# Patient Record
Sex: FEMALE | Race: WHITE | NOT HISPANIC OR LATINO | Employment: FULL TIME | URBAN - METROPOLITAN AREA
[De-identification: names, ages, dates, MRNs, and addresses within clinical notes are randomized per-mention and may not be internally consistent; named-entity substitution may affect disease eponyms.]

---

## 2017-02-02 DIAGNOSIS — M25.511 RIGHT SHOULDER PAIN, UNSPECIFIED CHRONICITY: Primary | ICD-10-CM

## 2017-02-03 ENCOUNTER — OFFICE VISIT (OUTPATIENT)
Dept: ORTHOPEDICS | Facility: CLINIC | Age: 62
End: 2017-02-03
Payer: COMMERCIAL

## 2017-02-03 ENCOUNTER — HOSPITAL ENCOUNTER (OUTPATIENT)
Dept: RADIOLOGY | Facility: HOSPITAL | Age: 62
Discharge: HOME | End: 2017-02-03
Attending: ORTHOPAEDIC SURGERY
Payer: COMMERCIAL

## 2017-02-03 VITALS — BODY MASS INDEX: 20.16 KG/M2 | HEIGHT: 68 IN | WEIGHT: 133 LBS

## 2017-02-03 DIAGNOSIS — M25.511 RIGHT SHOULDER PAIN, UNSPECIFIED CHRONICITY: ICD-10-CM

## 2017-02-03 DIAGNOSIS — M85.80 OSTEOPENIA: ICD-10-CM

## 2017-02-03 DIAGNOSIS — M54.12 CERVICAL RADICULAR PAIN: Primary | ICD-10-CM

## 2017-02-03 PROCEDURE — 73030 X-RAY EXAM OF SHOULDER: CPT | Mod: 26,RT,, | Performed by: RADIOLOGY

## 2017-02-03 PROCEDURE — 73030 X-RAY EXAM OF SHOULDER: CPT | Mod: TC,PO,RT

## 2017-02-03 PROCEDURE — 99213 OFFICE O/P EST LOW 20 MIN: CPT | Mod: S$GLB,,, | Performed by: ORTHOPAEDIC SURGERY

## 2017-02-03 PROCEDURE — 99999 PR PBB SHADOW E&M-EST. PATIENT-LVL II: CPT | Mod: PBBFAC,,, | Performed by: ORTHOPAEDIC SURGERY

## 2017-02-14 ENCOUNTER — OFFICE VISIT (OUTPATIENT)
Dept: OPHTHALMOLOGY | Facility: CLINIC | Age: 62
End: 2017-02-14
Payer: COMMERCIAL

## 2017-02-14 DIAGNOSIS — H40.053 OHT (OCULAR HYPERTENSION), BILATERAL: Primary | ICD-10-CM

## 2017-02-14 DIAGNOSIS — H52.13 HIGH MYOPIA, BILATERAL: ICD-10-CM

## 2017-02-14 DIAGNOSIS — Z98.890 HISTORY OF VITRECTOMY: ICD-10-CM

## 2017-02-14 DIAGNOSIS — Z96.1 PSEUDOPHAKIA OF BOTH EYES: ICD-10-CM

## 2017-02-14 PROCEDURE — 92012 INTRM OPH EXAM EST PATIENT: CPT | Mod: S$GLB,,, | Performed by: OPHTHALMOLOGY

## 2017-02-14 PROCEDURE — 99999 PR PBB SHADOW E&M-EST. PATIENT-LVL II: CPT | Mod: PBBFAC,,, | Performed by: OPHTHALMOLOGY

## 2017-02-14 RX ORDER — DORZOLAMIDE HCL 20 MG/ML
1 SOLUTION/ DROPS OPHTHALMIC 2 TIMES DAILY
Qty: 10 ML | Refills: 11 | Status: SHIPPED | OUTPATIENT
Start: 2017-02-14 | End: 2017-07-25

## 2017-02-14 NOTE — MR AVS SNAPSHOT
[facility]  [address] [facility] [address] [place] [address]  Phone: [phone]  Fax: [phone]                  Ashlie Redman   2017 8:30 AM   Office Visit    Description:  Female : 1955   Provider:  Almaz Vasquez MD   Department:  [facility]           Reason for Visit     Glaucoma Suspect           Diagnoses this Visit        Comments    OHT (ocular hypertension), bilateral    -  Primary     Pseudophakia of both eyes         History of vitrectomy         High myopia, bilateral                To Do List           Future Appointments        Provider Department Dept Phone    3/21/2017 3:00 PM Tanna Romeo MD [facility] - [phone]      Goals (5 Years of Data)     None      Follow-Up and Disposition     Return in about 3 months (around 2017) for IOP check, OCT optic nerve.       These Medications        Disp Refills Start End    dorzolamide (TRUSOPT) 2 % ophthalmic solution 10 mL 11 2017    Place 1 drop into both eyes 2 (two) times daily. - Both Eyes    Pharmacy: [id number] - [place], [place] - [address] [facility] Ph #: [phone]         [facility]     [facility] On Call Nurse Care Line -  Assistance  Registered nurses in the [facility] Center provide clinical advisement, health education, appointment booking, and other advisory services.  Call for this free service at [phone].             Medications           Message regarding Medications     Verify the changes and/or additions to your medication regime listed below are the same as discussed with your clinician today.  If any of these changes or additions are incorrect, please notify your healthcare provider.        START taking these NEW medications        Refills    dorzolamide (TRUSOPT) 2 % ophthalmic solution 11    Sig: Place 1 drop into both eyes 2 (two) times daily.    Class: Normal    Route: Both Eyes      STOP taking these  medications     PROZAC 20 mg capsule     timolol maleate 0.5% (TIMOPTIC) 0.5 % Drop Place 1 drop into both eyes every morning.           Verify that the below list of medications is an accurate representation of the medications you are currently taking.  If none reported, the list may be blank. If incorrect, please contact your healthcare provider. Carry this list with you in case of emergency.           Current Medications     estradiol-levonorgestrel (CLIMARA PRO) 0.045-0.015 mg/24 hr Place 1 patch onto the skin once a week.    latanoprost 0.005 % ophthalmic solution     latanoprost 0.005 % ophthalmic solution INSTILL ONE DROP INTO EACH EYE ONCE DAILY IN THE EVENING    nystatin (MYCOSTATIN) cream Apply topically 2 (two) times daily.    VALTREX 500 mg tablet     diazepam (VALIUM) 2 MG tablet 1/2 to 1 po q 6h prn anxiety    dorzolamide (TRUSOPT) 2 % ophthalmic solution Place 1 drop into both eyes 2 (two) times daily.    triamcinolone acetonide 0.1% (KENALOG) 0.1 % cream Apply topically 2 (two) times daily.           Clinical Reference Information           Allergies as of 2/14/2017     No Known Drug Allergies      Immunizations Administered on Date of Encounter - 2/14/2017     None      Orders Placed During Today's Visit     Future Labs/Procedures Expected by Expires    Posterior Segment OCT Optic Nerve- Both eyes  As directed 2/14/2018      Language Assistance Services     ATTENTION: Language assistance services are available, free of charge. Please call [phone].      ATENCIÓN: Si habla roblesañol, tiene a gabriel disposición servicios gratuitos de asistencia lingüística. [place] al [phone].     [facility] Ý: N?u b?n nói Ti?ng Vi?t, có các d?ch v? h? tr? ngôn ng? mi?n phí dành cho b?n. G?i s? [phone].         [facility] complies with applicable Federal civil rights laws and does not discriminate on the basis of race, color, national origin, age, disability, or sex.

## 2017-02-14 NOTE — PROGRESS NOTES
HPI     4 month IOP check, c/o right eye socket was bothering a few days ago went   to bed with it, has not re occurred,  eye pain, using Timolol .5 every AM   and Latanoprost ou HS. States compliance. Would like to discuss gtts   having trouble with low BP.        Last edited by Tiffany Chamberlain on 2/14/2017  8:50 AM.         Assessment /Plan     For exam results, see Encounter Report.    OHT (ocular hypertension), bilateral  -     Posterior Segment OCT Optic Nerve- Both eyes; Future    Pseudophakia of both eyes    History of vitrectomy    High myopia, bilateral    Other orders  -     dorzolamide (TRUSOPT) 2 % ophthalmic solution; Place 1 drop into both eyes 2 (two) times daily.  Dispense: 10 mL; Refill: 11            OHT (ocular hypertension), bilateral - IOP remains great today with addition of Timolol 0.5% QAM OU on 7/19/16, however having symptomatic hypotension/bradycardia since Jan '17. Prior to this, was running mid 20's with occasional pain OS. IOP was significantly elevated off Latanoprost - Tmax 37/44! Maternal grandmother with glaucoma, no known first degree relatives. CCT thick. Baseline HVF/HRT WNL, small cups on clinical exam OU. Last HRT - possible increase in LCD OD, decrease OS, no RNFL thinning identified. Last Gonio - open to CB OU, few tiny iris root vessels visible, no NV. Will try switching T .5 to Dorzolamide BID - if not effective, then can consider 0.25%, as seems to have had a dramatic response in IOP lowering with Timolol. RTC 3 months for IOP check and OCT optic nerve.    Pseudophakia of both eyes - stable    History of vitrectomy - for floaters/AH

## 2017-03-21 ENCOUNTER — OFFICE VISIT (OUTPATIENT)
Dept: OBSTETRICS AND GYNECOLOGY | Facility: CLINIC | Age: 62
End: 2017-03-21
Payer: COMMERCIAL

## 2017-03-21 VITALS
WEIGHT: 136.69 LBS | BODY MASS INDEX: 20.72 KG/M2 | DIASTOLIC BLOOD PRESSURE: 64 MMHG | HEIGHT: 68 IN | SYSTOLIC BLOOD PRESSURE: 118 MMHG

## 2017-03-21 DIAGNOSIS — Z79.890 POSTMENOPAUSAL HRT (HORMONE REPLACEMENT THERAPY): ICD-10-CM

## 2017-03-21 DIAGNOSIS — Z11.51 SCREENING FOR HPV (HUMAN PAPILLOMAVIRUS): ICD-10-CM

## 2017-03-21 DIAGNOSIS — Z01.419 ROUTINE GYNECOLOGICAL EXAMINATION: Primary | ICD-10-CM

## 2017-03-21 DIAGNOSIS — Z82.62 FAMILY HISTORY OF OSTEOPOROSIS: ICD-10-CM

## 2017-03-21 DIAGNOSIS — N81.2 UTEROVAGINAL PROLAPSE, INCOMPLETE: ICD-10-CM

## 2017-03-21 DIAGNOSIS — N63.20 LEFT BREAST MASS: ICD-10-CM

## 2017-03-21 DIAGNOSIS — N81.11 MIDLINE CYSTOCELE: ICD-10-CM

## 2017-03-21 DIAGNOSIS — R23.2 HOT FLASHES: ICD-10-CM

## 2017-03-21 PROCEDURE — 99396 PREV VISIT EST AGE 40-64: CPT | Mod: S$GLB,,, | Performed by: OBSTETRICS & GYNECOLOGY

## 2017-03-21 PROCEDURE — 87624 HPV HI-RISK TYP POOLED RSLT: CPT

## 2017-03-21 PROCEDURE — 88175 CYTOPATH C/V AUTO FLUID REDO: CPT

## 2017-03-21 PROCEDURE — 99999 PR PBB SHADOW E&M-EST. PATIENT-LVL III: CPT | Mod: PBBFAC,,, | Performed by: OBSTETRICS & GYNECOLOGY

## 2017-03-21 RX ORDER — PAROXETINE 10 MG/1
10 TABLET, FILM COATED ORAL NIGHTLY
Qty: 30 TABLET | Refills: 11 | Status: SHIPPED | OUTPATIENT
Start: 2017-03-21 | End: 2017-05-16

## 2017-03-21 NOTE — PROGRESS NOTES
Chief Complaint   Patient presents with    Well Woman    Breast Mass     left breast for about 2 weeks     Axillary Mass     left 4 days        History of Present Illness: Ashlie Redman is a 62 y.o. female that presents today 3/21/2017 for well gyn visit.    Past Medical History:   Diagnosis Date    Allergy     Asteroid hyalosis of both eyes     Diverticulosis     Glaucoma     High myopia     S/P dilation and curettage        Past Surgical History:   Procedure Laterality Date    breast augmentation  03/2013    CATARACT EXTRACTION W/  INTRAOCULAR LENS IMPLANT Bilateral 2009    COLONOSCOPY  4/2009, 2015    repeat in 5 years    DILATION AND CURETTAGE OF UTERUS      ECTOPIC PREGNANCY SURGERY      EYE SURGERY Bilateral 3/09    Vitrectomy     POLYPECTOMY      x3 2001    Yag Capsulotomy Bilateral 12/2014       Current Outpatient Prescriptions   Medication Sig Dispense Refill    diazepam (VALIUM) 2 MG tablet 1/2 to 1 po q 6h prn anxiety 20 tablet 0    dorzolamide (TRUSOPT) 2 % ophthalmic solution Place 1 drop into both eyes 2 (two) times daily. 10 mL 11    estradiol-levonorgestrel (CLIMARA PRO) 0.045-0.015 mg/24 hr Place 1 patch onto the skin once a week. 4 patch 11    latanoprost 0.005 % ophthalmic solution INSTILL ONE DROP INTO EACH EYE ONCE DAILY IN THE EVENING 8 mL 3    nystatin (MYCOSTATIN) cream Apply topically 2 (two) times daily. 30 g 0    VALTREX 500 mg tablet       triamcinolone acetonide 0.1% (KENALOG) 0.1 % cream Apply topically 2 (two) times daily. 45 g 0     No current facility-administered medications for this visit.        Review of patient's allergies indicates:   Allergen Reactions    No known drug allergies        Family History   Problem Relation Age of Onset    Cancer Sister      rectal; passed    Breast cancer Cousin     Cancer Cousin      breast    Heart disease Father      passed    Colon cancer Mother      hospice    Pancreatic cancer Mother      39yo; 91yo     "Cataracts Mother     Hypertension Mother     Thyroid disease Mother     Cancer Mother      colon    Glaucoma Maternal Grandmother     Amblyopia Neg Hx     Blindness Neg Hx     Diabetes Neg Hx     Macular degeneration Neg Hx     Retinal detachment Neg Hx     Strabismus Neg Hx     Stroke Neg Hx        Social History     Social History    Marital status:      Spouse name: N/A    Number of children: N/A    Years of education: N/A     Occupational History     [facility] (Clinics)     Social History Main Topics    Smoking status: Never Smoker    Smokeless tobacco: Never Used    Alcohol use 0.0 oz/week     0 Standard drinks or equivalent per week      Comment: 1/nt    Drug use: No    Sexual activity: Yes     Partners: Male     Birth control/ protection: Post-menopausal     Other Topics Concern    Are You Pregnant Or Think You May Be? No     Social History Narrative       OB History    Para Term  AB SAB TAB Ectopic Multiple Living   3 2  2 1   1  2      # Outcome Date GA Lbr Beni/2nd Weight Sex Delivery Anes PTL Lv   3   36w0d  2.778 kg (6 lb 2 oz) F Vag-Spont EPI  Y      Birth Comments: no complications   2 Ectopic 1988           1   34w0d  2.07 kg (4 lb 9 oz) F Vag-Spont EPI  Y      Birth Comments: no complications          Review of Symptoms:  GENERAL: Denies weight gain or weight loss. Feeling well overall.   SKIN: Denies rash or lesions.   HEAD: Denies head injury or headache.   NODES: Denies enlarged lymph nodes.   CHEST: Denies chest pain or shortness of breath.   CARDIOVASCULAR: Denies palpitations or left sided chest pain.   ABDOMEN: No abdominal pain, constipation, diarrhea, nausea, vomiting or rectal bleeding.   URINARY: No frequency, dysuria, hematuria, or burning on urination.  HEMATOLOGIC: No easy bruisability or excessive bleeding.   MUSCULOSKELETAL: Denies joint pain or swelling.     /64  Ht 5' 8" (1.727 m)  Wt 62 kg (136 lb " 11 oz)  BMI 20.78 kg/m2  Physical Exam:  APPEARANCE: Well nourished, well developed, in no acute distress.  SKIN: Normal skin turgor, no lesions.  NECK: Neck symmetric without masses   RESPIRATORY: Normal respiratory effort with no retractions or use of accessory muscles  CARDIOVASCULAR: Peripheral vascular system with no swelling no varicosities and palpation of pulses normal  LYMPHATIC: No enlargements of the lymph nodes noted in the neck, axillae, or groin  ABDOMEN: Soft. No tenderness or masses. No hepatosplenomegaly. No hernias.  BREASTS: Symmetrical, no skin changes or visible lesions. No palpable masses, nipple discharge or adenopathy bilaterally.  PELVIC: Normal external female genitalia without lesions. Normal hair distribution. Adequate perineal body, normal urethral meatus. Urethra with no masses.  Bladder nontender. Vagina moist and well rugated without lesions or discharge. Cervix pink and without lesions. No significant cystocele or rectocele. Bimanual exam showed uterus normal size, shape, position, mobile and nontender. Adnexa without masses or tenderness. Urethra and bladder normal. PAP DONE  EXTREMITIES: No clubbing cyanosis or edema.    ASSESSMENT/PLAN:  Routine gynecological examination  -     Liquid-based pap smear, screening    Screening for HPV (human papillomavirus)  -     HPV High Risk Genotypes, PCR          Patient was counseled today on Pap guidelines, recommendation for pelvic exams, mammograms every other year after the age of 40 and annually after the age of 50, Colonoscopy after the age of 50, Dexa Bone Scan and calcium and vitamin D supplementation in menopause and to see her PCP for other health maintenance.   FOLLOW-UP:prn

## 2017-03-21 NOTE — PROGRESS NOTES
Chief Complaint   Patient presents with    Well Woman    Breast Mass     left breast for about 2 weeks     Axillary Mass     left 4 days        History of Present Illness: Ashlie Redman is a 62 y.o. female that presents today 3/21/2017 for well gyn visit.    Past Medical History:   Diagnosis Date    Allergy     Asteroid hyalosis of both eyes     Diverticulosis     Glaucoma     High myopia     Osteopenia     S/P dilation and curettage        Past Surgical History:   Procedure Laterality Date    breast augmentation  03/2013    CATARACT EXTRACTION W/  INTRAOCULAR LENS IMPLANT Bilateral 2009    COLONOSCOPY  4/2009, 2015    repeat in 5 years    DILATION AND CURETTAGE OF UTERUS      ECTOPIC PREGNANCY SURGERY      EYE SURGERY Bilateral 3/09    Vitrectomy     POLYPECTOMY      x3 2001    Yag Capsulotomy Bilateral 12/2014       Current Outpatient Prescriptions   Medication Sig Dispense Refill    diazepam (VALIUM) 2 MG tablet 1/2 to 1 po q 6h prn anxiety 20 tablet 0    dorzolamide (TRUSOPT) 2 % ophthalmic solution Place 1 drop into both eyes 2 (two) times daily. 10 mL 11    estradiol-levonorgestrel (CLIMARA PRO) 0.045-0.015 mg/24 hr Place 1 patch onto the skin once a week. 4 patch 11    latanoprost 0.005 % ophthalmic solution INSTILL ONE DROP INTO EACH EYE ONCE DAILY IN THE EVENING 8 mL 3    nystatin (MYCOSTATIN) cream Apply topically 2 (two) times daily. 30 g 0    VALTREX 500 mg tablet       paroxetine (PAXIL) 10 MG tablet Take 1 tablet (10 mg total) by mouth every evening. 30 tablet 11    triamcinolone acetonide 0.1% (KENALOG) 0.1 % cream Apply topically 2 (two) times daily. 45 g 0     No current facility-administered medications for this visit.        Review of patient's allergies indicates:   Allergen Reactions    No known drug allergies        Family History   Problem Relation Age of Onset    Cancer Sister      rectal; passed    Breast cancer Cousin     Cancer Cousin      breast    Heart  disease Father      passed    Colon cancer Mother      hospice    Pancreatic cancer Mother      39yo; 89yo    Cataracts Mother     Hypertension Mother     Thyroid disease Mother     Cancer Mother      colon    Glaucoma Maternal Grandmother     Amblyopia Neg Hx     Blindness Neg Hx     Diabetes Neg Hx     Macular degeneration Neg Hx     Retinal detachment Neg Hx     Strabismus Neg Hx     Stroke Neg Hx        Social History     Social History    Marital status:      Spouse name: N/A    Number of children: N/A    Years of education: N/A     Occupational History     [facility] (Clinics)     Social History Main Topics    Smoking status: Never Smoker    Smokeless tobacco: Never Used    Alcohol use 0.0 oz/week     0 Standard drinks or equivalent per week      Comment: 1/nt    Drug use: No    Sexual activity: Yes     Partners: Male     Birth control/ protection: Post-menopausal     Other Topics Concern    Are You Pregnant Or Think You May Be? No     Social History Narrative       OB History    Para Term  AB SAB TAB Ectopic Multiple Living   3 2  2 1   1  2      # Outcome Date GA Lbr Beni/2nd Weight Sex Delivery Anes PTL Lv   3   36w0d  2.778 kg (6 lb 2 oz) F Vag-Spont EPI  Y      Birth Comments: no complications   2 Ectopic 1988           1   34w0d  2.07 kg (4 lb 9 oz) F Vag-Spont EPI  Y      Birth Comments: no complications          Review of Symptoms:  GENERAL: Denies weight gain or weight loss. Feeling well overall.   SKIN: Denies rash or lesions.   HEAD: Denies head injury or headache.   NODES: Denies enlarged lymph nodes.   CHEST: Denies chest pain or shortness of breath.   CARDIOVASCULAR: Denies palpitations or left sided chest pain.   ABDOMEN: No abdominal pain, constipation, diarrhea, nausea, vomiting or rectal bleeding.   URINARY: No frequency, dysuria, hematuria, or burning on urination.  HEMATOLOGIC: No easy bruisability or excessive  "bleeding.   MUSCULOSKELETAL: Denies joint pain or swelling.     /64  Ht 5' 8" (1.727 m)  Wt 62 kg (136 lb 11 oz)  BMI 20.78 kg/m2  Physical Exam:  APPEARANCE: Well nourished, well developed, in no acute distress.  SKIN: Normal skin turgor, no lesions.  NECK: Neck symmetric without masses   RESPIRATORY: Normal respiratory effort with no retractions or use of accessory muscles  CARDIOVASCULAR: Peripheral vascular system with no swelling no varicosities and palpation of pulses normal  LYMPHATIC: No enlargements of the lymph nodes noted in the neck, axillae, or groin  ABDOMEN: Soft. No tenderness or masses. No hepatosplenomegaly. No hernias.  BREASTS: Symmetrical, ++ left upper outer quadrant 1 o'clock firm mobile large mass 3 cm X 3 cm and 3 o'clock small 1 cm mass of the left breast   PELVIC: Normal external female genitalia without lesions. Normal hair distribution. Adequate perineal body, normal urethral meatus. Urethra with no masses.  Bladder nontender. Vagina moist and well rugated without lesions or discharge. Cervix pink and without lesions.grade 2 cystocele no rectocele. Bimanual exam showed uterus normal size, shape, position with grade 1, mobile and nontender. Adnexa without masses or tenderness. Urethra and bladder normal. PAP DONE  EXTREMITIES: No clubbing cyanosis or edema.    ASSESSMENT/PLAN:  Routine gynecological examination  -     Liquid-based pap smear, screening    Screening for HPV (human papillomavirus)  -     HPV High Risk Genotypes, PCR    Family history of osteoporosis  -     DXA Bone Density Spine And Hip_Axial Skeleton; Future; Expected date: 3/21/17    Postmenopausal HRT (hormone replacement therapy)  -     estradiol-levonorgestrel (CLIMARA PRO) 0.045-0.015 mg/24 hr; Place 1 patch onto the skin once a week.  Dispense: 4 patch; Refill: 11    Midline cystocele    Uterovaginal prolapse, incomplete    Left breast mass  -     Mammo Digital Diagnostic Bilat with Haroldo; Future; Expected " date: 3/21/17    Hot flashes  -     paroxetine (PAXIL) 10 MG tablet; Take 1 tablet (10 mg total) by mouth every evening.  Dispense: 30 tablet; Refill: 11          Patient was counseled today on Pap guidelines, recommendation for pelvic exams, mammograms every other year after the age of 40 and annually after the age of 50, Colonoscopy after the age of 50, Dexa Bone Scan and calcium and vitamin D supplementation in menopause and to see her PCP for other health maintenance.   FOLLOW-UP:prn

## 2017-03-21 NOTE — MR AVS SNAPSHOT
[facility]  [address] [place] [address]  Phone: [phone]                  Ashlie Redman   3/21/2017 3:00 PM   Office Visit    Description:  Female : 1955   Provider:  Tanna Romeo MD   Department:  [facility] - OB/GYN           Reason for Visit     Well Woman     Breast Mass     Axillary Mass           Diagnoses this Visit        Comments    Routine gynecological examination    -  Primary     Screening for HPV (human papillomavirus)                To Do List           Future Appointments        Provider Department Dept Phone    2017 8:00 AM Almaz Vasquez MD [facility] [phone]    2017 8:30 AM VISUAL STUART [facility] [phone]      Goals (5 Years of Data)     None      [facility]     [facility] Nurse Care Line -  Assistance  Registered nurses in the [facility] Center provide clinical advisement, health education, appointment booking, and other advisory services.  Call for this free service at [phone].             Medications           Message regarding Medications     Verify the changes and/or additions to your medication regime listed below are the same as discussed with your clinician today.  If any of these changes or additions are incorrect, please notify your healthcare provider.             Verify that the below list of medications is an accurate representation of the medications you are currently taking.  If none reported, the list may be blank. If incorrect, please contact your healthcare provider. Carry this list with you in case of emergency.           Current Medications     diazepam (VALIUM) 2 MG tablet 1/2 to 1 po q 6h prn anxiety    dorzolamide (TRUSOPT) 2 % ophthalmic solution Place 1 drop into both eyes 2 (two) times daily.    estradiol-levonorgestrel (CLIMARA PRO) 0.045-0.015 mg/24 hr Place 1 patch onto the skin once a week.    latanoprost 0.005 % ophthalmic  "solution INSTILL ONE DROP INTO EACH EYE ONCE DAILY IN THE EVENING    nystatin (MYCOSTATIN) cream Apply topically 2 (two) times daily.    VALTREX 500 mg tablet     triamcinolone acetonide 0.1% (KENALOG) 0.1 % cream Apply topically 2 (two) times daily.           Clinical Reference Information           Your Vitals Were     BP Height Weight BMI       118/64 5' 8" (1.727 m) 62 kg (136 lb 11 oz) 20.78 kg/m2       Blood Pressure          Most Recent Value    BP  118/64      Allergies as of 3/21/2017     No Known Drug Allergies      Immunizations Administered on Date of Encounter - 3/21/2017     None      Orders Placed During Today's Visit      Normal Orders This Visit    HPV High Risk Genotypes, PCR     Liquid-based pap smear, screening       Language Assistance Services     ATTENTION: Language assistance services are available, free of charge. Please call [phone].      ATENCIÓN: Si eloy pittman, tiene a gabriel disposición servicios gratuitos de asistencia lingüística. Llame al [phone].     DERRICK Ý: N?u b?n nói Ti?ng Vi?t, có các d?ch v? h? tr? ngôn ng? mi?n phí dành cho b?n. G?i s? [phone].         [facility] - OB[facility]GY[facility] complies with applicable Federal civil rights laws and does not discriminate on the basis of race, color, national origin, age, disability, or sex.        "

## 2017-03-23 ENCOUNTER — HOSPITAL ENCOUNTER (OUTPATIENT)
Dept: RADIOLOGY | Facility: HOSPITAL | Age: 62
Discharge: HOME | End: 2017-03-23
Attending: OBSTETRICS & GYNECOLOGY
Payer: COMMERCIAL

## 2017-03-23 DIAGNOSIS — Z82.62 FAMILY HISTORY OF OSTEOPOROSIS: ICD-10-CM

## 2017-03-23 DIAGNOSIS — N63.20 LEFT BREAST MASS: ICD-10-CM

## 2017-03-23 PROCEDURE — 76642 ULTRASOUND BREAST LIMITED: CPT | Mod: 26,50,, | Performed by: RADIOLOGY

## 2017-03-23 PROCEDURE — 77062 BREAST TOMOSYNTHESIS BI: CPT | Mod: 26,,, | Performed by: RADIOLOGY

## 2017-03-23 PROCEDURE — 77080 DXA BONE DENSITY AXIAL: CPT | Mod: 26,,, | Performed by: RADIOLOGY

## 2017-03-23 PROCEDURE — 77080 DXA BONE DENSITY AXIAL: CPT | Mod: TC,PO

## 2017-03-23 PROCEDURE — 77066 DX MAMMO INCL CAD BI: CPT | Mod: TC

## 2017-03-23 PROCEDURE — 77066 DX MAMMO INCL CAD BI: CPT | Mod: 26,,, | Performed by: RADIOLOGY

## 2017-03-23 PROCEDURE — 76642 ULTRASOUND BREAST LIMITED: CPT | Mod: TC,50,PO

## 2017-03-27 LAB
HPV16 DNA SPEC QL NAA+PROBE: NEGATIVE
HPV16+18+H RISK 12 DNA CVX-IMP: NEGATIVE
HPV18 DNA SPEC QL NAA+PROBE: NEGATIVE

## 2017-03-28 ENCOUNTER — OFFICE VISIT (OUTPATIENT)
Dept: OBSTETRICS AND GYNECOLOGY | Facility: CLINIC | Age: 62
End: 2017-03-28
Payer: COMMERCIAL

## 2017-03-28 ENCOUNTER — OFFICE VISIT (OUTPATIENT)
Dept: SURGERY | Facility: CLINIC | Age: 62
End: 2017-03-28
Payer: COMMERCIAL

## 2017-03-28 VITALS
WEIGHT: 136.44 LBS | DIASTOLIC BLOOD PRESSURE: 78 MMHG | BODY MASS INDEX: 20.75 KG/M2 | SYSTOLIC BLOOD PRESSURE: 124 MMHG

## 2017-03-28 VITALS — HEIGHT: 68 IN | BODY MASS INDEX: 20.68 KG/M2 | WEIGHT: 136.44 LBS

## 2017-03-28 DIAGNOSIS — N63.20 MASS OF BREAST, LEFT: Primary | ICD-10-CM

## 2017-03-28 DIAGNOSIS — N61.0 MASTITIS, LEFT, ACUTE: Primary | ICD-10-CM

## 2017-03-28 DIAGNOSIS — R22.32 MASS OF AXILLA, LEFT: ICD-10-CM

## 2017-03-28 PROCEDURE — 99999 PR PBB SHADOW E&M-EST. PATIENT-LVL III: CPT | Mod: PBBFAC,,, | Performed by: OBSTETRICS & GYNECOLOGY

## 2017-03-28 PROCEDURE — 99999 PR PBB SHADOW E&M-EST. PATIENT-LVL III: CPT | Mod: PBBFAC,,, | Performed by: SURGERY

## 2017-03-28 PROCEDURE — 87070 CULTURE OTHR SPECIMN AEROBIC: CPT

## 2017-03-28 PROCEDURE — 99244 OFF/OP CNSLTJ NEW/EST MOD 40: CPT | Mod: S$GLB,,, | Performed by: SURGERY

## 2017-03-28 PROCEDURE — 1160F RVW MEDS BY RX/DR IN RCRD: CPT | Mod: S$GLB,,, | Performed by: OBSTETRICS & GYNECOLOGY

## 2017-03-28 PROCEDURE — 99213 OFFICE O/P EST LOW 20 MIN: CPT | Mod: S$GLB,,, | Performed by: OBSTETRICS & GYNECOLOGY

## 2017-03-28 RX ORDER — CLINDAMYCIN HYDROCHLORIDE 300 MG/1
300 CAPSULE ORAL EVERY 8 HOURS
Qty: 30 CAPSULE | Refills: 0 | Status: SHIPPED | OUTPATIENT
Start: 2017-03-28 | End: 2017-04-11

## 2017-03-28 RX ORDER — SULFAMETHOXAZOLE AND TRIMETHOPRIM 800; 160 MG/1; MG/1
TABLET ORAL
COMMUNITY
Start: 2017-03-23 | End: 2017-04-11

## 2017-03-28 NOTE — LETTER
March 28, 2017      Tanna Romeo MD  [address]  [address]  [facility] [address]           [facility] General Surgery  [address]  [facility] [address]  Phone: [phone]          Patient: Ashlie Redman   MR Number: [id number]   YOB: 1955   Date of Visit: 3/28/2017       Dear Dr. Tanna Romeo:    Thank you for referring Ashlie Redman to me for evaluation. Attached you will find relevant portions of my assessment and plan of care.    If you have questions, please do not hesitate to call me. I look forward to following Ashlie Redman along with you.    Sincerely,    Justice Gallagher MD    Enclosure  CC:  No Recipients    If you would like to receive this communication electronically, please contact [email or web address] or [phone] to request more information on [employer] access.    For providers and/or their staff who would like to refer a patient to [facility], please contact us through our one-stop-shop provider referral line, [facility], at [phone].    If you feel you have received this communication in error or would no longer like to receive these types of communications, please e-mail [email or web address]

## 2017-03-28 NOTE — MR AVS SNAPSHOT
[facility]  [address]  [place] [address]  Phone: [phone]                  Ashlie Redman   3/28/2017 10:20 AM   Office Visit    Description:  Female : 1955   Provider:  Tanna Romeo MD   Department:  [facility]           Reason for Visit     Breast Mass                To Do List           Future Appointments        Provider Department Dept Phone    3/28/2017 10:20 AM Tanna Romeo MD [facility] [phone]    2017 1:00 PM [facility] US[facility] [phone]    2017 8:00 AM Almaz Vasquez MD [facility] Ophthalmology [phone]    2017 8:30 AM [facility] [phone]      Goals (5 Years of Data)     None      [facility] On Call     [facility] On [facility] Nurse Care Line -  Assistance  Registered nurses in the [facility] Center provide clinical advisement, health education, appointment booking, and other advisory services.  Call for this free service at [phone].             Medications           Message regarding Medications     Verify the changes and/or additions to your medication regime listed below are the same as discussed with your clinician today.  If any of these changes or additions are incorrect, please notify your healthcare provider.             Verify that the below list of medications is an accurate representation of the medications you are currently taking.  If none reported, the list may be blank. If incorrect, please contact your healthcare provider. Carry this list with you in case of emergency.           Current Medications     diazepam (VALIUM) 2 MG tablet 1/2 to 1 po q 6h prn anxiety    dorzolamide (TRUSOPT) 2 % ophthalmic solution Place 1 drop into both eyes 2 (two) times daily.    estradiol-levonorgestrel (CLIMARA PRO) 0.045-0.015 mg/24 hr Place 1 patch onto the skin once a week.    latanoprost 0.005 % ophthalmic solution INSTILL ONE  DROP INTO EACH EYE ONCE DAILY IN THE EVENING    nystatin (MYCOSTATIN) cream Apply topically 2 (two) times daily.    paroxetine (PAXIL) 10 MG tablet Take 1 tablet (10 mg total) by mouth every evening.    sulfamethoxazole-trimethoprim 800-160mg (BACTRIM DS) 800-160 mg Tab     triamcinolone acetonide 0.1% (KENALOG) 0.1 % cream Apply topically 2 (two) times daily.    VALTREX 500 mg tablet            Clinical Reference Information           Your Vitals Were     BP Weight BMI          124/78 61.9 kg (136 lb 7.4 oz) 20.75 kg/m2        Blood Pressure          Most Recent Value    BP  124/78      Allergies as of 3/28/2017     No Known Drug Allergies      Immunizations Administered on Date of Encounter - 3/28/2017     None      Language Assistance Services     ATTENTION: Language assistance services are available, free of charge. Please call [phone].      ATENCIÓN: Si habla zain, tiene a gabriel disposición servicios gratuitos de asistencia lingüística. Llame al [phone].     DERRICK Ý: N?u b?n nói Ti?ng Vi?t, có các d?ch v? h? tr? ngôn ng? mi?n phí dành cho b?n. G?i s? [phone].         [facility] complies with applicable Federal civil rights laws and does not discriminate on the basis of race, color, national origin, age, disability, or sex.

## 2017-03-28 NOTE — PROGRESS NOTES
Chief Complaint   Patient presents with    Breast Mass     Left breast mass, swelling has increased, has been on the bactrim since thursday    Nausea    Vomiting       History of Present Illness: Ashlie Redman is a 62 y.o. female that presents today 3/28/2017 for   Chief Complaint   Patient presents with    Breast Mass     Left breast mass, swelling has increased, has been on the bactrim since thursday    Nausea    Vomiting     She reports 1 week on the Bactrim. She reports now having severe worsening breast pain. She has no pain last week and now the breast is causing her severe pain. She reports no improvement in the size of the mass and now the lymph node is more tender.     Past Medical History:   Diagnosis Date    Allergy     Asteroid hyalosis of both eyes     Diverticulosis     Glaucoma     High myopia     Osteopenia     S/P dilation and curettage        Past Surgical History:   Procedure Laterality Date    breast augmentation  03/2013    CATARACT EXTRACTION W/  INTRAOCULAR LENS IMPLANT Bilateral 2009    COLONOSCOPY  4/2009, 2015    repeat in 5 years    DILATION AND CURETTAGE OF UTERUS      ECTOPIC PREGNANCY SURGERY      EYE SURGERY Bilateral 3/09    Vitrectomy     POLYPECTOMY      x3 2001    Yag Capsulotomy Bilateral 12/2014       Current Outpatient Prescriptions   Medication Sig Dispense Refill    diazepam (VALIUM) 2 MG tablet 1/2 to 1 po q 6h prn anxiety 20 tablet 0    dorzolamide (TRUSOPT) 2 % ophthalmic solution Place 1 drop into both eyes 2 (two) times daily. 10 mL 11    estradiol-levonorgestrel (CLIMARA PRO) 0.045-0.015 mg/24 hr Place 1 patch onto the skin once a week. 4 patch 11    latanoprost 0.005 % ophthalmic solution INSTILL ONE DROP INTO EACH EYE ONCE DAILY IN THE EVENING 8 mL 3    nystatin (MYCOSTATIN) cream Apply topically 2 (two) times daily. 30 g 0    paroxetine (PAXIL) 10 MG tablet Take 1 tablet (10 mg total) by mouth every evening. 30 tablet 11     sulfamethoxazole-trimethoprim 800-160mg (BACTRIM DS) 800-160 mg Tab       triamcinolone acetonide 0.1% (KENALOG) 0.1 % cream Apply topically 2 (two) times daily. 45 g 0    VALTREX 500 mg tablet        No current facility-administered medications for this visit.        Review of patient's allergies indicates:   Allergen Reactions    No known drug allergies        Family History   Problem Relation Age of Onset    Cancer Sister      rectal; passed    Breast cancer Cousin     Cancer Cousin      breast    Heart disease Father      passed    Colon cancer Mother      hospice    Pancreatic cancer Mother      39yo; 89yo    Cataracts Mother     Hypertension Mother     Thyroid disease Mother     Cancer Mother      colon    Glaucoma Maternal Grandmother     Amblyopia Neg Hx     Blindness Neg Hx     Diabetes Neg Hx     Macular degeneration Neg Hx     Retinal detachment Neg Hx     Strabismus Neg Hx     Stroke Neg Hx        Social History   Substance Use Topics    Smoking status: Never Smoker    Smokeless tobacco: Never Used    Alcohol use 0.0 oz/week     0 Standard drinks or equivalent per week      Comment: /nt       OB History    Para Term  AB SAB TAB Ectopic Multiple Living   3 2  2 1   1  2      # Outcome Date GA Lbr Beni/2nd Weight Sex Delivery Anes PTL Lv   3   36w0d  2.778 kg (6 lb 2 oz) F Vag-Spont EPI  Y      Birth Comments: no complications   2 Ectopic 1988           1   34w0d  2.07 kg (4 lb 9 oz) F Vag-Spont EPI  Y      Birth Comments: no complications          Review of Symptoms:  GENERAL: Denies weight gain or weight loss. Feeling well overall.   SKIN: Denies rash or lesions.   HEAD: Denies head injury or headache.   NODES: Denies enlarged lymph nodes.   CHEST: Denies chest pain or shortness of breath.   CARDIOVASCULAR: Denies palpitations or left sided chest pain.   ABDOMEN: No abdominal pain, constipation, diarrhea, nausea, vomiting or rectal bleeding.    URINARY: No frequency, dysuria, hematuria, or burning on urination.  HEMATOLOGIC: No easy bruisability or excessive bleeding.   MUSCULOSKELETAL: Denies joint pain or swelling.     /78  Wt 61.9 kg (136 lb 7.4 oz)  BMI 20.75 kg/m2  Physical Exam:  APPEARANCE: Well nourished, well developed, in no acute distress.  SKIN: Normal skin turgor, no lesions.  NECK: Neck symmetric without masses   RESPIRATORY: Normal respiratory effort with no retractions or use of accessory muscles  CARDIOVASCULAR: Peripheral vascular system with no swelling no varicosities and palpation of pulses normal  LYMPHATIC: No enlargements of the lymph nodes noted in the neck, axillae, or groin  ABDOMEN: Soft. No tenderness or masses. No hepatosplenomegaly. No hernias.  BREASTS: no change except now Tender small breast mass at 3 o'clock and tender large lymph node in the left axillary region.       ASSESSMENT/PLAN:  Mastitis, left, acute  -     Ambulatory consult to General Surgery      15 minutes spent today with this patient. Greater than half spent in counseling today.

## 2017-03-29 ENCOUNTER — TELEPHONE (OUTPATIENT)
Dept: RADIOLOGY | Facility: HOSPITAL | Age: 62
End: 2017-03-29

## 2017-03-30 LAB — BACTERIA SPEC AEROBE CULT: NORMAL

## 2017-04-02 ENCOUNTER — PATIENT MESSAGE (OUTPATIENT)
Dept: SURGERY | Facility: CLINIC | Age: 62
End: 2017-04-02

## 2017-04-02 ENCOUNTER — PATIENT MESSAGE (OUTPATIENT)
Dept: OBSTETRICS AND GYNECOLOGY | Facility: CLINIC | Age: 62
End: 2017-04-02

## 2017-04-03 ENCOUNTER — PATIENT MESSAGE (OUTPATIENT)
Dept: OBSTETRICS AND GYNECOLOGY | Facility: CLINIC | Age: 62
End: 2017-04-03

## 2017-04-03 ENCOUNTER — LAB VISIT (OUTPATIENT)
Dept: LAB | Facility: HOSPITAL | Age: 62
End: 2017-04-03
Attending: OBSTETRICS & GYNECOLOGY
Payer: COMMERCIAL

## 2017-04-03 DIAGNOSIS — N63.0 BREAST MASS: Primary | ICD-10-CM

## 2017-04-03 DIAGNOSIS — N63.0 BREAST MASS: ICD-10-CM

## 2017-04-03 LAB
BASOPHILS # BLD AUTO: 0.03 K/UL
BASOPHILS NFR BLD: 0.6 %
DIFFERENTIAL METHOD: ABNORMAL
EOSINOPHIL # BLD AUTO: 0.1 K/UL
EOSINOPHIL NFR BLD: 2.2 %
ERYTHROCYTE [DISTWIDTH] IN BLOOD BY AUTOMATED COUNT: 11.9 %
HCT VFR BLD AUTO: 37.5 %
HGB BLD-MCNC: 13.1 G/DL
LYMPHOCYTES # BLD AUTO: 2.6 K/UL
LYMPHOCYTES NFR BLD: 47.9 %
MCH RBC QN AUTO: 33.6 PG
MCHC RBC AUTO-ENTMCNC: 34.9 %
MCV RBC AUTO: 96 FL
MONOCYTES # BLD AUTO: 0.5 K/UL
MONOCYTES NFR BLD: 8.9 %
NEUTROPHILS # BLD AUTO: 2.2 K/UL
NEUTROPHILS NFR BLD: 40 %
PLATELET # BLD AUTO: 239 K/UL
PMV BLD AUTO: 10.9 FL
RBC # BLD AUTO: 3.9 M/UL
WBC # BLD AUTO: 5.37 K/UL

## 2017-04-03 PROCEDURE — 85025 COMPLETE CBC W/AUTO DIFF WBC: CPT

## 2017-04-03 PROCEDURE — 36415 COLL VENOUS BLD VENIPUNCTURE: CPT | Mod: PO

## 2017-04-05 ENCOUNTER — HOSPITAL ENCOUNTER (OUTPATIENT)
Dept: RADIOLOGY | Facility: HOSPITAL | Age: 62
Discharge: HOME | End: 2017-04-05
Attending: SURGERY
Payer: COMMERCIAL

## 2017-04-05 DIAGNOSIS — R22.32 MASS OF AXILLA, LEFT: ICD-10-CM

## 2017-04-05 DIAGNOSIS — N63.20 MASS OF BREAST, LEFT: ICD-10-CM

## 2017-04-05 DIAGNOSIS — R92.8 ABNORMAL MAMMOGRAM OF LEFT BREAST: ICD-10-CM

## 2017-04-05 PROCEDURE — 19083 BX BREAST 1ST LESION US IMAG: CPT | Mod: PO

## 2017-04-05 PROCEDURE — 19083 BX BREAST 1ST LESION US IMAG: CPT | Mod: ,,, | Performed by: RADIOLOGY

## 2017-04-05 PROCEDURE — 88305 TISSUE EXAM BY PATHOLOGIST: CPT | Performed by: PATHOLOGY

## 2017-04-05 PROCEDURE — 88305 TISSUE EXAM BY PATHOLOGIST: CPT | Mod: 26,,, | Performed by: PATHOLOGY

## 2017-04-05 PROCEDURE — 88360 TUMOR IMMUNOHISTOCHEM/MANUAL: CPT | Mod: 26,,, | Performed by: PATHOLOGY

## 2017-04-10 ENCOUNTER — TELEPHONE (OUTPATIENT)
Dept: RADIOLOGY | Facility: HOSPITAL | Age: 62
End: 2017-04-10

## 2017-04-10 ENCOUNTER — PATIENT MESSAGE (OUTPATIENT)
Dept: OBSTETRICS AND GYNECOLOGY | Facility: CLINIC | Age: 62
End: 2017-04-10

## 2017-04-10 RX ORDER — LATANOPROST 50 UG/ML
SOLUTION/ DROPS OPHTHALMIC
Qty: 8 ML | Refills: 2 | Status: SHIPPED | OUTPATIENT
Start: 2017-04-10 | End: 2018-05-19 | Stop reason: SDUPTHER

## 2017-04-10 RX ORDER — LATANOPROST 50 UG/ML
SOLUTION/ DROPS OPHTHALMIC
Qty: 8 ML | Refills: 3 | Status: CANCELLED | OUTPATIENT
Start: 2017-04-10

## 2017-04-10 NOTE — TELEPHONE ENCOUNTER
..Patient notified of breast biopsy results    FINAL PATHOLOGIC DIAGNOSIS  Left breast, 4:00; 8 cm from nipple, core biopsies:  Invasive ductal carcinoma, grade 2 (3,2,1).  Invasive tumor measures 0.2 cm in greatest linear dimension within the core biopsy specimen.  High-grade ductal carcinoma in situ with comedo necrosis is also present.  Immunohistochemical stains for hormonal receptors will be completed and results will follow in a supplemental  Report.    Appointment scheduled on 4/11/2017 with Dr Gallagher.

## 2017-04-11 ENCOUNTER — OFFICE VISIT (OUTPATIENT)
Dept: SURGERY | Facility: CLINIC | Age: 62
End: 2017-04-11
Payer: COMMERCIAL

## 2017-04-11 ENCOUNTER — TELEPHONE (OUTPATIENT)
Dept: OPHTHALMOLOGY | Facility: CLINIC | Age: 62
End: 2017-04-11

## 2017-04-11 VITALS — SYSTOLIC BLOOD PRESSURE: 134 MMHG | HEART RATE: 58 BPM | DIASTOLIC BLOOD PRESSURE: 66 MMHG

## 2017-04-11 DIAGNOSIS — C50.912 DUCTAL CARCINOMA OF LEFT BREAST: Primary | ICD-10-CM

## 2017-04-11 PROCEDURE — 99999 PR PBB SHADOW E&M-EST. PATIENT-LVL II: CPT | Mod: PBBFAC,,, | Performed by: SURGERY

## 2017-04-11 PROCEDURE — 99214 OFFICE O/P EST MOD 30 MIN: CPT | Mod: S$GLB,,, | Performed by: SURGERY

## 2017-04-11 PROCEDURE — 1160F RVW MEDS BY RX/DR IN RCRD: CPT | Mod: S$GLB,,, | Performed by: SURGERY

## 2017-04-11 NOTE — TELEPHONE ENCOUNTER
----- Message from Aleah Roberts sent at 4/10/2017  4:34 PM CDT -----  [facility] [phone] is calling concerning the prescription Latanoproft. Pharmacy says the quantity does not match up with what they have. Office put 8 ml and it comes in 2.5 ml. Please verify at:      [id number] - [place] - [address] [address] [facility]  [address] [facility] [address]  Phone: [phone] Fax: [phone]

## 2017-04-11 NOTE — TELEPHONE ENCOUNTER
----- Message from Aleah Roberts sent at 4/10/2017  4:34 PM CDT -----  [facility] [phone] is calling concerning the prescription Latanoproft. Pharmacy says the quantity does not match up with what they have. Office put 8 ml and it comes in 2.5 ml. Please verify at:      [id number] - [place] - [address] [facility]  [address] [facility] [address]  Phone: [phone] Fax: [phone]

## 2017-04-12 ENCOUNTER — PATIENT MESSAGE (OUTPATIENT)
Dept: ADMINISTRATIVE | Facility: OTHER | Age: 62
End: 2017-04-12

## 2017-04-12 NOTE — PROGRESS NOTES
Subjective:       Patient ID: Ashlie Redman is a 62 y.o. female.    Chief Complaint: Follow-up (s/p breast biopsy)    HPI Comments: Patient presents to discuss surgical treatment of breast cancer.  Leftt LOQ. she is 6 days following core biopsy, left demonstrating an intermediate grade invasive ductal cancer. Estrogen receptor status is pending. Progesterone receptor status is pending. HER-2/clay receptors by IHC is pending.  She was supposed to have the left axillary node biopsied at the same time but this was not done by radiology.      Review of Systems   Constitutional: Negative for appetite change, chills, diaphoresis, fatigue, fever and unexpected weight change.   HENT: Negative for hearing loss, sore throat, trouble swallowing and voice change.    Eyes: Negative for visual disturbance.   Respiratory: Negative for cough, shortness of breath and wheezing.    Cardiovascular: Negative for chest pain, palpitations and leg swelling.   Gastrointestinal: Negative for abdominal distention, abdominal pain, anal bleeding, blood in stool, constipation, diarrhea, nausea, rectal pain and vomiting.   Genitourinary: Negative for difficulty urinating, dysuria, flank pain, frequency, hematuria, menstrual problem and urgency.   Musculoskeletal: Negative for arthralgias, back pain, joint swelling, myalgias and neck pain.   Skin: Negative for pallor and rash.   Neurological: Negative for dizziness, syncope, weakness and headaches.   Hematological: Negative for adenopathy. Does not bruise/bleed easily.   Psychiatric/Behavioral: Negative for suicidal ideas. The patient is not nervous/anxious.        Objective:      Physical Exam   Constitutional: She is oriented to person, place, and time. She appears well-developed and well-nourished. No distress.   HENT:   Head: Normocephalic and atraumatic.   Right Ear: External ear normal.   Left Ear: External ear normal.   Eyes: Conjunctivae are normal. Pupils are equal, round, and reactive to  light. Right eye exhibits no discharge. Left eye exhibits no discharge.   Neck: No tracheal deviation present. No thyromegaly present.   Cardiovascular: Normal rate and regular rhythm.    Pulmonary/Chest: Effort normal. No respiratory distress. Right breast exhibits no inverted nipple, no mass, no nipple discharge, no skin change and no tenderness. Left breast exhibits mass (3 o'clock, non tender) and tenderness. Left breast exhibits no inverted nipple, no nipple discharge and no skin change. Breasts are asymmetrical.       Musculoskeletal: She exhibits no edema or tenderness.   Lymphadenopathy:     She has no cervical adenopathy.     She has axillary adenopathy.        Left axillary: Pectoral adenopathy present.   Neurological: She is alert and oriented to person, place, and time. No cranial nerve deficit.   Skin: Skin is warm and dry. No rash noted. She is not diaphoretic. No pallor.   Psychiatric: She has a normal mood and affect. Her behavior is normal. Judgment and thought content normal.       Assessment:       1. Ductal carcinoma of left breast        Plan:       Long discussion had with patient regarding treatment options.  She needs to have a biopsy of the axillary node as this appears positive clinically and on ultrasound.  Will probably be candidate for induction chemotherapy.  Breast surgery will probably involve removing or changing implant and require plastic surgery coordination.  She is very anxious at this time, and is considering MD Galvez.  When suggestion made to refer to [facility] she was eager to have it done.  [facility] contacted and will see patient.  Advised if she needs a port we can do it locally or there.  All questions answered.

## 2017-04-17 ENCOUNTER — PATIENT MESSAGE (OUTPATIENT)
Dept: FAMILY MEDICINE | Facility: CLINIC | Age: 62
End: 2017-04-17

## 2017-04-17 DIAGNOSIS — C50.919 MALIGNANT NEOPLASM OF FEMALE BREAST, UNSPECIFIED LATERALITY, UNSPECIFIED SITE OF BREAST: Primary | ICD-10-CM

## 2017-04-18 ENCOUNTER — HOSPITAL ENCOUNTER (OUTPATIENT)
Dept: RADIOLOGY | Facility: HOSPITAL | Age: 62
Discharge: HOME | End: 2017-04-18
Attending: SURGERY
Payer: COMMERCIAL

## 2017-04-18 ENCOUNTER — OFFICE VISIT (OUTPATIENT)
Dept: SURGERY | Facility: CLINIC | Age: 62
End: 2017-04-18
Payer: COMMERCIAL

## 2017-04-18 ENCOUNTER — DOCUMENTATION ONLY (OUTPATIENT)
Dept: SURGERY | Facility: CLINIC | Age: 62
End: 2017-04-18

## 2017-04-18 VITALS
BODY MASS INDEX: 20.61 KG/M2 | HEIGHT: 68 IN | TEMPERATURE: 98 F | SYSTOLIC BLOOD PRESSURE: 129 MMHG | HEART RATE: 65 BPM | DIASTOLIC BLOOD PRESSURE: 75 MMHG | WEIGHT: 136 LBS

## 2017-04-18 DIAGNOSIS — C50.919 BREAST CANCER: ICD-10-CM

## 2017-04-18 DIAGNOSIS — N63.0 BREAST MASS: ICD-10-CM

## 2017-04-18 DIAGNOSIS — C50.919 MALIGNANT NEOPLASM OF FEMALE BREAST, UNSPECIFIED LATERALITY, UNSPECIFIED SITE OF BREAST: Primary | ICD-10-CM

## 2017-04-18 DIAGNOSIS — N63.0 LUMP OR MASS IN BREAST: Primary | ICD-10-CM

## 2017-04-18 DIAGNOSIS — C50.512 PRIMARY CANCER OF LOWER OUTER QUADRANT OF LEFT FEMALE BREAST: ICD-10-CM

## 2017-04-18 PROCEDURE — 38505 NEEDLE BIOPSY LYMPH NODES: CPT | Mod: ,,, | Performed by: RADIOLOGY

## 2017-04-18 PROCEDURE — 88305 TISSUE EXAM BY PATHOLOGIST: CPT | Performed by: PATHOLOGY

## 2017-04-18 PROCEDURE — 77065 DX MAMMO INCL CAD UNI: CPT | Mod: TC,LT

## 2017-04-18 PROCEDURE — 99999 PR PBB SHADOW E&M-EST. PATIENT-LVL IV: CPT | Mod: PBBFAC,,, | Performed by: SURGERY

## 2017-04-18 PROCEDURE — A4648 IMPLANTABLE TISSUE MARKER: HCPCS

## 2017-04-18 PROCEDURE — 77065 DX MAMMO INCL CAD UNI: CPT | Mod: 26,,, | Performed by: RADIOLOGY

## 2017-04-18 PROCEDURE — 88305 TISSUE EXAM BY PATHOLOGIST: CPT | Mod: 26,,, | Performed by: PATHOLOGY

## 2017-04-18 PROCEDURE — 99245 OFF/OP CONSLTJ NEW/EST HI 55: CPT | Mod: S$GLB,,, | Performed by: SURGERY

## 2017-04-18 RX ORDER — LIDOCAINE HYDROCHLORIDE 10 MG/ML
1 INJECTION, SOLUTION EPIDURAL; INFILTRATION; INTRACAUDAL; PERINEURAL ONCE
Status: CANCELLED | OUTPATIENT
Start: 2017-04-18 | End: 2017-04-18

## 2017-04-18 RX ORDER — SODIUM CHLORIDE 9 MG/ML
INJECTION, SOLUTION INTRAVENOUS CONTINUOUS
Status: CANCELLED | OUTPATIENT
Start: 2017-04-18

## 2017-04-18 NOTE — Clinical Note
April 23, 2017      Justice Gallagher MD  [address]  [address]  [place] [address]           [facility] Breast Surgery  [address]  [facility] [address]  Phone: [phone]          Patient: Ashlie Redman   MR Number: [id number]   YOB: 1955   Date of Visit: 4/18/2017       Dear Dr. Justice Gallagher:    Thank you for referring Ashlie Redman to me for evaluation. Attached you will find relevant portions of my assessment and plan of care.    If you have questions, please do not hesitate to call me. I look forward to following Ashlie Redman along with you.    Sincerely,    Alexander Arce MD    Enclosure  CC:  No Recipients    If you would like to receive this communication electronically, please contact [email or web address] or [phone] to request more information on [employer] Link access.    For providers and/or their staff who would like to refer a patient to [facility], please contact us through our one-stop-shop provider referral line, [facility] , at [phone].    If you feel you have received this communication in error or would no longer like to receive these types of communications, please e-mail [email or web address]

## 2017-04-18 NOTE — LETTER
[facility] Breast Surgery  [address]  [facility] [address]  Phone: [phone] April 25, 2017      Justice Gallagher MD  [facility] [address]    Patient: Ashlie Redman   MR Number: [id number]   YOB: 1955   Date of Visit: 4/18/2017     Dear Dr. Gallagher:    Thank you for referring Ashlie Redman to me for evaluation. Below are the relevant portions of my assessment and plan of care.    The patient is a very pleasant 62-year-old  female who presents with her . The patient felt a mass in her left breast in March 2017. This was also described by her primary care physician who ordered a diagnostic mammogram and ultrasound. She subsequently had imaging, which revealed a suspicious asymmetric density and mass in the lateral lower outer 8 o'clock region of the left breast, 8 cm from the nipple. This measured 34 mm by ultrasound and 6 mm by mammogram. It was described as an asymmetric density with an area of focal asymmetry measuring 6 mm seen in the posterior aspect of the left breast at the 3 and 4 o'clock position located 8 cm from the nipple. It was in the lower outer 3 to 4 o'clock position.     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 This was estrogen and progesterone receptor positive and it was also HER-2/clay positive with 3+ staining. The patient also was found to have a benign 12 mm right-sided contralateral simple cyst.     PAST MEDICAL HISTORY: Significant for eye surgery, ectopic pregnancy, bilateral breast augmentation four years ago with some glandular silicone implants. She took oral contraceptive products for approximately 30 years.      GYN HISTORY: Reveals that menarche was at age 12, menopause at age 50. She is on Climara and Paxil in addition to other medications in [facility] record. She took hormone replacement therapy for approximately 12 years and has subsequently  discontinued them with her diagnosis. She is a  3, para 2 with first live birth and pregnancy at age 25 and the second child at age 36.      FAMILY HISTORY: Significant for colon cancer in her mother at age 90 and rectal cancer in her sister at age 48. She has a maternal first cousin diagnosed with breast cancer after the age of 50 and her mother also had had a benign pancreatic mass by the history, which is the region where the patient states was the same vicinity of her eventual development of colon cancer.     Her clinical breast exam is otherwise noncontributory other than the breast exam. The right breast is within normal limits with no suspicious masses, nodules, densities, skin changes or lymphadenopathy. There is a palpable subglandular silicone implant on the right anterior to the pectoralis muscle. There are no lymphadenopathy or breast masses. The left side reveals a 2.5 cm mass-like density in the lateral lower outer 3 to 4 o'clock region of the left breast. Wecan palpate the implant. There are no suspicious skin changes. There is no dimpling of the skin or nipple areolar complex. There is no edema, erythema or thickening of the skin; however, I do feel a palpable, mobile, well circumscribed single 2 cm lymph node, which is highly suspicious.    I am going to clinically stage this patient as having a T2 N1 clinical stage IIB breast cancer. Since she is HER-2 positive, she would be a strong candidate for preoperative neoadjuvant chemotherapy approach with Herceptin and Perjeta based chemotherapy. We will order a PET CT whole body fusion scan. We will schedule her for a right-sided contralateral Port-A-Cath, which will be scheduled for Monday, 2017. We will have the left axilla evaluated by ultrasound and core needle biopsy today. I will also obtain an MRI of her breast as well as obtain genetic counseling for possible genetic testing. We will set her up for Medical Oncology, but she prefers  Medical Oncology consultation on [place] and we will try to have her see Dr. Trell Lau who had taken care of her mother when her mother was diagnosed with the colon cancer.     Consent was obtained for the Port-A-Cath and we will proceed with the anticipated neoadjuvant chemotherapy as outlined above pending the lymph node biopsy.    ADDENDUM: Subsequently, the patient had an ultrasound-guided core needle biopsy of the axilla, which revealed a positive lymph node with abundant carcinoma noted within it. The PET scan was negative for any evidence of distant metastatic disease. The primary tumor was seen in the 4 o'clock region of the left breast as well as the PET avid lymph node that we can palpate. There was also an additional smaller lymph node adjacent to this, which was seen on the PET scan and I would still stage her as clinically stage IIB with a T2 N1 status at this point. Plan will be for neoadjuvant Herceptin-based chemotherapy with Herceptin and Perjeta and we will proceed with a Port-A-Cath insertion on Monday 04/24/2017 as outlined above.    Approximate time spent with the patient and her  today was 60 minutes discussing options of surgical therapy, typically if she receives breast conservation surgery, she would require radiotherapy. She may require adjuvant radiotherapy given the presentation with a large palpable lymph node at this time and HER-2/clay status. Certainly, she would have time to consider her local therapies of breast conservation and radiotherapy versus mastectomy and possible postmastectomy radiotherapy while she undergoes neoadjuvant chemotherapy. We would get her involved with Plastic Surgery should she decide to pursue mastectomies, but at this point, she would like to move forward with the neoadjuvant approach. We discussed all the indications for neoadjuvant chemotherapy including being able to assess response of the primary tumor initiating systemic therapy sooner  allowing her to receive FDA approved double antiHerceptin regimen. The ability to line up for potential results of genetic counseling and possible testing with Plastic Surgery and give her more time to ultimately decide her local therapeutic options. Again, time spent with the patient today was 60 minutes with greater than 50% of that time in counseling.    If you have questions, please do not hesitate to call me. I look forward to following Ashlie along with you.    Sincerely,      Alexander Arce MD  Medical Director, [facility]  Section of General and Oncologic Surgery  [facility]    RLC/hcr    CC  MD Celestina Oliver MD

## 2017-04-18 NOTE — LETTER
[facility] Breast Surgery  [address]  [facility] [address]  Phone: [phone] April 26, 2017      Justice Gallagher MD  [facility] [address] [address]    Patient: Ashlie Redman   MR Number: [id number]   YOB: 1955   Date of Visit: 4/18/2017     Dear Dr. Gallagher:    Thank you for referring Ashlie eRdman to me for evaluation. Below are the relevant portions of my assessment and plan of care.    The patient is a very pleasant 62-year-old  female who presents with her . The patient felt a mass in her left breast in March 2017. This was also described by her primary care physician who ordered a diagnostic mammogram and ultrasound. She subsequently had imaging, which revealed a suspicious asymmetric density and mass in the lateral lower outer 8 o'clock region of the left breast, 8 cm from the nipple. This measured 34 mm by ultrasound and 6 mm by mammogram. It was described as an asymmetric density with an area of focal asymmetry measuring 6 mm seen in the posterior aspect of the left breast at the 3 and 4 o'clock position located 8 cm from the nipple. It was in the lower outer 3 to 4 o'clock position. 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 This was estrogen and progesterone receptor positive and it was also HER-2/clay positive with 3+ staining. The patient also was found to have a benign 12 mm right-sided contralateral simple cyst.    PAST MEDICAL HISTORY: Significant for eye surgery, ectopic pregnancy, bilateral breast augmentation four years ago with some glandular silicone implants. She took oral contraceptive products for approximately 30 years.    GYN HISTORY: Reveals that menarche was at age 12, menopause at age 50. She is on Climara and Paxil in addition to other medications in [facility] record. She took hormone replacement therapy for approximately 12 years and has subsequently  discontinued them with her diagnosis. She is a  3, para 2 with first live birth and pregnancy at age 25 and the second child at age 36.    FAMILY HISTORY: Significant for colon cancer in her mother at age 90 and rectal cancer in her sister at age 48. She has a maternal first cousin diagnosed with breast cancer after the age of 50 and her mother also had had a benign pancreatic mass by the history, which is the region where the patient states was the same vicinity of her eventual development of colon cancer.     Her clinical breast exam is otherwise noncontributory other than the breast exam. The right breast is within normal limits with no suspicious masses, nodules, densities, skin changes or lymphadenopathy. There is a palpable subglandular silicone implant on the right anterior to the pectoralis muscle. There are no lymphadenopathy or breast masses. The left side reveals a 2.5 cm mass-like density in the lateral lower outer 3 to 4 o'clock region of the left breast. We can palpate the implant. There are no suspicious skin changes. There is no dimpling of the skin or nipple areolar complex. There is no edema, erythema or thickening of the skin; however, I do feel a palpable, mobile, well circumscribed single 2 cm lymph node, which is highly suspicious.    I am going to clinically stage this patient as having a T2 N1 clinical stage IIB breast cancer. Since she is HER-2 positive, she would be a strong candidate for preoperative neoadjuvant chemotherapy approach with Herceptin and Perjeta based chemotherapy. We will order a PET CT whole body fusion scan. We will schedule her for a right-sided contralateral Port-A-Cath, which will be scheduled for Monday, 2017. We will have the left axilla evaluated by ultrasound and core needle biopsy today. I will also obtain an MRI of her breast as well as obtain genetic counseling for possible genetic testing. We will set her up for Medical Oncology, but she prefers  Medical Oncology consultation on [place] and we will try to have her see Dr. Trell Lau who had taken care of her mother when her mother was diagnosed with the colon cancer. Consent was obtained for the Port-A-Cath and we will proceed with the anticipated neoadjuvant chemotherapy as outlined above pending the lymph node biopsy.    ADDENDUM: Subsequently, the patient had an ultrasound-guided core needle biopsy of the axilla, which revealed a positive lymph node with abundant carcinoma noted within it. The PET scan was negative for any evidence of distant metastatic disease. The primary tumor was seen in the 4 o'clock region of the left breast as well as the PET avid lymph node that we can palpate. There was also an additional smaller lymph node adjacent to this, which was seen on the PET scan and I would still stage her as clinically stage IIB with a T2 N1 status at this point. Plan will be for neoadjuvant Herceptin-based chemotherapy with Herceptin and Perjeta and we will proceed with a Port-A-Cath insertion on Monday, 04/24/2017 as outlined above.    Approximate time spent with the patient and her  today was 60 minutes discussing options of surgical therapy, typically if she receives breast conservation surgery, she would require radiotherapy. She may require adjuvant radiotherapy given the presentation with a large palpable lymph node at this time and HER-2/clay status. Certainly, she would have time to consider her local therapies of breast conservation and radiotherapy versus mastectomy and possible postmastectomy radiotherapy while she undergoes neoadjuvant chemotherapy. We would get her involved with Plastic Surgery should she decide to pursue mastectomies, but at this point, she would like to move forward with the neoadjuvant approach. We discussed all the indications for neoadjuvant chemotherapy including being able to assess response of the primary tumor initiating systemic therapy sooner  allowing her to receive FDA approved double antiHerceptin regimen. The ability to line up for potential results of genetic counseling and possible testing with Plastic Surgery and give her more time to ultimately decide her local therapeutic options. Again, time spent with the patient today was 60 minutes with greater than 50% of that time in counseling.    If you have questions, please do not hesitate to call me. I look forward to following Ashlie along with you.    Sincerely,      Alexander Arce MD   Medical Director, [facility]  Section of General and Oncologic Surgery  [facility]    RLC/hcr

## 2017-04-18 NOTE — MEDICAL/APP STUDENT
Subjective:       Patient ID: Ashlie Redman is a 62 y.o. female.    Chief Complaint: Consult (New Patient New Cancer)    HPI   This is a 62 y.o female who is referred for evaluation of L breast mass. She first noticed this mass in 2017, since then she saw her PCP and mammogram, ultrasound and biopsy were completed. Biopsy showed invasive ductal carcinoma of L breast, axillary lymph node biopsy was not completed at that time. Patient reports of breast pain 4-5/10 on severity and constantly there, the pain is worse when she walks or is active. She takes tylenol for pain relief and says it reduces her pain. She is unsure whether the mass has grown in size. Patient denies any fevers, chills, nausea, vomiting, weight loss or night sweats at this time.     Gyn History:  Age of menarche is 12. . OCP x 18 years. Menopause age 50. HRT (climara and paxil) x 12 years.     Fam History:   Colon cancer mother (90) and sister (48)  Breast cancer in 1st cousin (patient unsure age)    Review of Systems   Constitutional: Negative for chills, fatigue and fever.   Respiratory: Negative for cough, chest tightness and shortness of breath.    Cardiovascular: Negative for chest pain.   Gastrointestinal: Negative for abdominal distention, abdominal pain, nausea and vomiting.        Loose stools s/p clindamycin   Genitourinary: Negative for difficulty urinating, dysuria and flank pain.   Musculoskeletal: Negative for back pain.   Skin: Negative for color change and rash.   Hematological: Positive for adenopathy.       Objective:      Physical Exam   Constitutional: She is oriented to person, place, and time. She appears well-developed and well-nourished. No distress.   HENT:   Head: Normocephalic and atraumatic.   Eyes: EOM are normal. Pupils are equal, round, and reactive to light.   Neck: Normal range of motion. Neck supple.   Cardiovascular: Normal rate and regular rhythm.    Pulmonary/Chest: Effort normal and breath sounds  normal. No respiratory distress. She has no wheezes.       There is no apparent skin changes or nipple discharge.     R breast: appears normal.    Bilateral breast w/ implants that are palpable.    There is palpable and enlarge lymph node at apical axilla. It is non-tender and mobile.   Abdominal: Soft. Bowel sounds are normal. She exhibits no distension. There is no tenderness.   Musculoskeletal: Normal range of motion. She exhibits no edema.   Neurological: She is alert and oriented to person, place, and time.   Skin: Skin is warm and dry. She is not diaphoretic.   Psychiatric: She has a normal mood and affect. Her behavior is normal. Judgment and thought content normal.   Nursing note and vitals reviewed.      Assessment:       1. Malignant neoplasm of female breast, unspecified laterality, unspecified site of breast        Plan:         63 y/o female with invasive ductal carcinoma    1. US guided core needle biopsy of L apical axillary node  2. MRI scan  3. PET scan   4. Medical oncology referral for chemotherapy for Er-/Pr-/Her2+   5. Port-a-cath placement on R chest

## 2017-04-19 ENCOUNTER — TELEPHONE (OUTPATIENT)
Dept: HEMATOLOGY/ONCOLOGY | Facility: CLINIC | Age: 62
End: 2017-04-19

## 2017-04-19 DIAGNOSIS — C50.919 MALIGNANT NEOPLASM OF FEMALE BREAST, UNSPECIFIED LATERALITY, UNSPECIFIED SITE OF BREAST: Primary | ICD-10-CM

## 2017-04-19 DIAGNOSIS — C50.912 MALIGNANT NEOPLASM OF LEFT FEMALE BREAST, UNSPECIFIED SITE OF BREAST: Primary | ICD-10-CM

## 2017-04-19 NOTE — TELEPHONE ENCOUNTER
Received call from dr. kline's office, patient needs appt record printed and brought to rn navigator for review

## 2017-04-19 NOTE — PROGRESS NOTES
Nurse Navigator Note:     Met with patient during her consult with Dr. Arce. Patient and I reviewed the information she discussed with Dr. Arce, including treatment options, diagnosis, and future plans for workup. Patient and I went over her current future appointments, including PET scan, genetics consult, and MRI. Patient and I went through the new patient binder, explained some of the information and why it is provided.     Patient was given a copy of her appointments, new patient binder, and Dr. Arce's card with my number written on the back. Encouraged her to call me if she has any questions or concerns. Verbalized understanding of all information.

## 2017-04-21 ENCOUNTER — HOSPITAL ENCOUNTER (OUTPATIENT)
Dept: RADIOLOGY | Facility: HOSPITAL | Age: 62
Discharge: HOME | End: 2017-04-21
Attending: SURGERY
Payer: COMMERCIAL

## 2017-04-21 ENCOUNTER — TELEPHONE (OUTPATIENT)
Dept: HEMATOLOGY/ONCOLOGY | Facility: CLINIC | Age: 62
End: 2017-04-21

## 2017-04-21 ENCOUNTER — TELEPHONE (OUTPATIENT)
Dept: SURGERY | Facility: CLINIC | Age: 62
End: 2017-04-21

## 2017-04-21 ENCOUNTER — OFFICE VISIT (OUTPATIENT)
Dept: SURGERY | Facility: CLINIC | Age: 62
End: 2017-04-21
Payer: COMMERCIAL

## 2017-04-21 VITALS — BODY MASS INDEX: 20.67 KG/M2 | HEIGHT: 68 IN | WEIGHT: 136.38 LBS

## 2017-04-21 DIAGNOSIS — C50.919 MALIGNANT NEOPLASM OF FEMALE BREAST, UNSPECIFIED LATERALITY, UNSPECIFIED SITE OF BREAST: ICD-10-CM

## 2017-04-21 DIAGNOSIS — Z80.8 FAMILY HISTORY OF MELANOMA: ICD-10-CM

## 2017-04-21 DIAGNOSIS — C50.919 INVASIVE DUCTAL CARCINOMA OF BREAST, UNSPECIFIED LATERALITY: ICD-10-CM

## 2017-04-21 DIAGNOSIS — Z80.0 FAMILY HISTORY OF RECTAL CANCER: ICD-10-CM

## 2017-04-21 DIAGNOSIS — Z80.0 FAMILY HISTORY OF COLON CANCER IN MOTHER: ICD-10-CM

## 2017-04-21 PROCEDURE — 99999 PR PBB SHADOW E&M-EST. PATIENT-LVL I: CPT | Mod: PBBFAC,,,

## 2017-04-21 PROCEDURE — A9552 F18 FDG: HCPCS

## 2017-04-21 PROCEDURE — 99213 OFFICE O/P EST LOW 20 MIN: CPT | Mod: S$GLB,,, | Performed by: NURSE PRACTITIONER

## 2017-04-21 PROCEDURE — 78815 PET IMAGE W/CT SKULL-THIGH: CPT | Mod: 26,PI,, | Performed by: NUCLEAR MEDICINE

## 2017-04-21 NOTE — TELEPHONE ENCOUNTER
----- Message from Gema Zee sent at 4/21/2017 12:26 PM CDT -----  Contact: self  Patient returning Veena's call regarding scheduling an appointment    Please call      Thanks

## 2017-04-21 NOTE — PROGRESS NOTES
Patient presents to discuss family history concerns. See pedigree for full family history. This most likely represents sporadic cancers in this family, verses a family clustering, however there could be a genetic susceptibility with the cancers reported. Discussed options for genetic testing in detail including genes to test, types of results, implications for self and family and cost of testing/insurance. She desires to proceed with testing. Informed consent obtained. Buccal sample collected and sent to [facility] for [facility] with [facility]. Results expected in 2-3 weeks    Time in counseling 40 min, total time 40 min

## 2017-04-21 NOTE — TELEPHONE ENCOUNTER
Left message on pt VM that she needs to arrive to the 2nd floor DOC[facility] for surgery with Dr Arce on 4/24/2017 @9 am, left contact number for further questions

## 2017-04-21 NOTE — TELEPHONE ENCOUNTER
----- Message from Shanelle Foster sent at 4/21/2017  3:49 PM CDT -----  Contact: self  Ashlie States that she needs a call returned by a nurse in reference to some general questions.                    Please call Ashlie @ [phone]. Thanks :)

## 2017-04-21 NOTE — TELEPHONE ENCOUNTER
Patient scheduled with dr. Ragsdale for consult, to have chemotherapy done on [facility].

## 2017-04-23 PROBLEM — C50.512: Status: ACTIVE | Noted: 2017-04-23

## 2017-04-23 NOTE — PROGRESS NOTES
Patient ID: Ashlie Redman is a 62 y.o. female.     Chief Complaint: Consult (New Patient New Cancer)     HPI   This is a 62 y.o female who is referred for evaluation of L breast mass. She first noticed this mass in 2017, since then she saw her PCP and mammogram, ultrasound and biopsy were completed. Biopsy showed invasive ductal carcinoma of L breast, axillary lymph node biopsy was not completed at that time. Patient reports of breast pain 4-5/10 on severity and constantly there, the pain is worse when she walks or is active. She takes tylenol for pain relief and says it reduces her pain. She is unsure whether the mass has grown in size. Patient denies any fevers, chills, nausea, vomiting, weight loss or night sweats at this time.      Gyn History:  Age of menarche is 12. . OCP x 18 years. Menopause age 50. HRT (climara and paxil) x 12 years.      Fam History:   Colon cancer mother (90) and sister (48)  Breast cancer in 1st cousin (patient unsure age)     Review of Systems   Constitutional: Negative for chills, fatigue and fever.   Respiratory: Negative for cough, chest tightness and shortness of breath.   Cardiovascular: Negative for chest pain.   Gastrointestinal: Negative for abdominal distention, abdominal pain, nausea and vomiting.   Loose stools s/p clindamycin   Genitourinary: Negative for difficulty urinating, dysuria and flank pain.   Musculoskeletal: Negative for back pain.   Skin: Negative for color change and rash.   Hematological: Positive for adenopathy.       Objective:      Physical Exam   Constitutional: She is oriented to person, place, and time. She appears well-developed and well-nourished. No distress.   HENT:   Head: Normocephalic and atraumatic.   Eyes: EOM are normal. Pupils are equal, round, and reactive to light.   Neck: Normal range of motion. Neck supple.   Cardiovascular: Normal rate and regular rhythm.   Pulmonary/Chest: Effort normal and breath sounds normal. No  respiratory distress. She has no wheezes.       There is no apparent skin changes or nipple discharge.     R breast: appears normal.    Bilateral breast w/ implants that are palpable.    There is palpable and enlarge lymph node at apical axilla. It is non-tender and mobile.   Abdominal: Soft. Bowel sounds are normal. She exhibits no distension. There is no tenderness.   Musculoskeletal: Normal range of motion. She exhibits no edema.   Neurological: She is alert and oriented to person, place, and time.   Skin: Skin is warm and dry. She is not diaphoretic.   Psychiatric: She has a normal mood and affect. Her behavior is normal. Judgment and thought content normal.   Nursing note and vitals reviewed.      Assessment:       1. Malignant neoplasm of female breast, unspecified laterality, unspecified site of breast        Plan:         61 y/o female with invasive ductal carcinoma     1. US guided core needle biopsy of L apical axillary node  2. MRI scan  3. PET scan   4. Medical oncology referral for chemotherapy for Er-/Pr-/Her2+   5. Port-a-cath placement on R chest                       DATE OF CONSULTATION:  04/18/2017    CHIEF COMPLAINT:  Newly diagnosed left breast cancer.    HISTORY OF PRESENT ILLNESS:  The patient is a very pleasant 62-year-old    female who presents with her .  The patient felt a mass in her   left breast in March 2017.  This was also described by her primary care   physician who ordered a diagnostic mammogram and ultrasound.  She subsequently   had imaging, which revealed a suspicious asymmetric density and mass in the   lateral lower outer 8 o'clock region of the left breast, 8 cm from the nipple.    This measured 34 mm by ultrasound and 6 mm by mammogram.  It was described as an   asymmetric density with an area of focal asymmetry measuring 6 mm seen in the   posterior aspect of the left breast at the 3 and 4 o'clock position located 8 cm   from the nipple.  It was in the lower  outer 3 to 4 o'clock position.  The   patient underwent core needle biopsy, which revealed a grade II invasive ductal   carcinoma along with high-grade DCIS.  This was estrogen and progesterone   receptor positive and it was also HER-2/clay positive with 3+ staining.  The   patient also was found to have a benign 12 mm right-sided contralateral simple   cyst.    PAST MEDICAL HISTORY:  Significant for eye surgery, ectopic pregnancy, bilateral   breast augmentation four years ago with some glandular silicone implants.  She   took oral contraceptive products for approximately 30 years.    GYN HISTORY:  Reveals that menarche was at age 12, menopause at age 50.  She is   on Climara and Paxil in addition to other medications in [facility] record.  She took   hormone replacement therapy for approximately 12 years and has subsequently   discontinued them with her diagnosis.  She is a  3, para 2 with first   live birth and pregnancy at age 25 and the second child at age 36.    FAMILY HISTORY:  Significant for colon cancer in her mother at age 90 and rectal   cancer in her sister at age 48.  She has a maternal first cousin diagnosed with   breast cancer after the age of 50 and her mother also had had a benign   pancreatic mass by the history, which is the region where the patient states was   the same vicinity of her eventual development of colon cancer.  Her clinical   breast exam is otherwise noncontributory other than the breast exam.  The right   breast is within normal limits with no suspicious masses, nodules, densities,   skin changes or lymphadenopathy.  There is a palpable subglandular silicone   implant on the right anterior to the pectoralis muscle.  There are no   lymphadenopathy or breast masses.  The left side reveals a 2.5 cm mass-like   density in the lateral lower outer 3 to 4 o'clock region of the left breast.  We   can palpate the implant.  There are no suspicious skin changes.  There is no   dimpling of the  skin or nipple areolar complex.  There is no edema, erythema or   thickening of the skin; however, I do feel a palpable, mobile, well   circumscribed single 2 cm lymph node, which is highly suspicious.    ASSESSMENT AND PLAN:  I am going to clinically stage this patient as having a T2   N1 clinical stage IIB breast cancer.  Since she is HER-2 positive, she would be   a strong candidate for preoperative neoadjuvant chemotherapy approach with   Herceptin and Perjeta based chemotherapy.  We will order a PET CT whole body   fusion scan.  We will schedule her for a right-sided contralateral Port-A-Cath,   which will be scheduled for Monday, 04/24/2017.  We will have the left axilla   evaluated by ultrasound and core needle biopsy today.  I will also obtain an MRI   of her breast as well as obtain genetic counseling for possible genetic   testing.  We will set her up for Medical Oncology, but she prefers Medical   Oncology consultation on [place] and we will try to have her see Dr. Trell Lau who had taken care of her mother when her mother was diagnosed with the   colon cancer.  Consent was obtained for the Port-A-Cath and we will proceed with   the anticipated neoadjuvant chemotherapy as outlined above pending the lymph   node biopsy.    ADDENDUM:  Subsequently, the patient had an ultrasound-guided core needle biopsy   of the axilla, which revealed a positive lymph node with abundant carcinoma   noted within it.  The PET scan was negative for any evidence of distant   metastatic disease.  The primary tumor was seen in the 4 o'clock region of the   left breast as well as the PET avid lymph node that we can palpate.  There was   also an additional smaller lymph node adjacent to this, which was seen on the   PET scan and I would still stage her as clinically stage IIB with a T2 N1 status   at this point.  Plan will be for neoadjuvant Herceptin-based chemotherapy with   Herceptin and Perjeta and we will proceed with  a Port-A-Cath insertion on Monday 04/24/2017 as outlined above.  Approximate time spent with the patient and her    today was 60 minutes discussing options of surgical therapy, typically   if she receives breast conservation surgery, she would require radiotherapy.    She may require adjuvant radiotherapy given the presentation with a large   palpable lymph node at this time and HER-2/clay status.  Certainly, she would   have time to consider her local therapies of breast conservation and   radiotherapy versus mastectomy and possible postmastectomy radiotherapy while   she undergoes neoadjuvant chemotherapy.  We would get her involved with Plastic   Surgery should she decide to pursue mastectomies, but at this point, she would   like to move forward with the neoadjuvant approach.  We discussed all the   indications for neoadjuvant chemotherapy including being able to assess response   of the primary tumor initiating systemic therapy sooner allowing her to receive   FDA approved double antiHerceptin regimen.  The ability to line up for   potential results of genetic counseling and possible testing with Plastic   Surgery and give her more time to ultimately decide her local therapeutic   options.  Again, time spent with the patient today was 60 minutes with greater   than 50% of that time in counseling.      RLC/HN  dd: 04/23/2017 17:07:32 (CDT)  td: 04/24/2017 00:49:35 (CDT)  Doc ID   #[id number]  Job ID #[id number]    CC:     Job # [id number].

## 2017-04-24 ENCOUNTER — HOSPITAL ENCOUNTER (OUTPATIENT)
Facility: HOSPITAL | Age: 62
Discharge: HOME | End: 2017-04-24
Attending: SURGERY | Admitting: SURGERY
Payer: COMMERCIAL

## 2017-04-24 ENCOUNTER — ANESTHESIA EVENT (OUTPATIENT)
Dept: SURGERY | Facility: HOSPITAL | Age: 62
End: 2017-04-24
Payer: COMMERCIAL

## 2017-04-24 ENCOUNTER — ANESTHESIA (OUTPATIENT)
Dept: SURGERY | Facility: HOSPITAL | Age: 62
End: 2017-04-24
Payer: COMMERCIAL

## 2017-04-24 VITALS
TEMPERATURE: 98 F | RESPIRATION RATE: 14 BRPM | SYSTOLIC BLOOD PRESSURE: 137 MMHG | DIASTOLIC BLOOD PRESSURE: 64 MMHG | HEART RATE: 61 BPM | OXYGEN SATURATION: 100 % | BODY MASS INDEX: 20.61 KG/M2 | HEIGHT: 68 IN | WEIGHT: 136 LBS

## 2017-04-24 DIAGNOSIS — C50.912 BREAST CANCER, LEFT: ICD-10-CM

## 2017-04-24 PROCEDURE — 25000003 PHARM REV CODE 250: Performed by: SURGERY

## 2017-04-24 PROCEDURE — 71000015 HC POSTOP RECOV 1ST HR: Performed by: SURGERY

## 2017-04-24 PROCEDURE — 37000009 HC ANESTHESIA EA ADD 15 MINS: Performed by: SURGERY

## 2017-04-24 PROCEDURE — D9220A PRA ANESTHESIA: Mod: CRNA,,, | Performed by: NURSE ANESTHETIST, CERTIFIED REGISTERED

## 2017-04-24 PROCEDURE — 63600175 PHARM REV CODE 636 W HCPCS: Performed by: NURSE ANESTHETIST, CERTIFIED REGISTERED

## 2017-04-24 PROCEDURE — 36000706: Performed by: SURGERY

## 2017-04-24 PROCEDURE — 36000707: Performed by: SURGERY

## 2017-04-24 PROCEDURE — 77001 FLUOROGUIDE FOR VEIN DEVICE: CPT | Mod: 26,,, | Performed by: SURGERY

## 2017-04-24 PROCEDURE — C1788 PORT, INDWELLING, IMP: HCPCS | Performed by: SURGERY

## 2017-04-24 PROCEDURE — D9220A PRA ANESTHESIA: Mod: ANES,,, | Performed by: ANESTHESIOLOGY

## 2017-04-24 PROCEDURE — 36561 INSERT TUNNELED CV CATH: CPT | Mod: ,,, | Performed by: SURGERY

## 2017-04-24 PROCEDURE — 71000044 HC DOSC ROUTINE RECOVERY FIRST HOUR: Performed by: SURGERY

## 2017-04-24 PROCEDURE — 63600175 PHARM REV CODE 636 W HCPCS: Performed by: SURGERY

## 2017-04-24 PROCEDURE — 25000003 PHARM REV CODE 250: Performed by: NURSE ANESTHETIST, CERTIFIED REGISTERED

## 2017-04-24 PROCEDURE — 37000008 HC ANESTHESIA 1ST 15 MINUTES: Performed by: SURGERY

## 2017-04-24 DEVICE — KIT POWERPORT SINGLE 8FR: Type: IMPLANTABLE DEVICE | Site: NECK | Status: FUNCTIONAL

## 2017-04-24 RX ORDER — PROPOFOL 10 MG/ML
VIAL (ML) INTRAVENOUS
Status: DISCONTINUED | OUTPATIENT
Start: 2017-04-24 | End: 2017-04-24

## 2017-04-24 RX ORDER — CEFAZOLIN SODIUM 2 G/50ML
2 SOLUTION INTRAVENOUS
Status: COMPLETED | OUTPATIENT
Start: 2017-04-24 | End: 2017-04-24

## 2017-04-24 RX ORDER — SODIUM CHLORIDE 0.9 % (FLUSH) 0.9 %
3 SYRINGE (ML) INJECTION
Status: DISCONTINUED | OUTPATIENT
Start: 2017-04-24 | End: 2017-04-24 | Stop reason: HOSPADM

## 2017-04-24 RX ORDER — SODIUM CHLORIDE 9 MG/ML
INJECTION, SOLUTION INTRAVENOUS CONTINUOUS
Status: DISCONTINUED | OUTPATIENT
Start: 2017-04-24 | End: 2017-04-24 | Stop reason: HOSPADM

## 2017-04-24 RX ORDER — PROPOFOL 10 MG/ML
VIAL (ML) INTRAVENOUS CONTINUOUS PRN
Status: DISCONTINUED | OUTPATIENT
Start: 2017-04-24 | End: 2017-04-24

## 2017-04-24 RX ORDER — ONDANSETRON 2 MG/ML
4 INJECTION INTRAMUSCULAR; INTRAVENOUS EVERY 12 HOURS PRN
Status: DISCONTINUED | OUTPATIENT
Start: 2017-04-24 | End: 2017-04-24 | Stop reason: HOSPADM

## 2017-04-24 RX ORDER — HYDROMORPHONE HYDROCHLORIDE 1 MG/ML
0.2 INJECTION, SOLUTION INTRAMUSCULAR; INTRAVENOUS; SUBCUTANEOUS EVERY 5 MIN PRN
Status: DISCONTINUED | OUTPATIENT
Start: 2017-04-24 | End: 2017-04-24 | Stop reason: HOSPADM

## 2017-04-24 RX ORDER — BUPIVACAINE HYDROCHLORIDE 5 MG/ML
INJECTION, SOLUTION EPIDURAL; INTRACAUDAL
Status: DISCONTINUED | OUTPATIENT
Start: 2017-04-24 | End: 2017-04-24 | Stop reason: HOSPADM

## 2017-04-24 RX ORDER — MIDAZOLAM HYDROCHLORIDE 1 MG/ML
INJECTION, SOLUTION INTRAMUSCULAR; INTRAVENOUS
Status: DISCONTINUED | OUTPATIENT
Start: 2017-04-24 | End: 2017-04-24

## 2017-04-24 RX ORDER — HYDROCODONE BITARTRATE AND ACETAMINOPHEN 5; 325 MG/1; MG/1
1 TABLET ORAL EVERY 4 HOURS PRN
Status: DISCONTINUED | OUTPATIENT
Start: 2017-04-24 | End: 2017-04-24 | Stop reason: HOSPADM

## 2017-04-24 RX ORDER — FENTANYL CITRATE 50 UG/ML
INJECTION, SOLUTION INTRAMUSCULAR; INTRAVENOUS
Status: DISCONTINUED | OUTPATIENT
Start: 2017-04-24 | End: 2017-04-24

## 2017-04-24 RX ORDER — LIDOCAINE HCL/PF 100 MG/5ML
SYRINGE (ML) INTRAVENOUS
Status: DISCONTINUED | OUTPATIENT
Start: 2017-04-24 | End: 2017-04-24

## 2017-04-24 RX ORDER — LIDOCAINE HYDROCHLORIDE 10 MG/ML
1 INJECTION, SOLUTION EPIDURAL; INFILTRATION; INTRACAUDAL; PERINEURAL ONCE
Status: COMPLETED | OUTPATIENT
Start: 2017-04-24 | End: 2017-04-24

## 2017-04-24 RX ORDER — HYDROCODONE BITARTRATE AND ACETAMINOPHEN 5; 325 MG/1; MG/1
1 TABLET ORAL EVERY 6 HOURS PRN
Qty: 21 TABLET | Refills: 0 | Status: ON HOLD | OUTPATIENT
Start: 2017-04-24 | End: 2017-11-02

## 2017-04-24 RX ORDER — HEPARIN SODIUM (PORCINE) LOCK FLUSH IV SOLN 100 UNIT/ML 100 UNIT/ML
SOLUTION INTRAVENOUS
Status: DISCONTINUED | OUTPATIENT
Start: 2017-04-24 | End: 2017-04-24 | Stop reason: HOSPADM

## 2017-04-24 RX ADMIN — LIDOCAINE HYDROCHLORIDE 100 MG: 20 INJECTION, SOLUTION INTRAVENOUS at 11:04

## 2017-04-24 RX ADMIN — PROPOFOL 200 MCG/KG/MIN: 10 INJECTION, EMULSION INTRAVENOUS at 11:04

## 2017-04-24 RX ADMIN — CEFAZOLIN SODIUM 2 G: 2 SOLUTION INTRAVENOUS at 11:04

## 2017-04-24 RX ADMIN — PROPOFOL 20 MG: 10 INJECTION, EMULSION INTRAVENOUS at 11:04

## 2017-04-24 RX ADMIN — FENTANYL CITRATE 25 MCG: 50 INJECTION, SOLUTION INTRAMUSCULAR; INTRAVENOUS at 11:04

## 2017-04-24 RX ADMIN — LIDOCAINE HYDROCHLORIDE 0.1 MG: 10 INJECTION, SOLUTION EPIDURAL; INFILTRATION; INTRACAUDAL; PERINEURAL at 10:04

## 2017-04-24 RX ADMIN — SODIUM CHLORIDE, SODIUM GLUCONATE, SODIUM ACETATE, POTASSIUM CHLORIDE, MAGNESIUM CHLORIDE, SODIUM PHOSPHATE, DIBASIC, AND POTASSIUM PHOSPHATE: .53; .5; .37; .037; .03; .012; .00082 INJECTION, SOLUTION INTRAVENOUS at 11:04

## 2017-04-24 RX ADMIN — SODIUM CHLORIDE: 0.9 INJECTION, SOLUTION INTRAVENOUS at 10:04

## 2017-04-24 RX ADMIN — MIDAZOLAM HYDROCHLORIDE 2 MG: 1 INJECTION, SOLUTION INTRAMUSCULAR; INTRAVENOUS at 11:04

## 2017-04-24 NOTE — ANESTHESIA RELEASE NOTE
"Anesthesia Release from PACU Note    Patient: Ashlie Redman    Procedure(s) Performed: Procedure(s) (LRB):  [id number] neck poss chest (Right)    Anesthesia type: general    Post pain: Adequate analgesia    Post assessment: no apparent anesthetic complications    Last Vitals:   Visit Vitals    /62 (BP Location: Right leg, Patient Position: Sitting, BP Method: Automatic)    Pulse (!) 53    Temp 36.4 °C (97.6 °F) (Skin)    Resp 14    Ht 5' 8" (1.727 m)    Wt 61.7 kg (136 lb)    SpO2 100%    Breastfeeding No    BMI 20.68 kg/m2       Post vital signs: stable    Level of consciousness: awake    Nausea/Vomiting: no nausea/no vomiting    Complications: none    Airway Patency: patent    Respiratory: unassisted    Cardiovascular: stable and blood pressure at baseline    Hydration: euvolemic  "

## 2017-04-24 NOTE — BRIEF OP NOTE
[facility]  Brief Operative Note     SUMMARY     Surgery Date: 4/24/2017     Surgeon(s) and Role:     * Alexander Arce MD - Primary     * Kenn Amezcua MD - Resident - Assisting        Pre-op Diagnosis:  Malignant neoplasm of left female breast, unspecified site of breast [C50.912]    Post-op Diagnosis:  Post-Op Diagnosis Codes:     * Malignant neoplasm of left female breast, unspecified site of breast [C50.912]    Procedure(s) (LRB):  UIRSHHASV-CEKH-A-CATH neck poss chest (Right)    Anesthesia: Local MAC    Description of the findings of the procedure: port a cath placed in right chest, right sub clavian vein       Estimated Blood Loss: * No values recorded between 4/24/2017 12:09 PM and 4/24/2017 12:51 PM *         Specimens:   Specimen     None          Discharge Note    SUMMARY     Admit Date: 4/24/2017    Discharge Date and Time:  04/24/2017 12:51 PM    Hospital Course (synopsis of major diagnoses, care, treatment, and services provided during the course of the hospital stay): Hospital course: this patient was admitted for an outpatient procedure which they tolerated well without any apparent untoward event       Final Diagnosis: Post-Op Diagnosis Codes:     * Malignant neoplasm of left female breast, unspecified site of breast [C50.912]    Disposition: Home or Self Care    Follow Up/Patient Instructions:     Medications:  Reconciled Home Medications:   Current Discharge Medication List      START taking these medications    Details   hydrocodone-acetaminophen 5-325mg (NORCO) 5-325 mg per tablet Take 1 tablet by mouth every 6 (six) hours as needed for Pain.  Qty: 21 tablet, Refills: 0         CONTINUE these medications which have NOT CHANGED    Details   dorzolamide (TRUSOPT) 2 % ophthalmic solution Place 1 drop into both eyes 2 (two) times daily.  Qty: 10 mL, Refills: 11      !! latanoprost 0.005 % ophthalmic solution INSTILL ONE DROP INTO EACH EYE ONCE DAILY IN THE EVENING  Qty: 8 mL,  Refills: 3      !! latanoprost 0.005 % ophthalmic solution INSTILL ONE DROP INTO EACH EYE ONCE DAILY IN THE EVENING  Qty: 8 mL, Refills: 2    Comments: What was wrong with the refill I sent in December??      diazepam (VALIUM) 2 MG tablet 1/2 to 1 po q 6h prn anxiety  Qty: 20 tablet, Refills: 0    Associated Diagnoses: Depressed      nystatin (MYCOSTATIN) cream Apply topically 2 (two) times daily.  Qty: 30 g, Refills: 0      paroxetine (PAXIL) 10 MG tablet Take 1 tablet (10 mg total) by mouth every evening.  Qty: 30 tablet, Refills: 11    Associated Diagnoses: Hot flashes      triamcinolone acetonide 0.1% (KENALOG) 0.1 % cream Apply topically 2 (two) times daily.  Qty: 45 g, Refills: 0      VALTREX 500 mg tablet        !! - Potential duplicate medications found. Please discuss with provider.          Discharge Procedure Orders  Diet general     Activity as tolerated     Call MD for:  temperature >100.4     Call MD for:  persistent nausea and vomiting     Call MD for:  redness, tenderness, or signs of infection (pain, swelling, redness, odor or green/yellow discharge around incision site)     Call MD for:  difficulty breathing, headache or visual disturbances     Call MD for:  severe uncontrolled pain     Remove dressing in 48 hours   Order Comments: Ok to shower, wash wound with soap and water, keep incision clean and dry at all times, no swimming, no tub bathing.   Steristrips will fall off on their own       Follow-up Information     Follow up with Alexander Arce MD.    Specialty:  General Surgery    Why:  As needed    Contact information:    [address]  [facility] [id number] [phone]

## 2017-04-24 NOTE — H&P
Patient ID: Ashlie Redman is a 62 y.o. female.      Chief Complaint: Consult (New Patient New Cancer)      HPI   This is a 62 y.o female who is referred for evaluation of L breast mass. She first noticed this mass in 2017, since then she saw her PCP and mammogram, ultrasound and biopsy were completed. Biopsy showed invasive ductal carcinoma of L breast, axillary lymph node biopsy was not completed at that time. Patient reports of breast pain 4-5/10 on severity and constantly there, the pain is worse when she walks or is active. She takes tylenol for pain relief and says it reduces her pain. She is unsure whether the mass has grown in size. Patient denies any fevers, chills, nausea, vomiting, weight loss or night sweats at this time.       Gyn History:  Age of menarche is 12. . OCP x 18 years. Menopause age 50. HRT (climara and paxil) x 12 years.       Fam History:   Colon cancer mother (90) and sister (48)  Breast cancer in 1st cousin (patient unsure age)      Review of Systems   Constitutional: Negative for chills, fatigue and fever.   Respiratory: Negative for cough, chest tightness and shortness of breath.   Cardiovascular: Negative for chest pain.   Gastrointestinal: Negative for abdominal distention, abdominal pain, nausea and vomiting.   Loose stools s/p clindamycin   Genitourinary: Negative for difficulty urinating, dysuria and flank pain.   Musculoskeletal: Negative for back pain.   Skin: Negative for color change and rash.   Hematological: Positive for adenopathy.       Objective:       Physical Exam   Constitutional: She is oriented to person, place, and time. She appears well-developed and well-nourished. No distress.   HENT:   Head: Normocephalic and atraumatic.   Eyes: EOM are normal. Pupils are equal, round, and reactive to light.   Neck: Normal range of motion. Neck supple.   Cardiovascular: Normal rate and regular rhythm.   Pulmonary/Chest: Effort normal and breath sounds  normal. No respiratory distress. She has no wheezes.       There is no apparent skin changes or nipple discharge.     R breast: appears normal.    Bilateral breast w/ implants that are palpable.    There is palpable and enlarge lymph node at apical axilla. It is non-tender and mobile.   Abdominal: Soft. Bowel sounds are normal. She exhibits no distension. There is no tenderness.   Musculoskeletal: Normal range of motion. She exhibits no edema.   Neurological: She is alert and oriented to person, place, and time.   Skin: Skin is warm and dry. She is not diaphoretic.   Psychiatric: She has a normal mood and affect. Her behavior is normal. Judgment and thought content normal.   Nursing note and vitals reviewed.      Assessment:       1. Malignant neoplasm of female breast, unspecified laterality, unspecified site of breast        Plan:       63 y/o female with invasive ductal carcinoma      1. US guided core needle biopsy of L apical axillary node  2. MRI scan  3. PET scan   4. Medical oncology referral for chemotherapy for Er-/Pr-/Her2+   5. Port-a-cath placement on R chest

## 2017-04-24 NOTE — ANESTHESIA PREPROCEDURE EVALUATION
04/24/2017  Ashlie Redman is a 62 y.o., female.    Anesthesia Evaluation    I have reviewed the Patient Summary Reports.    I have reviewed the Nursing Notes.   I have reviewed the Medications.     Review of Systems  Anesthesia Hx:  No problems with previous Anesthesia    Hematology/Oncology:        Current/Recent Cancer. Breast left   Cardiovascular:  Cardiovascular Normal  Denies Hypertension.  Denies MI.  Denies CAD.       Pulmonary:  Pulmonary Normal  Denies COPD.  Denies Asthma.  Denies Shortness of breath.  Denies Sleep Apnea.    Hepatic/GI:  Hepatic/GI Normal  Denies GERD.    Neurological:  Neurology Normal Denies TIA.  Denies CVA. Denies Seizures.    Endocrine:  Endocrine Normal Denies Diabetes. Denies Hypothyroidism.  Denies Hyperthyroidism.        Physical Exam  General:  Well nourished    Airway/Jaw/Neck:  Airway Findings: Mouth Opening: Normal Tongue: Normal  General Airway Assessment: Adult  Mallampati: II  TM Distance: Normal, at least 6 cm  Jaw/Neck Findings:  Neck ROM: Normal ROM      Dental:  Dental Findings: In tact        Mental Status:  Mental Status Findings:  Cooperative, Alert and Oriented         Anesthesia Plan  Type of Anesthesia, risks & benefits discussed:  Anesthesia Type:  general  Patient's Preference: GA  Intra-op Monitoring Plan:   Intra-op Monitoring Plan Comments:   Post Op Pain Control Plan:   Post Op Pain Control Plan Comments:   Induction:   IV  Beta Blocker:  Patient is not currently on a Beta-Blocker (No further documentation required).       Informed Consent: Patient understands risks and agrees with Anesthesia plan.  Questions answered. Anesthesia consent signed with patient.  ASA Score: 2     Day of Surgery Review of History & Physical:    H&P update referred to the surgeon.         Ready For Surgery From Anesthesia Perspective.

## 2017-04-24 NOTE — IP AVS SNAPSHOT
[facility]  [address]  [facility] [address]  Phone: [phone]           Patient Discharge Instructions   Our goal is to set you up for success. This packet includes information on your condition, medications, and your home care.  It will help you care for yourself to prevent having to return to the hospital.     Please ask your nurse if you have any questions.      There are many details to remember when preparing to leave the hospital. Here is what you will need to do:    1. Take your medicine. If you are prescribed medications, review your Medication List on the following pages. You may have new medications to  at the pharmacy and others that you'll need to stop taking. Review the instructions for how and when to take your medications. Talk with your doctor or nurses if you are unsure of what to do.     2. Go to your follow-up appointments. Specific follow-up information is listed in the following pages. Your may be contacted by a nurse or clinical provider about future appointments. Be sure we have all of the phone numbers to reach you. Please contact your provider's office if you are unable to make an appointment.     3. Watch for warning signs. Your doctor or nurse will give you detailed warning signs to watch for and when to call for assistance. These instructions may also include educational information about your condition. If you experience any of warning signs to your health, call your doctor.           [facility]  Unless otherwise directed by your provider, please   contact [facility], our nurse care line   that is available for 24/7 assistance.     [phone] (toll-free)     Registered nurses in the [facility]   provide: appointment scheduling, clinical advisement, health education, and other advisory services.                  ** Verify the list of medication(s) below is accurate and up to date. Carry this with you in case of  emergency. If your medications have changed, please notify your healthcare provider.             Medication List      START taking these medications        Additional Info                      hydrocodone-acetaminophen 5-325mg 5-325 mg per tablet   Commonly known as:  NORCO   Quantity:  21 tablet   Refills:  0   Dose:  1 tablet    Instructions:  Take 1 tablet by mouth every 6 (six) hours as needed for Pain.     Begin Date    AM    Noon    PM    Bedtime         CONTINUE taking these medications        Additional Info                      diazePAM 2 MG tablet   Commonly known as:  VALIUM   Quantity:  20 tablet   Refills:  0    Instructions:  1/2 to 1 po q 6h prn anxiety     Begin Date    AM    Noon    PM    Bedtime       dorzolamide 2 % ophthalmic solution   Commonly known as:  TRUSOPT   Quantity:  10 mL   Refills:  11   Dose:  1 drop    Instructions:  Place 1 drop into both eyes 2 (two) times daily.     Begin Date    AM    Noon    PM    Bedtime       * latanoprost 0.005 % ophthalmic solution   Quantity:  8 mL   Refills:  3    Instructions:  INSTILL ONE DROP INTO EACH EYE ONCE DAILY IN THE EVENING     Begin Date    AM    Noon    PM    Bedtime       * latanoprost 0.005 % ophthalmic solution   Quantity:  8 mL   Refills:  2   Comments:  What was wrong with the refill I sent in December??    Instructions:  INSTILL ONE DROP INTO EACH EYE ONCE DAILY IN THE EVENING     Begin Date    AM    Noon    PM    Bedtime       nystatin cream   Commonly known as:  MYCOSTATIN   Quantity:  30 g   Refills:  0    Instructions:  Apply topically 2 (two) times daily.     Begin Date    AM    Noon    PM    Bedtime       paroxetine 10 MG tablet   Commonly known as:  PAXIL   Quantity:  30 tablet   Refills:  11   Dose:  10 mg    Instructions:  Take 1 tablet (10 mg total) by mouth every evening.     Begin Date    AM    Noon    PM    Bedtime       triamcinolone acetonide 0.1% 0.1 % cream   Commonly known as:  KENALOG   Quantity:  45 g   Refills:  0     Instructions:  Apply topically 2 (two) times daily.     Begin Date    AM    Noon    PM    Bedtime       VALTREX 500 MG tablet   Refills:  0   Generic drug:  valacyclovir      Begin Date    AM    Noon    PM    Bedtime       * Notice:  This list has 2 medication(s) that are the same as other medications prescribed for you. Read the directions carefully, and ask your doctor or other care provider to review them with you.         Where to Get Your Medications      You can get these medications from any pharmacy     Bring a paper prescription for each of these medications     hydrocodone-acetaminophen 5-325mg 5-325 mg per tablet                  Please bring to all follow up appointments:    1. A copy of your discharge instructions.  2. All medicines you are currently taking in their original bottles.  3. Identification and insurance card.    Please arrive 15 minutes ahead of scheduled appointment time.    Please call 24 hours in advance if you must reschedule your appointment and/or time.        Your Scheduled Appointments     Apr 25, 2017  1:00 PM CDT   Consult with Celestina Ragsdale MD   [facility] ([facility])    [address]  [facility] [address]   [phone]            Apr 27, 2017  4:30 PM CDT   Mri Breast Cont with [facility] MRI1   [facility] ([facility] )    [address] [address]   [phone]            May 16, 2017  8:00 AM CDT   Established Patient Visit with Almaz Vasquez MD   [facility] ([facility])    [address] [facility] [facility] [address]   [phone]            May 16, 2017  8:30 AM CDT   OCT with VISUAL FIELDS   [facility] ([facility])    [address] [facility] [facility] [address]   [phone]              Follow-up Information     Follow up with Alexander Arce MD.    Specialty:  General Surgery    Why:  As needed    Contact information:     [address]  [facility] [address]  [phone]          Discharge Instructions     Future Orders    Activity as tolerated     Call MD for:  difficulty breathing, headache or visual disturbances     Call MD for:  persistent nausea and vomiting     Call MD for:  redness, tenderness, or signs of infection (pain, swelling, redness, odor or green/yellow discharge around incision site)     Call MD for:  severe uncontrolled pain     Call MD for:  temperature >100.4     Diet general     Questions:    Total calories:      Fat restriction, if any:      Protein restriction, if any:      Na restriction, if any:      Fluid restriction:      Additional restrictions:      Remove dressing in 48 hours     Comments:    Ok to shower, wash wound with soap and water, keep incision clean and dry at all times, no swimming, no tub bathing.   Steristrips will fall off on their own        Discharge Instructions         Discharge Instructions: Caring for Your Central Line  You are going home with a central line. Its also called a central venous access device (CVAD) or central venous catheter (CVC). A small, soft tube (catheter) has been put in a vein that leads to your heart. This provides medicine during your treatment. It is taken out when you no longer need it. At home, you need to take care of your central line to keep it working. A central line has a high infection risk. So you must take extra care washing your hands and preventing the spread of germs. This sheet will help you remember what to do at home.    Understanding your role  · A nurse or other healthcare provider will teach you and your caregivers how to care for the central line. Before leaving the hospital, make sure you understand what to do at home, how long you may need the central line, and when to have a follow-up visit.  · You will likely be told to flush the central line with saline or heparin solution. You may also be told to change the catheters injection  caps and change the bandage (dressing). Or, a nurse may do this for you during a follow-up visit. Only do these things if youre told to do so. Follow the instructions you were given.  Protecting the central line  If the central line gets damaged, it wont work right and could raise your chance of infection. Call your healthcare team right away if any damage occurs. To protect the central line at home:  · Prevent infection. Use good hand hygiene by following the guidelines on this sheet. Dont touch the catheter or dressing unless you need to. And always clean your hands before and after you come in contact with any part of the central line. Your caregivers, family members, and any visitors should use good hand hygiene, too.  · Keep the central line dry. The catheter and dressing must stay dry. Dont take baths, go swimming, use a hot tub, or do other activities that could get the central line wet. Take a sponge bath to avoid getting the central line wet, unless your healthcare provider tells you otherwise. Ask your provider about the best way to keep the line dry when bathing or showering. If the dressing does get wet, change it only if you have been shown how. Otherwise, call your healthcare team right away for help.  · Avoid damage. Dont use any sharp or pointy objects around the catheter. This includes scissors, pins, knives, razors, or anything else that could cut it or put a hole in it (puncture it). Also, dont let anything pull or rub on the catheter, such as clothing.  · Watch for signs of problems. Pay attention to how much of the catheter sticks out from your skin. If this changes at all, let your healthcare provider know. Also watch for cracks, leaks, or other damage. If the dressing becomes dirty, loose, or wet, change it (if you have been instructed to). Or call your healthcare team right away.  · Avoid lowering your chest below your waist. This includes bending at the waist to do things like tying  your shoes. When your chest is below your waist, especially for a long time, the catheters internal tip could slip out of place in the vein.  · Tell your healthcare team if you vomit or have severe coughing. This can also make the catheter slip out of place.  Risk of blood clot  If a blood clot forms it can block blood flow through the vein where the catheter is placed. Signs of a blood clot include pain or swelling in your neck, face, chest or arm. If you have any of these symptoms, call your healthcare provider right away. You may need an ultrasound exam to find the blood clot. You may also be treated with a blood thinner.    Prevent infection with good hand hygiene  A central line can let germs into your body. This can lead to serious and sometimes deadly infections. To prevent infection, its very important that you, your caregivers, and others around you use good hand hygiene. This means washing your hands well with soap and water, and cleaning them with alcohol-based hand gel as directed. Never touch the central line or dressing without first using one of these methods.  To wash your hands with soap and water:  1. Wet your hands with warm water. (Avoid hot water. It can cause skin irritation when you wash your hands often.)  2. Apply enough soap to cover the entire surface of your hands, including your fingers.  3. Rub your hands together briskly for at least 15 seconds. Make sure to rub the front and back of each hand up to the wrist, your fingers and fingernails, between the fingers, and each thumb.  4. Rinse your hands with warm water.  5. Dry your hands completely with a new, unused paper towel. Dont use a cloth towel or other reusable towel. These can harbor germs.  6. Use the paper towel to turn off the faucet, then throw it away. If youre in a bathroom, also use a paper towel to open the door instead of touching the handle.  When you dont have access to soap and water: Use alcohol-based hand gel to  "clean your hands. The gel should have at least 60% alcohol. Follow the instructions on the package. Your healthcare team can answer any questions you have about when to use hand gel, or when its better to wash with soap and water.   When to seek medical care  Call your healthcare provider right away if you have any of the following:  · Pain or burning in your shoulder, chest, back, arm, or leg  · Fever of 100.4°F (38.0°C) or higher  · Chills  · Signs of infection at the catheter site (pain, redness, drainage, burning, or stinging)  · Coughing, wheezing, or shortness of breath  · A racing or irregular heartbeat  · Muscle stiffness or trouble moving  · Gurgling noises coming from the catheter  · The catheter falls out, breaks, cracks, leaks, or has other damage   Date Last Reviewed: 7/1/2016  © 6215-3813 [employer]. [address] [place] [address]. All rights reserved. This information is not intended as a substitute for professional medical care. Always follow your healthcare professional's instructions.            Primary Diagnosis     Your primary diagnosis was:  Breast Cancer      Admission Information     Date & Time Provider Department CSN    4/24/2017  9:12 AM Alexander Arce MD [facility] [id number]      Care Providers     Provider Role Specialty Primary office phone    Alexander Arce MD Attending Provider General Surgery [phone]    Alexander Arce MD Surgeon  General Surgery [phone]      Your Vitals Were     BP Pulse Temp Resp Height Weight    140/73 (BP Location: Right leg, Patient Position: Lying, BP Method: Automatic) 65 97.6 °F (36.4 °C) (Skin) 16 5' 8" (1.727 m) 61.7 kg (136 lb)    SpO2 BMI             100% 20.68 kg/m2         Recent Lab Values        5/5/2010                           9:01 AM           A1C 5.1                       Allergies as of 4/24/2017        Reactions    Bactrim [Sulfamethoxazole-trimethoprim] Rash    Fever, vomiting "      Advance Directives     An advance directive is a document which, in the event you are no longer able to make decisions for yourself, tells your healthcare team what kind of treatment you do or do not want to receive, or who you would like to make those decisions for you.  If you do not currently have an advance directive, [facility] encourages you to create one.  For more information call:  [phone]), [phone] ([phone]),  or log on to [email or web address].        Language Assistance Services     ATTENTION: Language assistance services are available, free of charge. Please call [phone].      ATENCIÓN: Si eloy pittman, tiene a gabriel disposición servicios gratuitos de asistencia lingüística. Llame al [phone].     CHÚ Ý: N?u b?n nói Ti?ng Vi?t, có các d?ch v? h? tr? ngôn ng? mi?n phí dành cho b?n. G?i s? [phone].         [facility] complies with applicable Federal civil rights laws and does not discriminate on the basis of race, color, national origin, age, disability, or sex.

## 2017-04-24 NOTE — ANESTHESIA POSTPROCEDURE EVALUATION
"Anesthesia Post Evaluation    Patient: Ashlie Redman    Procedure(s) Performed: Procedure(s) (LRB):  [id number] neck poss chest (Right)    Final Anesthesia Type: general  Patient location during evaluation: PACU  Patient participation: Yes- Able to Participate  Level of consciousness: awake and alert and oriented  Post-procedure vital signs: reviewed and stable  Pain management: adequate  Airway patency: patent  PONV status at discharge: No PONV  Anesthetic complications: no      Cardiovascular status: hemodynamically stable  Respiratory status: unassisted and spontaneous ventilation  Hydration status: euvolemic  Follow-up not needed.        Visit Vitals    /62 (BP Location: Right leg, Patient Position: Sitting, BP Method: Automatic)    Pulse (!) 53    Temp 36.4 °C (97.6 °F) (Skin)    Resp 14    Ht 5' 8" (1.727 m)    Wt 61.7 kg (136 lb)    SpO2 100%    Breastfeeding No    BMI 20.68 kg/m2       Pain/Lillian Score: Pain Assessment Performed: Yes (4/24/2017  1:21 PM)  Presence of Pain: denies (4/24/2017  1:21 PM)  Lillian Score: 10 (4/24/2017  1:21 PM)      "

## 2017-04-24 NOTE — PROGRESS NOTES
Discharge instructions given, patient verbalized understanding. Consents in chart, Vitals stable, no complaints. Waiting for Dr Amezcua to review CXR so she can be discharged.

## 2017-04-24 NOTE — DISCHARGE INSTRUCTIONS
Discharge Instructions: Caring for Your Central Line  You are going home with a central line. Its also called a central venous access device (CVAD) or central venous catheter (CVC). A small, soft tube (catheter) has been put in a vein that leads to your heart. This provides medicine during your treatment. It is taken out when you no longer need it. At home, you need to take care of your central line to keep it working. A central line has a high infection risk. So you must take extra care washing your hands and preventing the spread of germs. This sheet will help you remember what to do at home.    Understanding your role  · A nurse or other healthcare provider will teach you and your caregivers how to care for the central line. Before leaving the hospital, make sure you understand what to do at home, how long you may need the central line, and when to have a follow-up visit.  · You will likely be told to flush the central line with saline or heparin solution. You may also be told to change the catheters injection caps and change the bandage (dressing). Or, a nurse may do this for you during a follow-up visit. Only do these things if youre told to do so. Follow the instructions you were given.  Protecting the central line  If the central line gets damaged, it wont work right and could raise your chance of infection. Call your healthcare team right away if any damage occurs. To protect the central line at home:  · Prevent infection. Use good hand hygiene by following the guidelines on this sheet. Dont touch the catheter or dressing unless you need to. And always clean your hands before and after you come in contact with any part of the central line. Your caregivers, family members, and any visitors should use good hand hygiene, too.  · Keep the central line dry. The catheter and dressing must stay dry. Dont take baths, go swimming, use a hot tub, or do other activities that could get the central line wet. Take  a sponge bath to avoid getting the central line wet, unless your healthcare provider tells you otherwise. Ask your provider about the best way to keep the line dry when bathing or showering. If the dressing does get wet, change it only if you have been shown how. Otherwise, call your healthcare team right away for help.  · Avoid damage. Dont use any sharp or pointy objects around the catheter. This includes scissors, pins, knives, razors, or anything else that could cut it or put a hole in it (puncture it). Also, dont let anything pull or rub on the catheter, such as clothing.  · Watch for signs of problems. Pay attention to how much of the catheter sticks out from your skin. If this changes at all, let your healthcare provider know. Also watch for cracks, leaks, or other damage. If the dressing becomes dirty, loose, or wet, change it (if you have been instructed to). Or call your healthcare team right away.  · Avoid lowering your chest below your waist. This includes bending at the waist to do things like tying your shoes. When your chest is below your waist, especially for a long time, the catheters internal tip could slip out of place in the vein.  · Tell your healthcare team if you vomit or have severe coughing. This can also make the catheter slip out of place.  Risk of blood clot  If a blood clot forms it can block blood flow through the vein where the catheter is placed. Signs of a blood clot include pain or swelling in your neck, face, chest or arm. If you have any of these symptoms, call your healthcare provider right away. You may need an ultrasound exam to find the blood clot. You may also be treated with a blood thinner.    Prevent infection with good hand hygiene  A central line can let germs into your body. This can lead to serious and sometimes deadly infections. To prevent infection, its very important that you, your caregivers, and others around you use good hand hygiene. This means washing your  hands well with soap and water, and cleaning them with alcohol-based hand gel as directed. Never touch the central line or dressing without first using one of these methods.  To wash your hands with soap and water:  1. Wet your hands with warm water. (Avoid hot water. It can cause skin irritation when you wash your hands often.)  2. Apply enough soap to cover the entire surface of your hands, including your fingers.  3. Rub your hands together briskly for at least 15 seconds. Make sure to rub the front and back of each hand up to the wrist, your fingers and fingernails, between the fingers, and each thumb.  4. Rinse your hands with warm water.  5. Dry your hands completely with a new, unused paper towel. Dont use a cloth towel or other reusable towel. These can harbor germs.  6. Use the paper towel to turn off the faucet, then throw it away. If youre in a bathroom, also use a paper towel to open the door instead of touching the handle.  When you dont have access to soap and water: Use alcohol-based hand gel to clean your hands. The gel should have at least 60% alcohol. Follow the instructions on the package. Your healthcare team can answer any questions you have about when to use hand gel, or when its better to wash with soap and water.   When to seek medical care  Call your healthcare provider right away if you have any of the following:  · Pain or burning in your shoulder, chest, back, arm, or leg  · Fever of 100.4°F (38.0°C) or higher  · Chills  · Signs of infection at the catheter site (pain, redness, drainage, burning, or stinging)  · Coughing, wheezing, or shortness of breath  · A racing or irregular heartbeat  · Muscle stiffness or trouble moving  · Gurgling noises coming from the catheter  · The catheter falls out, breaks, cracks, leaks, or has other damage   Date Last Reviewed: 7/1/2016  © 5175-2318 [employer]. [address] [place] [address], [place] [address]. All rights reserved. This  information is not intended as a substitute for professional medical care. Always follow your healthcare professional's instructions.

## 2017-04-24 NOTE — TRANSFER OF CARE
"Anesthesia Transfer of Care Note    Patient: Ashlie Redman    Procedure(s) Performed: Procedure(s) (LRB):  [id number] neck poss chest (Right)    Patient location: PACU    Anesthesia Type: general    Transport from OR: Transported from OR on room air with adequate spontaneous ventilation    Post pain: adequate analgesia    Post assessment: no apparent anesthetic complications    Post vital signs: stable    Level of consciousness: awake, alert and oriented    Nausea/Vomiting: no nausea/vomiting    Complications: none          Last vitals:   Visit Vitals    BP (!) 137/94 (BP Location: Left arm, Patient Position: Lying, BP Method: Automatic)    Pulse 64    Temp 36.6 °C (97.8 °F) (Oral)    Resp 15    Ht 5' 8" (1.727 m)    Wt 61.7 kg (136 lb)    Breastfeeding No    BMI 20.68 kg/m2     "

## 2017-04-25 ENCOUNTER — INITIAL CONSULT (OUTPATIENT)
Dept: HEMATOLOGY/ONCOLOGY | Facility: CLINIC | Age: 62
End: 2017-04-25
Payer: COMMERCIAL

## 2017-04-25 ENCOUNTER — TELEPHONE (OUTPATIENT)
Dept: HEMATOLOGY/ONCOLOGY | Facility: CLINIC | Age: 62
End: 2017-04-25

## 2017-04-25 ENCOUNTER — LAB VISIT (OUTPATIENT)
Dept: LAB | Facility: HOSPITAL | Age: 62
End: 2017-04-25
Attending: INTERNAL MEDICINE
Payer: COMMERCIAL

## 2017-04-25 VITALS
BODY MASS INDEX: 21.18 KG/M2 | RESPIRATION RATE: 18 BRPM | DIASTOLIC BLOOD PRESSURE: 72 MMHG | WEIGHT: 139.75 LBS | HEART RATE: 57 BPM | HEIGHT: 68 IN | TEMPERATURE: 98 F | SYSTOLIC BLOOD PRESSURE: 134 MMHG

## 2017-04-25 DIAGNOSIS — C50.012 MALIGNANT NEOPLASM OF NIPPLE OF LEFT BREAST IN FEMALE: Primary | ICD-10-CM

## 2017-04-25 DIAGNOSIS — C50.012 MALIGNANT NEOPLASM OF NIPPLE OF LEFT BREAST IN FEMALE: ICD-10-CM

## 2017-04-25 LAB
ALBUMIN SERPL BCP-MCNC: 4.7 G/DL
ALP SERPL-CCNC: 80 U/L
ALT SERPL W/O P-5'-P-CCNC: 33 U/L
ANION GAP SERPL CALC-SCNC: 7 MMOL/L
AST SERPL-CCNC: 30 U/L
BASOPHILS # BLD AUTO: 0.03 K/UL
BASOPHILS NFR BLD: 0.3 %
BILIRUB SERPL-MCNC: 0.6 MG/DL
BUN SERPL-MCNC: 8 MG/DL
CALCIUM SERPL-MCNC: 10 MG/DL
CHLORIDE SERPL-SCNC: 102 MMOL/L
CO2 SERPL-SCNC: 30 MMOL/L
CREAT SERPL-MCNC: 0.75 MG/DL
DIFFERENTIAL METHOD: ABNORMAL
EOSINOPHIL # BLD AUTO: 0.2 K/UL
EOSINOPHIL NFR BLD: 2.3 %
ERYTHROCYTE [DISTWIDTH] IN BLOOD BY AUTOMATED COUNT: 11.9 %
EST. GFR  (AFRICAN AMERICAN): >60 ML/MIN/1.73 M^2
EST. GFR  (NON AFRICAN AMERICAN): >60 ML/MIN/1.73 M^2
GLUCOSE SERPL-MCNC: 97 MG/DL
HCT VFR BLD AUTO: 39.6 %
HGB BLD-MCNC: 13.5 G/DL
LYMPHOCYTES # BLD AUTO: 1.9 K/UL
LYMPHOCYTES NFR BLD: 17.6 %
MCH RBC QN AUTO: 34 PG
MCHC RBC AUTO-ENTMCNC: 34.1 %
MCV RBC AUTO: 100 FL
MONOCYTES # BLD AUTO: 1 K/UL
MONOCYTES NFR BLD: 9.3 %
NEUTROPHILS # BLD AUTO: 7.4 K/UL
NEUTROPHILS NFR BLD: 70.5 %
NRBC BLD-RTO: 0 /100 WBC
PLATELET # BLD AUTO: 199 K/UL
PMV BLD AUTO: 10.4 FL
POTASSIUM SERPL-SCNC: 4.4 MMOL/L
PROT SERPL-MCNC: 7.5 G/DL
RBC # BLD AUTO: 3.97 M/UL
SODIUM SERPL-SCNC: 139 MMOL/L
WBC # BLD AUTO: 10.5 K/UL

## 2017-04-25 PROCEDURE — 1160F RVW MEDS BY RX/DR IN RCRD: CPT | Mod: S$GLB,,, | Performed by: INTERNAL MEDICINE

## 2017-04-25 PROCEDURE — 99999 PR PBB SHADOW E&M-EST. PATIENT-LVL III: CPT | Mod: PBBFAC,,, | Performed by: INTERNAL MEDICINE

## 2017-04-25 PROCEDURE — 99205 OFFICE O/P NEW HI 60 MIN: CPT | Mod: S$GLB,,, | Performed by: INTERNAL MEDICINE

## 2017-04-25 PROCEDURE — 80053 COMPREHEN METABOLIC PANEL: CPT | Mod: PN

## 2017-04-25 PROCEDURE — 85025 COMPLETE CBC W/AUTO DIFF WBC: CPT

## 2017-04-25 PROCEDURE — 36415 COLL VENOUS BLD VENIPUNCTURE: CPT | Mod: PN

## 2017-04-25 PROCEDURE — 85025 COMPLETE CBC W/AUTO DIFF WBC: CPT | Mod: PN

## 2017-04-25 PROCEDURE — 86300 IMMUNOASSAY TUMOR CA 15-3: CPT

## 2017-04-25 PROCEDURE — 80053 COMPREHEN METABOLIC PANEL: CPT

## 2017-04-25 RX ORDER — ONDANSETRON 8 MG/1
8 TABLET, ORALLY DISINTEGRATING ORAL EVERY 12 HOURS PRN
Qty: 30 TABLET | Refills: 1 | Status: SHIPPED | OUTPATIENT
Start: 2017-04-25 | End: 2018-04-25

## 2017-04-25 RX ORDER — PROCHLORPERAZINE MALEATE 10 MG
10 TABLET ORAL EVERY 6 HOURS PRN
Qty: 30 TABLET | Refills: 1 | Status: SHIPPED | OUTPATIENT
Start: 2017-04-25 | End: 2018-04-25

## 2017-04-25 RX ORDER — LIDOCAINE AND PRILOCAINE 25; 25 MG/G; MG/G
CREAM TOPICAL
Qty: 5 G | Refills: 0 | Status: SHIPPED | OUTPATIENT
Start: 2017-04-25 | End: 2017-05-01

## 2017-04-25 NOTE — PROGRESS NOTES
Ashlie Redman, 62-year-old  woman sent in, referral by Dr. Arce.    REASON FOR CONSULTATION:  New diagnosis of breast cancer for neoadjuvant   chemotherapy.    SIGNIFICANT HISTORY:  The patient saw Dr. Arce yesterday.  The patient felt   a left breast mass.  She has had breast augmentation in the past.  Mammogram was   done.  Ultrasound and biopsy were completed when abnormality was noted.  The   patient also had a port placed since then.  Palpable enlarged lymph node in the   left axilla.  The patient's ERP are negative, HER-2 positive, stage IIB   clinically due to the size of breast mass on the PET scan being about 3 cm and   the axillary lymph node about 1.3 cm.  The patient is here for discussion of   neoadjuvant therapy.  She is obviously anxious, concerned about the findings,   wants to get started as soon as possible with therapy.  She is here with her   .  She is 0% on ER and 0% on NV and HER-2 positive.  The patient does not   really have any other medical situations that need to be addressed.  She takes   Valtrex for herpes prophylaxis and postbiopsy has been taking hydrocodone.  She   takes Paxil for depression and Valium for sleep aid.    PHYSICAL EXAM:  GENERAL:  This is a well-built, well-groomed, comfortable patient without any   deformities ambulating to clinic.  Patient is in no distress.    NEURO:  Awake, alert and oriented to time, person and place.  The patient   demonstrates no evidence of depression, anxiety or agitation.  Patient is   cooperative with exam and discussion.    EYES:  Normal conjunctivae and eyelids.  PERRLA 3 to 4 mm without icterus.    ENT AND MOUTH:  External appearance of ears and nose normal without scars,   lesions or masses.  Nasal mucosa, turbinates and septum are normal.  No ENT   drainage and dried blood noted.  No tenderness over frontal or maxillary sinus.    Lips and gums are normal.  Dentition is normal.    NECK:  Supple.  Trachea is central.   No crepitus.  No JVD.  No thyromegaly or   masses.     RESPIRATORY:  No intercostal retractions and no use of accessory muscles of   respirations noted.  No areas of dullness to percussion.  Chest is clear to   auscultation.  No rales, rhonchi or wheezes.  No crepitus.  Good air entry   bilaterally.    CARDIOVASCULAR:  No cardiomegaly.  Normal location.  No thrills or heaviness.    Normal carotid pulse.  No bruits.  S1 and S2 are normally heard without murmurs   or gallops.  All peripheral pulses, including pedal pulses, are present.    ABDOMEN:  Normal abdomen.  No hepatosplenomegaly.  No free fluid.  Bowel sounds   are present.  No hernia noted.  No masses.  No rebound or tenderness.  No   guarding or rigidity.  Umbilicus is midline.    LYMPHATICS:  No axillary, cervical, supraclavicular, submental, or inguinal   lymphadenopathy.    SKIN AND MUSCULOSKELETAL:  There is no evidence of excoriation marks or   ecchymosis.  No rashes.  No pigmented lesions, induration or subcutaneous   nodules.  No sores or abnormal moles.  No cyanosis.  No clubbing.  Nailbed   abnormalities are noted. No joint or skeletal deformities noted.  Normal range   of motion.    BREASTS:  Bilateral implants.  Mass in the breast in the left side is palpable,   two at the lateral side, 3 o'clock position, discrete, about 2 cm in size.    There is also an axillary lymph node about 1.5 cm palpable.  No supraclavicular   adenopathy felt.  No other lymph nodes felt.  Right breast is intact.  The   patient has a port placed in the right chest wall.  No abnormal masses or   discharge.    NEUROLOGIC:  Higher functions are appropriate.  No cranial nerve deficits.    Normal gait.  Normal strength.  Motor and sensory functions are normal.  Deep   tendon reflexes are normal without Babinski's sign or ankle clonus.    IMPRESSION:  ERP are negative, HER-2 positive, stage IIB, clinical stage breast   cancer.  The patient is here for neoadjuvant  therapy.    PLAN:  1.  Will be to embark on 4 cycles of Adriamycin and Cytoxan, followed by Taxol   weekly for 12 cycles with Herceptin.  Looking at the degree of response after   Adriamycin and Cytoxan, I may or may not add Perjeta to the regimen.  The   patient voices understanding of plan.  Lengthy clinic visit today.  2.  Sent to Chemo class. The patient already has port.  Chemo orders placed.  3.  Prescription for Compazine, Zofran, mouth wash, and EMLA cream sent to   pharmacy.  4.  The patient to come back to me with CBC and CMP in time for start date ASAP.  5.  The patient may continue her Valtrex and hydrocodone p.r.n.      SSS/PN  dd: 04/25/2017 15:23:59 (CDT)  td: 04/26/2017 02:16:53 (CDT)  Doc ID   #[id number]  Job ID #[id number]    CC:

## 2017-04-25 NOTE — TELEPHONE ENCOUNTER
Returned call to [facility], spoke with Norberto, confirmed 30 grams for Emla cream may be dispensed

## 2017-04-25 NOTE — TELEPHONE ENCOUNTER
----- Message from Vangie Varner LPN sent at 4/25/2017  3:28 PM CDT -----  Contact: [facility] - [phone]      ----- Message -----     From: Morgan Mendes     Sent: 4/25/2017   3:04 PM       To: Jn BARRETT Staff    States that they just received an e-scribe for the cream, can the lidocaine-prilocaine (EMLA) cream 30 grams can be dispensed.  Please call [phone]

## 2017-04-25 NOTE — MR AVS SNAPSHOT
[facility] Hematology  [facility]  [facility] [address]  Phone: [phone]  Fax: [phone]                  Ashlie Redman   2017 1:00 PM   Initial consult    Description:  Female : 1955   Provider:  Celestina Ragsdale MD   Department:  [facility] - Hematology           Diagnoses this Visit        Comments    Malignant neoplasm of nipple of left breast in female    -  Primary            To Do List           Future Appointments        Provider Department Dept Phone    2017 2:15 PM LAB, [facility] - Laboratory [phone]    2017 4:30 PM [facility] MRI[facility] [phone]    2017 8:00 AM Almaz Vasquez MD [facility] Ophthalmology [phone]    2017 8:30 AM [facility] Ophthalmology [phone]      Goals (5 Years of Data)     None       These Medications        Disp Refills Start End    prochlorperazine (COMPAZINE) 10 MG tablet 30 tablet 1 2017    Take 1 tablet (10 mg total) by mouth every 6 (six) hours as needed. - Oral    Pharmacy: [facility] [address] [place] [address] [facility] Ph #: [phone]       ondansetron (ZOFRAN-ODT) 8 MG TbDL 30 tablet 1 2017    Take 1 tablet (8 mg total) by mouth every 12 (twelve) hours as needed. - Oral    Pharmacy: [facility] [address] [place] [address] [place] - [address] [facility] Ph #: [phone]       mouthwashes Soln 200 mL 1 2017     PHARMACIST:  MIX 50mL Benadryl Elixir; 50mL Viscous Lidocaine; 50mL Nystatin Suspension; 50mL Milk of Magnesia  PATIENT:  Take 5mL by mouth - Swish and swallow - every 4 hours as needed for mouth/throat pain.    Pharmacy: [facility] [address] [place] [address] [place] - [address] [facility] Ph #: [phone]       lidocaine-prilocaine (EMLA) cream 5 g 0 2017     Apply to affected area once    Pharmacy: [facility] [address] [place] - [address] [address] Ph #:  [phone]         [facility]     [facility] Nurse Care Line - 24/7 Assistance  Unless otherwise directed by your provider, please contact [facility], our nurse care line that is available for 24/7 assistance.     Registered nurses in the [facility] provide: appointment scheduling, clinical advisement, health education, and other advisory services.  Call: [phone] (toll free)               Medications           Message regarding Medications     Verify the changes and/or additions to your medication regime listed below are the same as discussed with your clinician today.  If any of these changes or additions are incorrect, please notify your healthcare provider.        START taking these NEW medications        Refills    prochlorperazine (COMPAZINE) 10 MG tablet 1    Sig: Take 1 tablet (10 mg total) by mouth every 6 (six) hours as needed.    Class: Normal    Route: Oral    ondansetron (ZOFRAN-ODT) 8 MG TbDL 1    Sig: Take 1 tablet (8 mg total) by mouth every 12 (twelve) hours as needed.    Class: Normal    Route: Oral    mouthwashes Soln 1    Sig: PHARMACIST:  MIX 50mL Benadryl Elixir; 50mL Viscous Lidocaine; 50mL Nystatin Suspension; 50mL Milk of Magnesia  PATIENT:  Take 5mL by mouth - Swish and swallow - every 4 hours as needed for mouth/throat pain.    Class: Print    lidocaine-prilocaine (EMLA) cream 0    Sig: Apply to affected area once    Class: Normal           Verify that the below list of medications is an accurate representation of the medications you are currently taking.  If none reported, the list may be blank. If incorrect, please contact your healthcare provider. Carry this list with you in case of emergency.           Current Medications     dorzolamide (TRUSOPT) 2 % ophthalmic solution Place 1 drop into both eyes 2 (two) times daily.    hydrocodone-acetaminophen 5-325mg (NORCO) 5-325 mg per tablet Take 1 tablet by mouth every 6 (six) hours as needed for Pain.     "latanoprost 0.005 % ophthalmic solution INSTILL ONE DROP INTO EACH EYE ONCE DAILY IN THE EVENING    latanoprost 0.005 % ophthalmic solution INSTILL ONE DROP INTO EACH EYE ONCE DAILY IN THE EVENING    nystatin (MYCOSTATIN) cream Apply topically 2 (two) times daily.    paroxetine (PAXIL) 10 MG tablet Take 1 tablet (10 mg total) by mouth every evening.    VALTREX 500 mg tablet     diazepam (VALIUM) 2 MG tablet 1/2 to 1 po q 6h prn anxiety    lidocaine-prilocaine (EMLA) cream Apply to affected area once    mouthwashes Soln PHARMACIST:  MIX 50mL Benadryl Elixir; 50mL Viscous Lidocaine; 50mL Nystatin Suspension; 50mL Milk of Magnesia  PATIENT:  Take 5mL by mouth - Swish and swallow - every 4 hours as needed for mouth/throat pain.    ondansetron (ZOFRAN-ODT) 8 MG TbDL Take 1 tablet (8 mg total) by mouth every 12 (twelve) hours as needed.    prochlorperazine (COMPAZINE) 10 MG tablet Take 1 tablet (10 mg total) by mouth every 6 (six) hours as needed.    triamcinolone acetonide 0.1% (KENALOG) 0.1 % cream Apply topically 2 (two) times daily.           Clinical Reference Information           Your Vitals Were     BP Pulse Temp Resp Height Weight    134/72 57 98.2 °F (36.8 °C) 18 5' 8" (1.727 m) 63.4 kg (139 lb 12.4 oz)    BMI                21.25 kg/m2          Blood Pressure          Most Recent Value    BP  134/72      Allergies as of 4/25/2017     Bactrim [Sulfamethoxazole-trimethoprim]      Immunizations Administered on Date of Encounter - 4/25/2017     None      Orders Placed During Today's Visit     Future Labs/Procedures Expected by Expires    CA 27.29  4/25/2017 6/24/2018    CBC w/ DIFF  4/25/2017 6/24/2018    CMP  4/25/2017 6/24/2018      Language Assistance Services     ATTENTION: Language assistance services are available, free of charge. Please call [phone].      ATENCIÓN: Si habla zain, tiene a gabriel disposición servicios gratuitos de asistencia lingüística. Llame al [phone].     CHÚ Ý: N?u b?n nói " Ti?ng Vi?t, có các d?ch v? h? tr? ngôn ng? mi?n phí dành cho b?n. G?i s? [phone].         [facility] complies with applicable Federal civil rights laws and does not discriminate on the basis of race, color, national origin, age, disability, or sex.

## 2017-04-25 NOTE — LETTER
April 25, 2017      Alexander Arce MD  [address] [facility] [address]           [place] [address] [facility] [address]  [facility] [address]  Phone: [phone]  Fax: [phone]          Patient: Ashlie Redman   MR Number: [id number]   YOB: 1955   Date of Visit: 4/25/2017       Dear Dr. Alexander Arce:    Thank you for referring Ashlie Redman to me for evaluation. Attached you will find relevant portions of my assessment and plan of care.    If you have questions, please do not hesitate to call me. I look forward to following Ashlie Redman along with you.    Sincerely,    Celestina Ragsdale MD    Enclosure  CC:  No Recipients    If you would like to receive this communication electronically, please contact [email or web address] or [phone] to request more information on [employer] access.    For providers and/or their staff who would like to refer a patient to [facility], please contact us through our one-stop-shop provider referral line, [facility], at [phone].    If you feel you have received this communication in error or would no longer like to receive these types of communications, please e-mail [email or web address]

## 2017-04-26 NOTE — OP NOTE
DATE OF PROCEDURE:  04/24/2017    PRIMARY SURGEON:  Alexander Arce M.D.    ASSISTANT:  Kenn Amezcua M.D. (RES)    PREOPERATIVE DIAGNOSES:  Locally advanced left invasive HER-2/clay positive lymph   node, positive left invasive infiltrating breast carcinoma at least clinically   stage II; with need for central venous access for preoperative neoadjuvant   systemic Herceptin-based chemotherapy.    POSTOPERATIVE DIAGNOSES:  Locally advanced left invasive HER-2/clay positive   lymph node, positive left invasive infiltrating breast carcinoma at least   clinically stage II; with need for central venous access for preoperative   neoadjuvant systemic Herceptin-based chemotherapy.    PROCEDURES PERFORMED:  Placement of right subclavian vein 8-Congolese MRI   compatible PowerPort Port-A-Cath with intraoperative fluoroscopy placed by   Seldinger technique.    PROCEDURE IN DETAIL:  The patient underwent informed consent.  The history and   physical examination was reviewed and updated.  She was brought to the Operating   Room.  The right chest was marked.  She was in a supine position.  A vertical   roll was placed parallel to her spine between her shoulder blades.  Both arms   were tucked.  The right anterior neck and chest were prepped and draped in a   sterile fashion.  Ioban drape was placed.  Local anesthesia was infiltrated at   the midclavicular line in the infraclavicular region.  The right subclavian vein   was cannulated percutaneously with good venous blood return.  The guidewire was   advanced into the central venous system with good venous blood return.  The   access site was enlarged for approximately 3 cm or so under local anesthesia to   create the subcutaneous pocket for the port.  The port was anchored to the   catheter with the locking hub and flushed with injectable saline.  The port was   anchored into the pocket with interrupted 2-0 Vicryl sutures x2.  The patient   had a history of breast implants.  The  catheter tip was cut to the appropriate   length so its tip would be in the superior vena cava near the right atrial   junction.  The catheter tip was cut to approximately 16 cm in length.  The   dilator and peel-away sheath were advanced over the guidewire under fluoroscopic   guidance.  The dilator and guidewire were removed and the catheter was then   advanced through the peel-away sheath without difficulty.  The peel-away sheath   was withdrawn.  Fluoroscopy confirmed the catheter tip in the appropriate   position.  The port was accessed with the Edgar needle.  It flushed and   aspirated easily.  The port was then flushed with a final solution of 100 units   of heparin per mL of saline.  The deep dermal and subcutaneous layer was closed   with 3-0 Vicryl suture and the skin closed with a running 4-0 Monocryl   subcuticular skin closure.  Mastisol, Steri-Strips, sterile Telfa gauze and   Tegaderm dressing were applied.  Estimated blood loss was minimal.  All needle,   instrument and sponge counts were correct.  The patient tolerated the procedure   well without complication and was turned over to Anesthesia for transport to the   recovery area in a satisfactory condition.      ROSITA/FERNY  dd: 04/25/2017 20:45:35 (CDT)  td: 04/25/2017 21:18:52 (CDT)  Doc ID   #[id number]  Job ID #[id number]    CC:

## 2017-04-27 ENCOUNTER — HOSPITAL ENCOUNTER (OUTPATIENT)
Dept: RADIOLOGY | Facility: HOSPITAL | Age: 62
Discharge: HOME | End: 2017-04-27
Attending: SURGERY
Payer: COMMERCIAL

## 2017-04-27 DIAGNOSIS — C50.919 MALIGNANT NEOPLASM OF FEMALE BREAST, UNSPECIFIED LATERALITY, UNSPECIFIED SITE OF BREAST: ICD-10-CM

## 2017-04-27 PROCEDURE — 0159T MRI BREAST BILATERAL: CPT | Mod: TC

## 2017-04-27 PROCEDURE — 25500020 PHARM REV CODE 255: Performed by: SURGERY

## 2017-04-27 PROCEDURE — A9577 INJ MULTIHANCE: HCPCS | Performed by: SURGERY

## 2017-04-27 RX ADMIN — GADOBENATE DIMEGLUMINE 14 ML: 529 INJECTION, SOLUTION INTRAVENOUS at 05:04

## 2017-04-28 ENCOUNTER — TELEPHONE (OUTPATIENT)
Dept: HEMATOLOGY/ONCOLOGY | Facility: CLINIC | Age: 62
End: 2017-04-28

## 2017-04-28 ENCOUNTER — PATIENT MESSAGE (OUTPATIENT)
Dept: HEMATOLOGY/ONCOLOGY | Facility: CLINIC | Age: 62
End: 2017-04-28

## 2017-04-28 LAB — CANCER AG27-29 SERPL-ACNC: 44.1 U/ML

## 2017-04-28 NOTE — TELEPHONE ENCOUNTER
Patient called very anxious and ready to start chemo. Patient scheduled for chemo school on 5/1/17 and Olivia Pendleton notified. Patient will see Dietician an  at that time per Olivia Pendleton Nurse Navigator.

## 2017-05-01 ENCOUNTER — APPOINTMENT (OUTPATIENT)
Dept: INFUSION THERAPY | Facility: HOSPITAL | Age: 62
End: 2017-05-01
Attending: NURSE PRACTITIONER
Payer: COMMERCIAL

## 2017-05-01 ENCOUNTER — CLINICAL SUPPORT (OUTPATIENT)
Dept: HEMATOLOGY/ONCOLOGY | Facility: CLINIC | Age: 62
End: 2017-05-01
Payer: COMMERCIAL

## 2017-05-01 ENCOUNTER — DOCUMENTATION ONLY (OUTPATIENT)
Dept: INFUSION THERAPY | Facility: HOSPITAL | Age: 62
End: 2017-05-01

## 2017-05-01 VITALS
HEIGHT: 68 IN | TEMPERATURE: 98 F | WEIGHT: 136 LBS | DIASTOLIC BLOOD PRESSURE: 79 MMHG | SYSTOLIC BLOOD PRESSURE: 134 MMHG | BODY MASS INDEX: 20.61 KG/M2 | RESPIRATION RATE: 16 BRPM | HEART RATE: 58 BPM

## 2017-05-01 DIAGNOSIS — C50.512 PRIMARY CANCER OF LOWER OUTER QUADRANT OF LEFT FEMALE BREAST: Primary | ICD-10-CM

## 2017-05-01 PROCEDURE — 99999 PR PBB SHADOW E&M-EST. PATIENT-LVL III: CPT | Mod: PBBFAC,,, | Performed by: NURSE PRACTITIONER

## 2017-05-01 PROCEDURE — 99214 OFFICE O/P EST MOD 30 MIN: CPT | Mod: S$GLB,,, | Performed by: NURSE PRACTITIONER

## 2017-05-01 RX ORDER — LIDOCAINE AND PRILOCAINE 25; 25 MG/G; MG/G
CREAM TOPICAL ONCE
COMMUNITY
End: 2018-06-21

## 2017-05-01 RX ORDER — LATANOPROST 50 UG/ML
SOLUTION/ DROPS OPHTHALMIC
Refills: 0 | OUTPATIENT
Start: 2017-05-01

## 2017-05-01 NOTE — PROGRESS NOTES
TEACHING NOTE    INITIAL DIAGNOSIS:  Stage IIB left breast carcinoma, ER/[place] negative, HER-2/clya positive.    SECONDARY DIAGNOSES:  1.  Cataracts.  2.  Diverticulosis.  3.  Glaucoma.  4.  Osteopenia.  5.  Breast augmentation.  6.  D and C of uterus.  7.  Bilateral vitrectomy.  8.  Cataract extraction with intraocular lens implantation, bilateral.    HISTORY OF PRESENT ILLNESS:  This is a 62-year-old white female known to Dr. Ragsdale.  The patient discovered a left breast mass and was seen by Dr. Arce.    Ultrasound and biopsy were done as well as port implantation.  She presents to the  Clinic today for chemotherapy instructions in good spirits.  No new complaints or  Pertinent findings on a review of systems.    ALLERGIES:  Bactrim.    MEDICATIONS  Valium 1/2 to 1 tablet (2 mg q. 6 hours p.r.n. anxiety),   dorzolamide 2% ophthalmic solution one drop to both eyes b.i.d., Norco 5/325 one   q. 6 hours p.r.n. pain, latanoprost 0.005% ophthalmic solution one drop into   each eye daily, Paxil 10 mg q. evening, Valtrex 500 mg daily.    SOCIAL HISTORY:  The patient lives in [place].  She works full-time with [employer].  The patient denies ever smoking, uses alcohol socially and   denies the use of illicit drugs.    FAMILY HISTORY:  Includes mother positive for colon cancer, hypertension,   pancreatic cancer and thyroid disease.  Father positive for coronary artery   disease.    PHYSICAL EXAMINATION:  GENERAL:  Well-developed, well-nourished white female in no acute distress.    Alert and oriented x4.  VITAL SIGNS:  Weight 136 pounds, height 68 inches, /79, pulse 58,   respirations 16, temperature 98.0.  HEENT:  Normocephalic, atraumatic.  Oral mucosa pink and moist.  Lips without   lesions.  Tongue midline.  Oropharynx clear.  Nonicteric sclerae.  NECK:  Supple, no adenopathy.  No carotid bruits, thyromegaly or thyroid nodule.  HEART:  Regular rate and rhythm without murmur, gallop.  LUNGS:  Clear  to auscultation bilaterally.  Normal respiratory effort.  ABDOMEN:  Soft, nontender, nondistended with positive normoactive bowel sounds,   no hepatosplenomegaly.  EXTREMITIES:  No cyanosis, clubbing or edema.  Distal pulses are intact.  AXILLAE AND GROIN:  No palpable pathologic lymphadenopathy is appreciated.  SKIN:  Intact/turgor normal.  NEUROLOGIC:  Cranial nerves II-XII grossly intact.  Motor:  Good muscle bulk and   tone.  Strength/sensory 5/5 throughout.  Gait stable.    IMPRESSION:  Stage IIB left breast carcinoma.    PLAN:  1.  I spent 60 minutes with the patient discussing Dr. Ragsdale's treatment plan of   Adriamycin/Cytoxan to be delivered every two weeks for four cycles followed by   Taxol dosed at 80 mg/m2 weekly for 12 weeks.  2.  Lumpectomy/surgery after completion of Taxol.  3.  Herceptin every three weeks to complete 52 weeks.  4.  Possible Perjeta to be added.  5.  I provided the patient with preventative prescriptions for Dr. RAMOS's   Mouthwash.  Compazine, Zofran and EMLA cream were previously faxed to the   pharmacy.  I discussed the purposes and indications for each drug.  6.  I provided the patient with chemotherapy information with specific   information from [employer] on antineoplastics to be used.  The patient   verbalized understanding and signed her consent for treatment.  7.  Orders for chemotherapy were reviewed and checked in [facility].  8.  The patient to follow with nutritionist and  post this   teaching.  MUGA scan to be arranged prior to chemotherapy.    Assessment/plan reviewed and approved by Dr. Lau.      LUDIVINA/TN  dd: 05/01/2017 16:14:22 (CDT)  td: 05/02/2017 03:41:57 (CDT)  Doc ID   #[id number]  Job ID #[id number]    CC:

## 2017-05-01 NOTE — PROGRESS NOTES
: LCSW met with pt for new pt education. Discussed supportive services offered by the Cancer [facility]. Pt is well supported by family and friends and does not endorse psychosocial concerns at this time. Pt uses exercise for physical as well as emotional benefits. Pt is former professional dancer and dancercise instructor. Currently works at [employer]. Employer is very supportive and willing to flex pt's work hours as needed.  LCSW provided tour of infusion suite. Rowena Brown LCSW

## 2017-05-01 NOTE — MR AVS SNAPSHOT
[address] [facility] [address]  [facility] [address]  Phone: [phone]  Fax: [phone]                  Ashlie Redman   2017 11:00 AM   Clinical Support    Description:  Female : 1955   Provider:  Jarret Ramsay NP   Department:  [facility]           Diagnoses this Visit        Comments    Primary cancer of lower outer quadrant of left female breast    -  Primary            To Do List           Future Appointments        Provider Department Dept Phone    2017 8:00 AM Almaz Vasquez MD [facility] [phone]    2017 8:30 AM VISUAL STUART [facility] [phone]      Goals (5 Years of Data)     None      [facility]     [facility] Nurse Care Line -  Assistance  Unless otherwise directed by your provider, please contact [facility], our nurse care line that is available for  assistance.     Registered nurses in the [facility] provide: appointment scheduling, clinical advisement, health education, and other advisory services.  Call: [phone] (toll free)               Medications           Message regarding Medications     Verify the changes and/or additions to your medication regime listed below are the same as discussed with your clinician today.  If any of these changes or additions are incorrect, please notify your healthcare provider.        STOP taking these medications     lidocaine-prilocaine (EMLA) cream Apply to affected area once    nystatin (MYCOSTATIN) cream Apply topically 2 (two) times daily.    triamcinolone acetonide 0.1% (KENALOG) 0.1 % cream Apply topically 2 (two) times daily.           Verify that the below list of medications is an accurate representation of the medications you are currently taking.  If none reported, the list may be blank. If incorrect, please contact your healthcare provider. Carry this list with you in case of emergency.          "  Current Medications     diazepam (VALIUM) 2 MG tablet 1/2 to 1 po q 6h prn anxiety    dorzolamide (TRUSOPT) 2 % ophthalmic solution Place 1 drop into both eyes 2 (two) times daily.    hydrocodone-acetaminophen 5-325mg (NORCO) 5-325 mg per tablet Take 1 tablet by mouth every 6 (six) hours as needed for Pain.    latanoprost 0.005 % ophthalmic solution INSTILL ONE DROP INTO EACH EYE ONCE DAILY IN THE EVENING    lidocaine-prilocaine (EMLA) cream Apply topically once.    mouthwashes Soln PHARMACIST:  MIX 50mL Benadryl Elixir; 50mL Viscous Lidocaine; 50mL Nystatin Suspension; 50mL Milk of Magnesia  PATIENT:  Take 5mL by mouth - Swish and swallow - every 4 hours as needed for mouth/throat pain.    ondansetron (ZOFRAN-ODT) 8 MG TbDL Take 1 tablet (8 mg total) by mouth every 12 (twelve) hours as needed.    paroxetine (PAXIL) 10 MG tablet Take 1 tablet (10 mg total) by mouth every evening.    prochlorperazine (COMPAZINE) 10 MG tablet Take 1 tablet (10 mg total) by mouth every 6 (six) hours as needed.    VALTREX 500 mg tablet            Clinical Reference Information           Your Vitals Were     BP Pulse Temp Resp Height Weight    134/79 58 98 °F (36.7 °C) 16 5' 8" (1.727 m) 61.7 kg (136 lb 0.4 oz)    BMI                20.68 kg/m2          Blood Pressure          Most Recent Value    BP  134/79      Allergies as of 5/1/2017     Bactrim [Sulfamethoxazole-trimethoprim]      Immunizations Administered on Date of Encounter - 5/1/2017     None      Language Assistance Services     ATTENTION: Language assistance services are available, free of charge. Please call [phone].      ATENCIÓN: Si royalla zain, tiene a gabriel disposición servicios gratuitos de asistencia lingüística. Llame al [phone].     [facility] Ý: N?u b?n nói Ti?ng Vi?t, có các d?ch v? h? tr? ngôn ng? mi?n phí dành cho b?n. G?i s? [phone].         [facility] complies with applicable Federal civil rights laws and does not discriminate on the " basis of race, color, national origin, age, disability, or sex.

## 2017-05-02 ENCOUNTER — TELEPHONE (OUTPATIENT)
Dept: HEMATOLOGY/ONCOLOGY | Facility: CLINIC | Age: 62
End: 2017-05-02

## 2017-05-02 ENCOUNTER — TELEPHONE (OUTPATIENT)
Dept: PHARMACY | Facility: AMBULARY SURGERY CENTER | Age: 62
End: 2017-05-02

## 2017-05-02 ENCOUNTER — DOCUMENTATION ONLY (OUTPATIENT)
Dept: INFUSION THERAPY | Facility: HOSPITAL | Age: 62
End: 2017-05-02

## 2017-05-02 ENCOUNTER — PATIENT MESSAGE (OUTPATIENT)
Dept: HEMATOLOGY/ONCOLOGY | Facility: CLINIC | Age: 62
End: 2017-05-02

## 2017-05-02 DIAGNOSIS — C50.919 MALIGNANT NEOPLASM OF FEMALE BREAST, UNSPECIFIED LATERALITY, UNSPECIFIED SITE OF BREAST: Primary | ICD-10-CM

## 2017-05-02 NOTE — PROGRESS NOTES
Nutrition: Late entry. Met with pt on 5/1/2017 for chemo new patient education. Pt did not complete the nutrition screen however she is not currently a nutritional risk. Pt informed me that she is a vegetarian however she does eat protein at each meal. Wt: 136# Discussed the importance of weight maintenance and nutrition during treatment. Discussed the importance of eating protein at each meal. Discussed nausea MGT. Discussed bowel MGT. Discussed food safety. Will assist if and when needed. Connie Salvador,MS, RD, LDN

## 2017-05-03 ENCOUNTER — PATIENT MESSAGE (OUTPATIENT)
Dept: HEMATOLOGY/ONCOLOGY | Facility: CLINIC | Age: 62
End: 2017-05-03

## 2017-05-04 ENCOUNTER — CLINICAL SUPPORT (OUTPATIENT)
Dept: CARDIOLOGY | Facility: CLINIC | Age: 62
End: 2017-05-04
Payer: COMMERCIAL

## 2017-05-04 DIAGNOSIS — I34.0 NONRHEUMATIC MITRAL VALVE INSUFFICIENCY: ICD-10-CM

## 2017-05-04 DIAGNOSIS — C50.919 MALIGNANT NEOPLASM OF FEMALE BREAST, UNSPECIFIED LATERALITY, UNSPECIFIED SITE OF BREAST: ICD-10-CM

## 2017-05-04 LAB
DIASTOLIC DYSFUNCTION: NO
ESTIMATED PA SYSTOLIC PRESSURE: 26.43
MITRAL VALVE REGURGITATION: NORMAL
RETIRED EF AND QEF - SEE NOTES: 60 (ref 55–65)
TRICUSPID VALVE REGURGITATION: NORMAL

## 2017-05-04 PROCEDURE — 93306 TTE W/DOPPLER COMPLETE: CPT | Mod: S$GLB,,, | Performed by: INTERNAL MEDICINE

## 2017-05-08 ENCOUNTER — HOSPITAL ENCOUNTER (OUTPATIENT)
Dept: RADIOLOGY | Facility: HOSPITAL | Age: 62
Discharge: HOME | End: 2017-05-08
Attending: SURGERY
Payer: COMMERCIAL

## 2017-05-08 DIAGNOSIS — C50.919 MALIGNANT NEOPLASM OF FEMALE BREAST, UNSPECIFIED LATERALITY, UNSPECIFIED SITE OF BREAST: ICD-10-CM

## 2017-05-08 PROCEDURE — 0159T MRI BREAST BILATERAL: CPT | Mod: TC

## 2017-05-08 PROCEDURE — 0159T MRI BREAST BILATERAL: CPT | Mod: 26,,, | Performed by: RADIOLOGY

## 2017-05-08 PROCEDURE — 77059 MRI BREAST BILATERAL: CPT | Mod: 26,,, | Performed by: RADIOLOGY

## 2017-05-08 PROCEDURE — A9577 INJ MULTIHANCE: HCPCS | Performed by: SURGERY

## 2017-05-08 PROCEDURE — 25500020 PHARM REV CODE 255: Performed by: SURGERY

## 2017-05-08 RX ADMIN — GADOBENATE DIMEGLUMINE 14 ML: 529 INJECTION, SOLUTION INTRAVENOUS at 06:05

## 2017-05-09 ENCOUNTER — DOCUMENTATION ONLY (OUTPATIENT)
Dept: SURGERY | Facility: CLINIC | Age: 62
End: 2017-05-09

## 2017-05-12 ENCOUNTER — OFFICE VISIT (OUTPATIENT)
Dept: HEMATOLOGY/ONCOLOGY | Facility: CLINIC | Age: 62
End: 2017-05-12
Payer: COMMERCIAL

## 2017-05-12 ENCOUNTER — INFUSION (OUTPATIENT)
Dept: INFUSION THERAPY | Facility: HOSPITAL | Age: 62
End: 2017-05-12
Attending: NURSE PRACTITIONER
Payer: COMMERCIAL

## 2017-05-12 VITALS — SYSTOLIC BLOOD PRESSURE: 136 MMHG | HEART RATE: 74 BPM | RESPIRATION RATE: 18 BRPM | DIASTOLIC BLOOD PRESSURE: 80 MMHG

## 2017-05-12 VITALS
HEIGHT: 68 IN | BODY MASS INDEX: 21.11 KG/M2 | DIASTOLIC BLOOD PRESSURE: 82 MMHG | SYSTOLIC BLOOD PRESSURE: 142 MMHG | RESPIRATION RATE: 16 BRPM | WEIGHT: 139.31 LBS | HEART RATE: 77 BPM | TEMPERATURE: 99 F

## 2017-05-12 DIAGNOSIS — C50.012 MALIGNANT NEOPLASM OF NIPPLE OF LEFT BREAST IN FEMALE: Primary | ICD-10-CM

## 2017-05-12 DIAGNOSIS — F32.89 OTHER DEPRESSION: Primary | ICD-10-CM

## 2017-05-12 DIAGNOSIS — C50.012 MALIGNANT NEOPLASM OF NIPPLE OF LEFT BREAST IN FEMALE: ICD-10-CM

## 2017-05-12 PROCEDURE — 96411 CHEMO IV PUSH ADDL DRUG: CPT | Mod: PN

## 2017-05-12 PROCEDURE — 99999 PR PBB SHADOW E&M-EST. PATIENT-LVL III: CPT | Mod: PBBFAC,,, | Performed by: INTERNAL MEDICINE

## 2017-05-12 PROCEDURE — 99215 OFFICE O/P EST HI 40 MIN: CPT | Mod: S$GLB,,, | Performed by: INTERNAL MEDICINE

## 2017-05-12 PROCEDURE — 96367 TX/PROPH/DG ADDL SEQ IV INF: CPT | Mod: PN

## 2017-05-12 PROCEDURE — 63600175 PHARM REV CODE 636 W HCPCS: Mod: PN | Performed by: INTERNAL MEDICINE

## 2017-05-12 PROCEDURE — 1160F RVW MEDS BY RX/DR IN RCRD: CPT | Mod: S$GLB,,, | Performed by: INTERNAL MEDICINE

## 2017-05-12 PROCEDURE — 96413 CHEMO IV INFUSION 1 HR: CPT | Mod: PN

## 2017-05-12 PROCEDURE — 25000003 PHARM REV CODE 250: Mod: PN | Performed by: INTERNAL MEDICINE

## 2017-05-12 PROCEDURE — 96377 APPLICATON ON-BODY INJECTOR: CPT | Mod: PN

## 2017-05-12 RX ORDER — VENLAFAXINE HYDROCHLORIDE 75 MG/1
75 CAPSULE, EXTENDED RELEASE ORAL DAILY
Qty: 30 CAPSULE | Refills: 2 | Status: SHIPPED | OUTPATIENT
Start: 2017-05-12 | End: 2017-08-07 | Stop reason: SDUPTHER

## 2017-05-12 RX ORDER — SODIUM CHLORIDE 0.9 % (FLUSH) 0.9 %
10 SYRINGE (ML) INJECTION
Status: CANCELLED | OUTPATIENT
Start: 2017-05-12

## 2017-05-12 RX ORDER — HEPARIN 100 UNIT/ML
500 SYRINGE INTRAVENOUS
Status: CANCELLED | OUTPATIENT
Start: 2017-05-12

## 2017-05-12 RX ORDER — DOXORUBICIN HYDROCHLORIDE 2 MG/ML
60 INJECTION, SOLUTION INTRAVENOUS
Status: COMPLETED | OUTPATIENT
Start: 2017-05-12 | End: 2017-05-12

## 2017-05-12 RX ORDER — DOXORUBICIN HYDROCHLORIDE 2 MG/ML
60 INJECTION, SOLUTION INTRAVENOUS
Status: CANCELLED | OUTPATIENT
Start: 2017-05-12

## 2017-05-12 RX ORDER — SODIUM CHLORIDE 0.9 % (FLUSH) 0.9 %
10 SYRINGE (ML) INJECTION
Status: DISCONTINUED | OUTPATIENT
Start: 2017-05-12 | End: 2017-05-12 | Stop reason: HOSPADM

## 2017-05-12 RX ADMIN — CYCLOPHOSPHAMIDE 1045 MG: 1 INJECTION, POWDER, FOR SOLUTION INTRAVENOUS; ORAL at 12:05

## 2017-05-12 RX ADMIN — DOXORUBICIN HYDROCHLORIDE 104 MG: 2 INJECTION, SOLUTION INTRAVENOUS at 11:05

## 2017-05-12 RX ADMIN — PALONOSETRON HYDROCHLORIDE 0.25 MG: 0.25 INJECTION INTRAVENOUS at 10:05

## 2017-05-12 RX ADMIN — SODIUM CHLORIDE, PRESERVATIVE FREE 10 ML: 5 INJECTION INTRAVENOUS at 10:05

## 2017-05-12 RX ADMIN — SODIUM CHLORIDE 150 MG: 9 INJECTION, SOLUTION INTRAVENOUS at 11:05

## 2017-05-12 RX ADMIN — PEGFILGRASTIM 6 MG: KIT SUBCUTANEOUS at 01:05

## 2017-05-12 RX ADMIN — SODIUM CHLORIDE: 0.9 INJECTION, SOLUTION INTRAVENOUS at 10:05

## 2017-05-12 NOTE — PLAN OF CARE
Problem: Patient Care Overview  Goal: Plan of Care Review  Outcome: Ongoing (interventions implemented as appropriate)  C1 D1 AC  Pt voiced understanding ARLETTE

## 2017-05-12 NOTE — MR AVS SNAPSHOT
Patient Information     Patient Name Sex Ashlie Padilla Female 1955      Visit Information        Provider Department Dept Phone Center    2017 9:30 AM CHAIR 32, [facility] [phone] [facility]      Patient Instructions     None      Your Current Medications Are     diazepam (VALIUM) 2 MG tablet    dorzolamide (TRUSOPT) 2 % ophthalmic solution    hydrocodone-acetaminophen 5-325mg (NORCO) 5-325 mg per tablet    latanoprost 0.005 % ophthalmic solution    lidocaine-prilocaine (EMLA) cream    mouthwashes Soln    ondansetron (ZOFRAN-ODT) 8 MG TbDL    paroxetine (PAXIL) 10 MG tablet    prochlorperazine (COMPAZINE) 10 MG tablet    VALTREX 500 mg tablet    venlafaxine (EFFEXOR XR) 75 MG 24 hr capsule      Facility-Administered Medications     cyclophosphamide (CYTOXAN) 600 mg/m2 = 1,045 mg in sodium chloride 0.9% 250 mL chemo infusion    DOXOrubicin chemo injection 104 mg    fosaprepitant 150 mg in sodium chloride 0.9% 150 mL IVPB    palonosetron 0.25mg/dexamethasone 10mg IVPB    pegfilgrastim (NEULASTA (ON BODY INJECTOR)) injection 6 mg    sodium chloride 0.9% 250 mL flush bag    sodium chloride 0.9% flush 10 mL      Appointments for Next Year     2017  8:00 AM ESTABLISHED PATIENT (15 min.) [facility] Almaz Vasquez MD    Arrive at check-in approximately 15 minutes before your scheduled appointment time. Bring all outside medical records and imaging, along with a list of your current medications and insurance card.    2nd Floor    2017  8:30 AM OCT (30 min.) [facility]    Arrive at check-in approximately 15 minutes before your scheduled appointment time. Bring all outside medical records and imaging, along with a list of your current medications and insurance card.    2nd Floor    2017 12:30 PM INFUSION 240 MIN (240 min.) [facility] CHAIR 24, [facility]    Arrive at  "check-in approximately 15 minutes before your scheduled appointment time. Bring all outside medical records and imaging, along with a list of your current medications and insurance card.    1st Floor         Default Flowsheet Data (last 24 hours)      Amb Complex Vitals Kang        05/12/17 0937                Measurements    Weight 63.2 kg (139 lb 5.3 oz)        Height 5' 8" (1.727 m)        BSA (Calculated - sq m) 1.74 sq meters        BMI (Calculated) 21.2        BP (!)  142/82        Temp 98.6 °F (37 °C)        Pulse 77        Resp 16        Pain Assessment    Pain Score Five   breast                Allergies     Bactrim [Sulfamethoxazole-trimethoprim] Rash    Fever, vomiting      Current Discharge Medication List     Cannot display discharge medications since this is not an admission.      "

## 2017-05-12 NOTE — PLAN OF CARE
Problem: Chemotherapy Effects (Adult)  Goal: Signs and Symptoms of Listed Potential Problems Will be Absent, Minimized or Managed (Chemotherapy Effects)  Signs and symptoms of listed potential problems will be absent, minimized or managed by discharge/transition of care (reference Chemotherapy Effects (Adult) CPG).  Outcome: Ongoing (interventions implemented as appropriate)  C1 D1 AC

## 2017-05-12 NOTE — PROGRESS NOTES
Ashlie Redman, 62-year-old  woman sent in, referral by Dr. Arce.    REASON FOR CONSULTATION:  New diagnosis of breast cancer for neoadjuvant   chemotherapy.    SIGNIFICANT HISTORY:  The patient saw Dr. Arce yesterday.  The patient felt   a left breast mass.  She has had breast augmentation in the past.  Mammogram was   done.  Ultrasound and biopsy were completed when abnormality was noted.  The   patient also had a port placed since then.  Palpable enlarged lymph node in the   left axilla.  The patient's ERP are negative, HER-2 positive, stage IIB   clinically due to the size of breast mass on the PET scan being about 3 cm and   the axillary lymph node about 1.3 cm.  The patient is here for discussion of   neoadjuvant therapy.  She is obviously anxious, concerned about the findings,   wants to get started as soon as possible with therapy.  She is here with her   .  She is 0% on ER and 0% on NE and HER-2 positive.  The patient does not   really have any other medical situations that need to be addressed.  She takes   Valtrex for herpes prophylaxis and postbiopsy has been taking hydrocodone.  She   takes Paxil for depression and Valium for sleep aid.    PHYSICAL EXAM:  GENERAL:  This is a well-built, well-groomed, comfortable patient without any   deformities ambulating to clinic.  Patient is in no distress.    NEURO:  Awake, alert and oriented to time, person and place.  The patient   demonstrates no evidence of depression, anxiety or agitation.  Patient is   cooperative with exam and discussion.    EYES:  Normal conjunctivae and eyelids.  PERRLA 3 to 4 mm without icterus.    ENT AND MOUTH:  External appearance of ears and nose normal without scars,   lesions or masses.  Nasal mucosa, turbinates and septum are normal.  No ENT   drainage and dried blood noted.  No tenderness over frontal or maxillary sinus.    Lips and gums are normal.  Dentition is normal.    NECK:  Supple.  Trachea is central.   No crepitus.  No JVD.  No thyromegaly or   masses.     RESPIRATORY:  No intercostal retractions and no use of accessory muscles of   respirations noted.  No areas of dullness to percussion.  Chest is clear to   auscultation.  No rales, rhonchi or wheezes.  No crepitus.  Good air entry   bilaterally.    CARDIOVASCULAR:  No cardiomegaly.  Normal location.  No thrills or heaviness.    Normal carotid pulse.  No bruits.  S1 and S2 are normally heard without murmurs   or gallops.  All peripheral pulses, including pedal pulses, are present.    ABDOMEN:  Normal abdomen.  No hepatosplenomegaly.  No free fluid.  Bowel sounds   are present.  No hernia noted.  No masses.  No rebound or tenderness.  No   guarding or rigidity.  Umbilicus is midline.    LYMPHATICS:  No axillary, cervical, supraclavicular, submental, or inguinal   lymphadenopathy.    SKIN AND MUSCULOSKELETAL:  There is no evidence of excoriation marks or   ecchymosis.  No rashes.  No pigmented lesions, induration or subcutaneous   nodules.  No sores or abnormal moles.  No cyanosis.  No clubbing.  Nailbed   abnormalities are noted. No joint or skeletal deformities noted.  Normal range   of motion.    BREASTS:  Bilateral implants.  Mass in the breast in the left side is palpable,   two at the lateral side, 3 o'clock position, discrete, about 2 cm in size.    There is also an axillary lymph node about 1.5 cm palpable.  No supraclavicular   adenopathy felt.  No other lymph nodes felt.  Right breast is intact.  The   patient has a port placed in the right chest wall.  No abnormal masses or   discharge.    NEUROLOGIC:  Higher functions are appropriate.  No cranial nerve deficits.    Normal gait.  Normal strength.  Motor and sensory functions are normal.  Deep   tendon reflexes are normal without Babinski's sign or ankle clonus.    IMPRESSION:  ERP are negative, HER-2 positive, stage IIB, clinical stage breast   cancer.  The patient is here for neoadjuvant  therapy.    PLAN:  1.  Start 1/4  cycles of Adriamycin and Cytoxan, followed by Taxol   weekly for 12 cycles with Herceptin.  Looking at the degree of response after   Adriamycin and Cytoxan, I may or may not add Perjeta to the regimen.  The   patient voices understanding of plan.  Lengthy clinic visit today.  2.  Sent to Chemo class. The patient  has port.    3.  Prescription for Compazine, Zofran, mouth wash, and EMLA cream sent to   Pharmacy.pt went over these with chemo class  4.  The patient to come back to me with CBC and CMP 2 weeks   5.  The patient may continue her Valtrex and hydrocodone p.r.n.

## 2017-05-12 NOTE — PLAN OF CARE
Problem: Patient Care Overview  Goal: Discharge Needs Assessment  Outcome: Ongoing (interventions implemented as appropriate)  Pt tolerated C1 well Neulasta OBI placed on patient Right arm   Instructions reviewed with patient   Pt and daughter voiced understanding . Pt to return in 2 weeks Pt instructed to call if any symptoms/ problems arise ARLETTE

## 2017-05-16 ENCOUNTER — OFFICE VISIT (OUTPATIENT)
Dept: OPHTHALMOLOGY | Facility: CLINIC | Age: 62
End: 2017-05-16
Payer: COMMERCIAL

## 2017-05-16 ENCOUNTER — CLINICAL SUPPORT (OUTPATIENT)
Dept: OPHTHALMOLOGY | Facility: CLINIC | Age: 62
End: 2017-05-16
Payer: COMMERCIAL

## 2017-05-16 DIAGNOSIS — H40.003 GLAUCOMA SUSPECT OF BOTH EYES: ICD-10-CM

## 2017-05-16 DIAGNOSIS — H40.053 OHT (OCULAR HYPERTENSION), BILATERAL: ICD-10-CM

## 2017-05-16 DIAGNOSIS — Z98.890 HISTORY OF VITRECTOMY: ICD-10-CM

## 2017-05-16 DIAGNOSIS — Z96.1 PSEUDOPHAKIA OF BOTH EYES: ICD-10-CM

## 2017-05-16 DIAGNOSIS — H52.13 HIGH MYOPIA, BILATERAL: ICD-10-CM

## 2017-05-16 DIAGNOSIS — H40.053 OHT (OCULAR HYPERTENSION), BILATERAL: Primary | ICD-10-CM

## 2017-05-16 PROCEDURE — 92133 CPTRZD OPH DX IMG PST SGM ON: CPT | Mod: S$GLB,,, | Performed by: OPHTHALMOLOGY

## 2017-05-16 PROCEDURE — 99999 PR PBB SHADOW E&M-EST. PATIENT-LVL III: CPT | Mod: PBBFAC,,, | Performed by: OPHTHALMOLOGY

## 2017-05-16 PROCEDURE — 92012 INTRM OPH EXAM EST PATIENT: CPT | Mod: S$GLB,,, | Performed by: OPHTHALMOLOGY

## 2017-05-16 NOTE — PROGRESS NOTES
HPI     Glaucoma    Additional comments: 3 month iop ck with oct           Comments   DLS: 2/14/17    Pt states no changes in va since last visit. Pt just diagnosed with breast   cancer.     Gtts: Dorzolamide BID, Latanoprost QHS OU       Last edited by Cheryle Quintana on 5/16/2017  8:42 AM. (History)            Assessment /Plan     For exam results, see Encounter Report.    OHT (ocular hypertension), bilateral    Pseudophakia of both eyes    History of vitrectomy    High myopia, bilateral    Glaucoma suspect of both eyes            OHT (ocular hypertension), bilateral - Switched Timolol to Dorzolamide 2/14/17, but may have forgotten to take this am - has headache, recently started chemo for newly diagnosed breast cancer.   IOP baseline mid 20's with occasional pain OS. IOP was significantly elevated off Latanoprost - Tmax 37/44! Maternal grandmother with glaucoma, no known first degree relatives. CCT thick. Baseline HVF/HRT WNL, small cups on clinical exam OU. Last HRT - possible increase in LCD OD, decrease OS, no RNFL thinning identified.   Last Gonio - open to CB OU, few tiny iris root vessels visible, no NV.   OCT nerve 5/16/17 - thin TS and TI OD, borderline TS OS.  Will try switching T .5 to Dorzolamide BID - if not effective, then can consider 0.25%, as seems to have had a dramatic response in IOP lowering with Timolol. RTC 3 months for IOP check and OCT optic nerve.    On Latanoprost since presentation (former pt of Dr. Ordaz).  Dorzolamide added 2/14/17 - no significant improvement in IOP, may have forgotten to take this am?  Timolol 0.5% QAM (7/9/16-2/14/17) - worked great but symptomatic hypotension/bradycardia started in Jan '17    Pseudophakia of both eyes - stable    History of vitrectomy - for floaters/AH

## 2017-05-16 NOTE — MR AVS SNAPSHOT
[facility]  [address] [facility]  [facility] [address]  Phone: [phone]  Fax: [phone]                  Ashlie Redman   2017 8:00 AM   Office Visit    Description:  Female : 1955   Provider:  Almaz Vasquez MD   Department:  [facility]           Reason for Visit     Glaucoma           Diagnoses this Visit        Comments    OHT (ocular hypertension), bilateral    -  Primary     Pseudophakia of both eyes         History of vitrectomy         High myopia, bilateral         Glaucoma suspect of both eyes                To Do List           Future Appointments        Provider Department Dept Phone    2017 10:30 AM LAB, [facility] DRAW STATION [facility] - Laboratory [phone]    2017 11:20 AM Celestina Ragsdale MD [facility] Hematology [phone]    2017 12:30 PM CHAIR 24, [facility] CHEMO [facility] [phone]    2017 4:15 PM Alexander Arce MD [facility] Surgery [phone]      Goals (5 Years of Data)     None      Follow-Up and Disposition     Return in about 2 months (around 2017) for IOP check.      [facility]     [facility] Nurse Care Line -  Assistance  Unless otherwise directed by your provider, please contact [facility], our nurse care line that is available for  assistance.     Registered nurses in the [facility] provide: appointment scheduling, clinical advisement, health education, and other advisory services.  Call: [phone] (toll free)               Medications           Message regarding Medications     Verify the changes and/or additions to your medication regime listed below are the same as discussed with your clinician today.  If any of these changes or additions are incorrect, please notify your healthcare provider.        STOP taking these medications     paroxetine (PAXIL) 10 MG tablet Take 1 tablet (10 mg total) by mouth every  evening.           Verify that the below list of medications is an accurate representation of the medications you are currently taking.  If none reported, the list may be blank. If incorrect, please contact your healthcare provider. Carry this list with you in case of emergency.           Current Medications     diazepam (VALIUM) 2 MG tablet 1/2 to 1 po q 6h prn anxiety    dorzolamide (TRUSOPT) 2 % ophthalmic solution Place 1 drop into both eyes 2 (two) times daily.    DOXORUBICIN HCL (ADRIAMYCIN IV) Inject into the vein every 14 (fourteen) days.    hydrocodone-acetaminophen 5-325mg (NORCO) 5-325 mg per tablet Take 1 tablet by mouth every 6 (six) hours as needed for Pain.    latanoprost 0.005 % ophthalmic solution INSTILL ONE DROP INTO EACH EYE ONCE DAILY IN THE EVENING    lidocaine-prilocaine (EMLA) cream Apply topically once.    mouthwashes Soln PHARMACIST:  MIX 50mL Benadryl Elixir; 50mL Viscous Lidocaine; 50mL Nystatin Suspension; 50mL Milk of Magnesia  PATIENT:  Take 5mL by mouth - Swish and swallow - every 4 hours as needed for mouth/throat pain.    ondansetron (ZOFRAN-ODT) 8 MG TbDL Take 1 tablet (8 mg total) by mouth every 12 (twelve) hours as needed.    prochlorperazine (COMPAZINE) 10 MG tablet Take 1 tablet (10 mg total) by mouth every 6 (six) hours as needed.    sodium chloride 0.9% 0.9 % SolP with cyclophosphamide 1 gram SolR Inject into the vein every 14 (fourteen) days.    UNABLE TO FIND SWISH AND SWALLOW 5 ML PO Q 4 H PRF MOUTH/THROAT PAIN    VALTREX 500 mg tablet     venlafaxine (EFFEXOR XR) 75 MG 24 hr capsule Take 1 capsule (75 mg total) by mouth once daily.           Clinical Reference Information           Allergies as of 5/16/2017     Bactrim [Sulfamethoxazole-trimethoprim]      Immunizations Administered on Date of Encounter - 5/16/2017     None      Language Assistance Services     ATTENTION: Language assistance services are available, free of charge. Please call [phone].      ATENCIÓN:  Si habla español, tiene a gabriel disposición servicios gratuitos de asistencia lingüística. Llame al [phone].     CHÚ Ý: N?u b?n nói Ti?ng Vi?t, có các d?ch v? h? tr? ngôn ng? mi?n phí dành cho b?n. G?i s? [phone].         [facility] complies with applicable Federal civil rights laws and does not discriminate on the basis of race, color, national origin, age, disability, or sex.

## 2017-05-23 ENCOUNTER — TELEPHONE (OUTPATIENT)
Dept: HEMATOLOGY/ONCOLOGY | Facility: CLINIC | Age: 62
End: 2017-05-23

## 2017-05-23 NOTE — TELEPHONE ENCOUNTER
As provider will be out on 5/26/17, rescheduled lab and follow up with patient to Thursday 5/25/17 at 10 am and 10:40 am, respectively. Patient desires to keep infusion on Friday 5/26/17 as scheduled. Patient voiced understanding and appreciation.

## 2017-05-25 ENCOUNTER — LAB VISIT (OUTPATIENT)
Dept: LAB | Facility: HOSPITAL | Age: 62
End: 2017-05-25
Attending: INTERNAL MEDICINE
Payer: COMMERCIAL

## 2017-05-25 ENCOUNTER — PATIENT MESSAGE (OUTPATIENT)
Dept: OBSTETRICS AND GYNECOLOGY | Facility: CLINIC | Age: 62
End: 2017-05-25

## 2017-05-25 ENCOUNTER — OFFICE VISIT (OUTPATIENT)
Dept: HEMATOLOGY/ONCOLOGY | Facility: CLINIC | Age: 62
End: 2017-05-25
Payer: COMMERCIAL

## 2017-05-25 VITALS
RESPIRATION RATE: 16 BRPM | TEMPERATURE: 97 F | HEART RATE: 55 BPM | HEIGHT: 68 IN | BODY MASS INDEX: 21.02 KG/M2 | DIASTOLIC BLOOD PRESSURE: 69 MMHG | WEIGHT: 138.69 LBS | SYSTOLIC BLOOD PRESSURE: 141 MMHG

## 2017-05-25 DIAGNOSIS — F32.89 OTHER DEPRESSION: ICD-10-CM

## 2017-05-25 DIAGNOSIS — C50.011 MALIGNANT NEOPLASM INVOLVING BOTH NIPPLE AND AREOLA OF RIGHT BREAST IN FEMALE: Primary | ICD-10-CM

## 2017-05-25 DIAGNOSIS — C50.012 MALIGNANT NEOPLASM OF NIPPLE OF LEFT BREAST IN FEMALE: ICD-10-CM

## 2017-05-25 LAB
ALBUMIN SERPL BCP-MCNC: 4.5 G/DL
ALP SERPL-CCNC: 105 U/L
ALT SERPL W/O P-5'-P-CCNC: 40 U/L
ANION GAP SERPL CALC-SCNC: 8 MMOL/L
ANISOCYTOSIS BLD QL SMEAR: SLIGHT
AST SERPL-CCNC: 34 U/L
BASOPHILS NFR BLD: 0 %
BILIRUB SERPL-MCNC: 0.3 MG/DL
BUN SERPL-MCNC: 12 MG/DL
CALCIUM SERPL-MCNC: 10 MG/DL
CHLORIDE SERPL-SCNC: 100 MMOL/L
CO2 SERPL-SCNC: 30 MMOL/L
CREAT SERPL-MCNC: 0.65 MG/DL
DIFFERENTIAL METHOD: ABNORMAL
EOSINOPHIL NFR BLD: 0 %
ERYTHROCYTE [DISTWIDTH] IN BLOOD BY AUTOMATED COUNT: 11.9 %
EST. GFR  (AFRICAN AMERICAN): >60 ML/MIN/1.73 M^2
EST. GFR  (NON AFRICAN AMERICAN): >60 ML/MIN/1.73 M^2
GLUCOSE SERPL-MCNC: 86 MG/DL
HCT VFR BLD AUTO: 37 %
HGB BLD-MCNC: 12.3 G/DL
LYMPHOCYTES NFR BLD: 9 %
MCH RBC QN AUTO: 33.8 PG
MCHC RBC AUTO-ENTMCNC: 33.2 %
MCV RBC AUTO: 102 FL
METAMYELOCYTES NFR BLD MANUAL: 5 %
MONOCYTES NFR BLD: 7 %
MYELOCYTES NFR BLD MANUAL: 4 %
NEUTROPHILS # BLD AUTO: 13.1 K/UL
NEUTROPHILS NFR BLD: 69 %
NEUTS BAND NFR BLD MANUAL: 6 %
NRBC BLD-RTO: 0 /100 WBC
PLATELET # BLD AUTO: 210 K/UL
PMV BLD AUTO: 10.2 FL
POTASSIUM SERPL-SCNC: 4.3 MMOL/L
PROT SERPL-MCNC: 7.3 G/DL
RBC # BLD AUTO: 3.64 M/UL
SODIUM SERPL-SCNC: 138 MMOL/L
TOXIC GRANULES BLD QL SMEAR: PRESENT
WBC # BLD AUTO: 17.47 K/UL

## 2017-05-25 PROCEDURE — 99999 PR PBB SHADOW E&M-EST. PATIENT-LVL III: CPT | Mod: PBBFAC,,, | Performed by: INTERNAL MEDICINE

## 2017-05-25 PROCEDURE — 85007 BL SMEAR W/DIFF WBC COUNT: CPT

## 2017-05-25 PROCEDURE — 85027 COMPLETE CBC AUTOMATED: CPT

## 2017-05-25 PROCEDURE — 99215 OFFICE O/P EST HI 40 MIN: CPT | Mod: S$GLB,,, | Performed by: INTERNAL MEDICINE

## 2017-05-25 PROCEDURE — 80053 COMPREHEN METABOLIC PANEL: CPT | Mod: PN

## 2017-05-25 PROCEDURE — 85007 BL SMEAR W/DIFF WBC COUNT: CPT | Mod: PN

## 2017-05-25 PROCEDURE — 80053 COMPREHEN METABOLIC PANEL: CPT

## 2017-05-25 PROCEDURE — 85027 COMPLETE CBC AUTOMATED: CPT | Mod: PN

## 2017-05-25 PROCEDURE — 36415 COLL VENOUS BLD VENIPUNCTURE: CPT | Mod: PN

## 2017-05-25 RX ORDER — HEPARIN 100 UNIT/ML
500 SYRINGE INTRAVENOUS
Status: CANCELLED | OUTPATIENT
Start: 2017-05-26

## 2017-05-25 RX ORDER — DOXORUBICIN HYDROCHLORIDE 2 MG/ML
60 INJECTION, SOLUTION INTRAVENOUS
Status: CANCELLED | OUTPATIENT
Start: 2017-05-26

## 2017-05-25 RX ORDER — SODIUM CHLORIDE 0.9 % (FLUSH) 0.9 %
10 SYRINGE (ML) INJECTION
Status: CANCELLED | OUTPATIENT
Start: 2017-05-26

## 2017-05-25 NOTE — PROGRESS NOTES
REASON FOR F/U:  For AC chemotherapy.cycle #2, completed cycle #1 without much issue    SIGNIFICANT HISTORY:  The patient saw Dr. Arce..  The patient felt   a left breast mass.  She has had breast augmentation in the past.  Mammogram was   done.  Ultrasound and biopsy were completed when abnormality was noted.  The   patient also had a port placed since then.  Palpable enlarged lymph node in the   left axilla.  The patient's ERP are negative, HER-2 positive, stage IIB   clinically due to the size of breast mass on the PET scan being about 3 cm and   the axillary lymph node about 1.3 cm.  The patient is here for discussion of   neoadjuvant therapy.  She is obviously anxious, concerned about the findings,   wants to get started as soon as possible with therapy.  She is here with her   .  She is 0% on ER and 0% on NH and HER-2 positive.  The patient does not   really have any other medical situations that need to be addressed.  She takes   Valtrex for herpes prophylaxis and postbiopsy has been taking hydrocodone.  She   takes Paxil for depression and Valium for sleep aid.    PHYSICAL EXAM:  Wt Readings from Last 3 Encounters:   05/25/17 62.9 kg (138 lb 10.7 oz)   05/12/17 63.2 kg (139 lb 5.3 oz)   05/01/17 61.7 kg (136 lb 0.4 oz)     Temp Readings from Last 3 Encounters:   05/25/17 97.2 °F (36.2 °C)   05/12/17 98.6 °F (37 °C)   05/01/17 98 °F (36.7 °C)     BP Readings from Last 3 Encounters:   05/25/17 (!) 141/69   05/12/17 136/80   05/12/17 (!) 142/82     Pulse Readings from Last 3 Encounters:   05/25/17 (!) 55   05/12/17 74   05/12/17 77     GENERAL:  This is a well-built, well-groomed, comfortable patient without any   deformities ambulating to clinic.  Patient is in no distress.    NEURO:  Awake, alert and oriented to time, person and place.  The patient   demonstrates no evidence of depression, anxiety or agitation.  Patient is   cooperative with exam and discussion.    EYES:  Normal conjunctivae  and eyelids.  PERRLA 3 to 4 mm without icterus.    ENT AND MOUTH:  External appearance of ears and nose normal without scars,   lesions or masses.  Nasal mucosa, turbinates and septum are normal.  No ENT   drainage and dried blood noted.  No tenderness over frontal or maxillary sinus.    Lips and gums are normal.  Dentition is normal.    NECK:  Supple.  Trachea is central.  No crepitus.  No JVD.  No thyromegaly or   masses.     RESPIRATORY:  No intercostal retractions and no use of accessory muscles of   respirations noted.  No areas of dullness to percussion.  Chest is clear to   auscultation.  No rales, rhonchi or wheezes.  No crepitus.  Good air entry   bilaterally.    CARDIOVASCULAR:  No cardiomegaly.  Normal location.  No thrills or heaviness.    Normal carotid pulse.  No bruits.  S1 and S2 are normally heard without murmurs   or gallops.  All peripheral pulses, including pedal pulses, are present.    ABDOMEN:  Normal abdomen.  No hepatosplenomegaly.  No free fluid.  Bowel sounds   are present.  No hernia noted.  No masses.  No rebound or tenderness.  No   guarding or rigidity.  Umbilicus is midline.    LYMPHATICS:  No axillary, cervical, supraclavicular, submental, or inguinal   lymphadenopathy.    SKIN AND MUSCULOSKELETAL:  There is no evidence of excoriation marks or   ecchymosis.  No rashes.  No pigmented lesions, induration or subcutaneous   nodules.  No sores or abnormal moles.  No cyanosis.  No clubbing.  Nailbed   abnormalities are noted. No joint or skeletal deformities noted.  Normal range   of motion.    BREASTS:  Bilateral implants.  Mass in the breast in the left side is palpable,   two at the lateral side, 3 o'clock position, discrete, about 2 cm in size.    There is also an axillary lymph node about 1.5 cm palpable.  No supraclavicular   adenopathy felt.  No other lymph nodes felt.  Right breast is intact.  The   patient has a port placed in the right chest wall.  No abnormal masses or    discharge.    NEUROLOGIC:  Higher functions are appropriate.  No cranial nerve deficits.    Normal gait.  Normal strength.  Motor and sensory functions are normal.  Deep   tendon reflexes are normal without Babinski's sign or ankle clonus.    IMPRESSION:  ERP are negative, HER-2 positive, stage IIB, clinical stage breast   cancer.  The patient is here for neoadjuvant therapy.    PLAN:  1.   2/4  cycles of Adriamycin and Cytoxan, followed by Taxol   weekly for 12 cycles with Herceptin.  Looking at the degree of response after   Adriamycin and Cytoxan, I may or may not add Perjeta to the regimen.  The   patient voices understanding of plan.  Lengthy clinic visit today.    The patient may continue her Valtrex and hydrocodone p.r.n.   rtc 2 weeks with cbc, cmp,   neulast for neutropenia support

## 2017-05-26 ENCOUNTER — INFUSION (OUTPATIENT)
Dept: INFUSION THERAPY | Facility: HOSPITAL | Age: 62
End: 2017-05-26
Attending: NURSE PRACTITIONER
Payer: COMMERCIAL

## 2017-05-26 ENCOUNTER — DOCUMENTATION ONLY (OUTPATIENT)
Dept: INFUSION THERAPY | Facility: HOSPITAL | Age: 62
End: 2017-05-26

## 2017-05-26 VITALS
HEART RATE: 62 BPM | HEIGHT: 68 IN | BODY MASS INDEX: 21.24 KG/M2 | WEIGHT: 140.13 LBS | RESPIRATION RATE: 98 BRPM | SYSTOLIC BLOOD PRESSURE: 136 MMHG | DIASTOLIC BLOOD PRESSURE: 66 MMHG

## 2017-05-26 DIAGNOSIS — C50.012 MALIGNANT NEOPLASM OF NIPPLE OF LEFT BREAST IN FEMALE: Primary | ICD-10-CM

## 2017-05-26 PROCEDURE — 25000003 PHARM REV CODE 250: Mod: PN | Performed by: INTERNAL MEDICINE

## 2017-05-26 PROCEDURE — 96413 CHEMO IV INFUSION 1 HR: CPT | Mod: PN

## 2017-05-26 PROCEDURE — 96367 TX/PROPH/DG ADDL SEQ IV INF: CPT | Mod: PN

## 2017-05-26 PROCEDURE — 63600175 PHARM REV CODE 636 W HCPCS: Mod: PN | Performed by: INTERNAL MEDICINE

## 2017-05-26 PROCEDURE — 96411 CHEMO IV PUSH ADDL DRUG: CPT | Mod: PN

## 2017-05-26 PROCEDURE — 96377 APPLICATON ON-BODY INJECTOR: CPT | Mod: PN

## 2017-05-26 RX ORDER — DOXORUBICIN HYDROCHLORIDE 2 MG/ML
60 INJECTION, SOLUTION INTRAVENOUS
Status: COMPLETED | OUTPATIENT
Start: 2017-05-26 | End: 2017-05-26

## 2017-05-26 RX ORDER — SODIUM CHLORIDE 0.9 % (FLUSH) 0.9 %
10 SYRINGE (ML) INJECTION
Status: DISCONTINUED | OUTPATIENT
Start: 2017-05-26 | End: 2017-05-26 | Stop reason: HOSPADM

## 2017-05-26 RX ADMIN — SODIUM CHLORIDE: 0.9 INJECTION, SOLUTION INTRAVENOUS at 01:05

## 2017-05-26 RX ADMIN — PEGFILGRASTIM 6 MG: KIT SUBCUTANEOUS at 03:05

## 2017-05-26 RX ADMIN — DOXORUBICIN HYDROCHLORIDE 104 MG: 2 INJECTION, SOLUTION INTRAVENOUS at 02:05

## 2017-05-26 RX ADMIN — CYCLOPHOSPHAMIDE 1045 MG: 1 INJECTION, POWDER, FOR SOLUTION INTRAVENOUS; ORAL at 02:05

## 2017-05-26 RX ADMIN — PALONOSETRON HYDROCHLORIDE: 0.25 INJECTION INTRAVENOUS at 01:05

## 2017-05-26 RX ADMIN — SODIUM CHLORIDE 150 MG: 9 INJECTION, SOLUTION INTRAVENOUS at 01:05

## 2017-05-26 NOTE — PLAN OF CARE
Problem: Patient Care Overview  Goal: Plan of Care Review  Outcome: Ongoing (interventions implemented as appropriate)  Tolerated chemo infusion without any difficulty; RTC in 2 weeks for next tx; Amb off unit independently with spouse

## 2017-05-26 NOTE — PROGRESS NOTES
Nutrition: Met with pt today while she was having chemo infusion #2. Pt is not currently a nutritional risk. Reports slight nausea after first cycle that resolved with rest and medicine. Reports good appetite. Denies vomiting or issues with bowels. Wt: 140# Discussed the importance of continued weight maintenance and nutrition during treatment. Provided encouragement and support. Will assist if and when needed. Connie Salvador,MS, RD, LDN

## 2017-06-09 ENCOUNTER — OFFICE VISIT (OUTPATIENT)
Dept: HEMATOLOGY/ONCOLOGY | Facility: CLINIC | Age: 62
End: 2017-06-09
Payer: COMMERCIAL

## 2017-06-09 ENCOUNTER — LAB VISIT (OUTPATIENT)
Dept: LAB | Facility: HOSPITAL | Age: 62
End: 2017-06-09
Attending: INTERNAL MEDICINE
Payer: COMMERCIAL

## 2017-06-09 ENCOUNTER — INFUSION (OUTPATIENT)
Dept: INFUSION THERAPY | Facility: HOSPITAL | Age: 62
End: 2017-06-09
Attending: NURSE PRACTITIONER
Payer: COMMERCIAL

## 2017-06-09 VITALS
TEMPERATURE: 98 F | RESPIRATION RATE: 16 BRPM | WEIGHT: 141.13 LBS | DIASTOLIC BLOOD PRESSURE: 64 MMHG | HEIGHT: 68 IN | HEART RATE: 71 BPM | SYSTOLIC BLOOD PRESSURE: 111 MMHG | BODY MASS INDEX: 21.39 KG/M2

## 2017-06-09 VITALS
HEIGHT: 68 IN | SYSTOLIC BLOOD PRESSURE: 123 MMHG | WEIGHT: 141.13 LBS | TEMPERATURE: 98 F | DIASTOLIC BLOOD PRESSURE: 59 MMHG | RESPIRATION RATE: 16 BRPM | BODY MASS INDEX: 21.39 KG/M2 | HEART RATE: 56 BPM

## 2017-06-09 DIAGNOSIS — C50.012 MALIGNANT NEOPLASM OF NIPPLE OF LEFT BREAST IN FEMALE: ICD-10-CM

## 2017-06-09 DIAGNOSIS — C50.011 MALIGNANT NEOPLASM INVOLVING BOTH NIPPLE AND AREOLA OF RIGHT BREAST IN FEMALE: ICD-10-CM

## 2017-06-09 DIAGNOSIS — C50.012 MALIGNANT NEOPLASM OF NIPPLE OF LEFT BREAST IN FEMALE: Primary | ICD-10-CM

## 2017-06-09 DIAGNOSIS — C50.011 MALIGNANT NEOPLASM INVOLVING BOTH NIPPLE AND AREOLA OF RIGHT BREAST IN FEMALE: Primary | ICD-10-CM

## 2017-06-09 LAB
ALBUMIN SERPL BCP-MCNC: 4.3 G/DL
ALP SERPL-CCNC: 103 U/L
ALT SERPL W/O P-5'-P-CCNC: 33 U/L
ANION GAP SERPL CALC-SCNC: 7 MMOL/L
AST SERPL-CCNC: 33 U/L
BASOPHILS NFR BLD: 2 %
BILIRUB SERPL-MCNC: 0.4 MG/DL
BUN SERPL-MCNC: 9 MG/DL
CALCIUM SERPL-MCNC: 9.6 MG/DL
CHLORIDE SERPL-SCNC: 102 MMOL/L
CO2 SERPL-SCNC: 30 MMOL/L
CREAT SERPL-MCNC: 0.65 MG/DL
DIFFERENTIAL METHOD: ABNORMAL
EOSINOPHIL NFR BLD: 0 %
ERYTHROCYTE [DISTWIDTH] IN BLOOD BY AUTOMATED COUNT: 12.5 %
EST. GFR  (AFRICAN AMERICAN): >60 ML/MIN/1.73 M^2
EST. GFR  (NON AFRICAN AMERICAN): >60 ML/MIN/1.73 M^2
GLUCOSE SERPL-MCNC: 87 MG/DL
HCT VFR BLD AUTO: 33.9 %
HGB BLD-MCNC: 11.1 G/DL
LYMPHOCYTES NFR BLD: 7 %
MCH RBC QN AUTO: 33.4 PG
MCHC RBC AUTO-ENTMCNC: 32.7 %
MCV RBC AUTO: 102 FL
METAMYELOCYTES NFR BLD MANUAL: 5 %
MONOCYTES NFR BLD: 10 %
MYELOCYTES NFR BLD MANUAL: 1 %
NEUTROPHILS # BLD AUTO: 9.4 K/UL
NEUTROPHILS NFR BLD: 59 %
NEUTS BAND NFR BLD MANUAL: 16 %
NRBC BLD-RTO: 0 /100 WBC
PLATELET # BLD AUTO: 144 K/UL
PLATELET BLD QL SMEAR: ABNORMAL
PMV BLD AUTO: 10.4 FL
POTASSIUM SERPL-SCNC: 4.3 MMOL/L
PROT SERPL-MCNC: 6.8 G/DL
RBC # BLD AUTO: 3.32 M/UL
SODIUM SERPL-SCNC: 139 MMOL/L
WBC # BLD AUTO: 12.58 K/UL

## 2017-06-09 PROCEDURE — 96367 TX/PROPH/DG ADDL SEQ IV INF: CPT | Mod: PN

## 2017-06-09 PROCEDURE — 99999 PR PBB SHADOW E&M-EST. PATIENT-LVL III: CPT | Mod: PBBFAC,,, | Performed by: INTERNAL MEDICINE

## 2017-06-09 PROCEDURE — 96411 CHEMO IV PUSH ADDL DRUG: CPT | Mod: PN

## 2017-06-09 PROCEDURE — 85027 COMPLETE CBC AUTOMATED: CPT

## 2017-06-09 PROCEDURE — 96413 CHEMO IV INFUSION 1 HR: CPT | Mod: PN

## 2017-06-09 PROCEDURE — 25000003 PHARM REV CODE 250: Mod: PN | Performed by: INTERNAL MEDICINE

## 2017-06-09 PROCEDURE — 85027 COMPLETE CBC AUTOMATED: CPT | Mod: PN

## 2017-06-09 PROCEDURE — 80053 COMPREHEN METABOLIC PANEL: CPT

## 2017-06-09 PROCEDURE — 80053 COMPREHEN METABOLIC PANEL: CPT | Mod: PN

## 2017-06-09 PROCEDURE — 85007 BL SMEAR W/DIFF WBC COUNT: CPT | Mod: PN

## 2017-06-09 PROCEDURE — 96377 APPLICATON ON-BODY INJECTOR: CPT | Mod: PN

## 2017-06-09 PROCEDURE — 63600175 PHARM REV CODE 636 W HCPCS: Mod: PN | Performed by: INTERNAL MEDICINE

## 2017-06-09 PROCEDURE — 85007 BL SMEAR W/DIFF WBC COUNT: CPT

## 2017-06-09 PROCEDURE — 36415 COLL VENOUS BLD VENIPUNCTURE: CPT | Mod: PN

## 2017-06-09 PROCEDURE — 99215 OFFICE O/P EST HI 40 MIN: CPT | Mod: S$GLB,,, | Performed by: INTERNAL MEDICINE

## 2017-06-09 RX ORDER — SODIUM CHLORIDE 0.9 % (FLUSH) 0.9 %
10 SYRINGE (ML) INJECTION
Status: DISCONTINUED | OUTPATIENT
Start: 2017-06-09 | End: 2017-06-09 | Stop reason: HOSPADM

## 2017-06-09 RX ORDER — DOXORUBICIN HYDROCHLORIDE 2 MG/ML
60 INJECTION, SOLUTION INTRAVENOUS
Status: CANCELLED | OUTPATIENT
Start: 2017-06-09

## 2017-06-09 RX ORDER — HEPARIN 100 UNIT/ML
500 SYRINGE INTRAVENOUS
Status: CANCELLED | OUTPATIENT
Start: 2017-06-09

## 2017-06-09 RX ORDER — SODIUM CHLORIDE 0.9 % (FLUSH) 0.9 %
10 SYRINGE (ML) INJECTION
Status: CANCELLED | OUTPATIENT
Start: 2017-06-09

## 2017-06-09 RX ORDER — DOXORUBICIN HYDROCHLORIDE 2 MG/ML
60 INJECTION, SOLUTION INTRAVENOUS
Status: COMPLETED | OUTPATIENT
Start: 2017-06-09 | End: 2017-06-09

## 2017-06-09 RX ADMIN — SODIUM CHLORIDE 150 MG: 9 INJECTION, SOLUTION INTRAVENOUS at 12:06

## 2017-06-09 RX ADMIN — SODIUM CHLORIDE, PRESERVATIVE FREE 10 ML: 5 INJECTION INTRAVENOUS at 11:06

## 2017-06-09 RX ADMIN — CYCLOPHOSPHAMIDE 1045 MG: 1 INJECTION, POWDER, FOR SOLUTION INTRAVENOUS; ORAL at 12:06

## 2017-06-09 RX ADMIN — DOXORUBICIN HYDROCHLORIDE 104 MG: 2 INJECTION, SOLUTION INTRAVENOUS at 12:06

## 2017-06-09 RX ADMIN — PALONOSETRON HYDROCHLORIDE: 0.25 INJECTION INTRAVENOUS at 11:06

## 2017-06-09 RX ADMIN — SODIUM CHLORIDE, PRESERVATIVE FREE 10 ML: 5 INJECTION INTRAVENOUS at 02:06

## 2017-06-09 RX ADMIN — PEGFILGRASTIM 6 MG: KIT SUBCUTANEOUS at 12:06

## 2017-06-09 RX ADMIN — SODIUM CHLORIDE: 0.9 INJECTION, SOLUTION INTRAVENOUS at 11:06

## 2017-06-09 NOTE — PROGRESS NOTES
REASON FOR F/U:  For AC chemotherapy.cycle #3, completed cycle #2 without much issue    SIGNIFICANT HISTORY:  The patient saw Dr. Arce..  The patient felt   a left breast mass.  She has had breast augmentation in the past.  Mammogram was   done.  Ultrasound and biopsy were completed when abnormality was noted.  The   patient also had a port placed since then.  Palpable enlarged lymph node in the   left axilla.  The patient's ERP are negative, HER-2 positive, stage IIB   clinically due to the size of breast mass on the PET scan being about 3 cm and   the axillary lymph node about 1.3 cm.  The patient is here for discussion of   neoadjuvant therapy.  She is obviously anxious, concerned about the findings,   wants to get started as soon as possible with therapy.  She is here with her   .  She is 0% on ER and 0% on TN and HER-2 positive.  The patient does not   really have any other medical situations that need to be addressed.  She takes   Valtrex for herpes prophylaxis and postbiopsy has been taking hydrocodone.  She   takes Paxil for depression and Valium for sleep aid.    PHYSICAL EXAM:  Wt Readings from Last 3 Encounters:   06/09/17 64 kg (141 lb 1.5 oz)   05/26/17 63.5 kg (140 lb 1.6 oz)   05/25/17 62.9 kg (138 lb 10.7 oz)     Temp Readings from Last 3 Encounters:   06/09/17 97.7 °F (36.5 °C)   05/25/17 97.2 °F (36.2 °C)   05/12/17 98.6 °F (37 °C)     BP Readings from Last 3 Encounters:   06/09/17 (!) 123/59   05/26/17 136/66   05/25/17 (!) 141/69     Pulse Readings from Last 3 Encounters:   06/09/17 (!) 56   05/26/17 62   05/25/17 (!) 55     GENERAL:  This is a well-built, well-groomed, comfortable patient without any   deformities ambulating to clinic.  Patient is in no distress.    NEURO:  Awake, alert and oriented to time, person and place.  The patient   demonstrates no evidence of depression, anxiety or agitation.  Patient is   cooperative with exam and discussion.    EYES:  Normal  conjunctivae and eyelids.  PERRLA 3 to 4 mm without icterus.    ENT AND MOUTH:  External appearance of ears and nose normal without scars,   lesions or masses.  Nasal mucosa, turbinates and septum are normal.  No ENT   drainage and dried blood noted.  No tenderness over frontal or maxillary sinus.    Lips and gums are normal.  Dentition is normal.    NECK:  Supple.  Trachea is central.  No crepitus.  No JVD.  No thyromegaly or   masses.     RESPIRATORY:  No intercostal retractions and no use of accessory muscles of   respirations noted.  No areas of dullness to percussion.  Chest is clear to   auscultation.  No rales, rhonchi or wheezes.  No crepitus.  Good air entry   bilaterally.    CARDIOVASCULAR:  No cardiomegaly.  Normal location.  No thrills or heaviness.    Normal carotid pulse.  No bruits.  S1 and S2 are normally heard without murmurs   or gallops.  All peripheral pulses, including pedal pulses, are present.    ABDOMEN:  Normal abdomen.  No hepatosplenomegaly.  No free fluid.  Bowel sounds   are present.  No hernia noted.  No masses.  No rebound or tenderness.  No   guarding or rigidity.  Umbilicus is midline.    LYMPHATICS:  No axillary, cervical, supraclavicular, submental, or inguinal   lymphadenopathy.    SKIN AND MUSCULOSKELETAL:  There is no evidence of excoriation marks or   ecchymosis.  No rashes.  No pigmented lesions, induration or subcutaneous   nodules.  No sores or abnormal moles.  No cyanosis.  No clubbing.  Nailbed   abnormalities are noted. No joint or skeletal deformities noted.  Normal range   of motion.    BREASTS:  Bilateral implants.  Mass in the breast in the left side is palpable,   two at the lateral side, 3 o'clock position, discrete, about 2 cm in size.    There is also an axillary lymph node about 1.5 cm palpable.  No supraclavicular   adenopathy felt.  No other lymph nodes felt.  Right breast is intact.  The   patient has a port placed in the right chest wall.  No abnormal masses  or   discharge.    NEUROLOGIC:  Higher functions are appropriate.  No cranial nerve deficits.    Normal gait.  Normal strength.  Motor and sensory functions are normal.  Deep   tendon reflexes are normal without Babinski's sign or ankle clonus.  Lab Results   Component Value Date    WBC 12.58 06/09/2017    HGB 11.1 (L) 06/09/2017    HCT 33.9 (L) 06/09/2017     (H) 06/09/2017     (L) 06/09/2017     CMP  Sodium   Date Value Ref Range Status   06/09/2017 139 136 - 145 mmol/L Final     Potassium   Date Value Ref Range Status   06/09/2017 4.3 3.5 - 5.1 mmol/L Final     Chloride   Date Value Ref Range Status   06/09/2017 102 95 - 110 mmol/L Final     CO2   Date Value Ref Range Status   06/09/2017 30 22 - 31 mmol/L Final     Glucose   Date Value Ref Range Status   06/09/2017 87 70 - 110 mg/dL Final     Comment:     The ADA recommends the following guidelines for fasting glucose:  Normal:       less than 100 mg/dL  Prediabetes:  100 mg/dL to 125 mg/dL  Diabetes:     126 mg/dL or higher       BUN, Bld   Date Value Ref Range Status   06/09/2017 9 7 - 18 mg/dL Final     Creatinine   Date Value Ref Range Status   06/09/2017 0.65 0.50 - 1.40 mg/dL Final     Calcium   Date Value Ref Range Status   06/09/2017 9.6 8.4 - 10.2 mg/dL Final     Total Protein   Date Value Ref Range Status   06/09/2017 6.8 6.0 - 8.4 g/dL Final     Albumin   Date Value Ref Range Status   06/09/2017 4.3 3.5 - 5.2 g/dL Final     Total Bilirubin   Date Value Ref Range Status   06/09/2017 0.4 0.2 - 1.3 mg/dL Final     Comment:     For infants and newborns, interpretation of results should be based  on gestational age, weight and in agreement with clinical  observations.  Premature Infant recommended reference ranges:  Up to 24 hours.............<8.0 mg/dL  Up to 48 hours............<12.0 mg/dL  3-5 days..................<15.0 mg/dL  6-29 days.................<15.0 mg/dL       Alkaline Phosphatase   Date Value Ref Range Status   06/09/2017 103 38  - 145 U/L Final     AST   Date Value Ref Range Status   06/09/2017 33 14 - 36 U/L Final     ALT   Date Value Ref Range Status   06/09/2017 33 10 - 44 U/L Final     Anion Gap   Date Value Ref Range Status   06/09/2017 7 (L) 8 - 16 mmol/L Final     eGFR if    Date Value Ref Range Status   06/09/2017 >60 >60 mL/min/1.73 m^2 Final     eGFR if non    Date Value Ref Range Status   06/09/2017 >60 >60 mL/min/1.73 m^2 Final     Comment:     Calculation used to obtain the estimated glomerular filtration  rate (eGFR) is the CKD-EPI equation. Since race is unknown   in our information system, the eGFR values for   -American and Non--American patients are given   for each creatinine result.       IMPRESSION:  ERP are negative, HER-2 positive, stage IIB, clinical stage breast   cancer.  The patient is here for neoadjuvant therapy.    PLAN:  1.   3/4  cycles of Adriamycin and Cytoxan, followed by Taxol   weekly for 12 cycles with Herceptin.  Looking at the degree of response after   Adriamycin and Cytoxan, I may or may not add Perjeta to the regimen.  The   patient voices understanding of plan.  Lengthy clinic visit today.    The patient may continue her Valtrex and hydrocodone p.r.n.   rtc 2 weeks with cbc, cmp,   neulast for neutropenia support

## 2017-06-09 NOTE — PLAN OF CARE
Problem: Patient Care Overview  Goal: Plan of Care Review  Outcome: Ongoing (interventions implemented as appropriate)  Pt. Completed treatment, tolerated without noted distress.Vtial signs stable. Patient discharged from infusion center ambulatory with friend. Patient had all present questions answered.

## 2017-06-23 ENCOUNTER — OFFICE VISIT (OUTPATIENT)
Dept: HEMATOLOGY/ONCOLOGY | Facility: CLINIC | Age: 62
End: 2017-06-23
Payer: COMMERCIAL

## 2017-06-23 ENCOUNTER — INFUSION (OUTPATIENT)
Dept: INFUSION THERAPY | Facility: HOSPITAL | Age: 62
End: 2017-06-23
Attending: NURSE PRACTITIONER
Payer: COMMERCIAL

## 2017-06-23 VITALS
DIASTOLIC BLOOD PRESSURE: 80 MMHG | HEIGHT: 68 IN | TEMPERATURE: 98 F | BODY MASS INDEX: 21.42 KG/M2 | WEIGHT: 141.31 LBS | SYSTOLIC BLOOD PRESSURE: 128 MMHG | HEART RATE: 65 BPM | RESPIRATION RATE: 16 BRPM

## 2017-06-23 VITALS
HEIGHT: 68 IN | WEIGHT: 141.31 LBS | RESPIRATION RATE: 16 BRPM | DIASTOLIC BLOOD PRESSURE: 66 MMHG | BODY MASS INDEX: 21.42 KG/M2 | HEART RATE: 54 BPM | TEMPERATURE: 98 F | SYSTOLIC BLOOD PRESSURE: 143 MMHG

## 2017-06-23 DIAGNOSIS — C50.012 MALIGNANT NEOPLASM OF NIPPLE OF LEFT BREAST IN FEMALE: Primary | ICD-10-CM

## 2017-06-23 DIAGNOSIS — C50.011 MALIGNANT NEOPLASM INVOLVING BOTH NIPPLE AND AREOLA OF RIGHT BREAST IN FEMALE: Primary | ICD-10-CM

## 2017-06-23 DIAGNOSIS — C50.012 MALIGNANT NEOPLASM OF NIPPLE OF LEFT BREAST IN FEMALE: ICD-10-CM

## 2017-06-23 PROCEDURE — 96377 APPLICATON ON-BODY INJECTOR: CPT | Mod: PN

## 2017-06-23 PROCEDURE — 96413 CHEMO IV INFUSION 1 HR: CPT | Mod: PN

## 2017-06-23 PROCEDURE — 63600175 PHARM REV CODE 636 W HCPCS: Mod: PN | Performed by: INTERNAL MEDICINE

## 2017-06-23 PROCEDURE — 96411 CHEMO IV PUSH ADDL DRUG: CPT | Mod: PN

## 2017-06-23 PROCEDURE — 25000003 PHARM REV CODE 250: Mod: PN | Performed by: INTERNAL MEDICINE

## 2017-06-23 PROCEDURE — 99215 OFFICE O/P EST HI 40 MIN: CPT | Mod: S$GLB,,, | Performed by: INTERNAL MEDICINE

## 2017-06-23 PROCEDURE — 96367 TX/PROPH/DG ADDL SEQ IV INF: CPT | Mod: PN

## 2017-06-23 PROCEDURE — 99999 PR PBB SHADOW E&M-EST. PATIENT-LVL III: CPT | Mod: PBBFAC,,, | Performed by: INTERNAL MEDICINE

## 2017-06-23 RX ORDER — SODIUM CHLORIDE 0.9 % (FLUSH) 0.9 %
10 SYRINGE (ML) INJECTION
Status: DISCONTINUED | OUTPATIENT
Start: 2017-06-23 | End: 2017-06-23

## 2017-06-23 RX ORDER — DOXORUBICIN HYDROCHLORIDE 2 MG/ML
60 INJECTION, SOLUTION INTRAVENOUS
Status: CANCELLED | OUTPATIENT
Start: 2017-06-23

## 2017-06-23 RX ORDER — SODIUM CHLORIDE 0.9 % (FLUSH) 0.9 %
10 SYRINGE (ML) INJECTION
Status: CANCELLED | OUTPATIENT
Start: 2017-06-23

## 2017-06-23 RX ORDER — HEPARIN 100 UNIT/ML
500 SYRINGE INTRAVENOUS
Status: CANCELLED | OUTPATIENT
Start: 2017-06-23

## 2017-06-23 RX ORDER — DOXORUBICIN HYDROCHLORIDE 2 MG/ML
60 INJECTION, SOLUTION INTRAVENOUS
Status: COMPLETED | OUTPATIENT
Start: 2017-06-23 | End: 2017-06-23

## 2017-06-23 RX ADMIN — DOXORUBICIN HYDROCHLORIDE 104 MG: 2 INJECTION, SOLUTION INTRAVENOUS at 12:06

## 2017-06-23 RX ADMIN — PALONOSETRON HYDROCHLORIDE: 0.25 INJECTION INTRAVENOUS at 11:06

## 2017-06-23 RX ADMIN — CYCLOPHOSPHAMIDE 1045 MG: 1 INJECTION, POWDER, FOR SOLUTION INTRAVENOUS; ORAL at 12:06

## 2017-06-23 RX ADMIN — PEGFILGRASTIM 6 MG: KIT SUBCUTANEOUS at 01:06

## 2017-06-23 RX ADMIN — SODIUM CHLORIDE 150 MG: 9 INJECTION, SOLUTION INTRAVENOUS at 12:06

## 2017-06-23 RX ADMIN — SODIUM CHLORIDE: 0.9 INJECTION, SOLUTION INTRAVENOUS at 11:06

## 2017-06-23 RX ADMIN — SODIUM CHLORIDE, PRESERVATIVE FREE 10 ML: 5 INJECTION INTRAVENOUS at 11:06

## 2017-06-23 NOTE — PROGRESS NOTES
A 62-year-old  woman coming in for followup.  She is due for cycle 4 of   Adriamycin and Cytoxan.  The patient is receiving neoadjuvant chemotherapy in   lieu of ER/CO negative, HER-2 positive invasive breast carcinoma of the left   breast.  The patient has seen Dr. Alexander Arce.  She is here for the fourth   cycle of treatment.  She has tolerated this regimen really well and has no   issues whatsoever.  The patient had a palpable left axillary lymph node.  She is   a clinical stage IIB at least.  The patient had a PET scan done that was April 2017 that shows no evidence of metastatic lesions elsewhere, but showed the left   primary breast malignancy and ipsilateral axillary metastatic lymphadenopathy.    She had pulmonary nodules in the right lower side.  She had bilateral breast   MRIs ordered by Dr. Arce, which was a challenging study because the patient   had prior implants in both her breasts.  The lesion roughly measured about 2.7   cm.  The left axillary lymph node measured about 2.6 cm on the MRI.    REVIEW OF SYSTEMS:  Feels great.  No side effects whatsoever, has lost all her   hair and is wearing it proudly.  Denies nausea, vomiting, diarrhea, altered   bowel or bladder habits, cough, sputum production, fever, chills, rigors.    Appetite has been appropriately appropriate for her chemotherapy time and the   patient make a conscious effort to be healthy during her cycles of chemotherapy.    PHYSICAL EXAMINATION:  GENERAL:  Alopecic.  VITAL SIGNS:  Stable.  Ambulatory to clinic.  Weight is 141 pounds, 5 feet 8   inches.  BSA of 1.75.  HEENT:  Showed no congestion.  NECK:  Supple, without JVD.  Trachea is central.  LUNGS:  Clear to auscultation, right-sided MediPort.  ABDOMEN:  Soft, no rebound, guarding or rigidity.  Axilla on the left reveals   lymph node slightly smaller in size.  This is palpable still.  NEUROLOGIC:  The patient is appropriate, very pleasant.  EXTREMITIES:  Showing no  edema.    Labs today reveal a white count of 11.0, H and H 11 and 32, , platelets   of 184, segs of 66%.  Chemistry panel reveals entirely normal electrolytes and   liver functions and renal function.  The patient's tumor markers CA 27-29 on   April 25th was 44.1, slightly elevated.    IMPRESSION:  1.  Neoadjuvant chemotherapy for a clinical stage IIB breast cancer with large   palpable lymph nodes to the left axilla and an MRI measurement of breast tumor   at 2.7 cm.  The patient is here for cycle #4 of Adriamycin and Cytoxan okay to   proceed with AC.  2.  Because of her ER/WA negativity and HER-2 positivity, in two weeks, she will   start Taxol weekly regimen for 12 cycles, along with Herceptin 8 mg/kg loading   dose and then 6 mg/kg q. 3 weeks.  She will also start Perjeta along with the   Herceptin.  3.  I have attempted to make a phone call to Dr. Arce, but he is out of   clinic until July 4th.  I will make sure I notify Dr. Arce about the plans   to proceed with the Taxol, Herceptin, Perjeta combination as clinically I have   seen mild improvement, not a very remarkable one.  An MRI may be necessary to   follow up.  4.  We will also get an echocardiogram before embarking on more cardiotoxic   drugs.  The patient returns to clinic in two weeks to proceed with Taxol weekly   regimen, Herceptin 8 mg/k loading dose followed by 6 mg q. 3 weeks and Perjeta   as ordered.  Chemotherapy chart prepared, consent orders to be done.  She will   need chemo teaching again for the new drugs.  CBC and CMP for the next following   visit along with echocardiogram.  The patient will be sent to radiation   evaluation after completing of surgery, which can be accomplished if Taxol is   completed in 12 weeks during infusion Perjeta and Herceptin.      SSS/PN  dd: 06/23/2017 16:52:32 (CDT)  td: 06/24/2017 13:29:26 (CDT)  Doc ID   #[id number]  Job ID #[id number]    CC:

## 2017-06-29 ENCOUNTER — PATIENT MESSAGE (OUTPATIENT)
Dept: HEMATOLOGY/ONCOLOGY | Facility: CLINIC | Age: 62
End: 2017-06-29

## 2017-07-07 ENCOUNTER — OFFICE VISIT (OUTPATIENT)
Dept: HEMATOLOGY/ONCOLOGY | Facility: CLINIC | Age: 62
End: 2017-07-07
Payer: COMMERCIAL

## 2017-07-07 ENCOUNTER — PATIENT MESSAGE (OUTPATIENT)
Dept: HEMATOLOGY/ONCOLOGY | Facility: CLINIC | Age: 62
End: 2017-07-07

## 2017-07-07 ENCOUNTER — INFUSION (OUTPATIENT)
Dept: INFUSION THERAPY | Facility: HOSPITAL | Age: 62
End: 2017-07-07
Attending: INTERNAL MEDICINE
Payer: COMMERCIAL

## 2017-07-07 VITALS — HEART RATE: 69 BPM | DIASTOLIC BLOOD PRESSURE: 69 MMHG | SYSTOLIC BLOOD PRESSURE: 119 MMHG | RESPIRATION RATE: 20 BRPM

## 2017-07-07 VITALS
DIASTOLIC BLOOD PRESSURE: 71 MMHG | TEMPERATURE: 98 F | HEIGHT: 68 IN | HEART RATE: 62 BPM | SYSTOLIC BLOOD PRESSURE: 118 MMHG | BODY MASS INDEX: 21.39 KG/M2 | WEIGHT: 141.13 LBS | RESPIRATION RATE: 16 BRPM

## 2017-07-07 DIAGNOSIS — C50.912 MALIGNANT NEOPLASM OF LEFT FEMALE BREAST, UNSPECIFIED SITE OF BREAST: ICD-10-CM

## 2017-07-07 DIAGNOSIS — C50.912 MALIGNANT NEOPLASM OF LEFT FEMALE BREAST, UNSPECIFIED SITE OF BREAST: Primary | ICD-10-CM

## 2017-07-07 DIAGNOSIS — C50.512 PRIMARY CANCER OF LOWER OUTER QUADRANT OF LEFT FEMALE BREAST: Primary | ICD-10-CM

## 2017-07-07 PROCEDURE — S0028 INJECTION, FAMOTIDINE, 20 MG: HCPCS | Mod: PN | Performed by: NURSE PRACTITIONER

## 2017-07-07 PROCEDURE — 63600175 PHARM REV CODE 636 W HCPCS: Mod: JW,PN | Performed by: NURSE PRACTITIONER

## 2017-07-07 PROCEDURE — A4216 STERILE WATER/SALINE, 10 ML: HCPCS | Mod: PN | Performed by: NURSE PRACTITIONER

## 2017-07-07 PROCEDURE — 99214 OFFICE O/P EST MOD 30 MIN: CPT | Mod: S$GLB,,, | Performed by: NURSE PRACTITIONER

## 2017-07-07 PROCEDURE — 25000003 PHARM REV CODE 250: Mod: PN | Performed by: NURSE PRACTITIONER

## 2017-07-07 PROCEDURE — 96415 CHEMO IV INFUSION ADDL HR: CPT | Mod: PN

## 2017-07-07 PROCEDURE — 96367 TX/PROPH/DG ADDL SEQ IV INF: CPT | Mod: PN

## 2017-07-07 PROCEDURE — 96417 CHEMO IV INFUS EACH ADDL SEQ: CPT | Mod: PN

## 2017-07-07 PROCEDURE — 96413 CHEMO IV INFUSION 1 HR: CPT | Mod: PN

## 2017-07-07 PROCEDURE — 99999 PR PBB SHADOW E&M-EST. PATIENT-LVL IV: CPT | Mod: PBBFAC,,, | Performed by: NURSE PRACTITIONER

## 2017-07-07 RX ORDER — SODIUM CHLORIDE 0.9 % (FLUSH) 0.9 %
10 SYRINGE (ML) INJECTION
Status: CANCELLED | OUTPATIENT
Start: 2017-07-07

## 2017-07-07 RX ORDER — HEPARIN 100 UNIT/ML
500 SYRINGE INTRAVENOUS
Status: CANCELLED | OUTPATIENT
Start: 2017-07-07

## 2017-07-07 RX ORDER — EPINEPHRINE 0.3 MG/.3ML
0.3 INJECTION SUBCUTANEOUS ONCE AS NEEDED
Status: CANCELLED | OUTPATIENT
Start: 2017-07-07 | End: 2017-07-07

## 2017-07-07 RX ORDER — FAMOTIDINE 20 MG/50ML
20 INJECTION, SOLUTION INTRAVENOUS
Status: CANCELLED
Start: 2017-07-07 | End: 2017-07-07

## 2017-07-07 RX ORDER — FAMOTIDINE 20 MG/50ML
20 INJECTION, SOLUTION INTRAVENOUS
Status: COMPLETED | OUTPATIENT
Start: 2017-07-07 | End: 2017-07-07

## 2017-07-07 RX ORDER — DIPHENHYDRAMINE HYDROCHLORIDE 50 MG/ML
50 INJECTION INTRAMUSCULAR; INTRAVENOUS ONCE AS NEEDED
Status: CANCELLED | OUTPATIENT
Start: 2017-07-07 | End: 2017-07-07

## 2017-07-07 RX ORDER — SODIUM CHLORIDE 0.9 % (FLUSH) 0.9 %
10 SYRINGE (ML) INJECTION
Status: DISCONTINUED | OUTPATIENT
Start: 2017-07-07 | End: 2017-07-07 | Stop reason: HOSPADM

## 2017-07-07 RX ADMIN — PACLITAXEL 138 MG: 6 INJECTION, SOLUTION, CONCENTRATE INTRAVENOUS at 04:07

## 2017-07-07 RX ADMIN — PERTUZUMAB 840 MG: 30 INJECTION, SOLUTION, CONCENTRATE INTRAVENOUS at 01:07

## 2017-07-07 RX ADMIN — SODIUM CHLORIDE, PRESERVATIVE FREE 10 ML: 5 INJECTION INTRAVENOUS at 05:07

## 2017-07-07 RX ADMIN — FAMOTIDINE 20 MG: 20 INJECTION, SOLUTION INTRAVENOUS at 12:07

## 2017-07-07 RX ADMIN — SODIUM CHLORIDE: 0.9 INJECTION, SOLUTION INTRAVENOUS at 11:07

## 2017-07-07 RX ADMIN — Medication 20 MG: at 12:07

## 2017-07-07 RX ADMIN — DIPHENHYDRAMINE HYDROCHLORIDE 50 MG: 50 INJECTION, SOLUTION INTRAMUSCULAR; INTRAVENOUS at 11:07

## 2017-07-07 RX ADMIN — TRASTUZUMAB 513 MG: 150 INJECTION, POWDER, LYOPHILIZED, FOR SOLUTION INTRAVENOUS at 02:07

## 2017-07-07 NOTE — PROGRESS NOTES
HISTORY OF PRESENT ILLNESS:  This is a 62-year-old white female known to Dr. Ragsdale for a recent diagnosis of Stage IIB left breast carcinoma.  The patient discovered   a mass in her left breast and was seen by Dr. Arce.  She has received 4 cycles of   A/C and presents to the clinic today for evaluation prior to Week 1 of Taxol/Herceptin/Perjeta.    She states she is slightly fatigued, but continues to work out with weights.  She denies any  fevers, chills, unexplained weight loss, new breast complaints, vaginal bleeding, abdominal   discomfort, nausea, vomiting, diarrhea, etc.  No new complaints or pertinent findings on a   14 point review of systems.      PHYSICAL EXAMINATION:  GENERAL:  Well-developed, well-nourished white female in no acute distress.    Alert and oriented x4.  VITAL SIGNS:  Weight 141.1 pounds, stable. /71, pulse 62,   respirations 16, temperature 97.9.  HEENT:  Normocephalic, atraumatic.  Oral mucosa pink and moist.  Lips without   lesions.  Tongue midline.  Oropharynx clear.  Nonicteric sclerae.  NECK:  Supple, no adenopathy.  No carotid bruits, thyromegaly or thyroid nodule.  HEART:  Regular rate and rhythm without murmur, gallop.  LUNGS:  Clear to auscultation bilaterally.  Normal respiratory effort.  ABDOMEN:  Soft, nontender, nondistended with positive normoactive bowel sounds,   no hepatosplenomegaly.  EXTREMITIES:  No cyanosis, clubbing or edema.  Distal pulses are intact.  AXILLAE AND GROIN:  No palpable pathologic lymphadenopathy is appreciated.  SKIN:  Intact/turgor normal.  NEUROLOGIC:  Cranial nerves II-XII grossly intact.  Motor:  Good muscle bulk and   tone.  Strength/sensory 5/5 throughout.  Gait stable.    LABORATORY:    Lab Results   Component Value Date    WBC 11.22 07/07/2017    HGB 10.9 (L) 07/07/2017    HCT 32.8 (L) 07/07/2017     (H) 07/07/2017     07/07/2017     Differential remarkable for 88% grans, 5% lymph, 3% monos    CMP  Sodium   Date Value  Ref Range Status   07/07/2017 136 136 - 145 mmol/L Final     Potassium   Date Value Ref Range Status   07/07/2017 4.6 3.5 - 5.1 mmol/L Final     Chloride   Date Value Ref Range Status   07/07/2017 102 95 - 110 mmol/L Final     CO2   Date Value Ref Range Status   07/07/2017 29 22 - 31 mmol/L Final     Glucose   Date Value Ref Range Status   07/07/2017 85 70 - 110 mg/dL Final     Comment:     The ADA recommends the following guidelines for fasting glucose:  Normal:       less than 100 mg/dL  Prediabetes:  100 mg/dL to 125 mg/dL  Diabetes:     126 mg/dL or higher       BUN, Bld   Date Value Ref Range Status   07/07/2017 13 7 - 18 mg/dL Final     Creatinine   Date Value Ref Range Status   07/07/2017 0.63 0.50 - 1.40 mg/dL Final     Calcium   Date Value Ref Range Status   07/07/2017 9.6 8.4 - 10.2 mg/dL Final     Total Protein   Date Value Ref Range Status   07/07/2017 7.0 6.0 - 8.4 g/dL Final     Albumin   Date Value Ref Range Status   07/07/2017 4.3 3.5 - 5.2 g/dL Final     Total Bilirubin   Date Value Ref Range Status   07/07/2017 0.4 0.2 - 1.3 mg/dL Final     Comment:     For infants and newborns, interpretation of results should be based  on gestational age, weight and in agreement with clinical  observations.  Premature Infant recommended reference ranges:  Up to 24 hours.............<8.0 mg/dL  Up to 48 hours............<12.0 mg/dL  3-5 days..................<15.0 mg/dL  6-29 days.................<15.0 mg/dL       Alkaline Phosphatase   Date Value Ref Range Status   07/07/2017 127 38 - 145 U/L Final     AST   Date Value Ref Range Status   07/07/2017 30 14 - 36 U/L Final     ALT   Date Value Ref Range Status   07/07/2017 28 10 - 44 U/L Final     Anion Gap   Date Value Ref Range Status   07/07/2017 5 (L) 8 - 16 mmol/L Final     eGFR if    Date Value Ref Range Status   07/07/2017 >60 >60 mL/min/1.73 m^2 Final     eGFR if non    Date Value Ref Range Status   07/07/2017 >60 >60  mL/min/1.73 m^2 Final     Comment:     Calculation used to obtain the estimated glomerular filtration  rate (eGFR) is the CKD-EPI equation. Since race is unknown   in our information system, the eGFR values for   -American and Non--American patients are given   for each creatinine result.         IMPRESSION:  Stage IIB left breast carcinoma (ER/OK negative, Her-2/clay positive)    PLAN:  1.  Proceed with Week 1 of Taxol/herceptin/perjeta with premedications.  2.  Follow up in clinic with interval CBC, CMP for evaluation prior to C1D8 Taxol.    Assessment/plan reviewed and approved by Dr. Lau.

## 2017-07-07 NOTE — PLAN OF CARE
Problem: Patient Care Overview  Goal: Plan of Care Review  Outcome: Ongoing (interventions implemented as appropriate)  Pt tolerated first PHT regimen well.  No s/s of infusion reaction noted.  Instructed to call MD with any problems.

## 2017-07-13 ENCOUNTER — LAB VISIT (OUTPATIENT)
Dept: LAB | Facility: HOSPITAL | Age: 62
End: 2017-07-13
Attending: INTERNAL MEDICINE
Payer: COMMERCIAL

## 2017-07-13 DIAGNOSIS — C50.512 PRIMARY CANCER OF LOWER OUTER QUADRANT OF LEFT FEMALE BREAST: ICD-10-CM

## 2017-07-13 LAB
ALBUMIN SERPL BCP-MCNC: 4.3 G/DL
ALP SERPL-CCNC: 94 U/L
ALT SERPL W/O P-5'-P-CCNC: 54 U/L
ANION GAP SERPL CALC-SCNC: 7 MMOL/L
AST SERPL-CCNC: 48 U/L
BASOPHILS # BLD AUTO: 0.03 K/UL
BASOPHILS NFR BLD: 0.7 %
BILIRUB SERPL-MCNC: 0.6 MG/DL
BUN SERPL-MCNC: 13 MG/DL
CALCIUM SERPL-MCNC: 9.6 MG/DL
CHLORIDE SERPL-SCNC: 100 MMOL/L
CO2 SERPL-SCNC: 30 MMOL/L
CREAT SERPL-MCNC: 0.63 MG/DL
DIFFERENTIAL METHOD: ABNORMAL
EOSINOPHIL # BLD AUTO: 0 K/UL
EOSINOPHIL NFR BLD: 0.7 %
ERYTHROCYTE [DISTWIDTH] IN BLOOD BY AUTOMATED COUNT: 15.2 %
EST. GFR  (AFRICAN AMERICAN): >60 ML/MIN/1.73 M^2
EST. GFR  (NON AFRICAN AMERICAN): >60 ML/MIN/1.73 M^2
GLUCOSE SERPL-MCNC: 89 MG/DL
HCT VFR BLD AUTO: 29.9 %
HGB BLD-MCNC: 10.4 G/DL
LYMPHOCYTES # BLD AUTO: 0.8 K/UL
LYMPHOCYTES NFR BLD: 17.2 %
MCH RBC QN AUTO: 35.9 PG
MCHC RBC AUTO-ENTMCNC: 34.8 %
MCV RBC AUTO: 103 FL
MONOCYTES # BLD AUTO: 0.6 K/UL
MONOCYTES NFR BLD: 12.9 %
NEUTROPHILS # BLD AUTO: 3.1 K/UL
NEUTROPHILS NFR BLD: 68.5 %
NRBC BLD-RTO: 0 /100 WBC
PLATELET # BLD AUTO: 294 K/UL
PMV BLD AUTO: 10.4 FL
POTASSIUM SERPL-SCNC: 4.3 MMOL/L
PROT SERPL-MCNC: 7 G/DL
RBC # BLD AUTO: 2.9 M/UL
SODIUM SERPL-SCNC: 137 MMOL/L
WBC # BLD AUTO: 4.58 K/UL

## 2017-07-13 PROCEDURE — 80053 COMPREHEN METABOLIC PANEL: CPT | Mod: PN

## 2017-07-13 PROCEDURE — 85025 COMPLETE CBC W/AUTO DIFF WBC: CPT

## 2017-07-13 PROCEDURE — 85025 COMPLETE CBC W/AUTO DIFF WBC: CPT | Mod: PN

## 2017-07-13 PROCEDURE — 36415 COLL VENOUS BLD VENIPUNCTURE: CPT | Mod: PN

## 2017-07-13 PROCEDURE — 80053 COMPREHEN METABOLIC PANEL: CPT

## 2017-07-14 ENCOUNTER — OFFICE VISIT (OUTPATIENT)
Dept: HEMATOLOGY/ONCOLOGY | Facility: CLINIC | Age: 62
End: 2017-07-14
Payer: COMMERCIAL

## 2017-07-14 ENCOUNTER — INFUSION (OUTPATIENT)
Dept: INFUSION THERAPY | Facility: HOSPITAL | Age: 62
End: 2017-07-14
Attending: INTERNAL MEDICINE
Payer: COMMERCIAL

## 2017-07-14 VITALS
DIASTOLIC BLOOD PRESSURE: 55 MMHG | TEMPERATURE: 98 F | WEIGHT: 142.44 LBS | SYSTOLIC BLOOD PRESSURE: 123 MMHG | BODY MASS INDEX: 21.59 KG/M2 | RESPIRATION RATE: 20 BRPM | HEIGHT: 68 IN | HEART RATE: 71 BPM

## 2017-07-14 VITALS
WEIGHT: 142.44 LBS | HEART RATE: 73 BPM | HEIGHT: 68 IN | DIASTOLIC BLOOD PRESSURE: 72 MMHG | RESPIRATION RATE: 16 BRPM | BODY MASS INDEX: 21.59 KG/M2 | SYSTOLIC BLOOD PRESSURE: 129 MMHG | TEMPERATURE: 98 F

## 2017-07-14 DIAGNOSIS — C50.012 MALIGNANT NEOPLASM OF NIPPLE OF LEFT BREAST IN FEMALE, UNSPECIFIED ESTROGEN RECEPTOR STATUS: Primary | ICD-10-CM

## 2017-07-14 DIAGNOSIS — C50.011 MALIGNANT NEOPLASM OF NIPPLE OF RIGHT BREAST IN FEMALE, UNSPECIFIED ESTROGEN RECEPTOR STATUS: Primary | ICD-10-CM

## 2017-07-14 DIAGNOSIS — C50.012 MALIGNANT NEOPLASM OF NIPPLE OF LEFT BREAST IN FEMALE, UNSPECIFIED ESTROGEN RECEPTOR STATUS: ICD-10-CM

## 2017-07-14 PROCEDURE — 96367 TX/PROPH/DG ADDL SEQ IV INF: CPT | Mod: PN

## 2017-07-14 PROCEDURE — S0028 INJECTION, FAMOTIDINE, 20 MG: HCPCS | Mod: PN | Performed by: INTERNAL MEDICINE

## 2017-07-14 PROCEDURE — 63600175 PHARM REV CODE 636 W HCPCS: Mod: PN | Performed by: INTERNAL MEDICINE

## 2017-07-14 PROCEDURE — A4216 STERILE WATER/SALINE, 10 ML: HCPCS | Mod: PN | Performed by: INTERNAL MEDICINE

## 2017-07-14 PROCEDURE — 99215 OFFICE O/P EST HI 40 MIN: CPT | Mod: S$GLB,,, | Performed by: INTERNAL MEDICINE

## 2017-07-14 PROCEDURE — 96375 TX/PRO/DX INJ NEW DRUG ADDON: CPT | Mod: PN

## 2017-07-14 PROCEDURE — 25000003 PHARM REV CODE 250: Mod: PN | Performed by: INTERNAL MEDICINE

## 2017-07-14 PROCEDURE — 99999 PR PBB SHADOW E&M-EST. PATIENT-LVL III: CPT | Mod: PBBFAC,,, | Performed by: INTERNAL MEDICINE

## 2017-07-14 PROCEDURE — 96413 CHEMO IV INFUSION 1 HR: CPT | Mod: PN

## 2017-07-14 RX ORDER — SODIUM CHLORIDE 0.9 % (FLUSH) 0.9 %
10 SYRINGE (ML) INJECTION
Status: CANCELLED | OUTPATIENT
Start: 2017-07-14

## 2017-07-14 RX ORDER — EPINEPHRINE 0.3 MG/.3ML
0.3 INJECTION SUBCUTANEOUS ONCE AS NEEDED
Status: CANCELLED | OUTPATIENT
Start: 2017-07-14 | End: 2017-07-14

## 2017-07-14 RX ORDER — ONDANSETRON HCL IN 0.9 % NACL 8 MG/50 ML
8 INTRAVENOUS SOLUTION, PIGGYBACK (ML) INTRAVENOUS
Status: CANCELLED
Start: 2017-07-14

## 2017-07-14 RX ORDER — SODIUM CHLORIDE 0.9 % (FLUSH) 0.9 %
10 SYRINGE (ML) INJECTION
Status: DISCONTINUED | OUTPATIENT
Start: 2017-07-14 | End: 2017-07-14 | Stop reason: HOSPADM

## 2017-07-14 RX ORDER — DIPHENHYDRAMINE HYDROCHLORIDE 50 MG/ML
50 INJECTION INTRAMUSCULAR; INTRAVENOUS ONCE AS NEEDED
Status: CANCELLED | OUTPATIENT
Start: 2017-07-21 | End: 2017-07-21

## 2017-07-14 RX ORDER — EPINEPHRINE 0.3 MG/.3ML
0.3 INJECTION SUBCUTANEOUS ONCE AS NEEDED
Status: CANCELLED | OUTPATIENT
Start: 2017-07-21 | End: 2017-07-21

## 2017-07-14 RX ORDER — ONDANSETRON 2 MG/ML
8 INJECTION INTRAMUSCULAR; INTRAVENOUS
Status: COMPLETED | OUTPATIENT
Start: 2017-07-14 | End: 2017-07-14

## 2017-07-14 RX ORDER — HEPARIN 100 UNIT/ML
500 SYRINGE INTRAVENOUS
Status: CANCELLED | OUTPATIENT
Start: 2017-07-14

## 2017-07-14 RX ORDER — SODIUM CHLORIDE 0.9 % (FLUSH) 0.9 %
10 SYRINGE (ML) INJECTION
Status: CANCELLED | OUTPATIENT
Start: 2017-07-21

## 2017-07-14 RX ORDER — HEPARIN 100 UNIT/ML
500 SYRINGE INTRAVENOUS
Status: CANCELLED | OUTPATIENT
Start: 2017-07-21

## 2017-07-14 RX ORDER — FAMOTIDINE 20 MG/50ML
20 INJECTION, SOLUTION INTRAVENOUS
Status: CANCELLED
Start: 2017-07-21 | End: 2017-07-21

## 2017-07-14 RX ORDER — FAMOTIDINE 20 MG/50ML
20 INJECTION, SOLUTION INTRAVENOUS
Status: COMPLETED | OUTPATIENT
Start: 2017-07-14 | End: 2017-07-14

## 2017-07-14 RX ORDER — DIPHENHYDRAMINE HYDROCHLORIDE 50 MG/ML
50 INJECTION INTRAMUSCULAR; INTRAVENOUS ONCE AS NEEDED
Status: CANCELLED | OUTPATIENT
Start: 2017-07-14 | End: 2017-07-14

## 2017-07-14 RX ORDER — FAMOTIDINE 20 MG/50ML
20 INJECTION, SOLUTION INTRAVENOUS
Status: CANCELLED
Start: 2017-07-14 | End: 2017-07-14

## 2017-07-14 RX ADMIN — DIPHENHYDRAMINE HYDROCHLORIDE 50 MG: 50 INJECTION, SOLUTION INTRAMUSCULAR; INTRAVENOUS at 12:07

## 2017-07-14 RX ADMIN — ONDANSETRON 8 MG: 2 INJECTION INTRAMUSCULAR; INTRAVENOUS at 12:07

## 2017-07-14 RX ADMIN — Medication 20 MG: at 12:07

## 2017-07-14 RX ADMIN — FAMOTIDINE 20 MG: 20 INJECTION, SOLUTION INTRAVENOUS at 01:07

## 2017-07-14 RX ADMIN — SODIUM CHLORIDE, PRESERVATIVE FREE 10 ML: 5 INJECTION INTRAVENOUS at 02:07

## 2017-07-14 RX ADMIN — PACLITAXEL 138 MG: 6 INJECTION, SOLUTION, CONCENTRATE INTRAVENOUS at 01:07

## 2017-07-14 RX ADMIN — SODIUM CHLORIDE: 0.9 INJECTION, SOLUTION INTRAVENOUS at 12:07

## 2017-07-14 NOTE — PROGRESS NOTES
A 62-year-old  woman coming in for followup.  She is due for cycle 4 of   Adriamycin and Cytoxan.  The patient is receiving neoadjuvant chemotherapy in   lieu of ER/HI negative, HER-2 positive invasive breast carcinoma of the left   breast.  The patient has seen Dr. Alexander Arce.  She is here for the fourth   cycle of treatment.  She has tolerated this regimen really well and has no   issues whatsoever.  The patient had a palpable left axillary lymph node.  She is   a clinical stage IIB at least.  The patient had a PET scan done that was April 2017 that shows no evidence of metastatic lesions elsewhere, but showed the left   primary breast malignancy and ipsilateral axillary metastatic lymphadenopathy.    She had pulmonary nodules in the right lower side.  She had bilateral breast   MRIs ordered by Dr. Arce, which was a challenging study because the patient   had prior implants in both her breasts.  The lesion roughly measured about 2.7   cm.  The left axillary lymph node measured about 2.6 cm on the MRI.    REVIEW OF SYSTEMS:  Feels great.  No side effects whatsoever, has lost all her   hair and is wearing it proudly.  Denies nausea, vomiting, diarrhea, altered   bowel or bladder habits, cough, sputum production, fever, chills, rigors.    Appetite has been appropriately appropriate for her chemotherapy time and the   patient make a conscious effort to be healthy during her cycles of chemotherapy.    PHYSICAL EXAMINATION:  GENERAL:  Alopecic.  VITAL SIGNS:  Stable.  Ambulatory to clinic.  Weight is 141 pounds, 5 feet 8   inches.  BSA of 1.75.  HEENT:  Showed no congestion.  NECK:  Supple, without JVD.  Trachea is central.  LUNGS:  Clear to auscultation, right-sided MediPort.  ABDOMEN:  Soft, no rebound, guarding or rigidity.  Axilla on the left reveals   lymph node slightly smaller in size.  This is palpable still.  NEUROLOGIC:  The patient is appropriate, very pleasant.  EXTREMITIES:  Showing no  edema.    Labs today reveal a white count of 11.0, H and H 11 and 32, , platelets   of 184, segs of 66%.  Chemistry panel reveals entirely normal electrolytes and   liver functions and renal function.  The patient's tumor markers CA 27-29 on   April 25th was 44.1, slightly elevated.    IMPRESSION:  1.  Neoadjuvant chemotherapy for a clinical stage IIB breast cancer with large   palpable lymph nodes to the left axilla and an MRI measurement of breast tumor   at 2.7 cm.  The patient is here for cycle #4 of Adriamycin and Cytoxan okay to   proceed with taxol week #2, week 1 recd perjeta and herceptin    3.   Dr. Arce,seeing pt next week. Pt has multiple questions about sx  ..  Taxol weekly regimen, Herceptin 8 mg/k loading dose followed by 6 mg q. 3 weeks and Perjeta   as ordered.        CBC and CMP for the next week  The patient will be sent to radiation   evaluation after completing of surgery, which can be accomplished if Taxol is   completed in 12 weeks during infusion Perjeta and Herceptin.

## 2017-07-14 NOTE — PLAN OF CARE
Problem: Patient Care Overview  Goal: Plan of Care Review  Outcome: Ongoing (interventions implemented as appropriate)  Pt tolerated treatment well.  Instructed to call MD with any problems.

## 2017-07-17 ENCOUNTER — PATIENT MESSAGE (OUTPATIENT)
Dept: HEMATOLOGY/ONCOLOGY | Facility: CLINIC | Age: 62
End: 2017-07-17

## 2017-07-21 ENCOUNTER — OFFICE VISIT (OUTPATIENT)
Dept: HEMATOLOGY/ONCOLOGY | Facility: CLINIC | Age: 62
End: 2017-07-21
Payer: COMMERCIAL

## 2017-07-21 ENCOUNTER — INFUSION (OUTPATIENT)
Dept: INFUSION THERAPY | Facility: HOSPITAL | Age: 62
End: 2017-07-21
Attending: INTERNAL MEDICINE
Payer: COMMERCIAL

## 2017-07-21 VITALS
DIASTOLIC BLOOD PRESSURE: 75 MMHG | RESPIRATION RATE: 16 BRPM | SYSTOLIC BLOOD PRESSURE: 144 MMHG | TEMPERATURE: 98 F | WEIGHT: 144.81 LBS | BODY MASS INDEX: 21.95 KG/M2 | HEART RATE: 81 BPM | HEIGHT: 68 IN

## 2017-07-21 VITALS
HEART RATE: 78 BPM | HEIGHT: 68 IN | WEIGHT: 144.81 LBS | RESPIRATION RATE: 17 BRPM | BODY MASS INDEX: 21.95 KG/M2 | SYSTOLIC BLOOD PRESSURE: 155 MMHG | DIASTOLIC BLOOD PRESSURE: 80 MMHG

## 2017-07-21 DIAGNOSIS — C50.011 MALIGNANT NEOPLASM OF NIPPLE OF RIGHT BREAST IN FEMALE, UNSPECIFIED ESTROGEN RECEPTOR STATUS: Primary | ICD-10-CM

## 2017-07-21 DIAGNOSIS — C50.012 MALIGNANT NEOPLASM OF NIPPLE OF LEFT BREAST IN FEMALE, UNSPECIFIED ESTROGEN RECEPTOR STATUS: Primary | ICD-10-CM

## 2017-07-21 PROCEDURE — A4216 STERILE WATER/SALINE, 10 ML: HCPCS | Mod: PN | Performed by: INTERNAL MEDICINE

## 2017-07-21 PROCEDURE — 96413 CHEMO IV INFUSION 1 HR: CPT | Mod: PN

## 2017-07-21 PROCEDURE — 96367 TX/PROPH/DG ADDL SEQ IV INF: CPT | Mod: PN

## 2017-07-21 PROCEDURE — 99215 OFFICE O/P EST HI 40 MIN: CPT | Mod: S$GLB,,, | Performed by: INTERNAL MEDICINE

## 2017-07-21 PROCEDURE — 99999 PR PBB SHADOW E&M-EST. PATIENT-LVL III: CPT | Mod: PBBFAC,,, | Performed by: INTERNAL MEDICINE

## 2017-07-21 PROCEDURE — 96375 TX/PRO/DX INJ NEW DRUG ADDON: CPT | Mod: PN

## 2017-07-21 PROCEDURE — 25000003 PHARM REV CODE 250: Mod: PN | Performed by: INTERNAL MEDICINE

## 2017-07-21 PROCEDURE — 63600175 PHARM REV CODE 636 W HCPCS: Mod: PN | Performed by: INTERNAL MEDICINE

## 2017-07-21 PROCEDURE — S0028 INJECTION, FAMOTIDINE, 20 MG: HCPCS | Mod: PN | Performed by: INTERNAL MEDICINE

## 2017-07-21 RX ORDER — ONDANSETRON 2 MG/ML
8 INJECTION INTRAMUSCULAR; INTRAVENOUS
Status: CANCELLED
Start: 2017-07-21 | End: 2017-07-21

## 2017-07-21 RX ORDER — ONDANSETRON 2 MG/ML
8 INJECTION INTRAMUSCULAR; INTRAVENOUS
Status: COMPLETED | OUTPATIENT
Start: 2017-07-21 | End: 2017-07-21

## 2017-07-21 RX ORDER — SODIUM CHLORIDE 0.9 % (FLUSH) 0.9 %
10 SYRINGE (ML) INJECTION
Status: DISCONTINUED | OUTPATIENT
Start: 2017-07-21 | End: 2017-07-21 | Stop reason: HOSPADM

## 2017-07-21 RX ORDER — HEPARIN 100 UNIT/ML
500 SYRINGE INTRAVENOUS
Status: DISCONTINUED | OUTPATIENT
Start: 2017-07-21 | End: 2017-07-21 | Stop reason: HOSPADM

## 2017-07-21 RX ORDER — FAMOTIDINE 20 MG/50ML
20 INJECTION, SOLUTION INTRAVENOUS
Status: COMPLETED | OUTPATIENT
Start: 2017-07-21 | End: 2017-07-21

## 2017-07-21 RX ADMIN — SODIUM CHLORIDE, PRESERVATIVE FREE 10 ML: 5 INJECTION INTRAVENOUS at 12:07

## 2017-07-21 RX ADMIN — Medication 20 MG: at 12:07

## 2017-07-21 RX ADMIN — ONDANSETRON 8 MG: 2 INJECTION INTRAMUSCULAR; INTRAVENOUS at 12:07

## 2017-07-21 RX ADMIN — FAMOTIDINE 20 MG: 20 INJECTION, SOLUTION INTRAVENOUS at 12:07

## 2017-07-21 RX ADMIN — PACLITAXEL 138 MG: 6 INJECTION, SOLUTION, CONCENTRATE INTRAVENOUS at 01:07

## 2017-07-21 RX ADMIN — SODIUM CHLORIDE: 0.9 INJECTION, SOLUTION INTRAVENOUS at 12:07

## 2017-07-21 RX ADMIN — DIPHENHYDRAMINE HYDROCHLORIDE 50 MG: 50 INJECTION, SOLUTION INTRAMUSCULAR; INTRAVENOUS at 12:07

## 2017-07-21 NOTE — PROGRESS NOTES
A 62-year-old  woman coming in for followup.  She is due for cycle 4 of   Adriamycin and Cytoxan.  The patient is receiving neoadjuvant chemotherapy in   lieu of ER/[place] negative, HER-2 positive invasive breast carcinoma of the left   breast.  The patient has seen Dr. Alexander Arce.  She is here for the fourth   cycle of treatment.  She has tolerated this regimen really well and has no   issues whatsoever.  The patient had a palpable left axillary lymph node.  She is   a clinical stage IIB at least.  The patient had a PET scan done that was April 2017 that shows no evidence of metastatic lesions elsewhere, but showed the left   primary breast malignancy and ipsilateral axillary metastatic lymphadenopathy.    She had pulmonary nodules in the right lower side.  She had bilateral breast   MRIs ordered by Dr. Arce, which was a challenging study because the patient   had prior implants in both her breasts.  The lesion roughly measured about 2.7   cm.  The left axillary lymph node measured about 2.6 cm on the MRI.    REVIEW OF SYSTEMS:  Feels great.  No side effects whatsoever, has lost all her   hair and is wearing it proudly.  Denies nausea, vomiting, diarrhea, altered   bowel or bladder habits, cough, sputum production, fever, chills, rigors.    Appetite has been appropriately appropriate for her chemotherapy time and the   patient make a conscious effort to be healthy during her cycles of chemotherapy.    PHYSICAL EXAMINATION:  GENERAL:  Alopecic.  VITAL SIGNS:  Stable.  Ambulatory to clinic.  Weight is 141 pounds, 5 feet 8   inches.  BSA of 1.75.  HEENT:  Showed no congestion.  NECK:  Supple, without JVD.  Trachea is central.  LUNGS:  Clear to auscultation, right-sided MediPort.  ABDOMEN:  Soft, no rebound, guarding or rigidity.  Axilla on the left reveals   lymph node slightly smaller in size.  This is palpable still.  NEUROLOGIC:  The patient is appropriate, very pleasant.  EXTREMITIES:  Showing no  edema.  Lab Results   Component Value Date    WBC 3.29 (L) 07/20/2017    HGB 10.3 (L) 07/20/2017    HCT 30.4 (L) 07/20/2017     (H) 07/20/2017     07/20/2017     CMP  Sodium   Date Value Ref Range Status   07/20/2017 138 136 - 145 mmol/L Final     Potassium   Date Value Ref Range Status   07/20/2017 3.9 3.5 - 5.1 mmol/L Final     Chloride   Date Value Ref Range Status   07/20/2017 102 95 - 110 mmol/L Final     CO2   Date Value Ref Range Status   07/20/2017 30 22 - 31 mmol/L Final     Glucose   Date Value Ref Range Status   07/20/2017 85 70 - 110 mg/dL Final     Comment:     The ADA recommends the following guidelines for fasting glucose:  Normal:       less than 100 mg/dL  Prediabetes:  100 mg/dL to 125 mg/dL  Diabetes:     126 mg/dL or higher       BUN, Bld   Date Value Ref Range Status   07/20/2017 13 7 - 18 mg/dL Final     Creatinine   Date Value Ref Range Status   07/20/2017 0.63 0.50 - 1.40 mg/dL Final     Calcium   Date Value Ref Range Status   07/20/2017 9.8 8.4 - 10.2 mg/dL Final     Total Protein   Date Value Ref Range Status   07/20/2017 6.9 6.0 - 8.4 g/dL Final     Albumin   Date Value Ref Range Status   07/20/2017 4.3 3.5 - 5.2 g/dL Final     Total Bilirubin   Date Value Ref Range Status   07/20/2017 0.5 0.2 - 1.3 mg/dL Final     Comment:     For infants and newborns, interpretation of results should be based  on gestational age, weight and in agreement with clinical  observations.  Premature Infant recommended reference ranges:  Up to 24 hours.............<8.0 mg/dL  Up to 48 hours............<12.0 mg/dL  3-5 days..................<15.0 mg/dL  6-29 days.................<15.0 mg/dL       Alkaline Phosphatase   Date Value Ref Range Status   07/20/2017 92 38 - 145 U/L Final     AST   Date Value Ref Range Status   07/20/2017 53 (H) 14 - 36 U/L Final     ALT   Date Value Ref Range Status   07/20/2017 59 (H) 10 - 44 U/L Final     Anion Gap   Date Value Ref Range Status   07/20/2017 6 (L) 8 - 16  mmol/L Final     eGFR if    Date Value Ref Range Status   07/20/2017 >60 >60 mL/min/1.73 m^2 Final     eGFR if non    Date Value Ref Range Status   07/20/2017 >60 >60 mL/min/1.73 m^2 Final     Comment:     Calculation used to obtain the estimated glomerular filtration  rate (eGFR) is the CKD-EPI equation. Since race is unknown   in our information system, the eGFR values for   -American and Non--American patients are given   for each creatinine result.     IMPRESSION:  1.  Neoadjuvant chemotherapy for a clinical stage IIB breast cancer with large   palpable lymph nodes to the left axilla and an MRI measurement of breast tumor   at 2.7 cm.  The patient is here for cycle #4 of Adriamycin and Cytoxan okay to   proceed with taxol week #2, week 1 recd perjeta and herceptin    3.   Dr. Arce,seeing pt august 17th. Pt has multiple questions about sx  ..  Taxol weekly regimen, Herceptin 8 mg/k loading dose followed by 6 mg q. 3 weeks and Perjeta   as ordered.        CBC and CMP for the next week  The patient will be sent to radiation   evaluation after completing of surgery, which can be accomplished if Taxol is   completed in 12 weeks during infusion Perjeta and Herceptin.

## 2017-07-24 ENCOUNTER — INFUSION (OUTPATIENT)
Dept: INFUSION THERAPY | Facility: HOSPITAL | Age: 62
End: 2017-07-24
Attending: INTERNAL MEDICINE
Payer: COMMERCIAL

## 2017-07-24 VITALS
HEART RATE: 62 BPM | BODY MASS INDEX: 21.9 KG/M2 | HEIGHT: 68 IN | DIASTOLIC BLOOD PRESSURE: 70 MMHG | TEMPERATURE: 98 F | SYSTOLIC BLOOD PRESSURE: 143 MMHG | RESPIRATION RATE: 14 BRPM | WEIGHT: 144.5 LBS

## 2017-07-24 DIAGNOSIS — C50.012 MALIGNANT NEOPLASM OF NIPPLE OF LEFT BREAST IN FEMALE, UNSPECIFIED ESTROGEN RECEPTOR STATUS: Primary | ICD-10-CM

## 2017-07-24 PROCEDURE — 96372 THER/PROPH/DIAG INJ SC/IM: CPT | Mod: PN

## 2017-07-24 PROCEDURE — 63600175 PHARM REV CODE 636 W HCPCS: Mod: PN | Performed by: INTERNAL MEDICINE

## 2017-07-24 RX ADMIN — FILGRASTIM 300 MCG: 300 INJECTION, SOLUTION INTRAVENOUS; SUBCUTANEOUS at 10:07

## 2017-07-25 ENCOUNTER — INFUSION (OUTPATIENT)
Dept: INFUSION THERAPY | Facility: HOSPITAL | Age: 62
End: 2017-07-25
Attending: INTERNAL MEDICINE
Payer: COMMERCIAL

## 2017-07-25 ENCOUNTER — OFFICE VISIT (OUTPATIENT)
Dept: OPHTHALMOLOGY | Facility: CLINIC | Age: 62
End: 2017-07-25
Payer: COMMERCIAL

## 2017-07-25 VITALS
SYSTOLIC BLOOD PRESSURE: 135 MMHG | DIASTOLIC BLOOD PRESSURE: 85 MMHG | TEMPERATURE: 98 F | HEART RATE: 72 BPM | RESPIRATION RATE: 16 BRPM

## 2017-07-25 DIAGNOSIS — Z98.890 HISTORY OF VITRECTOMY: ICD-10-CM

## 2017-07-25 DIAGNOSIS — Z96.1 PSEUDOPHAKIA OF BOTH EYES: ICD-10-CM

## 2017-07-25 DIAGNOSIS — C50.012 MALIGNANT NEOPLASM OF NIPPLE OF LEFT BREAST IN FEMALE, UNSPECIFIED ESTROGEN RECEPTOR STATUS: Primary | ICD-10-CM

## 2017-07-25 DIAGNOSIS — H40.053 OHT (OCULAR HYPERTENSION), BILATERAL: Primary | ICD-10-CM

## 2017-07-25 DIAGNOSIS — H52.13 HIGH MYOPIA, BILATERAL: ICD-10-CM

## 2017-07-25 PROCEDURE — 92012 INTRM OPH EXAM EST PATIENT: CPT | Mod: S$GLB,,, | Performed by: OPHTHALMOLOGY

## 2017-07-25 PROCEDURE — 96372 THER/PROPH/DIAG INJ SC/IM: CPT | Mod: PN

## 2017-07-25 PROCEDURE — 63600175 PHARM REV CODE 636 W HCPCS: Mod: PN | Performed by: INTERNAL MEDICINE

## 2017-07-25 PROCEDURE — 99999 PR PBB SHADOW E&M-EST. PATIENT-LVL III: CPT | Mod: PBBFAC,,, | Performed by: OPHTHALMOLOGY

## 2017-07-25 RX ORDER — BRIMONIDINE TARTRATE 2 MG/ML
1 SOLUTION/ DROPS OPHTHALMIC 3 TIMES DAILY
Qty: 10 ML | Refills: 11 | Status: SHIPPED | OUTPATIENT
Start: 2017-07-25 | End: 2017-08-11 | Stop reason: ALTCHOICE

## 2017-07-25 RX ADMIN — FILGRASTIM 300 MCG: 300 INJECTION, SOLUTION INTRAVENOUS; SUBCUTANEOUS at 10:07

## 2017-07-25 NOTE — PROGRESS NOTES
Assessment /Plan     For exam results, see Encounter Report.    OHT (ocular hypertension), bilateral    Pseudophakia of both eyes    History of vitrectomy    High myopia, bilateral            OHT (ocular hypertension), bilateral - Switched Timolol to Dorzolamide 2/14/17, but IOP still upper 20's. Recently started chemo for newly diagnosed breast cancer.   IOP baseline mid 20's with occasional pain OS. IOP was significantly elevated off Latanoprost - Tmax 37/44! Maternal grandmother with glaucoma, no known first degree relatives. CCT thick. Baseline HVF/HRT WNL, small cups on clinical exam OU. Last HRT - possible increase in LCD OD, decrease OS, no RNFL thinning identified.   Last Gonio - open to CB OU, few tiny iris root vessels visible, no NV.   OCT nerve 5/16/17 - thin TS and TI OD, borderline TS OS.  Switched T .5 to Dorzolamide BID, but not as effective. Will try switching to Brimonidine TID, if still too high then can consider 0.25%, as seems to have had a dramatic response in IOP lowering with Timolol.   RTC 3 months for IOP check with HRT and DFE.    On Latanoprost since presentation (former pt of Dr. Ordaz).  Dorzolamide added 2/14-7/25/17 - no significant improvement in IOP, may have forgotten to take? Resumed 8/9/17 TID OU  Timolol 0.5% QAM (7/9/16-2/14/17) - worked great but symptomatic hypotension/bradycardia started in Jan '17  Brimonidine - 7/25-8/9 17 - d/c'd due to symptomatic hypotension.    Pseudophakia of both eyes - stable    History of vitrectomy - for floaters/AH      Addendum: symptomatic hypotension with Brimonidine - switched back to Dorzolamide on 8/9/17 - asked that she try to use it TID.

## 2017-07-26 ENCOUNTER — INFUSION (OUTPATIENT)
Dept: INFUSION THERAPY | Facility: HOSPITAL | Age: 62
End: 2017-07-26
Attending: INTERNAL MEDICINE
Payer: COMMERCIAL

## 2017-07-26 VITALS
RESPIRATION RATE: 16 BRPM | TEMPERATURE: 99 F | SYSTOLIC BLOOD PRESSURE: 106 MMHG | DIASTOLIC BLOOD PRESSURE: 62 MMHG | HEART RATE: 67 BPM

## 2017-07-26 DIAGNOSIS — C50.012 MALIGNANT NEOPLASM OF NIPPLE OF LEFT BREAST IN FEMALE, UNSPECIFIED ESTROGEN RECEPTOR STATUS: Primary | ICD-10-CM

## 2017-07-26 PROCEDURE — 63600175 PHARM REV CODE 636 W HCPCS: Mod: PN | Performed by: INTERNAL MEDICINE

## 2017-07-26 PROCEDURE — 96372 THER/PROPH/DIAG INJ SC/IM: CPT | Mod: PN

## 2017-07-26 RX ADMIN — FILGRASTIM 300 MCG: 300 INJECTION, SOLUTION INTRAVENOUS; SUBCUTANEOUS at 01:07

## 2017-07-26 NOTE — PLAN OF CARE
Problem: Patient Care Overview  Goal: Plan of Care Review  Outcome: Ongoing (interventions implemented as appropriate)  Pt has no new complaints on shift. Pt receiving neupogen day 3 pt refused AVS. Educated pt to call Dr with any issues. Pt verbalized understanding.

## 2017-07-28 ENCOUNTER — OFFICE VISIT (OUTPATIENT)
Dept: HEMATOLOGY/ONCOLOGY | Facility: CLINIC | Age: 62
End: 2017-07-28
Payer: COMMERCIAL

## 2017-07-28 ENCOUNTER — DOCUMENTATION ONLY (OUTPATIENT)
Dept: INFUSION THERAPY | Facility: HOSPITAL | Age: 62
End: 2017-07-28

## 2017-07-28 ENCOUNTER — INFUSION (OUTPATIENT)
Dept: INFUSION THERAPY | Facility: HOSPITAL | Age: 62
End: 2017-07-28
Attending: INTERNAL MEDICINE
Payer: COMMERCIAL

## 2017-07-28 VITALS
BODY MASS INDEX: 22.05 KG/M2 | RESPIRATION RATE: 18 BRPM | SYSTOLIC BLOOD PRESSURE: 108 MMHG | WEIGHT: 145.5 LBS | HEART RATE: 54 BPM | DIASTOLIC BLOOD PRESSURE: 59 MMHG | HEIGHT: 68 IN

## 2017-07-28 VITALS
BODY MASS INDEX: 22.05 KG/M2 | WEIGHT: 145.5 LBS | DIASTOLIC BLOOD PRESSURE: 59 MMHG | HEIGHT: 68 IN | TEMPERATURE: 98 F | RESPIRATION RATE: 18 BRPM | HEART RATE: 54 BPM | SYSTOLIC BLOOD PRESSURE: 108 MMHG

## 2017-07-28 DIAGNOSIS — C50.012 BILATERAL MALIGNANT NEOPLASM INVOLVING BOTH NIPPLE AND AREOLA IN FEMALE, UNSPECIFIED ESTROGEN RECEPTOR STATUS: Primary | ICD-10-CM

## 2017-07-28 DIAGNOSIS — C50.011 BILATERAL MALIGNANT NEOPLASM INVOLVING BOTH NIPPLE AND AREOLA IN FEMALE, UNSPECIFIED ESTROGEN RECEPTOR STATUS: Primary | ICD-10-CM

## 2017-07-28 DIAGNOSIS — C50.012 MALIGNANT NEOPLASM OF NIPPLE OF LEFT BREAST IN FEMALE, UNSPECIFIED ESTROGEN RECEPTOR STATUS: Primary | ICD-10-CM

## 2017-07-28 DIAGNOSIS — C50.012 MALIGNANT NEOPLASM OF NIPPLE OF LEFT BREAST IN FEMALE, UNSPECIFIED ESTROGEN RECEPTOR STATUS: ICD-10-CM

## 2017-07-28 PROCEDURE — A4216 STERILE WATER/SALINE, 10 ML: HCPCS | Mod: PN | Performed by: INTERNAL MEDICINE

## 2017-07-28 PROCEDURE — 99999 PR PBB SHADOW E&M-EST. PATIENT-LVL III: CPT | Mod: PBBFAC,,, | Performed by: INTERNAL MEDICINE

## 2017-07-28 PROCEDURE — 96375 TX/PRO/DX INJ NEW DRUG ADDON: CPT | Mod: PN

## 2017-07-28 PROCEDURE — 96413 CHEMO IV INFUSION 1 HR: CPT | Mod: PN

## 2017-07-28 PROCEDURE — 96417 CHEMO IV INFUS EACH ADDL SEQ: CPT | Mod: PN

## 2017-07-28 PROCEDURE — 96367 TX/PROPH/DG ADDL SEQ IV INF: CPT | Mod: PN

## 2017-07-28 PROCEDURE — 99215 OFFICE O/P EST HI 40 MIN: CPT | Mod: S$GLB,,, | Performed by: INTERNAL MEDICINE

## 2017-07-28 PROCEDURE — S0028 INJECTION, FAMOTIDINE, 20 MG: HCPCS | Mod: PN | Performed by: INTERNAL MEDICINE

## 2017-07-28 PROCEDURE — 63600175 PHARM REV CODE 636 W HCPCS: Mod: PN | Performed by: INTERNAL MEDICINE

## 2017-07-28 PROCEDURE — 25000003 PHARM REV CODE 250: Mod: PN | Performed by: INTERNAL MEDICINE

## 2017-07-28 RX ORDER — FAMOTIDINE 20 MG/50ML
20 INJECTION, SOLUTION INTRAVENOUS
Status: CANCELLED
Start: 2017-07-30 | End: 2017-07-30

## 2017-07-28 RX ORDER — ONDANSETRON 2 MG/ML
8 INJECTION INTRAMUSCULAR; INTRAVENOUS
Status: COMPLETED | OUTPATIENT
Start: 2017-07-28 | End: 2017-07-28

## 2017-07-28 RX ORDER — FAMOTIDINE 20 MG/50ML
20 INJECTION, SOLUTION INTRAVENOUS
Status: COMPLETED | OUTPATIENT
Start: 2017-07-28 | End: 2017-07-28

## 2017-07-28 RX ORDER — SODIUM CHLORIDE 0.9 % (FLUSH) 0.9 %
10 SYRINGE (ML) INJECTION
Status: DISCONTINUED | OUTPATIENT
Start: 2017-07-28 | End: 2017-07-28 | Stop reason: HOSPADM

## 2017-07-28 RX ORDER — EPINEPHRINE 0.3 MG/.3ML
0.3 INJECTION SUBCUTANEOUS ONCE AS NEEDED
Status: CANCELLED | OUTPATIENT
Start: 2017-07-30 | End: 2017-07-30

## 2017-07-28 RX ORDER — HEPARIN 100 UNIT/ML
500 SYRINGE INTRAVENOUS
Status: CANCELLED | OUTPATIENT
Start: 2017-07-30

## 2017-07-28 RX ORDER — ONDANSETRON 2 MG/ML
8 INJECTION INTRAMUSCULAR; INTRAVENOUS
Status: CANCELLED
Start: 2017-07-30 | End: 2017-07-30

## 2017-07-28 RX ORDER — SODIUM CHLORIDE 0.9 % (FLUSH) 0.9 %
10 SYRINGE (ML) INJECTION
Status: CANCELLED | OUTPATIENT
Start: 2017-07-30

## 2017-07-28 RX ORDER — DIPHENHYDRAMINE HYDROCHLORIDE 50 MG/ML
50 INJECTION INTRAMUSCULAR; INTRAVENOUS ONCE AS NEEDED
Status: CANCELLED | OUTPATIENT
Start: 2017-07-30 | End: 2017-07-30

## 2017-07-28 RX ADMIN — SODIUM CHLORIDE, PRESERVATIVE FREE 10 ML: 5 INJECTION INTRAVENOUS at 03:07

## 2017-07-28 RX ADMIN — DIPHENHYDRAMINE HYDROCHLORIDE 50 MG: 50 INJECTION, SOLUTION INTRAMUSCULAR; INTRAVENOUS at 12:07

## 2017-07-28 RX ADMIN — TRASTUZUMAB 396 MG: 150 INJECTION, POWDER, LYOPHILIZED, FOR SOLUTION INTRAVENOUS at 01:07

## 2017-07-28 RX ADMIN — PERTUZUMAB 420 MG: 30 INJECTION, SOLUTION, CONCENTRATE INTRAVENOUS at 12:07

## 2017-07-28 RX ADMIN — SODIUM CHLORIDE: 0.9 INJECTION, SOLUTION INTRAVENOUS at 11:07

## 2017-07-28 RX ADMIN — Medication 20 MG: at 11:07

## 2017-07-28 RX ADMIN — ONDANSETRON 8 MG: 2 INJECTION INTRAMUSCULAR; INTRAVENOUS at 11:07

## 2017-07-28 RX ADMIN — PACLITAXEL 144 MG: 6 INJECTION, SOLUTION, CONCENTRATE INTRAVENOUS at 02:07

## 2017-07-28 RX ADMIN — FAMOTIDINE 20 MG: 20 INJECTION, SOLUTION INTRAVENOUS at 12:07

## 2017-07-28 NOTE — PROGRESS NOTES
Nutrition: Late entry. Weight check; 145 lbs , 5 lbs wt gain in greater than 1 month. Will f/u and assist as needed. Tamia Kohli RD ,LDN.

## 2017-07-28 NOTE — PROGRESS NOTES
A 62-year-old  woman coming in for followup.  She is due for cycle 4 of   Adriamycin and Cytoxan.  The patient is receiving neoadjuvant chemotherapy in   lieu of ER/OR negative, HER-2 positive invasive breast carcinoma of the left   breast.  The patient has seen Dr. Alexander Arce.  She is here for the fourth   cycle of treatment.  She has tolerated this regimen really well and has no   issues whatsoever.  The patient had a palpable left axillary lymph node.  She is   a clinical stage IIB at least.  The patient had a PET scan done that was April 2017 that shows no evidence of metastatic lesions elsewhere, but showed the left   primary breast malignancy and ipsilateral axillary metastatic lymphadenopathy.    She had pulmonary nodules in the right lower side.  She had bilateral breast   MRIs ordered by Dr. Arce, which was a challenging study because the patient   had prior implants in both her breasts.  The lesion roughly measured about 2.7   cm.  The left axillary lymph node measured about 2.6 cm on the MRI.    REVIEW OF SYSTEMS:  Feels great.  No side effects whatsoever, has lost all her   hair and is wearing it proudly.  Denies nausea, vomiting, diarrhea, altered   bowel or bladder habits, cough, sputum production, fever, chills, rigors.    Appetite has been appropriately appropriate for her chemotherapy time and the   patient make a conscious effort to be healthy during her cycles of chemotherapy.    PHYSICAL EXAMINATION:  GENERAL:  Alopecic.  VITAL SIGNS:  Stable.  Ambulatory to clinic.  Weight is 141 pounds, 5 feet 8   inches.  BSA of 1.75.  HEENT:  Showed no congestion.  NECK:  Supple, without JVD.  Trachea is central.  LUNGS:  Clear to auscultation, right-sided MediPort.  ABDOMEN:  Soft, no rebound, guarding or rigidity.  Axilla on the left reveals   lymph node slightly smaller in size.  This is palpable still.  NEUROLOGIC:  The patient is appropriate, very pleasant.  EXTREMITIES:  Showing no  edema.  Lab Results   Component Value Date    WBC 24.47 (H) 07/28/2017    HGB 10.3 (L) 07/28/2017    HCT 30.4 (L) 07/28/2017     (H) 07/28/2017     07/28/2017     CMP  Sodium   Date Value Ref Range Status   07/28/2017 138 136 - 145 mmol/L Final     Potassium   Date Value Ref Range Status   07/28/2017 4.1 3.5 - 5.1 mmol/L Final     Chloride   Date Value Ref Range Status   07/28/2017 103 95 - 110 mmol/L Final     CO2   Date Value Ref Range Status   07/28/2017 30 22 - 31 mmol/L Final     Glucose   Date Value Ref Range Status   07/28/2017 92 70 - 110 mg/dL Final     Comment:     The ADA recommends the following guidelines for fasting glucose:  Normal:       less than 100 mg/dL  Prediabetes:  100 mg/dL to 125 mg/dL  Diabetes:     126 mg/dL or higher       BUN, Bld   Date Value Ref Range Status   07/28/2017 11 7 - 18 mg/dL Final     Creatinine   Date Value Ref Range Status   07/28/2017 0.75 0.50 - 1.40 mg/dL Final     Calcium   Date Value Ref Range Status   07/28/2017 9.6 8.4 - 10.2 mg/dL Final     Total Protein   Date Value Ref Range Status   07/28/2017 6.4 6.0 - 8.4 g/dL Final     Albumin   Date Value Ref Range Status   07/28/2017 3.9 3.5 - 5.2 g/dL Final     Total Bilirubin   Date Value Ref Range Status   07/28/2017 0.3 0.2 - 1.3 mg/dL Final     Comment:     For infants and newborns, interpretation of results should be based  on gestational age, weight and in agreement with clinical  observations.  Premature Infant recommended reference ranges:  Up to 24 hours.............<8.0 mg/dL  Up to 48 hours............<12.0 mg/dL  3-5 days..................<15.0 mg/dL  6-29 days.................<15.0 mg/dL       Alkaline Phosphatase   Date Value Ref Range Status   07/28/2017 112 38 - 145 U/L Final     AST   Date Value Ref Range Status   07/28/2017 43 (H) 14 - 36 U/L Final     ALT   Date Value Ref Range Status   07/28/2017 56 (H) 10 - 44 U/L Final     Anion Gap   Date Value Ref Range Status   07/28/2017 5 (L) 8 -  16 mmol/L Final     eGFR if    Date Value Ref Range Status   07/28/2017 >60 >60 mL/min/1.73 m^2 Final     eGFR if non    Date Value Ref Range Status   07/28/2017 >60 >60 mL/min/1.73 m^2 Final     Comment:     Calculation used to obtain the estimated glomerular filtration  rate (eGFR) is the CKD-EPI equation. Since race is unknown   in our information system, the eGFR values for   -American and Non--American patients are given   for each creatinine result.     IMPRESSION:  1.  Neoadjuvant chemotherapy for a clinical stage IIB breast cancer with large   palpable lymph nodes to the left axilla and an MRI measurement of breast tumor   at 2.7 cm.  The patient is here for cycle #4 of Adriamycin and Cytoxan okay to   proceed with taxol week #3  week and cont this time with  perjeta and herceptin   recd neupogen last week and counts picked up well for rx this week no neupogen this time    3.   Dr. Arce,seeing pt august 17th. Pt has multiple questions about sx  ..  Taxol weekly regimen, Herceptin 8 mg/k loading dose followed by 6 mg q. 3 weeks and Perjeta   as ordered.        CBC and CMP for the next week  The patient will be sent to radiation   evaluation after completing of surgery, which can be accomplished if Taxol is   completed in 12 weeks during infusion Perjeta and Herceptin.

## 2017-08-04 ENCOUNTER — INFUSION (OUTPATIENT)
Dept: INFUSION THERAPY | Facility: HOSPITAL | Age: 62
End: 2017-08-04
Attending: INTERNAL MEDICINE
Payer: COMMERCIAL

## 2017-08-04 ENCOUNTER — OFFICE VISIT (OUTPATIENT)
Dept: HEMATOLOGY/ONCOLOGY | Facility: CLINIC | Age: 62
End: 2017-08-04
Payer: COMMERCIAL

## 2017-08-04 VITALS
SYSTOLIC BLOOD PRESSURE: 112 MMHG | HEIGHT: 68 IN | WEIGHT: 146.63 LBS | DIASTOLIC BLOOD PRESSURE: 66 MMHG | HEART RATE: 56 BPM | TEMPERATURE: 98 F | RESPIRATION RATE: 16 BRPM | BODY MASS INDEX: 22.22 KG/M2

## 2017-08-04 VITALS
BODY MASS INDEX: 22.22 KG/M2 | HEIGHT: 68 IN | TEMPERATURE: 98 F | SYSTOLIC BLOOD PRESSURE: 135 MMHG | WEIGHT: 146.63 LBS | HEART RATE: 65 BPM | DIASTOLIC BLOOD PRESSURE: 70 MMHG | RESPIRATION RATE: 16 BRPM

## 2017-08-04 DIAGNOSIS — Z17.1 MALIGNANT NEOPLASM OF LEFT BREAST IN FEMALE, ESTROGEN RECEPTOR NEGATIVE, UNSPECIFIED SITE OF BREAST: Primary | ICD-10-CM

## 2017-08-04 DIAGNOSIS — C50.012 MALIGNANT NEOPLASM OF NIPPLE OF LEFT BREAST IN FEMALE, UNSPECIFIED ESTROGEN RECEPTOR STATUS: Primary | ICD-10-CM

## 2017-08-04 DIAGNOSIS — C50.912 MALIGNANT NEOPLASM OF LEFT BREAST IN FEMALE, ESTROGEN RECEPTOR NEGATIVE, UNSPECIFIED SITE OF BREAST: Primary | ICD-10-CM

## 2017-08-04 DIAGNOSIS — C50.912 HER2-POSITIVE CARCINOMA OF LEFT BREAST: ICD-10-CM

## 2017-08-04 PROBLEM — Z17.31 HER2-POSITIVE CARCINOMA OF LEFT BREAST: Status: ACTIVE | Noted: 2017-08-04

## 2017-08-04 PROCEDURE — S0028 INJECTION, FAMOTIDINE, 20 MG: HCPCS | Mod: PN | Performed by: NURSE PRACTITIONER

## 2017-08-04 PROCEDURE — 99999 PR PBB SHADOW E&M-EST. PATIENT-LVL III: CPT | Mod: PBBFAC,,, | Performed by: NURSE PRACTITIONER

## 2017-08-04 PROCEDURE — 3008F BODY MASS INDEX DOCD: CPT | Mod: S$GLB,,, | Performed by: NURSE PRACTITIONER

## 2017-08-04 PROCEDURE — 96375 TX/PRO/DX INJ NEW DRUG ADDON: CPT | Mod: PN

## 2017-08-04 PROCEDURE — 63600175 PHARM REV CODE 636 W HCPCS: Mod: PN | Performed by: NURSE PRACTITIONER

## 2017-08-04 PROCEDURE — 99214 OFFICE O/P EST MOD 30 MIN: CPT | Mod: S$GLB,,, | Performed by: NURSE PRACTITIONER

## 2017-08-04 PROCEDURE — 96413 CHEMO IV INFUSION 1 HR: CPT | Mod: PN

## 2017-08-04 PROCEDURE — 96367 TX/PROPH/DG ADDL SEQ IV INF: CPT | Mod: PN

## 2017-08-04 PROCEDURE — 25000003 PHARM REV CODE 250: Mod: PN | Performed by: NURSE PRACTITIONER

## 2017-08-04 PROCEDURE — A4216 STERILE WATER/SALINE, 10 ML: HCPCS | Mod: PN | Performed by: NURSE PRACTITIONER

## 2017-08-04 RX ORDER — DIPHENHYDRAMINE HYDROCHLORIDE 50 MG/ML
50 INJECTION INTRAMUSCULAR; INTRAVENOUS ONCE AS NEEDED
Status: DISCONTINUED | OUTPATIENT
Start: 2017-08-04 | End: 2017-08-04 | Stop reason: HOSPADM

## 2017-08-04 RX ORDER — DIPHENHYDRAMINE HYDROCHLORIDE 50 MG/ML
50 INJECTION INTRAMUSCULAR; INTRAVENOUS ONCE AS NEEDED
Status: CANCELLED | OUTPATIENT
Start: 2017-08-04 | End: 2017-08-04

## 2017-08-04 RX ORDER — HEPARIN 100 UNIT/ML
500 SYRINGE INTRAVENOUS
Status: CANCELLED | OUTPATIENT
Start: 2017-08-04

## 2017-08-04 RX ORDER — FAMOTIDINE 20 MG/50ML
20 INJECTION, SOLUTION INTRAVENOUS
Status: COMPLETED | OUTPATIENT
Start: 2017-08-04 | End: 2017-08-04

## 2017-08-04 RX ORDER — ONDANSETRON 2 MG/ML
8 INJECTION INTRAMUSCULAR; INTRAVENOUS
Status: COMPLETED | OUTPATIENT
Start: 2017-08-04 | End: 2017-08-04

## 2017-08-04 RX ORDER — EPINEPHRINE 0.3 MG/.3ML
0.3 INJECTION SUBCUTANEOUS ONCE AS NEEDED
Status: DISCONTINUED | OUTPATIENT
Start: 2017-08-04 | End: 2017-08-04 | Stop reason: HOSPADM

## 2017-08-04 RX ORDER — SODIUM CHLORIDE 0.9 % (FLUSH) 0.9 %
10 SYRINGE (ML) INJECTION
Status: DISCONTINUED | OUTPATIENT
Start: 2017-08-04 | End: 2017-08-04 | Stop reason: HOSPADM

## 2017-08-04 RX ORDER — FAMOTIDINE 20 MG/50ML
20 INJECTION, SOLUTION INTRAVENOUS
Status: CANCELLED
Start: 2017-08-04 | End: 2017-08-04

## 2017-08-04 RX ORDER — ONDANSETRON 2 MG/ML
8 INJECTION INTRAMUSCULAR; INTRAVENOUS
Status: CANCELLED
Start: 2017-08-04 | End: 2017-08-04

## 2017-08-04 RX ORDER — EPINEPHRINE 0.3 MG/.3ML
0.3 INJECTION SUBCUTANEOUS ONCE AS NEEDED
Status: CANCELLED | OUTPATIENT
Start: 2017-08-04 | End: 2017-08-04

## 2017-08-04 RX ORDER — SODIUM CHLORIDE 0.9 % (FLUSH) 0.9 %
10 SYRINGE (ML) INJECTION
Status: CANCELLED | OUTPATIENT
Start: 2017-08-04

## 2017-08-04 RX ADMIN — FAMOTIDINE 20 MG: 20 INJECTION, SOLUTION INTRAVENOUS at 12:08

## 2017-08-04 RX ADMIN — PACLITAXEL 144 MG: 6 INJECTION, SOLUTION, CONCENTRATE INTRAVENOUS at 01:08

## 2017-08-04 RX ADMIN — SODIUM CHLORIDE, PRESERVATIVE FREE 10 ML: 5 INJECTION INTRAVENOUS at 03:08

## 2017-08-04 RX ADMIN — SODIUM CHLORIDE: 0.9 INJECTION, SOLUTION INTRAVENOUS at 12:08

## 2017-08-04 RX ADMIN — ONDANSETRON 8 MG: 2 INJECTION INTRAMUSCULAR; INTRAVENOUS at 12:08

## 2017-08-04 RX ADMIN — Medication 20 MG: at 01:08

## 2017-08-04 RX ADMIN — DIPHENHYDRAMINE HYDROCHLORIDE 50 MG: 50 INJECTION, SOLUTION INTRAMUSCULAR; INTRAVENOUS at 01:08

## 2017-08-04 NOTE — PLAN OF CARE
Problem: Chemotherapy Effects (Adult)  Goal: Signs and Symptoms of Listed Potential Problems Will be Absent, Minimized or Managed (Chemotherapy Effects)  Signs and symptoms of listed potential problems will be absent, minimized or managed by discharge/transition of care (reference Chemotherapy Effects (Adult) CPG).   Outcome: Ongoing (interventions implemented as appropriate)  Pt receiving taxol on shift. No new complaints. Pt reports occasional nose bleeds Dr aware. And generalized fatigue. Will continue to monitor.

## 2017-08-04 NOTE — PROGRESS NOTES
HISTORY OF PRESENT ILLNESS:  This is a 62-year-old white female known to Dr. Ragsdale for a recent diagnosis of Stage IIB left breast carcinoma.  The patient discovered   a mass in her left breast and was seen by Dr. Arce.  She has received 4 cycles of   A/C and presents to the clinic today for evaluation prior to Cycle 2, Day 8 of Taxol.    She remains slightly fatigued, but continues to work out with weights.  She denies any  fevers, chills, unexplained weight loss, new breast complaints, vaginal bleeding, abdominal   discomfort, nausea, vomiting, diarrhea, etc.  No new complaints or pertinent findings on a   14 point review of systems.    PHYSICAL EXAMINATION:  GENERAL:  Well-developed, well-nourished white female in no acute distress.    Alert and oriented x4.  VITAL SIGNS:  Weight 146.6 pounds (gain 1/2 pound/1 week), /66, pulse 56,   respirations 16, temperature 98.0.  HEENT:  Normocephalic, atraumatic.  Oral mucosa pink and moist.  Lips without   lesions.  Tongue midline.  Oropharynx clear.  Nonicteric sclerae.  NECK:  Supple, no adenopathy.  No carotid bruits, thyromegaly or thyroid nodule.  HEART:  Regular rate and rhythm without murmur, gallop.  LUNGS:  Clear to auscultation bilaterally.  Normal respiratory effort.  ABDOMEN:  Soft, nontender, nondistended with positive normoactive bowel sounds,   no hepatosplenomegaly.  EXTREMITIES:  No cyanosis, clubbing or edema.  Distal pulses are intact.  AXILLAE AND GROIN:  No palpable pathologic lymphadenopathy is appreciated.  SKIN:  Intact/turgor normal.  NEUROLOGIC:  Cranial nerves II-XII grossly intact.  Motor:  Good muscle bulk and   tone.  Strength/sensory 5/5 throughout.  Gait stable.    LABORATORY:    Lab Results   Component Value Date    WBC 5.13 08/04/2017    HGB 10.1 (L) 08/04/2017    HCT 29.7 (L) 08/04/2017     (H) 08/04/2017     08/04/2017     Differential remarkable for 71.3% grans, 5% lymph, 17% monos    CMP  Sodium   Date Value  Ref Range Status   08/04/2017 137 136 - 145 mmol/L Final     Potassium   Date Value Ref Range Status   08/04/2017 4.4 3.5 - 5.1 mmol/L Final     Chloride   Date Value Ref Range Status   08/04/2017 102 95 - 110 mmol/L Final     CO2   Date Value Ref Range Status   08/04/2017 28 22 - 31 mmol/L Final     Glucose   Date Value Ref Range Status   08/04/2017 90 70 - 110 mg/dL Final     Comment:     The ADA recommends the following guidelines for fasting glucose:  Normal:       less than 100 mg/dL  Prediabetes:  100 mg/dL to 125 mg/dL  Diabetes:     126 mg/dL or higher       BUN, Bld   Date Value Ref Range Status   08/04/2017 13 7 - 18 mg/dL Final     Creatinine   Date Value Ref Range Status   08/04/2017 0.74 0.50 - 1.40 mg/dL Final     Calcium   Date Value Ref Range Status   08/04/2017 9.8 8.4 - 10.2 mg/dL Final     Total Protein   Date Value Ref Range Status   08/04/2017 6.8 6.0 - 8.4 g/dL Final     Albumin   Date Value Ref Range Status   08/04/2017 4.2 3.5 - 5.2 g/dL Final     Total Bilirubin   Date Value Ref Range Status   08/04/2017 0.6 0.2 - 1.3 mg/dL Final     Alkaline Phosphatase   Date Value Ref Range Status   08/04/2017 80 38 - 145 U/L Final     AST   Date Value Ref Range Status   08/04/2017 42 (H) 14 - 36 U/L Final     ALT   Date Value Ref Range Status   08/04/2017 48 (H) 10 - 44 U/L Final     Anion Gap   Date Value Ref Range Status   08/04/2017 7 (L) 8 - 16 mmol/L Final     eGFR if    Date Value Ref Range Status   08/04/2017 >60 >60 mL/min/1.73 m^2 Final     eGFR if non    Date Value Ref Range Status   08/04/2017 >60 >60 mL/min/1.73 m^2 Final     Comment:     Calculation used to obtain the estimated glomerular filtration  rate (eGFR) is the CKD-EPI equation. Since race is unknown   in our information system, the eGFR values for   -American and Non--American patients are given   for each creatinine result.         IMPRESSION:  Stage IIB left breast carcinoma (ER/IN  negative, Her-2/clay positive)    PLAN:  1.  Proceed with Cycle 2, day 8 Taxol with premedications.  2.  Follow up in clinic with interval CBC, CMP for evaluation prior to C2D15 Taxol.    Assessment/plan reviewed and approved by Dr. Lau.

## 2017-08-04 NOTE — PLAN OF CARE
Problem: Patient Care Overview  Goal: Plan of Care Review  Outcome: Ongoing (interventions implemented as appropriate)  Pt tolerated well, AVS given to pt. Pt educated to call Dr with any issues. Pt verbalized understanding.

## 2017-08-07 DIAGNOSIS — F32.89 OTHER DEPRESSION: ICD-10-CM

## 2017-08-08 ENCOUNTER — PATIENT MESSAGE (OUTPATIENT)
Dept: HEMATOLOGY/ONCOLOGY | Facility: CLINIC | Age: 62
End: 2017-08-08

## 2017-08-09 ENCOUNTER — PATIENT MESSAGE (OUTPATIENT)
Dept: OPHTHALMOLOGY | Facility: CLINIC | Age: 62
End: 2017-08-09

## 2017-08-09 RX ORDER — VENLAFAXINE HYDROCHLORIDE 75 MG/1
75 CAPSULE, EXTENDED RELEASE ORAL DAILY
Qty: 30 CAPSULE | Refills: 2 | Status: SHIPPED | OUTPATIENT
Start: 2017-08-09 | End: 2017-11-08 | Stop reason: SDUPTHER

## 2017-08-11 ENCOUNTER — LAB VISIT (OUTPATIENT)
Dept: LAB | Facility: HOSPITAL | Age: 62
End: 2017-08-11
Attending: INTERNAL MEDICINE
Payer: COMMERCIAL

## 2017-08-11 ENCOUNTER — OFFICE VISIT (OUTPATIENT)
Dept: HEMATOLOGY/ONCOLOGY | Facility: CLINIC | Age: 62
End: 2017-08-11
Payer: COMMERCIAL

## 2017-08-11 ENCOUNTER — INFUSION (OUTPATIENT)
Dept: INFUSION THERAPY | Facility: HOSPITAL | Age: 62
End: 2017-08-11
Attending: INTERNAL MEDICINE
Payer: COMMERCIAL

## 2017-08-11 VITALS
HEART RATE: 68 BPM | SYSTOLIC BLOOD PRESSURE: 138 MMHG | BODY MASS INDEX: 22.09 KG/M2 | TEMPERATURE: 98 F | RESPIRATION RATE: 18 BRPM | DIASTOLIC BLOOD PRESSURE: 72 MMHG | WEIGHT: 145.75 LBS | HEIGHT: 68 IN

## 2017-08-11 VITALS
WEIGHT: 145.75 LBS | RESPIRATION RATE: 18 BRPM | HEART RATE: 67 BPM | BODY MASS INDEX: 22.09 KG/M2 | TEMPERATURE: 98 F | HEIGHT: 68 IN | SYSTOLIC BLOOD PRESSURE: 142 MMHG | DIASTOLIC BLOOD PRESSURE: 74 MMHG

## 2017-08-11 DIAGNOSIS — Z17.1 MALIGNANT NEOPLASM OF LEFT BREAST IN FEMALE, ESTROGEN RECEPTOR NEGATIVE, UNSPECIFIED SITE OF BREAST: ICD-10-CM

## 2017-08-11 DIAGNOSIS — C50.912 MALIGNANT NEOPLASM OF LEFT BREAST IN FEMALE, ESTROGEN RECEPTOR NEGATIVE, UNSPECIFIED SITE OF BREAST: ICD-10-CM

## 2017-08-11 DIAGNOSIS — C50.012 MALIGNANT NEOPLASM OF NIPPLE OF LEFT BREAST IN FEMALE, UNSPECIFIED ESTROGEN RECEPTOR STATUS: Primary | ICD-10-CM

## 2017-08-11 LAB
ALBUMIN SERPL BCP-MCNC: 4.1 G/DL
ALP SERPL-CCNC: 79 U/L
ALT SERPL W/O P-5'-P-CCNC: 49 U/L
ANION GAP SERPL CALC-SCNC: 7 MMOL/L
AST SERPL-CCNC: 41 U/L
BASOPHILS # BLD AUTO: 0.03 K/UL
BASOPHILS NFR BLD: 1.2 %
BILIRUB SERPL-MCNC: 0.6 MG/DL
BUN SERPL-MCNC: 9 MG/DL
CALCIUM SERPL-MCNC: 9.9 MG/DL
CHLORIDE SERPL-SCNC: 104 MMOL/L
CO2 SERPL-SCNC: 28 MMOL/L
CREAT SERPL-MCNC: 0.7 MG/DL
DIFFERENTIAL METHOD: ABNORMAL
EOSINOPHIL # BLD AUTO: 0.1 K/UL
EOSINOPHIL NFR BLD: 3.8 %
ERYTHROCYTE [DISTWIDTH] IN BLOOD BY AUTOMATED COUNT: 14.4 %
EST. GFR  (AFRICAN AMERICAN): >60 ML/MIN/1.73 M^2
EST. GFR  (NON AFRICAN AMERICAN): >60 ML/MIN/1.73 M^2
GLUCOSE SERPL-MCNC: 79 MG/DL
HCT VFR BLD AUTO: 30 %
HGB BLD-MCNC: 10.1 G/DL
LYMPHOCYTES # BLD AUTO: 0.8 K/UL
LYMPHOCYTES NFR BLD: 29.2 %
MCH RBC QN AUTO: 36.5 PG
MCHC RBC AUTO-ENTMCNC: 33.7 G/DL
MCV RBC AUTO: 108 FL
MONOCYTES # BLD AUTO: 0.4 K/UL
MONOCYTES NFR BLD: 16.9 %
NEUTROPHILS # BLD AUTO: 1.3 K/UL
NEUTROPHILS NFR BLD: 48.9 %
NRBC BLD-RTO: 0 /100 WBC
PLATELET # BLD AUTO: 231 K/UL
PMV BLD AUTO: 10.1 FL
POTASSIUM SERPL-SCNC: 4.1 MMOL/L
PROT SERPL-MCNC: 6.7 G/DL
RBC # BLD AUTO: 2.77 M/UL
SODIUM SERPL-SCNC: 139 MMOL/L
WBC # BLD AUTO: 2.6 K/UL

## 2017-08-11 PROCEDURE — 85025 COMPLETE CBC W/AUTO DIFF WBC: CPT

## 2017-08-11 PROCEDURE — 96413 CHEMO IV INFUSION 1 HR: CPT | Mod: PN

## 2017-08-11 PROCEDURE — 85025 COMPLETE CBC W/AUTO DIFF WBC: CPT | Mod: PN

## 2017-08-11 PROCEDURE — 36415 COLL VENOUS BLD VENIPUNCTURE: CPT | Mod: PN

## 2017-08-11 PROCEDURE — A4216 STERILE WATER/SALINE, 10 ML: HCPCS | Mod: PN | Performed by: INTERNAL MEDICINE

## 2017-08-11 PROCEDURE — 3008F BODY MASS INDEX DOCD: CPT | Mod: S$GLB,,, | Performed by: INTERNAL MEDICINE

## 2017-08-11 PROCEDURE — 99215 OFFICE O/P EST HI 40 MIN: CPT | Mod: S$GLB,,, | Performed by: INTERNAL MEDICINE

## 2017-08-11 PROCEDURE — 25000003 PHARM REV CODE 250: Mod: PN | Performed by: INTERNAL MEDICINE

## 2017-08-11 PROCEDURE — 96375 TX/PRO/DX INJ NEW DRUG ADDON: CPT | Mod: PN

## 2017-08-11 PROCEDURE — S0028 INJECTION, FAMOTIDINE, 20 MG: HCPCS | Mod: PN | Performed by: INTERNAL MEDICINE

## 2017-08-11 PROCEDURE — 99999 PR PBB SHADOW E&M-EST. PATIENT-LVL III: CPT | Mod: PBBFAC,,, | Performed by: INTERNAL MEDICINE

## 2017-08-11 PROCEDURE — 63600175 PHARM REV CODE 636 W HCPCS: Mod: PN | Performed by: INTERNAL MEDICINE

## 2017-08-11 PROCEDURE — 96367 TX/PROPH/DG ADDL SEQ IV INF: CPT | Mod: PN

## 2017-08-11 PROCEDURE — 80053 COMPREHEN METABOLIC PANEL: CPT

## 2017-08-11 PROCEDURE — 80053 COMPREHEN METABOLIC PANEL: CPT | Mod: PN

## 2017-08-11 RX ORDER — HEPARIN 100 UNIT/ML
500 SYRINGE INTRAVENOUS
Status: CANCELLED | OUTPATIENT
Start: 2017-08-11

## 2017-08-11 RX ORDER — HEPARIN 100 UNIT/ML
500 SYRINGE INTRAVENOUS
Status: DISCONTINUED | OUTPATIENT
Start: 2017-08-11 | End: 2017-08-11 | Stop reason: HOSPADM

## 2017-08-11 RX ORDER — ONDANSETRON 2 MG/ML
8 INJECTION INTRAMUSCULAR; INTRAVENOUS
Status: COMPLETED | OUTPATIENT
Start: 2017-08-11 | End: 2017-08-11

## 2017-08-11 RX ORDER — FAMOTIDINE 20 MG/50ML
20 INJECTION, SOLUTION INTRAVENOUS
Status: COMPLETED | OUTPATIENT
Start: 2017-08-11 | End: 2017-08-11

## 2017-08-11 RX ORDER — SODIUM CHLORIDE 0.9 % (FLUSH) 0.9 %
10 SYRINGE (ML) INJECTION
Status: CANCELLED | OUTPATIENT
Start: 2017-08-11

## 2017-08-11 RX ORDER — SODIUM CHLORIDE 0.9 % (FLUSH) 0.9 %
10 SYRINGE (ML) INJECTION
Status: DISCONTINUED | OUTPATIENT
Start: 2017-08-11 | End: 2017-08-11 | Stop reason: HOSPADM

## 2017-08-11 RX ORDER — EPINEPHRINE 0.3 MG/.3ML
0.3 INJECTION SUBCUTANEOUS ONCE AS NEEDED
Status: CANCELLED | OUTPATIENT
Start: 2017-08-11 | End: 2017-08-11

## 2017-08-11 RX ORDER — DORZOLAMIDE HCL 20 MG/ML
1 SOLUTION/ DROPS OPHTHALMIC 3 TIMES DAILY
COMMUNITY
End: 2017-10-24 | Stop reason: SDUPTHER

## 2017-08-11 RX ORDER — FAMOTIDINE 20 MG/50ML
20 INJECTION, SOLUTION INTRAVENOUS
Status: CANCELLED
Start: 2017-08-11 | End: 2017-08-11

## 2017-08-11 RX ORDER — ONDANSETRON 2 MG/ML
8 INJECTION INTRAMUSCULAR; INTRAVENOUS
Status: CANCELLED
Start: 2017-08-11 | End: 2017-08-11

## 2017-08-11 RX ORDER — DIPHENHYDRAMINE HYDROCHLORIDE 50 MG/ML
50 INJECTION INTRAMUSCULAR; INTRAVENOUS ONCE AS NEEDED
Status: CANCELLED | OUTPATIENT
Start: 2017-08-11 | End: 2017-08-11

## 2017-08-11 RX ADMIN — SODIUM CHLORIDE 20 MG: 9 INJECTION, SOLUTION INTRAVENOUS at 11:08

## 2017-08-11 RX ADMIN — DIPHENHYDRAMINE HYDROCHLORIDE 50 MG: 50 INJECTION, SOLUTION INTRAMUSCULAR; INTRAVENOUS at 11:08

## 2017-08-11 RX ADMIN — SODIUM CHLORIDE: 0.9 INJECTION, SOLUTION INTRAVENOUS at 11:08

## 2017-08-11 RX ADMIN — SODIUM CHLORIDE, PRESERVATIVE FREE 10 ML: 5 INJECTION INTRAVENOUS at 01:08

## 2017-08-11 RX ADMIN — ONDANSETRON 8 MG: 2 INJECTION INTRAMUSCULAR; INTRAVENOUS at 12:08

## 2017-08-11 RX ADMIN — PACLITAXEL 144 MG: 6 INJECTION, SOLUTION, CONCENTRATE INTRAVENOUS at 12:08

## 2017-08-11 RX ADMIN — FAMOTIDINE 20 MG: 20 INJECTION, SOLUTION INTRAVENOUS at 11:08

## 2017-08-11 NOTE — PLAN OF CARE
Problem: Patient Care Overview  Goal: Plan of Care Review  Outcome: Ongoing (interventions implemented as appropriate)  Pt tolerated well, no signs of infusion reaction AVS given to pt. Pt educated to call Dr with any issues. Pt verbalized understanding.

## 2017-08-11 NOTE — PLAN OF CARE
Problem: Chemotherapy Effects (Adult)  Goal: Signs and Symptoms of Listed Potential Problems Will be Absent, Minimized or Managed (Chemotherapy Effects)  Signs and symptoms of listed potential problems will be absent, minimized or managed by discharge/transition of care (reference Chemotherapy Effects (Adult) CPG).   Outcome: Ongoing (interventions implemented as appropriate)  Pt receiving taxol on shift. Pt presents with new mouth sore educated t use magic mouthwash. Pt verbalized understanding. . Pt reports occasional nose bleeds Dr aware. Pt alternates between constipation and diarrhea medication used as necessary. Pt also c/o  generalized fatigue. Will continue to monitor.

## 2017-08-11 NOTE — PROGRESS NOTES
A 62-year-old  woman coming in for followup.  She is due for cycle 4 of   Adriamycin and Cytoxan.  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  The patient has seen Dr. Alexander Arce.  She is here for the fourth   cycle of treatment.  She has tolerated this regimen really well and has no   issues whatsoever.  The patient had a palpable left axillary lymph node.  She is   a clinical stage IIB at least.  The patient had a PET scan done that was April 2017 that shows no evidence of metastatic lesions elsewhere, but showed the left   primary breast malignancy and ipsilateral axillary metastatic lymphadenopathy.    She had pulmonary nodules in the right lower side.  She had bilateral breast   MRIs ordered by Dr. Arce, which was a challenging study because the patient   had prior implants in both her breasts.  The lesion roughly measured about 2.7   cm.  The left axillary lymph node measured about 2.6 cm on the MRI.    REVIEW OF SYSTEMS:  Alt diarrhea and constipation, passed out from side effects of glaucoma meds, bp better now  that she has been taken off  bowel or bladder habits, cough, sputum production, fever, chills, rigors.    Appetite has been appropriately appropriate for her chemotherapy time and the   patient make a conscious effort to be healthy during her cycles of chemotherapy.    PHYSICAL EXAMINATION:  GENERAL:  Alopecic.  VITAL SIGNS:  Stable.  Ambulatory to clinic.  145lbs inches.  BSA of 1.75.  HEENT:  Showed no congestion.  NECK:  Supple, without JVD.  Trachea is central.  LUNGS:  Clear to auscultation, right-sided MediPort.  ABDOMEN:  Soft, no rebound, guarding or rigidity.  Axilla on the left reveals   lymph node slightly smaller in size.  This is palpable still.  NEUROLOGIC:  The patient is appropriate, very pleasant.  EXTREMITIES:  Showing no edema.  Lab Results   Component Value Date     WBC 2.60 (L) 08/11/2017    HGB 10.1 (L) 08/11/2017    HCT 30.0 (L) 08/11/2017     (H) 08/11/2017     08/11/2017     CMP  Sodium   Date Value Ref Range Status   08/11/2017 139 136 - 145 mmol/L Final     Potassium   Date Value Ref Range Status   08/11/2017 4.1 3.5 - 5.1 mmol/L Final     Chloride   Date Value Ref Range Status   08/11/2017 104 95 - 110 mmol/L Final     CO2   Date Value Ref Range Status   08/11/2017 28 22 - 31 mmol/L Final     Glucose   Date Value Ref Range Status   08/11/2017 79 70 - 110 mg/dL Final     Comment:     The ADA recommends the following guidelines for fasting glucose:  Normal:       less than 100 mg/dL  Prediabetes:  100 mg/dL to 125 mg/dL  Diabetes:     126 mg/dL or higher       BUN, Bld   Date Value Ref Range Status   08/11/2017 9 7 - 18 mg/dL Final     Creatinine   Date Value Ref Range Status   08/11/2017 0.70 0.50 - 1.40 mg/dL Final     Calcium   Date Value Ref Range Status   08/11/2017 9.9 8.4 - 10.2 mg/dL Final     Total Protein   Date Value Ref Range Status   08/11/2017 6.7 6.0 - 8.4 g/dL Final     Albumin   Date Value Ref Range Status   08/11/2017 4.1 3.5 - 5.2 g/dL Final     Total Bilirubin   Date Value Ref Range Status   08/11/2017 0.6 0.2 - 1.3 mg/dL Final     Alkaline Phosphatase   Date Value Ref Range Status   08/11/2017 79 38 - 145 U/L Final     AST   Date Value Ref Range Status   08/11/2017 41 (H) 14 - 36 U/L Final     ALT   Date Value Ref Range Status   08/11/2017 49 (H) 10 - 44 U/L Final     Anion Gap   Date Value Ref Range Status   08/11/2017 7 (L) 8 - 16 mmol/L Final     eGFR if    Date Value Ref Range Status   08/11/2017 >60 >60 mL/min/1.73 m^2 Final     eGFR if non    Date Value Ref Range Status   08/11/2017 >60 >60 mL/min/1.73 m^2 Final     Comment:     Calculation used to obtain the estimated glomerular filtration  rate (eGFR) is the CKD-EPI equation. Since race is unknown   in our information system, the eGFR values for    -American and Non--American patients are given   for each creatinine result.     IMPRESSION:  1.  Neoadjuvant chemotherapy for a clinical stage IIB breast cancer with large   palpable lymph nodes to the left axilla and an MRI measurement of breast tumor   at 2.7 cm.  The patient is here for cycle #4 of Adriamycin and Cytoxan okay to   proceed with taxol week #3  week and cont this time with  perjeta and herceptin   recd neupogen last week and counts picked up well for rx this week no neupogen this time    3.   Dr. Arce,seeing pt august 17th. Pt has multiple questions about sx  ..  Taxol weekly regimen, Herceptin 8 mg/k loading dose followed by 6 mg q. 3 weeks and Perjeta   as ordered.        CBC and CMP for the next week  The patient will be sent to radiation   evaluation after completing of surgery, which can be accomplished if Taxol is   completed in 12 weeks during infusion Perjeta and Herceptin.

## 2017-08-12 ENCOUNTER — PATIENT MESSAGE (OUTPATIENT)
Dept: HEMATOLOGY/ONCOLOGY | Facility: CLINIC | Age: 62
End: 2017-08-12

## 2017-08-14 ENCOUNTER — INFUSION (OUTPATIENT)
Dept: INFUSION THERAPY | Facility: HOSPITAL | Age: 62
End: 2017-08-14
Attending: INTERNAL MEDICINE
Payer: COMMERCIAL

## 2017-08-14 VITALS
HEIGHT: 68 IN | WEIGHT: 145 LBS | RESPIRATION RATE: 16 BRPM | TEMPERATURE: 98 F | HEART RATE: 67 BPM | BODY MASS INDEX: 21.98 KG/M2 | DIASTOLIC BLOOD PRESSURE: 83 MMHG | SYSTOLIC BLOOD PRESSURE: 144 MMHG

## 2017-08-14 DIAGNOSIS — C50.012 MALIGNANT NEOPLASM OF NIPPLE OF LEFT BREAST IN FEMALE, UNSPECIFIED ESTROGEN RECEPTOR STATUS: Primary | ICD-10-CM

## 2017-08-14 PROCEDURE — 96372 THER/PROPH/DIAG INJ SC/IM: CPT | Mod: PN

## 2017-08-14 PROCEDURE — 63600175 PHARM REV CODE 636 W HCPCS: Mod: PN | Performed by: INTERNAL MEDICINE

## 2017-08-14 RX ADMIN — FILGRASTIM 300 MCG: 300 INJECTION, SOLUTION INTRAVENOUS; SUBCUTANEOUS at 11:08

## 2017-08-14 NOTE — PLAN OF CARE
Problem: Chemotherapy Effects (Adult)  Goal: Signs and Symptoms of Listed Potential Problems Will be Absent, Minimized or Managed (Chemotherapy Effects)  Signs and symptoms of listed potential problems will be absent, minimized or managed by discharge/transition of care (reference Chemotherapy Effects (Adult) CPG).   Outcome: Ongoing (interventions implemented as appropriate)  Pt receiving neupogen during visit today No new complaints. Pt c/o generalized fatigue and diarrhea treated well with immodium. Will continue to monitor.

## 2017-08-14 NOTE — PLAN OF CARE
Problem: Patient Care Overview  Goal: Plan of Care Review  Outcome: Ongoing (interventions implemented as appropriate)  Pt tolerated tx well Educated her to take claratin or zyrtec for bone pain and malaise. Educated pt to call Dr with any issues. Will continue to monitor.

## 2017-08-15 ENCOUNTER — INFUSION (OUTPATIENT)
Dept: INFUSION THERAPY | Facility: HOSPITAL | Age: 62
End: 2017-08-15
Attending: INTERNAL MEDICINE
Payer: COMMERCIAL

## 2017-08-15 VITALS
DIASTOLIC BLOOD PRESSURE: 75 MMHG | RESPIRATION RATE: 16 BRPM | HEART RATE: 77 BPM | TEMPERATURE: 99 F | SYSTOLIC BLOOD PRESSURE: 123 MMHG

## 2017-08-15 DIAGNOSIS — C50.012 MALIGNANT NEOPLASM OF NIPPLE OF LEFT BREAST IN FEMALE, UNSPECIFIED ESTROGEN RECEPTOR STATUS: Primary | ICD-10-CM

## 2017-08-15 PROCEDURE — 96372 THER/PROPH/DIAG INJ SC/IM: CPT | Mod: PN

## 2017-08-15 PROCEDURE — 63600175 PHARM REV CODE 636 W HCPCS: Mod: PN | Performed by: INTERNAL MEDICINE

## 2017-08-15 RX ADMIN — FILGRASTIM 300 MCG: 300 INJECTION, SOLUTION INTRAVENOUS; SUBCUTANEOUS at 12:08

## 2017-08-16 ENCOUNTER — INFUSION (OUTPATIENT)
Dept: INFUSION THERAPY | Facility: HOSPITAL | Age: 62
End: 2017-08-16
Attending: INTERNAL MEDICINE
Payer: COMMERCIAL

## 2017-08-16 VITALS
HEART RATE: 82 BPM | DIASTOLIC BLOOD PRESSURE: 76 MMHG | SYSTOLIC BLOOD PRESSURE: 127 MMHG | TEMPERATURE: 98 F | RESPIRATION RATE: 16 BRPM

## 2017-08-16 DIAGNOSIS — C50.012 MALIGNANT NEOPLASM OF NIPPLE OF LEFT BREAST IN FEMALE, UNSPECIFIED ESTROGEN RECEPTOR STATUS: Primary | ICD-10-CM

## 2017-08-16 PROCEDURE — 63600175 PHARM REV CODE 636 W HCPCS: Mod: PN | Performed by: INTERNAL MEDICINE

## 2017-08-16 PROCEDURE — 96372 THER/PROPH/DIAG INJ SC/IM: CPT | Mod: PN

## 2017-08-16 RX ADMIN — FILGRASTIM 300 MCG: 300 INJECTION, SOLUTION INTRAVENOUS; SUBCUTANEOUS at 11:08

## 2017-08-17 ENCOUNTER — OFFICE VISIT (OUTPATIENT)
Dept: SURGERY | Facility: CLINIC | Age: 62
End: 2017-08-17
Payer: COMMERCIAL

## 2017-08-17 VITALS
HEIGHT: 68 IN | SYSTOLIC BLOOD PRESSURE: 160 MMHG | HEART RATE: 67 BPM | TEMPERATURE: 98 F | BODY MASS INDEX: 21.77 KG/M2 | WEIGHT: 143.63 LBS | DIASTOLIC BLOOD PRESSURE: 79 MMHG

## 2017-08-17 DIAGNOSIS — C50.912 MALIGNANT NEOPLASM OF LEFT FEMALE BREAST, UNSPECIFIED ESTROGEN RECEPTOR STATUS, UNSPECIFIED SITE OF BREAST: Primary | ICD-10-CM

## 2017-08-17 PROCEDURE — 99215 OFFICE O/P EST HI 40 MIN: CPT | Mod: S$GLB,,, | Performed by: SURGERY

## 2017-08-17 PROCEDURE — 99999 PR PBB SHADOW E&M-EST. PATIENT-LVL III: CPT | Mod: PBBFAC,,, | Performed by: SURGERY

## 2017-08-17 PROCEDURE — 3008F BODY MASS INDEX DOCD: CPT | Mod: S$GLB,,, | Performed by: SURGERY

## 2017-08-17 NOTE — Clinical Note
MRI early October.  F/U with me mid October.  Plan for magseed left lumpectomy with magseed assisted SLNB hopefully on B[facility] And [place] trial on wed 11-1-17. Will need magseed at both site of primary tumor and the site of biopsy proven positive LN

## 2017-08-18 ENCOUNTER — OFFICE VISIT (OUTPATIENT)
Dept: HEMATOLOGY/ONCOLOGY | Facility: CLINIC | Age: 62
End: 2017-08-18
Payer: COMMERCIAL

## 2017-08-18 ENCOUNTER — INFUSION (OUTPATIENT)
Dept: INFUSION THERAPY | Facility: HOSPITAL | Age: 62
End: 2017-08-18
Attending: INTERNAL MEDICINE
Payer: COMMERCIAL

## 2017-08-18 VITALS
WEIGHT: 149.25 LBS | HEART RATE: 73 BPM | DIASTOLIC BLOOD PRESSURE: 77 MMHG | RESPIRATION RATE: 17 BRPM | SYSTOLIC BLOOD PRESSURE: 148 MMHG | TEMPERATURE: 98 F | HEIGHT: 68 IN | BODY MASS INDEX: 22.62 KG/M2

## 2017-08-18 VITALS
SYSTOLIC BLOOD PRESSURE: 148 MMHG | BODY MASS INDEX: 22.42 KG/M2 | RESPIRATION RATE: 17 BRPM | DIASTOLIC BLOOD PRESSURE: 77 MMHG | HEART RATE: 73 BPM | HEIGHT: 68 IN | WEIGHT: 147.94 LBS

## 2017-08-18 DIAGNOSIS — C50.912 HER2-POSITIVE CARCINOMA OF LEFT BREAST: ICD-10-CM

## 2017-08-18 DIAGNOSIS — C50.012 MALIGNANT NEOPLASM OF NIPPLE OF LEFT BREAST IN FEMALE, UNSPECIFIED ESTROGEN RECEPTOR STATUS: Primary | ICD-10-CM

## 2017-08-18 DIAGNOSIS — C50.512 PRIMARY CANCER OF LOWER OUTER QUADRANT OF LEFT FEMALE BREAST: ICD-10-CM

## 2017-08-18 DIAGNOSIS — C50.012 MALIGNANT NEOPLASM OF NIPPLE OF LEFT BREAST IN FEMALE, UNSPECIFIED ESTROGEN RECEPTOR STATUS: ICD-10-CM

## 2017-08-18 DIAGNOSIS — C50.011 MALIGNANT NEOPLASM OF NIPPLE OF RIGHT BREAST IN FEMALE, UNSPECIFIED ESTROGEN RECEPTOR STATUS: Primary | ICD-10-CM

## 2017-08-18 PROCEDURE — 3008F BODY MASS INDEX DOCD: CPT | Mod: S$GLB,,, | Performed by: INTERNAL MEDICINE

## 2017-08-18 PROCEDURE — S0028 INJECTION, FAMOTIDINE, 20 MG: HCPCS | Mod: PN | Performed by: INTERNAL MEDICINE

## 2017-08-18 PROCEDURE — 96417 CHEMO IV INFUS EACH ADDL SEQ: CPT | Mod: PN

## 2017-08-18 PROCEDURE — 99215 OFFICE O/P EST HI 40 MIN: CPT | Mod: S$GLB,,, | Performed by: INTERNAL MEDICINE

## 2017-08-18 PROCEDURE — A4216 STERILE WATER/SALINE, 10 ML: HCPCS | Mod: PN | Performed by: INTERNAL MEDICINE

## 2017-08-18 PROCEDURE — 96413 CHEMO IV INFUSION 1 HR: CPT | Mod: PN

## 2017-08-18 PROCEDURE — 63600175 PHARM REV CODE 636 W HCPCS: Mod: PN | Performed by: INTERNAL MEDICINE

## 2017-08-18 PROCEDURE — 99999 PR PBB SHADOW E&M-EST. PATIENT-LVL III: CPT | Mod: PBBFAC,,, | Performed by: INTERNAL MEDICINE

## 2017-08-18 PROCEDURE — 25000003 PHARM REV CODE 250: Mod: PN | Performed by: INTERNAL MEDICINE

## 2017-08-18 PROCEDURE — 96367 TX/PROPH/DG ADDL SEQ IV INF: CPT | Mod: PN

## 2017-08-18 PROCEDURE — 96375 TX/PRO/DX INJ NEW DRUG ADDON: CPT | Mod: PN

## 2017-08-18 RX ORDER — FAMOTIDINE 20 MG/50ML
20 INJECTION, SOLUTION INTRAVENOUS
Status: COMPLETED | OUTPATIENT
Start: 2017-08-18 | End: 2017-08-18

## 2017-08-18 RX ORDER — ONDANSETRON 2 MG/ML
8 INJECTION INTRAMUSCULAR; INTRAVENOUS
Status: CANCELLED
Start: 2017-08-25 | End: 2017-08-25

## 2017-08-18 RX ORDER — EPINEPHRINE 0.3 MG/.3ML
0.3 INJECTION SUBCUTANEOUS ONCE AS NEEDED
Status: CANCELLED | OUTPATIENT
Start: 2017-08-18 | End: 2017-08-18

## 2017-08-18 RX ORDER — FAMOTIDINE 20 MG/50ML
20 INJECTION, SOLUTION INTRAVENOUS
Status: CANCELLED
Start: 2017-08-25 | End: 2017-08-25

## 2017-08-18 RX ORDER — EPINEPHRINE 0.3 MG/.3ML
0.3 INJECTION SUBCUTANEOUS ONCE AS NEEDED
Status: CANCELLED | OUTPATIENT
Start: 2017-08-25 | End: 2017-08-25

## 2017-08-18 RX ORDER — FAMOTIDINE 20 MG/50ML
20 INJECTION, SOLUTION INTRAVENOUS
Status: CANCELLED
Start: 2017-08-18 | End: 2017-08-18

## 2017-08-18 RX ORDER — SODIUM CHLORIDE 0.9 % (FLUSH) 0.9 %
10 SYRINGE (ML) INJECTION
Status: DISCONTINUED | OUTPATIENT
Start: 2017-08-18 | End: 2017-08-18 | Stop reason: HOSPADM

## 2017-08-18 RX ORDER — DIPHENHYDRAMINE HYDROCHLORIDE 50 MG/ML
50 INJECTION INTRAMUSCULAR; INTRAVENOUS ONCE AS NEEDED
Status: CANCELLED | OUTPATIENT
Start: 2017-08-18 | End: 2017-08-18

## 2017-08-18 RX ORDER — FAMOTIDINE 20 MG/50ML
20 INJECTION, SOLUTION INTRAVENOUS
Status: CANCELLED
Start: 2017-09-01 | End: 2017-09-01

## 2017-08-18 RX ORDER — DIPHENHYDRAMINE HYDROCHLORIDE 50 MG/ML
50 INJECTION INTRAMUSCULAR; INTRAVENOUS ONCE AS NEEDED
Status: CANCELLED | OUTPATIENT
Start: 2017-09-01 | End: 2017-09-01

## 2017-08-18 RX ORDER — HEPARIN 100 UNIT/ML
500 SYRINGE INTRAVENOUS
Status: CANCELLED | OUTPATIENT
Start: 2017-08-18

## 2017-08-18 RX ORDER — ONDANSETRON 2 MG/ML
8 INJECTION INTRAMUSCULAR; INTRAVENOUS
Status: CANCELLED
Start: 2017-09-01 | End: 2017-09-01

## 2017-08-18 RX ORDER — ONDANSETRON 2 MG/ML
8 INJECTION INTRAMUSCULAR; INTRAVENOUS
Status: COMPLETED | OUTPATIENT
Start: 2017-08-18 | End: 2017-08-18

## 2017-08-18 RX ORDER — SODIUM CHLORIDE 0.9 % (FLUSH) 0.9 %
10 SYRINGE (ML) INJECTION
Status: CANCELLED | OUTPATIENT
Start: 2017-08-18

## 2017-08-18 RX ORDER — HEPARIN 100 UNIT/ML
500 SYRINGE INTRAVENOUS
Status: CANCELLED | OUTPATIENT
Start: 2017-08-25

## 2017-08-18 RX ORDER — DIPHENHYDRAMINE HYDROCHLORIDE 50 MG/ML
50 INJECTION INTRAMUSCULAR; INTRAVENOUS ONCE AS NEEDED
Status: CANCELLED | OUTPATIENT
Start: 2017-08-25 | End: 2017-08-25

## 2017-08-18 RX ORDER — SODIUM CHLORIDE 0.9 % (FLUSH) 0.9 %
10 SYRINGE (ML) INJECTION
Status: CANCELLED | OUTPATIENT
Start: 2017-08-25

## 2017-08-18 RX ORDER — HEPARIN 100 UNIT/ML
500 SYRINGE INTRAVENOUS
Status: CANCELLED | OUTPATIENT
Start: 2017-09-01

## 2017-08-18 RX ORDER — EPINEPHRINE 0.3 MG/.3ML
0.3 INJECTION SUBCUTANEOUS ONCE AS NEEDED
Status: CANCELLED | OUTPATIENT
Start: 2017-09-01 | End: 2017-09-01

## 2017-08-18 RX ORDER — ONDANSETRON 2 MG/ML
8 INJECTION INTRAMUSCULAR; INTRAVENOUS
Status: CANCELLED
Start: 2017-08-18 | End: 2017-08-18

## 2017-08-18 RX ORDER — SODIUM CHLORIDE 0.9 % (FLUSH) 0.9 %
10 SYRINGE (ML) INJECTION
Status: CANCELLED | OUTPATIENT
Start: 2017-09-01

## 2017-08-18 RX ADMIN — SODIUM CHLORIDE 20 MG: 9 INJECTION, SOLUTION INTRAVENOUS at 02:08

## 2017-08-18 RX ADMIN — FAMOTIDINE 20 MG: 20 INJECTION, SOLUTION INTRAVENOUS at 02:08

## 2017-08-18 RX ADMIN — SODIUM CHLORIDE, PRESERVATIVE FREE 10 ML: 5 INJECTION INTRAVENOUS at 05:08

## 2017-08-18 RX ADMIN — PERTUZUMAB 420 MG: 30 INJECTION, SOLUTION, CONCENTRATE INTRAVENOUS at 03:08

## 2017-08-18 RX ADMIN — SODIUM CHLORIDE: 0.9 INJECTION, SOLUTION INTRAVENOUS at 02:08

## 2017-08-18 RX ADMIN — TRASTUZUMAB 396 MG: 150 INJECTION, POWDER, LYOPHILIZED, FOR SOLUTION INTRAVENOUS at 04:08

## 2017-08-18 RX ADMIN — PACLITAXEL 144 MG: 6 INJECTION, SOLUTION, CONCENTRATE INTRAVENOUS at 04:08

## 2017-08-18 RX ADMIN — DIPHENHYDRAMINE HYDROCHLORIDE 50 MG: 50 INJECTION, SOLUTION INTRAMUSCULAR; INTRAVENOUS at 02:08

## 2017-08-18 RX ADMIN — ONDANSETRON 8 MG: 2 INJECTION INTRAMUSCULAR; INTRAVENOUS at 02:08

## 2017-08-18 NOTE — PLAN OF CARE
Problem: Patient Care Overview  Goal: Plan of Care Review  Outcome: Ongoing (interventions implemented as appropriate)  Pt tolerated treatment well.  No s/s of infusion reaction noted.  Instructed to call MD with any problems.

## 2017-08-18 NOTE — PROGRESS NOTES
A 62-year-old  woman coming in for followup.  She is due for cycle 4 of   Adriamycin and Cytoxan.  The patient is receiving neoadjuvant chemotherapy in   lieu of ER/CO negative, HER-2 positive invasive breast carcinoma of the left   breast.  The patient has seen Dr. Alexander Arce.  She is here for the fourth   cycle of treatment.  She has tolerated this regimen really well and has no   issues whatsoever.  The patient had a palpable left axillary lymph node.  She is   a clinical stage IIB at least.  The patient had a PET scan done that was April 2017 that shows no evidence of metastatic lesions elsewhere, but showed the left   primary breast malignancy and ipsilateral axillary metastatic lymphadenopathy.    She had pulmonary nodules in the right lower side.  She had bilateral breast   MRIs ordered by Dr. Arce, which was a challenging study because the patient   had prior implants in both her breasts.  The lesion roughly measured about 2.7   cm.  The left axillary lymph node measured about 2.6 cm on the MRI.    REVIEW OF SYSTEMS:  Alt diarrhea and constipation, passed out from side effects of glaucoma meds, bp better now  that she has been taken off  bowel or bladder habits, cough, sputum production, fever, chills, rigors.    Appetite has been appropriately appropriate for her chemotherapy time and the   patient make a conscious effort to be healthy during her cycles of chemotherapy.    PHYSICAL EXAMINATION:  GENERAL:  Alopecic.  VITAL SIGNS:  Stable.  Ambulatory to clinic.  145lbs inches.  BSA of 1.75.  HEENT:  Showed no congestion.  NECK:  Supple, without JVD.  Trachea is central.  LUNGS:  Clear to auscultation, right-sided MediPort.  ABDOMEN:  Soft, no rebound, guarding or rigidity.  Axilla on the left reveals   lymph node slightly smaller in size.  This is palpable still.  NEUROLOGIC:  The patient is appropriate, very pleasant.  EXTREMITIES:  Showing no edema.  Lab Results   Component Value Date     WBC 24.06 (H) 08/18/2017    HGB 10.5 (L) 08/18/2017    HCT 31.5 (L) 08/18/2017     (H) 08/18/2017     08/18/2017     CMP  Sodium   Date Value Ref Range Status   08/18/2017 138 136 - 145 mmol/L Final     Potassium   Date Value Ref Range Status   08/18/2017 4.2 3.5 - 5.1 mmol/L Final     Chloride   Date Value Ref Range Status   08/18/2017 103 95 - 110 mmol/L Final     CO2   Date Value Ref Range Status   08/18/2017 26 22 - 31 mmol/L Final     Glucose   Date Value Ref Range Status   08/18/2017 87 70 - 110 mg/dL Final     Comment:     The ADA recommends the following guidelines for fasting glucose:  Normal:       less than 100 mg/dL  Prediabetes:  100 mg/dL to 125 mg/dL  Diabetes:     126 mg/dL or higher       BUN, Bld   Date Value Ref Range Status   08/18/2017 10 7 - 18 mg/dL Final     Creatinine   Date Value Ref Range Status   08/18/2017 0.70 0.50 - 1.40 mg/dL Final     Calcium   Date Value Ref Range Status   08/18/2017 9.6 8.4 - 10.2 mg/dL Final     Total Protein   Date Value Ref Range Status   08/18/2017 6.6 6.0 - 8.4 g/dL Final     Albumin   Date Value Ref Range Status   08/18/2017 4.0 3.5 - 5.2 g/dL Final     Total Bilirubin   Date Value Ref Range Status   08/18/2017 0.4 0.2 - 1.3 mg/dL Final     Alkaline Phosphatase   Date Value Ref Range Status   08/18/2017 114 38 - 145 U/L Final     AST   Date Value Ref Range Status   08/18/2017 39 (H) 14 - 36 U/L Final     ALT   Date Value Ref Range Status   08/18/2017 42 10 - 44 U/L Final     Anion Gap   Date Value Ref Range Status   08/18/2017 9 8 - 16 mmol/L Final     eGFR if    Date Value Ref Range Status   08/18/2017 >60 >60 mL/min/1.73 m^2 Final     eGFR if non    Date Value Ref Range Status   08/18/2017 >60 >60 mL/min/1.73 m^2 Final     Comment:     Calculation used to obtain the estimated glomerular filtration  rate (eGFR) is the CKD-EPI equation. Since race is unknown   in our information system, the eGFR values for    -American and Non--American patients are given   for each creatinine result.     IMPRESSION:  1.  Neoadjuvant chemotherapy for a clinical stage IIB breast cancer with large   palpable lymph nodes to the left axilla and an MRI measurement of breast tumor   at 2.7 cm.  proceed with taxol  and cont   perjeta and herceptin q 3 weeks   recd neupogen last week and counts picked up well for rx this week no neupogen this time    3.   Dr. Arce,is happy with reasults of PE , not identifying any mass, MRI pending next week  Continue with..Taxol weekly regimen, Herceptin 8 mg/k loading dose followed by 6 mg q. 3 weeks and Perjeta   as ordered.        CBC and CMP for the next week  The patient will be sent to radiation ( may enroll if study in [facility] that may leave out axillary disection/ xrt)  evaluation after completing of surgery, which can be accomplished if Taxol is   completed in 12 weeks during infusion Perjeta and Herceptin.

## 2017-08-19 NOTE — PROGRESS NOTES
Patient ID: Ashlie Redman is a 62 y.o. female.     Chief Complaint: Consult (New Patient New Cancer)     HPI   This is a 62 y.o female who is referred for evaluation of L breast mass. She first noticed this mass in 2017, since then she saw her PCP and mammogram, ultrasound and biopsy were completed. Biopsy showed invasive ductal carcinoma of L breast, axillary lymph node biopsy was not completed at that time. Patient reports of breast pain 4-5/10 on severity and constantly there, the pain is worse when she walks or is active. She takes tylenol for pain relief and says it reduces her pain. She is unsure whether the mass has grown in size. Patient denies any fevers, chills, nausea, vomiting, weight loss or night sweats at this time.      Gyn History:  Age of menarche is 12. . OCP x 18 years. Menopause age 50. HRT (climara and paxil) x 12 years.      Fam History:   Colon cancer mother (90) and sister (48)  Breast cancer in 1st cousin (patient unsure age)     Review of Systems   Constitutional: Negative for chills, fatigue and fever.   Respiratory: Negative for cough, chest tightness and shortness of breath.   Cardiovascular: Negative for chest pain.   Gastrointestinal: Negative for abdominal distention, abdominal pain, nausea and vomiting.   Loose stools s/p clindamycin   Genitourinary: Negative for difficulty urinating, dysuria and flank pain.   Musculoskeletal: Negative for back pain.   Skin: Negative for color change and rash.   Hematological: Positive for adenopathy.       Objective:      Physical Exam   Constitutional: She is oriented to person, place, and time. She appears well-developed and well-nourished. No distress.   HENT:   Head: Normocephalic and atraumatic.   Eyes: EOM are normal. Pupils are equal, round, and reactive to light.   Neck: Normal range of motion. Neck supple.   Cardiovascular: Normal rate and regular rhythm.   Pulmonary/Chest: Effort normal and breath sounds normal. No  respiratory distress. She has no wheezes.       There is no apparent skin changes or nipple discharge.     R breast: appears normal.    Bilateral breast w/ implants that are palpable.    There is palpable and enlarge lymph node at apical axilla. It is non-tender and mobile.   Abdominal: Soft. Bowel sounds are normal. She exhibits no distension. There is no tenderness.   Musculoskeletal: Normal range of motion. She exhibits no edema.   Neurological: She is alert and oriented to person, place, and time.   Skin: Skin is warm and dry. She is not diaphoretic.   Psychiatric: She has a normal mood and affect. Her behavior is normal. Judgment and thought content normal.   Nursing note and vitals reviewed.      Assessment:       1. Malignant neoplasm of female breast, unspecified laterality, unspecified site of breast        Plan:         63 y/o female with invasive ductal carcinoma     1. US guided core needle biopsy of L apical axillary node  2. MRI scan  3. PET scan   4. Medical oncology referral for chemotherapy for Er-/Pr-/Her2+   5. Port-a-cath placement on R chest                           DATE OF CONSULTATION:  04/18/2017     CHIEF COMPLAINT:  Newly diagnosed left breast cancer.     HISTORY OF PRESENT ILLNESS:  The patient is a very pleasant 62-year-old    female who presents with her .  The patient felt a mass in her   left breast in March 2017.  This was also described by her primary care   physician who ordered a diagnostic mammogram and ultrasound.  She subsequently   had imaging, which revealed a suspicious asymmetric density and mass in the   lateral lower outer 8 o'clock region of the left breast, 8 cm from the nipple.    This measured 34 mm by ultrasound and 6 mm by mammogram.  It was described as an   asymmetric density with an area of focal asymmetry measuring 6 mm seen in the   posterior aspect of the left breast at the 3 and 4 o'clock position located 8 cm   from the nipple.  It was in the  lower outer 3 to 4 o'clock position.  The   patient underwent core needle biopsy, which revealed a grade II invasive ductal   carcinoma along with high-grade DCIS.  This was estrogen and progesterone   receptor positive and it was also HER-2/clay positive with 3+ staining.  The   patient also was found to have a benign 12 mm right-sided contralateral simple   cyst.     PAST MEDICAL HISTORY:  Significant for eye surgery, ectopic pregnancy, bilateral   breast augmentation four years ago with some glandular silicone implants.  She   took oral contraceptive products for approximately 30 years.     GYN HISTORY:  Reveals that menarche was at age 12, menopause at age 50.  She is   on Climara and Paxil in addition to other medications in [facility] record.  She took   hormone replacement therapy for approximately 12 years and has subsequently   discontinued them with her diagnosis.  She is a  3, para 2 with first   live birth and pregnancy at age 25 and the second child at age 36.     FAMILY HISTORY:  Significant for colon cancer in her mother at age 90 and rectal   cancer in her sister at age 48.  She has a maternal first cousin diagnosed with   breast cancer after the age of 50 and her mother also had had a benign   pancreatic mass by the history, which is the region where the patient states was   the same vicinity of her eventual development of colon cancer.  Her clinical   breast exam is otherwise noncontributory other than the breast exam.  The right   breast is within normal limits with no suspicious masses, nodules, densities,   skin changes or lymphadenopathy.  There is a palpable subglandular silicone   implant on the right anterior to the pectoralis muscle.  There are no   lymphadenopathy or breast masses.  The left side reveals a 2.5 cm mass-like   density in the lateral lower outer 3 to 4 o'clock region of the left breast.  We   can palpate the implant.  There are no suspicious skin changes.  There is no    dimpling of the skin or nipple areolar complex.  There is no edema, erythema or   thickening of the skin; however, I do feel a palpable, mobile, well   circumscribed single 2 cm lymph node, which is highly suspicious.     ASSESSMENT AND PLAN:  I am going to clinically stage this patient as having a T2   N1 clinical stage IIB breast cancer.  Since she is HER-2 positive, she would be   a strong candidate for preoperative neoadjuvant chemotherapy approach with   Herceptin and Perjeta based chemotherapy.  We will order a PET CT whole body   fusion scan.  We will schedule her for a right-sided contralateral Port-A-Cath,   which will be scheduled for Monday, 04/24/2017.  We will have the left axilla   evaluated by ultrasound and core needle biopsy today.  I will also obtain an MRI   of her breast as well as obtain genetic counseling for possible genetic   testing.  We will set her up for Medical Oncology, but she prefers Medical   Oncology consultation on [place] and we will try to have her see Dr. Trell Lau who had taken care of her mother when her mother was diagnosed with the   colon cancer.  Consent was obtained for the Port-A-Cath and we will proceed with   the anticipated neoadjuvant chemotherapy as outlined above pending the lymph   node biopsy.     ADDENDUM:  Subsequently, the patient had an ultrasound-guided core needle biopsy   of the axilla, which revealed a positive lymph node with abundant carcinoma   noted within it.  The PET scan was negative for any evidence of distant   metastatic disease.  The primary tumor was seen in the 4 o'clock region of the   left breast as well as the PET avid lymph node that we can palpate.  There was   also an additional smaller lymph node adjacent to this, which was seen on the   PET scan and I would still stage her as clinically stage IIB with a T2 N1 status   at this point.  Plan will be for neoadjuvant Herceptin-based chemotherapy with   Herceptin and Perjeta and  we will proceed with a Port-A-Cath insertion on Monday 04/24/2017 as outlined above.  Approximate time spent with the patient and her    today was 60 minutes discussing options of surgical therapy, typically   if she receives breast conservation surgery, she would require radiotherapy.    She may require adjuvant radiotherapy given the presentation with a large   palpable lymph node at this time and HER-2/clay status.  Certainly, she would   have time to consider her local therapies of breast conservation and   radiotherapy versus mastectomy and possible postmastectomy radiotherapy while   she undergoes neoadjuvant chemotherapy.  We would get her involved with Plastic   Surgery should she decide to pursue mastectomies, but at this point, she would   like to move forward with the neoadjuvant approach.  We discussed all the   indications for neoadjuvant chemotherapy including being able to assess response   of the primary tumor initiating systemic therapy sooner allowing her to receive   FDA approved double antiHerceptin regimen.  The ability to line up for   potential results of genetic counseling and possible testing with Plastic   Surgery and give her more time to ultimately decide her local therapeutic   options.       Notes from initial clinic encounter of 4-18-17 are as detailed and noted above.  I reviewed these notes above from that encounter again today August 17, 2018 with the patient and her .  The patient has been receiving her neoadjuvant preoperative Herceptin and per GEN a-based chemotherapy per Dr. Celestina Ragsdale on the [place].  The patient is a 62-year-old  female with a HER-2/clay positive invasive ductal carcinoma that originally measured 2.5 cm in the 4:00 region of the left lower outer quadrant of the breast with a biopsy-proven positive palpable 2 cm left axillary lymph node.  She has received her chemotherapy and has 6 cycles of weekly Taxol remaining and has been  administered to cycles of Herceptin and progesterone of thus far every 3 weeks.  We anticipate her cytotoxic chemotherapy to be completed by the end of September and I would like to give her approximately 1 month to recover until the end of October and therefore we would plan to schedule her surgery in early November 2017.    Her clinical breast exam today is completely within normal limits.  I do not palpate any suspicious masses nodules densities skin changes or lymphadenopathy.  I feel she has achieved a complete clinical response already.  Nonetheless, she will complete her proper recommended preoperative course of chemotherapy.  The patient  are quite happy about the good prognostic implications of a complete clinical response and this was discussed with them.    I will plan to order a repeat breast MRI in early October 2017 and she will follow-up with me in mid October and we would anticipate her surgery to be tentatively scheduled for Wednesday, November 1, 2017.  Since she has had a complete clinical response by my clinical exam of her axilla today and I do not palpate the axillary node, she would be a candidate for the NSABP B 51 and its sister [place] trial where at the time of surgery I would perform a sentinel lymph node biopsy on the previously biopsied proven lymph node and if negative then not only which she did not undergo a completion axillary dissection, but she would also be randomized to either radiation or no radiation to the axilla.  If the sentinel node or node that was producing biopsied remains pathologically positive at the time of surgery then the [place] trial would randomize her to either axillary radiation versus axillary dissection.  We discussed this trial at length and the aim of the trial is to minimize lymphedema and comorbidities that come from repetitive treatment if the patient has a pathologic response in her axilla.    In terms of local regional therapy we discussed  options of breast conservation surgery and radiotherapy versus mastectomy again at length.  Since she has had a complete clinical response thus far I feel she is an excellent candidate for breast conservation surgery.  We would perform a lumpectomy of the area of the original tumor as well as perform a left axillary sentinel lymph node biopsy and make sure that the originally biopsied node that was positive with a clip and it is excised for complete pathologic analysis and trial eligibility  Given the complete clinical response she is motivated for the attempted breast conservation surgery and radiotherapy and is very interested in the clinical trial as well to minimize axillary surgery to reduce potential postsurgical left upper extremity lymphedema risks.  She will follow-up with me in mid October to obtain consents and review her postchemotherapy MRI scheduled for early October.  Surgery date will be Wednesday, November 1, 2017 with anticipated mag seed seed localization partial mastectomy (lumpectomy for margins) with left axillary sentinel lymph node biopsy with anticipated excision of the previously biopsy proven malignant node with mag seed localization of that note as well to assess his response to therapy and randomization as dictated by the clinical trial.  We will plan to keep her port as we will anticipate her receiving adjuvant Herceptin postoperatively as well for a year total of monoclonal antibody therapy.  Time spent with the patient today was 60 minutes with greater than 50% of that time in counseling.

## 2017-08-23 DIAGNOSIS — C50.912 MALIGNANT NEOPLASM OF LEFT FEMALE BREAST, UNSPECIFIED ESTROGEN RECEPTOR STATUS, UNSPECIFIED SITE OF BREAST: Primary | ICD-10-CM

## 2017-08-25 ENCOUNTER — INFUSION (OUTPATIENT)
Dept: INFUSION THERAPY | Facility: HOSPITAL | Age: 62
End: 2017-08-25
Attending: INTERNAL MEDICINE
Payer: COMMERCIAL

## 2017-08-25 ENCOUNTER — DOCUMENTATION ONLY (OUTPATIENT)
Dept: INFUSION THERAPY | Facility: HOSPITAL | Age: 62
End: 2017-08-25

## 2017-08-25 ENCOUNTER — TELEPHONE (OUTPATIENT)
Dept: HEMATOLOGY/ONCOLOGY | Facility: CLINIC | Age: 62
End: 2017-08-25

## 2017-08-25 ENCOUNTER — OFFICE VISIT (OUTPATIENT)
Dept: HEMATOLOGY/ONCOLOGY | Facility: CLINIC | Age: 62
End: 2017-08-25
Payer: COMMERCIAL

## 2017-08-25 VITALS
DIASTOLIC BLOOD PRESSURE: 70 MMHG | BODY MASS INDEX: 22.12 KG/M2 | HEIGHT: 68 IN | HEART RATE: 82 BPM | TEMPERATURE: 98 F | RESPIRATION RATE: 18 BRPM | WEIGHT: 145.94 LBS | SYSTOLIC BLOOD PRESSURE: 144 MMHG

## 2017-08-25 VITALS
BODY MASS INDEX: 22.12 KG/M2 | WEIGHT: 145.94 LBS | RESPIRATION RATE: 18 BRPM | SYSTOLIC BLOOD PRESSURE: 144 MMHG | HEIGHT: 68 IN | DIASTOLIC BLOOD PRESSURE: 70 MMHG | TEMPERATURE: 98 F | HEART RATE: 82 BPM

## 2017-08-25 DIAGNOSIS — C50.012 MALIGNANT NEOPLASM OF NIPPLE OF LEFT BREAST IN FEMALE, UNSPECIFIED ESTROGEN RECEPTOR STATUS: ICD-10-CM

## 2017-08-25 DIAGNOSIS — C50.011 MALIGNANT NEOPLASM OF NIPPLE OF RIGHT BREAST IN FEMALE, UNSPECIFIED ESTROGEN RECEPTOR STATUS: Primary | ICD-10-CM

## 2017-08-25 DIAGNOSIS — C50.912 HER2-POSITIVE CARCINOMA OF LEFT BREAST: ICD-10-CM

## 2017-08-25 DIAGNOSIS — C50.012 MALIGNANT NEOPLASM OF NIPPLE OF LEFT BREAST IN FEMALE, UNSPECIFIED ESTROGEN RECEPTOR STATUS: Primary | ICD-10-CM

## 2017-08-25 PROCEDURE — 96367 TX/PROPH/DG ADDL SEQ IV INF: CPT | Mod: PN

## 2017-08-25 PROCEDURE — 96375 TX/PRO/DX INJ NEW DRUG ADDON: CPT | Mod: PN

## 2017-08-25 PROCEDURE — 99215 OFFICE O/P EST HI 40 MIN: CPT | Mod: S$GLB,,, | Performed by: INTERNAL MEDICINE

## 2017-08-25 PROCEDURE — 63600175 PHARM REV CODE 636 W HCPCS: Mod: PN | Performed by: INTERNAL MEDICINE

## 2017-08-25 PROCEDURE — 99999 PR PBB SHADOW E&M-EST. PATIENT-LVL III: CPT | Mod: PBBFAC,,, | Performed by: INTERNAL MEDICINE

## 2017-08-25 PROCEDURE — 25000003 PHARM REV CODE 250: Mod: PN | Performed by: INTERNAL MEDICINE

## 2017-08-25 PROCEDURE — 3008F BODY MASS INDEX DOCD: CPT | Mod: S$GLB,,, | Performed by: INTERNAL MEDICINE

## 2017-08-25 PROCEDURE — S0028 INJECTION, FAMOTIDINE, 20 MG: HCPCS | Mod: PN | Performed by: INTERNAL MEDICINE

## 2017-08-25 PROCEDURE — A4216 STERILE WATER/SALINE, 10 ML: HCPCS | Mod: PN | Performed by: INTERNAL MEDICINE

## 2017-08-25 PROCEDURE — 96413 CHEMO IV INFUSION 1 HR: CPT | Mod: PN

## 2017-08-25 RX ORDER — ONDANSETRON 2 MG/ML
8 INJECTION INTRAMUSCULAR; INTRAVENOUS
Status: COMPLETED | OUTPATIENT
Start: 2017-08-25 | End: 2017-08-25

## 2017-08-25 RX ORDER — FAMOTIDINE 20 MG/50ML
20 INJECTION, SOLUTION INTRAVENOUS
Status: COMPLETED | OUTPATIENT
Start: 2017-08-25 | End: 2017-08-25

## 2017-08-25 RX ORDER — SODIUM CHLORIDE 0.9 % (FLUSH) 0.9 %
10 SYRINGE (ML) INJECTION
Status: DISCONTINUED | OUTPATIENT
Start: 2017-08-25 | End: 2017-08-25 | Stop reason: HOSPADM

## 2017-08-25 RX ADMIN — FAMOTIDINE 20 MG: 20 INJECTION, SOLUTION INTRAVENOUS at 02:08

## 2017-08-25 RX ADMIN — ONDANSETRON 8 MG: 2 INJECTION INTRAMUSCULAR; INTRAVENOUS at 02:08

## 2017-08-25 RX ADMIN — PACLITAXEL 144 MG: 6 INJECTION, SOLUTION, CONCENTRATE INTRAVENOUS at 03:08

## 2017-08-25 RX ADMIN — SODIUM CHLORIDE, PRESERVATIVE FREE 10 ML: 5 INJECTION INTRAVENOUS at 04:08

## 2017-08-25 RX ADMIN — SODIUM CHLORIDE 20 MG: 9 INJECTION, SOLUTION INTRAVENOUS at 02:08

## 2017-08-25 RX ADMIN — DIPHENHYDRAMINE HYDROCHLORIDE 50 MG: 50 INJECTION, SOLUTION INTRAMUSCULAR; INTRAVENOUS at 02:08

## 2017-08-25 NOTE — TELEPHONE ENCOUNTER
----- Message from Tanika Dow RN sent at 8/25/2017 12:01 PM CDT -----  Due to the office being closed on Labor Day, the patient will not receive her Monday neupogen. So we need to add a neupogen thurs?--- or just skip that dose?  Thanks

## 2017-08-25 NOTE — PLAN OF CARE
Problem: Patient Care Overview  Goal: Plan of Care Review  Outcome: Ongoing (interventions implemented as appropriate)  Pt tolerated Taxol treatment well.  No s/s of infusion reaction noted.  Instructed to call MD with any problems.  To RTC on Monday for Neupogen injection.

## 2017-08-25 NOTE — PROGRESS NOTES
[8/25/2017 3:43 PM] Monica Ann:   ----- Message from Tanika Dow RN sent at 8/25/2017 12:01 PM CDT -----  Due to the office being closed on Labor Day, the patient will not receive her Monday neupogen. So we need to add a neupogen thurs?--- or just skip that dose?  Thanks  Per Dr Ragsdale she will not know for the week of labor day till she follows up with her next friday on 09/01/17 Thanks Monica

## 2017-08-25 NOTE — PROGRESS NOTES
A 62-year-old  woman coming in for followup.  She is due for cycle 4 of   Adriamycin and Cytoxan.  The patient is receiving neoadjuvant chemotherapy in   lieu of ER/CA negative, HER-2 positive invasive breast carcinoma of the left   breast.  The patient is also seeing Dr. Alexander Arce.  On weekly taxol now. With herceptin and perjeta q 3 weeks with neupogen support as needed.     The patient had a palpable left axillary lymph node.  She is   a clinical stage IIB at least.  The patient had a PET scan done that was April 2017 that shows no evidence of metastatic lesions elsewhere, but showed the left   primary breast malignancy and ipsilateral axillary metastatic lymphadenopathy.    She had pulmonary nodules in the right lower side.  She had bilateral breast   MRIs  which was a challenging study because the patient   had prior implants in both her breasts.  The lesion roughly measured about 2.7   cm.  The left axillary lymph node measured about 2.6 cm on the MRI.    REVIEW OF SYSTEMS: denies changes today to  bowel or bladder habits, cough, sputum production, fever, chills, rigors.    Appetite has been appropriately appropriate for her chemotherapy time and the   patient make a conscious effort to be healthy during her cycles of chemotherapy.  Feeling more fatigued    PHYSICAL EXAMINATION:  GENERAL:  Alopecic.  VITAL SIGNS:  Stable.  Ambulatory to clinic.  145lbs inches.  BSA of 1.75.  HEENT:  Showed no congestion.  NECK:  Supple, without JVD.  Trachea is central.  LUNGS:  Clear to auscultation, right-sided MediPort.  ABDOMEN:  Soft, no rebound, guarding or rigidity.  Axilla on the left reveals   lymph node slightly smaller in size.  This is palpable still.  NEUROLOGIC:  The patient is appropriate, very pleasant.  EXTREMITIES:  Showing no edema.  Lab Results   Component Value Date    WBC 4.34 08/24/2017    HGB 11.1 (L) 08/24/2017    HCT 32.4 (L) 08/24/2017     (H) 08/24/2017     08/24/2017      CMP  Sodium   Date Value Ref Range Status   08/24/2017 136 136 - 145 mmol/L Final     Potassium   Date Value Ref Range Status   08/24/2017 4.3 3.5 - 5.1 mmol/L Final     Chloride   Date Value Ref Range Status   08/24/2017 101 95 - 110 mmol/L Final     CO2   Date Value Ref Range Status   08/24/2017 25 22 - 31 mmol/L Final     Glucose   Date Value Ref Range Status   08/24/2017 86 70 - 110 mg/dL Final     Comment:     The ADA recommends the following guidelines for fasting glucose:  Normal:       less than 100 mg/dL  Prediabetes:  100 mg/dL to 125 mg/dL  Diabetes:     126 mg/dL or higher       BUN, Bld   Date Value Ref Range Status   08/24/2017 21 (H) 7 - 18 mg/dL Final     Creatinine   Date Value Ref Range Status   08/24/2017 0.69 0.50 - 1.40 mg/dL Final     Calcium   Date Value Ref Range Status   08/24/2017 10.1 8.4 - 10.2 mg/dL Final     Total Protein   Date Value Ref Range Status   08/24/2017 7.3 6.0 - 8.4 g/dL Final     Albumin   Date Value Ref Range Status   08/24/2017 4.4 3.5 - 5.2 g/dL Final     Total Bilirubin   Date Value Ref Range Status   08/24/2017 0.7 0.2 - 1.3 mg/dL Final     Alkaline Phosphatase   Date Value Ref Range Status   08/24/2017 100 38 - 145 U/L Final     AST   Date Value Ref Range Status   08/24/2017 58 (H) 14 - 36 U/L Final     ALT   Date Value Ref Range Status   08/24/2017 69 (H) 10 - 44 U/L Final     Anion Gap   Date Value Ref Range Status   08/24/2017 10 8 - 16 mmol/L Final     eGFR if    Date Value Ref Range Status   08/24/2017 >60 >60 mL/min/1.73 m^2 Final     eGFR if non    Date Value Ref Range Status   08/24/2017 >60 >60 mL/min/1.73 m^2 Final     Comment:     Calculation used to obtain the estimated glomerular filtration  rate (eGFR) is the CKD-EPI equation. Since race is unknown   in our information system, the eGFR values for   -American and Non--American patients are given   for each creatinine result.       DR. Arce's note from  aug 19th    will plan to order a repeat breast MRI in early October 2017 and she will follow-up with me in mid October and we would anticipate her surgery to be tentatively scheduled for Wednesday, November 1, 2017.  Since she has had a complete clinical response by my clinical exam of her axilla today and I do not palpate the axillary node, she would be a candidate for the NSABP B 51 and its sister [place] trial where at the time of surgery I would perform a sentinel lymph node biopsy on the previously biopsied proven lymph node and if negative then not only which she did not undergo a completion axillary dissection, but she would also be randomized to either radiation or no radiation to the axilla.  If the sentinel node or node that was producing biopsied remains pathologically positive at the time of surgery then the [place] trial would randomize her to either axillary radiation versus axillary dissection.  We discussed this trial at length and the aim of the trial is to minimize lymphedema and comorbidities that come from repetitive treatment if the patient has a pathologic response in her axilla.     In terms of local regional therapy we discussed options of breast conservation surgery and radiotherapy versus mastectomy again at length.  Since she has had a complete clinical response thus far I feel she is an excellent candidate for breast conservation surgery.  We would perform a lumpectomy of the area of the original tumor as well as perform a left axillary sentinel lymph node biopsy and make sure that the originally biopsied node that was positive with a clip and it is excised for complete pathologic analysis and trial eligibility  Given the complete clinical response she is motivated for the attempted breast conservation surgery and radiotherapy and is very interested in the clinical trial as well to minimize axillary surgery to reduce potential postsurgical left upper extremity lymphedema risks.  She  will follow-up with me in mid October to obtain consents and review her postchemotherapy MRI scheduled for early October.  Surgery date will be Wednesday, November 1, 2017 with anticipated mag seed seed localization partial mastectomy (lumpectomy for margins) with left axillary sentinel lymph node biopsy with anticipated excision of the previously biopsy proven malignant node with mag seed localization of that note as well to assess his response to therapy and randomization as dictated by the clinical trial    IMPRESSION:  1.  Neoadjuvant chemotherapy for a clinical stage IIB breast cancer with large   palpable lymph nodes to the left axilla and an MRI measurement of breast tumor   at 2.7 cm.  proceed with taxol  and cont   perjeta and herceptin q 3 weeks   recd neupogen last week and counts picked up well for rx this week no neupogen this time  Continue with..Taxol weekly regimen, Herceptin 8 mg/k loading dose followed by 6 mg q. 3 weeks and Perjeta   as ordered.    Will add neupogen for Monday and Tuesday after today to get wbc up I time for next weeks rx    CBC and CMP for the next week  The patient will be sent to radiation ( may enroll if study in [facility] that may leave out axillary disection/ xrt)  evaluation after completing of surgery, which can be accomplished if Taxol is   completed in 12 weeks during infusion Perjeta and Herceptin.

## 2017-08-28 ENCOUNTER — INFUSION (OUTPATIENT)
Dept: INFUSION THERAPY | Facility: HOSPITAL | Age: 62
End: 2017-08-28
Attending: INTERNAL MEDICINE
Payer: COMMERCIAL

## 2017-08-28 VITALS
SYSTOLIC BLOOD PRESSURE: 143 MMHG | WEIGHT: 147.19 LBS | DIASTOLIC BLOOD PRESSURE: 84 MMHG | HEART RATE: 73 BPM | HEIGHT: 68 IN | OXYGEN SATURATION: 100 % | BODY MASS INDEX: 22.31 KG/M2 | TEMPERATURE: 98 F | RESPIRATION RATE: 16 BRPM

## 2017-08-28 DIAGNOSIS — C50.012 MALIGNANT NEOPLASM OF NIPPLE OF LEFT BREAST IN FEMALE, UNSPECIFIED ESTROGEN RECEPTOR STATUS: Primary | ICD-10-CM

## 2017-08-28 PROCEDURE — 63600175 PHARM REV CODE 636 W HCPCS: Mod: PN | Performed by: INTERNAL MEDICINE

## 2017-08-28 PROCEDURE — 96372 THER/PROPH/DIAG INJ SC/IM: CPT | Mod: PN

## 2017-08-28 RX ADMIN — FILGRASTIM 480 MCG: 480 INJECTION, SOLUTION INTRAVENOUS; SUBCUTANEOUS at 03:08

## 2017-08-28 NOTE — PLAN OF CARE
Problem: Patient Care Overview  Goal: Plan of Care Review  Pt tolerated neupogen injection well

## 2017-08-29 ENCOUNTER — INFUSION (OUTPATIENT)
Dept: INFUSION THERAPY | Facility: HOSPITAL | Age: 62
End: 2017-08-29
Attending: INTERNAL MEDICINE
Payer: COMMERCIAL

## 2017-08-29 VITALS
SYSTOLIC BLOOD PRESSURE: 134 MMHG | RESPIRATION RATE: 14 BRPM | TEMPERATURE: 98 F | HEART RATE: 80 BPM | DIASTOLIC BLOOD PRESSURE: 73 MMHG

## 2017-08-29 DIAGNOSIS — C50.012 MALIGNANT NEOPLASM OF NIPPLE OF LEFT BREAST IN FEMALE, UNSPECIFIED ESTROGEN RECEPTOR STATUS: Primary | ICD-10-CM

## 2017-08-29 PROCEDURE — 63600175 PHARM REV CODE 636 W HCPCS: Mod: PN | Performed by: INTERNAL MEDICINE

## 2017-08-29 PROCEDURE — 96372 THER/PROPH/DIAG INJ SC/IM: CPT | Mod: PN

## 2017-08-29 RX ADMIN — FILGRASTIM 480 MCG: 480 INJECTION, SOLUTION INTRAVENOUS; SUBCUTANEOUS at 03:08

## 2017-09-01 ENCOUNTER — TELEPHONE (OUTPATIENT)
Dept: INFUSION THERAPY | Facility: HOSPITAL | Age: 62
End: 2017-09-01

## 2017-09-01 ENCOUNTER — INFUSION (OUTPATIENT)
Dept: INFUSION THERAPY | Facility: HOSPITAL | Age: 62
End: 2017-09-01
Attending: INTERNAL MEDICINE
Payer: COMMERCIAL

## 2017-09-01 ENCOUNTER — OFFICE VISIT (OUTPATIENT)
Dept: HEMATOLOGY/ONCOLOGY | Facility: CLINIC | Age: 62
End: 2017-09-01
Payer: COMMERCIAL

## 2017-09-01 VITALS
DIASTOLIC BLOOD PRESSURE: 93 MMHG | TEMPERATURE: 98 F | DIASTOLIC BLOOD PRESSURE: 93 MMHG | SYSTOLIC BLOOD PRESSURE: 135 MMHG | HEIGHT: 68 IN | TEMPERATURE: 98 F | RESPIRATION RATE: 16 BRPM | BODY MASS INDEX: 22.35 KG/M2 | WEIGHT: 147.5 LBS | HEART RATE: 74 BPM | SYSTOLIC BLOOD PRESSURE: 135 MMHG | RESPIRATION RATE: 16 BRPM | HEIGHT: 68 IN | BODY MASS INDEX: 22.35 KG/M2 | HEART RATE: 74 BPM | WEIGHT: 147.5 LBS

## 2017-09-01 DIAGNOSIS — C50.912 HER2-POSITIVE CARCINOMA OF LEFT BREAST: ICD-10-CM

## 2017-09-01 DIAGNOSIS — C50.012 MALIGNANT NEOPLASM OF NIPPLE OF LEFT BREAST IN FEMALE, UNSPECIFIED ESTROGEN RECEPTOR STATUS: Primary | ICD-10-CM

## 2017-09-01 PROCEDURE — 3008F BODY MASS INDEX DOCD: CPT | Mod: S$GLB,,, | Performed by: INTERNAL MEDICINE

## 2017-09-01 PROCEDURE — 99215 OFFICE O/P EST HI 40 MIN: CPT | Mod: S$GLB,,, | Performed by: INTERNAL MEDICINE

## 2017-09-01 PROCEDURE — 96375 TX/PRO/DX INJ NEW DRUG ADDON: CPT | Mod: PN

## 2017-09-01 PROCEDURE — S0028 INJECTION, FAMOTIDINE, 20 MG: HCPCS | Mod: PN | Performed by: INTERNAL MEDICINE

## 2017-09-01 PROCEDURE — 25000003 PHARM REV CODE 250: Mod: PN | Performed by: INTERNAL MEDICINE

## 2017-09-01 PROCEDURE — 63600175 PHARM REV CODE 636 W HCPCS: Mod: PN | Performed by: INTERNAL MEDICINE

## 2017-09-01 PROCEDURE — 96367 TX/PROPH/DG ADDL SEQ IV INF: CPT | Mod: PN

## 2017-09-01 PROCEDURE — 99999 PR PBB SHADOW E&M-EST. PATIENT-LVL III: CPT | Mod: PBBFAC,,, | Performed by: INTERNAL MEDICINE

## 2017-09-01 PROCEDURE — 96413 CHEMO IV INFUSION 1 HR: CPT | Mod: PN

## 2017-09-01 PROCEDURE — A4216 STERILE WATER/SALINE, 10 ML: HCPCS | Mod: PN | Performed by: INTERNAL MEDICINE

## 2017-09-01 RX ORDER — ONDANSETRON 2 MG/ML
8 INJECTION INTRAMUSCULAR; INTRAVENOUS
Status: CANCELLED
Start: 2017-09-22 | End: 2017-09-22

## 2017-09-01 RX ORDER — FAMOTIDINE 20 MG/50ML
20 INJECTION, SOLUTION INTRAVENOUS
Status: CANCELLED
Start: 2017-09-15 | End: 2017-09-15

## 2017-09-01 RX ORDER — FAMOTIDINE 20 MG/50ML
20 INJECTION, SOLUTION INTRAVENOUS
Status: CANCELLED
Start: 2017-09-11 | End: 2017-09-08

## 2017-09-01 RX ORDER — FAMOTIDINE 20 MG/50ML
20 INJECTION, SOLUTION INTRAVENOUS
Status: CANCELLED
Start: 2017-09-22 | End: 2017-09-22

## 2017-09-01 RX ORDER — EPINEPHRINE 0.3 MG/.3ML
0.3 INJECTION SUBCUTANEOUS ONCE AS NEEDED
Status: CANCELLED | OUTPATIENT
Start: 2017-09-15 | End: 2017-09-15

## 2017-09-01 RX ORDER — SODIUM CHLORIDE 0.9 % (FLUSH) 0.9 %
10 SYRINGE (ML) INJECTION
Status: DISCONTINUED | OUTPATIENT
Start: 2017-09-01 | End: 2017-09-01 | Stop reason: HOSPADM

## 2017-09-01 RX ORDER — ONDANSETRON 2 MG/ML
8 INJECTION INTRAMUSCULAR; INTRAVENOUS
Status: CANCELLED
Start: 2017-09-11 | End: 2017-09-08

## 2017-09-01 RX ORDER — SODIUM CHLORIDE 0.9 % (FLUSH) 0.9 %
10 SYRINGE (ML) INJECTION
Status: CANCELLED | OUTPATIENT
Start: 2017-09-22

## 2017-09-01 RX ORDER — SODIUM CHLORIDE 0.9 % (FLUSH) 0.9 %
10 SYRINGE (ML) INJECTION
Status: CANCELLED | OUTPATIENT
Start: 2017-09-15

## 2017-09-01 RX ORDER — ONDANSETRON 2 MG/ML
8 INJECTION INTRAMUSCULAR; INTRAVENOUS
Status: CANCELLED
Start: 2017-09-15 | End: 2017-09-15

## 2017-09-01 RX ORDER — DIPHENHYDRAMINE HYDROCHLORIDE 50 MG/ML
50 INJECTION INTRAMUSCULAR; INTRAVENOUS ONCE AS NEEDED
Status: CANCELLED | OUTPATIENT
Start: 2017-09-11 | End: 2017-09-08

## 2017-09-01 RX ORDER — HEPARIN 100 UNIT/ML
500 SYRINGE INTRAVENOUS
Status: CANCELLED | OUTPATIENT
Start: 2017-09-22

## 2017-09-01 RX ORDER — DIPHENHYDRAMINE HYDROCHLORIDE 50 MG/ML
50 INJECTION INTRAMUSCULAR; INTRAVENOUS ONCE AS NEEDED
Status: CANCELLED | OUTPATIENT
Start: 2017-09-15 | End: 2017-09-15

## 2017-09-01 RX ORDER — ONDANSETRON 2 MG/ML
8 INJECTION INTRAMUSCULAR; INTRAVENOUS
Status: COMPLETED | OUTPATIENT
Start: 2017-09-01 | End: 2017-09-01

## 2017-09-01 RX ORDER — HEPARIN 100 UNIT/ML
500 SYRINGE INTRAVENOUS
Status: CANCELLED | OUTPATIENT
Start: 2017-09-11

## 2017-09-01 RX ORDER — HEPARIN 100 UNIT/ML
500 SYRINGE INTRAVENOUS
Status: CANCELLED | OUTPATIENT
Start: 2017-09-15

## 2017-09-01 RX ORDER — EPINEPHRINE 0.3 MG/.3ML
0.3 INJECTION SUBCUTANEOUS ONCE AS NEEDED
Status: CANCELLED | OUTPATIENT
Start: 2017-09-22 | End: 2017-09-22

## 2017-09-01 RX ORDER — FAMOTIDINE 20 MG/50ML
20 INJECTION, SOLUTION INTRAVENOUS
Status: COMPLETED | OUTPATIENT
Start: 2017-09-01 | End: 2017-09-01

## 2017-09-01 RX ORDER — DIPHENHYDRAMINE HYDROCHLORIDE 50 MG/ML
50 INJECTION INTRAMUSCULAR; INTRAVENOUS ONCE AS NEEDED
Status: CANCELLED | OUTPATIENT
Start: 2017-09-22 | End: 2017-09-22

## 2017-09-01 RX ORDER — SODIUM CHLORIDE 0.9 % (FLUSH) 0.9 %
10 SYRINGE (ML) INJECTION
Status: CANCELLED | OUTPATIENT
Start: 2017-09-11

## 2017-09-01 RX ORDER — EPINEPHRINE 0.3 MG/.3ML
0.3 INJECTION SUBCUTANEOUS ONCE AS NEEDED
Status: CANCELLED | OUTPATIENT
Start: 2017-09-11 | End: 2017-09-08

## 2017-09-01 RX ADMIN — SODIUM CHLORIDE 20 MG: 9 INJECTION, SOLUTION INTRAVENOUS at 02:09

## 2017-09-01 RX ADMIN — SODIUM CHLORIDE, PRESERVATIVE FREE 10 ML: 5 INJECTION INTRAVENOUS at 04:09

## 2017-09-01 RX ADMIN — ONDANSETRON 8 MG: 2 INJECTION INTRAMUSCULAR; INTRAVENOUS at 02:09

## 2017-09-01 RX ADMIN — DIPHENHYDRAMINE HYDROCHLORIDE 50 MG: 50 INJECTION, SOLUTION INTRAMUSCULAR; INTRAVENOUS at 02:09

## 2017-09-01 RX ADMIN — PACLITAXEL 144 MG: 6 INJECTION, SOLUTION, CONCENTRATE INTRAVENOUS at 03:09

## 2017-09-01 RX ADMIN — FAMOTIDINE 20 MG: 20 INJECTION, SOLUTION INTRAVENOUS at 02:09

## 2017-09-01 RX ADMIN — SODIUM CHLORIDE: 0.9 INJECTION, SOLUTION INTRAVENOUS at 02:09

## 2017-09-01 NOTE — PROGRESS NOTES
A 62-year-old  woman coming in for followup.  She is due for cycle 4 of   Adriamycin and Cytoxan.  The patient is receiving neoadjuvant chemotherapy in   lieu of ER/[place] negative, HER-2 positive invasive breast carcinoma of the left   breast.  The patient is also seeing Dr. Alexander Arce.  On weekly taxol now. With herceptin and perjeta q 3 weeks with neupogen support as needed.     The patient had a palpable left axillary lymph node.  She is   a clinical stage IIB at least.  The patient had a PET scan done that was April 2017 that shows no evidence of metastatic lesions elsewhere, but showed the left   primary breast malignancy and ipsilateral axillary metastatic lymphadenopathy.    She had pulmonary nodules in the right lower side.  She had bilateral breast   MRIs  which was a challenging study because the patient   had prior implants in both her breasts.  The lesion roughly measured about 2.7   cm.  The left axillary lymph node measured about 2.6 cm on the MRI.current clinical improvement without the mass being palpable ,     REVIEW OF SYSTEMS: denies changes today to  bowel or bladder habits, cough, sputum production, fever, chills, rigors.    Appetite has been appropriately appropriate for her chemotherapy time and the   patient make a conscious effort to be healthy during her cycles of chemotherapy.  Feeling more fatigued    PHYSICAL EXAMINATION:  Wt Readings from Last 3 Encounters:   09/01/17 66.9 kg (147 lb 7.8 oz)   09/01/17 66.9 kg (147 lb 7.8 oz)   08/28/17 66.8 kg (147 lb 3.2 oz)     Temp Readings from Last 3 Encounters:   09/01/17 97.6 °F (36.4 °C)   09/01/17 97.6 °F (36.4 °C)   08/29/17 98 °F (36.7 °C)     BP Readings from Last 3 Encounters:   09/01/17 (!) 135/93   09/01/17 (!) 135/93   08/29/17 134/73     Pulse Readings from Last 3 Encounters:   09/01/17 74   09/01/17 74   08/29/17 80     GENERAL:  Alopecic.  VITAL SIGNS:  Stable.  Ambulatory to clinic.  147lbs inches.  BSA of 1.75.  HEENT:   Showed no congestion.  NECK:  Supple, without JVD.  Trachea is central.  LUNGS:  Clear to auscultation, right-sided MediPort.  ABDOMEN:  Soft, no rebound, guarding or rigidity.  Axilla on the left reveals   lymph node slightly smaller in size.  This is palpable still.  NEUROLOGIC:  The patient is appropriate, very pleasant.  EXTREMITIES:  Showing no edema.  Lab Results   Component Value Date    WBC 14.20 (H) 09/01/2017    HGB 10.8 (L) 09/01/2017    HCT 32.1 (L) 09/01/2017     (H) 09/01/2017     09/01/2017     CMP  Sodium   Date Value Ref Range Status   09/01/2017 140 136 - 145 mmol/L Final     Potassium   Date Value Ref Range Status   09/01/2017 4.0 3.5 - 5.1 mmol/L Final     Chloride   Date Value Ref Range Status   09/01/2017 105 95 - 110 mmol/L Final     CO2   Date Value Ref Range Status   09/01/2017 27 22 - 31 mmol/L Final     Glucose   Date Value Ref Range Status   09/01/2017 89 70 - 110 mg/dL Final     Comment:     The ADA recommends the following guidelines for fasting glucose:  Normal:       less than 100 mg/dL  Prediabetes:  100 mg/dL to 125 mg/dL  Diabetes:     126 mg/dL or higher       BUN, Bld   Date Value Ref Range Status   09/01/2017 10 7 - 18 mg/dL Final     Creatinine   Date Value Ref Range Status   09/01/2017 0.70 0.50 - 1.40 mg/dL Final     Calcium   Date Value Ref Range Status   09/01/2017 10.0 8.4 - 10.2 mg/dL Final     Total Protein   Date Value Ref Range Status   09/01/2017 7.0 6.0 - 8.4 g/dL Final     Albumin   Date Value Ref Range Status   09/01/2017 4.2 3.5 - 5.2 g/dL Final     Total Bilirubin   Date Value Ref Range Status   09/01/2017 0.4 0.2 - 1.3 mg/dL Final     Alkaline Phosphatase   Date Value Ref Range Status   09/01/2017 128 38 - 145 U/L Final     AST   Date Value Ref Range Status   09/01/2017 64 (H) 14 - 36 U/L Final     ALT   Date Value Ref Range Status   09/01/2017 70 (H) 10 - 44 U/L Final     Anion Gap   Date Value Ref Range Status   09/01/2017 8 8 - 16 mmol/L Final      eGFR if    Date Value Ref Range Status   09/01/2017 >60 >60 mL/min/1.73 m^2 Final     eGFR if non    Date Value Ref Range Status   09/01/2017 >60 >60 mL/min/1.73 m^2 Final     Comment:     Calculation used to obtain the estimated glomerular filtration  rate (eGFR) is the CKD-EPI equation. Since race is unknown   in our information system, the eGFR values for   -American and Non--American patients are given   for each creatinine result.       IMPRESSION:  1.  Neoadjuvant chemotherapy for a clinical stage IIB breast cancer with large   palpable lymph nodes to the left axilla and an MRI measurement of breast tumor   at 2.7 cm.  proceed with taxol  and cont   perjeta and herceptin q 3 weeks   recd neupogen last week and counts picked up well for rx this week no neupogen this time  Continue with..Taxol weekly regimen, Herceptin 8 mg/k loading dose followed by 6 mg q. 3 weeks and Perjeta   as ordered.    Will add neupogen for Monday and Tuesday after today to get wbc up I time for next weeks rx    CBC and CMP for the next week  The patient will be sent to radiation ( may enroll if study in [facility] that may leave out axillary disection/ xrt)  evaluation after completing of surgery, which can be accomplished if Taxol is   completed in 12 weeks during infusion Perjeta and Herceptin.

## 2017-09-01 NOTE — PLAN OF CARE
Problem: Patient Care Overview  Goal: Plan of Care Review  Outcome: Ongoing (interventions implemented as appropriate)  Pt tolerated Taxol infusion well.  No s/s of infusion reaction noted.  To RTC on Tuesday for Neupogen injection.  Instructed to call MD with any problems.

## 2017-09-05 ENCOUNTER — INFUSION (OUTPATIENT)
Dept: INFUSION THERAPY | Facility: HOSPITAL | Age: 62
End: 2017-09-05
Attending: INTERNAL MEDICINE
Payer: COMMERCIAL

## 2017-09-05 VITALS
BODY MASS INDEX: 22.35 KG/M2 | HEIGHT: 68 IN | RESPIRATION RATE: 16 BRPM | TEMPERATURE: 98 F | HEART RATE: 77 BPM | DIASTOLIC BLOOD PRESSURE: 86 MMHG | SYSTOLIC BLOOD PRESSURE: 140 MMHG | OXYGEN SATURATION: 99 % | WEIGHT: 147.5 LBS

## 2017-09-05 DIAGNOSIS — C50.012 MALIGNANT NEOPLASM OF NIPPLE OF LEFT BREAST IN FEMALE, UNSPECIFIED ESTROGEN RECEPTOR STATUS: Primary | ICD-10-CM

## 2017-09-05 PROCEDURE — 63600175 PHARM REV CODE 636 W HCPCS: Mod: PN | Performed by: INTERNAL MEDICINE

## 2017-09-05 PROCEDURE — 96372 THER/PROPH/DIAG INJ SC/IM: CPT | Mod: PN

## 2017-09-05 RX ADMIN — FILGRASTIM 300 MCG: 300 INJECTION, SOLUTION INTRAVENOUS; SUBCUTANEOUS at 03:09

## 2017-09-05 NOTE — NURSING
Reported assessment to Dr Ragsdale via Sharmaine Cortes, RN charge nurse. Pt to go to ER if symptoms get worse or will discuss with pt at appointment on Friday, 9/8/17. Dr Ragsdale states possible mucusitis issues in gutt, that pt may need a break from chemo. Pt notified and verbalized understanding. Pt states will speak with Dr Ragsdale on Friday.

## 2017-09-05 NOTE — PATIENT INSTRUCTIONS
Understanding Neupogen     You may be able to get Neupogen injections at home.      Your doctor has prescribed the medication Neupogen for you. Neupogen increases the number of infection-fighting cells in your blood following a chemotherapy treatment. It is given by injection (a shot). Your doctor or pharmacist can give you information about getting the injections. This sheet will help you learn more about Neupogen and how it is given at home, or in a clinic or hospital.  What Neupogen can do for you  · Improve your quality of life.  · Make you less prone to infection.  · Make it safe for you to be in close contact with people. And as a result, you can do more.  · Prevent illness that could cause a delay in your treatment.  How it works  · When you have a chemotherapy treatment, the number of infection-fighting cells in your blood is reduced.  · As the number of cells decreases, you are less able to fight infection.  · Neupogen works by helping these infection-fighting blood cells rebuild faster inside of your bones.  Coping with side effects  · Common side effects are aching bones, joints, and muscles, and redness, swelling, or itching at the site of injection.  · These symptoms can often be relieved with a heating pad and/or pain medications that dont contain aspirin. Check with your doctor before you take anything for pain.  · Rare side effects include headache, pain in the lower back or pelvis, skin rash or itching, and nausea. Report all side effects to your doctor.  When should I call my healthcare provider?  Call your healthcare provider right away if any of these occur:  · Possible signs of infection, such as fever, chills, rash, sore throat, diarrhea, or redness at the site of a wound or sore  · Pain in the left upper stomach area or left shoulder area  (this could be a sign of spleen enlargement, a rare side effect of neupogen treatment)  · Shortness of breath, wheezing, dizziness, swelling around the  mouth or eyes, quick pulse, or sweating  · Redness, swelling, or itching at the site of injection   Date Last Reviewed: 5/1/2016 © 2000-2016 [employer]. [address] [place] [address], [place] [address]. All rights reserved. This information is not intended as a substitute for professional medical care. Always follow your healthcare professional's instructions.        Oncology: Preventing Infections  Chemotherapy can make your body less able to fight off infection. This happens because treatment reduces the number of white blood cells (infection fighters) in your body. To help prevent infections, follow the tips below.     Scrub your hands with soap and warm water for at least 15 seconds.   Know your harry  The harry is the time during your chemotherapy cycle when you have the fewest white blood cells. The length of your harry and when it occurs depend on the drugs you are taking. Each drug has its own harry. Talk with your doctor or nurse about your harry period. Then take extra precautions to prevent infection at that time.  Protect yourself  · Keep your hands clean. To reduce your risk of infection, bathe daily and wash your hands often throughout the day. For best results, lather them with soap for at least 15 seconds. Wash your hands before eating, after spending time in public places, and after using the bathroom.  · Avoid some foods. Limit your risk. Dont eat uncooked or undercooked meat or fish. You may also be told not to eat raw vegetables or thin-skinned fruits during your harry.  · Reduce your risk of illness. During this time your body is less able to fight off colds, measles, and other illnesses. Stay away from anyone who has a fever or an infection and avoid large crowds during your harry.  · Wear gloves. Make it harder for infection to enter your body. Wear gloves when you work around germs and dirt. Have someone else clean a pets tank, cage, or litter box.  · Avoid cutting yourself.  Protect your feet from injury and germs by not walking barefoot.  Medications can help you to  · Prevent and treat infection. Antibiotics work in this way by attacking and killing the germs that cause infection.  · Trigger new cell growth. These medications cause your body to make new white blood cells. Neupogen is an example of such a drug.  Talk with your doctor about optimal medications. In some circumstances, your doctor may advise you to avoid Tylenol and other NSAIDs for pain relief because these drugs can mask a fever if you have neutropenia. Talk with your doctor about medications for pain control if needed during your harry period.  When to seek medical advice  Contact your doctor right away if you have any of the following:  · Temperature of 100.4ºF (38ºC) or higher.  · Burning when you urinate.  · Severe coughing or sore throat.  · Shortness of breath, sweating, or chills.  · Pain, especially near an open wound or catheter site.   Date Last Reviewed: 1/3/2016  © 7508-7689 [employer]. [address] [place] [address], [place] [address]. All rights reserved. This information is not intended as a substitute for professional medical care. Always follow your healthcare professional's instructions.

## 2017-09-05 NOTE — PLAN OF CARE
Problem: Patient Care Overview  Goal: Plan of Care Review  Outcome: Ongoing (interventions implemented as appropriate)  See nurse note

## 2017-09-06 ENCOUNTER — INFUSION (OUTPATIENT)
Dept: INFUSION THERAPY | Facility: HOSPITAL | Age: 62
End: 2017-09-06
Attending: INTERNAL MEDICINE
Payer: COMMERCIAL

## 2017-09-06 VITALS
HEART RATE: 74 BPM | DIASTOLIC BLOOD PRESSURE: 83 MMHG | RESPIRATION RATE: 16 BRPM | TEMPERATURE: 98 F | OXYGEN SATURATION: 99 % | SYSTOLIC BLOOD PRESSURE: 142 MMHG

## 2017-09-06 DIAGNOSIS — C50.012 MALIGNANT NEOPLASM OF NIPPLE OF LEFT BREAST IN FEMALE, UNSPECIFIED ESTROGEN RECEPTOR STATUS: Primary | ICD-10-CM

## 2017-09-06 PROCEDURE — 63600175 PHARM REV CODE 636 W HCPCS: Mod: PN | Performed by: INTERNAL MEDICINE

## 2017-09-06 PROCEDURE — 96372 THER/PROPH/DIAG INJ SC/IM: CPT | Mod: PN

## 2017-09-06 RX ADMIN — FILGRASTIM 300 MCG: 300 INJECTION, SOLUTION INTRAVENOUS; SUBCUTANEOUS at 03:09

## 2017-09-06 NOTE — PLAN OF CARE
Problem: Patient Care Overview  Goal: Plan of Care Review  Outcome: Ongoing (interventions implemented as appropriate)  Pt tolerated Neupogen injection well.  Pt states she is have soft BM's several times a day.  Instructed to take Imodium AD PRN.  Instructed to call MD with any problems.

## 2017-09-08 ENCOUNTER — OFFICE VISIT (OUTPATIENT)
Dept: HEMATOLOGY/ONCOLOGY | Facility: CLINIC | Age: 62
End: 2017-09-08
Payer: COMMERCIAL

## 2017-09-08 ENCOUNTER — INFUSION (OUTPATIENT)
Dept: INFUSION THERAPY | Facility: HOSPITAL | Age: 62
End: 2017-09-08
Attending: INTERNAL MEDICINE
Payer: COMMERCIAL

## 2017-09-08 VITALS
TEMPERATURE: 97 F | BODY MASS INDEX: 22.52 KG/M2 | DIASTOLIC BLOOD PRESSURE: 82 MMHG | HEIGHT: 68 IN | SYSTOLIC BLOOD PRESSURE: 147 MMHG | WEIGHT: 148.56 LBS | RESPIRATION RATE: 16 BRPM | HEART RATE: 72 BPM

## 2017-09-08 VITALS
RESPIRATION RATE: 16 BRPM | HEART RATE: 72 BPM | SYSTOLIC BLOOD PRESSURE: 147 MMHG | BODY MASS INDEX: 22.52 KG/M2 | TEMPERATURE: 97 F | WEIGHT: 148.56 LBS | DIASTOLIC BLOOD PRESSURE: 82 MMHG | HEIGHT: 68 IN

## 2017-09-08 DIAGNOSIS — C50.012 MALIGNANT NEOPLASM OF NIPPLE OF LEFT BREAST IN FEMALE, UNSPECIFIED ESTROGEN RECEPTOR STATUS: Primary | ICD-10-CM

## 2017-09-08 DIAGNOSIS — C50.912 HER2-POSITIVE CARCINOMA OF LEFT BREAST: ICD-10-CM

## 2017-09-08 DIAGNOSIS — C50.512 PRIMARY CANCER OF LOWER OUTER QUADRANT OF LEFT FEMALE BREAST: Primary | ICD-10-CM

## 2017-09-08 PROCEDURE — A4216 STERILE WATER/SALINE, 10 ML: HCPCS | Mod: PN | Performed by: INTERNAL MEDICINE

## 2017-09-08 PROCEDURE — 63600175 PHARM REV CODE 636 W HCPCS: Mod: PN | Performed by: INTERNAL MEDICINE

## 2017-09-08 PROCEDURE — 3008F BODY MASS INDEX DOCD: CPT | Mod: S$GLB,,, | Performed by: INTERNAL MEDICINE

## 2017-09-08 PROCEDURE — 99215 OFFICE O/P EST HI 40 MIN: CPT | Mod: S$GLB,,, | Performed by: INTERNAL MEDICINE

## 2017-09-08 PROCEDURE — S0028 INJECTION, FAMOTIDINE, 20 MG: HCPCS | Mod: PN | Performed by: INTERNAL MEDICINE

## 2017-09-08 PROCEDURE — 25000003 PHARM REV CODE 250: Mod: PN | Performed by: INTERNAL MEDICINE

## 2017-09-08 PROCEDURE — 99999 PR PBB SHADOW E&M-EST. PATIENT-LVL III: CPT | Mod: PBBFAC,,, | Performed by: INTERNAL MEDICINE

## 2017-09-08 PROCEDURE — 96367 TX/PROPH/DG ADDL SEQ IV INF: CPT | Mod: PN

## 2017-09-08 PROCEDURE — 96375 TX/PRO/DX INJ NEW DRUG ADDON: CPT | Mod: PN

## 2017-09-08 PROCEDURE — 96417 CHEMO IV INFUS EACH ADDL SEQ: CPT | Mod: PN

## 2017-09-08 PROCEDURE — 96413 CHEMO IV INFUSION 1 HR: CPT | Mod: PN

## 2017-09-08 RX ORDER — HEPARIN 100 UNIT/ML
500 SYRINGE INTRAVENOUS
Status: CANCELLED | OUTPATIENT
Start: 2017-10-06

## 2017-09-08 RX ORDER — FAMOTIDINE 20 MG/50ML
20 INJECTION, SOLUTION INTRAVENOUS
Status: COMPLETED | OUTPATIENT
Start: 2017-09-08 | End: 2017-09-08

## 2017-09-08 RX ORDER — SODIUM CHLORIDE 0.9 % (FLUSH) 0.9 %
10 SYRINGE (ML) INJECTION
Status: CANCELLED | OUTPATIENT
Start: 2017-10-06

## 2017-09-08 RX ORDER — HEPARIN 100 UNIT/ML
500 SYRINGE INTRAVENOUS
Status: CANCELLED | OUTPATIENT
Start: 2017-10-13

## 2017-09-08 RX ORDER — EPINEPHRINE 0.3 MG/.3ML
0.3 INJECTION SUBCUTANEOUS ONCE AS NEEDED
Status: CANCELLED | OUTPATIENT
Start: 2017-09-29 | End: 2017-10-02

## 2017-09-08 RX ORDER — FAMOTIDINE 20 MG/50ML
20 INJECTION, SOLUTION INTRAVENOUS
Status: CANCELLED
Start: 2017-10-06 | End: 2017-10-09

## 2017-09-08 RX ORDER — ONDANSETRON 2 MG/ML
8 INJECTION INTRAMUSCULAR; INTRAVENOUS
Status: CANCELLED
Start: 2017-10-06 | End: 2017-10-09

## 2017-09-08 RX ORDER — SODIUM CHLORIDE 0.9 % (FLUSH) 0.9 %
10 SYRINGE (ML) INJECTION
Status: DISCONTINUED | OUTPATIENT
Start: 2017-09-08 | End: 2017-09-08 | Stop reason: HOSPADM

## 2017-09-08 RX ORDER — SODIUM CHLORIDE 0.9 % (FLUSH) 0.9 %
10 SYRINGE (ML) INJECTION
Status: CANCELLED | OUTPATIENT
Start: 2017-09-29

## 2017-09-08 RX ORDER — DIPHENHYDRAMINE HYDROCHLORIDE 50 MG/ML
50 INJECTION INTRAMUSCULAR; INTRAVENOUS ONCE AS NEEDED
Status: CANCELLED | OUTPATIENT
Start: 2017-09-29 | End: 2017-10-02

## 2017-09-08 RX ORDER — SODIUM CHLORIDE 0.9 % (FLUSH) 0.9 %
10 SYRINGE (ML) INJECTION
Status: CANCELLED | OUTPATIENT
Start: 2017-10-13

## 2017-09-08 RX ORDER — ONDANSETRON 2 MG/ML
8 INJECTION INTRAMUSCULAR; INTRAVENOUS
Status: CANCELLED
Start: 2017-09-29 | End: 2017-10-02

## 2017-09-08 RX ORDER — FAMOTIDINE 20 MG/50ML
20 INJECTION, SOLUTION INTRAVENOUS
Status: CANCELLED
Start: 2017-09-29 | End: 2017-10-02

## 2017-09-08 RX ORDER — ONDANSETRON 2 MG/ML
8 INJECTION INTRAMUSCULAR; INTRAVENOUS
Status: COMPLETED | OUTPATIENT
Start: 2017-09-08 | End: 2017-09-08

## 2017-09-08 RX ORDER — ONDANSETRON 2 MG/ML
8 INJECTION INTRAMUSCULAR; INTRAVENOUS
Status: CANCELLED
Start: 2017-10-13 | End: 2017-10-16

## 2017-09-08 RX ORDER — EPINEPHRINE 0.3 MG/.3ML
0.3 INJECTION SUBCUTANEOUS ONCE AS NEEDED
Status: CANCELLED | OUTPATIENT
Start: 2017-10-13 | End: 2017-10-16

## 2017-09-08 RX ORDER — DIPHENHYDRAMINE HYDROCHLORIDE 50 MG/ML
50 INJECTION INTRAMUSCULAR; INTRAVENOUS ONCE AS NEEDED
Status: CANCELLED | OUTPATIENT
Start: 2017-10-13 | End: 2017-10-16

## 2017-09-08 RX ORDER — DIPHENHYDRAMINE HYDROCHLORIDE 50 MG/ML
50 INJECTION INTRAMUSCULAR; INTRAVENOUS ONCE AS NEEDED
Status: CANCELLED | OUTPATIENT
Start: 2017-10-06 | End: 2017-10-09

## 2017-09-08 RX ORDER — EPINEPHRINE 0.3 MG/.3ML
0.3 INJECTION SUBCUTANEOUS ONCE AS NEEDED
Status: CANCELLED | OUTPATIENT
Start: 2017-10-06 | End: 2017-10-09

## 2017-09-08 RX ORDER — FAMOTIDINE 20 MG/50ML
20 INJECTION, SOLUTION INTRAVENOUS
Status: CANCELLED
Start: 2017-10-13 | End: 2017-10-16

## 2017-09-08 RX ORDER — HEPARIN 100 UNIT/ML
500 SYRINGE INTRAVENOUS
Status: CANCELLED | OUTPATIENT
Start: 2017-09-29

## 2017-09-08 RX ADMIN — ONDANSETRON 8 MG: 2 INJECTION INTRAMUSCULAR; INTRAVENOUS at 12:09

## 2017-09-08 RX ADMIN — DIPHENHYDRAMINE HYDROCHLORIDE 50 MG: 50 INJECTION, SOLUTION INTRAMUSCULAR; INTRAVENOUS at 12:09

## 2017-09-08 RX ADMIN — FAMOTIDINE 20 MG: 20 INJECTION, SOLUTION INTRAVENOUS at 12:09

## 2017-09-08 RX ADMIN — SODIUM CHLORIDE 20 MG: 9 INJECTION, SOLUTION INTRAVENOUS at 12:09

## 2017-09-08 RX ADMIN — PERTUZUMAB 420 MG: 30 INJECTION, SOLUTION, CONCENTRATE INTRAVENOUS at 01:09

## 2017-09-08 RX ADMIN — SODIUM CHLORIDE, PRESERVATIVE FREE 10 ML: 5 INJECTION INTRAVENOUS at 04:09

## 2017-09-08 RX ADMIN — TRASTUZUMAB 396 MG: 150 INJECTION, POWDER, LYOPHILIZED, FOR SOLUTION INTRAVENOUS at 02:09

## 2017-09-08 RX ADMIN — PACLITAXEL 144 MG: 6 INJECTION, SOLUTION, CONCENTRATE INTRAVENOUS at 03:09

## 2017-09-08 RX ADMIN — SODIUM CHLORIDE: 0.9 INJECTION, SOLUTION INTRAVENOUS at 12:09

## 2017-09-08 NOTE — PROGRESS NOTES
A 62-year-old  woman coming in for followup.  She is due for cycle 4 of   Adriamycin and Cytoxan.  The patient is receiving neoadjuvant chemotherapy in   lieu of ER/[place] negative, HER-2 positive invasive breast carcinoma of the left   breast.  The patient is also seeing Dr. Alexander Arce.  On weekly taxol now. With herceptin and perjeta q 3 weeks with neupogen support as needed.     The patient had a palpable left axillary lymph node.  She is   a clinical stage IIB at least.  The patient had a PET scan done that was April 2017 that shows no evidence of metastatic lesions elsewhere, but showed the left   primary breast malignancy and ipsilateral axillary metastatic lymphadenopathy.    She had pulmonary nodules in the right lower side.  She had bilateral breast   MRIs  which was a challenging study because the patient   had prior implants in both her breasts.  The lesion roughly measured about 2.7   cm.  The left axillary lymph node measured about 2.6 cm on the MRI.current clinical improvement without the mass being palpable ,     REVIEW OF SYSTEMS: denies changes today, occ cramping to abd and alt diarrhea and constipation, weak and tired  PHYSICAL EXAMINATION:  Wt Readings from Last 3 Encounters:   09/05/17 66.9 kg (147 lb 7.8 oz)   09/01/17 66.9 kg (147 lb 7.8 oz)   09/01/17 66.9 kg (147 lb 7.8 oz)     Temp Readings from Last 3 Encounters:   09/06/17 98.3 °F (36.8 °C)   09/05/17 98.2 °F (36.8 °C)   09/01/17 97.6 °F (36.4 °C)     BP Readings from Last 3 Encounters:   09/06/17 (!) 142/83   09/05/17 (!) 140/86   09/01/17 (!) 135/93     Pulse Readings from Last 3 Encounters:   09/06/17 74   09/05/17 77   09/01/17 74     GENERAL:  Alopecic.  VITAL SIGNS:  Stable.  Ambulatory to clinic.  147lbs inches.  BSA of 1.75.  HEENT:  Showed no congestion.  NECK:  Supple, without JVD.  Trachea is central.  LUNGS:  Clear to auscultation, right-sided MediPort.  ABDOMEN:  Soft, no rebound, guarding or rigidity.  Axilla on  the left reveals   lymph node slightly smaller in size.  This is palpable still.  NEUROLOGIC:  The patient is appropriate, very pleasant.  EXTREMITIES:  Showing no edema.  Lab Results   Component Value Date    WBC 14.20 (H) 09/01/2017    HGB 10.8 (L) 09/01/2017    HCT 32.1 (L) 09/01/2017     (H) 09/01/2017     09/01/2017     CMP  Sodium   Date Value Ref Range Status   09/01/2017 140 136 - 145 mmol/L Final     Potassium   Date Value Ref Range Status   09/01/2017 4.0 3.5 - 5.1 mmol/L Final     Chloride   Date Value Ref Range Status   09/01/2017 105 95 - 110 mmol/L Final     CO2   Date Value Ref Range Status   09/01/2017 27 22 - 31 mmol/L Final     Glucose   Date Value Ref Range Status   09/01/2017 89 70 - 110 mg/dL Final     Comment:     The ADA recommends the following guidelines for fasting glucose:  Normal:       less than 100 mg/dL  Prediabetes:  100 mg/dL to 125 mg/dL  Diabetes:     126 mg/dL or higher       BUN, Bld   Date Value Ref Range Status   09/01/2017 10 7 - 18 mg/dL Final     Creatinine   Date Value Ref Range Status   09/01/2017 0.70 0.50 - 1.40 mg/dL Final     Calcium   Date Value Ref Range Status   09/01/2017 10.0 8.4 - 10.2 mg/dL Final     Total Protein   Date Value Ref Range Status   09/01/2017 7.0 6.0 - 8.4 g/dL Final     Albumin   Date Value Ref Range Status   09/01/2017 4.2 3.5 - 5.2 g/dL Final     Total Bilirubin   Date Value Ref Range Status   09/01/2017 0.4 0.2 - 1.3 mg/dL Final     Alkaline Phosphatase   Date Value Ref Range Status   09/01/2017 128 38 - 145 U/L Final     AST   Date Value Ref Range Status   09/01/2017 64 (H) 14 - 36 U/L Final     ALT   Date Value Ref Range Status   09/01/2017 70 (H) 10 - 44 U/L Final     Anion Gap   Date Value Ref Range Status   09/01/2017 8 8 - 16 mmol/L Final     eGFR if    Date Value Ref Range Status   09/01/2017 >60 >60 mL/min/1.73 m^2 Final     eGFR if non    Date Value Ref Range Status   09/01/2017 >60 >60  mL/min/1.73 m^2 Final     Comment:     Calculation used to obtain the estimated glomerular filtration  rate (eGFR) is the CKD-EPI equation. Since race is unknown   in our information system, the eGFR values for   -American and Non--American patients are given   for each creatinine result.       IMPRESSION:  1.  Neoadjuvant chemotherapy for a clinical stage IIB breast cancer with large   palpable lymph nodes to the left axilla and an MRI measurement of breast tumor   at 2.7 cm.  proceed with taxol  and cont   perjeta and herceptin q 3 weeks   recd neupogen last week and counts picked up well for rx this week no neupogen this time  Continue with..Taxol weekly regimen, Herceptin 8 mg/k loading dose followed by 6 mg q. 3 weeks and Perjeta   as ordered.    Will add neupogen for Monday and Tuesday after today to get wbc up I time for next weeks rx    CBC and CMP for the next week  The patient will be sent to radiation ( may enroll if study in [facility] that may leave out axillary disection/ xrt)  evaluation after completing of surgery, which can be accomplished if Taxol is   completed in 12 weeks during infusion Perjeta and Herceptin.

## 2017-09-15 ENCOUNTER — OFFICE VISIT (OUTPATIENT)
Dept: HEMATOLOGY/ONCOLOGY | Facility: CLINIC | Age: 62
End: 2017-09-15
Payer: COMMERCIAL

## 2017-09-15 ENCOUNTER — INFUSION (OUTPATIENT)
Dept: INFUSION THERAPY | Facility: HOSPITAL | Age: 62
End: 2017-09-15
Attending: INTERNAL MEDICINE
Payer: COMMERCIAL

## 2017-09-15 VITALS
RESPIRATION RATE: 20 BRPM | WEIGHT: 147.5 LBS | BODY MASS INDEX: 22.35 KG/M2 | TEMPERATURE: 98 F | SYSTOLIC BLOOD PRESSURE: 119 MMHG | HEIGHT: 68 IN | DIASTOLIC BLOOD PRESSURE: 69 MMHG | HEART RATE: 76 BPM

## 2017-09-15 VITALS
RESPIRATION RATE: 16 BRPM | WEIGHT: 147.5 LBS | BODY MASS INDEX: 22.35 KG/M2 | SYSTOLIC BLOOD PRESSURE: 140 MMHG | DIASTOLIC BLOOD PRESSURE: 76 MMHG | HEART RATE: 76 BPM | TEMPERATURE: 98 F | HEIGHT: 68 IN

## 2017-09-15 DIAGNOSIS — C50.912 HER2-POSITIVE CARCINOMA OF LEFT BREAST: Primary | ICD-10-CM

## 2017-09-15 DIAGNOSIS — C50.012 MALIGNANT NEOPLASM OF NIPPLE OF LEFT BREAST IN FEMALE, UNSPECIFIED ESTROGEN RECEPTOR STATUS: Primary | ICD-10-CM

## 2017-09-15 PROCEDURE — 96367 TX/PROPH/DG ADDL SEQ IV INF: CPT | Mod: PN

## 2017-09-15 PROCEDURE — 99999 PR PBB SHADOW E&M-EST. PATIENT-LVL IV: CPT | Mod: PBBFAC,,, | Performed by: NURSE PRACTITIONER

## 2017-09-15 PROCEDURE — 99214 OFFICE O/P EST MOD 30 MIN: CPT | Mod: S$GLB,,, | Performed by: NURSE PRACTITIONER

## 2017-09-15 PROCEDURE — S0028 INJECTION, FAMOTIDINE, 20 MG: HCPCS | Mod: PN | Performed by: INTERNAL MEDICINE

## 2017-09-15 PROCEDURE — 25000003 PHARM REV CODE 250: Mod: PN | Performed by: INTERNAL MEDICINE

## 2017-09-15 PROCEDURE — A4216 STERILE WATER/SALINE, 10 ML: HCPCS | Mod: PN | Performed by: INTERNAL MEDICINE

## 2017-09-15 PROCEDURE — 3008F BODY MASS INDEX DOCD: CPT | Mod: S$GLB,,, | Performed by: NURSE PRACTITIONER

## 2017-09-15 PROCEDURE — 96413 CHEMO IV INFUSION 1 HR: CPT | Mod: PN

## 2017-09-15 PROCEDURE — 63600175 PHARM REV CODE 636 W HCPCS: Mod: PN | Performed by: INTERNAL MEDICINE

## 2017-09-15 PROCEDURE — 96375 TX/PRO/DX INJ NEW DRUG ADDON: CPT | Mod: PN

## 2017-09-15 RX ORDER — FAMOTIDINE 20 MG/50ML
20 INJECTION, SOLUTION INTRAVENOUS
Status: COMPLETED | OUTPATIENT
Start: 2017-09-15 | End: 2017-09-15

## 2017-09-15 RX ORDER — SODIUM CHLORIDE 0.9 % (FLUSH) 0.9 %
10 SYRINGE (ML) INJECTION
Status: DISCONTINUED | OUTPATIENT
Start: 2017-09-15 | End: 2017-09-15 | Stop reason: HOSPADM

## 2017-09-15 RX ORDER — ONDANSETRON 2 MG/ML
8 INJECTION INTRAMUSCULAR; INTRAVENOUS
Status: COMPLETED | OUTPATIENT
Start: 2017-09-15 | End: 2017-09-15

## 2017-09-15 RX ADMIN — ONDANSETRON 8 MG: 2 INJECTION INTRAMUSCULAR; INTRAVENOUS at 01:09

## 2017-09-15 RX ADMIN — SODIUM CHLORIDE 20 MG: 9 INJECTION, SOLUTION INTRAVENOUS at 01:09

## 2017-09-15 RX ADMIN — PACLITAXEL 144 MG: 6 INJECTION, SOLUTION, CONCENTRATE INTRAVENOUS at 02:09

## 2017-09-15 RX ADMIN — SODIUM CHLORIDE: 0.9 INJECTION, SOLUTION INTRAVENOUS at 01:09

## 2017-09-15 RX ADMIN — SODIUM CHLORIDE, PRESERVATIVE FREE 10 ML: 5 INJECTION INTRAVENOUS at 04:09

## 2017-09-15 RX ADMIN — FAMOTIDINE 20 MG: 20 INJECTION, SOLUTION INTRAVENOUS at 02:09

## 2017-09-15 RX ADMIN — DIPHENHYDRAMINE HYDROCHLORIDE 50 MG: 50 INJECTION, SOLUTION INTRAMUSCULAR; INTRAVENOUS at 02:09

## 2017-09-15 NOTE — PLAN OF CARE
Problem: Patient Care Overview  Goal: Plan of Care Review  Outcome: Ongoing (interventions implemented as appropriate)  Pt tolerated Taxol infusion well.  No s/s of infusion reaction noted.  Instructed to call MD with any problems.

## 2017-09-18 ENCOUNTER — DOCUMENTATION ONLY (OUTPATIENT)
Dept: INFUSION THERAPY | Facility: HOSPITAL | Age: 62
End: 2017-09-18

## 2017-09-22 ENCOUNTER — OFFICE VISIT (OUTPATIENT)
Dept: HEMATOLOGY/ONCOLOGY | Facility: CLINIC | Age: 62
End: 2017-09-22
Payer: COMMERCIAL

## 2017-09-22 ENCOUNTER — LAB VISIT (OUTPATIENT)
Dept: LAB | Facility: HOSPITAL | Age: 62
End: 2017-09-22
Attending: INTERNAL MEDICINE
Payer: COMMERCIAL

## 2017-09-22 ENCOUNTER — INFUSION (OUTPATIENT)
Dept: INFUSION THERAPY | Facility: HOSPITAL | Age: 62
End: 2017-09-22
Attending: INTERNAL MEDICINE
Payer: COMMERCIAL

## 2017-09-22 VITALS
WEIGHT: 148.56 LBS | DIASTOLIC BLOOD PRESSURE: 72 MMHG | SYSTOLIC BLOOD PRESSURE: 136 MMHG | RESPIRATION RATE: 16 BRPM | BODY MASS INDEX: 22.52 KG/M2 | TEMPERATURE: 98 F | HEART RATE: 80 BPM | HEIGHT: 68 IN

## 2017-09-22 DIAGNOSIS — C50.012 MALIGNANT NEOPLASM OF NIPPLE OF LEFT BREAST IN FEMALE, UNSPECIFIED ESTROGEN RECEPTOR STATUS: Primary | ICD-10-CM

## 2017-09-22 DIAGNOSIS — C50.012 MALIGNANT NEOPLASM OF NIPPLE OF LEFT BREAST IN FEMALE, UNSPECIFIED ESTROGEN RECEPTOR STATUS: ICD-10-CM

## 2017-09-22 DIAGNOSIS — C50.912 HER2-POSITIVE CARCINOMA OF LEFT BREAST: ICD-10-CM

## 2017-09-22 DIAGNOSIS — C50.512 PRIMARY CANCER OF LOWER OUTER QUADRANT OF LEFT FEMALE BREAST: ICD-10-CM

## 2017-09-22 DIAGNOSIS — C50.912 HER2-POSITIVE CARCINOMA OF LEFT BREAST: Primary | ICD-10-CM

## 2017-09-22 LAB
ALBUMIN SERPL BCP-MCNC: 4.5 G/DL
ALP SERPL-CCNC: 95 U/L
ALT SERPL W/O P-5'-P-CCNC: 50 U/L
ANION GAP SERPL CALC-SCNC: 8 MMOL/L
AST SERPL-CCNC: 36 U/L
BASOPHILS # BLD AUTO: 0.05 K/UL
BASOPHILS NFR BLD: 1.2 %
BILIRUB SERPL-MCNC: 0.4 MG/DL
BUN SERPL-MCNC: 13 MG/DL
CALCIUM SERPL-MCNC: 10.3 MG/DL
CHLORIDE SERPL-SCNC: 104 MMOL/L
CO2 SERPL-SCNC: 26 MMOL/L
CREAT SERPL-MCNC: 0.68 MG/DL
DIFFERENTIAL METHOD: ABNORMAL
EOSINOPHIL # BLD AUTO: 0.1 K/UL
EOSINOPHIL NFR BLD: 2.8 %
ERYTHROCYTE [DISTWIDTH] IN BLOOD BY AUTOMATED COUNT: 12.3 %
EST. GFR  (AFRICAN AMERICAN): >60 ML/MIN/1.73 M^2
EST. GFR  (NON AFRICAN AMERICAN): >60 ML/MIN/1.73 M^2
GLUCOSE SERPL-MCNC: 98 MG/DL
HCT VFR BLD AUTO: 34.4 %
HGB BLD-MCNC: 11.6 G/DL
LYMPHOCYTES # BLD AUTO: 1.2 K/UL
LYMPHOCYTES NFR BLD: 27.6 %
MCH RBC QN AUTO: 35.8 PG
MCHC RBC AUTO-ENTMCNC: 33.7 G/DL
MCV RBC AUTO: 106 FL
MONOCYTES # BLD AUTO: 0.4 K/UL
MONOCYTES NFR BLD: 9.5 %
NEUTROPHILS # BLD AUTO: 2.5 K/UL
NEUTROPHILS NFR BLD: 58.9 %
NRBC BLD-RTO: 0 /100 WBC
PLATELET # BLD AUTO: 231 K/UL
PMV BLD AUTO: 10 FL
POTASSIUM SERPL-SCNC: 3.8 MMOL/L
PROT SERPL-MCNC: 7.1 G/DL
RBC # BLD AUTO: 3.24 M/UL
SODIUM SERPL-SCNC: 138 MMOL/L
WBC # BLD AUTO: 4.31 K/UL

## 2017-09-22 PROCEDURE — 80053 COMPREHEN METABOLIC PANEL: CPT | Mod: PN

## 2017-09-22 PROCEDURE — 3008F BODY MASS INDEX DOCD: CPT | Mod: S$GLB,,, | Performed by: INTERNAL MEDICINE

## 2017-09-22 PROCEDURE — 36415 COLL VENOUS BLD VENIPUNCTURE: CPT | Mod: PN

## 2017-09-22 PROCEDURE — 96367 TX/PROPH/DG ADDL SEQ IV INF: CPT | Mod: PN

## 2017-09-22 PROCEDURE — 80053 COMPREHEN METABOLIC PANEL: CPT

## 2017-09-22 PROCEDURE — 85025 COMPLETE CBC W/AUTO DIFF WBC: CPT | Mod: PN

## 2017-09-22 PROCEDURE — 25000003 PHARM REV CODE 250: Mod: PN | Performed by: INTERNAL MEDICINE

## 2017-09-22 PROCEDURE — 99999 PR PBB SHADOW E&M-EST. PATIENT-LVL III: CPT | Mod: PBBFAC,,, | Performed by: INTERNAL MEDICINE

## 2017-09-22 PROCEDURE — 99215 OFFICE O/P EST HI 40 MIN: CPT | Mod: S$GLB,,, | Performed by: INTERNAL MEDICINE

## 2017-09-22 PROCEDURE — 96413 CHEMO IV INFUSION 1 HR: CPT | Mod: PN

## 2017-09-22 PROCEDURE — A4216 STERILE WATER/SALINE, 10 ML: HCPCS | Mod: PN | Performed by: INTERNAL MEDICINE

## 2017-09-22 PROCEDURE — 63600175 PHARM REV CODE 636 W HCPCS: Mod: PN | Performed by: INTERNAL MEDICINE

## 2017-09-22 PROCEDURE — 85025 COMPLETE CBC W/AUTO DIFF WBC: CPT

## 2017-09-22 PROCEDURE — S0028 INJECTION, FAMOTIDINE, 20 MG: HCPCS | Mod: PN | Performed by: INTERNAL MEDICINE

## 2017-09-22 PROCEDURE — 96375 TX/PRO/DX INJ NEW DRUG ADDON: CPT | Mod: PN

## 2017-09-22 RX ORDER — SODIUM CHLORIDE 0.9 % (FLUSH) 0.9 %
10 SYRINGE (ML) INJECTION
Status: DISCONTINUED | OUTPATIENT
Start: 2017-09-22 | End: 2017-09-22 | Stop reason: HOSPADM

## 2017-09-22 RX ORDER — FAMOTIDINE 20 MG/50ML
20 INJECTION, SOLUTION INTRAVENOUS
Status: COMPLETED | OUTPATIENT
Start: 2017-09-22 | End: 2017-09-22

## 2017-09-22 RX ORDER — ONDANSETRON 2 MG/ML
8 INJECTION INTRAMUSCULAR; INTRAVENOUS
Status: COMPLETED | OUTPATIENT
Start: 2017-09-22 | End: 2017-09-22

## 2017-09-22 RX ADMIN — PACLITAXEL 144 MG: 6 INJECTION, SOLUTION, CONCENTRATE INTRAVENOUS at 03:09

## 2017-09-22 RX ADMIN — FAMOTIDINE 20 MG: 20 INJECTION, SOLUTION INTRAVENOUS at 03:09

## 2017-09-22 RX ADMIN — ONDANSETRON 8 MG: 2 INJECTION INTRAMUSCULAR; INTRAVENOUS at 02:09

## 2017-09-22 RX ADMIN — DIPHENHYDRAMINE HYDROCHLORIDE 50 MG: 50 INJECTION, SOLUTION INTRAMUSCULAR; INTRAVENOUS at 03:09

## 2017-09-22 RX ADMIN — SODIUM CHLORIDE, PRESERVATIVE FREE 10 ML: 5 INJECTION INTRAVENOUS at 05:09

## 2017-09-22 RX ADMIN — SODIUM CHLORIDE: 0.9 INJECTION, SOLUTION INTRAVENOUS at 02:09

## 2017-09-22 RX ADMIN — SODIUM CHLORIDE 20 MG: 9 INJECTION, SOLUTION INTRAVENOUS at 03:09

## 2017-09-22 NOTE — PROGRESS NOTES
Per Dr. Ragsdale pt has completed 12 cycles of taxol and last dose of taxol is today. Continue with perjeta/Herceptin every 3 weeks

## 2017-09-22 NOTE — PLAN OF CARE
Problem: Patient Care Overview  Goal: Plan of Care Review  Outcome: Ongoing (interventions implemented as appropriate)  Pt tolerated Taxol infusion well.  No s/s of infusion reaction noted.  Pt instructed to call MD with any problems.

## 2017-09-22 NOTE — PROGRESS NOTES
HISTORY OF PRESENT ILLNESS:  This is a 62-year-old white female known to Dr. Ragsdale for a recent diagnosis of Stage IIB left breast carcinoma.  The patient discovered   a mass in her left breast and was seen by Dr. Arce.  She has received 4 cycles of   A/C and presents to the clinic today for evaluation prior to Cycle 4, Day 8 of Taxol.    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  She has bouts of   diarrhea that resolve easily with Imodium. She denies any fevers, chills, unexplained   weight loss, new breast complaints, vaginal bleeding, abdominal discomfort, nausea,   vomiting, diarrhea, mouth sores, etc.  No new complaints or pertinent findings on a   14 point review of systems.    PHYSICAL EXAMINATION:  GENERAL:  Well-developed, well-nourished white female in no acute distress.    Alert and oriented x4.  VITAL SIGNS:  Weight 147.5 pounds (decrease of 1 pound/1 week), /76, pulse 76,   respirations 16, temperature 97.9.  HEENT:  Normocephalic, atraumatic.  Oral mucosa pink and moist.  Lips without   lesions.  Tongue midline.  Oropharynx clear.  Nonicteric sclerae.  NECK:  Supple, no adenopathy.  No carotid bruits, thyromegaly or thyroid nodule.  HEART:  Regular rate and rhythm without murmur, gallop.  LUNGS:  Clear to auscultation bilaterally.  Normal respiratory effort.  ABDOMEN:  Soft, nontender, nondistended with positive normoactive bowel sounds,   no hepatosplenomegaly.  EXTREMITIES:  No cyanosis, clubbing or edema.  Distal pulses are intact.  AXILLAE AND GROIN:  No palpable pathologic lymphadenopathy is appreciated.  SKIN:  Intact/turgor normal.  NEUROLOGIC:  Cranial nerves II-XII grossly intact.  Motor:  Good muscle bulk and   tone.  Strength/sensory 5/5 throughout.  Gait stable.    LABORATORY:    Lab Results   Component Value Date    WBC 4.31 09/22/2017    HGB 11.6 (L) 09/22/2017    HCT 34.4 (L) 09/22/2017     (H) 09/22/2017     09/22/2017     Differential  remarkable for 71.3% grans, 5% lymph, 17% monos    CMP  Sodium   Date Value Ref Range Status   09/22/2017 138 136 - 145 mmol/L Final     Potassium   Date Value Ref Range Status   09/22/2017 3.8 3.5 - 5.1 mmol/L Final     Chloride   Date Value Ref Range Status   09/22/2017 104 95 - 110 mmol/L Final     CO2   Date Value Ref Range Status   09/22/2017 26 22 - 31 mmol/L Final     Glucose   Date Value Ref Range Status   09/22/2017 98 70 - 110 mg/dL Final     Comment:     The ADA recommends the following guidelines for fasting glucose:  Normal:       less than 100 mg/dL  Prediabetes:  100 mg/dL to 125 mg/dL  Diabetes:     126 mg/dL or higher       BUN, Bld   Date Value Ref Range Status   09/22/2017 13 7 - 18 mg/dL Final     Creatinine   Date Value Ref Range Status   09/22/2017 0.68 0.50 - 1.40 mg/dL Final     Calcium   Date Value Ref Range Status   09/22/2017 10.3 (H) 8.4 - 10.2 mg/dL Final     Total Protein   Date Value Ref Range Status   09/22/2017 7.1 6.0 - 8.4 g/dL Final     Albumin   Date Value Ref Range Status   09/22/2017 4.5 3.5 - 5.2 g/dL Final     Total Bilirubin   Date Value Ref Range Status   09/22/2017 0.4 0.2 - 1.3 mg/dL Final     Alkaline Phosphatase   Date Value Ref Range Status   09/22/2017 95 38 - 145 U/L Final     AST   Date Value Ref Range Status   09/22/2017 36 14 - 36 U/L Final     ALT   Date Value Ref Range Status   09/22/2017 50 (H) 10 - 44 U/L Final     Anion Gap   Date Value Ref Range Status   09/22/2017 8 8 - 16 mmol/L Final     eGFR if    Date Value Ref Range Status   09/22/2017 >60 >60 mL/min/1.73 m^2 Final     eGFR if non    Date Value Ref Range Status   09/22/2017 >60 >60 mL/min/1.73 m^2 Final     Comment:     Calculation used to obtain the estimated glomerular filtration  rate (eGFR) is the CKD-EPI equation. Since race is unknown   in our information system, the eGFR values for   -American and Non--American patients are given   for each  creatinine result.       IMPRESSION:  Stage IIB left breast carcinoma (ER/[place] negative, Her-2/clay positive)    PLAN:  1.  Proceed with 12th dose of taxol and last , rtc for herceptin and perjeta due 3 weeks from last    Off dexamethasone  2.  Follow up in clinic with interval CBC, CMP

## 2017-09-25 ENCOUNTER — INFUSION (OUTPATIENT)
Dept: INFUSION THERAPY | Facility: HOSPITAL | Age: 62
End: 2017-09-25
Attending: INTERNAL MEDICINE
Payer: COMMERCIAL

## 2017-09-25 VITALS
RESPIRATION RATE: 14 BRPM | WEIGHT: 149 LBS | DIASTOLIC BLOOD PRESSURE: 75 MMHG | TEMPERATURE: 98 F | HEART RATE: 62 BPM | HEIGHT: 68 IN | SYSTOLIC BLOOD PRESSURE: 150 MMHG | BODY MASS INDEX: 22.58 KG/M2

## 2017-09-25 DIAGNOSIS — C50.012 MALIGNANT NEOPLASM OF NIPPLE OF LEFT BREAST IN FEMALE, UNSPECIFIED ESTROGEN RECEPTOR STATUS: Primary | ICD-10-CM

## 2017-09-25 PROCEDURE — 96372 THER/PROPH/DIAG INJ SC/IM: CPT | Mod: PN

## 2017-09-25 PROCEDURE — 63600175 PHARM REV CODE 636 W HCPCS: Mod: PN | Performed by: INTERNAL MEDICINE

## 2017-09-25 RX ADMIN — FILGRASTIM 300 MCG: 300 INJECTION, SOLUTION INTRAVENOUS; SUBCUTANEOUS at 11:09

## 2017-09-26 ENCOUNTER — INFUSION (OUTPATIENT)
Dept: INFUSION THERAPY | Facility: HOSPITAL | Age: 62
End: 2017-09-26
Attending: INTERNAL MEDICINE
Payer: COMMERCIAL

## 2017-09-26 ENCOUNTER — PATIENT MESSAGE (OUTPATIENT)
Dept: HEMATOLOGY/ONCOLOGY | Facility: CLINIC | Age: 62
End: 2017-09-26

## 2017-09-26 VITALS
RESPIRATION RATE: 14 BRPM | DIASTOLIC BLOOD PRESSURE: 70 MMHG | HEART RATE: 75 BPM | SYSTOLIC BLOOD PRESSURE: 133 MMHG | TEMPERATURE: 98 F

## 2017-09-26 DIAGNOSIS — C50.919 MALIGNANT NEOPLASM OF FEMALE BREAST, UNSPECIFIED ESTROGEN RECEPTOR STATUS, UNSPECIFIED LATERALITY, UNSPECIFIED SITE OF BREAST: Primary | ICD-10-CM

## 2017-09-26 DIAGNOSIS — C50.012 MALIGNANT NEOPLASM OF NIPPLE OF LEFT BREAST IN FEMALE, UNSPECIFIED ESTROGEN RECEPTOR STATUS: Primary | ICD-10-CM

## 2017-09-26 PROCEDURE — 96372 THER/PROPH/DIAG INJ SC/IM: CPT | Mod: PN

## 2017-09-26 PROCEDURE — 63600175 PHARM REV CODE 636 W HCPCS: Mod: PN | Performed by: INTERNAL MEDICINE

## 2017-09-26 RX ADMIN — FILGRASTIM 300 MCG: 300 INJECTION, SOLUTION INTRAVENOUS; SUBCUTANEOUS at 11:09

## 2017-09-26 NOTE — PLAN OF CARE
Problem: Patient Care Overview  Goal: Plan of Care Review  Outcome: Ongoing (interventions implemented as appropriate)  Pt tolerated injection well without complaints. D/C to home with steady gait. VSS.

## 2017-09-26 NOTE — PATIENT INSTRUCTIONS
Oncology: Preventing Infections  Chemotherapy can make your body less able to fight off infection. This happens because treatment reduces the number of white blood cells. White blood cells fight infection in your body. To help prevent infections, follow the tips below.     Scrub your hands with soap and warm water for at least 15 seconds.   Know your harry  The harry is the time during your chemotherapy cycle when you have the fewest white blood cells. The length of your harry and when it occurs depend on the medicines you are taking. Each medicine has its own harry. Talk with your doctor or nurse about your harry period. Then take extra precautions to prevent infection at that time.  Protect yourself  · Keep your hands clean. To reduce your risk of infection, bathe every day and wash your hands often throughout the day. For best results, lather them with soap for at least 15 seconds. Wash your hands before eating, after spending time in public places, and after using the bathroom.  · Stay away from some foods. Limit your risk. Dont eat uncooked or undercooked meat or fish. You may also be told not to eat raw vegetables or thin-skinned fruits during your harry.  · Reduce your risk for illness. During this time your body is less able to fight off colds, measles, and other illnesses. Stay away from anyone who has a fever or an infection. Also stay away from large crowds during your harry.  · Wear gloves. Make it harder for infection to enter your body. Wear gloves when you work around germs and dirt. Have someone else clean a pets tank, cage, or litter box.  · Try not to accidentally cut yourself. Protect your feet from injury and germs by not walking barefoot.  How medicines can help  · Prevent and treat infection. Antibiotics work in this way by attacking and killing the germs that cause infection.  · Trigger new cell growth. These medicines cause your body to make new white blood cells. Neupogen is an example  of such a medicine.  Talk with your doctor about the best medicines for you. In some cases, your doctor may tell you to not take acetaminophen or NSAIDs for pain relief because these medicines can mask a fever if you have neutropenia. Talk with your doctor about medicines for pain control if you need it during your harry period.  When to seek medical advice  Contact your doctor right away if you have any of the following:  · Fever of 100.4ºF (38ºC) or higher, or as directed by your healthcare provider  · Burning when you urinate  · Severe coughing or sore throat  · Shortness of breath, sweating, or chills  · Pain, especially near an open wound or catheter site   Date Last Reviewed: 1/3/2016  © 4996-1148 [employer]. [address] [place] [address], [place] [address]. All rights reserved. This information is not intended as a substitute for professional medical care. Always follow your healthcare professional's instructions.        Understanding Neupogen     You may be able to get Neupogen injections at home.      Your doctor has prescribed the medication Neupogen for you. Neupogen increases the number of infection-fighting cells in your blood following a chemotherapy treatment. It is given by injection (a shot). Your doctor or pharmacist can give you information about getting the injections. This sheet will help you learn more about Neupogen and how it is given at home, or in a clinic or hospital.  What Neupogen can do for you  · Improve your quality of life.  · Make you less prone to infection.  · Make it safe for you to be in close contact with people. And as a result, you can do more.  · Prevent illness that could cause a delay in your treatment.  How it works  · When you have a chemotherapy treatment, the number of infection-fighting cells in your blood is reduced.  · As the number of cells decreases, you are less able to fight infection.  · Neupogen works by helping these infection-fighting blood cells  rebuild faster inside of your bones.  Coping with side effects  · Common side effects are aching bones, joints, and muscles, and redness, swelling, or itching at the site of injection.  · These symptoms can often be relieved with a heating pad and/or pain medications that dont contain aspirin. Check with your doctor before you take anything for pain.  · Rare side effects include headache, pain in the lower back or pelvis, skin rash or itching, and nausea. Report all side effects to your doctor.  When should I call my healthcare provider?  Call your healthcare provider right away if any of these occur:  · Possible signs of infection, such as fever, chills, rash, sore throat, diarrhea, or redness at the site of a wound or sore  · Pain in the left upper stomach area or left shoulder area  (this could be a sign of spleen enlargement, a rare side effect of neupogen treatment)  · Shortness of breath, wheezing, dizziness, swelling around the mouth or eyes, quick pulse, or sweating  · Redness, swelling, or itching at the site of injection   Date Last Reviewed: 5/1/2016  © 0396-9282 [employer]. [address] [place] [address], [place] [address]. All rights reserved. This information is not intended as a substitute for professional medical care. Always follow your healthcare professional's instructions.

## 2017-09-29 ENCOUNTER — OFFICE VISIT (OUTPATIENT)
Dept: HEMATOLOGY/ONCOLOGY | Facility: CLINIC | Age: 62
End: 2017-09-29
Payer: COMMERCIAL

## 2017-09-29 ENCOUNTER — INFUSION (OUTPATIENT)
Dept: INFUSION THERAPY | Facility: HOSPITAL | Age: 62
End: 2017-09-29
Attending: INTERNAL MEDICINE
Payer: COMMERCIAL

## 2017-09-29 VITALS
HEART RATE: 68 BPM | SYSTOLIC BLOOD PRESSURE: 149 MMHG | RESPIRATION RATE: 18 BRPM | TEMPERATURE: 98 F | HEIGHT: 68 IN | BODY MASS INDEX: 22.29 KG/M2 | DIASTOLIC BLOOD PRESSURE: 70 MMHG | WEIGHT: 147.06 LBS

## 2017-09-29 DIAGNOSIS — C50.012 MALIGNANT NEOPLASM OF NIPPLE OF LEFT BREAST IN FEMALE, UNSPECIFIED ESTROGEN RECEPTOR STATUS: Primary | ICD-10-CM

## 2017-09-29 DIAGNOSIS — C50.912 HER2-POSITIVE CARCINOMA OF LEFT BREAST: ICD-10-CM

## 2017-09-29 DIAGNOSIS — C50.011 MALIGNANT NEOPLASM OF NIPPLE OF RIGHT BREAST IN FEMALE, UNSPECIFIED ESTROGEN RECEPTOR STATUS: Primary | ICD-10-CM

## 2017-09-29 PROCEDURE — 63600175 PHARM REV CODE 636 W HCPCS: Mod: PN | Performed by: INTERNAL MEDICINE

## 2017-09-29 PROCEDURE — 99999 PR PBB SHADOW E&M-EST. PATIENT-LVL III: CPT | Mod: PBBFAC,,, | Performed by: INTERNAL MEDICINE

## 2017-09-29 PROCEDURE — 96417 CHEMO IV INFUS EACH ADDL SEQ: CPT | Mod: PN

## 2017-09-29 PROCEDURE — S0028 INJECTION, FAMOTIDINE, 20 MG: HCPCS | Mod: PN | Performed by: INTERNAL MEDICINE

## 2017-09-29 PROCEDURE — 99215 OFFICE O/P EST HI 40 MIN: CPT | Mod: S$GLB,,, | Performed by: INTERNAL MEDICINE

## 2017-09-29 PROCEDURE — 96413 CHEMO IV INFUSION 1 HR: CPT | Mod: PN

## 2017-09-29 PROCEDURE — A4216 STERILE WATER/SALINE, 10 ML: HCPCS | Mod: PN | Performed by: INTERNAL MEDICINE

## 2017-09-29 PROCEDURE — 3008F BODY MASS INDEX DOCD: CPT | Mod: S$GLB,,, | Performed by: INTERNAL MEDICINE

## 2017-09-29 PROCEDURE — 25000003 PHARM REV CODE 250: Mod: PN | Performed by: INTERNAL MEDICINE

## 2017-09-29 PROCEDURE — 96375 TX/PRO/DX INJ NEW DRUG ADDON: CPT | Mod: PN

## 2017-09-29 PROCEDURE — 96367 TX/PROPH/DG ADDL SEQ IV INF: CPT | Mod: PN

## 2017-09-29 RX ORDER — ONDANSETRON 2 MG/ML
8 INJECTION INTRAMUSCULAR; INTRAVENOUS
Status: CANCELLED
Start: 2017-10-20 | End: 2017-10-20

## 2017-09-29 RX ORDER — SODIUM CHLORIDE 0.9 % (FLUSH) 0.9 %
10 SYRINGE (ML) INJECTION
Status: CANCELLED | OUTPATIENT
Start: 2017-10-20

## 2017-09-29 RX ORDER — HEPARIN 100 UNIT/ML
500 SYRINGE INTRAVENOUS
Status: CANCELLED | OUTPATIENT
Start: 2017-10-20

## 2017-09-29 RX ORDER — SODIUM CHLORIDE 0.9 % (FLUSH) 0.9 %
10 SYRINGE (ML) INJECTION
Status: DISCONTINUED | OUTPATIENT
Start: 2017-09-29 | End: 2017-09-29 | Stop reason: HOSPADM

## 2017-09-29 RX ORDER — FAMOTIDINE 20 MG/50ML
20 INJECTION, SOLUTION INTRAVENOUS
Status: COMPLETED | OUTPATIENT
Start: 2017-09-29 | End: 2017-09-29

## 2017-09-29 RX ORDER — FAMOTIDINE 20 MG/50ML
20 INJECTION, SOLUTION INTRAVENOUS
Status: CANCELLED
Start: 2017-10-20 | End: 2017-10-20

## 2017-09-29 RX ORDER — DIPHENHYDRAMINE HYDROCHLORIDE 50 MG/ML
50 INJECTION INTRAMUSCULAR; INTRAVENOUS ONCE AS NEEDED
Status: CANCELLED | OUTPATIENT
Start: 2017-10-20 | End: 2017-10-20

## 2017-09-29 RX ORDER — EPINEPHRINE 0.3 MG/.3ML
0.3 INJECTION SUBCUTANEOUS ONCE AS NEEDED
Status: CANCELLED | OUTPATIENT
Start: 2017-10-20 | End: 2017-10-20

## 2017-09-29 RX ORDER — ONDANSETRON 2 MG/ML
8 INJECTION INTRAMUSCULAR; INTRAVENOUS
Status: COMPLETED | OUTPATIENT
Start: 2017-09-29 | End: 2017-09-29

## 2017-09-29 RX ADMIN — SODIUM CHLORIDE: 0.9 INJECTION, SOLUTION INTRAVENOUS at 12:09

## 2017-09-29 RX ADMIN — ONDANSETRON 8 MG: 2 INJECTION INTRAMUSCULAR; INTRAVENOUS at 12:09

## 2017-09-29 RX ADMIN — PERTUZUMAB 420 MG: 30 INJECTION, SOLUTION, CONCENTRATE INTRAVENOUS at 01:09

## 2017-09-29 RX ADMIN — TRASTUZUMAB 396 MG: 150 INJECTION, POWDER, LYOPHILIZED, FOR SOLUTION INTRAVENOUS at 02:09

## 2017-09-29 RX ADMIN — FAMOTIDINE 20 MG: 20 INJECTION, SOLUTION INTRAVENOUS at 01:09

## 2017-09-29 RX ADMIN — DIPHENHYDRAMINE HYDROCHLORIDE 50 MG: 50 INJECTION, SOLUTION INTRAMUSCULAR; INTRAVENOUS at 12:09

## 2017-09-29 RX ADMIN — SODIUM CHLORIDE, PRESERVATIVE FREE 10 ML: 5 INJECTION INTRAVENOUS at 03:09

## 2017-09-29 NOTE — PROGRESS NOTES
HISTORY OF PRESENT ILLNESS:  This is a 62-year-old white female known to Dr. Ragsdale for a recent diagnosis of Stage IIB left breast carcinoma.  The patient discovered   a mass in her left breast and was seen by Dr. Arce.  She has received 4 cycles of   A/C and presents to the clinic today for evaluation prior to completed 12 cycles weekly taxol started q 3 week herceptin/ perjeta  She remains chronically fatigued with minor peripheral neuropathy.  She has bouts of   diarrhea that resolve easily with Imodium. She denies any fevers, chills, unexplained   weight loss, new breast complaints, vaginal bleeding, abdominal discomfort, nausea,   vomiting, diarrhea, mouth sores, etc.  No new complaints or pertinent findings on a   14 point review of systems.    PHYSICAL EXAMINATION:  Wt Readings from Last 3 Encounters:   09/29/17 66.7 kg (147 lb 0.8 oz)   09/25/17 67.6 kg (149 lb)   09/22/17 67.4 kg (148 lb 9.4 oz)     Temp Readings from Last 3 Encounters:   09/29/17 98.1 °F (36.7 °C)   09/26/17 98.1 °F (36.7 °C)   09/25/17 97.9 °F (36.6 °C)     BP Readings from Last 3 Encounters:   09/29/17 (!) 149/70   09/26/17 133/70   09/25/17 (!) 150/75     Pulse Readings from Last 3 Encounters:   09/29/17 68   09/26/17 75   09/25/17 62     GENERAL:  Well-developed, well-nourished white female in no acute distress.    Alert and oriented x4.  :  HEENT:  Normocephalic, atraumatic.  Oral mucosa pink and moist.  Lips without   lesions.  Tongue midline.  Oropharynx clear.  Nonicteric sclerae.  NECK:  Supple, no adenopathy.  No carotid bruits, thyromegaly or thyroid nodule.  HEART:  Regular rate and rhythm without murmur, gallop.  LUNGS:  Clear to auscultation bilaterally.  Normal respiratory effort.  ABDOMEN:  Soft, nontender, nondistended with positive normoactive bowel sounds,   no hepatosplenomegaly.  EXTREMITIES:  No cyanosis, clubbing or edema.  Distal pulses are intact.  AXILLAE AND GROIN:  No palpable pathologic lymphadenopathy is  appreciated.  SKIN:  Intact/turgor normal.  NEUROLOGIC:  Cranial nerves II-XII grossly intact.  Motor:  Good muscle bulk and   tone.  Strength/sensory 5/5 throughout.  Gait stable.    LABORATORY:    Lab Results   Component Value Date    WBC 13.17 (H) 09/29/2017    HGB 12.1 09/29/2017    HCT 35.9 (L) 09/29/2017     (H) 09/29/2017     09/29/2017     Differential remarkable for 71.3% grans, 5% lymph, 17% monos    CMP  Sodium   Date Value Ref Range Status   09/29/2017 138 136 - 145 mmol/L Final     Potassium   Date Value Ref Range Status   09/29/2017 4.1 3.5 - 5.1 mmol/L Final     Chloride   Date Value Ref Range Status   09/29/2017 102 95 - 110 mmol/L Final     CO2   Date Value Ref Range Status   09/29/2017 27 22 - 31 mmol/L Final     Glucose   Date Value Ref Range Status   09/29/2017 84 70 - 110 mg/dL Final     Comment:     The ADA recommends the following guidelines for fasting glucose:  Normal:       less than 100 mg/dL  Prediabetes:  100 mg/dL to 125 mg/dL  Diabetes:     126 mg/dL or higher       BUN, Bld   Date Value Ref Range Status   09/29/2017 13 7 - 18 mg/dL Final     Creatinine   Date Value Ref Range Status   09/29/2017 0.70 0.50 - 1.40 mg/dL Final     Calcium   Date Value Ref Range Status   09/29/2017 10.1 8.4 - 10.2 mg/dL Final     Total Protein   Date Value Ref Range Status   09/29/2017 7.1 6.0 - 8.4 g/dL Final     Albumin   Date Value Ref Range Status   09/29/2017 4.4 3.5 - 5.2 g/dL Final     Total Bilirubin   Date Value Ref Range Status   09/29/2017 0.3 0.2 - 1.3 mg/dL Final     Alkaline Phosphatase   Date Value Ref Range Status   09/29/2017 115 38 - 145 U/L Final     AST   Date Value Ref Range Status   09/29/2017 42 (H) 14 - 36 U/L Final     ALT   Date Value Ref Range Status   09/29/2017 55 (H) 10 - 44 U/L Final     Anion Gap   Date Value Ref Range Status   09/29/2017 9 8 - 16 mmol/L Final     eGFR if    Date Value Ref Range Status   09/29/2017 >60 >60 mL/min/1.73 m^2 Final      eGFR if non    Date Value Ref Range Status   09/29/2017 >60 >60 mL/min/1.73 m^2 Final     Comment:     Calculation used to obtain the estimated glomerular filtration  rate (eGFR) is the CKD-EPI equation. Since race is unknown   in our information system, the eGFR values for   -American and Non--American patients are given   for each creatinine result.       IMPRESSION:  Stage IIB left breast carcinoma (ER/MA negative, Her-2/clay positive)  Completed neoadjuvant AC and weekly taxol now on herceptin and perjeta  PLAN:  1.  Proceed with   herceptin and perjeta Off dexamethasone  2.  Follow up in clinic with interval CBC, CMP 3 weeks  MRI on 3rd oct, DR Arce on the 12th sx scheduled for 11/1

## 2017-09-29 NOTE — PLAN OF CARE
Problem: Patient Care Overview  Goal: Plan of Care Review  Outcome: Ongoing (interventions implemented as appropriate)  Pt tolerated Perjeta/Herceptin infusion well.  Instructed to call MD with any problems.

## 2017-10-03 ENCOUNTER — HOSPITAL ENCOUNTER (OUTPATIENT)
Dept: RADIOLOGY | Facility: HOSPITAL | Age: 62
Discharge: HOME | End: 2017-10-03
Attending: SURGERY
Payer: COMMERCIAL

## 2017-10-03 DIAGNOSIS — C50.912 MALIGNANT NEOPLASM OF LEFT FEMALE BREAST, UNSPECIFIED ESTROGEN RECEPTOR STATUS, UNSPECIFIED SITE OF BREAST: ICD-10-CM

## 2017-10-03 PROCEDURE — 0159T MRI BREAST BILATERAL: CPT | Mod: 26,,, | Performed by: STUDENT IN AN ORGANIZED HEALTH CARE EDUCATION/TRAINING PROGRAM

## 2017-10-03 PROCEDURE — 0159T MRI BREAST BILATERAL: CPT | Mod: TC

## 2017-10-03 PROCEDURE — A9577 INJ MULTIHANCE: HCPCS | Performed by: SURGERY

## 2017-10-03 PROCEDURE — 77059 MRI BREAST BILATERAL: CPT | Mod: 26,,, | Performed by: STUDENT IN AN ORGANIZED HEALTH CARE EDUCATION/TRAINING PROGRAM

## 2017-10-03 PROCEDURE — 25500020 PHARM REV CODE 255: Performed by: SURGERY

## 2017-10-03 RX ADMIN — GADOBENATE DIMEGLUMINE 15 ML: 529 INJECTION, SOLUTION INTRAVENOUS at 09:10

## 2017-10-12 ENCOUNTER — RESEARCH ENCOUNTER (OUTPATIENT)
Dept: RESEARCH | Facility: HOSPITAL | Age: 62
End: 2017-10-12

## 2017-10-12 ENCOUNTER — OFFICE VISIT (OUTPATIENT)
Dept: SURGERY | Facility: CLINIC | Age: 62
End: 2017-10-12
Payer: COMMERCIAL

## 2017-10-12 VITALS
DIASTOLIC BLOOD PRESSURE: 74 MMHG | WEIGHT: 147 LBS | HEART RATE: 80 BPM | TEMPERATURE: 98 F | BODY MASS INDEX: 22.28 KG/M2 | HEIGHT: 68 IN | SYSTOLIC BLOOD PRESSURE: 135 MMHG

## 2017-10-12 DIAGNOSIS — C50.512 PRIMARY CANCER OF LOWER OUTER QUADRANT OF LEFT FEMALE BREAST: Primary | ICD-10-CM

## 2017-10-12 PROCEDURE — 99999 PR PBB SHADOW E&M-EST. PATIENT-LVL III: CPT | Mod: PBBFAC,,, | Performed by: SURGERY

## 2017-10-12 PROCEDURE — 99214 OFFICE O/P EST MOD 30 MIN: CPT | Mod: S$GLB,,, | Performed by: SURGERY

## 2017-10-12 NOTE — PROGRESS NOTES
Thursday, October 12, 2017    Protocol: [id number]  Investigator: GRETCHEN Arce MD  Pt Initials: KATRINA HERNDON JOSHUA  IRB #: [id number]    [id number]: A Randomized Phase III Trial Evaluating the Role of Axillary Lymph Node Dissection in Breast Cancer Patients (CT1-3 N1) Who Have Positive [place] Lymph Node Disease After Neoadjuvant Chemotherapy     Informed Consent Note:     Met with patient today to discuss the above mentioned clinical trial. Patient alert and oriented; mood and affect appropriate to the situation. Patient confirms that she not participating in any other research trials.  Purpose of the trial reviewed with patient who noted understanding that she has breast cancer that has spread to her lymph nodes which need to be examined and possibly removed with surgery and which will be followed by radiation therapy.  Length of Study and Number of Participants reviewed with patient who noted understanding that radiation will occur after surgery and may last for 6 weeks, that she will be followed for 3-5 years, and that there should be about [id number] participants in this study.  Procedure reviewed with patient to include screening tests, surgery, randomization to either Arm A or B, and study visits during and after treatment; understanding noted by patient.  Risks and Benefits reviewed with patient to include side effects of surgery, radiation, and reproductive and radiation risks. Benefits include helping other future cancer patients. Understanding noted by the patient.  Costs reviewed with patient who noted understanding that her insurance will be billed in the usual way.  Payment for Participation and/or Reimbursement of Expenses reviewed with patient who understands that she will not be paid to participate in this clinical trial.  Alternative Methods/Treatments reviewed with patient who noted understanding of other options and that if she decides not to participate in the trial her care at [facility] will not be affected.  Study  Related Questions and Compensation For Injury reviewed with patient who noted understanding of who to contact for any trial participation questions and/or any research-related injuries.  Questions About Your Rights reviewed with patient who noted understanding of the role of the Institutional Review Board and their contact information.  Voluntary Participation and Withdrawal From the Research reviewed with patient who noted understanding that she may withdraw her consent at any time without penalty or loss of benefits.  Confidentiality and HIPAA Authorization reviewed with patient who noted understanding of the laws and also to go to www.clinicaltrials.gov for more information regarding this clinical trial.  Additional Studies reviewed with patient who initialed, yes, to confirm her wish to participate in the Lymphedema sub-study.     Patient willingly and independently agreed to participate in this clinical trial and signed consent on 12OCT2017. Patient was given several opportunities to ask questions which were all answered to the patient's satisfaction. A copy of the signed consent was given to the patient along with my contact information. Patient is aware to contact myself and/or Dr. Arce for any further questions.

## 2017-10-12 NOTE — Clinical Note
Surgery 11-1-17. Needs magseed in both the L breast and axilla.
 Shunt Revision
27 year old h/o HCP shunted since 1month old with 2 revision, found to have papilledema by eye doctor

## 2017-10-12 NOTE — PROGRESS NOTES
Thursday, October 12, 2017    Protocol: Use of a Clinical Trial Screening Tool to Address Cancer Health Disparities in the [employer] [facility] Research Program ([facility])  Protocol# DCP-001  IRB# [id number]  Pi: Rod Morocho MD    Pt Eligibility Note:  Patient deemed eligible for above named clinical trial due to patient being currently in screening process for study [id number].    Informed Consent Process:    I met with the patient to discuss the above mentioned protocol. She is alert and oriented. Mood and affect appropriate to the situation. Patient states that she has just consented to research study [id number] and is in the screening process.  Purpose of the study reviewed with the patient. Patient states understanding of this information.  Length of study and number of participants reviewed with the patient; patient verbalized that she understands that her participation in the trial will take place each time she is considered for a clinical trial.  Study procedure discussed in detail with the patient to include. What will happen on this project; Who is included in the project; What extra tests she would have to take part in and how long she would be in the project. Patient verbalized understanding of this information.  Risks reviewed with the patient. Patient verbalized understanding.  Benefits, costs, and/or payment reimbursement and source of funding all reviewed with the patient. Patient states she understands that her insurance will cover cost of all standard of care medications and procedures and there is no cost to take part in the study nor will she get paid to participate in this study.  Alternative treatment methods discussed with patient; she states understanding of this information.  Study related questions/compensation for injury, and whom to contact regarding rights as a research subject all reviewed with the patient; she verbalizes understanding of this information.  Voluntary participation and withdrawal  from research stressed to patient; she states she understands that she may withdraw consent at any time without compromise to her care.  Confidentiality and HIPAA discussed with patient; process of protection and security of database explained to patient; she verbalizes understanding of this information. Informed the patient that detailed information on this trial can be found at www.clinicaltrials.gov.   And cancer.gov.  Patient also informed that he can call the [employer] Cancer Information Service at: 5-888-7-CANCER    Throughout the consenting process, the patient was given several opportunities to ask questions, all questions addressed and answered to patient satisfaction per myself. Patient states her willingness to participate in the study; patient signed and dated the consent form and copy of consent form given to patient. Patient verbalized understanding.

## 2017-10-14 NOTE — PROGRESS NOTES
Patient ID: Ashlie Redman is a 62 y.o. female.     Chief Complaint: F/U of left breast CA treated with neoadjuvant CTX     HPI   This is a 62 y.o female who was referred for evaluation of L breast mass. She first noticed this mass in 2017, since then she saw her PCP and mammogram, ultrasound and biopsy were completed. Biopsy showed invasive ductal carcinoma of L breast, axillary lymph node biopsy was not completed at that time. Patient reports of breast pain 4-5/10 on severity and constantly there at time of initial presentation, the pain was worse when she walked or was active.  She is statesr the mass has decreased in size and is no longer palpable. Patient denied any fevers, chills, nausea, vomiting, weight loss or night sweats at that time.      Gyn History:  Age of menarche is 12. . OCP x 18 years. Menopause age 50. HRT (climara and paxil) x 12 years.      Fam History:   Colon cancer mother (90) and sister (48)  Breast cancer in 1st cousin (patient unsure age)     Review of Systems   Constitutional: Negative for chills, fatigue and fever.   Respiratory: Negative for cough, chest tightness and shortness of breath.   Cardiovascular: Negative for chest pain.   Gastrointestinal: Negative for abdominal distention, abdominal pain, nausea and vomiting.   Loose stools s/p clindamycin   Genitourinary: Negative for difficulty urinating, dysuria and flank pain.   Musculoskeletal: Negative for back pain.   Skin: Negative for color change and rash.   Hematological: Positive for adenopathy.      Objective:      Physical Exam   Constitutional: She is oriented to person, place, and time. She appears well-developed and well-nourished. No distress.   HENT:   Head: Normocephalic and atraumatic.   Eyes: EOM are normal. Pupils are equal, round, and reactive to light.   Neck: Normal range of motion. Neck supple.   Cardiovascular: Normal rate and regular rhythm.   Pulmonary/Chest: Effort normal and breath sounds normal.  No respiratory distress. She has no wheezes.       There is no apparent skin changes or nipple discharge.     R breast: appears normal.    Bilateral breast w/ implants that are palpable.    There is palpable and enlarge lymph node at apical axilla. It is non-tender and mobile.   Abdominal: Soft. Bowel sounds are normal. She exhibits no distension. There is no tenderness.   Musculoskeletal: Normal range of motion. She exhibits no edema.   Neurological: She is alert and oriented to person, place, and time.   Skin: Skin is warm and dry. She is not diaphoretic.   Psychiatric: She has a normal mood and affect. Her behavior is normal. Judgment and thought content normal.   Nursing note and vitals reviewed.     Assessment:      1. Malignant neoplasm of female breast, unspecified laterality, unspecified site of breast       Plan:        63 y/o female with invasive ductal carcinoma     1. US guided core needle biopsy of L apical axillary node  2. MRI scan  3. PET scan   4. Medical oncology referral for chemotherapy for Er-/Pr-/Her2+   5. Port-a-cath placement on R chest                              DATE OF CONSULTATION:  04/18/2017     CHIEF COMPLAINT:  Newly diagnosed left breast cancer.     HISTORY OF PRESENT ILLNESS:  The patient is a very pleasant 62-year-old    female who presents with her .  The patient felt a mass in her   left breast in March 2017.  This was also described by her primary care   physician who ordered a diagnostic mammogram and ultrasound.  She subsequently   had imaging, which revealed a suspicious asymmetric density and mass in the   lateral lower outer 8 o'clock region of the left breast, 8 cm from the nipple.    This measured 34 mm by ultrasound and 6 mm by mammogram.  It was described as an   asymmetric density with an area of focal asymmetry measuring 6 mm seen in the   posterior aspect of the left breast at the 3 and 4 o'clock position located 8 cm   from the nipple.  It was in  the lower outer 3 to 4 o'clock position.  The   patient underwent core needle biopsy, which revealed a grade II invasive ductal   carcinoma along with high-grade DCIS.  This was estrogen and progesterone   receptor positive and it was also HER-2/clay positive with 3+ staining.  The   patient also was found to have a benign 12 mm right-sided contralateral simple   cyst.     PAST MEDICAL HISTORY:  Significant for eye surgery, ectopic pregnancy, bilateral   breast augmentation four years ago with some glandular silicone implants.  She   took oral contraceptive products for approximately 30 years.     GYN HISTORY:  Reveals that menarche was at age 12, menopause at age 50.  She is   on Climara and Paxil in addition to other medications in [facility] record.  She took   hormone replacement therapy for approximately 12 years and has subsequently   discontinued them with her diagnosis.  She is a  3, para 2 with first   live birth and pregnancy at age 25 and the second child at age 36.     FAMILY HISTORY:  Significant for colon cancer in her mother at age 90 and rectal   cancer in her sister at age 48.  She has a maternal first cousin diagnosed with   breast cancer after the age of 50 and her mother also had had a benign   pancreatic mass by the history, which is the region where the patient states was   the same vicinity of her eventual development of colon cancer.  Her clinical   breast exam is otherwise noncontributory other than the breast exam.  The right   breast is within normal limits with no suspicious masses, nodules, densities,   skin changes or lymphadenopathy.  There is a palpable subglandular silicone   implant on the right anterior to the pectoralis muscle.  There are no   lymphadenopathy or breast masses.  The left side reveals a 2.5 cm mass-like   density in the lateral lower outer 3 to 4 o'clock region of the left breast.  We   can palpate the implant.  There are no suspicious skin changes.  There is no    dimpling of the skin or nipple areolar complex.  There is no edema, erythema or   thickening of the skin; however, I do feel a palpable, mobile, well   circumscribed single 2 cm lymph node, which is highly suspicious.     ASSESSMENT AND PLAN:  I am going to clinically stage this patient as having a T2   N1 clinical stage IIB breast cancer.  Since she is HER-2 positive, she would be   a strong candidate for preoperative neoadjuvant chemotherapy approach with   Herceptin and Perjeta based chemotherapy.  We will order a PET CT whole body   fusion scan.  We will schedule her for a right-sided contralateral Port-A-Cath,   which will be scheduled for Monday, 04/24/2017.  We will have the left axilla   evaluated by ultrasound and core needle biopsy today.  I will also obtain an MRI   of her breast as well as obtain genetic counseling for possible genetic   testing.  We will set her up for Medical Oncology, but she prefers Medical   Oncology consultation on [place] and we will try to have her see Dr. Trell Lau who had taken care of her mother when her mother was diagnosed with the   colon cancer.  Consent was obtained for the Port-A-Cath and we will proceed with   the anticipated neoadjuvant chemotherapy as outlined above pending the lymph   node biopsy.     ADDENDUM:  Subsequently, the patient had an ultrasound-guided core needle biopsy   of the axilla, which revealed a positive lymph node with abundant carcinoma   noted within it.  The PET scan was negative for any evidence of distant   metastatic disease.  The primary tumor was seen in the 4 o'clock region of the   left breast as well as the PET avid lymph node that we can palpate.  There was   also an additional smaller lymph node adjacent to this, which was seen on the   PET scan and I would still stage her as clinically stage IIB with a T2 N1 status   at this point.  Plan will be for neoadjuvant Herceptin-based chemotherapy with   Herceptin and Perjeta and  we will proceed with a Port-A-Cath insertion on Monday 04/24/2017 as outlined above.  Approximate time spent with the patient and her    today was 60 minutes discussing options of surgical therapy, typically   if she receives breast conservation surgery, she would require radiotherapy.    She may require adjuvant radiotherapy given the presentation with a large   palpable lymph node at this time and HER-2/clay status.  Certainly, she would   have time to consider her local therapies of breast conservation and   radiotherapy versus mastectomy and possible postmastectomy radiotherapy while   she undergoes neoadjuvant chemotherapy.  We would get her involved with Plastic   Surgery should she decide to pursue mastectomies, but at this point, she would   like to move forward with the neoadjuvant approach.  We discussed all the   indications for neoadjuvant chemotherapy including being able to assess response   of the primary tumor initiating systemic therapy sooner allowing her to receive   FDA approved double antiHerceptin regimen.  The ability to line up for   potential results of genetic counseling and possible testing with Plastic   Surgery and give her more time to ultimately decide her local therapeutic   options.         Notes from initial clinic encounter of 4-18-17 are as detailed and noted above.  I reviewed these notes above from that encounter again today August 17, 2018 with the patient and her .  The patient has been receiving her neoadjuvant preoperative Herceptin and per GEN a-based chemotherapy per Dr. Celestina Ragsdale on the [place].  The patient is a 62-year-old  female with a HER-2/clay positive invasive ductal carcinoma that originally measured 2.5 cm in the 4:00 region of the left lower outer quadrant of the breast with a biopsy-proven positive palpable 2 cm left axillary lymph node.  She has received her chemotherapy and has 6 cycles of weekly Taxol remaining and has been  administered to cycles of Herceptin and progesterone of thus far every 3 weeks.  We anticipate her cytotoxic chemotherapy to be completed by the end of September and I would like to give her approximately 1 month to recover until the end of October and therefore we would plan to schedule her surgery in early November 2017.     Her clinical breast exam today is completely within normal limits.  I do not palpate any suspicious masses nodules densities skin changes or lymphadenopathy.  I feel she has achieved a complete clinical response already.  Nonetheless, she will complete her proper recommended preoperative course of chemotherapy.  The patient  are quite happy about the good prognostic implications of a complete clinical response and this was discussed with them.     I will plan to order a repeat breast MRI in early October 2017 and she will follow-up with me in mid October and we would anticipate her surgery to be tentatively scheduled for Wednesday, November 1, 2017.  Since she has had a complete clinical response by my clinical exam of her axilla today and I do not palpate the axillary node, she would be a candidate for the NSABP B 51 and its sister [place] trial where at the time of surgery I would perform a sentinel lymph node biopsy on the previously biopsied proven lymph node and if negative then not only which she did not undergo a completion axillary dissection, but she would also be randomized to either radiation or no radiation to the axilla.  If the sentinel node or node that was producing biopsied remains pathologically positive at the time of surgery then the [place] trial would randomize her to either axillary radiation versus axillary dissection.  We discussed this trial at length and the aim of the trial is to minimize lymphedema and comorbidities that come from repetitive treatment if the patient has a pathologic response in her axilla.     In terms of local regional therapy we  discussed options of breast conservation surgery and radiotherapy versus mastectomy again at length.  Since she has had a complete clinical response thus far I feel she is an excellent candidate for breast conservation surgery.  We would perform a lumpectomy of the area of the original tumor as well as perform a left axillary sentinel lymph node biopsy and make sure that the originally biopsied node that was positive with a clip and it is excised for complete pathologic analysis and trial eligibility  Given the complete clinical response she is motivated for the attempted breast conservation surgery and radiotherapy and is very interested in the clinical trial as well to minimize axillary surgery to reduce potential postsurgical left upper extremity lymphedema risks.  She will follow-up with me in mid October to obtain consents and review her postchemotherapy MRI scheduled for early October.  Surgery date will be Wednesday, November 1, 2017 with anticipated mag seed seed localization partial mastectomy (lumpectomy for margins) with left axillary sentinel lymph node biopsy with anticipated excision of the previously biopsy proven malignant node with mag seed localization of that note as well to assess his response to therapy and randomization as dictated by the clinical trial.  We will plan to keep her port as we will anticipate her receiving adjuvant Herceptin postoperatively as well for a year total of monoclonal antibody therapy.      The patient presented again today 10-12-13 and all of the above from previous encounters was reviewed.  The MRI of breast on 10-3-17 revealed no residual enhancement or tumor seen in left breast with left axillary node decreased in size.  The left axillary LN is not palpable today on clinical exam.  It was not palpable on the last clinical exam either.  This makes her eligible for the NSABP B51 and [place] trial.  She has been consented and scheduled for left BCS partial mastectomy  (lumpectomy for margins) with left axillary SLNB on 11-1-17.  She will be recommended for and have a magseed placed in both her left breat at the initial biopsy proven primary site as well as in the left previously biopsy proven axillary LN  Magseeds to be placed on 10-25-17 in both the left breast AND axilla.  Patient will consent to NSABP B51 and [place] trial.  Time spent today in clinic was 45 minutes with >50% of time in counseling, predominantly about the clincial trial.  She had previously agreed to want to pursue and schedule BCS and SLNB on 11-1-17 of left breast after neoadjuvant CTX for Her2 positive CA.  We will plan to keep her R port in place for adjuvvant Herceptin as well.

## 2017-10-18 ENCOUNTER — TELEPHONE (OUTPATIENT)
Dept: HEMATOLOGY/ONCOLOGY | Facility: CLINIC | Age: 62
End: 2017-10-18

## 2017-10-18 NOTE — TELEPHONE ENCOUNTER
----- Message from Georgette King sent at 10/18/2017 11:32 AM CDT -----  Can you please put in order for just the perjeta and herceptin. So I can get it to the auth team to start working on that. Thanks

## 2017-10-20 ENCOUNTER — OFFICE VISIT (OUTPATIENT)
Dept: HEMATOLOGY/ONCOLOGY | Facility: CLINIC | Age: 62
End: 2017-10-20
Payer: COMMERCIAL

## 2017-10-20 ENCOUNTER — LAB VISIT (OUTPATIENT)
Dept: LAB | Facility: HOSPITAL | Age: 62
End: 2017-10-20
Attending: INTERNAL MEDICINE
Payer: COMMERCIAL

## 2017-10-20 ENCOUNTER — INFUSION (OUTPATIENT)
Dept: INFUSION THERAPY | Facility: HOSPITAL | Age: 62
End: 2017-10-20
Attending: INTERNAL MEDICINE
Payer: COMMERCIAL

## 2017-10-20 ENCOUNTER — DOCUMENTATION ONLY (OUTPATIENT)
Dept: INFUSION THERAPY | Facility: HOSPITAL | Age: 62
End: 2017-10-20

## 2017-10-20 VITALS
HEART RATE: 73 BPM | HEIGHT: 68 IN | RESPIRATION RATE: 20 BRPM | SYSTOLIC BLOOD PRESSURE: 126 MMHG | TEMPERATURE: 98 F | DIASTOLIC BLOOD PRESSURE: 74 MMHG | BODY MASS INDEX: 23.02 KG/M2 | WEIGHT: 151.88 LBS

## 2017-10-20 DIAGNOSIS — C50.011 MALIGNANT NEOPLASM OF NIPPLE OF RIGHT BREAST IN FEMALE, UNSPECIFIED ESTROGEN RECEPTOR STATUS: ICD-10-CM

## 2017-10-20 DIAGNOSIS — C50.011 MALIGNANT NEOPLASM OF NIPPLE OF RIGHT BREAST IN FEMALE, UNSPECIFIED ESTROGEN RECEPTOR STATUS: Primary | ICD-10-CM

## 2017-10-20 DIAGNOSIS — Z11.3 SCREEN FOR STD (SEXUALLY TRANSMITTED DISEASE): ICD-10-CM

## 2017-10-20 DIAGNOSIS — C50.012 MALIGNANT NEOPLASM OF NIPPLE OF LEFT BREAST IN FEMALE, UNSPECIFIED ESTROGEN RECEPTOR STATUS: ICD-10-CM

## 2017-10-20 DIAGNOSIS — C50.012 MALIGNANT NEOPLASM OF NIPPLE OF LEFT BREAST IN FEMALE, UNSPECIFIED ESTROGEN RECEPTOR STATUS: Primary | ICD-10-CM

## 2017-10-20 DIAGNOSIS — C50.912 HER2-POSITIVE CARCINOMA OF LEFT BREAST: ICD-10-CM

## 2017-10-20 LAB
ALBUMIN SERPL BCP-MCNC: 4.2 G/DL
ALP SERPL-CCNC: 95 U/L
ALT SERPL W/O P-5'-P-CCNC: 45 U/L
ANION GAP SERPL CALC-SCNC: 8 MMOL/L
AST SERPL-CCNC: 42 U/L
BASOPHILS # BLD AUTO: 0.04 K/UL
BASOPHILS NFR BLD: 0.7 %
BILIRUB SERPL-MCNC: 0.3 MG/DL
BUN SERPL-MCNC: 12 MG/DL
CALCIUM SERPL-MCNC: 10.2 MG/DL
CHLORIDE SERPL-SCNC: 106 MMOL/L
CO2 SERPL-SCNC: 28 MMOL/L
CREAT SERPL-MCNC: 0.7 MG/DL
DIFFERENTIAL METHOD: ABNORMAL
EOSINOPHIL # BLD AUTO: 0.2 K/UL
EOSINOPHIL NFR BLD: 2.8 %
ERYTHROCYTE [DISTWIDTH] IN BLOOD BY AUTOMATED COUNT: 11.6 %
EST. GFR  (AFRICAN AMERICAN): >60 ML/MIN/1.73 M^2
EST. GFR  (NON AFRICAN AMERICAN): >60 ML/MIN/1.73 M^2
GLUCOSE SERPL-MCNC: 95 MG/DL
HCT VFR BLD AUTO: 37.2 %
HGB BLD-MCNC: 12.3 G/DL
LYMPHOCYTES # BLD AUTO: 1.8 K/UL
LYMPHOCYTES NFR BLD: 31.4 %
MCH RBC QN AUTO: 34.7 PG
MCHC RBC AUTO-ENTMCNC: 33.1 G/DL
MCV RBC AUTO: 105 FL
MONOCYTES # BLD AUTO: 0.6 K/UL
MONOCYTES NFR BLD: 11.1 %
NEUTROPHILS # BLD AUTO: 3.1 K/UL
NEUTROPHILS NFR BLD: 54 %
NRBC BLD-RTO: 0 /100 WBC
PLATELET # BLD AUTO: 261 K/UL
PMV BLD AUTO: 9.6 FL
POTASSIUM SERPL-SCNC: 4 MMOL/L
PROT SERPL-MCNC: 7.1 G/DL
RBC # BLD AUTO: 3.54 M/UL
SODIUM SERPL-SCNC: 142 MMOL/L
WBC # BLD AUTO: 5.67 K/UL

## 2017-10-20 PROCEDURE — 63600175 PHARM REV CODE 636 W HCPCS: Mod: PN | Performed by: INTERNAL MEDICINE

## 2017-10-20 PROCEDURE — 85025 COMPLETE CBC W/AUTO DIFF WBC: CPT

## 2017-10-20 PROCEDURE — 96367 TX/PROPH/DG ADDL SEQ IV INF: CPT | Mod: PN

## 2017-10-20 PROCEDURE — A4216 STERILE WATER/SALINE, 10 ML: HCPCS | Mod: PN | Performed by: INTERNAL MEDICINE

## 2017-10-20 PROCEDURE — 85025 COMPLETE CBC W/AUTO DIFF WBC: CPT | Mod: PN

## 2017-10-20 PROCEDURE — 99999 PR PBB SHADOW E&M-EST. PATIENT-LVL III: CPT | Mod: PBBFAC,,, | Performed by: INTERNAL MEDICINE

## 2017-10-20 PROCEDURE — S0028 INJECTION, FAMOTIDINE, 20 MG: HCPCS | Mod: PN | Performed by: INTERNAL MEDICINE

## 2017-10-20 PROCEDURE — 80053 COMPREHEN METABOLIC PANEL: CPT | Mod: PN

## 2017-10-20 PROCEDURE — 36415 COLL VENOUS BLD VENIPUNCTURE: CPT | Mod: PN

## 2017-10-20 PROCEDURE — 96375 TX/PRO/DX INJ NEW DRUG ADDON: CPT | Mod: PN

## 2017-10-20 PROCEDURE — 96417 CHEMO IV INFUS EACH ADDL SEQ: CPT | Mod: PN

## 2017-10-20 PROCEDURE — 80053 COMPREHEN METABOLIC PANEL: CPT

## 2017-10-20 PROCEDURE — 96413 CHEMO IV INFUSION 1 HR: CPT | Mod: PN

## 2017-10-20 PROCEDURE — 25000003 PHARM REV CODE 250: Mod: PN | Performed by: INTERNAL MEDICINE

## 2017-10-20 PROCEDURE — 99215 OFFICE O/P EST HI 40 MIN: CPT | Mod: S$GLB,,, | Performed by: INTERNAL MEDICINE

## 2017-10-20 RX ORDER — SODIUM CHLORIDE 0.9 % (FLUSH) 0.9 %
10 SYRINGE (ML) INJECTION
Status: DISCONTINUED | OUTPATIENT
Start: 2017-10-20 | End: 2017-10-20 | Stop reason: HOSPADM

## 2017-10-20 RX ORDER — SODIUM CHLORIDE 0.9 % (FLUSH) 0.9 %
10 SYRINGE (ML) INJECTION
Status: CANCELLED | OUTPATIENT
Start: 2017-11-10

## 2017-10-20 RX ORDER — EPINEPHRINE 0.3 MG/.3ML
0.3 INJECTION SUBCUTANEOUS ONCE AS NEEDED
Status: CANCELLED | OUTPATIENT
Start: 2017-11-10 | End: 2017-11-10

## 2017-10-20 RX ORDER — HEPARIN 100 UNIT/ML
500 SYRINGE INTRAVENOUS
Status: CANCELLED | OUTPATIENT
Start: 2017-11-10

## 2017-10-20 RX ORDER — FAMOTIDINE 20 MG/50ML
20 INJECTION, SOLUTION INTRAVENOUS
Status: COMPLETED | OUTPATIENT
Start: 2017-10-20 | End: 2017-10-20

## 2017-10-20 RX ORDER — FAMOTIDINE 20 MG/50ML
20 INJECTION, SOLUTION INTRAVENOUS
Status: CANCELLED
Start: 2017-11-10 | End: 2017-11-10

## 2017-10-20 RX ORDER — ONDANSETRON 2 MG/ML
8 INJECTION INTRAMUSCULAR; INTRAVENOUS
Status: COMPLETED | OUTPATIENT
Start: 2017-10-20 | End: 2017-10-20

## 2017-10-20 RX ORDER — ONDANSETRON 2 MG/ML
8 INJECTION INTRAMUSCULAR; INTRAVENOUS
Status: CANCELLED
Start: 2017-11-10 | End: 2017-11-10

## 2017-10-20 RX ORDER — DIPHENHYDRAMINE HYDROCHLORIDE 50 MG/ML
50 INJECTION INTRAMUSCULAR; INTRAVENOUS ONCE AS NEEDED
Status: CANCELLED | OUTPATIENT
Start: 2017-11-10 | End: 2017-11-10

## 2017-10-20 RX ADMIN — FAMOTIDINE 20 MG: 20 INJECTION, SOLUTION INTRAVENOUS at 12:10

## 2017-10-20 RX ADMIN — PERTUZUMAB 420 MG: 30 INJECTION, SOLUTION, CONCENTRATE INTRAVENOUS at 12:10

## 2017-10-20 RX ADMIN — ONDANSETRON 8 MG: 2 INJECTION INTRAMUSCULAR; INTRAVENOUS at 12:10

## 2017-10-20 RX ADMIN — SODIUM CHLORIDE, PRESERVATIVE FREE 10 ML: 5 INJECTION INTRAVENOUS at 02:10

## 2017-10-20 RX ADMIN — TRASTUZUMAB 396 MG: 150 INJECTION, POWDER, LYOPHILIZED, FOR SOLUTION INTRAVENOUS at 01:10

## 2017-10-20 RX ADMIN — SODIUM CHLORIDE: 900 INJECTION, SOLUTION INTRAVENOUS at 12:10

## 2017-10-20 NOTE — PROGRESS NOTES
HISTORY OF PRESENT ILLNESS:  This is a 62-year-old white female known to Dr. Ragsdale for a recent diagnosis of Stage IIB left breast carcinoma.  The patient discovered   a mass in her left breast and was seen by Dr. Arce.  She has received 4 cycles of   A/C and presents to the clinic today for evaluation prior to completed 12 cycles weekly taxol started q 3 week herceptin/ perjeta  She remains chronically fatigued with minor peripheral neuropathy.  She has bouts of   diarrhea that resolve easily with Imodium. She denies any fevers, chills, unexplained   weight loss, new breast complaints, vaginal bleeding, abdominal discomfort, nausea,   vomiting, diarrhea, mouth sores, etc.  No new complaints or pertinent findings on a   14 point review of systems.    PHYSICAL EXAMINATION:  Wt Readings from Last 3 Encounters:   10/20/17 68.9 kg (151 lb 14.4 oz)   10/12/17 66.7 kg (147 lb)   09/29/17 66.7 kg (147 lb 0.8 oz)     Temp Readings from Last 3 Encounters:   10/20/17 97.7 °F (36.5 °C)   10/12/17 98.1 °F (36.7 °C) (Oral)   09/29/17 98.1 °F (36.7 °C)     BP Readings from Last 3 Encounters:   10/20/17 126/74   10/12/17 135/74   09/29/17 (!) 149/70     Pulse Readings from Last 3 Encounters:   10/20/17 73   10/12/17 80   09/29/17 68     GENERAL:  Well-developed, well-nourished white female in no acute distress.    Alert and oriented x4.  :  HEENT:  Normocephalic, atraumatic.  Oral mucosa pink and moist.  Lips without   lesions.  Tongue midline.  Oropharynx clear.  Nonicteric sclerae.  NECK:  Supple, no adenopathy.  No carotid bruits, thyromegaly or thyroid nodule.  HEART:  Regular rate and rhythm without murmur, gallop.  LUNGS:  Clear to auscultation bilaterally.  Normal respiratory effort.  ABDOMEN:  Soft, nontender, nondistended with positive normoactive bowel sounds,   no hepatosplenomegaly.  EXTREMITIES:  No cyanosis, clubbing or edema.  Distal pulses are intact.  AXILLAE AND GROIN:  No palpable pathologic  lymphadenopathy is appreciated.  SKIN:  Intact/turgor normal.  NEUROLOGIC:  Cranial nerves II-XII grossly intact.  Motor:  Good muscle bulk and   tone.  Strength/sensory 5/5 throughout.  Gait stable.    LABORATORY:    Lab Results   Component Value Date    WBC 5.67 10/20/2017    HGB 12.3 10/20/2017    HCT 37.2 10/20/2017     (H) 10/20/2017     10/20/2017     Differential remarkable for 71.3% grans, 5% lymph, 17% monos    CMP  Sodium   Date Value Ref Range Status   10/20/2017 142 136 - 145 mmol/L Final     Potassium   Date Value Ref Range Status   10/20/2017 4.0 3.5 - 5.1 mmol/L Final     Chloride   Date Value Ref Range Status   10/20/2017 106 95 - 110 mmol/L Final     CO2   Date Value Ref Range Status   10/20/2017 28 22 - 31 mmol/L Final     Glucose   Date Value Ref Range Status   10/20/2017 95 70 - 110 mg/dL Final     Comment:     The ADA recommends the following guidelines for fasting glucose:  Normal:       less than 100 mg/dL  Prediabetes:  100 mg/dL to 125 mg/dL  Diabetes:     126 mg/dL or higher       BUN, Bld   Date Value Ref Range Status   10/20/2017 12 7 - 18 mg/dL Final     Creatinine   Date Value Ref Range Status   10/20/2017 0.70 0.50 - 1.40 mg/dL Final     Calcium   Date Value Ref Range Status   10/20/2017 10.2 8.4 - 10.2 mg/dL Final     Total Protein   Date Value Ref Range Status   10/20/2017 7.1 6.0 - 8.4 g/dL Final     Albumin   Date Value Ref Range Status   10/20/2017 4.2 3.5 - 5.2 g/dL Final     Total Bilirubin   Date Value Ref Range Status   10/20/2017 0.3 0.2 - 1.3 mg/dL Final     Alkaline Phosphatase   Date Value Ref Range Status   10/20/2017 95 38 - 145 U/L Final     AST   Date Value Ref Range Status   10/20/2017 42 (H) 14 - 36 U/L Final     ALT   Date Value Ref Range Status   10/20/2017 45 (H) 10 - 44 U/L Final     Anion Gap   Date Value Ref Range Status   10/20/2017 8 8 - 16 mmol/L Final     eGFR if    Date Value Ref Range Status   10/20/2017 >60 >60 mL/min/1.73  m^2 Final     eGFR if non    Date Value Ref Range Status   10/20/2017 >60 >60 mL/min/1.73 m^2 Final     Comment:     Calculation used to obtain the estimated glomerular filtration  rate (eGFR) is the CKD-EPI equation. Since race is unknown   in our information system, the eGFR values for   -American and Non--American patients are given   for each creatinine result.       IMPRESSION:  Stage IIB left breast carcinoma (ER/MS negative, Her-2/clay positive)  Completed neoadjuvant AC and weekly taxol now on herceptin and perjeta  PLAN:  1.  Proceed with   herceptin and perjeta Off dexamethasone and benadryl ( makes her too sleepy)  2.  Follow up in clinic with interval CBC, CMP 3 weeks  MRI on 3rd oct, DR Arce on the 12th sx scheduled for 11/1   schedule echo

## 2017-10-20 NOTE — PROGRESS NOTES
Nutrition: Met with pt per patients request regarding intake of fresh fruits and veggies. Answered patients questions. Encouraged to call with questions. Offered encouragement and support. Will assist if and when needed. Connie Salvador, MS, RD, LDN

## 2017-10-24 ENCOUNTER — OFFICE VISIT (OUTPATIENT)
Dept: OPHTHALMOLOGY | Facility: CLINIC | Age: 62
End: 2017-10-24
Payer: COMMERCIAL

## 2017-10-24 ENCOUNTER — CLINICAL SUPPORT (OUTPATIENT)
Dept: OPHTHALMOLOGY | Facility: CLINIC | Age: 62
End: 2017-10-24
Attending: OPHTHALMOLOGY
Payer: COMMERCIAL

## 2017-10-24 DIAGNOSIS — H52.13 HIGH MYOPIA, BILATERAL: ICD-10-CM

## 2017-10-24 DIAGNOSIS — H00.15 CHALAZION LEFT LOWER EYELID: ICD-10-CM

## 2017-10-24 DIAGNOSIS — H40.053 OHT (OCULAR HYPERTENSION), BILATERAL: ICD-10-CM

## 2017-10-24 DIAGNOSIS — Z98.890 HISTORY OF VITRECTOMY: ICD-10-CM

## 2017-10-24 DIAGNOSIS — H40.053 OHT (OCULAR HYPERTENSION), BILATERAL: Primary | ICD-10-CM

## 2017-10-24 DIAGNOSIS — Z96.1 PSEUDOPHAKIA OF BOTH EYES: ICD-10-CM

## 2017-10-24 PROCEDURE — 92014 COMPRE OPH EXAM EST PT 1/>: CPT | Mod: S$GLB,,, | Performed by: OPHTHALMOLOGY

## 2017-10-24 PROCEDURE — 99999 PR PBB SHADOW E&M-EST. PATIENT-LVL III: CPT | Mod: PBBFAC,,, | Performed by: OPHTHALMOLOGY

## 2017-10-24 PROCEDURE — 92134 CPTRZ OPH DX IMG PST SGM RTA: CPT | Mod: S$GLB,,, | Performed by: OPHTHALMOLOGY

## 2017-10-24 RX ORDER — DORZOLAMIDE HCL 20 MG/ML
1 SOLUTION/ DROPS OPHTHALMIC 3 TIMES DAILY
Qty: 30 ML | Refills: 3 | Status: SHIPPED | OUTPATIENT
Start: 2017-10-24 | End: 2017-10-26 | Stop reason: SDUPTHER

## 2017-10-24 NOTE — PROGRESS NOTES
HPI     3 month , IOP check , HRT , DFE , denies eye pain today does have a stye   developing left lower lid using OTC stye cream. States was on Taxol which   has visual side effects blurry vision and optic nerve damage concerned   about.     Using Dorzolamide OU TID has trouble with TID  Latanoprost OU HS.    Last edited by Tiffany Chamberlain on 10/24/2017  9:50 AM. (History)        ROS     Positive for: Neurological (headaches - do occur in am - currently under   stress, just moved to new house, attributes to sinuses; history of   migraines), Eyes (CE OU, vitrectomy OU for floaters 2009 (AH?)),   Heme/Lymph (breast CA - under chemo)    Negative for: Endocrine (denies DM), Cardiovascular (denies HTN - some   changes while on chemo), Respiratory (denies asthma)    Last edited by Almaz Vasquez MD on 10/24/2017 11:09 AM.   (History)        Assessment /Plan     For exam results, see Encounter Report.    OHT (ocular hypertension), bilateral    Pseudophakia of both eyes    History of vitrectomy    High myopia, bilateral    Chalazion left lower eyelid    Other orders  -     dorzolamide (TRUSOPT) 2 % ophthalmic solution; Place 1 drop into both eyes 3 (three) times daily.  Dispense: 30 mL; Refill: 3            OHT (ocular hypertension), bilateral - Switched Timolol to Dorzolamide 2/14/17, but IOP still upper 20's. Just finished tamoxifen for newly diagnosed breast cancer.   IOP baseline mid 20's with occasional pain OS. IOP was significantly elevated off Latanoprost - Tmax 37/44! Maternal grandmother with glaucoma, no known first degree relatives. CCT thick. Baseline HVF/HRT WNL, small cups on clinical exam OU. today's HRT - within range of priors, no RNFL thinning identified.   Last Gonio - open to CB OU, few tiny iris root vessels visible, no NV.   OCT nerve 5/16/17 - thin TS and TI OD, borderline TS OS.  Switched T .5 to Dorzolamide BID, but not as effective. Tried Brimonidine TID - but had hypotension again, so  went back to Dorzolamide TID. If goes too high then can consider 0.25% Timolol, as seems to have had a dramatic response in IOP lowering with Timolol.   RTC 4 months for IOP check with HVF. If HVF is WNL, then will continue to follow just with HRT/OCT.    On Latanoprost since presentation (former pt of Dr. Ordaz).  Dorzolamide added 2/14-7/25/17 - no significant improvement in IOP, may have forgotten to take? Resumed 8/9/17 TID OU  Timolol 0.5% QAM (7/9/16-2/14/17) - worked great but symptomatic hypotension/bradycardia started in Jan '17  Brimonidine - 7/25-8/9 17 - d/c'd due to symptomatic hypotension.    Pseudophakia of both eyes - stable    History of vitrectomy - for floaters/AH      Addendum: symptomatic hypotension with Brimonidine - switched back to Dorzolamide on 8/9/17 - asked that she try to use it TID.

## 2017-10-25 ENCOUNTER — HOSPITAL ENCOUNTER (OUTPATIENT)
Dept: RADIOLOGY | Facility: HOSPITAL | Age: 62
Discharge: HOME | End: 2017-10-25
Attending: SURGERY
Payer: COMMERCIAL

## 2017-10-25 ENCOUNTER — PATIENT MESSAGE (OUTPATIENT)
Dept: OPHTHALMOLOGY | Facility: CLINIC | Age: 62
End: 2017-10-25

## 2017-10-25 ENCOUNTER — TELEPHONE (OUTPATIENT)
Dept: OPHTHALMOLOGY | Facility: CLINIC | Age: 62
End: 2017-10-25

## 2017-10-25 DIAGNOSIS — C50.919 BREAST CANCER: ICD-10-CM

## 2017-10-25 PROCEDURE — A4648 IMPLANTABLE TISSUE MARKER: HCPCS

## 2017-10-25 PROCEDURE — 19281 PERQ DEVICE BREAST 1ST IMAG: CPT | Mod: LT,,, | Performed by: STUDENT IN AN ORGANIZED HEALTH CARE EDUCATION/TRAINING PROGRAM

## 2017-10-25 PROCEDURE — 19285 PERQ DEV BREAST 1ST US IMAG: CPT | Mod: TC,LT

## 2017-10-25 NOTE — TELEPHONE ENCOUNTER
----- Message from Almaz Vasquez MD sent at 10/24/2017  4:36 PM CDT -----  Contact: Shanelle   Can she get it somewhere else?    ----- Message -----  From: Tiana Locke  Sent: 10/24/2017   4:28 PM  To: Almaz Vasquez MD        ----- Message -----  From: Barbra Salamanca  Sent: 10/24/2017   3:31 PM  To: Pedro Mccann Staff    Pharmacy called regarding the patient's Rx, dorzolamide (TRUSOPT) 2 % ophthalmic solution will not be available until mid next month. Please contact Shanelle      [id number] - [place], [place] - [address] [facility]  [address] [facility] [address]  Phone: [phone] Fax: [phone]

## 2017-10-26 RX ORDER — DORZOLAMIDE HCL 20 MG/ML
1 SOLUTION/ DROPS OPHTHALMIC 3 TIMES DAILY
Qty: 30 ML | Refills: 3 | Status: SHIPPED | OUTPATIENT
Start: 2017-10-26 | End: 2018-09-25

## 2017-10-27 ENCOUNTER — RESEARCH ENCOUNTER (OUTPATIENT)
Dept: RESEARCH | Facility: HOSPITAL | Age: 62
End: 2017-10-27

## 2017-10-27 NOTE — PROGRESS NOTES
2017    Protocol: [id number]  Investigator: GRETCHEN Arce MD  Pt Initials: KATRINA HERNDON JOSHUA  Study ID: [id number]  IRB #: [id number]    [id number]: A Randomized Phase III Trial Evaluating the Role of Axillary Lymph Node Dissection in Breast Cancer Patients (CT1-3 N1) Who Have Positive [place] Lymph Node Disease After Neoadjuvant Chemotherapy    Pre-registration Eligibility Note:    Work-up has been completed for the above mentioned trail and patient is deemed eligible:    Per Demographics, patient is a 62 year old female; : 1955.  Per Dr. Arce's note dated 17, patient's clinical stage at diagnosis was T2 N1 M0, Stage IIb.  Per Pathology reports dated 17 and 17, patient does not have inflammatory breast cancer.  Per patient and medical history, patient has no history of any other malignancy within 5 years of registration.  Per Pathology report dated 17, patient had a core needle biopsy of axillary lymph nodes documenting axillary metastasis at the time of diagnosis.  Per Pathology report dated 17, patient is estrogen receptor and progesterone receptor negative; and HER2 positive.  Per Chemo Flowsheet, patient received 12 weekly treatments of Taxol and 4 cycles of AC which was completed on 17.  Per Pathology report dated 17, patient is HER2 positive and has received 5 cycles of approved anti-HER2 therapy thus far.  Per Dr. Arce's note dated 10/12/17, patient has no axillary adenopathy on physical exam.  Per Pathology report dated 17, patient is estrogen and progesterone receptor negative thus no endocrine therapy needed.  Per patient and medical history, no neoadjuvant radiation therapy was received.  Per patient and medical history, patient has not received a SLN surgery/excisional biopsy.  Per patient and medical history, patient has no prior history of ipsilateral breast cancer.  Per patient and medical history, patient has had no prior ipsilateral axillary  surgery or treatment of hidradenitis.  Per patient and medical history, patient has no history of prior or concurrent contralateral invasive breast cancer.  Per patient, she is not pregnant or nursing. Patient went through menopause at age 50.  Per Dr. Arce's note dated 10/12/17, patient's ECOG PS = 0.

## 2017-10-29 ENCOUNTER — PATIENT MESSAGE (OUTPATIENT)
Dept: SURGERY | Facility: HOSPITAL | Age: 62
End: 2017-10-29

## 2017-10-31 ENCOUNTER — TELEPHONE (OUTPATIENT)
Dept: SURGERY | Facility: CLINIC | Age: 62
End: 2017-10-31

## 2017-10-31 ENCOUNTER — ANESTHESIA EVENT (OUTPATIENT)
Dept: SURGERY | Facility: HOSPITAL | Age: 62
End: 2017-10-31
Payer: COMMERCIAL

## 2017-11-01 ENCOUNTER — HOSPITAL ENCOUNTER (OUTPATIENT)
Dept: RADIOLOGY | Facility: HOSPITAL | Age: 62
Discharge: HOME | End: 2017-11-01
Attending: SURGERY | Admitting: SURGERY
Payer: COMMERCIAL

## 2017-11-01 ENCOUNTER — SURGERY (OUTPATIENT)
Age: 62
End: 2017-11-01

## 2017-11-01 ENCOUNTER — RESEARCH ENCOUNTER (OUTPATIENT)
Dept: RESEARCH | Facility: HOSPITAL | Age: 62
End: 2017-11-01

## 2017-11-01 ENCOUNTER — ANESTHESIA (OUTPATIENT)
Dept: SURGERY | Facility: HOSPITAL | Age: 62
End: 2017-11-01
Payer: COMMERCIAL

## 2017-11-01 ENCOUNTER — HOSPITAL ENCOUNTER (OUTPATIENT)
Facility: HOSPITAL | Age: 62
Discharge: HOME | End: 2017-11-02
Attending: SURGERY | Admitting: SURGERY
Payer: COMMERCIAL

## 2017-11-01 DIAGNOSIS — C50.919 BREAST CANCER: ICD-10-CM

## 2017-11-01 DIAGNOSIS — C50.912 BREAST CANCER, LEFT BREAST: Primary | ICD-10-CM

## 2017-11-01 DIAGNOSIS — C50.912 MALIGNANT NEOPLASM OF LEFT FEMALE BREAST, UNSPECIFIED ESTROGEN RECEPTOR STATUS, UNSPECIFIED SITE OF BREAST: ICD-10-CM

## 2017-11-01 PROCEDURE — 64520 N BLOCK LUMBAR/THORACIC: CPT | Performed by: ANESTHESIOLOGY

## 2017-11-01 PROCEDURE — 27000221 HC OXYGEN, UP TO 24 HOURS

## 2017-11-01 PROCEDURE — 25000003 PHARM REV CODE 250: Performed by: STUDENT IN AN ORGANIZED HEALTH CARE EDUCATION/TRAINING PROGRAM

## 2017-11-01 PROCEDURE — 38792 RA TRACER ID OF SENTINL NODE: CPT | Mod: 26,,, | Performed by: NUCLEAR MEDICINE

## 2017-11-01 PROCEDURE — 19302 P-MASTECTOMY W/LN REMOVAL: CPT | Mod: LT,,, | Performed by: SURGERY

## 2017-11-01 PROCEDURE — 88304 TISSUE EXAM BY PATHOLOGIST: CPT | Mod: 26,,, | Performed by: PATHOLOGY

## 2017-11-01 PROCEDURE — 63600175 PHARM REV CODE 636 W HCPCS: Performed by: STUDENT IN AN ORGANIZED HEALTH CARE EDUCATION/TRAINING PROGRAM

## 2017-11-01 PROCEDURE — 27200651 HC AIRWAY, LMA: Performed by: NURSE ANESTHETIST, CERTIFIED REGISTERED

## 2017-11-01 PROCEDURE — 71000033 HC RECOVERY, INTIAL HOUR: Performed by: SURGERY

## 2017-11-01 PROCEDURE — 76098 X-RAY EXAM SURGICAL SPECIMEN: CPT | Mod: 26,,, | Performed by: RADIOLOGY

## 2017-11-01 PROCEDURE — 36000706: Performed by: SURGERY

## 2017-11-01 PROCEDURE — D9220A PRA ANESTHESIA: Mod: CRNA,,, | Performed by: NURSE ANESTHETIST, CERTIFIED REGISTERED

## 2017-11-01 PROCEDURE — 38792 RA TRACER ID OF SENTINL NODE: CPT | Mod: TC

## 2017-11-01 PROCEDURE — G0378 HOSPITAL OBSERVATION PER HR: HCPCS

## 2017-11-01 PROCEDURE — 88305 TISSUE EXAM BY PATHOLOGIST: CPT | Performed by: PATHOLOGY

## 2017-11-01 PROCEDURE — 63600175 PHARM REV CODE 636 W HCPCS: Performed by: ANESTHESIOLOGY

## 2017-11-01 PROCEDURE — 94760 N-INVAS EAR/PLS OXIMETRY 1: CPT

## 2017-11-01 PROCEDURE — A4216 STERILE WATER/SALINE, 10 ML: HCPCS | Performed by: STUDENT IN AN ORGANIZED HEALTH CARE EDUCATION/TRAINING PROGRAM

## 2017-11-01 PROCEDURE — 88341 IMHCHEM/IMCYTCHM EA ADD ANTB: CPT | Mod: 26,,, | Performed by: PATHOLOGY

## 2017-11-01 PROCEDURE — 25000003 PHARM REV CODE 250: Performed by: NURSE ANESTHETIST, CERTIFIED REGISTERED

## 2017-11-01 PROCEDURE — 36000707: Performed by: SURGERY

## 2017-11-01 PROCEDURE — D9220A PRA ANESTHESIA: Mod: ANES,,, | Performed by: ANESTHESIOLOGY

## 2017-11-01 PROCEDURE — 88305 TISSUE EXAM BY PATHOLOGIST: CPT | Mod: 26,,, | Performed by: PATHOLOGY

## 2017-11-01 PROCEDURE — 76098 X-RAY EXAM SURGICAL SPECIMEN: CPT | Mod: TC

## 2017-11-01 PROCEDURE — 71000039 HC RECOVERY, EACH ADD'L HOUR: Performed by: SURGERY

## 2017-11-01 PROCEDURE — 88331 PATH CONSLTJ SURG 1 BLK 1SPC: CPT | Mod: 26,,, | Performed by: PATHOLOGY

## 2017-11-01 PROCEDURE — 63600175 PHARM REV CODE 636 W HCPCS: Performed by: NURSE ANESTHETIST, CERTIFIED REGISTERED

## 2017-11-01 PROCEDURE — 88307 TISSUE EXAM BY PATHOLOGIST: CPT | Mod: 26,,, | Performed by: PATHOLOGY

## 2017-11-01 PROCEDURE — 63600175 PHARM REV CODE 636 W HCPCS: Performed by: SURGERY

## 2017-11-01 PROCEDURE — 88342 IMHCHEM/IMCYTCHM 1ST ANTB: CPT | Mod: 26,,, | Performed by: PATHOLOGY

## 2017-11-01 PROCEDURE — 37000009 HC ANESTHESIA EA ADD 15 MINS: Performed by: SURGERY

## 2017-11-01 PROCEDURE — 25000003 PHARM REV CODE 250

## 2017-11-01 PROCEDURE — 37000008 HC ANESTHESIA 1ST 15 MINUTES: Performed by: SURGERY

## 2017-11-01 PROCEDURE — 38900 IO MAP OF SENT LYMPH NODE: CPT | Mod: LT,,, | Performed by: SURGERY

## 2017-11-01 RX ORDER — PHENYLEPHRINE HYDROCHLORIDE 10 MG/ML
INJECTION INTRAVENOUS
Status: DISCONTINUED | OUTPATIENT
Start: 2017-11-01 | End: 2017-11-01

## 2017-11-01 RX ORDER — PROPOFOL 10 MG/ML
VIAL (ML) INTRAVENOUS
Status: DISCONTINUED | OUTPATIENT
Start: 2017-11-01 | End: 2017-11-01

## 2017-11-01 RX ORDER — HYDROMORPHONE HYDROCHLORIDE 1 MG/ML
0.2 INJECTION, SOLUTION INTRAMUSCULAR; INTRAVENOUS; SUBCUTANEOUS EVERY 5 MIN PRN
Status: DISCONTINUED | OUTPATIENT
Start: 2017-11-01 | End: 2017-11-01 | Stop reason: HOSPADM

## 2017-11-01 RX ORDER — LIDOCAINE HYDROCHLORIDE 10 MG/ML
1 INJECTION, SOLUTION EPIDURAL; INFILTRATION; INTRACAUDAL; PERINEURAL ONCE
Status: DISCONTINUED | OUTPATIENT
Start: 2017-11-01 | End: 2017-11-01

## 2017-11-01 RX ORDER — LIDOCAINE HCL/PF 100 MG/5ML
SYRINGE (ML) INTRAVENOUS
Status: DISCONTINUED | OUTPATIENT
Start: 2017-11-01 | End: 2017-11-01

## 2017-11-01 RX ORDER — DORZOLAMIDE HCL 20 MG/ML
1 SOLUTION/ DROPS OPHTHALMIC 3 TIMES DAILY
Status: DISCONTINUED | OUTPATIENT
Start: 2017-11-01 | End: 2017-11-02 | Stop reason: HOSPADM

## 2017-11-01 RX ORDER — DEXAMETHASONE SODIUM PHOSPHATE 4 MG/ML
INJECTION, SOLUTION INTRA-ARTICULAR; INTRALESIONAL; INTRAMUSCULAR; INTRAVENOUS; SOFT TISSUE
Status: DISCONTINUED | OUTPATIENT
Start: 2017-11-01 | End: 2017-11-01

## 2017-11-01 RX ORDER — DIPHENHYDRAMINE HYDROCHLORIDE 50 MG/ML
25 INJECTION INTRAMUSCULAR; INTRAVENOUS EVERY 4 HOURS PRN
Status: DISCONTINUED | OUTPATIENT
Start: 2017-11-01 | End: 2017-11-02 | Stop reason: HOSPADM

## 2017-11-01 RX ORDER — OXYCODONE AND ACETAMINOPHEN 5; 325 MG/1; MG/1
TABLET ORAL
Status: COMPLETED
Start: 2017-11-01 | End: 2017-11-01

## 2017-11-01 RX ORDER — GLYCOPYRROLATE 0.2 MG/ML
INJECTION INTRAMUSCULAR; INTRAVENOUS
Status: DISCONTINUED | OUTPATIENT
Start: 2017-11-01 | End: 2017-11-01

## 2017-11-01 RX ORDER — OXYCODONE AND ACETAMINOPHEN 5; 325 MG/1; MG/1
1 TABLET ORAL ONCE
Status: COMPLETED | OUTPATIENT
Start: 2017-11-01 | End: 2017-11-01

## 2017-11-01 RX ORDER — SODIUM CHLORIDE 9 MG/ML
INJECTION, SOLUTION INTRAVENOUS CONTINUOUS
Status: DISCONTINUED | OUTPATIENT
Start: 2017-11-01 | End: 2017-11-01

## 2017-11-01 RX ORDER — ONDANSETRON 2 MG/ML
4 INJECTION INTRAMUSCULAR; INTRAVENOUS EVERY 6 HOURS PRN
Status: DISCONTINUED | OUTPATIENT
Start: 2017-11-01 | End: 2017-11-02 | Stop reason: HOSPADM

## 2017-11-01 RX ORDER — MIDAZOLAM HYDROCHLORIDE 1 MG/ML
INJECTION, SOLUTION INTRAMUSCULAR; INTRAVENOUS
Status: DISCONTINUED | OUTPATIENT
Start: 2017-11-01 | End: 2017-11-01

## 2017-11-01 RX ORDER — LATANOPROST 50 UG/ML
1 SOLUTION/ DROPS OPHTHALMIC NIGHTLY
Status: DISCONTINUED | OUTPATIENT
Start: 2017-11-01 | End: 2017-11-02 | Stop reason: HOSPADM

## 2017-11-01 RX ORDER — FENTANYL CITRATE 50 UG/ML
25 INJECTION, SOLUTION INTRAMUSCULAR; INTRAVENOUS EVERY 5 MIN PRN
Status: DISCONTINUED | OUTPATIENT
Start: 2017-11-01 | End: 2017-11-01

## 2017-11-01 RX ORDER — ISOSULFAN BLUE 50 MG/5ML
INJECTION, SOLUTION SUBCUTANEOUS
Status: DISCONTINUED | OUTPATIENT
Start: 2017-11-01 | End: 2017-11-01 | Stop reason: HOSPADM

## 2017-11-01 RX ORDER — MIDAZOLAM HYDROCHLORIDE 1 MG/ML
0.5 INJECTION INTRAMUSCULAR; INTRAVENOUS EVERY 5 MIN PRN
Status: DISCONTINUED | OUTPATIENT
Start: 2017-11-01 | End: 2017-11-01

## 2017-11-01 RX ORDER — ACETAMINOPHEN 325 MG/1
650 TABLET ORAL EVERY 6 HOURS PRN
Status: DISCONTINUED | OUTPATIENT
Start: 2017-11-01 | End: 2017-11-02 | Stop reason: HOSPADM

## 2017-11-01 RX ORDER — VENLAFAXINE HYDROCHLORIDE 75 MG/1
75 CAPSULE, EXTENDED RELEASE ORAL DAILY
Status: DISCONTINUED | OUTPATIENT
Start: 2017-11-01 | End: 2017-11-02 | Stop reason: HOSPADM

## 2017-11-01 RX ORDER — FENTANYL CITRATE 50 UG/ML
INJECTION, SOLUTION INTRAMUSCULAR; INTRAVENOUS
Status: DISCONTINUED | OUTPATIENT
Start: 2017-11-01 | End: 2017-11-01

## 2017-11-01 RX ORDER — HYDROCODONE BITARTRATE AND ACETAMINOPHEN 5; 325 MG/1; MG/1
1 TABLET ORAL EVERY 4 HOURS PRN
Status: DISCONTINUED | OUTPATIENT
Start: 2017-11-01 | End: 2017-11-02 | Stop reason: HOSPADM

## 2017-11-01 RX ORDER — ONDANSETRON 2 MG/ML
INJECTION INTRAMUSCULAR; INTRAVENOUS
Status: DISCONTINUED | OUTPATIENT
Start: 2017-11-01 | End: 2017-11-01

## 2017-11-01 RX ORDER — SODIUM CHLORIDE 0.9 % (FLUSH) 0.9 %
3 SYRINGE (ML) INJECTION EVERY 8 HOURS
Status: DISCONTINUED | OUTPATIENT
Start: 2017-11-01 | End: 2017-11-02 | Stop reason: HOSPADM

## 2017-11-01 RX ORDER — HYDROCODONE BITARTRATE AND ACETAMINOPHEN 10; 325 MG/1; MG/1
1 TABLET ORAL EVERY 4 HOURS PRN
Status: DISCONTINUED | OUTPATIENT
Start: 2017-11-01 | End: 2017-11-02 | Stop reason: HOSPADM

## 2017-11-01 RX ORDER — SODIUM CHLORIDE 0.9 % (FLUSH) 0.9 %
3 SYRINGE (ML) INJECTION
Status: DISCONTINUED | OUTPATIENT
Start: 2017-11-01 | End: 2017-11-01

## 2017-11-01 RX ADMIN — FENTANYL CITRATE 50 MCG: 50 INJECTION INTRAMUSCULAR; INTRAVENOUS at 07:11

## 2017-11-01 RX ADMIN — MIDAZOLAM HYDROCHLORIDE 2 MG: 1 INJECTION, SOLUTION INTRAMUSCULAR; INTRAVENOUS at 07:11

## 2017-11-01 RX ADMIN — ONDANSETRON 4 MG: 2 INJECTION INTRAMUSCULAR; INTRAVENOUS at 10:11

## 2017-11-01 RX ADMIN — PHENYLEPHRINE HYDROCHLORIDE 100 MCG: 10 INJECTION INTRAVENOUS at 09:11

## 2017-11-01 RX ADMIN — PHENYLEPHRINE HYDROCHLORIDE 200 MCG: 10 INJECTION INTRAVENOUS at 09:11

## 2017-11-01 RX ADMIN — PHENYLEPHRINE HYDROCHLORIDE 0.25 MCG/KG/MIN: 10 INJECTION INTRAVENOUS at 09:11

## 2017-11-01 RX ADMIN — PROPOFOL 50 MG: 10 INJECTION, EMULSION INTRAVENOUS at 08:11

## 2017-11-01 RX ADMIN — DORZOLAMIDE HYDROCHLORIDE 1 DROP: 20 SOLUTION/ DROPS OPHTHALMIC at 10:11

## 2017-11-01 RX ADMIN — HYDROMORPHONE HYDROCHLORIDE 0.2 MG: 1 INJECTION, SOLUTION INTRAMUSCULAR; INTRAVENOUS; SUBCUTANEOUS at 11:11

## 2017-11-01 RX ADMIN — FENTANYL CITRATE 25 MCG: 50 INJECTION, SOLUTION INTRAMUSCULAR; INTRAVENOUS at 09:11

## 2017-11-01 RX ADMIN — FENTANYL CITRATE 50 MCG: 50 INJECTION, SOLUTION INTRAMUSCULAR; INTRAVENOUS at 09:11

## 2017-11-01 RX ADMIN — PHENYLEPHRINE HYDROCHLORIDE 100 MCG: 10 INJECTION INTRAVENOUS at 08:11

## 2017-11-01 RX ADMIN — GLYCOPYRROLATE 0.1 MG: 0.2 INJECTION, SOLUTION INTRAMUSCULAR; INTRAVENOUS at 09:11

## 2017-11-01 RX ADMIN — SODIUM CHLORIDE, PRESERVATIVE FREE 3 ML: 5 INJECTION INTRAVENOUS at 09:11

## 2017-11-01 RX ADMIN — VENLAFAXINE HYDROCHLORIDE 75 MG: 75 CAPSULE, EXTENDED RELEASE ORAL at 05:11

## 2017-11-01 RX ADMIN — HYDROMORPHONE HYDROCHLORIDE 0.2 MG: 1 INJECTION, SOLUTION INTRAMUSCULAR; INTRAVENOUS; SUBCUTANEOUS at 12:11

## 2017-11-01 RX ADMIN — PROPOFOL 150 MG: 10 INJECTION, EMULSION INTRAVENOUS at 08:11

## 2017-11-01 RX ADMIN — Medication 2 G: at 08:11

## 2017-11-01 RX ADMIN — OXYCODONE HYDROCHLORIDE AND ACETAMINOPHEN 1 TABLET: 5; 325 TABLET ORAL at 01:11

## 2017-11-01 RX ADMIN — DEXAMETHASONE SODIUM PHOSPHATE 4 MG: 4 INJECTION, SOLUTION INTRAMUSCULAR; INTRAVENOUS at 09:11

## 2017-11-01 RX ADMIN — PROPOFOL 30 MG: 10 INJECTION, EMULSION INTRAVENOUS at 09:11

## 2017-11-01 RX ADMIN — ISOSULFAN BLUE 3 ML: 10 INJECTION, SOLUTION SUBCUTANEOUS at 09:11

## 2017-11-01 RX ADMIN — LATANOPROST 1 DROP: 50 SOLUTION OPHTHALMIC at 10:11

## 2017-11-01 RX ADMIN — MIDAZOLAM HYDROCHLORIDE 2 MG: 1 INJECTION, SOLUTION INTRAMUSCULAR; INTRAVENOUS at 08:11

## 2017-11-01 RX ADMIN — SODIUM CHLORIDE, SODIUM GLUCONATE, SODIUM ACETATE, POTASSIUM CHLORIDE, MAGNESIUM CHLORIDE, SODIUM PHOSPHATE, DIBASIC, AND POTASSIUM PHOSPHATE: .53; .5; .37; .037; .03; .012; .00082 INJECTION, SOLUTION INTRAVENOUS at 08:11

## 2017-11-01 RX ADMIN — PHENYLEPHRINE HYDROCHLORIDE 200 MCG: 10 INJECTION INTRAVENOUS at 08:11

## 2017-11-01 RX ADMIN — HYDROCODONE BITARTRATE AND ACETAMINOPHEN 1 TABLET: 10; 325 TABLET ORAL at 07:11

## 2017-11-01 RX ADMIN — LIDOCAINE HYDROCHLORIDE 60 MG: 20 INJECTION, SOLUTION INTRAVENOUS at 08:11

## 2017-11-01 RX ADMIN — SODIUM CHLORIDE: 0.9 INJECTION, SOLUTION INTRAVENOUS at 08:11

## 2017-11-01 RX ADMIN — PROPOFOL 50 MG: 10 INJECTION, EMULSION INTRAVENOUS at 09:11

## 2017-11-01 RX ADMIN — SODIUM CHLORIDE, SODIUM GLUCONATE, SODIUM ACETATE, POTASSIUM CHLORIDE, MAGNESIUM CHLORIDE, SODIUM PHOSPHATE, DIBASIC, AND POTASSIUM PHOSPHATE: .53; .5; .37; .037; .03; .012; .00082 INJECTION, SOLUTION INTRAVENOUS at 09:11

## 2017-11-01 RX ADMIN — OXYCODONE AND ACETAMINOPHEN 1 TABLET: 5; 325 TABLET ORAL at 01:11

## 2017-11-01 NOTE — PROGRESS NOTES
Wednesday, November 1, 2017    Protocol: [id number]  Investigator: GRTECHEN Arce MD  Pt Initials: KATRINA HERNDON  Study ID: [id number]  IRB #: [id number]    [id number]: A Randomized Phase III Trial Evaluating the Role of Axillary Lymph Node Dissection in Breast Cancer Patients (CT1-3 N1) Who Have Positive [place] Lymph Node Disease After Neoadjuvant Chemotherapy    Intraoperative Randomization Note:    Phone call received from the circulating nurse in the patient's operating room who stated that the first sentinel lymph node tested was positive. Intraoperative randomization then occurred which resulted in randomization to Arm 1: Axillary Lymph Node Dissection + Anne Radiation Therapy (without XRT to dissected axilla). Will await final pathology report and have patient set up for a radiation consult.

## 2017-11-01 NOTE — ANESTHESIA PREPROCEDURE EVALUATION
11/01/2017  Ashlie Redman is a 62 y.o., female.    Pre-op Assessment    I have reviewed the Patient Summary Reports.     I have reviewed the Nursing Notes.   I have reviewed the Medications.     Review of Systems  Anesthesia Hx:  No problems with previous Anesthesia  History of prior surgery of interest to airway management or planning: Denies Family Hx of Anesthesia complications.   Denies Personal Hx of Anesthesia complications.   Hematology/Oncology:  Hematology Normal      Current/Recent Cancer. Breast left   EENT/Dental:  EENT/Dental Normal Elevated IOP   Cardiovascular:  Cardiovascular Normal Exercise tolerance: good  Denies Hypertension.  Denies MI.  Denies CAD.       Pulmonary:  Pulmonary Normal  Denies COPD.  Denies Asthma.  Denies Shortness of breath.  Denies Sleep Apnea.    Renal/:  Renal/ Normal     Hepatic/GI:  Hepatic/GI Normal  Denies GERD.    Musculoskeletal:  Musculoskeletal Normal    Neurological:  Neurology Normal Denies TIA.  Denies CVA. Denies Seizures.    Endocrine:  Endocrine Normal Denies Diabetes. Denies Hypothyroidism.  Denies Hyperthyroidism.    Dermatological:  Skin Normal    Psych:  Psychiatric Normal           Physical Exam  General:  Well nourished    Airway/Jaw/Neck:  Airway Findings: Mouth Opening: Normal Tongue: Normal  General Airway Assessment: Adult  Mallampati: II  TM Distance: Normal, at least 6 cm  Jaw/Neck Findings:  Neck ROM: Normal ROM  Neck Findings: Normal     Dental:  Dental Findings: In tact   Chest/Lungs:  Chest/Lungs Clear    Heart/Vascular:  Heart Findings: Normal Heart murmur: negative Vascular Findings: Normal    Abdomen:  Abdomen Findings: Normal    Musculoskeletal:  Musculoskeletal Findings: Normal   Skin:  Skin Findings: Normal    Mental Status:  Mental Status Findings:  Cooperative, Alert and Oriented         Anesthesia Plan  Type of Anesthesia,  risks & benefits discussed:  Anesthesia Type:  general, regional  Patient's Preference:   Intra-op Monitoring Plan: standard ASA monitors  Intra-op Monitoring Plan Comments:   Post Op Pain Control Plan: per primary service following discharge from PACU, IV/PO Opioids PRN and peripheral nerve block  Post Op Pain Control Plan Comments:   Induction:   IV  Beta Blocker:  Patient is not currently on a Beta-Blocker (No further documentation required).       Informed Consent: Patient understands risks and agrees with Anesthesia plan.  Questions answered. Anesthesia consent signed with patient.  ASA Score: 2     Day of Surgery Review of History & Physical:    H&P update referred to the surgeon.         Ready For Surgery From Anesthesia Perspective.

## 2017-11-01 NOTE — ANESTHESIA PROCEDURE NOTES
Paravertebral Single Injection Block(s)    Patient location during procedure: pre-op   Block not for primary anesthetic.  Reason for block: at surgeon's request and post-op pain management   Post-op Pain Location: left chest wall pain  Start time: 11/1/2017 7:26 AM  Timeout: 11/1/2017 7:24 AM   End time: 11/1/2017 7:33 AM  Staffing  Anesthesiologist: RADHA GOLDSTEIN  Other anesthesia staff: PAUL COLLINS  Performed: other anesthesia staff   Preanesthetic Checklist  Completed: patient identified, site marked, surgical consent, pre-op evaluation, timeout performed, IV checked, risks and benefits discussed and monitors and equipment checked  Peripheral Block  Patient position: sitting  Prep: ChloraPrep  Patient monitoring: heart rate, cardiac monitor, continuous pulse ox, continuous capnometry and frequent blood pressure checks  Block type: paravertebral - thoracic  Laterality: left  Injection technique: single shot  Needle  Needle type: Tuohy   Needle gauge: 22 G  Needle length: 3.5 in  Needle localization: anatomical landmarks     Assessment  Injection assessment: negative aspiration and negative parasthesia  Paresthesia pain: none  Heart rate change: no  Slow fractionated injection: yes  Medications:  Bolus administered: 30 mL of 0.5 ropivacaine  Epinephrine added: 3.75 mcg/mL (1/300,000)  Additional Notes  T2 os at 4 cm  T4 os at 3.25 cm  VSS.  [facility] RN monitoring vitals throughout procedure.  Patient tolerated procedure well.

## 2017-11-01 NOTE — H&P (VIEW-ONLY)
Patient ID: Ashlie Redman is a 62 y.o. female.     Chief Complaint: F/U of left breast CA treated with neoadjuvant CTX     HPI   This is a 62 y.o female who was referred for evaluation of L breast mass. She first noticed this mass in 2017, since then she saw her PCP and mammogram, ultrasound and biopsy were completed. Biopsy showed invasive ductal carcinoma of L breast, axillary lymph node biopsy was not completed at that time. Patient reports of breast pain 4-5/10 on severity and constantly there at time of initial presentation, the pain was worse when she walked or was active.  She is statesr the mass has decreased in size and is no longer palpable. Patient denied any fevers, chills, nausea, vomiting, weight loss or night sweats at that time.      Gyn History:  Age of menarche is 12. . OCP x 18 years. Menopause age 50. HRT (climara and paxil) x 12 years.      Fam History:   Colon cancer mother (90) and sister (48)  Breast cancer in 1st cousin (patient unsure age)     Review of Systems   Constitutional: Negative for chills, fatigue and fever.   Respiratory: Negative for cough, chest tightness and shortness of breath.   Cardiovascular: Negative for chest pain.   Gastrointestinal: Negative for abdominal distention, abdominal pain, nausea and vomiting.   Loose stools s/p clindamycin   Genitourinary: Negative for difficulty urinating, dysuria and flank pain.   Musculoskeletal: Negative for back pain.   Skin: Negative for color change and rash.   Hematological: Positive for adenopathy.      Objective:      Physical Exam   Constitutional: She is oriented to person, place, and time. She appears well-developed and well-nourished. No distress.   HENT:   Head: Normocephalic and atraumatic.   Eyes: EOM are normal. Pupils are equal, round, and reactive to light.   Neck: Normal range of motion. Neck supple.   Cardiovascular: Normal rate and regular rhythm.   Pulmonary/Chest: Effort normal and breath sounds normal.  No respiratory distress. She has no wheezes.       There is no apparent skin changes or nipple discharge.     R breast: appears normal.    Bilateral breast w/ implants that are palpable.    There is palpable and enlarge lymph node at apical axilla. It is non-tender and mobile.   Abdominal: Soft. Bowel sounds are normal. She exhibits no distension. There is no tenderness.   Musculoskeletal: Normal range of motion. She exhibits no edema.   Neurological: She is alert and oriented to person, place, and time.   Skin: Skin is warm and dry. She is not diaphoretic.   Psychiatric: She has a normal mood and affect. Her behavior is normal. Judgment and thought content normal.   Nursing note and vitals reviewed.     Assessment:      1. Malignant neoplasm of female breast, unspecified laterality, unspecified site of breast       Plan:        61 y/o female with invasive ductal carcinoma     1. US guided core needle biopsy of L apical axillary node  2. MRI scan  3. PET scan   4. Medical oncology referral for chemotherapy for Er-/Pr-/Her2+   5. Port-a-cath placement on R chest                              DATE OF CONSULTATION:  04/18/2017     CHIEF COMPLAINT:  Newly diagnosed left breast cancer.     HISTORY OF PRESENT ILLNESS:  The patient is a very pleasant 62-year-old    female who presents with her .  The patient felt a mass in her   left breast in March 2017.  This was also described by her primary care   physician who ordered a diagnostic mammogram and ultrasound.  She subsequently   had imaging, which revealed a suspicious asymmetric density and mass in the   lateral lower outer 8 o'clock region of the left breast, 8 cm from the nipple.    This measured 34 mm by ultrasound and 6 mm by mammogram.  It was described as an   asymmetric density with an area of focal asymmetry measuring 6 mm seen in the   posterior aspect of the left breast at the 3 and 4 o'clock position located 8 cm   from the nipple.  It was in  the lower outer 3 to 4 o'clock position.  The   patient underwent core needle biopsy, which revealed a grade II invasive ductal   carcinoma along with high-grade DCIS.  This was estrogen and progesterone   receptor positive and it was also HER-2/clay positive with 3+ staining.  The   patient also was found to have a benign 12 mm right-sided contralateral simple   cyst.     PAST MEDICAL HISTORY:  Significant for eye surgery, ectopic pregnancy, bilateral   breast augmentation four years ago with some glandular silicone implants.  She   took oral contraceptive products for approximately 30 years.     GYN HISTORY:  Reveals that menarche was at age 12, menopause at age 50.  She is   on Climara and Paxil in addition to other medications in [facility] record.  She took   hormone replacement therapy for approximately 12 years and has subsequently   discontinued them with her diagnosis.  She is a  3, para 2 with first   live birth and pregnancy at age 25 and the second child at age 36.     FAMILY HISTORY:  Significant for colon cancer in her mother at age 90 and rectal   cancer in her sister at age 48.  She has a maternal first cousin diagnosed with   breast cancer after the age of 50 and her mother also had had a benign   pancreatic mass by the history, which is the region where the patient states was   the same vicinity of her eventual development of colon cancer.  Her clinical   breast exam is otherwise noncontributory other than the breast exam.  The right   breast is within normal limits with no suspicious masses, nodules, densities,   skin changes or lymphadenopathy.  There is a palpable subglandular silicone   implant on the right anterior to the pectoralis muscle.  There are no   lymphadenopathy or breast masses.  The left side reveals a 2.5 cm mass-like   density in the lateral lower outer 3 to 4 o'clock region of the left breast.  We   can palpate the implant.  There are no suspicious skin changes.  There is no    dimpling of the skin or nipple areolar complex.  There is no edema, erythema or   thickening of the skin; however, I do feel a palpable, mobile, well   circumscribed single 2 cm lymph node, which is highly suspicious.     ASSESSMENT AND PLAN:  I am going to clinically stage this patient as having a T2   N1 clinical stage IIB breast cancer.  Since she is HER-2 positive, she would be   a strong candidate for preoperative neoadjuvant chemotherapy approach with   Herceptin and Perjeta based chemotherapy.  We will order a PET CT whole body   fusion scan.  We will schedule her for a right-sided contralateral Port-A-Cath,   which will be scheduled for Monday, 04/24/2017.  We will have the left axilla   evaluated by ultrasound and core needle biopsy today.  I will also obtain an MRI   of her breast as well as obtain genetic counseling for possible genetic   testing.  We will set her up for Medical Oncology, but she prefers Medical   Oncology consultation on [place] and we will try to have her see Dr. Trell Lau who had taken care of her mother when her mother was diagnosed with the   colon cancer.  Consent was obtained for the Port-A-Cath and we will proceed with   the anticipated neoadjuvant chemotherapy as outlined above pending the lymph   node biopsy.     ADDENDUM:  Subsequently, the patient had an ultrasound-guided core needle biopsy   of the axilla, which revealed a positive lymph node with abundant carcinoma   noted within it.  The PET scan was negative for any evidence of distant   metastatic disease.  The primary tumor was seen in the 4 o'clock region of the   left breast as well as the PET avid lymph node that we can palpate.  There was   also an additional smaller lymph node adjacent to this, which was seen on the   PET scan and I would still stage her as clinically stage IIB with a T2 N1 status   at this point.  Plan will be for neoadjuvant Herceptin-based chemotherapy with   Herceptin and Perjeta and  we will proceed with a Port-A-Cath insertion on Monday 04/24/2017 as outlined above.  Approximate time spent with the patient and her    today was 60 minutes discussing options of surgical therapy, typically   if she receives breast conservation surgery, she would require radiotherapy.    She may require adjuvant radiotherapy given the presentation with a large   palpable lymph node at this time and HER-2/clay status.  Certainly, she would   have time to consider her local therapies of breast conservation and   radiotherapy versus mastectomy and possible postmastectomy radiotherapy while   she undergoes neoadjuvant chemotherapy.  We would get her involved with Plastic   Surgery should she decide to pursue mastectomies, but at this point, she would   like to move forward with the neoadjuvant approach.  We discussed all the   indications for neoadjuvant chemotherapy including being able to assess response   of the primary tumor initiating systemic therapy sooner allowing her to receive   FDA approved double antiHerceptin regimen.  The ability to line up for   potential results of genetic counseling and possible testing with Plastic   Surgery and give her more time to ultimately decide her local therapeutic   options.         Notes from initial clinic encounter of 4-18-17 are as detailed and noted above.  I reviewed these notes above from that encounter again today August 17, 2018 with the patient and her .  The patient has been receiving her neoadjuvant preoperative Herceptin and per GEN a-based chemotherapy per Dr. Celestina Ragsdale on the [place].  The patient is a 62-year-old  female with a HER-2/clay positive invasive ductal carcinoma that originally measured 2.5 cm in the 4:00 region of the left lower outer quadrant of the breast with a biopsy-proven positive palpable 2 cm left axillary lymph node.  She has received her chemotherapy and has 6 cycles of weekly Taxol remaining and has been  administered to cycles of Herceptin and progesterone of thus far every 3 weeks.  We anticipate her cytotoxic chemotherapy to be completed by the end of September and I would like to give her approximately 1 month to recover until the end of October and therefore we would plan to schedule her surgery in early November 2017.     Her clinical breast exam today is completely within normal limits.  I do not palpate any suspicious masses nodules densities skin changes or lymphadenopathy.  I feel she has achieved a complete clinical response already.  Nonetheless, she will complete her proper recommended preoperative course of chemotherapy.  The patient  are quite happy about the good prognostic implications of a complete clinical response and this was discussed with them.     I will plan to order a repeat breast MRI in early October 2017 and she will follow-up with me in mid October and we would anticipate her surgery to be tentatively scheduled for Wednesday, November 1, 2017.  Since she has had a complete clinical response by my clinical exam of her axilla today and I do not palpate the axillary node, she would be a candidate for the NSABP B 51 and its sister [place] trial where at the time of surgery I would perform a sentinel lymph node biopsy on the previously biopsied proven lymph node and if negative then not only which she did not undergo a completion axillary dissection, but she would also be randomized to either radiation or no radiation to the axilla.  If the sentinel node or node that was producing biopsied remains pathologically positive at the time of surgery then the [place] trial would randomize her to either axillary radiation versus axillary dissection.  We discussed this trial at length and the aim of the trial is to minimize lymphedema and comorbidities that come from repetitive treatment if the patient has a pathologic response in her axilla.     In terms of local regional therapy we  discussed options of breast conservation surgery and radiotherapy versus mastectomy again at length.  Since she has had a complete clinical response thus far I feel she is an excellent candidate for breast conservation surgery.  We would perform a lumpectomy of the area of the original tumor as well as perform a left axillary sentinel lymph node biopsy and make sure that the originally biopsied node that was positive with a clip and it is excised for complete pathologic analysis and trial eligibility  Given the complete clinical response she is motivated for the attempted breast conservation surgery and radiotherapy and is very interested in the clinical trial as well to minimize axillary surgery to reduce potential postsurgical left upper extremity lymphedema risks.  She will follow-up with me in mid October to obtain consents and review her postchemotherapy MRI scheduled for early October.  Surgery date will be Wednesday, November 1, 2017 with anticipated mag seed seed localization partial mastectomy (lumpectomy for margins) with left axillary sentinel lymph node biopsy with anticipated excision of the previously biopsy proven malignant node with mag seed localization of that note as well to assess his response to therapy and randomization as dictated by the clinical trial.  We will plan to keep her port as we will anticipate her receiving adjuvant Herceptin postoperatively as well for a year total of monoclonal antibody therapy.      The patient presented again today 10-12-13 and all of the above from previous encounters was reviewed.  The MRI of breast on 10-3-17 revealed no residual enhancement or tumor seen in left breast with left axillary node decreased in size.  The left axillary LN is not palpable today on clinical exam.  It was not palpable on the last clinical exam either.  This makes her eligible for the NSABP B51 and [place] trial.  She has been consented and scheduled for left BCS partial mastectomy  (lumpectomy for margins) with left axillary SLNB on 11-1-17.  She will be recommended for and have a magseed placed in both her left breat at the initial biopsy proven primary site as well as in the left previously biopsy proven axillary LN  Magseeds to be placed on 10-25-17 in both the left breast AND axilla.  Patient will consent to NSABP B51 and [place] trial.  Time spent today in clinic was 45 minutes with >50% of time in counseling, predominantly about the clincial trial.  She had previously agreed to want to pursue and schedule BCS and SLNB on 11-1-17 of left breast after neoadjuvant CTX for Her2 positive CA.  We will plan to keep her R port in place for adjuvvant Herceptin as well.

## 2017-11-01 NOTE — ANESTHESIA RELEASE NOTE
"Anesthesia Release from PACU Note    Patient: Ashlie Redman    Procedure(s) Performed: Procedure(s) (LRB):  LUMPECTOMY with MAGSEED (2) (Left)  BIOPSY-LYMPH NODE-SENTINEL (Left)  INJECTION-NODE-SENTINEL (Left)  DISSECTION-LYMPH NODE-AXILLARY (Left)    Anesthesia type: general    Post pain: Adequate analgesia    Post assessment: no apparent anesthetic complications    Last Vitals:   Visit Vitals  /63   Pulse 80   Temp 36.9 °C (98.4 °F) (Oral)   Resp 12   Ht 5' 7" (1.702 m)   Wt 65.8 kg (145 lb)   SpO2 97%   Breastfeeding? No   BMI 22.71 kg/m²       Post vital signs: stable    Level of consciousness: awake, alert  and oriented    Nausea/Vomiting: no nausea/no vomiting    Complications: none    Airway Patency: patent    Respiratory: unassisted    Cardiovascular: stable and blood pressure at baseline    Hydration: euvolemic  "

## 2017-11-01 NOTE — OR NURSING
1.) Left axillary sentinel lymph node blue and warm <100. (sent to mammo then to frozen)  2.) Left axillary sentinel lymph node blue and hot 1000 (frozen)  3.) Left axillary additional tissue (permanent)  4.) Left axillary sentinel lymph node warm and blue 500 (frozen)  5.) Left breast lumpectomy (sent to mammo)  6.) Fibrous capsule deep to known tumor (permanent)  7.) Left axillary contents (permanent)

## 2017-11-01 NOTE — ANESTHESIA POSTPROCEDURE EVALUATION
"Anesthesia Post Evaluation    Patient: Ashlie Redman    Procedure(s) Performed: Procedure(s) (LRB):  LUMPECTOMY with MAGSEED (2) (Left)  BIOPSY-LYMPH NODE-SENTINEL (Left)  INJECTION-NODE-SENTINEL (Left)  DISSECTION-LYMPH NODE-AXILLARY (Left)    Final Anesthesia Type: general  Patient location during evaluation: PACU  Patient participation: Yes- Able to Participate  Level of consciousness: awake and alert and oriented  Post-procedure vital signs: reviewed and stable  Pain management: adequate  Airway patency: patent  PONV status at discharge: No PONV  Anesthetic complications: no      Cardiovascular status: blood pressure returned to baseline  Respiratory status: unassisted and room air  Hydration status: euvolemic  Follow-up not needed.        Visit Vitals  /63   Pulse 85   Temp 36.9 °C (98.4 °F) (Oral)   Resp 11   Ht 5' 7" (1.702 m)   Wt 65.8 kg (145 lb)   SpO2 97%   Breastfeeding? No   BMI 22.71 kg/m²       Pain/Lillian Score: Pain Assessment Performed: Yes (11/1/2017 12:30 PM)  Presence of Pain: other (see comments) (states pain is tolerable) (11/1/2017 12:30 PM)  Pain Rating Prior to Med Admin: 4 (11/1/2017  1:25 PM)  Lillian Score: 8 (11/1/2017 12:30 PM)      "

## 2017-11-01 NOTE — ANESTHESIA POSTPROCEDURE EVALUATION
"Anesthesia Post Evaluation    Patient: Ashlie Redman    Procedure(s) Performed: Procedure(s) (LRB):  LUMPECTOMY with MAGSEED (2) (Left)  BIOPSY-LYMPH NODE-SENTINEL (Left)  INJECTION-NODE-SENTINEL (Left)  DISSECTION-LYMPH NODE-AXILLARY (Left)    Final Anesthesia Type: general  Patient location during evaluation: PACU  Patient participation: Yes- Able to Participate  Level of consciousness: awake and alert and oriented  Post-procedure vital signs: reviewed and stable  Pain management: adequate  Airway patency: patent  PONV status at discharge: No PONV  Anesthetic complications: no      Cardiovascular status: blood pressure returned to baseline  Respiratory status: unassisted  Hydration status: euvolemic  Follow-up not needed.        Visit Vitals  /63   Pulse 80   Temp 36.9 °C (98.4 °F) (Oral)   Resp 12   Ht 5' 7" (1.702 m)   Wt 65.8 kg (145 lb)   SpO2 97%   Breastfeeding? No   BMI 22.71 kg/m²       Pain/Lillian Score: Pain Assessment Performed: Yes (11/1/2017 12:30 PM)  Presence of Pain: other (see comments) (states pain is tolerable) (11/1/2017 12:30 PM)  Pain Rating Prior to Med Admin: 4 (11/1/2017  1:25 PM)  Lillian Score: 8 (11/1/2017 12:30 PM)      "

## 2017-11-01 NOTE — INTERVAL H&P NOTE
The patient has been examined and the H&P has been reviewed:    No acute interval changes.    Anesthesia/Surgery risks, benefits and alternative options discussed and understood by patient/family.          Active Hospital Problems    Diagnosis  POA    Breast cancer, left breast [C50.912]  Yes      Resolved Hospital Problems    Diagnosis Date Resolved POA   No resolved problems to display.

## 2017-11-01 NOTE — BRIEF OP NOTE
[facility]  Brief Operative Note    SUMMARY     Surgery Date: 11/1/2017     Surgeon(s) and Role:     * Alexander Arce MD - Primary    Assisting Surgeon: Yonny Hernandez MD    Pre-op Diagnosis:  Malignant neoplasm of left female breast, unspecified estrogen receptor status, unspecified site of breast [C50.912]    Post-op Diagnosis:  Post-Op Diagnosis Codes:     * Malignant neoplasm of left female breast, unspecified estrogen receptor status, unspecified site of breast [C50.912]    Procedure(s) (LRB):  LUMPECTOMY with MAGSEED (2) (Left)  BIOPSY-LYMPH NODE-SENTINEL (Left)  INJECTION-NODE-SENTINEL (Left)  DISSECTION-LYMPH NODE-AXILLARY (Left)    Anesthesia: General    Description of Procedure: Patient was injected with radio tracer and blue dye. Axillary sentinel lymph nodes removed and sent for frozen section. Lateral incision made on left breast and breast tissue removed down to breast implant capsule. Small area of implant capsule excised and sent for permanent. Trion node returned positive for metastatic disease and an axillary dissection was performed.     Description of the findings of the procedure: 1 sentinel lymph node positive for metastatic disease on frozen; axillary dissection done    Estimated Blood Loss: 10cc       Specimens:   Specimen (12h ago through future)    Start     Ordered    11/01/17 1023  Specimen to Pathology - Surgery  Once     Comments:  1.) Left axillary sentinel lymph node blue and warm <100. (sent to mammo then to frozen)2.) Left axillary sentinel lymph node blue and hot 1000 (frozen)3.) Left axillary additional tissue (permanent)4.) Left axillary sentinel lymph node warm and blue 500 (frozen)5.) Left breast lumpectomy- green= inferior, blue= superior, orange= lateral, yellow= anterior, black= posterior, red= medial (sent to mammo)6.) Fibrous capsule deep to known tumor (permanent)7.) Left axillary contents (permanent)      11/01/17 1026

## 2017-11-01 NOTE — TRANSFER OF CARE
"Anesthesia Transfer of Care Note    Patient: Ashlie Redman    Procedure(s) Performed: Procedure(s) (LRB):  LUMPECTOMY with MAGSEED (2) (Left)  BIOPSY-LYMPH NODE-SENTINEL (Left)  INJECTION-NODE-SENTINEL (Left)  DISSECTION-LYMPH NODE-AXILLARY (Left)    Patient location: PACU    Anesthesia Type: general    Transport from OR: Transported from OR on 6-10 L/min O2 by face mask with adequate spontaneous ventilation    Post pain: adequate analgesia    Post assessment: no apparent anesthetic complications and tolerated procedure well    Post vital signs: stable    Level of consciousness: sedated and responds to stimulation    Nausea/Vomiting: no nausea/vomiting    Complications: none    Transfer of care protocol was followed      Last vitals:   Visit Vitals  /69 (BP Location: Right arm)   Pulse 82   Temp 36.2 °C (97.2 °F) (Axillary)   Resp 15   Ht 5' 7" (1.702 m)   Wt 65.8 kg (145 lb)   SpO2 95%   Breastfeeding? No   BMI 22.71 kg/m²     "

## 2017-11-01 NOTE — OP NOTE
DATE OF PROCEDURE:  11/01/2017    PRIMARY SURGEON:  Alexander Arce M.D.    ASSISTANT:  Juan Miguel Hernandez M.D. (RES)    PREOPERATIVE DIAGNOSIS:  Known left lateral breast invasive HER-2/clay positive   invasive breast carcinoma, status post Herceptin and Perjeta based preoperative   neoadjuvant chemotherapy with preoperatively known positive left lymph node in   the left axilla.    POSTOPERATIVE DIAGNOSIS:  Known left lateral breast invasive HER-2/clay positive   invasive breast carcinoma, status post Herceptin and Perjeta based preoperative   neoadjuvant chemotherapy with preoperatively known positive left lymph node in   the left axilla.    PROCEDURES:  1.  Left breast partial mastectomy with Magseed seed localization.  2.  Injection of left breast with isosulfan Lymphazurin blue dye and   technetium-labeled radiocolloid for sentinel lymph node identification.  3.  Left deep axillary sentinel lymph node biopsy with intraoperative frozen   sections; a complete axillary level 1 and level 2 lymph node dissection   (axillary lymph node dissection).    PROCEDURE IN DETAIL:  The patient underwent informed consent.  The history and   physical examination was reviewed and updated.  The left breast was marked.  She   underwent a regional paravertebral block on the left side.  She was brought to   the Operating Room.  The patient was in the supine position under general   anesthesia with laryngeal mask airway.  The left breast, anterior subareolar   region was injected with the isosulfan Lymphazurin blue dye and   technetium-labeled radiocolloid in the subareolar region under standard   protocol.  The patient was known to have an underlying breast implant.  We   allowed the area to migrate to the axilla while we prepped and draped the left   breast, left anterior chest, left arm and axilla in a sterile fashion.  We noted   a hot spot in the axilla and we performed the sentinel lymph node biopsy first.    We used the Magseed  detector to detect the area of interest in the axilla and   this corresponded to the area of hot spot.  A small transverse inferior axillary   incision was made.  We circumferentially dissected around the hot spot with   both the gamma probe as well as the Magseed detector.  The blue dye could be   seen entering this area.  This area was circumferentially dissected and removed.    On the back table, we  the hot spot with the Magseed detector which   was in the area of the clip of the prior biopsy.  There was no palpable lymph   node in this area, but there was some fibrosis thought to be reaction from the   neoadjuvant chemotherapy since the node has been known to be positive prior to   chemotherapy.  We trimmed the Magseed active lymph node, which had some blue dye   and some faint radioactivity and this was labeled as specimen #1, warm and   blue.  It was sent for specimen radiograph, which confirmed the Magseed and clip   and then sent to frozen section.  Adjacent to this, we had trimmed off some   additional left axillary sentinel lymph node tissue, which was hot and blue with   an ex vivo count of 100.  This had been immediately adjacent to the area with   the Magseed.  We then trimmed some fibrofatty tissue off of this, which was not   blue or radioactive and this was sent as left axillary additional tissue.  All   this had come out as the original initial specimen and the first 3 specimens   were  individually on the back table.  We then put the probe back in   the axilla.  There were no further palpable nodes, but we still had some   radioactivity in the more posterior medial area perhaps level 2 region.  This   was dissected out and labeled as left axillary sentinel lymph node, warm and   blue with an ex vivo count of 500 and this was submitted for frozen section as   well.  The patient had been enrolled in NSABP B-51 and its sister alliance trial   and so we sent these for frozen  sections.  The specimen radiograph of #1   confirmed the Magseed in it and then it was sent to frozen section.  We then   found out that the sentinel node was being analyzed and we are awaiting the   results while we performed the lumpectomy.  We made a lateral radial incision in   the 3 o'clock position of the left breast over the Magseed area of greatest   activity.  No skin was taken.  The dissection was carried down to and including   the capsule of the implant.  We submitted the capsule of the implant separately   that was deep to the area of interest.  The lumpectomy specimen was removed and   circumferentially had been dissected with cautery and on the back table was   oriented with the multicolored inks in the standard fashion and fixed with   acetic acid.  Specimen radiograph confirmed the clip and area of interest and   Magseed within this specimen.  We submitted the posterior fibrous capsule, which   was taken from the area just deep to and posterior to the known area of cancer   for permanent sectioning.  The lumpectomy was specimen #5 and the fibrous   capsule was specimen #6.  In the meantime, we heard the results on the frozen   section.  The first specimen, which had been originally positive node was   positive on frozen section.  She underwent intraoperative randomization by phone   call to the alliance trial and she was randomized to complete axillary lymph   node dissection, which the patient had given consent to preoperatively.  The   patient then underwent a complete level 1 and level 2 axillary lymph node   dissection.  We identified the axillary vein and preserved its adventitia.  We   dissected posterior to the pectoralis minor muscle, preserving the medial   pectoral nerve.  We swept out the level 2 nodes, swept them inferiorly.  We   preserved the intercostal brachial nerve running inferior and parallel to the   axillary vein across the axilla.  The dissection was carried posteriorly to    identify the long thoracic nerve of Leal on the posterior medial chest wall   along the serratus anterior muscle.  Laterally, we identified the thoracodorsal   neurovascular bundle.  The anterior axillary veins running through the axilla   had been clipped and divided.  We also used the Harmonic scalpel LigaSure   throughout some of this dissection.  We swept all the contents out from the   level 2 region and off of the subscapularis muscle inferiorly off of the   serratus anterior muscle and the latissimus dorsi muscle, thus performing a   level 1 and level 2 lymph node dissection.  We had preserved the intercostal   brachial nerve as well as all the motor nerves.  At this point, there were no   palpable nodes remaining in the axilla.  There were no palpable nodes in the   level 3 region, thus completing the level 1 and level 2 axillary lymph node   dissection.  The wound was irrigated.  It was made hemostatic.  We placed a #19   round Jeff drain into the axilla.  The axillary incision was closed with a deep   dermal and subcutaneous 3-0 Vicryl suture followed by running 4-0 Monocryl   subcuticular skin closure.  The lumpectomy site was closed after it was made   hemostatic and irrigated by reapproximating the deep dermal and subcutaneous   layer with 3-0 Vicryl suture and running the skin layer closed with a 4-0   Monocryl subcuticular skin closure.  Mastisol, Steri-Strips, sterile fluff gauze   dressing and post-procedure bra were placed.  Estimated blood loss was minimal.    All needle, instrument and sponge counts were correct.      LOLI  dd: 11/01/2017 11:15:05 (CDT)  td: 11/01/2017 12:08:36 (CD)  Doc ID   #[id number]  Job ID #[id number]    CC:

## 2017-11-02 VITALS
HEART RATE: 67 BPM | HEIGHT: 67 IN | TEMPERATURE: 99 F | OXYGEN SATURATION: 97 % | RESPIRATION RATE: 18 BRPM | DIASTOLIC BLOOD PRESSURE: 58 MMHG | WEIGHT: 143.31 LBS | SYSTOLIC BLOOD PRESSURE: 120 MMHG | BODY MASS INDEX: 22.49 KG/M2

## 2017-11-02 PROCEDURE — A4216 STERILE WATER/SALINE, 10 ML: HCPCS | Performed by: STUDENT IN AN ORGANIZED HEALTH CARE EDUCATION/TRAINING PROGRAM

## 2017-11-02 PROCEDURE — 25000003 PHARM REV CODE 250: Performed by: STUDENT IN AN ORGANIZED HEALTH CARE EDUCATION/TRAINING PROGRAM

## 2017-11-02 RX ORDER — HYDROCODONE BITARTRATE AND ACETAMINOPHEN 5; 325 MG/1; MG/1
1 TABLET ORAL EVERY 6 HOURS PRN
Qty: 30 TABLET | Refills: 0 | Status: SHIPPED | OUTPATIENT
Start: 2017-11-02 | End: 2018-06-21

## 2017-11-02 RX ADMIN — VENLAFAXINE HYDROCHLORIDE 75 MG: 75 CAPSULE, EXTENDED RELEASE ORAL at 10:11

## 2017-11-02 RX ADMIN — SODIUM CHLORIDE, PRESERVATIVE FREE 3 ML: 5 INJECTION INTRAVENOUS at 05:11

## 2017-11-02 RX ADMIN — DORZOLAMIDE HYDROCHLORIDE 1 DROP: 20 SOLUTION/ DROPS OPHTHALMIC at 05:11

## 2017-11-02 RX ADMIN — HYDROCODONE BITARTRATE AND ACETAMINOPHEN 1 TABLET: 10; 325 TABLET ORAL at 02:11

## 2017-11-02 NOTE — PLAN OF CARE
Problem: Patient Care Overview  Goal: Plan of Care Review  Outcome: Ongoing (interventions implemented as appropriate)  Plan of care reviewed with patient. No distress noted at this time. MELY Drain to bulb suction. Pt denies pain at this time. Pt resting quietly in bed and  at bedside supportive of patient. Awaiting discharge. Will continue to monitor closely.

## 2017-11-02 NOTE — SUBJECTIVE & OBJECTIVE
Interval History: No issues overnight. Some breast soreness this morning. Full range of motion left shoulder    Medications:  Continuous Infusions:   Scheduled Meds:   dorzolamide  1 drop Both Eyes TID    latanoprost  1 drop Both Eyes QHS    sodium chloride 0.9%  3 mL Intravenous Q8H    venlafaxine  75 mg Oral Daily     PRN Meds:acetaminophen, diphenhydrAMINE, hydrocodone-acetaminophen 10-325mg, hydrocodone-acetaminophen 5-325mg, ondansetron, promethazine (PHENERGAN) IVPB     Review of patient's allergies indicates:   Allergen Reactions    Bactrim [sulfamethoxazole-trimethoprim] Rash     Fever, vomiting     Objective:     Vital Signs (Most Recent):  Temp: 97.6 °F (36.4 °C) (11/02/17 0455)  Pulse: 75 (11/02/17 0455)  Resp: 18 (11/02/17 0455)  BP: 125/64 (11/02/17 0455)  SpO2: 100 % (11/02/17 0455) Vital Signs (24h Range):  Temp:  [97.2 °F (36.2 °C)-98.4 °F (36.9 °C)] 97.6 °F (36.4 °C)  Pulse:  [] 75  Resp:  [10-20] 18  SpO2:  [90 %-100 %] 100 %  BP: (113-150)/(56-79) 125/64     Weight: 65 kg (143 lb 4.8 oz)  Body mass index is 22.44 kg/m².    Intake/Output - Last 3 Shifts       10/31 0700 - 11/01 0659 11/01 0700 - 11/02 0659 11/02 0700 - 11/03 0659    P.O.  360     I.V. (mL/kg)  2200 (33.8)     Total Intake(mL/kg)  2560 (39.4)     Urine (mL/kg/hr)  675 (0.4)     Drains  110 (0.1)     Total Output   785      Net   +1775             Urine Occurrence  1 x           Physical Exam   Constitutional: She is oriented to person, place, and time. She appears well-developed and well-nourished.   HENT:   Head: Atraumatic.   Eyes: EOM are normal.   Neck: Neck supple.   Cardiovascular: Normal rate and regular rhythm.    Pulmonary/Chest: Effort normal and breath sounds normal.   Left breast and axillary incision c/d/i with steris in place  MELY drain in place with serous output   Abdominal: Soft. She exhibits no distension. There is no tenderness. There is no guarding.   Musculoskeletal: Normal range of motion.    Neurological: She is alert and oriented to person, place, and time.   Skin: Skin is warm and dry.   Psychiatric: She has a normal mood and affect. Her behavior is normal.       Significant Labs:  CBC: No results for input(s): WBC, RBC, HGB, HCT, PLT, MCV, MCH, MCHC in the last 168 hours.  BMP: No results for input(s): GLU, NA, K, CL, CO2, BUN, CREATININE, CALCIUM, MG in the last 168 hours.  LFTs: No results for input(s): ALT, AST, ALKPHOS, BILITOT, PROT, ALBUMIN in the last 168 hours.    Significant Diagnostics:  I have reviewed all pertinent imaging results/findings within the past 24 hours.

## 2017-11-02 NOTE — NURSING TRANSFER
Nursing Transfer Note      11/1/2017     Transfer obs 10    Transfer via stretcher    Transfer with n/a    Transported by pct    Medicines sent: effexor    Chart send with patient: yes    Notified: patient will notify     Patient reassessed at: 11/1/17    Upon arrival to floor:

## 2017-11-02 NOTE — ASSESSMENT & PLAN NOTE
POD#1 s/p left breast lumpectomy and axillary dissection    - Doing well  - Can cont regular diet  - Home meds  - D/C home today with MELY drain.  - RTC in 2 weeks. Appointment made

## 2017-11-02 NOTE — PROGRESS NOTES
Discharge instructions given to patient. No distress noted at this time. Peripheral IV removed catheter intact. Pt given measuring cup to empty MELY drain. Pt instructed in care of MELY drain; stripping drain, emptying, measuring, and recording output. Pt given paper prescription for pain meds.  at bedside. Wheelchair requested. Pt left unit without incident.

## 2017-11-02 NOTE — PLAN OF CARE
Problem: Patient Care Overview  Goal: Plan of Care Review  Outcome: Ongoing (interventions implemented as appropriate)  Proper hand hygiene and wearing of gloves to reduce risk of infection. Rounding every 2 hours, call light within reach, pt in room near nurses station and assistance with OOB activities.

## 2017-11-02 NOTE — PROGRESS NOTES
[facility]  General Surgery  Progress Note    Subjective:     History of Present Illness:  No notes on file    Post-Op Info:  Procedure(s) (LRB):  LUMPECTOMY with MAGSEED (2) (Left)  BIOPSY-LYMPH NODE-SENTINEL (Left)  INJECTION-NODE-SENTINEL (Left)  DISSECTION-LYMPH NODE-AXILLARY (Left)   1 Day Post-Op     Interval History: No issues overnight. Some breast soreness this morning. Full range of motion left shoulder    Medications:  Continuous Infusions:   Scheduled Meds:   dorzolamide  1 drop Both Eyes TID    latanoprost  1 drop Both Eyes QHS    sodium chloride 0.9%  3 mL Intravenous Q8H    venlafaxine  75 mg Oral Daily     PRN Meds:acetaminophen, diphenhydrAMINE, hydrocodone-acetaminophen 10-325mg, hydrocodone-acetaminophen 5-325mg, ondansetron, promethazine (PHENERGAN) IVPB     Review of patient's allergies indicates:   Allergen Reactions    Bactrim [sulfamethoxazole-trimethoprim] Rash     Fever, vomiting     Objective:     Vital Signs (Most Recent):  Temp: 97.6 °F (36.4 °C) (11/02/17 0455)  Pulse: 75 (11/02/17 0455)  Resp: 18 (11/02/17 0455)  BP: 125/64 (11/02/17 0455)  SpO2: 100 % (11/02/17 0455) Vital Signs (24h Range):  Temp:  [97.2 °F (36.2 °C)-98.4 °F (36.9 °C)] 97.6 °F (36.4 °C)  Pulse:  [] 75  Resp:  [10-20] 18  SpO2:  [90 %-100 %] 100 %  BP: (113-150)/(56-79) 125/64     Weight: 65 kg (143 lb 4.8 oz)  Body mass index is 22.44 kg/m².    Intake/Output - Last 3 Shifts       10/31 0700 - 11/01 0659 11/01 0700 - 11/02 0659 11/02 0700 - 11/03 0659    P.O.  360     I.V. (mL/kg)  2200 (33.8)     Total Intake(mL/kg)  2560 (39.4)     Urine (mL/kg/hr)  675 (0.4)     Drains  110 (0.1)     Total Output   785      Net   +1775             Urine Occurrence  1 x           Physical Exam   Constitutional: She is oriented to person, place, and time. She appears well-developed and well-nourished.   HENT:   Head: Atraumatic.   Eyes: EOM are normal.   Neck: Neck supple.   Cardiovascular: Normal rate  and regular rhythm.    Pulmonary/Chest: Effort normal and breath sounds normal.   Left breast and axillary incision c/d/i with steris in place  MELY drain in place with serous output   Abdominal: Soft. She exhibits no distension. There is no tenderness. There is no guarding.   Musculoskeletal: Normal range of motion.   Neurological: She is alert and oriented to person, place, and time.   Skin: Skin is warm and dry.   Psychiatric: She has a normal mood and affect. Her behavior is normal.       Significant Labs:  CBC: No results for input(s): WBC, RBC, HGB, HCT, PLT, MCV, MCH, MCHC in the last 168 hours.  BMP: No results for input(s): GLU, NA, K, CL, CO2, BUN, CREATININE, CALCIUM, MG in the last 168 hours.  LFTs: No results for input(s): ALT, AST, ALKPHOS, BILITOT, PROT, ALBUMIN in the last 168 hours.    Significant Diagnostics:  I have reviewed all pertinent imaging results/findings within the past 24 hours.    Assessment/Plan:     Breast cancer, left breast    POD#1 s/p left breast lumpectomy and axillary dissection    - Doing well  - Can cont regular diet  - Home meds  - D/C home today with MELY drain.  - RTC in 2 weeks. Appointment made            Yonny Hernandez MD  General Surgery  [facility] [address]

## 2017-11-03 NOTE — DISCHARGE SUMMARY
[facility]  General Surgery  Discharge Summary      Patient Name: Ashlie Redman  MRN: [id number]  Admission Date: 2017  Hospital Length of Stay: 0 days  Discharge Date and Time:  2017 8:40 AM  Attending Physician: No att. providers found   Discharging Provider: Yonny Hernandez MD  Primary Care Provider: Kevin Gong MD     HPI: This is a 62 y.o female who was referred for evaluation of L breast mass. She first noticed this mass in 2017, since then she saw her PCP and mammogram, ultrasound and biopsy were completed. Biopsy showed invasive ductal carcinoma of L breast, axillary lymph node biopsy was not completed at that time. Patient reports of breast pain 4-5/10 on severity and constantly there at time of initial presentation, the pain was worse when she walked or was active.  She is statesr the mass has decreased in size and is no longer palpable. Patient denied any fevers, chills, nausea, vomiting, weight loss or night sweats at that time.      Gyn History:  Age of menarche is 12. . OCP x 18 years. Menopause age 50. HRT (climara and paxil) x 12 years.        Procedure(s) (LRB):  LUMPECTOMY with MAGSEED (2) (Left)  BIOPSY-LYMPH NODE-SENTINEL (Left)  INJECTION-NODE-SENTINEL (Left)  DISSECTION-LYMPH NODE-AXILLARY (Left)     Hospital Course: The patient was admitted and underwent the procedures listed above. Her post-op course was uneventful. Tolerated regular diet without issue and her pain was well controlled with PO meds. She was discharged home with family and has an appt to f/u in clinic in 2 weeks.    Consults:     Significant Diagnostic Studies: Labs: BMP: No results for input(s): GLU, NA, K, CL, CO2, BUN, CREATININE, CALCIUM, MG in the last 48 hours.    Pending Diagnostic Studies:     None        Final Active Diagnoses:    Diagnosis Date Noted POA    Breast cancer, left breast [C50.912] 2017 Yes      Problems Resolved During this Admission:    Diagnosis Date Noted Date  Resolved POA      Discharged Condition: good    Disposition: Home or Self Care    Follow Up:  Follow-up Information     Alexander Arce MD In 2 weeks.    Specialty:  General Surgery  Contact information:  Karie Dhillon  [facility] [id number] [phone]                 Patient Instructions:     Diet general     Activity as tolerated     Shower on day dressing removed (No bath)   Order Comments: Can shower in 24 hours with soap and water. No soaking in bath.     Call MD for:  redness, tenderness, or signs of infection (pain, swelling, redness, odor or green/yellow discharge around incision site)     Call MD for:  severe uncontrolled pain     Call MD for:  persistent nausea and vomiting or diarrhea     Call MD for:  temperature >100.4     Other restrictions (specify):   Order Comments: POSTOPERATIVE INSTRUCTIONS FOLLOWING SENTINEL   LYMPH NODE BIOPSY AND LUMPECTOMY      The following are post-operative instructions that will help you to recover from your surgery.  Please read over these instructions carefully and contact us if we can answer any of your questions or concerns.    Post-op care/dressing/breast binder (surgi-bra)  A surgical bra may be placed around your chest after your surgery.  If you are given the bra, please wear it for the first 48 hours after surgery. After 48 hours you can remove your surgical bra and dressing to shower/cleanse the breast with antibacterial soap and warm water. Do not take a tub bath and do not soak the surgical site for at least 2 weeks. Please do not remove the white strips of tape (steri-strips) that cover your incision- they will be removed at your clinic visit.    The final pathology report will be available approximately 7-10 days after your surgery.  Our office will call you with your pathology report when it becomes available.    If blue dye was used to locate your sentinel lymph nodes, your urine and stool may be blue-green in color for 1 or 2 days.    Dr. Rodriguez  patients: please wear the surgical bra as close to 24 hours a day as possible until your post-operative clinic appointment.  If the elastic around the bra irritates your skin, you may wear a soft t-shirt underneath the bra. You may shower AFTER the drains are removed.  Please sponge bathe until then. You may go without wearing the bra long enough to bath, to launder and dry the bra. If you have fluffy filler placed inside the bra, the filler should be removed whenever the bra is taken off. Please reinsert the fluffy filler, or insert the new soft filler, under the bra when you put the bra back on.  If the bra is extremely uncomfortable, you may wear a supportive sports bra instead after 2 days.    Activity   You will be able to do much of your own personal care, such as bathing, dressing, preparing simple meals, etc.  A short walk each day will help with your recovery  You may find that you need to take rest breaks between activities, but you should not need to stay in bed for prolonged periods of time during the day. A good rule during this time is to listen to your body, do what is comfortable, and stop and rest when your feel tired.  If it hurts, don't do it.  Return to taking your daily medications as prescribed  Please avoid activities that require moderate to heavy lifting (grocery shopping) or pushing/pulling (vacuuming) and repetitive motions (such as washing windows). Do not lift anything heavier than a gallon of milk.  Following a lymph node dissection, don't avoid using your arm, but don't exercise your arm until after your first post-operative visit.  At your first post-op visit, you will be given arm exercises to regain movement and flexibility.  You may be referred to physical therapy if needed.  You may restart driving when you are no longer on narcotics and you feel safe turning the wheel and stopping quickly.  You will need to be out of work approximately 1-2 weeks depending on your particular  surgery and how well you are recovering.  We will evaluate how you are doing at the first post-op appointment.  This is a good time to ask when you may return to work and what activities you may do.    Medication for pain  You may find that over the counter pain medications may be sufficient for your pain.  You will be given a prescription for pain medication for more severe pain.  You should not drive or operate machinery while taking these.  Please take prescription pain medication (narcotics) with food.  Narcotics can cause, or worsen, constipation.  You will need to increase your fluid intake, eat high fiber foods (such as fruits and bran) and make sure that you are up and walking. You may need to take an over the counter stool softener for constipation. Short term use of an icepack may be helpful to decrease discomfort and swelling, particularly to the armpit after lymph node surgery. A small pillow positioned in the armpit may also decrease discomfort after lymph node surgery.      Please report the following:    Temperature greater than 101 degrees  Discharge or bad odor from the wound  Excessive bleeding, such as bloody dressing or extreme bruising  Redness at incision and/or drain sites  Swelling or buildup of fluid around incision  Persistent fevers, chills, nausea, vomiting, or diarrhea    Additional information  Your surgeon will see you approximately 2 weeks following your surgery.  If this follow-up appointment has not been made, please call the office.    If you have any questions or problems, please call my office or my nurse.    NOREEN Turk Dr., Dr., Dr., RN Jamie Gambino, NOREEN Garcia RN  [phone]    Erika Bhat PA-C [phone]  Tamika Nam, NOREEN [phone]    After hours and on weekends, you may call the main [facility] line at [phone] and ask to  "have the general surgery resident paged or have me paged.  How to care for surgical Drain(s)  Wash hands-STRIP or "milk" the drainage tube as it comes out of your body toward the bulb.   Beginning where the drain comes out of your body, hold drainage tubing with one hand and with the other, stretch and release tubing an inch at time while moving downward with both hands toward the bulb.  Do this 2-3 times before emptying the bulb.  Remove the stopper from the bulb's port  (drainage port)  Pour the drainage in the measuring cup provided by the nurse  Flatten/squeeze the bulb to create a vacuum and replace the stopper before letting go of the bulb.  Record the date, time and amount of drainage in cc's (not ounces) each time bulb is emptied. If you have more than one drain, record each separately.  Discard the drainage into the toilet after measuring and then wash hands.  Empty bulbs 2-3 times/day or as needed if it fills up before 8 hours.  Remember to bring the output record with you to your doctor's appointment.      Lymphedema Risk Reduction    Lymphedema is a swelling of a part of the body, caused by an insufficient lymphatic system and an accumulation of fluid in the body's tissues.  Lymphedema may occur when normal drainage of fluid is disrupted, such as an infection, injury, cancer, scar tissue, or removal of lymph nodes.    If you had a full axillary lymph node dissection procedure, you may be at greater risk for lymphedema.     For those patients having a sentinel lymph node biopsy, these risks may be smaller and the recommendations are provided for your review and consideration.    The following list contains recommendations for reducing your risk of developing lymphedema.    Skin Care-avoid trauma/injury to reduce infection risk  Keep the hand and arm on the side of surgery clean and dry  Pay attention to nail care and do not cut cuticles  Avoid punctures, such as injections and blood draws from you on the " side of your surgery  Wear gloves while doing activities that may cause skin injury (washing dishes, gardening, etc.)  If scratches or punctures occur, wash area with soap and water, and observe for signs of infections (redness, drainage, swelling)  If a rash, itching, redness, pain, increased skin temperatures, fever, or flu-like symptoms occur, contact your physician immediately for early treatment of a possible infection  Activity/Lifestyle  Gradually build up the duration and intensity of any activity or exercise  Take frequent rest periods during activity to allow for arm recovery  Monitor your arm and upper body during and after activity for any change in size, shape, tissue, texture, soreness, heaviness, or firmness  Avoid constriction of your arm on the side of your surgery  Avoid having blood pressure taken on the arm on the side of your surgery  Wear loose fitting jewelry and clothing  Be careful not to rest a heavy purse, luggage, or grocery bags on that arm  When you return to wearing a bra, make sure that it is well fitted and not too tight       Medications:  Reconciled Home Medications:   Discharge Medication List as of 11/2/2017 10:06 AM      CONTINUE these medications which have CHANGED    Details   hydrocodone-acetaminophen 5-325mg (NORCO) 5-325 mg per tablet Take 1 tablet by mouth every 6 (six) hours as needed for Pain., Starting Thu 11/2/2017, Print         CONTINUE these medications which have NOT CHANGED    Details   dorzolamide (TRUSOPT) 2 % ophthalmic solution Place 1 drop into both eyes 3 (three) times daily., Starting u 10/26/2017, Normal      latanoprost 0.005 % ophthalmic solution INSTILL ONE DROP INTO EACH EYE ONCE DAILY IN THE EVENING, Normal      lidocaine-prilocaine (EMLA) cream Apply topically once., Historical Med      mouthwashes Soln PHARMACIST:  MIX 50mL Benadryl Elixir; 50mL Viscous Lidocaine; 50mL Nystatin Suspension; 50mL Milk of Magnesia  PATIENT:  Take 5mL by mouth - Swish  and swallow - every 4 hours as needed for mouth/throat pain., Print      ondansetron (ZOFRAN-ODT) 8 MG TbDL Take 1 tablet (8 mg total) by mouth every 12 (twelve) hours as needed., Starting 4/25/2017, Until Wed 4/25/18, Normal      prochlorperazine (COMPAZINE) 10 MG tablet Take 1 tablet (10 mg total) by mouth every 6 (six) hours as needed., Starting 4/25/2017, Until Wed 4/25/18, Normal      VALTREX 500 mg tablet Starting 6/16/2016, Until Discontinued, Historical Med      venlafaxine (EFFEXOR XR) 75 MG 24 hr capsule Take 1 capsule (75 mg total) by mouth once daily., Starting Wed 8/9/2017, Until Thu 8/9/2018, Normal             Yonny Hernandez MD  General Surgery  [facility]

## 2017-11-07 ENCOUNTER — CLINICAL SUPPORT (OUTPATIENT)
Dept: CARDIOLOGY | Facility: CLINIC | Age: 62
End: 2017-11-07
Payer: COMMERCIAL

## 2017-11-07 DIAGNOSIS — C50.011 MALIGNANT NEOPLASM OF NIPPLE OF RIGHT BREAST IN FEMALE, UNSPECIFIED ESTROGEN RECEPTOR STATUS: ICD-10-CM

## 2017-11-07 LAB
DIASTOLIC DYSFUNCTION: NO
ESTIMATED PA SYSTOLIC PRESSURE: 25.85
MITRAL VALVE REGURGITATION: NORMAL
RETIRED EF AND QEF - SEE NOTES: 55 (ref 55–65)
TRICUSPID VALVE REGURGITATION: NORMAL

## 2017-11-07 PROCEDURE — 93306 TTE W/DOPPLER COMPLETE: CPT | Mod: S$GLB,,, | Performed by: INTERNAL MEDICINE

## 2017-11-08 DIAGNOSIS — F32.89 OTHER DEPRESSION: ICD-10-CM

## 2017-11-08 RX ORDER — VENLAFAXINE HYDROCHLORIDE 75 MG/1
CAPSULE, EXTENDED RELEASE ORAL
Qty: 30 CAPSULE | Refills: 2 | Status: SHIPPED | OUTPATIENT
Start: 2017-11-08 | End: 2017-12-08 | Stop reason: ALTCHOICE

## 2017-11-10 ENCOUNTER — OFFICE VISIT (OUTPATIENT)
Dept: HEMATOLOGY/ONCOLOGY | Facility: CLINIC | Age: 62
End: 2017-11-10
Payer: COMMERCIAL

## 2017-11-10 ENCOUNTER — TELEPHONE (OUTPATIENT)
Dept: SURGERY | Facility: CLINIC | Age: 62
End: 2017-11-10

## 2017-11-10 ENCOUNTER — INFUSION (OUTPATIENT)
Dept: INFUSION THERAPY | Facility: HOSPITAL | Age: 62
End: 2017-11-10
Attending: INTERNAL MEDICINE
Payer: COMMERCIAL

## 2017-11-10 ENCOUNTER — LAB VISIT (OUTPATIENT)
Dept: LAB | Facility: HOSPITAL | Age: 62
End: 2017-11-10
Attending: INTERNAL MEDICINE
Payer: COMMERCIAL

## 2017-11-10 VITALS
HEIGHT: 68 IN | TEMPERATURE: 98 F | BODY MASS INDEX: 23.37 KG/M2 | WEIGHT: 154.19 LBS | SYSTOLIC BLOOD PRESSURE: 123 MMHG | HEART RATE: 64 BPM | DIASTOLIC BLOOD PRESSURE: 69 MMHG | RESPIRATION RATE: 16 BRPM

## 2017-11-10 VITALS
WEIGHT: 154.19 LBS | RESPIRATION RATE: 16 BRPM | BODY MASS INDEX: 23.37 KG/M2 | DIASTOLIC BLOOD PRESSURE: 69 MMHG | HEART RATE: 64 BPM | SYSTOLIC BLOOD PRESSURE: 123 MMHG | TEMPERATURE: 98 F | HEIGHT: 68 IN

## 2017-11-10 DIAGNOSIS — C50.912 HER2-POSITIVE CARCINOMA OF LEFT BREAST: ICD-10-CM

## 2017-11-10 DIAGNOSIS — C50.012 MALIGNANT NEOPLASM OF NIPPLE OF LEFT BREAST IN FEMALE, UNSPECIFIED ESTROGEN RECEPTOR STATUS: Primary | ICD-10-CM

## 2017-11-10 DIAGNOSIS — C50.012 MALIGNANT NEOPLASM OF NIPPLE OF LEFT BREAST IN FEMALE, UNSPECIFIED ESTROGEN RECEPTOR STATUS: ICD-10-CM

## 2017-11-10 DIAGNOSIS — C50.011 MALIGNANT NEOPLASM OF NIPPLE OF RIGHT BREAST IN FEMALE, UNSPECIFIED ESTROGEN RECEPTOR STATUS: Primary | ICD-10-CM

## 2017-11-10 DIAGNOSIS — C50.011 MALIGNANT NEOPLASM OF NIPPLE OF RIGHT BREAST IN FEMALE, UNSPECIFIED ESTROGEN RECEPTOR STATUS: ICD-10-CM

## 2017-11-10 LAB
ALBUMIN SERPL BCP-MCNC: 4.2 G/DL
ALP SERPL-CCNC: 88 U/L
ALT SERPL W/O P-5'-P-CCNC: 45 U/L
ANION GAP SERPL CALC-SCNC: 9 MMOL/L
AST SERPL-CCNC: 39 U/L
BASOPHILS # BLD AUTO: 0.05 K/UL
BASOPHILS NFR BLD: 0.8 %
BILIRUB SERPL-MCNC: 0.4 MG/DL
BUN SERPL-MCNC: 13 MG/DL
CALCIUM SERPL-MCNC: 10.4 MG/DL
CHLORIDE SERPL-SCNC: 104 MMOL/L
CO2 SERPL-SCNC: 27 MMOL/L
CREAT SERPL-MCNC: 0.67 MG/DL
DIFFERENTIAL METHOD: ABNORMAL
EOSINOPHIL # BLD AUTO: 0.4 K/UL
EOSINOPHIL NFR BLD: 6.6 %
ERYTHROCYTE [DISTWIDTH] IN BLOOD BY AUTOMATED COUNT: 11.3 %
EST. GFR  (AFRICAN AMERICAN): >60 ML/MIN/1.73 M^2
EST. GFR  (NON AFRICAN AMERICAN): >60 ML/MIN/1.73 M^2
GLUCOSE SERPL-MCNC: 91 MG/DL
HCT VFR BLD AUTO: 37.2 %
HGB BLD-MCNC: 12.4 G/DL
LYMPHOCYTES # BLD AUTO: 1.5 K/UL
LYMPHOCYTES NFR BLD: 25 %
MCH RBC QN AUTO: 34.3 PG
MCHC RBC AUTO-ENTMCNC: 33.3 G/DL
MCV RBC AUTO: 103 FL
MONOCYTES # BLD AUTO: 0.6 K/UL
MONOCYTES NFR BLD: 10.2 %
NEUTROPHILS # BLD AUTO: 3.5 K/UL
NEUTROPHILS NFR BLD: 57.4 %
NRBC BLD-RTO: 0 /100 WBC
PLATELET # BLD AUTO: 241 K/UL
PMV BLD AUTO: 9.9 FL
POTASSIUM SERPL-SCNC: 4.5 MMOL/L
PROT SERPL-MCNC: 7.2 G/DL
RBC # BLD AUTO: 3.61 M/UL
SODIUM SERPL-SCNC: 140 MMOL/L
WBC # BLD AUTO: 6.05 K/UL

## 2017-11-10 PROCEDURE — 85025 COMPLETE CBC W/AUTO DIFF WBC: CPT | Mod: PN

## 2017-11-10 PROCEDURE — 25000003 PHARM REV CODE 250: Mod: PN | Performed by: INTERNAL MEDICINE

## 2017-11-10 PROCEDURE — 36415 COLL VENOUS BLD VENIPUNCTURE: CPT | Mod: PN

## 2017-11-10 PROCEDURE — 63600175 PHARM REV CODE 636 W HCPCS: Mod: PN | Performed by: INTERNAL MEDICINE

## 2017-11-10 PROCEDURE — 80053 COMPREHEN METABOLIC PANEL: CPT

## 2017-11-10 PROCEDURE — A4216 STERILE WATER/SALINE, 10 ML: HCPCS | Mod: PN | Performed by: INTERNAL MEDICINE

## 2017-11-10 PROCEDURE — 96375 TX/PRO/DX INJ NEW DRUG ADDON: CPT | Mod: PN

## 2017-11-10 PROCEDURE — 99215 OFFICE O/P EST HI 40 MIN: CPT | Mod: S$GLB,,, | Performed by: INTERNAL MEDICINE

## 2017-11-10 PROCEDURE — S0028 INJECTION, FAMOTIDINE, 20 MG: HCPCS | Mod: PN | Performed by: INTERNAL MEDICINE

## 2017-11-10 PROCEDURE — 96413 CHEMO IV INFUSION 1 HR: CPT | Mod: PN

## 2017-11-10 PROCEDURE — 99999 PR PBB SHADOW E&M-EST. PATIENT-LVL III: CPT | Mod: PBBFAC,,, | Performed by: INTERNAL MEDICINE

## 2017-11-10 PROCEDURE — 96417 CHEMO IV INFUS EACH ADDL SEQ: CPT | Mod: PN

## 2017-11-10 PROCEDURE — 85025 COMPLETE CBC W/AUTO DIFF WBC: CPT

## 2017-11-10 PROCEDURE — 96367 TX/PROPH/DG ADDL SEQ IV INF: CPT | Mod: PN

## 2017-11-10 PROCEDURE — 80053 COMPREHEN METABOLIC PANEL: CPT | Mod: PN

## 2017-11-10 RX ORDER — DIPHENHYDRAMINE HYDROCHLORIDE 50 MG/ML
50 INJECTION INTRAMUSCULAR; INTRAVENOUS ONCE AS NEEDED
Status: CANCELLED | OUTPATIENT
Start: 2017-12-01 | End: 2017-12-01

## 2017-11-10 RX ORDER — FAMOTIDINE 20 MG/50ML
20 INJECTION, SOLUTION INTRAVENOUS
Status: COMPLETED | OUTPATIENT
Start: 2017-11-10 | End: 2017-11-10

## 2017-11-10 RX ORDER — SODIUM CHLORIDE 0.9 % (FLUSH) 0.9 %
10 SYRINGE (ML) INJECTION
Status: CANCELLED | OUTPATIENT
Start: 2017-12-01

## 2017-11-10 RX ORDER — ONDANSETRON 2 MG/ML
8 INJECTION INTRAMUSCULAR; INTRAVENOUS
Status: COMPLETED | OUTPATIENT
Start: 2017-11-10 | End: 2017-11-10

## 2017-11-10 RX ORDER — FAMOTIDINE 20 MG/50ML
20 INJECTION, SOLUTION INTRAVENOUS
Status: CANCELLED
Start: 2017-12-01 | End: 2017-12-01

## 2017-11-10 RX ORDER — SODIUM CHLORIDE 0.9 % (FLUSH) 0.9 %
10 SYRINGE (ML) INJECTION
Status: DISCONTINUED | OUTPATIENT
Start: 2017-11-10 | End: 2017-11-10 | Stop reason: HOSPADM

## 2017-11-10 RX ORDER — ONDANSETRON 2 MG/ML
8 INJECTION INTRAMUSCULAR; INTRAVENOUS
Status: CANCELLED
Start: 2017-12-01 | End: 2017-12-01

## 2017-11-10 RX ORDER — HEPARIN 100 UNIT/ML
500 SYRINGE INTRAVENOUS
Status: CANCELLED | OUTPATIENT
Start: 2017-12-01

## 2017-11-10 RX ORDER — EPINEPHRINE 0.3 MG/.3ML
0.3 INJECTION SUBCUTANEOUS ONCE AS NEEDED
Status: CANCELLED | OUTPATIENT
Start: 2017-12-01 | End: 2017-12-01

## 2017-11-10 RX ORDER — CLINDAMYCIN HYDROCHLORIDE 300 MG/1
300 CAPSULE ORAL 3 TIMES DAILY
COMMUNITY
Start: 2017-11-10 | End: 2017-12-08 | Stop reason: ALTCHOICE

## 2017-11-10 RX ADMIN — FAMOTIDINE 20 MG: 20 INJECTION, SOLUTION INTRAVENOUS at 11:11

## 2017-11-10 RX ADMIN — PERTUZUMAB 420 MG: 30 INJECTION, SOLUTION, CONCENTRATE INTRAVENOUS at 11:11

## 2017-11-10 RX ADMIN — TRASTUZUMAB 396 MG: 150 INJECTION, POWDER, LYOPHILIZED, FOR SOLUTION INTRAVENOUS at 12:11

## 2017-11-10 RX ADMIN — SODIUM CHLORIDE, PRESERVATIVE FREE 10 ML: 5 INJECTION INTRAVENOUS at 01:11

## 2017-11-10 RX ADMIN — ONDANSETRON 8 MG: 2 INJECTION INTRAMUSCULAR; INTRAVENOUS at 11:11

## 2017-11-10 RX ADMIN — SODIUM CHLORIDE: 0.9 INJECTION, SOLUTION INTRAVENOUS at 11:11

## 2017-11-10 NOTE — PLAN OF CARE
Problem: Patient Care Overview  Goal: Plan of Care Review  Outcome: Ongoing (interventions implemented as appropriate)  Pt tolerated Perjeta/Herceptin treatment well.  No s/s of infusion reaction noted.  Instructed to call MD with any problems.

## 2017-11-10 NOTE — PROGRESS NOTES
HISTORY OF PRESENT ILLNESS:  This is a 62-year-old white female known to Dr. Ragsdale for a recent diagnosis of Stage IIB left breast carcinoma.  The patient discovered   a mass in her left breast and was seen by Dr. Arce.  She has received 4 cycles of   A/C and presents to the clinic today for evaluation prior to completed 12 cycles weekly taxol started q 3 week herceptin/ perjeta  She remains chronically fatigued with minor peripheral neuropathy.  She has bouts of   diarrhea that resolve easily with Imodium. She denies any fevers, chills, unexplained   weight loss, new breast complaints, vaginal bleeding, abdominal discomfort, nausea,   vomiting, diarrhea, mouth sores, etc.  No new complaints or pertinent findings on a   14 point review of systems.some fatigue ,w ent for lumpectomy and axillary eval , revealed 2 LN + hence had a disection  xrt is to be done in [place] per trial, pt here to continue herceptin / perjeta, she had hoped her axilla would be clear    PHYSICAL EXAMINATION:  Wt Readings from Last 3 Encounters:   11/10/17 70 kg (154 lb 3.4 oz)   11/01/17 65 kg (143 lb 4.8 oz)   10/20/17 68.9 kg (151 lb 14.4 oz)     Temp Readings from Last 3 Encounters:   11/10/17 97.8 °F (36.6 °C)   11/02/17 98.5 °F (36.9 °C) (Oral)   10/20/17 97.7 °F (36.5 °C)     BP Readings from Last 3 Encounters:   11/10/17 123/69   11/02/17 (!) 120/58   10/20/17 126/74     Pulse Readings from Last 3 Encounters:   11/10/17 64   11/02/17 67   10/20/17 73     GENERAL:  Well-developed, well-nourished white female in no acute distress.    Alert and oriented x4.  :  HEENT:  Normocephalic, atraumatic.  Oral mucosa pink and moist.  Lips without   lesions.  Tongue midline.  Oropharynx clear.  Nonicteric sclerae.  NECK:  Supple, no adenopathy.  No carotid bruits, thyromegaly or thyroid nodule.  HEART:  Regular rate and rhythm without murmur, gallop.  LUNGS:  Clear to auscultation bilaterally.  Normal respiratory effort.  ABDOMEN:  Soft,  nontender, nondistended with positive normoactive bowel sounds,   no hepatosplenomegaly.  EXTREMITIES:  No cyanosis, clubbing or edema.  Distal pulses are intact.  AXILLAE AND GROIN:  No palpable pathologic lymphadenopathy is appreciated.  SKIN:  Intact/turgor normal.  NEUROLOGIC:  Cranial nerves II-XII grossly intact.  Motor:  Good muscle bulk and   tone.  Strength/sensory 5/5 throughout.  Gait stable.    LABORATORY:    Lab Results   Component Value Date    WBC 6.05 11/10/2017    HGB 12.4 11/10/2017    HCT 37.2 11/10/2017     (H) 11/10/2017     11/10/2017     Differential remarkable for 71.3% grans, 5% lymph, 17% monos    CMP  Sodium   Date Value Ref Range Status   11/10/2017 140 136 - 145 mmol/L Final     Potassium   Date Value Ref Range Status   11/10/2017 4.5 3.5 - 5.1 mmol/L Final     Chloride   Date Value Ref Range Status   11/10/2017 104 95 - 110 mmol/L Final     CO2   Date Value Ref Range Status   11/10/2017 27 22 - 31 mmol/L Final     Glucose   Date Value Ref Range Status   11/10/2017 91 70 - 110 mg/dL Final     Comment:     The ADA recommends the following guidelines for fasting glucose:  Normal:       less than 100 mg/dL  Prediabetes:  100 mg/dL to 125 mg/dL  Diabetes:     126 mg/dL or higher       BUN, Bld   Date Value Ref Range Status   11/10/2017 13 7 - 18 mg/dL Final     Creatinine   Date Value Ref Range Status   11/10/2017 0.67 0.50 - 1.40 mg/dL Final     Calcium   Date Value Ref Range Status   11/10/2017 10.4 (H) 8.4 - 10.2 mg/dL Final     Total Protein   Date Value Ref Range Status   11/10/2017 7.2 6.0 - 8.4 g/dL Final     Albumin   Date Value Ref Range Status   11/10/2017 4.2 3.5 - 5.2 g/dL Final     Total Bilirubin   Date Value Ref Range Status   11/10/2017 0.4 0.2 - 1.3 mg/dL Final     Alkaline Phosphatase   Date Value Ref Range Status   11/10/2017 88 38 - 145 U/L Final     AST   Date Value Ref Range Status   11/10/2017 39 (H) 14 - 36 U/L Final     ALT   Date Value Ref Range Status    11/10/2017 45 (H) 10 - 44 U/L Final     Anion Gap   Date Value Ref Range Status   11/10/2017 9 8 - 16 mmol/L Final     eGFR if    Date Value Ref Range Status   11/10/2017 >60 >60 mL/min/1.73 m^2 Final     eGFR if non    Date Value Ref Range Status   11/10/2017 >60 >60 mL/min/1.73 m^2 Final     Comment:     Calculation used to obtain the estimated glomerular filtration  rate (eGFR) is the CKD-EPI equation.      ECHO wnl  IMPRESSION:  Stage IIB left breast carcinoma (ER/AR negative, Her-2/clay positive)  Completed neoadjuvant AC and weekly taxol now on herceptin and perjeta, s/p recent lumpectomy and axillary disection , with 2 residual Ln positive  PLAN:  1.  Proceed with   herceptin and perjeta Off dexamethasone and benadryl ( makes her too sleepy)  2.  Follow up in clinic with interval CBC, CMP 3 weeks for ongoing rx.   keep xrt schedule

## 2017-11-16 ENCOUNTER — TELEPHONE (OUTPATIENT)
Dept: HEMATOLOGY/ONCOLOGY | Facility: CLINIC | Age: 62
End: 2017-11-16

## 2017-11-16 ENCOUNTER — OFFICE VISIT (OUTPATIENT)
Dept: SURGERY | Facility: CLINIC | Age: 62
End: 2017-11-16
Payer: COMMERCIAL

## 2017-11-16 VITALS
TEMPERATURE: 98 F | BODY MASS INDEX: 23.13 KG/M2 | WEIGHT: 152.63 LBS | DIASTOLIC BLOOD PRESSURE: 76 MMHG | SYSTOLIC BLOOD PRESSURE: 154 MMHG | HEART RATE: 64 BPM | HEIGHT: 68 IN

## 2017-11-16 DIAGNOSIS — Z90.10 STATUS POST PARTIAL MASTECTOMY, UNSPECIFIED LATERALITY: Primary | ICD-10-CM

## 2017-11-16 DIAGNOSIS — C50.512 PRIMARY CANCER OF LOWER OUTER QUADRANT OF LEFT FEMALE BREAST: ICD-10-CM

## 2017-11-16 PROCEDURE — 99999 PR PBB SHADOW E&M-EST. PATIENT-LVL III: CPT | Mod: PBBFAC,,, | Performed by: SURGERY

## 2017-11-16 PROCEDURE — 99024 POSTOP FOLLOW-UP VISIT: CPT | Mod: S$GLB,,, | Performed by: SURGERY

## 2017-11-16 NOTE — TELEPHONE ENCOUNTER
I called to discuss setting up xrt on the [facility] and she has not decided if she will do [facility] or [facility]. She has an appt with Dr Arce this morning and I will call her back later today to see what she has decided. arabella

## 2017-11-16 NOTE — PROGRESS NOTES
History & Physical  Gynecology      SUBJECTIVE:         History of Present Illness:  62 y.o with known left lateral breast invasive HER-2/clay positive invasive breast carcinoma, status post Herceptin and Perjeta based preoperative neoadjuvant chemotherapy with preoperatively known positive left lymph node inthe left axilla. She is s/p partial mastectomy and sentinel node dissection on , positive node noted with negative margins on mastectomy specimen. She is doing well, however complains of irritation at drain site, with purulent drainage around MELY drain insertion. She started PO clindamycin on . Denies signs of symptoms of systemic illness, denies fevers at home.    Pathology  [id number], Stage IIB, ER/NY negative, HER-2 positive        Review of patient's allergies indicates:   Allergen Reactions    Bactrim [sulfamethoxazole-trimethoprim] Rash     Fever, vomiting       Past Medical History:   Diagnosis Date    Allergy     Anemia     Asteroid hyalosis of both eyes     Breast cancer     Cataract     Diverticulosis     Encounter for blood transfusion     Glaucoma     High myopia     Osteopenia     S/P dilation and curettage      Past Surgical History:   Procedure Laterality Date    breast augmentation  2013    CATARACT EXTRACTION W/  INTRAOCULAR LENS IMPLANT Bilateral     COLONOSCOPY  2009,     repeat in 5 years    DILATION AND CURETTAGE OF UTERUS      ECTOPIC PREGNANCY SURGERY      EYE SURGERY Bilateral 3/09    Vitrectomy     POLYPECTOMY      x3     Yag Capsulotomy Bilateral 2014     OB History      Para Term  AB Living    3 2   2 1 2    SAB TAB Ectopic Multiple Live Births        1   2        Family History   Problem Relation Age of Onset    Cancer Sister      rectal; passed    Breast cancer Cousin     Cancer Cousin      breast    Heart disease Father      passed    Colon cancer Mother      hospice    Pancreatic cancer Mother      39yo;  89yo    Cataracts Mother     Hypertension Mother     Thyroid disease Mother     Cancer Mother      colon    Glaucoma Maternal Grandmother     Amblyopia Neg Hx     Blindness Neg Hx     Diabetes Neg Hx     Macular degeneration Neg Hx     Retinal detachment Neg Hx     Strabismus Neg Hx     Stroke Neg Hx      Social History   Substance Use Topics    Smoking status: Never Smoker    Smokeless tobacco: Never Used    Alcohol use No      Comment: Quit       Current Outpatient Prescriptions   Medication Sig    clindamycin (CLEOCIN) 300 MG capsule Take 300 mg by mouth 3 (three) times daily.    dorzolamide (TRUSOPT) 2 % ophthalmic solution Place 1 drop into both eyes 3 (three) times daily.    hydrocodone-acetaminophen 5-325mg (NORCO) 5-325 mg per tablet Take 1 tablet by mouth every 6 (six) hours as needed for Pain.    latanoprost 0.005 % ophthalmic solution INSTILL ONE DROP INTO EACH EYE ONCE DAILY IN THE EVENING    lidocaine-prilocaine (EMLA) cream Apply topically once.    venlafaxine (EFFEXOR-XR) 75 MG 24 hr capsule TAKE ONE CAPSULE BY MOUTH ONCE DAILY    mouthwashes Soln PHARMACIST:  MIX 50mL Benadryl Elixir; 50mL Viscous Lidocaine; 50mL Nystatin Suspension; 50mL Milk of Magnesia  PATIENT:  Take 5mL by mouth - Swish and swallow - every 4 hours as needed for mouth/throat pain.    ondansetron (ZOFRAN-ODT) 8 MG TbDL Take 1 tablet (8 mg total) by mouth every 12 (twelve) hours as needed.    prochlorperazine (COMPAZINE) 10 MG tablet Take 1 tablet (10 mg total) by mouth every 6 (six) hours as needed.     No current facility-administered medications for this visit.          Review of Systems:  Review of Systems   Breast: Positive for skin changes.Negative for breast mass, breast pain and nipple discharge  All other systems reviewed and are negative.       OBJECTIVE:     Physical Exam:  Physical Exam   Constitutional: She is oriented to person, place, and time. She appears well-developed and well-nourished.    Neck: No tracheal deviation present. No thyromegaly present.   Cardiovascular: Normal rate, regular rhythm, normal heart sounds and intact distal pulses.    Pulmonary/Chest: Effort normal and breath sounds normal. Right breast exhibits no skin change.   Mastectomy site healing well without evidence of erythema. Node site well healing. There is erythema around MELY drain site with scant purulence noted   Abdominal: Soft. Bowel sounds are normal. She exhibits no distension and no mass. There is no tenderness. There is no guarding.   Neurological: She is oriented to person, place, and time.   Psychiatric: She has a normal mood and affect. Her behavior is normal. Thought content normal.       Procedure: Lumpectomy.  Lymph node sampling: [place] lymph nodes and axillary contents.  Specimen laterality: Left.  Tumor site: N/A.  Histologic type of invasive carcinoma: Invasive ductal carcinoma.  Tumor size: 0.2 cm.  Histiologic grade: Grade 2 (glandular/tubular differentiation- 3, nuclear pleomorphism- 2, mitotic rate- 1)  Tumor focality: Single focus.  Ductal carcinoma in situ: Present, negative for EIC.  Macroscopic and microscopic extent of tumor:  Skin- Not applicable.  Nipple- Not applicable.  Margins- Negative; Invasive carcinoma is present 0.1 cm from the closest margin (posterior); DCIS is present 0.1  cm from the closest margin (posterior).  Number of sentinel lymph nodes examined: 7.  Total number of lymph nodes examined (sentinel and non-sentinel): 18.  Number of lymph nodes involved: 2.  Pathologic staging: [id number].  Ancillary studies- (Performed on biopsy specimen [id number])  ER: Negative.  MT: Negative.  HER2: Positive (3+).    ASSESSMENT:       ICD-10-CM ICD-9-CM    1. Status post partial mastectomy, left Z90.10 V45.71           Plan:       1. S/p partial mastectomy, positive node  - [id number], ER/MT negative, HER-2 positive  - Continue to follow with med-onc for chemotherapy recommendations  - Long  discussion today with patient and  regarding XRT versus total mastectomy, discussed risks of lymphedema and capsular contraction. Discussed recurrence risk with radiation therapy versus mastectomy. Patient is considering electing to proceed with total mastectomy and forgoing radiation treatment, however will further discuss this when returns in clinic in 2 weeks  - Drainage output >50cc qdaily, instructed patient to contact clinic when drain output is <45cc/day for removal  - Continue clindamycin for superficial infection at MELY drain site, monitor for signs of system infection, mastectomy site and baldo dissection site are well healed.     2. Shoulder pain  - Likely positional from surgery  - Will arrange for PT after drain is removed    Counseling time: 45 minutes    Fam Mayorga MD   PGY-3 Ob-gyn  I have personally taken the history and examined this patient and agree with the resident's note as stated above.  Ashlie Redman is a very  pleasant 62-year-old  female well known to   me, who presents with her  for their first postoperative visit status   post surgery on 11/01/2017.  We performed a left lateral radial lumpectomy,   breast conservation surgery, partial mastectomy for left invasive cancer   following neoadjuvant Herceptin-based chemotherapy.  She initially presented   with a positive node, which was clinically palpable at the initial presentation   and subsequently resolved clinically with neoadjuvant Herceptin and Perjeta   based chemotherapy.  We enrolled her in the NSABP B-51 and [place] trial.    Intraoperatively her sentinel lymph node at the time of lumpectomy was positive   on frozen section and she was intraoperatively randomized to a completion   axillary lymph node dissection, which we performed.  Final pathology revealed 2   out of 18 positive lymph nodes with the complete axillary dissection including   the sentinel node and one other additional nodes, one node had  an 8-mm   macrometastasis and other metastatic deposit was borderline micro versus   macrometastasis at 2 mm.  Today, she presents.  She offers no subjective   complaints.  She does have some limited range of motion at the left shoulder.    Her drain is still draining approximately 70 mL of benign transudative serous   fluid.  We will continue the drain and defer and hold off on any physical   therapy until the drain has been removed.  Once the drain has been removed, we   will refer her for her physical therapy for the limited range of motion at the   left shoulder.  At this point, I have asked her to follow up with me in two   weeks for drain removal or sooner if it becomes less than 45 mL daily in that   two-week interval.    At this point, she can resume her adjuvant Herceptin.  She does want to skip and   defer the axillary radiation per the protocol.  She is somewhat hesitant about   the potential radiation of the breast given the potential 40-50% risk of   contraction capsular fibrosis in patients with implants that undergo breast   conservation surgery and radiotherapy.  We did discuss the option of a   completion mastectomy since she has been randomized to deferral of radiotherapy   in the axilla.  We could certainly perform a completion mastectomy with implant   placement.  We would remove the existing implant and replace it with a tissue   expander or different size implant to try to achieve symmetry with the   contralateral breast and then she could potentially defer radiotherapy   altogether.  At her lumpectomy the closest margin was the posterior deep margin   at 1 mm.  We did then subsequently excise the underlying deep aspect, which was   the capsule on the implant itself, which had no evidence of tumor.  I will   present her at conference to make sure that she would not be recommended for   postmastectomy radiotherapy since she has already been randomized to not undergo   axillary radiation.   Certainly, I feel that if she underwent a completion   mastectomy, she would not need adjuvant radiotherapy to the chest wall and   native skin mastectomy flaps given the initial presentation, which did not   warrant criteria for post-mastectomy radiotherapy.  Nonetheless, we will present   her at clinic and we rediscuss the topic at her next followup visit in 2 weeks.    We will present her at conference to discuss all these options as well.      RLC/HN  dd: 11/18/2017 15:07:49 (CST)  td: 11/18/2017 23:14:09 (CST)  Doc ID   #[id number]  Job ID #[id number]    CC:     Job # [id number].

## 2017-11-16 NOTE — LETTER
[facility]  [address]  [facility] [address]  Phone: [phone]  Fax: [phone] November 29, 2017      Kevin Gong MD  [address] [facility] [address] [place] [address]    Patient: Ashlie Redman   MR Number: [id number]   YOB: 1955   Date of Visit: 11/16/2017     Dear Dr. Gong:    Thank you for referring Ashlie eRdman to me for evaluation. Below are the relevant portions of my assessment and plan of care.    Ashlie Redman is a very  pleasant 62-year-old  female well known to me, who presents with her  for their first postoperative visit status post surgery on 11/01/2017.  We performed a left lateral radial lumpectomy, breast conservation surgery, partial mastectomy for left invasive cancer following neoadjuvant Herceptin-based chemotherapy.  She initially presented with a positive node, which was clinically palpable at the initial presentation and subsequently resolved clinically with neoadjuvant Herceptin and Perjeta based chemotherapy.  We enrolled her in the NSABP B-51 and [place] trial. Intraoperatively her sentinel lymph node at the time of lumpectomy was positive on frozen section and she was intraoperatively randomized to a completion axillary lymph node dissection, which we performed. Final pathology revealed 2 out of 18 positive lymph nodes with the complete axillary dissection including the sentinel node and one other additional nodes, one node had an 8-mm macrometastasis and other metastatic deposit was borderline micro versus macrometastasis at 2 mm.  Today, she presents.  She offers no subjective complaints.  She does have some limited range of motion at the left shoulder.  Her drain is still draining approximately 70 mL of benign transudative serous fluid.  We will continue the drain and defer and hold off on any physical therapy until the drain has been removed.  Once the drain has been removed, we will refer her for her physical therapy for  the limited range of motion at the left shoulder.  At this point, I have asked her to follow up with me in two weeks for drain removal or sooner if it becomes less than 45 mL daily in that two-week interval.     At this point, she can resume her adjuvant Herceptin.  She does want to skip and defer the axillary radiation per the protocol.  She is somewhat hesitant about the potential radiation of the breast given the potential 40-50% risk of contraction capsular fibrosis in patients with implants that undergo breast conservation surgery and radiotherapy.  We did discuss the option of a completion mastectomy since she has been randomized to deferral of radiotherapy in the axilla.  We could certainly perform a completion mastectomy with implant placement.  We would remove the existing implant and replace it with a tissue expander or different size implant to try to achieve symmetry with the contralateral breast and then she could potentially defer radiotherapy altogether.  At her lumpectomy the closest margin was the posterior deep margin at 1 mm.  We did then subsequently excise the underlying deep aspect, which was the capsule on the implant itself, which had no evidence of tumor.  I will present her at conference to make sure that she would not be recommended for postmastectomy radiotherapy since she has already been randomized to not undergo axillary radiation.  Certainly, I feel that if she underwent a completion mastectomy, she would not need adjuvant radiotherapy to the chest wall and native skin mastectomy flaps given the initial presentation, which did not warrant criteria for post-mastectomy radiotherapy.  Nonetheless, we will present her at clinic and we rediscuss the topic at her next followup visit in 2 weeks. We will present her at conference to discuss all these options as well.     If you have questions, please do not hesitate to call me. I look forward to following Ashlie along with  you.    Sincerely,      Alexander Arce MD  Medical Director, [facility]  Section of General and Oncologic Surgery  [facility]    RLC/hcr    CC  MD Tanna Fierro MD

## 2017-11-16 NOTE — Clinical Note
Mariann, please present at conference on 11-28 if she is not already on the schedule for 11-21 at conference. Thanks, RLC

## 2017-11-18 PROBLEM — Z90.10: Status: ACTIVE | Noted: 2017-11-18

## 2017-11-21 ENCOUNTER — TUMOR BOARD CONFERENCE (OUTPATIENT)
Dept: SURGERY | Facility: CLINIC | Age: 62
End: 2017-11-21

## 2017-11-21 ENCOUNTER — PATIENT MESSAGE (OUTPATIENT)
Dept: HEMATOLOGY/ONCOLOGY | Facility: CLINIC | Age: 62
End: 2017-11-21

## 2017-11-21 ENCOUNTER — PATIENT MESSAGE (OUTPATIENT)
Dept: SURGERY | Facility: CLINIC | Age: 62
End: 2017-11-21

## 2017-11-21 NOTE — PROGRESS NOTES
Interdisciplinary Breast Cancer Conference    Ashlie Redman    Female    Date Presented to Tumor Board: 11/21/17    HOSPTIAL/CLINIC PRESENTING: [facility]    TUMOR SITE: LEFT    TUMOR SITE: LOQ (3:00 8cm FN, enlarged lymph node)    Presenter: Madalyn Gautam, Yonny Hernandez (on behalf of Alexander Arce MD)    Reason For Consultation: Initial Presentation    Specialties Present: Surgical Oncology;Radiation Oncology;Medical Oncology;Research;Pathology;Navigation;Genetics;Radiology    Patient Status: a current patient    Treatment to Date: Neoadjuvant Chemotherapy (genetic consultation done, testing denied by insurance, recceived AC/THP)    Clinical Trial Eligibility: Enrolled ([id number] surgical trial, randomized to axillary dissection)    Estrogen Receptor Status: Negative    Progesterone Status: Negative    Her2/MARIAMA Status: Positive    Clinically T2N1 at baseline pre-chemo  [id number], 2 positive lymph nodes 16 negative lymph nodes    RECOMMENDED PLAN: Radiation;Surgery;Chemotherapy  Patient does not need axillary radiation, but currently needs breast radiation. Patient is debating between completion mastectomy and no radiation vs. Breast radiation    Per radiation, if she has completion mastectomy no chest wall radiation would be recommended. If no further surgery will radiate breast per trial criteria    Completion of 1 year Herceptin will be mandatory either way    UNSTAGEABLE: No         PRESENTATION AT CANCER CONFERENCE: [facility]

## 2017-11-27 ENCOUNTER — TELEPHONE (OUTPATIENT)
Dept: SURGERY | Facility: CLINIC | Age: 62
End: 2017-11-27

## 2017-11-27 NOTE — TELEPHONE ENCOUNTER
Returned pt call, she was complain that the drain site is red and inflamed and is draining less than 30 ml a day and would like her appointment moved up, I reschedule her for tomorrow at 0930

## 2017-11-27 NOTE — TELEPHONE ENCOUNTER
----- Message from Tirso Bradley sent at 11/27/2017 10:25 AM CST -----  Orlando Wall wants to speak with you regarding her f/u appt. Please call pt at [phone]

## 2017-11-28 ENCOUNTER — TELEPHONE (OUTPATIENT)
Dept: SURGERY | Facility: CLINIC | Age: 62
End: 2017-11-28

## 2017-11-28 ENCOUNTER — OFFICE VISIT (OUTPATIENT)
Dept: SURGERY | Facility: CLINIC | Age: 62
End: 2017-11-28
Payer: COMMERCIAL

## 2017-11-28 VITALS
SYSTOLIC BLOOD PRESSURE: 131 MMHG | HEART RATE: 73 BPM | HEIGHT: 68 IN | BODY MASS INDEX: 22.7 KG/M2 | DIASTOLIC BLOOD PRESSURE: 86 MMHG | WEIGHT: 149.81 LBS | TEMPERATURE: 98 F

## 2017-11-28 DIAGNOSIS — I89.0 LYMPHEDEMA: Primary | ICD-10-CM

## 2017-11-28 DIAGNOSIS — M25.612 DECREASED RANGE OF MOTION OF LEFT SHOULDER: ICD-10-CM

## 2017-11-28 DIAGNOSIS — C50.912 MALIGNANT NEOPLASM OF LEFT FEMALE BREAST, UNSPECIFIED ESTROGEN RECEPTOR STATUS, UNSPECIFIED SITE OF BREAST: ICD-10-CM

## 2017-11-28 PROCEDURE — 99024 POSTOP FOLLOW-UP VISIT: CPT | Mod: S$GLB,,, | Performed by: SURGERY

## 2017-11-28 PROCEDURE — 99999 PR PBB SHADOW E&M-EST. PATIENT-LVL III: CPT | Mod: PBBFAC,,, | Performed by: SURGERY

## 2017-11-28 NOTE — PROGRESS NOTES
The patient is a 62-year-old  female well-known to me.  The left axillary drain is now draining 30 cc or less for the last 4 consecutive days.  The drain was removed in clinic today without difficulty.  She is doing quite well with no signs of infection.  She has an excellent symmetrical bilateral cosmetic result with the left lateral radial incision performed for the lumpectomy following Herceptin-based neoadjuvant chemotherapy.    The patient is scheduled to meet with radiation oncology on December 15, 2017 to discuss her adjuvant radiotherapy to the breast only.  She will not be receiving axillary radiation since she is enrolled on the [place] trial and was randomized to a complete axillary lymph node dissection which was performed intraoperatively and revealed 2 of 18 positive lymph nodes.  We presented her at conference and accident discussed whether or not she would require postmastectomy radiation if she chose completion mastectomy rather than proceed with adjuvant radiotherapy to the remaining breast.  I told her the discussion at conference was that she would not require any radiotherapy should she proceed with completion mastectomy.    She has considered this since our last surgery but has opted to proceed with the adjuvant radiation to the breast and desires to maintain her breasts since she had clear surgical margins with only 2 mm of residual disease in the lumpectomy specimen.  She will be set up for the adjuvant radiotherapy here on the [place] as part of the protocol where she will not receive adjuvant axillary radiotherapy.  She does not desire any further surgery at this point    She will continue with her adjuvant Herceptin on the [place] and we'll have the radiation oncology consult with Dr. Duarte on December 15, 2017.  We will refer her to outpatient occupational therapy and physical therapy for range of motion exercises as it relates to the left upper extremity and  shoulder.  She will follow-up with me in 4 months for routine breast cancer screening surveillance follow-up per [facility] protocol.

## 2017-11-28 NOTE — TELEPHONE ENCOUNTER
Called patient to set up PT consultation. Discussed PT schedule, and possibly getting patient set up with someone permanently on the [facility] after this initial consultation. Patient scheduled for 12/5 at 8am. Reviewed location of [facility], patient verbalized understanding of all information.

## 2017-12-01 ENCOUNTER — LAB VISIT (OUTPATIENT)
Dept: LAB | Facility: HOSPITAL | Age: 62
End: 2017-12-01
Attending: INTERNAL MEDICINE
Payer: COMMERCIAL

## 2017-12-01 ENCOUNTER — INFUSION (OUTPATIENT)
Dept: INFUSION THERAPY | Facility: HOSPITAL | Age: 62
End: 2017-12-01
Attending: INTERNAL MEDICINE
Payer: COMMERCIAL

## 2017-12-01 ENCOUNTER — OFFICE VISIT (OUTPATIENT)
Dept: HEMATOLOGY/ONCOLOGY | Facility: CLINIC | Age: 62
End: 2017-12-01
Payer: COMMERCIAL

## 2017-12-01 VITALS
SYSTOLIC BLOOD PRESSURE: 137 MMHG | HEIGHT: 68 IN | BODY MASS INDEX: 22.79 KG/M2 | TEMPERATURE: 98 F | RESPIRATION RATE: 20 BRPM | HEART RATE: 70 BPM | WEIGHT: 150.38 LBS | DIASTOLIC BLOOD PRESSURE: 84 MMHG

## 2017-12-01 VITALS
TEMPERATURE: 98 F | DIASTOLIC BLOOD PRESSURE: 65 MMHG | HEART RATE: 75 BPM | BODY MASS INDEX: 22.79 KG/M2 | SYSTOLIC BLOOD PRESSURE: 131 MMHG | RESPIRATION RATE: 20 BRPM | WEIGHT: 150.38 LBS | HEIGHT: 68 IN

## 2017-12-01 DIAGNOSIS — C50.012 MALIGNANT NEOPLASM OF NIPPLE OF LEFT BREAST IN FEMALE, UNSPECIFIED ESTROGEN RECEPTOR STATUS: ICD-10-CM

## 2017-12-01 DIAGNOSIS — C50.011 MALIGNANT NEOPLASM OF NIPPLE OF RIGHT BREAST IN FEMALE, UNSPECIFIED ESTROGEN RECEPTOR STATUS: ICD-10-CM

## 2017-12-01 DIAGNOSIS — C50.011 MALIGNANT NEOPLASM OF NIPPLE OF RIGHT BREAST IN FEMALE, UNSPECIFIED ESTROGEN RECEPTOR STATUS: Primary | ICD-10-CM

## 2017-12-01 DIAGNOSIS — C50.912 HER2-POSITIVE CARCINOMA OF LEFT BREAST: ICD-10-CM

## 2017-12-01 DIAGNOSIS — C50.012 MALIGNANT NEOPLASM OF NIPPLE OF LEFT BREAST IN FEMALE, UNSPECIFIED ESTROGEN RECEPTOR STATUS: Primary | ICD-10-CM

## 2017-12-01 LAB
ALBUMIN SERPL BCP-MCNC: 4.3 G/DL
ALP SERPL-CCNC: 84 U/L
ALT SERPL W/O P-5'-P-CCNC: 30 U/L
ANION GAP SERPL CALC-SCNC: 9 MMOL/L
AST SERPL-CCNC: 31 U/L
BASOPHILS # BLD AUTO: 0.05 K/UL
BASOPHILS NFR BLD: 0.9 %
BILIRUB SERPL-MCNC: 0.5 MG/DL
BUN SERPL-MCNC: 14 MG/DL
CALCIUM SERPL-MCNC: 10.4 MG/DL
CHLORIDE SERPL-SCNC: 103 MMOL/L
CO2 SERPL-SCNC: 29 MMOL/L
CREAT SERPL-MCNC: 0.66 MG/DL
DIFFERENTIAL METHOD: ABNORMAL
EOSINOPHIL # BLD AUTO: 0.3 K/UL
EOSINOPHIL NFR BLD: 6 %
ERYTHROCYTE [DISTWIDTH] IN BLOOD BY AUTOMATED COUNT: 11.1 %
EST. GFR  (AFRICAN AMERICAN): >60 ML/MIN/1.73 M^2
EST. GFR  (NON AFRICAN AMERICAN): >60 ML/MIN/1.73 M^2
GLUCOSE SERPL-MCNC: 79 MG/DL
HCT VFR BLD AUTO: 38.5 %
HGB BLD-MCNC: 12.9 G/DL
LYMPHOCYTES # BLD AUTO: 1.7 K/UL
LYMPHOCYTES NFR BLD: 31.1 %
MCH RBC QN AUTO: 33.5 PG
MCHC RBC AUTO-ENTMCNC: 33.5 G/DL
MCV RBC AUTO: 100 FL
MONOCYTES # BLD AUTO: 0.5 K/UL
MONOCYTES NFR BLD: 9.8 %
NEUTROPHILS # BLD AUTO: 2.8 K/UL
NEUTROPHILS NFR BLD: 52.2 %
NRBC BLD-RTO: 0 /100 WBC
PLATELET # BLD AUTO: 215 K/UL
PMV BLD AUTO: 10.1 FL
POTASSIUM SERPL-SCNC: 4.3 MMOL/L
PROT SERPL-MCNC: 7.4 G/DL
RBC # BLD AUTO: 3.85 M/UL
SODIUM SERPL-SCNC: 141 MMOL/L
WBC # BLD AUTO: 5.3 K/UL

## 2017-12-01 PROCEDURE — 99215 OFFICE O/P EST HI 40 MIN: CPT | Mod: S$GLB,,, | Performed by: INTERNAL MEDICINE

## 2017-12-01 PROCEDURE — 25000003 PHARM REV CODE 250: Mod: PN | Performed by: INTERNAL MEDICINE

## 2017-12-01 PROCEDURE — 85025 COMPLETE CBC W/AUTO DIFF WBC: CPT | Mod: PN

## 2017-12-01 PROCEDURE — 96417 CHEMO IV INFUS EACH ADDL SEQ: CPT | Mod: PN

## 2017-12-01 PROCEDURE — 80053 COMPREHEN METABOLIC PANEL: CPT | Mod: PN

## 2017-12-01 PROCEDURE — 80053 COMPREHEN METABOLIC PANEL: CPT

## 2017-12-01 PROCEDURE — 96413 CHEMO IV INFUSION 1 HR: CPT | Mod: PN

## 2017-12-01 PROCEDURE — 85025 COMPLETE CBC W/AUTO DIFF WBC: CPT

## 2017-12-01 PROCEDURE — S0028 INJECTION, FAMOTIDINE, 20 MG: HCPCS | Mod: PN | Performed by: INTERNAL MEDICINE

## 2017-12-01 PROCEDURE — 96375 TX/PRO/DX INJ NEW DRUG ADDON: CPT | Mod: PN

## 2017-12-01 PROCEDURE — 36415 COLL VENOUS BLD VENIPUNCTURE: CPT | Mod: PN

## 2017-12-01 PROCEDURE — 63600175 PHARM REV CODE 636 W HCPCS: Mod: PN | Performed by: INTERNAL MEDICINE

## 2017-12-01 PROCEDURE — A4216 STERILE WATER/SALINE, 10 ML: HCPCS | Mod: PN | Performed by: INTERNAL MEDICINE

## 2017-12-01 PROCEDURE — 99999 PR PBB SHADOW E&M-EST. PATIENT-LVL III: CPT | Mod: PBBFAC,,, | Performed by: INTERNAL MEDICINE

## 2017-12-01 RX ORDER — DESVENLAFAXINE 100 MG/1
100 TABLET, EXTENDED RELEASE ORAL DAILY
Qty: 30 TABLET | Refills: 11 | Status: SHIPPED | OUTPATIENT
Start: 2017-12-01 | End: 2018-01-09 | Stop reason: ALTCHOICE

## 2017-12-01 RX ORDER — FAMOTIDINE 10 MG/ML
20 INJECTION INTRAVENOUS 2 TIMES DAILY
Status: DISCONTINUED | OUTPATIENT
Start: 2017-12-01 | End: 2017-12-01 | Stop reason: HOSPADM

## 2017-12-01 RX ORDER — SODIUM CHLORIDE 0.9 % (FLUSH) 0.9 %
10 SYRINGE (ML) INJECTION
Status: CANCELLED | OUTPATIENT
Start: 2017-12-22

## 2017-12-01 RX ORDER — SODIUM CHLORIDE 0.9 % (FLUSH) 0.9 %
10 SYRINGE (ML) INJECTION
Status: DISCONTINUED | OUTPATIENT
Start: 2017-12-01 | End: 2017-12-01 | Stop reason: HOSPADM

## 2017-12-01 RX ORDER — DIPHENHYDRAMINE HYDROCHLORIDE 50 MG/ML
50 INJECTION INTRAMUSCULAR; INTRAVENOUS ONCE AS NEEDED
Status: CANCELLED | OUTPATIENT
Start: 2017-12-22 | End: 2017-12-22

## 2017-12-01 RX ORDER — HEPARIN 100 UNIT/ML
500 SYRINGE INTRAVENOUS
Status: CANCELLED | OUTPATIENT
Start: 2017-12-22

## 2017-12-01 RX ORDER — ONDANSETRON 2 MG/ML
8 INJECTION INTRAMUSCULAR; INTRAVENOUS
Status: COMPLETED | OUTPATIENT
Start: 2017-12-01 | End: 2017-12-01

## 2017-12-01 RX ORDER — EPINEPHRINE 0.3 MG/.3ML
0.3 INJECTION SUBCUTANEOUS ONCE AS NEEDED
Status: CANCELLED | OUTPATIENT
Start: 2017-12-22 | End: 2017-12-22

## 2017-12-01 RX ORDER — FAMOTIDINE 10 MG/ML
20 INJECTION INTRAVENOUS 2 TIMES DAILY
Status: CANCELLED
Start: 2017-12-22

## 2017-12-01 RX ORDER — ONDANSETRON 2 MG/ML
8 INJECTION INTRAMUSCULAR; INTRAVENOUS
Status: CANCELLED
Start: 2017-12-22 | End: 2017-12-22

## 2017-12-01 RX ADMIN — PERTUZUMAB 420 MG: 30 INJECTION, SOLUTION, CONCENTRATE INTRAVENOUS at 11:12

## 2017-12-01 RX ADMIN — SODIUM CHLORIDE, PRESERVATIVE FREE 10 ML: 5 INJECTION INTRAVENOUS at 01:12

## 2017-12-01 RX ADMIN — ONDANSETRON 8 MG: 2 INJECTION INTRAMUSCULAR; INTRAVENOUS at 11:12

## 2017-12-01 RX ADMIN — TRASTUZUMAB 396 MG: 150 INJECTION, POWDER, LYOPHILIZED, FOR SOLUTION INTRAVENOUS at 12:12

## 2017-12-01 RX ADMIN — SODIUM CHLORIDE: 900 INJECTION, SOLUTION INTRAVENOUS at 11:12

## 2017-12-01 RX ADMIN — FAMOTIDINE 20 MG: 10 INJECTION, SOLUTION INTRAVENOUS at 11:12

## 2017-12-01 NOTE — PLAN OF CARE
Problem: Patient Care Overview  Goal: Plan of Care Review  Outcome: Ongoing (interventions implemented as appropriate)  Pt tolerated herceptin/perjecta infusions well.  No s/s of infusion reaction noted.  Instructed to call MD with any problems.

## 2017-12-01 NOTE — PROGRESS NOTES
HISTORY OF PRESENT ILLNESS:  This is a 62-year-old white female known to Dr. Ragsdale for a recent diagnosis of Stage IIB left breast carcinoma.  The patient discovered   a mass in her left breast and was seen by Dr. Arce.  She has received 4 cycles of   A/C and presents to the clinic today for evaluation prior to completed 12 cycles weekly taxol started q 3 week herceptin/ perjeta  She remains chronically fatigued with minor peripheral neuropathy.  She has bouts of   diarrhea that resolve easily with Imodium. She denies any fevers, chills, unexplained   weight loss, new breast complaints, vaginal bleeding, abdominal discomfort, nausea,   vomiting, diarrhea, mouth sores, etc.  No new complaints or pertinent findings on a   14 point review of systems.some fatigue ,went for lumpectomy and axillary eval , revealed 2 LN + hence had a disection  xrt is to be done in [place] per trial, pt here to continue herceptin / perjeta, she had hoped her axilla would be clear has xrt appt scheduled I [place] mid december    PHYSICAL EXAMINATION:  Wt Readings from Last 3 Encounters:   11/28/17 67.9 kg (149 lb 12.8 oz)   11/16/17 69.2 kg (152 lb 9.6 oz)   11/10/17 70 kg (154 lb 3.4 oz)     Temp Readings from Last 3 Encounters:   11/28/17 98 °F (36.7 °C)   11/16/17 97.6 °F (36.4 °C) (Oral)   11/10/17 97.8 °F (36.6 °C)     BP Readings from Last 3 Encounters:   11/28/17 131/86   11/16/17 (!) 154/76   11/10/17 123/69     Pulse Readings from Last 3 Encounters:   11/28/17 73   11/16/17 64   11/10/17 64     GENERAL:  Well-developed, well-nourished white female in no acute distress.    Alert and oriented x4.  :  HEENT:  Normocephalic, atraumatic.  Oral mucosa pink and moist.  Lips without   lesions.  Tongue midline.  Oropharynx clear.  Nonicteric sclerae.  NECK:  Supple, no adenopathy.  No carotid bruits, thyromegaly or thyroid nodule.  HEART:  Regular rate and rhythm without murmur, gallop.  LUNGS:  Clear to auscultation  bilaterally.  Normal respiratory effort.  ABDOMEN:  Soft, nontender, nondistended with positive normoactive bowel sounds,   no hepatosplenomegaly.  EXTREMITIES:  No cyanosis, clubbing or edema.  Distal pulses are intact.  AXILLAE AND GROIN:  No palpable pathologic lymphadenopathy is appreciated.  SKIN:  Intact/turgor normal.  NEUROLOGIC:  Cranial nerves II-XII grossly intact.  Motor:  Good muscle bulk and   tone.  Strength/sensory 5/5 throughout.  Gait stable.    LABORATORY:    Lab Results   Component Value Date    WBC 5.30 12/01/2017    HGB 12.9 12/01/2017    HCT 38.5 12/01/2017     (H) 12/01/2017     12/01/2017     Differential remarkable for 71.3% grans, 5% lymph, 17% monos    CMP  Sodium   Date Value Ref Range Status   12/01/2017 141 136 - 145 mmol/L Final     Potassium   Date Value Ref Range Status   12/01/2017 4.3 3.5 - 5.1 mmol/L Final     Chloride   Date Value Ref Range Status   12/01/2017 103 95 - 110 mmol/L Final     CO2   Date Value Ref Range Status   12/01/2017 29 22 - 31 mmol/L Final     Glucose   Date Value Ref Range Status   12/01/2017 79 70 - 110 mg/dL Final     Comment:     The ADA recommends the following guidelines for fasting glucose:  Normal:       less than 100 mg/dL  Prediabetes:  100 mg/dL to 125 mg/dL  Diabetes:     126 mg/dL or higher       BUN, Bld   Date Value Ref Range Status   12/01/2017 14 7 - 18 mg/dL Final     Creatinine   Date Value Ref Range Status   12/01/2017 0.66 0.50 - 1.40 mg/dL Final     Calcium   Date Value Ref Range Status   12/01/2017 10.4 (H) 8.4 - 10.2 mg/dL Final     Total Protein   Date Value Ref Range Status   12/01/2017 7.4 6.0 - 8.4 g/dL Final     Albumin   Date Value Ref Range Status   12/01/2017 4.3 3.5 - 5.2 g/dL Final     Total Bilirubin   Date Value Ref Range Status   12/01/2017 0.5 0.2 - 1.3 mg/dL Final     Alkaline Phosphatase   Date Value Ref Range Status   12/01/2017 84 38 - 145 U/L Final     AST   Date Value Ref Range Status   12/01/2017 31  14 - 36 U/L Final     ALT   Date Value Ref Range Status   12/01/2017 30 10 - 44 U/L Final     Anion Gap   Date Value Ref Range Status   12/01/2017 9 8 - 16 mmol/L Final     eGFR if    Date Value Ref Range Status   12/01/2017 >60 >60 mL/min/1.73 m^2 Final     eGFR if non    Date Value Ref Range Status   12/01/2017 >60 >60 mL/min/1.73 m^2 Final     Comment:     Calculation used to obtain the estimated glomerular filtration  rate (eGFR) is the CKD-EPI equation.      ECHO wnl  CONCLUSIONS     1 - Normal left ventricular systolic function (EF 55-60%).     2 - Normal left ventricular diastolic function.     3 - Normal right ventricular systolic function .     4 - Trivial mitral regurgitation.     5 - Trivial tricuspid regurgitation.     6 - The estimated PA systolic pressure is 26 mmHg.   5.       Left breast, lumpectomy:  - Residual invasive ductal carcinoma, moderately differentiated, Jamal Grade 2 (3+2+1=6), 0.2 cm in  greatest linear microscopic dimension.  - Ductal carcinoma in situ (DCIS), high nuclear grade, 2 cm, solid and cribriform types.  - Surgical margins are free of tumor; invasive carcinoma is located 0.1 cm from the closest margin  IMPRESSION:  Stage IIB left breast carcinoma (ER/MI negative, Her-2/clay positive)  Completed neoadjuvant AC and weekly taxol now on herceptin and perjeta, s/p recent lumpectomy and axillary disection , with 2 residual Ln positive  PLAN:  1.  Proceed with   herceptin and perjeta Off dexamethasone and benadryl ( makes her too sleepy)  2.  Follow up in clinic with interval CBC, CMP 3 weeks for ongoing rx.   keep xrt schedule

## 2017-12-05 ENCOUNTER — CLINICAL SUPPORT (OUTPATIENT)
Dept: REHABILITATION | Facility: HOSPITAL | Age: 62
End: 2017-12-05
Payer: COMMERCIAL

## 2017-12-05 DIAGNOSIS — M25.612 DECREASED RANGE OF MOTION OF LEFT SHOULDER: ICD-10-CM

## 2017-12-05 DIAGNOSIS — M79.622 PAIN IN AXILLA, LEFT: ICD-10-CM

## 2017-12-05 PROCEDURE — 97110 THERAPEUTIC EXERCISES: CPT | Mod: PO

## 2017-12-05 PROCEDURE — 97161 PT EVAL LOW COMPLEX 20 MIN: CPT | Mod: PO

## 2017-12-05 NOTE — PATIENT INSTRUCTIONS
Contract / Relax: External Rotation    Place L  arm, elbow bent, beside head on pillow. Use 1-3 pillows to be comfortable.  REST AND RELAX. Hold 30 seconds. Repeat 5 x.  Do 1-2 times per day.       Wall Climb    Perform this exercise three (2) times a day with ten (10) repetitions.    Stand and FACE THE WALL with your toes 10 to 12 inches away from the wall.    a. Place the fingers of your affected arm on the wall and slowly walk your fingers up the wall. Let your fingers climb the wall as high as possible without feeling pulling or pain.  b. Hold this stretch for 15 to 20 seconds then move your fingers back down the wall.  c. Try to go a little higher each time. It may relax you a bit if you rest your head against the wall.    Repeat the above exercises standing with your side to the wall.    ROM: Flexion - Wand (Supine)    Lie on back holding wand. Raise arms over head.   Repeat 10 times and hold 30 seconds.  Do 1-2 sessions per day.       Scapular: Retraction in External Rotation    With hands clasped behind head, elbows up, pull elbows back, pinching shoulder blades together. Hold 30 seconds.  Repeat 5 times. Do 1-2 sessions per day.      Lumbar Rotation - combine with above exercise     Put your arms out to the side - 90 degrees - your arms should be in a T formation.   Feet on floor, slowly rock knees from side to side in small, pain-free range of motion. Allow lower back to rotate slightly, but keep shoulders flat on ground. Hold 15 seconds. Repeat each side. Repeat 5 times per side. Do 1-2 sessions per day.      Scapular Retraction: Rowing (Eccentric) - Arms - Side (Resistance Band)        Hold end of band in each hand. Pull back until elbows are even with trunk. Keep elbows by sides, thumbs up. Slowly release for 3-5 seconds. Use green  resistance band. 15 reps per set, 3 sets per day

## 2017-12-05 NOTE — PLAN OF CARE
OUTPATIENT PHYSICAL THERAPY   EVALUATION    Name: Ashlie Redman  Clinic Number: [id number]    Diagnosis:   Encounter Diagnoses   Name Primary?    Decreased range of motion of left shoulder     Pain in axilla, left      Physician: Alexander Arce MD  Treatment Orders: PT Eval and Treat  Past Medical History:   Diagnosis Date    Allergy     Anemia     Asteroid hyalosis of both eyes     Breast cancer     Cataract     Diverticulosis     Encounter for blood transfusion     Glaucoma     High myopia     Osteopenia     S/P dilation and curettage      Past Surgical History:   Procedure Laterality Date    breast augmentation  03/2013    CATARACT EXTRACTION W/  INTRAOCULAR LENS IMPLANT Bilateral 2009    COLONOSCOPY  4/2009, 2015    repeat in 5 years    DILATION AND CURETTAGE OF UTERUS      ECTOPIC PREGNANCY SURGERY      EYE SURGERY Bilateral 3/09    Vitrectomy     POLYPECTOMY      x3 2001    Yag Capsulotomy Bilateral 12/2014        Evaluation Date: 12/5/17  Visit # authorized: 20  Visit #: 1   Authorization period: 12/31/2017  Plan of care Expiration: 12 weeks - February 27, 2018  Insurance:  [employer] ([id number])  Precautions: port in R anterior chest   Physician: Alexander Arce MD    Time In: 8:00 am  Time out: 9:10 am   1:1 treatment time: 70 min   History   History of Present Illness: Ashlie is a 62 y.o. female that presents to  [facility] at the [facility] s/p L breast surgery for invasive carcinoma with axillary dissection.   Dx: L invasive carcinoma ER/MT negative Her2/MARIAMA positive  Surgery: 11/1/2017 - L lumpectomy with magseed, SLNB with axillary dissection level I and II  Treatment: will start radiation to L breast, adjuvant Herceptin/perjecta infusions, completed 4 cycles of neoadjuvant A/C with weekly taxol  Chief complaint: pt reports discomfort/pulling/pain in her L arm with movement and decreased ROM in L arm  Functionally: patient having difficulty taking  "shirt off - requires assistance at times, lifting, reaching overhead    Hand dominance: R handed   Prior Therapy: disc in L4-5, scoliosis   Nutrition:  Thin  Social History: lives with    Place of Residence (Steps/Adaptations): one story home   Previous functional status includes: able to use L arm without limitations   Current functional status:  patient having difficulty taking shirt off - requires assistance at times, lifting, reaching overhead  Exercise routine prior to onset : walks her dogs every morning - 1/2 mile  (used to running/cycling/dancing)  Work:  Works for non-profit - [employer]                        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)    Subjective   Pt states: "I am doing well"  Pain: 0/10 on VAS currently, 4/10 pain at best with movement 7/10 pain at worst - describes as a sharp pain pulling from axilla on L into forearm  Pain Location: L arm/axilla - down to forearm  Objective   Mental status :oriented  - Follows commands: 100% of time   - Speech: no deficits   - Mood/behavior: calm, behavior appropriate to situation   Mental status   - Memory - Intact recent and remote   - Attention/concentration - Normal months-of-year backwards, vigilant during exam   - Language - Naming & repetition intact, +2-step commands   - Fund of knowledge - Aware of current events   - Executive - Well-organized thoughts       Postural examination/scapula alignment: Affected scapula abducted on L  Joint integrity: WFLs  Skin integrity: axillary incision and breast incision intact, no sign of infection   Edema: Mild over axillary incision site, down lateral chest wall    Sensation: Light Touch: Impaired: under L axilla down into forearm            Proprioception:   Intact  - appearance: well groomed     ROM:   UPPER EXTREMITY--AROM/PROM  (R) UE: WFLs  (L) UE: limited as follows: see chart below      Shoulder Range of Motion:   ACTIVE ROM LEFT RIGHT   Flexion 90 175   Abduction 90 175 "   Extension 50 70   IR/90deg 70 80   ER/90deg 90 95     PASSIVE ROM LEFT RIGHT   Flexion 95 180   Abduction 95 180   Extension  50 70   IR/90deg 70 80   ER/90deg 90 95   ER/0deg NT NT         Strength: manual muscle test grades below   Upper Extremity Strength - not tested this session    Visual/Auditory: denies changes     Coordination:   - fine motor: WFL  - UE coordination: intact     - LE coordination:  Not tested     Functional Mobility (Bed mobility, transfers)  Bed mobility: I =  independent   Roll to left: I  Roll to right: I  Supine to prone: I  Scooting to edge of bed: I  Supine to sit: I  Sit to supine: I  Transfers to bed: I  Transfers to toilet: I  Sit to stand:  I  Stand pivot:  I  Car transfers: I      ADL's:  Feeding: I = independent   Grooming: I  Hygiene: I  UB Dressing: min A - with tighter shirts  LB Dressing: I  Toileting: I  Bathing: I    Gait Assessment:   - AD used: none  - Assistance: independent  - Distance: community distances     Patient Education Provided   - role of PT in multi - disciplinary team, goals for PT  - Pt was educated in lymphedema etiology and management plans.    - Pt was provided with written risk reductions and precautions for managing lymphedema.     Pt has no cultural, educational or language barriers to learning provided.  Treatment and Instruction of Home Exercise Program      Written Home Exercises Provided and Patient Education: Handouts given   See EMR under patient instructions for program given  Pt demonstrated good understanding of the education provided. Patient demo good return demo of skill of exercises.    Pt was instructed in and performed therapeutic exercise for 30 min for postural correction and alignment, stretching and soft tissue mobility, and strengthening.   See EMR under patient instructions for program given with sets and reps done this session     Assessment   This is a 62 y.o. female referred to outpatient physical therapy and presents with a  medical diagnosis of L breast cancer, s/p L breast surgery for invasive carcinoma with axillary dissection level I and II and was seen today post-operatively to establish PT plan of care for impairments following surgery including: decreased ROM in L shoulder, decreased strength in L UE along with pain and lack of HEP post surgery. Pt has evidence of axillary cording in L axilla/forearm made evident buy visible cords/dimpling appearance of skin along with complaints of pulling and burning with movement. Pt also with scapular winging on L. Pt instructed in HEP to improve ROM and tissue extensibility in trunk, anterior/lateral chest wall and shoulder musculature as well as strengthening of scapular musculature. Pt instructed to follow up with therapist if any concerns arise with program established. Pt will continue to benefit from skilled physical therapy to address the impairments stated in chart below, provide patient/family education and to maximize pt's level of independence in home and community environment     Anticipated barriers to physical therapy: none      Pt's spiritual, cultural and educational needs considered and pt agreeable to plan of care and goals as stated below:     Medical necessity is demonstrated by the following IMPAIRMENTS/PROMBLEM LIST:  History  Co-morbidities and personal factors that may impact the plan of care Examination  Body Structures and Functions, activity limitations and participation restrictions that may impact the plan of care    Clinical Presentation   Co-morbidities:   Breast Cancer s/p L breast surgery with axillary dissection       Personal Factors:   no deficits Body Regions:   upper extremities  trunk    Body Systems:   ROM  strength  edema  scar formation  skin integrity        Participation Restrictions:   None      Activity limitations:   Mobility  lifting and carrying objects    Self care  dressing    Domestic Life  doing house work (cleaning house, washing dishes,  laundry)    Interactions/Relationships  no deficits    Life Areas  no deficits    Community and Social Life  no deficits         stable and uncomplicated                      low   low  high Decision Making/ Complexity Score:  low     Goals: Pt agrees with goals set    Short Term goals: 6 weeks  1. Patient will demonstrate 100% understanding of lymphedema risk reduction practices to include self monitoring for lymphedema. (progressing, not met).    2. Patient will demonstrate independence with Home Exercise program established. (progressing, not met).    3. Strength will be assessed and appropriate goals will be set. (progressing, not met).    4. Pt will increase PROM/AROM in shoulder flexion to 160 degrees L UE to improve functional reach, carry, push, pull pain free. (progressing, not met).      Long Term Goals: 12 weeks   1.  Pt will increase PROM/AROM in shoulder flexion to 175 degrees L UE to improve functional reach, carry, push, pull pain free. (progressing, not met).    2. Pt will increase strength to 4+/5 in gross UE musculature to improve tolerance to all functional activities pain free. (progressing, not met).    3. Pt will demonstrate full/maximized tissue mobility mobility to increase ROM and promote healthy tissue to be pain free at discharge. (progressing, not met).    4.  Pt will be pain free with L UE movements at discharge. (progressing, not met)    Plan   Outpatient physical therapy 1x week for 12 weeks to include the following:   - Patient education  - Therapeutic exercise  - Manual therapy  - Performance testing   - Neuromuscular Re-education  - Therapeutic activity   - Modalities    plan of care expiration -  February 27, 2018    Therapist: Rachel Ulrich, PT  12/5/2017

## 2017-12-08 ENCOUNTER — INITIAL CONSULT (OUTPATIENT)
Dept: RADIATION ONCOLOGY | Facility: CLINIC | Age: 62
End: 2017-12-08
Payer: COMMERCIAL

## 2017-12-08 VITALS
SYSTOLIC BLOOD PRESSURE: 128 MMHG | HEART RATE: 73 BPM | WEIGHT: 150.69 LBS | DIASTOLIC BLOOD PRESSURE: 72 MMHG | TEMPERATURE: 98 F | RESPIRATION RATE: 16 BRPM | BODY MASS INDEX: 22.84 KG/M2 | HEIGHT: 68 IN

## 2017-12-08 DIAGNOSIS — C50.512 PRIMARY CANCER OF LOWER OUTER QUADRANT OF LEFT FEMALE BREAST: Primary | ICD-10-CM

## 2017-12-08 DIAGNOSIS — C50.412 MALIGNANT NEOPLASM OF UPPER-OUTER QUADRANT OF LEFT BREAST IN FEMALE, ESTROGEN RECEPTOR NEGATIVE: ICD-10-CM

## 2017-12-08 DIAGNOSIS — Z17.1 MALIGNANT NEOPLASM OF UPPER-OUTER QUADRANT OF LEFT BREAST IN FEMALE, ESTROGEN RECEPTOR NEGATIVE: ICD-10-CM

## 2017-12-08 PROCEDURE — 99204 OFFICE O/P NEW MOD 45 MIN: CPT | Mod: S$GLB,,, | Performed by: RADIOLOGY

## 2017-12-08 PROCEDURE — 99999 PR PBB SHADOW E&M-EST. PATIENT-LVL III: CPT | Mod: PBBFAC,,, | Performed by: RADIOLOGY

## 2017-12-08 NOTE — PATIENT INSTRUCTIONS
Radiation Therapy Treatment  Radiation therapy can help you in your fight against cancer. It begins with a session to discuss treatment with your doctor. If you and your doctor decide on radiation, you will return for a simulation. The simulation is a planning session that helps the doctor target your cancer. He or she will design a radiation plan to protect normal tissues. When the simulation and plan are completed, you will begin your daily treatments. Treatment is usually once daily Monday to Friday. It takes less than a half an hour. Sometimes you may need radiation twice a day, with usually 6 hours between treatments. After the course of radiation is complete, you will be scheduled for follow-up appointments. This is to make sure the cancer is under control. The follow-ups will also make sure that any side effects from the treatment are taken care of.  Radiation therapy uses high-energy X-rays to kill cancer cells.   Your treatment planning visit: The simulation  Your radiation therapy team uses a special machine called a simulator to map out your treatment. The simulator is usually an X-ray machine (fluoroscopy), CT scanner, MRI scanner, or PET-CT scanner machine. Laser lights act as guides to help position your body accurately. During this visit:  · The team figures out the best position for your body. They make notes in your chart so youll be placed the same way each time.  · You may use special devices to keep your body correctly positioned and still during treatment. These may include molds, masks, rests, and blocks.  · The team makes ink marks on your skin. These will help you get in the same position for each treatment. Tiny permanent tattoos may also be used.   · Markers such as metal balls or wires may be put on or in your body. Sometime these are taped to the skin to help with the imaging process. These work with the X-rays to position your body. The markers are removed when the visit is  over.  After the team has the imaging and data, the information is sent into the computer planning system. Your doctor and the team of physicists and dosimetrists design a treatment field. The field will best target your cancer and how it might spread. It will also help limit radiation to nearby normal tissues.  Your treatments  Each treatment usually takes 10 to 30 minutes. You may need to change into a hospital gown. The radiation therapist puts you in the correct position on the treatment table, then leaves the room. Sometimes you may need more imaging before each treatment. The machine may take digital X-rays or a CT scan to help make sure you are lined up correctly. During treatment, lie as still as you can and breathe normally. You will hear noises coming from the machine. You can talk to the radiation therapist, who watches you from the control room on a TV monitor. After treatment, the therapist will help you off the table. You can then get dressed and go back to your normal activities.  Date Last Reviewed: 1/14/2016 © 2000-2017 [employer]. [address] [place] [address]. All rights reserved. This information is not intended as a substitute for professional medical care. Always follow your healthcare professional's instructions.        Discharge Instructions for Radiation Therapy  Radiation therapy uses high-energy X-rays to kill cancer cells and help you in your fight against cancer. Radiation destroys cancer cells gradually, over time. The goal of therapy is to focus on and kill as many cancer cells as possible. Radiation can also damage or kill some of the normal cells that are closest to the tumor. Damaged normal cells can repair themselves, often within a few days.  Caring for your skin  You should ask your healthcare provider for specific products that he or she recommends for washing and bathing. In general, use a mild nondetergent soap and warm (not hot) water to clean the  "area receiving radiation. Pat the region dry rather than rubbing.  Your healthcare provider may give you products to moisturize the skin and prevent infection. The goal is to prevent cracks or breaks in the skin that may be sensitive from treatment:   · Dont be surprised if your treatment causes skin redness, and a type of "sunburn" over time. Some radiation treatments can cause this.   · Ask your therapy team what lotion to use. Also ask for directions about when and how to apply it.  · Avoid prolonged or direct sunlight on the treated area. Ask your therapy team about using a sunscreen. You do not have to avoid going outside altogether, but must take appropriate precautions.  · Dont remove ink marks unless your radiation therapist says its OK. Dont scrub or use soap on the marks when you wash. Let water run over them and pat them dry.  · Protect your skin from heat or cold. Avoid hot tubs, saunas, heating pads, or ice packs.  · Avoid clothing that causes friction or rubbing on the skin.  Fighting fatigue  Radiation therapy may cause you to feel tired. Your body is working hard to heal and repair itself. To feel better, try these things:  · Do light exercise each day. Take short walks.  · Plan tasks for the times when you tend to have the most energy. Ask for help when you need it.  · Relax before you go to bed. This will help you sleep better. Try reading or listening to soothing music.  Coping with appetite changes  Here are ways to cope:  · Tell your therapy team if you find it hard to eat or you have no appetite. You may be referred to a nutritionist to help you with meal planning.  · Radiation to certain internal sites can cause nausea, depending on the location of treatment. This can affect your appetite. Think of healthy eating as part of your treatment. Try these tips:  ¨ Eat slowly.  ¨ Eat small meals several times a day.  ¨ Eat more food when youre feeling better.  ¨ Ask others to keep you company " when you eat.  ¨ Stock up on easy-to-prepare foods.  ¨ Eat foods high in protein and calories. Your healthcare provider may recommend liquid meal supplements.  ¨ Drink plenty of water and other fluids.  ¨ Ask your healthcare provider before taking any vitamins or over-the-counter supplements. Such products are not regulated by the FDA and can sometimes interfere with your treatments.   Dealing with other side effects  Here are suggestions to deal with other side effects:   · Be prepared for hair loss in the area being treated. The hair loss may be permanent. Be sure to discuss this with your healthcare provider.  · Sip cool water if your mouth or throat becomes dry or sore. Ice chips may also help.  · Tell your healthcare provider if you have diarrhea or constipation. You may be given a special diet.  · If you have trouble swallowing liquids, tell your healthcare provider.  Follow-up  Make a follow-up appointment as directed by your healthcare provider.     When to call your healthcare provider  Call your healthcare provider right away if you have any of the following:  · Unexpected headaches  · Trouble concentrating  · Ongoing fatigue  · Wheezing, shortness of breath, or trouble breathing  · Pain that doesnt go away, especially if its always in the same place  · New or unusual lumps, bumps, or swelling  · Dizziness or lightheadedness  · Unusual rashes, bruises, or bleeding  · Fever of 100.4°F (38°C) or higher, or chills  · Nausea and vomiting  · Diarrhea that doesnt improve with time  · Skin breakdown; significant pain due to skin irritation   Date Last Reviewed: 1/13/2016  © 3551-9620 [employer]. [address] [place] [address], [place] [address]. All rights reserved. This information is not intended as a substitute for professional medical care. Always follow your healthcare professional's instructions.        Radiation Therapy: Managing Short-Term Side Effects     Take short walks daily.   Radiation  therapy uses high-energy X-rays or particles to kill cancer cells. Some normal cells can also be affected. This causes side effects such as dry skin, tiredness (fatigue), or changes in your appetite. Most side effects go away when your radiation therapy is over.  Having side effects of radiation therapy does not mean that your cancer is getting worse or that therapy isnt working.   Caring for your skin  Skin problems may happen where your body gets radiation. Your skin may become dry, itchy, red, and peeling. It may darken in that spot, like a tan. To care for your skin:  · Dont scrub on the treatment area. Clean that area of the skin every day. Use warm water and mild soap, or as your healthcare provider advises. Pat the skin afterward or let it air dry.  · Ask your therapy team what lotion to use and when to use it.  · Keep the treated area out of the sun. Ask your team about using a sunscreen.  · Don't remove ink marks unless your radiation therapist says you can. Dont scrub the marks when you wash. Let water run over them and pat them dry.  · Protect your skin from heat or cold. Avoid hot tubs, saunas, hot pads, and ice packs.  · Wear soft, loose clothing to avoid rubbing skin.  Fighting tiredness  The cancer itself or the radiation therapy may cause you to feel tired. Your body is working hard to heal and repair itself. To feel better:  · Try light exercise each day. Take short walks.  · Plan tasks for the times when you tend to have the most energy. Ask for help when you need it.  · Relax before you go to bed to sleep better. Try reading or listening to soothing music.  · Be sure to let your cancer care team know if you continue to have fatigue that is not getting better. They may be able to offer ways to help.   Coping with appetite changes  Tell your therapy team if you find it hard to eat or have no appetite. You may need to see a nutritionist. This is a healthcare provider with special training in meal  planning. To keep your strength up, you need to eat well and maintain your weight. Think of healthy eating as part of your treatment. Try these tips:  · Eat slowly.  · Eat small meals several times a day.  · Eat more food when youre feeling better, even if it is not mealtime.  · Ask others to keep you company when you eat.  · Stock up on easy-to-prepare foods.  · Eat foods high in protein and calories.  · Drink plenty of water and other fluids.  · Ask your healthcare provider before taking any vitamins.  Site-specific side effects  These side effects include the following:   · You may lose hair in the area being treated. The hair often grows back after treatment.  · Your mouth or throat can become dry or sore if your head or neck is being treated. Sip cool water to help ease discomfort.  · Nausea and bowel changes can happen with radiation to the pelvic region. Tell your healthcare provider if you have nausea, diarrhea, or constipation. You may need to take medicine or follow a special diet.  Talk with your healthcare team  Radiation therapy can also have other side effects, including some that might not show up until years later. Be sure to talk with your healthcare team about what to expect with the type of radiation therapy you are getting, including when you should call them with concerns.   Date Last Reviewed: 5/1/2016  © 2945-3597 [employer]. [address] [place] [address], [place] [address]. All rights reserved. This information is not intended as a substitute for professional medical care. Always follow your healthcare professional's instructions.      Return for ct/sim in one week.   Hold herceptin until after radiation

## 2017-12-08 NOTE — LETTER
December 8, 2017      Trell Lau MD  [address] [facility] [address] [place] [address]           [facility]iation [facility]  [address] [place], [place] [address] [id number]  Phone: [phone]          Patient: Ashlie Redman   MR Number: [id number]   YOB: 1955   Date of Visit: 12/8/2017       Dear Dr. Trell Lau:    Thank you for referring Ashlie Redman to me for evaluation. Attached you will find relevant portions of my assessment and plan of care.    If you have questions, please do not hesitate to call me. I look forward to following Ashlie Redman along with you.    Sincerely,    Emmie Duarte MD    Enclosure  CC:  No Recipients    If you would like to receive this communication electronically, please contact [email or web address] or [phone] to request more information on [employer] access.    For providers and/or their staff who would like to refer a patient to [facility], please contact us through our one-stop-shop provider referral line, [facility] , at [phone].    If you feel you have received this communication in error or would no longer like to receive these types of communications, please e-mail [email or web address]

## 2017-12-08 NOTE — PROGRESS NOTES
REFERRING PHYSICIAN:   Alexander Arce M.D., Celestina Ragsdale M.D.     DIAGNOSIS:    cT2 N1 M0 (ypT1a N1a) invasive ductal carcinoma of the left breast     HISTORY OF PRESENT ILLNESS:   Ms. Redman is a 62-year-old female with history of bilateral saline breast implants who was recently diagnosed with left breast cancer after she presented with a palpable mass in the upper outer quadrant of the left breast as well as in the left axilla.  Mammogram in March 2017 revealed a 6 mm focal asymmetry in the left breast at 3- 4 o'clock at the site of the palpable lesion.  An ultrasound revealed a 34 mm area of abnormality at the same site with additional enlarged left axillary lymph node measuring 20 x 17 x 11 mm.  A biopsy of the left breast mass on April 5, 2017 revealed grade 2, invasive ductal carcinoma  with associated high-grade DCIS with comedonecrosis.  This lesion is ER/FL negative and HER-2 positive.  On April 18, 2017, she underwent left axillary lymph node biopsy which revealed high-grade infiltrating ductal carcinoma involving the entire lymph node.  A PET scan on April 21, 2017 revealed a hypermetabolic mass in the inferior lateral aspect of the left breast abutting the implant measuring approximately 3 cm with SUV max of 3.8.  There is also a hypermetabolic left axillary lymph node measuring 1.3 cm with SUV max of 6 noted in addition to additional smaller subcentimeter hypermetabolic left axillary lymph nodes.  There are 2 sub centimeter pulmonary nodules seen in the right lower lobe.  An MRI of the bilateral breasts on May 8, 2017 revealed a 2.7 cm upper outer quadrant mass on the left breast as well as 2 abnormal level I left axillary lymph nodes with the largest one measuring 2.6 cm.    She received neoadjuvant chemotherapy with 4 cycles of Adriamycin and Cytoxan followed by weekly Taxol, Herceptin, and Perjeta.  A repeat MRI of the breasts on October 3, 2017 revealed no residual suspicious enhancement in the  left breast.  2 previously seen enlarged left axillary lymph nodes have decreased in size with the largest one now measuring 1.1 cm.  On 2017, she underwent left breast lumpectomy and sentinel node biopsy followed by axillary dissection  based on the [place] protocol.  The pathology revealed the left breast with residual 0.2 cm invasive ductal carcinoma, grade 2, with the closest margin posteriorly at 0.1 cm.  There is also associated high nuclear grade DCIS seen measuring 2 cm, with the closest margin at 0.1 cm posteriorly.  Additional deep margin was taken from the fibrous capsule which is negative for carcinoma.  2 out of 7 sentinel lymph nodes were found to have involvement with the largest focus measuring 8 mm and the second measuring 2 mm, both of which were negative for extra capsular invasion.  She was randomized to the arm receiving axillary dissection with additional 11 lymph nodes negative for involvement.  She is here today for discussion regarding postoperative radiation.    At present, patient denies left breast pain, edema, erythema, or nipple discharge.  She also denies fever, night sweats, or weight loss.  She tolerated the chemotherapy without any unexpected side effects and continues to work as an exercise .    REVIEW OF SYSTEMS:  As above.  In addition, patient denies headaches, visual problems, dizziness, chest pain, shortness of breath, cough, nausea, vomiting, diarrhea, or any new bony pains. Patient also denies easy bruising, skin rashes, or numbness or tingling.    GYN HISTORY:    with history of one ectopic pregnancy.  Menarche at the age of 13 and menopause at age 50.  She has approximately 22 year history of oral contraceptive pills and 12 year history of hormone replacement therapy which she has discontinued.      ECO    PAST MEDICAL HISTORY:  Past Medical History:   Diagnosis Date    Allergy     Anemia     Asteroid hyalosis of both eyes     Breast cancer      Cataract     Diverticulosis     Encounter for blood transfusion     Glaucoma     High myopia     Osteopenia     S/P dilation and curettage        PAST SURGICAL HISTORY:  Past Surgical History:   Procedure Laterality Date    breast augmentation  03/2013    CATARACT EXTRACTION W/  INTRAOCULAR LENS IMPLANT Bilateral 2009    COLONOSCOPY  4/2009, 2015    repeat in 5 years    DILATION AND CURETTAGE OF UTERUS      ECTOPIC PREGNANCY SURGERY      EYE SURGERY Bilateral 3/09    Vitrectomy     POLYPECTOMY      x3 2001    Yag Capsulotomy Bilateral 12/2014       ALLERGIES:   Review of patient's allergies indicates:   Allergen Reactions    Bactrim [sulfamethoxazole-trimethoprim] Rash     Fever, vomiting       MEDICATIONS:  Current Outpatient Prescriptions   Medication Sig    desvenlafaxine succinate (PRISTIQ) 100 MG Tb24 Take 100 mg by mouth once daily.    dorzolamide (TRUSOPT) 2 % ophthalmic solution Place 1 drop into both eyes 3 (three) times daily.    hydrocodone-acetaminophen 5-325mg (NORCO) 5-325 mg per tablet Take 1 tablet by mouth every 6 (six) hours as needed for Pain.    latanoprost 0.005 % ophthalmic solution INSTILL ONE DROP INTO EACH EYE ONCE DAILY IN THE EVENING    lidocaine-prilocaine (EMLA) cream Apply topically once.    ondansetron (ZOFRAN-ODT) 8 MG TbDL Take 1 tablet (8 mg total) by mouth every 12 (twelve) hours as needed.    prochlorperazine (COMPAZINE) 10 MG tablet Take 1 tablet (10 mg total) by mouth every 6 (six) hours as needed.     No current facility-administered medications for this visit.        SOCIAL HISTORY:  Social History     Social History    Marital status:      Spouse name: N/A    Number of children: N/A    Years of education: N/A     Occupational History     [facility] ([facility]     Social History Main Topics    Smoking status: Never Smoker    Smokeless tobacco: Never Used    Alcohol use No      Comment: Quit    Drug use: No    Sexual  activity: Yes     Partners: Male     Birth control/ protection: Post-menopausal     Other Topics Concern    Are You Pregnant Or Think You May Be? No     Social History Narrative    No narrative on file       FAMILY HISTORY:  Family History   Problem Relation Age of Onset    Cancer Sister      rectal; passed    Breast cancer Cousin     Cancer Cousin      breast    Heart disease Father      passed    Colon cancer Mother      hospice    Pancreatic cancer Mother      41yo; 91yo    Cataracts Mother     Hypertension Mother     Thyroid disease Mother     Cancer Mother      colon    Glaucoma Maternal Grandmother     Amblyopia Neg Hx     Blindness Neg Hx     Diabetes Neg Hx     Macular degeneration Neg Hx     Retinal detachment Neg Hx     Strabismus Neg Hx     Stroke Neg Hx          PHYSICAL EXAMINATION:  Vitals:    12/08/17 0951   BP: 128/72   Pulse: 73   Resp: 16   Temp: 97.7 °F (36.5 °C)     GENERAL: Patient is alert and oriented, in no acute distress.  HEENT:Extraocular muscles are intact.  Oropharynx is clear without lesions.  There is no cervical or supraclavicular lymphadenopathy palpated.  No thyromegaly noted.  HEART: Regular rate and rhythm.  LUNGS: Clear to auscultation bilaterally.  BREAST EXAM:  Bilateral implants noted.  The scar secondary to lumpectomy is noted in the upper outer quadrant of the left breast.  There is also a scar in the left axilla secondary to axillary dissection.  No suspicious masses palpated in the left breast or left axilla.  The right breast and right axilla are also without palpable masses.  ABDOMEN:Soft, nontender, nondistended, without hepatosplenomegaly.  Normoactive bowel sounds.  EXTREMITIES: No clubbing, cyanosis, or edema.  NEUROLOGICAL: Cranial nerve II through XII grossly intact.  Sensation is intact.  Strength is 5 out of 5 in the upper and lower extremities bilaterally.     ASSESSMENT:   This is a 62-year-old female with  c T2 N1 M0  (yp T1a N1a)  infiltrating ductal carcinoma of the left breast who underwent neoadjuvant chemotherapy and lumpectomy and sentinel lymph node biopsy followed by axillary dissection per [place] protocol, with pathology revealing 0.2 cm residual invasive ductal carcinoma with associated 2 cm high-grade DCIS, with negative margins, and 2 out of 18 lymph nodes positive for involvement, for a lesion which is ER/CO negative and HER-2 positive.      PLAN:   After review of the available pathology and radiological studies, Ms. Redman is noted to have loco regionally advanced left breast cancer.  She is enrolled in the [place] protocol and was randomized to the arm receiving axillary dissection.  She is found to have 2 lymph nodes positive out of a total of 18 lymph nodes.  After review of the protocol, patient is recommended to undergo postoperative radiation to the left breast, high axillary, supraclavicular, and internal mammary nodes (Arm 1).  I plan to deliver approximately 5000 cGy +1000 cGy boost per protocol.  I also recommend holding Herceptin during the radiation to minimize cardiac toxicity.  I plan to discuss that with Dr. Ragsdale.  This was also discussed with Dr. Arce who is in agreement.      The risks, benefits, and side effects of radiation were explained in detail to the patient.  All questions were answered and informed consent was signed.  I plan to see the patient back for radiation planning CT.     Psychosocial Distress screening score of Distress Score: 0 noted and reviewed. No intervention indicated.    I spent approximately 60 minutes reviewing the available records and evaluating the patient, out of which over 50% of the time was spent face to face with the patient in counseling and coordinating this patient's care.

## 2017-12-12 ENCOUNTER — PATIENT MESSAGE (OUTPATIENT)
Dept: HEMATOLOGY/ONCOLOGY | Facility: CLINIC | Age: 62
End: 2017-12-12

## 2017-12-12 ENCOUNTER — CLINICAL SUPPORT (OUTPATIENT)
Dept: REHABILITATION | Facility: HOSPITAL | Age: 62
End: 2017-12-12
Payer: COMMERCIAL

## 2017-12-12 DIAGNOSIS — M25.612 DECREASED RANGE OF MOTION OF LEFT SHOULDER: ICD-10-CM

## 2017-12-12 DIAGNOSIS — M79.622 PAIN IN AXILLA, LEFT: ICD-10-CM

## 2017-12-12 PROCEDURE — 97110 THERAPEUTIC EXERCISES: CPT | Mod: PO

## 2017-12-12 PROCEDURE — 97140 MANUAL THERAPY 1/> REGIONS: CPT | Mod: PO

## 2017-12-12 NOTE — PATIENT INSTRUCTIONS
Scapular Retraction: Rowing (Eccentric) - Arms - Side (Resistance Band)        Hold end of band in each hand. Pull back until elbows are even with trunk. Keep elbows by sides, thumbs up. Slowly release for 3-5 seconds. Use resistance band. 20 reps per set, 3-5 sets per day - hold 2 seconds     Tricep Strength    Lie comfortably on back with a weight in one palm. Bend arm so elbow points up and weight gently hangs. Keep shoulder flat on floor. Raise hand to straighten arm. Repeat with other arm. Use 5 pounds.  Repeat 15 times. Do 3 sessions 1-2x per day     Wall Push-ups - 2 ways (elbows to wall and traditional0     Repeat 15 times. Do 3 sessions 1-2x per day.  Facing wall with both hands on wall at shoulder height and shoulder-width apart. Keeping body straight, lean forward allowing elbows to bend then push out again.  Repeat 15 times. Do 3 sessions 1-2x per day.    Thoracic Side Bend    With hands clasped behind head, slowly bend to left, then to right. Hold extremes of position as tolerated.  Repeat 3-5 times. Do 1-2 times a day.      [place]    Standing with hands on wall and elbows straight, pull arms back and rotate head back, bending elbow.  Repeat 10 times each side. Do 1-2 times a day.  Abdominal: Strength - Varied Arm Positions a

## 2017-12-12 NOTE — PROGRESS NOTES
Physical Therapy Daily Treatment Note     Name: Ashlie Redman  Clinic Number: [id number]  Diagnosis:   Encounter Diagnoses   Name Primary?    Pain in axilla, left     Decreased range of motion of left shoulder      Physician: Alexander Arce MD    Evaluation Date: 12/5/17  Visit # authorized: 20  Visit #: 2  Authorization period: 12/31/2017  Plan of care Expiration: 12 weeks - February 27, 2018  Insurance:  BCBS (40)  Precautions: port in R anterior chest   Physician: Alexander Arce MD     Time In: 8:00 am  Time out: 8:50 am   1:1 treatment time: 50 min     Subjective   Pt reports: overall her pain has decreased in her L shoulder.  She walked 2 miles on Sunday - 1-2x day did her stretches, had her first appointment with radiation oncology - will start radiation towards end of the month per patient.   Pain Scale: Ashlie rates pain on a scale of 0/10 on VAS. The worst pain since last PT session is 2-3/10.     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  Surgery: 11/1/2017 - L lumpectomy with magseed, SLNB with axillary dissection level I and II  Treatment: will start radiation to L breast, adjuvant Herceptin/perjecta infusions, completed 4 cycles of neoadjuvant A/C with weekly taxol  Objective     Ashlie received individual therapeutic exercises to improve postural correction and alignment, stretching and soft tissue mobility, and strengthening for 30 minutes including the following:   See EMR under patient instructions for exercises performed during session with sets and reps  Reviewed HEP given last time - See EMR - patient compliant with HEP    Shoulder Range of Motion measured this session:   ACTIVE ROM LEFT RIGHT   Flexion  145 175   Abduction 135 175   Extension 50 70   IR/90deg 70 80   ER/90deg 90 95     Ashlie received the following manual  therapy techniques were performed to increased myofascial/soft tissue length, mobility and pliability, increase PROM, AROM and function as well as to decrease pain for 20  minutes  - axillary distraction BL  - lateral chest wall stretch  - IR/ER stretch   - soft tissue work to L upper arm down into extensors - to assist in decreasing cording on L  - flexion and abduction stretching     Home Exercise Program and Patient Education   Education provided re:  - role of PT in multi - disciplinary team, goals for PT  - progress towards goals     Pt was instructed in and performed therapeutic exercise for postural correction and alignment, stretching and soft tissue mobility, and strengthening.   Pt was able to demonstrate and report understanding and performance  Pt has no cultural, educational or language barriers to learning provided.    Assessment     Patient is responding well to physical therapy. Pt made great progress towards goals this session with improvement noted in shoulder flexion and abduction. Pt is having less pain and increase in function. Pt given new exercises this session for HEP and will continue to progress as tolerated.   Pain after treatment: none     This is a 62 y.o. female referred to outpatient physical therapy and presents with a medical diagnosis of L breast cancer and demonstrates limitations as described in the problem list on evaluation. Pt prognosis is Excellent. Pt will continue to benefit from skilled outpatient physical therapy to address the deficits listed in the problem list, provide pt/family education and to maximize pt's level of independence in the home and community environment.     Goals as follows:  Short Term goals: 6 weeks  1. Patient will demonstrate 100% understanding of lymphedema risk reduction practices to include self monitoring for lymphedema. (progressing, not met).    2. Patient will demonstrate independence with Home Exercise program established. (progressing, not  met).    3. Strength will be assessed and appropriate goals will be set. (progressing, not met).    4. Pt will increase PROM/AROM in shoulder flexion to 160 degrees L UE to improve functional reach, carry, push, pull pain free. (progressing, not met).       Long Term Goals: 12 weeks   1.  Pt will increase PROM/AROM in shoulder flexion to 175 degrees L UE to improve functional reach, carry, push, pull pain free. (progressing, not met).    2. Pt will increase strength to 4+/5 in gross UE musculature to improve tolerance to all functional activities pain free. (progressing, not met).    3. Pt will demonstrate full/maximized tissue mobility mobility to increase ROM and promote healthy tissue to be pain free at discharge. (progressing, not met).    4.  Pt will be pain free with L UE movements at discharge. (progressing, not met)     Plan     Continue with established Plan of Care towards PT goals.    Therapist: Rachel Ulrich, PT  12/12/2017

## 2017-12-14 ENCOUNTER — TELEPHONE (OUTPATIENT)
Dept: INFUSION THERAPY | Facility: HOSPITAL | Age: 62
End: 2017-12-14

## 2017-12-14 ENCOUNTER — TELEPHONE (OUTPATIENT)
Dept: HEMATOLOGY/ONCOLOGY | Facility: CLINIC | Age: 62
End: 2017-12-14

## 2017-12-14 ENCOUNTER — HOSPITAL ENCOUNTER (OUTPATIENT)
Dept: RADIATION THERAPY | Facility: HOSPITAL | Age: 62
Discharge: HOME | End: 2017-12-14
Attending: RADIOLOGY
Payer: COMMERCIAL

## 2017-12-14 PROCEDURE — 77263 THER RADIOLOGY TX PLNG CPLX: CPT | Mod: ,,, | Performed by: RADIOLOGY

## 2017-12-14 PROCEDURE — 77290 THER RAD SIMULAJ FIELD CPLX: CPT | Mod: TC | Performed by: RADIOLOGY

## 2017-12-14 PROCEDURE — 77334 RADIATION TREATMENT AID(S): CPT | Mod: 26,,, | Performed by: RADIOLOGY

## 2017-12-14 PROCEDURE — 77334 RADIATION TREATMENT AID(S): CPT | Mod: TC | Performed by: RADIOLOGY

## 2017-12-14 PROCEDURE — 77290 THER RAD SIMULAJ FIELD CPLX: CPT | Mod: 26,,, | Performed by: RADIOLOGY

## 2017-12-14 PROCEDURE — 77014 HC CT GUIDANCE RADIATION THERAPY FLDS PLACEMENT: CPT | Mod: TC | Performed by: RADIOLOGY

## 2017-12-14 NOTE — TELEPHONE ENCOUNTER
----- Message from Celestina Ragsdale MD sent at 12/13/2017  1:28 PM CST -----  yue copying you pts nYesterday (10:33 AM)   please change dates of infusion       Checking in to see if Dr Nelson contacted you about delaying my next 2 to 3 infusions Dec 22, Jan 12 and maybe Feb 2  while radiation is taking place.  She is concerned about the effect from both Herceptin and radiation to my heart .  Can you let me know if you are going to cancel those appointments and add 2 -3 on the back end.     Many thanks,   Ashlie Redman

## 2017-12-14 NOTE — TELEPHONE ENCOUNTER
[12/14/2017 11:48 AM] Trisha Horton:   You can cancel infusions for MRN [id number] Feroz until after her radiation. Once she calls me with last day of radiation date, I will call you to see put her back on,. Thanks  Appointments cancel

## 2017-12-15 ENCOUNTER — PATIENT MESSAGE (OUTPATIENT)
Dept: RADIATION ONCOLOGY | Facility: CLINIC | Age: 62
End: 2017-12-15

## 2017-12-15 DIAGNOSIS — Z11.59 NEED FOR HEPATITIS C SCREENING TEST: ICD-10-CM

## 2017-12-17 ENCOUNTER — PATIENT MESSAGE (OUTPATIENT)
Dept: RADIATION ONCOLOGY | Facility: CLINIC | Age: 62
End: 2017-12-17

## 2017-12-17 ENCOUNTER — PATIENT MESSAGE (OUTPATIENT)
Dept: HEMATOLOGY/ONCOLOGY | Facility: CLINIC | Age: 62
End: 2017-12-17

## 2017-12-18 ENCOUNTER — PATIENT MESSAGE (OUTPATIENT)
Dept: HEMATOLOGY/ONCOLOGY | Facility: CLINIC | Age: 62
End: 2017-12-18

## 2017-12-18 ENCOUNTER — TELEPHONE (OUTPATIENT)
Dept: HEMATOLOGY/ONCOLOGY | Facility: CLINIC | Age: 62
End: 2017-12-18

## 2017-12-18 ENCOUNTER — TELEPHONE (OUTPATIENT)
Dept: INFUSION THERAPY | Facility: HOSPITAL | Age: 62
End: 2017-12-18

## 2017-12-18 NOTE — TELEPHONE ENCOUNTER
[12/18/2017 1:21 PM] Trisha Horton:   MRN [id number] would like to be placed back on schedule after talking with both of her DR's starting 12/22/17 if you can puit her back on  [12/18/2017 1:23 PM] Georgette King:   clarisse abhishek mrn [id number] is on 12/22/17 11:30

## 2017-12-18 NOTE — TELEPHONE ENCOUNTER
----- Message from Patricia Raman sent at 12/18/2017 11:27 AM CST -----  Contact: self  Patient needs reschedule appointment due to she can have infusion. Patient would like to have on Friday, 12/22/17. Please call patient at [phone]. Thanks!

## 2017-12-22 ENCOUNTER — TELEPHONE (OUTPATIENT)
Dept: HEMATOLOGY/ONCOLOGY | Facility: CLINIC | Age: 62
End: 2017-12-22

## 2017-12-22 ENCOUNTER — INFUSION (OUTPATIENT)
Dept: INFUSION THERAPY | Facility: HOSPITAL | Age: 62
End: 2017-12-22
Attending: INTERNAL MEDICINE
Payer: COMMERCIAL

## 2017-12-22 ENCOUNTER — OFFICE VISIT (OUTPATIENT)
Dept: HEMATOLOGY/ONCOLOGY | Facility: CLINIC | Age: 62
End: 2017-12-22
Payer: COMMERCIAL

## 2017-12-22 VITALS
BODY MASS INDEX: 23.04 KG/M2 | TEMPERATURE: 98 F | HEIGHT: 68 IN | SYSTOLIC BLOOD PRESSURE: 139 MMHG | WEIGHT: 152 LBS | RESPIRATION RATE: 16 BRPM | DIASTOLIC BLOOD PRESSURE: 70 MMHG | HEART RATE: 61 BPM

## 2017-12-22 VITALS
BODY MASS INDEX: 23.04 KG/M2 | RESPIRATION RATE: 20 BRPM | TEMPERATURE: 98 F | HEART RATE: 65 BPM | SYSTOLIC BLOOD PRESSURE: 134 MMHG | DIASTOLIC BLOOD PRESSURE: 84 MMHG | HEIGHT: 68 IN | WEIGHT: 152 LBS

## 2017-12-22 DIAGNOSIS — C50.012 MALIGNANT NEOPLASM OF NIPPLE OF LEFT BREAST IN FEMALE, UNSPECIFIED ESTROGEN RECEPTOR STATUS: Primary | ICD-10-CM

## 2017-12-22 DIAGNOSIS — C50.012 MALIGNANT NEOPLASM OF NIPPLE OF LEFT BREAST IN FEMALE, UNSPECIFIED ESTROGEN RECEPTOR STATUS: ICD-10-CM

## 2017-12-22 DIAGNOSIS — C34.00 MALIGNANT NEOPLASM OF HILUS OF LUNG, UNSPECIFIED LATERALITY: Primary | ICD-10-CM

## 2017-12-22 PROCEDURE — 25000003 PHARM REV CODE 250: Mod: PN | Performed by: INTERNAL MEDICINE

## 2017-12-22 PROCEDURE — 99215 OFFICE O/P EST HI 40 MIN: CPT | Mod: S$GLB,,, | Performed by: INTERNAL MEDICINE

## 2017-12-22 PROCEDURE — 63600175 PHARM REV CODE 636 W HCPCS: Mod: PN | Performed by: INTERNAL MEDICINE

## 2017-12-22 PROCEDURE — A4216 STERILE WATER/SALINE, 10 ML: HCPCS | Mod: PN | Performed by: INTERNAL MEDICINE

## 2017-12-22 PROCEDURE — 96413 CHEMO IV INFUSION 1 HR: CPT | Mod: PN

## 2017-12-22 PROCEDURE — 99999 PR PBB SHADOW E&M-EST. PATIENT-LVL III: CPT | Mod: PBBFAC,,, | Performed by: INTERNAL MEDICINE

## 2017-12-22 RX ORDER — DIPHENOXYLATE HYDROCHLORIDE AND ATROPINE SULFATE 2.5; .025 MG/1; MG/1
1 TABLET ORAL 4 TIMES DAILY PRN
Qty: 30 TABLET | Refills: 1 | Status: SHIPPED | OUTPATIENT
Start: 2017-12-22 | End: 2018-06-21

## 2017-12-22 RX ORDER — SODIUM CHLORIDE 0.9 % (FLUSH) 0.9 %
10 SYRINGE (ML) INJECTION
Status: DISCONTINUED | OUTPATIENT
Start: 2017-12-22 | End: 2017-12-22 | Stop reason: HOSPADM

## 2017-12-22 RX ADMIN — SODIUM CHLORIDE, PRESERVATIVE FREE 10 ML: 5 INJECTION INTRAVENOUS at 01:12

## 2017-12-22 RX ADMIN — SODIUM CHLORIDE: 900 INJECTION, SOLUTION INTRAVENOUS at 12:12

## 2017-12-22 RX ADMIN — TRASTUZUMAB 396 MG: 150 INJECTION, POWDER, LYOPHILIZED, FOR SOLUTION INTRAVENOUS at 12:12

## 2017-12-22 NOTE — PLAN OF CARE
Problem: Patient Care Overview  Goal: Plan of Care Review  Outcome: Ongoing (interventions implemented as appropriate)  Pt tolerated herceptin infusion well.  Permariahta held today per MD orders.  No s/s of infusion reaction noted. Instructed to call MD with any problems.

## 2017-12-22 NOTE — PROGRESS NOTES
HISTORY OF PRESENT ILLNESS:  This is a 62-year-old white female for a  diagnosis of Stage IIB left breast carcinoma.  The patient discovered   a mass in her left breast and was seen by Dr. Arce.  She has received 4 cycles of   A/C ,completed 12 cycles weekly taxol and continues q 3 week herceptin/ perjeta    She has bouts of diarrhea that resolve easily with Imodium. She denies any fevers, chills, unexplained   weight loss, new breast complaints, vaginal bleeding, abdominal discomfort, nausea,   vomiting, diarrhea, mouth sores, etc.  No new complaints or pertinent findings on a   14 point review of systems.some fatigue ,went for lumpectomy and axillary eval , revealed 2 LN + hence had a disection  xrt is to be done in [place] per trial, pt here to continue herceptin / perjeta, she had hoped her axilla would be clear has xrt appt scheduled  Dr. Kunzould like to prevent cardiotoxicity from combined xrt/ herceptin /perjeta , pt hasnt started xrt yet so here to get next herceptn    PHYSICAL EXAMINATION:  Wt Readings from Last 3 Encounters:   12/22/17 68.9 kg (152 lb 0.1 oz)   12/22/17 68.9 kg (152 lb 0.1 oz)   12/08/17 68.4 kg (150 lb 11.2 oz)     Temp Readings from Last 3 Encounters:   12/22/17 97.7 °F (36.5 °C)   12/22/17 97.7 °F (36.5 °C)   12/08/17 97.7 °F (36.5 °C) (Oral)     BP Readings from Last 3 Encounters:   12/22/17 134/84   12/22/17 139/70   12/08/17 128/72     Pulse Readings from Last 3 Encounters:   12/22/17 65   12/22/17 61   12/08/17 73     GENERAL:  Well-developed, well-nourished white female in no acute distress.    Alert and oriented x4.  :  HEENT:  Normocephalic, atraumatic.  Oral mucosa pink and moist.  Lips without   lesions.  Tongue midline.  Oropharynx clear.  Nonicteric sclerae.  NECK:  Supple, no adenopathy.  No carotid bruits, thyromegaly or thyroid nodule.  HEART:  Regular rate and rhythm without murmur, gallop.  LUNGS:  Clear to auscultation bilaterally.  Normal respiratory  effort.  ABDOMEN:  Soft, nontender, nondistended with positive normoactive bowel sounds,   no hepatosplenomegaly.  EXTREMITIES:  No cyanosis, clubbing or edema.  Distal pulses are intact.  AXILLAE AND GROIN:  No palpable pathologic lymphadenopathy is appreciated.  SKIN:  Intact/turgor normal.  NEUROLOGIC:  Cranial nerves II-XII grossly intact.  Motor:  Good muscle bulk and   tone.  Strength/sensory 5/5 throughout.  Gait stable.    LABORATORY:    Lab Results   Component Value Date    WBC 6.47 12/19/2017    HGB 12.5 12/19/2017    HCT 36.8 (L) 12/19/2017    MCV 98 12/19/2017     12/19/2017     Differential remarkable for 71.3% grans, 5% lymph, 17% monos    CMP  Sodium   Date Value Ref Range Status   12/19/2017 139 136 - 145 mmol/L Final     Potassium   Date Value Ref Range Status   12/19/2017 4.3 3.5 - 5.1 mmol/L Final     Chloride   Date Value Ref Range Status   12/19/2017 104 95 - 110 mmol/L Final     CO2   Date Value Ref Range Status   12/19/2017 29 22 - 31 mmol/L Final     Glucose   Date Value Ref Range Status   12/19/2017 97 70 - 110 mg/dL Final     Comment:     The ADA recommends the following guidelines for fasting glucose:  Normal:       less than 100 mg/dL  Prediabetes:  100 mg/dL to 125 mg/dL  Diabetes:     126 mg/dL or higher       BUN, Bld   Date Value Ref Range Status   12/19/2017 13 7 - 18 mg/dL Final     Creatinine   Date Value Ref Range Status   12/19/2017 0.64 0.50 - 1.40 mg/dL Final     Calcium   Date Value Ref Range Status   12/19/2017 10.1 8.4 - 10.2 mg/dL Final     Total Protein   Date Value Ref Range Status   12/19/2017 7.2 6.0 - 8.4 g/dL Final     Albumin   Date Value Ref Range Status   12/19/2017 4.2 3.5 - 5.2 g/dL Final     Total Bilirubin   Date Value Ref Range Status   12/19/2017 0.4 0.2 - 1.3 mg/dL Final     Alkaline Phosphatase   Date Value Ref Range Status   12/19/2017 94 38 - 145 U/L Final     AST   Date Value Ref Range Status   12/19/2017 28 14 - 36 U/L Final     ALT   Date Value  Ref Range Status   12/19/2017 33 10 - 44 U/L Final     Anion Gap   Date Value Ref Range Status   12/19/2017 6 (L) 8 - 16 mmol/L Final     eGFR if    Date Value Ref Range Status   12/19/2017 >60 >60 mL/min/1.73 m^2 Final     eGFR if non    Date Value Ref Range Status   12/19/2017 >60 >60 mL/min/1.73 m^2 Final     Comment:     Calculation used to obtain the estimated glomerular filtration  rate (eGFR) is the CKD-EPI equation.      ECHO wnl  CONCLUSIONS     1 - Normal left ventricular systolic function (EF 55-60%).     2 - Normal left ventricular diastolic function.     3 - Normal right ventricular systolic function .     4 - Trivial mitral regurgitation.     5 - Trivial tricuspid regurgitation.     6 - The estimated PA systolic pressure is 26 mmHg.   5.       Left breast, lumpectomy:  - Residual invasive ductal carcinoma, moderately differentiated, [place] Grade 2 (3+2+1=6), 0.2 cm in  greatest linear microscopic dimension.  - Ductal carcinoma in situ (DCIS), high nuclear grade, 2 cm, solid and cribriform types.  - Surgical margins are free of tumor; invasive carcinoma is located 0.1 cm from the closest margin  IMPRESSION:  Stage IIB left breast carcinoma (ER/TX negative, Her-2/clay positive)  Completed neoadjuvant AC and weekly taxol now on herceptin and perjeta, s/p recent lumpectomy and axillary disection , with 2 residual Ln positive  PLAN:  1.  Proceed with   herceptin will stop perjeta due to concerns of combined toxicity , she will complete xrt mid feb rtc with cbc, cmp, and echo at the time and proceed only with hercetin to completion

## 2017-12-22 NOTE — TELEPHONE ENCOUNTER
Per Dr. Ragsdale - today will be last dose of Herceptin until patient completes radiation. Patient has not started radiation yet. Patient will let us know when she is finishing radiation so that we can plan to resume treatment at which time she will be getting Herceptin only, dropping Perjeta permanently.     She will need a cardiac Echo, cbc, cmp prior to resuming treatment    Infusion nurse Sharmaine notified today through IM

## 2017-12-28 ENCOUNTER — TELEPHONE (OUTPATIENT)
Dept: RADIATION ONCOLOGY | Facility: CLINIC | Age: 62
End: 2017-12-28

## 2017-12-28 PROCEDURE — 77334 RADIATION TREATMENT AID(S): CPT | Mod: 26,,, | Performed by: RADIOLOGY

## 2017-12-28 PROCEDURE — 77295 3-D RADIOTHERAPY PLAN: CPT | Mod: TC | Performed by: RADIOLOGY

## 2017-12-28 PROCEDURE — 77300 RADIATION THERAPY DOSE PLAN: CPT | Mod: 26,,, | Performed by: RADIOLOGY

## 2017-12-28 PROCEDURE — 77300 RADIATION THERAPY DOSE PLAN: CPT | Mod: TC | Performed by: RADIOLOGY

## 2017-12-28 PROCEDURE — 77334 RADIATION TREATMENT AID(S): CPT | Mod: TC | Performed by: RADIOLOGY

## 2017-12-28 PROCEDURE — 77295 3-D RADIOTHERAPY PLAN: CPT | Mod: 26,,, | Performed by: RADIOLOGY

## 2018-01-02 ENCOUNTER — HOSPITAL ENCOUNTER (OUTPATIENT)
Dept: RADIATION THERAPY | Facility: HOSPITAL | Age: 63
Discharge: HOME | End: 2018-01-02
Attending: RADIOLOGY
Payer: COMMERCIAL

## 2018-01-02 ENCOUNTER — TELEPHONE (OUTPATIENT)
Dept: RADIATION ONCOLOGY | Facility: CLINIC | Age: 63
End: 2018-01-02

## 2018-01-03 ENCOUNTER — TELEPHONE (OUTPATIENT)
Dept: HEMATOLOGY/ONCOLOGY | Facility: CLINIC | Age: 63
End: 2018-01-03

## 2018-01-03 ENCOUNTER — DOCUMENTATION ONLY (OUTPATIENT)
Dept: RADIATION ONCOLOGY | Facility: CLINIC | Age: 63
End: 2018-01-03

## 2018-01-03 DIAGNOSIS — C50.012 MALIGNANT NEOPLASM OF NIPPLE OF LEFT BREAST IN FEMALE, UNSPECIFIED ESTROGEN RECEPTOR STATUS: Primary | ICD-10-CM

## 2018-01-03 PROCEDURE — 77280 THER RAD SIMULAJ FIELD SMPL: CPT | Mod: 26,,, | Performed by: RADIOLOGY

## 2018-01-03 PROCEDURE — 77280 THER RAD SIMULAJ FIELD SMPL: CPT | Mod: TC | Performed by: RADIOLOGY

## 2018-01-04 PROCEDURE — 77417 THER RADIOLOGY PORT IMAGE(S): CPT | Performed by: RADIOLOGY

## 2018-01-04 PROCEDURE — 77412 RADIATION TX DELIVERY LVL 3: CPT | Performed by: RADIOLOGY

## 2018-01-05 PROCEDURE — 77412 RADIATION TX DELIVERY LVL 3: CPT | Performed by: RADIOLOGY

## 2018-01-05 NOTE — PROGRESS NOTES
Ashlie Redman, 61 years old, having pain in her right trapezial and shoulder area,   had this now for years, gotten worse, 5/10 on the pain scale.    Exam today shows cervical motion is slightly uncomfortable for her.  Shoulder   exam has weakly positive Neer and Hawkin's impingement sign.  Cuff strength   intact.  No instability.  Negative apprehension.  The hand is functioning well.    Negative Tinel's at the wrist.    X-rays of the shoulder look good.    ASSESSMENT:  Cervical radicular symptoms/brachial neuritis type problem.    PLAN:  Symptomatic care.  Physical therapy.  Follow up in our clinic as needed.      ARDEN/TRINA  dd: 02/03/2017 11:18:45 (CST)  td: 02/03/2017 16:30:55 (CST)  Doc ID   #[id number]  Job ID #[id number]    CC:        [facility]  [address]  Phone: [phone]  Fax: [phone]    Norbert Callejas MD        January 5, 2018     Patient: Lacho Sullivan   YOB: 1971   Date of Visit: 1/5/2018       To Whom It May Concern:     Anamika Deng had a surgical procedure on 12-21-17. He was seen for a post operative appointment on 1-5-18. He may return back to work on 1-26-18 with no restrictions. If you have any questions or concerns, please don't hesitate to call.     Sincerely,          Norbert Callejas MD

## 2018-01-08 ENCOUNTER — PATIENT MESSAGE (OUTPATIENT)
Dept: FAMILY MEDICINE | Facility: CLINIC | Age: 63
End: 2018-01-08

## 2018-01-08 PROCEDURE — 77412 RADIATION TX DELIVERY LVL 3: CPT | Performed by: RADIOLOGY

## 2018-01-09 ENCOUNTER — PATIENT MESSAGE (OUTPATIENT)
Dept: HEMATOLOGY/ONCOLOGY | Facility: CLINIC | Age: 63
End: 2018-01-09

## 2018-01-09 ENCOUNTER — TELEPHONE (OUTPATIENT)
Dept: FAMILY MEDICINE | Facility: CLINIC | Age: 63
End: 2018-01-09

## 2018-01-09 ENCOUNTER — DOCUMENTATION ONLY (OUTPATIENT)
Dept: RADIATION ONCOLOGY | Facility: CLINIC | Age: 63
End: 2018-01-09

## 2018-01-09 ENCOUNTER — TELEPHONE (OUTPATIENT)
Dept: INFUSION THERAPY | Facility: HOSPITAL | Age: 63
End: 2018-01-09

## 2018-01-09 DIAGNOSIS — F32.0 CURRENT MILD EPISODE OF MAJOR DEPRESSIVE DISORDER WITHOUT PRIOR EPISODE: Primary | ICD-10-CM

## 2018-01-09 PROCEDURE — 77412 RADIATION TX DELIVERY LVL 3: CPT | Performed by: RADIOLOGY

## 2018-01-09 PROCEDURE — 77336 RADIATION PHYSICS CONSULT: CPT | Performed by: RADIOLOGY

## 2018-01-09 RX ORDER — VENLAFAXINE HYDROCHLORIDE 75 MG/1
75 CAPSULE, EXTENDED RELEASE ORAL DAILY
Qty: 30 CAPSULE | Refills: 2 | Status: SHIPPED | OUTPATIENT
Start: 2018-01-09 | End: 2018-04-09 | Stop reason: SDUPTHER

## 2018-01-09 NOTE — PLAN OF CARE
Problem: Patient Care Overview  Goal: Plan of Care Review  Outcome: Ongoing (interventions implemented as appropriate)  Pt. On day 3 of xrt to left breast.  Doing well.  No skin issues.  Just started.

## 2018-01-09 NOTE — TELEPHONE ENCOUNTER
I am currently doing radiation treatments and have be instructed to stop my infusions until the radiation is complete.  This should be the week of Feb 19th.  I will make sure Dr Ragsdale stays up to date with my radiation treatment schedule. Thanks Ashlie  Patient cancel infusion appointment

## 2018-01-09 NOTE — TELEPHONE ENCOUNTER
Needs appointment with me in my next available regular appointment time slot (do not use any hold spots).   Schedule FLP with one of her oncology lab draws prior to my apt.

## 2018-01-10 PROCEDURE — 77412 RADIATION TX DELIVERY LVL 3: CPT | Performed by: RADIOLOGY

## 2018-01-10 NOTE — TELEPHONE ENCOUNTER
----- Message from Melissa Arias sent at 1/9/2018  3:34 PM CST -----  Please call [phone]  Pt is  Calling  Back // please call

## 2018-01-11 ENCOUNTER — PATIENT MESSAGE (OUTPATIENT)
Dept: FAMILY MEDICINE | Facility: CLINIC | Age: 63
End: 2018-01-11

## 2018-01-11 DIAGNOSIS — Z00.00 LABORATORY EXAM ORDERED AS PART OF ROUTINE GENERAL MEDICAL EXAMINATION: Primary | ICD-10-CM

## 2018-01-11 PROCEDURE — 77417 THER RADIOLOGY PORT IMAGE(S): CPT | Performed by: RADIOLOGY

## 2018-01-11 PROCEDURE — 77412 RADIATION TX DELIVERY LVL 3: CPT | Performed by: RADIOLOGY

## 2018-01-12 PROCEDURE — 77412 RADIATION TX DELIVERY LVL 3: CPT | Performed by: RADIOLOGY

## 2018-01-12 NOTE — TELEPHONE ENCOUNTER
Spoke with pt. She has scheduled appt with Dr. Gong already online (portal). She states that she believes that she may resume chemo closer to appt date with Dr. Gong and will resume lab draws at that time. She believes that she will have labs the morning of the appt, and return later in day to see Dr. Gong.

## 2018-01-15 ENCOUNTER — CLINICAL SUPPORT (OUTPATIENT)
Dept: REHABILITATION | Facility: HOSPITAL | Age: 63
End: 2018-01-15
Attending: SURGERY
Payer: COMMERCIAL

## 2018-01-15 DIAGNOSIS — M25.612 DECREASED RANGE OF MOTION OF LEFT SHOULDER: ICD-10-CM

## 2018-01-15 DIAGNOSIS — M79.622 PAIN IN AXILLA, LEFT: ICD-10-CM

## 2018-01-15 PROCEDURE — 77412 RADIATION TX DELIVERY LVL 3: CPT | Performed by: RADIOLOGY

## 2018-01-15 PROCEDURE — 97110 THERAPEUTIC EXERCISES: CPT | Mod: PO

## 2018-01-15 PROCEDURE — 97140 MANUAL THERAPY 1/> REGIONS: CPT | Mod: PO

## 2018-01-15 NOTE — PATIENT INSTRUCTIONS
"Also performed as part of her treatment this session    Scapular Retraction: Rowing (Eccentric) - Arms - Side (Resistance Band)        Hold end of band in each hand. Pull back until elbows are even with trunk. Keep elbows by sides, thumbs up. Slowly release for 3-5 seconds. Use green resistance band, do 3 sets of 15 reps each 1x day everyday.       "Sean "      Copyright © 4729-5165 [address].        Thoracic Side Bend    With hands clasped behind head, slowly bend to left, then to right. Hold extremes of position as tolerated.  Repeat 3-5 times. Do 1-2 times a day.        ROM: Abduction (Standing)    Bring arms straight out from sides and raise as high as possible without pain.  Repeat 15 times. Do 3 sessions 1-2x per day.        Scapular: Retraction in External Rotation    With hands clasped behind head, elbows up, pull elbows back, pinching shoulder blades together. Hold 30 seconds.  Repeat 5 times. Do 1-2 sessions per day.      [place] and [place]    Standing with hands on wall and elbows straight, pull arms back and rotate head back, bending elbow.  Repeat 10 times each side. Do 1-2 times a day.            "

## 2018-01-15 NOTE — PROGRESS NOTES
"                                                    Physical Therapy Daily Treatment Note     Name: Ashlie Redman  Clinic Number: [id number]  Diagnosis:   Encounter Diagnoses   Name Primary?    Pain in axilla, left     Decreased range of motion of left shoulder      Physician: Alexander Arce MD    Evaluation Date: 12/5/17  Visit # authorized: 20  Visit #: 3  Authorization period: 12/31/2017  Plan of care Expiration: 12 weeks - February 27, 2018  Insurance:  BCBS (40)  Precautions: port in R anterior chest   Physician: Alexander Arce MD    Time In: 10:00 am  Time out: 11:15 am   1:1 treatment time: 75 min     Subjective   Pt reports: she just started her radiation treatments and is on visit 8 of 33, starting to notice some redness of her skin over radiation site. She reports that she has started jazzercise 2-3 times a week when she is not feeling extremely fatigued. She is been somewhat compliant with her stretches but has not with her strengthening HEP. She reports noticing still some weakness and feels her scapula is still causing some issues. Pt states that she still notices winging with her arm movements in exercise class. Also, she reports some feeling of thickness/swelling near the base of her neck and on her back over scapula. Overall, her pain has decreased since she started physical therapy.     Pain Scale: Ashlie rates no pain in her L arm today, just some slight discomfort from radiation "sunburn"  Fatigue: moderate   Functional change: ROM better, less pain with movement  Dx: L invasive carcinoma ER/MD negative Her2/MARIAMA positive  Surgery: 11/1/2017 - L lumpectomy with magseed, SLNB with axillary dissection level I and II  Treatment: currently in radiation tx  - scheduled to get 33 treatments   Objective   Edema: no pitting edema evident down L arm, slight thinkness/increased density noted on back over scapula and on anterior neck - will continue to monitor     Ashlie received individual therapeutic " "exercises to improve postural correction and alignment, stretching and soft tissue mobility, and strengthening for 45 minutes including the following:   See EMR under patient instructions for exercises performed during session with sets and reps  Reviewed HEP given -     Shoulder Range of Motion measured this session: improvement noted in flexion and abduction   ACTIVE/PASSIVE ROM LEFT   Flexion  145/160 passive   Abduction 160   Extension 60/70   IR/90deg 70   ER/90deg 90       Upper Extremity Strength   (L) UE (R) UE   Shoulder flexion: 4-/5 4+/5   Shoulder Abduction: 4-/5 4+/5   Shoulder IR 4/5 4+/5   Shoulder ER 4/5 4+/5   Elbow flexion: 5/5 5/5   Elbow extension: 4/5 4+/5   Wrist flexion: 5/5 5/5   Wrist extension: 5/5 5/5    5/5 5/5       Ashlie received the following manual therapy techniques were performed to increased myofascial/soft tissue length, mobility and pliability, increase PROM, AROM and function as well as to decrease pain for 15 minutes  Pt still describes feeling of pulling from her axillary incision down her arm - she does report the "pulling" has resolved below her elbow with help of stretches.   - axillary distraction BL  - lateral chest wall stretch  - IR/ER stretch   - soft tissue work to L upper arm down into extensors - to assist in decreasing cording on L  - flexion and abduction active and passive stretching     Home Exercise Program and Patient Education   Education provided re:  - role of PT in multi - disciplinary team, goals for PT  - progress towards goals     Pt was instructed in and performed therapeutic exercise for postural correction and alignment, stretching and soft tissue mobility, and strengthening. See EMR under patient instructions for exercises give for HEP this session.   Pt was able to demonstrate and report understanding and performance  Pt has no cultural, educational or language barriers to learning provided.    Assessment   Patient is responding well to physical " therapy. Pt made great progress towards goals this session with improvement noted in shoulder flexion and abduction. Pt is having less pain and increase in function. Pt given new exercises this session for HEP and will continue to progress as tolerated. Pt does have some significant scapular dyskinesis and weakness with shoulder flexion and abduction. Extensive education provided this session on condition, plan for PT, compliance with exercises. Pt verbalized understanding. Pt met some of her goals this session.  Pain after treatment: none     This is a 62 y.o. female referred to outpatient physical therapy and presents with a medical diagnosis of L breast cancer and demonstrates limitations as described in the problem list on evaluation. Pt prognosis is Excellent. Pt will continue to benefit from skilled outpatient physical therapy to address the deficits listed in the problem list, provide pt/family education and to maximize pt's level of independence in the home and community environment.     Goals as follows:  Short Term goals: 6 weeks  1. Patient will demonstrate 100% understanding of lymphedema risk reduction practices to include self monitoring for lymphedema.Met 1/16/2018   2. Patient will demonstrate independence with Home Exercise program established. (progressing, not met).    3. Strength will be assessed and appropriate goals will be set. Met 1/16/2018   4. Pt will increase PROM/AROM in shoulder flexion to 160 degrees L UE to improve functional reach, carry, push, pull pain free. (progressing, not met).       Long Term Goals: 12 weeks   1.  Pt will increase PROM/AROM in shoulder flexion to 175 degrees L UE to improve functional reach, carry, push, pull pain free. (progressing, not met).    2. Pt will increase strength to 4+/5 in gross UE musculature to improve tolerance to all functional activities pain free. (progressing, not met).    3. Pt will demonstrate full/maximized tissue mobility mobility to  increase ROM and promote healthy tissue to be pain free at discharge. (progressing, not met).    4.  Pt will be pain free with L UE movements at discharge. (progressing, not met)     Plan     Continue with established Plan of Care towards PT goals.    Therapist: Rachel Ulrich, PT  1/16/2018

## 2018-01-16 ENCOUNTER — DOCUMENTATION ONLY (OUTPATIENT)
Dept: RADIATION ONCOLOGY | Facility: CLINIC | Age: 63
End: 2018-01-16

## 2018-01-16 PROCEDURE — 77336 RADIATION PHYSICS CONSULT: CPT | Performed by: RADIOLOGY

## 2018-01-16 PROCEDURE — 77412 RADIATION TX DELIVERY LVL 3: CPT | Performed by: RADIOLOGY

## 2018-01-16 NOTE — PLAN OF CARE
Problem: Patient Care Overview  Goal: Plan of Care Review  Outcome: Ongoing (interventions implemented as appropriate)  Pt. On day 8 of xrt to breast.  Pt. With faint erythema.  Pt. Has URI.  Using cream.

## 2018-01-19 PROCEDURE — 77412 RADIATION TX DELIVERY LVL 3: CPT | Performed by: RADIOLOGY

## 2018-01-22 ENCOUNTER — CLINICAL SUPPORT (OUTPATIENT)
Dept: REHABILITATION | Facility: HOSPITAL | Age: 63
End: 2018-01-22
Attending: SURGERY
Payer: COMMERCIAL

## 2018-01-22 DIAGNOSIS — M79.622 PAIN IN AXILLA, LEFT: ICD-10-CM

## 2018-01-22 DIAGNOSIS — M25.612 DECREASED RANGE OF MOTION OF LEFT SHOULDER: ICD-10-CM

## 2018-01-22 PROCEDURE — 77417 THER RADIOLOGY PORT IMAGE(S): CPT | Performed by: RADIOLOGY

## 2018-01-22 PROCEDURE — 77412 RADIATION TX DELIVERY LVL 3: CPT | Performed by: RADIOLOGY

## 2018-01-22 PROCEDURE — 97110 THERAPEUTIC EXERCISES: CPT | Mod: PO

## 2018-01-22 NOTE — PROGRESS NOTES
"                                                    Physical Therapy Daily Treatment Note     Name: Ashlie Redman  Clinic Number: [id number]  Diagnosis:   Encounter Diagnoses   Name Primary?    Pain in axilla, left     Decreased range of motion of left shoulder      Physician: Alexander Arce MD  Evaluation Date: 12/5/17  Visit # authorized: 20  Visit #: 4  Authorization period: 12/31/2017  Plan of care Expiration: 12 weeks - February 27, 2018  Insurance:  BCBS (40)  Precautions: port in R anterior chest   Physician: Alexander Arce MD    Time In: 8:05 am  Time out: 9:05 am   1:1 treatment time: 60 min     Subjective     Pt reports: that she is still has discomfort/weakness in her scapula, reaching overhead or lifting heavier objects. She reports being compliant with her HEP since last session.   Pain Scale: Ashlie reports just some slight discomfort from radiation "sunburn" type feeling and deep stabbing pain at times in her L axilla over pec insertion - no pain currently but when it hurts she reports 6/10  Fatigue: moderate   Dx: L invasive carcinoma ER/VA negative Her2/MARIAMA positive  Surgery: 11/1/2017 - L lumpectomy with magseed, SLNB with axillary dissection level I and II  Treatment: currently in radiation tx  - scheduled to get 33 treatments   Objective   Edema: no pitting edema evident down L arm, slight thinkness/increased density noted on back over scapula and on anterior neck - will continue to monitor     Ashlie received individual therapeutic exercises to improve postural correction and alignment, stretching and soft tissue mobility, and strengthening for 60 minutes including the following:   Cane protraction for serratus strengthening   Lat pull downs using green theraband   Side lying ER using 2lb weights  Side lying gators 1 set with 2lb weight  Standing rows using green theraband   Standing ER - using BW with 3 sec hold   See EMR under patient instructions for exercises taught and performed this " "session    Circumferential measurements taken this session:   LANDMARK RIGHT UE LEFT UE DIFFERENCE   E + 8" 36 cm 36 cm 0 cm   E + 6" 29 cm 30.5 cm 1.5 cm   E + 4" 26.5 cm 28 cm 1.5 cm   E + 2" 26 cm 27 cm 1 cm   Elbow 24 cm 25 cm 1 cm   W+ 8" 23.5 cm 23.5 cm 0 cm   W +  6" 21.5 cm 21.5 cm 0 cm   W + 4" 16 cm 17.5 cm 1.5 cm   Wrist 15 cm 14.5 cm .5 cm   DPC 18.5 cm 18 cm .5 cm   IP Thumb 6 cm 6 cm 0 cm         Home Exercise Program and Patient Education   Education provided re:  - role of PT in multi - disciplinary team, goals for PT  - progress towards goals     Pt was instructed in and performed therapeutic exercise for postural correction and alignment, stretching and soft tissue mobility, and strengthening. See EMR under patient instructions for exercises give for HEP this session.   Pt was able to demonstrate and report understanding and performance  Pt has no cultural, educational or language barriers to learning provided.    Assessment   Pt continues to make steady progress in outpatient physical therapy. Continue to work on scapular  strengthening and stability as well as maintain ROM throughout radiation. Education provided again as to when to back off of exercising with radiation changes/skin changes etc. No goals met this session. Continue to progress as tolerated.   Pain after treatment: none     This is a 62 y.o. female referred to outpatient physical therapy and presents with a medical diagnosis of L breast cancer and demonstrates limitations as described in the problem list on evaluation. Pt prognosis is Excellent. Pt will continue to benefit from skilled outpatient physical therapy to address the deficits listed in the problem list on evaluation, provide pt/family education and to maximize pt's level of independence in the home and community environment.     Goals as follows:  Short Term goals: 6 weeks  1. Patient will demonstrate 100% understanding of lymphedema risk reduction practices to include " self monitoring for lymphedema.Met 1/16/2018   2. Patient will demonstrate independence with Home Exercise program established. (progressing, not met).    3. Strength will be assessed and appropriate goals will be set. Met 1/16/2018   4. Pt will increase PROM/AROM in shoulder flexion to 160 degrees L UE to improve functional reach, carry, push, pull pain free. (progressing, not met).       Long Term Goals: 12 weeks   1.  Pt will increase PROM/AROM in shoulder flexion to 175 degrees L UE to improve functional reach, carry, push, pull pain free. (progressing, not met).    2. Pt will increase strength to 4+/5 in gross UE musculature to improve tolerance to all functional activities pain free. (progressing, not met).    3. Pt will demonstrate full/maximized tissue mobility mobility to increase ROM and promote healthy tissue to be pain free at discharge. (progressing, not met).    4.  Pt will be pain free with L UE movements at discharge. (progressing, not met)     Plan     Continue with established Plan of Care towards PT goals.    Therapist: Rachel Ulrich, PT  1/22/2018

## 2018-01-22 NOTE — PATIENT INSTRUCTIONS
" Lat Pull Down        Face anchor with knees slightly flexed. Palms down using one arm at a time - repeat 15 times per set/arm and  Do 3 sets per session. Do 1 x a day   [place] Height: Over Head     [email or web address]       Push up plus or cat/camel - for Scapular Stabilization       Copyright © [employer]    1) Start on your hands and knees  2) Arch your back by dropping your stomach towards the floor (picture 1)  3) Round your back towards the ceiling (picture 2)        Shoulder External Rotation - WITHOUT Theraband     Copyright © [employer]  Standing with your arms by your side and 90 degrees of elbow flexion by your side,  pull your shoulder blades back, then proceed to rotate just the forearm out to the side, hold for 3 seconds and control the movement back to start.        Progressive Resisted: External Rotation (Side-Lying)    Lie on right side with left shoulder blade squeezed back and down. Raise forearm toward ceiling. Keep elbow bent and at side with towel roll under elbow. Use 2lb weight.   Repeat 15 times left side per set. Do 3 sets per session. Do 1 sessions per day.        "Sean "      Copyright © 9780-9230 [employer]      SCAPULAR PROTRACTION - FREE WEIGHT - SERRATUS PUNCHES      Copyright © 9883-1756 [employer]    Lie on your back holding a small free weight (2lbs to start) with your arm extended out in front of your body and towards the ceiling. While keeping your elbow straight, protract your shoulders forward towards the ceiling and then lower back down in a control motion.     Do not allow your shoulder to raise towards your ears.   Keep your elbows straight the entire time.              Do the one above with your L UE on the wall -   "

## 2018-01-23 PROCEDURE — 77412 RADIATION TX DELIVERY LVL 3: CPT | Performed by: RADIOLOGY

## 2018-01-24 ENCOUNTER — DOCUMENTATION ONLY (OUTPATIENT)
Dept: RADIATION ONCOLOGY | Facility: CLINIC | Age: 63
End: 2018-01-24

## 2018-01-24 PROCEDURE — 77412 RADIATION TX DELIVERY LVL 3: CPT | Performed by: RADIOLOGY

## 2018-01-24 NOTE — PLAN OF CARE
Problem: Patient Care Overview  Goal: Plan of Care Review  Outcome: Ongoing (interventions implemented as appropriate)  Pt. On day 11 of xrt to breast.  Pt. With mild erythema.  Given gel packs.

## 2018-01-25 PROCEDURE — 77336 RADIATION PHYSICS CONSULT: CPT | Performed by: RADIOLOGY

## 2018-01-25 PROCEDURE — 77412 RADIATION TX DELIVERY LVL 3: CPT | Performed by: RADIOLOGY

## 2018-01-26 PROCEDURE — 77412 RADIATION TX DELIVERY LVL 3: CPT | Performed by: RADIOLOGY

## 2018-01-29 ENCOUNTER — DOCUMENTATION ONLY (OUTPATIENT)
Dept: RADIATION ONCOLOGY | Facility: CLINIC | Age: 63
End: 2018-01-29

## 2018-01-29 PROCEDURE — 77412 RADIATION TX DELIVERY LVL 3: CPT | Performed by: RADIOLOGY

## 2018-01-29 PROCEDURE — 77417 THER RADIOLOGY PORT IMAGE(S): CPT | Performed by: RADIOLOGY

## 2018-01-29 NOTE — PLAN OF CARE
Problem: Patient Care Overview  Goal: Plan of Care Review  Outcome: Ongoing (interventions implemented as appropriate)  Pt. On day 16 of xrt to breast.  Pt. Had fall this weekend.  Skinned knees and elbow.  Pt. Denies hitting head.  Denies pain. Brisk erythema to breast.  Using cream.

## 2018-01-30 PROCEDURE — 77412 RADIATION TX DELIVERY LVL 3: CPT | Performed by: RADIOLOGY

## 2018-01-31 PROCEDURE — 77412 RADIATION TX DELIVERY LVL 3: CPT | Performed by: RADIOLOGY

## 2018-02-01 ENCOUNTER — HOSPITAL ENCOUNTER (OUTPATIENT)
Dept: RADIATION THERAPY | Facility: HOSPITAL | Age: 63
Discharge: HOME | End: 2018-02-01
Attending: RADIOLOGY
Payer: COMMERCIAL

## 2018-02-01 PROCEDURE — 77412 RADIATION TX DELIVERY LVL 3: CPT | Performed by: RADIOLOGY

## 2018-02-02 PROCEDURE — 77412 RADIATION TX DELIVERY LVL 3: CPT | Performed by: RADIOLOGY

## 2018-02-02 PROCEDURE — 77336 RADIATION PHYSICS CONSULT: CPT | Performed by: RADIOLOGY

## 2018-02-05 ENCOUNTER — DOCUMENTATION ONLY (OUTPATIENT)
Dept: RADIATION ONCOLOGY | Facility: CLINIC | Age: 63
End: 2018-02-05

## 2018-02-05 ENCOUNTER — CLINICAL SUPPORT (OUTPATIENT)
Dept: REHABILITATION | Facility: HOSPITAL | Age: 63
End: 2018-02-05
Attending: SURGERY
Payer: COMMERCIAL

## 2018-02-05 DIAGNOSIS — M25.612 DECREASED RANGE OF MOTION OF LEFT SHOULDER: ICD-10-CM

## 2018-02-05 DIAGNOSIS — M79.622 PAIN IN AXILLA, LEFT: ICD-10-CM

## 2018-02-05 PROCEDURE — 97110 THERAPEUTIC EXERCISES: CPT | Mod: PO

## 2018-02-05 PROCEDURE — 77412 RADIATION TX DELIVERY LVL 3: CPT | Performed by: RADIOLOGY

## 2018-02-05 PROCEDURE — 77417 THER RADIOLOGY PORT IMAGE(S): CPT | Performed by: RADIOLOGY

## 2018-02-05 NOTE — PATIENT INSTRUCTIONS
" Lat Pull Down        Face anchor with knees slightly flexed. Palms down using one arm at a time - repeat 15 times per set/arm and  Do 3 sets per session. Do 1 x a day   [place] Height: Over Head     [email or web address]       Push up plus or cat/camel - for Scapular Stabilization       Copyright © [employer]    1) Start on your hands and knees  2) Arch your back by dropping your stomach towards the floor (picture 1)  3) Round your back towards the ceiling (picture 2)        Shoulder External Rotation - WITHOUT Theraband     Copyright © [employer]  Standing with your arms by your side and 90 degrees of elbow flexion by your side,  pull your shoulder blades back, then proceed to rotate just the forearm out to the side, hold for 3 seconds and control the movement back to start.        Progressive Resisted: External Rotation (Side-Lying)    Lie on right side with left shoulder blade squeezed back and down. Raise forearm toward ceiling. Keep elbow bent and at side with towel roll under elbow. Use 2lb weight.   Repeat 15 times left side per set. Do 3 sets per session. Do 1 sessions per day.        "Sean "      Copyright © 6476-3245 [employer]      SCAPULAR PROTRACTION - FREE WEIGHT - SERRATUS PUNCHES      Copyright © 6410-5094 [employer]    Lie on your back holding a small free weight (2lbs to start) with your arm extended out in front of your body and towards the ceiling. While keeping your elbow straight, protract your shoulders forward towards the ceiling and then lower back down in a control motion.     Do not allow your shoulder to raise towards your ears.   Keep your elbows straight the entire time.              Do the one above with your L UE on the wall -             Side toward anchor in shoulder width stance. Arm across body, thumb up, pull arm out and away from body.  3 x 15 reps 1x day 5-6 days a week  Can do this with both hands          "

## 2018-02-05 NOTE — PROGRESS NOTES
Physical Therapy Daily Treatment Note     Name: Ashlie Redman  Clinic Number: [id number]  Diagnosis:   Encounter Diagnoses   Name Primary?    Pain in axilla, left     Decreased range of motion of left shoulder      Physician: Alexander Arce MD  Evaluation Date: 12/5/17  Visit # authorized: 20  Visit #: 5  Authorization period: 12/31/2017  Plan of care Expiration: 12 weeks - February 27, 2018  Insurance:  BCBS (40)  Precautions: port in R anterior chest - no manual therapy to R chest wall  Physician: Alexander Arce MD    Time In: 8:40 am  Time out: 9:20 am   1:1 treatment time: 40 min     Subjective     Pt reports: that she has been compliant with her HEP since last session 2 weeks ago and feels she is making progress. She reports seeing Dr. Duarte today and reports that her skin has increased redness from radiation. She also states that she is starting to feel more fatigued.   Pain Scale: Ashlie rated pain over L anterior chest wall/lateral chest wall as 4/10- states is burning feeling and sometimes itchy. She is using cream to relive pain and itchiness.   Fatigue: moderate   Dx: L invasive carcinoma ER/NE negative Her2/MARIAMA positive  Surgery: 11/1/2017 - L lumpectomy with magseed, SLNB with axillary dissection level I and II  Treatment: currently in radiation tx  - scheduled to get 33 treatments   Objective   Edema: no pitting edema evident down L arm, slight thinkness/increased density noted on back over scapula and on anterior neck - will continue to monitor   Skin desquamation increased this session - no open wounds noted over anterior/lateral chest wall or axilla    Ashlie received individual therapeutic exercises to improve postural correction and alignment, stretching and soft tissue mobility, and strengthening for 30 minutes including the following:     See EMR under patient instructions for exercises taught and performed this session   Reviewed workout  plan - revised walking program this session - 10-15 min per day to keep skin dry, stop jazzercise to ensure skin is dry and to avoid skin breakdown.     Advised pt on how to modify stretches if skin pulling or pain increase as to ensure that ROM is maintained. Pt demonstrated understanding.     Home Exercise Program and Patient Education   Education provided re:  - role of PT in multi - disciplinary team, goals for PT  - progress towards goals     Pt was instructed in and performed therapeutic exercise for postural correction and alignment, stretching and soft tissue mobility, and strengthening. See EMR under patient instructions for exercises give for HEP this session.   Pt was able to demonstrate and report understanding and performance  Pt has no cultural, educational or language barriers to learning provided.    Assessment   Pt continues to make steady progress in outpatient physical therapy. R scapula with less winging noted - improvement with strength in UEs. Revised walking program to shorter bouts to decreased fatigue and keep skin dry and avoid breakdown. Pt verbalized understanding. Will stop her intense exercise classes for now as skin desquamation has increased. Pt will continue exercises given in HEP with one addition this session. Pt will follow up with me to make next appointment - after radiation tx or as needed. Pt met on short term goal this session. Continue to progress as tolerated.     This is a 62 y.o. female referred to outpatient physical therapy and presents with a medical diagnosis of L breast cancer and demonstrates limitations as described in the problem list on evaluation. Pt prognosis is Excellent. Pt will continue to benefit from skilled outpatient physical therapy to address the deficits listed in the problem list on evaluation, provide pt/family education and to maximize pt's level of independence in the home and community environment.     Goals as follows:  Short Term goals: 6  weeks  1. Patient will demonstrate 100% understanding of lymphedema risk reduction practices to include self monitoring for lymphedema.Met 1/16/2018   2. Patient will demonstrate independence with Home Exercise program established. (progressing, not met).    3. Strength will be assessed and appropriate goals will be set. Met 1/16/2018   4. Pt will increase PROM/AROM in shoulder flexion to 160 degrees L UE to improve functional reach, carry, push, pull pain free. Met 2/5/2018     Long Term Goals: 12 weeks   1.  Pt will increase PROM/AROM in shoulder flexion to 175 degrees L UE to improve functional reach, carry, push, pull pain free. (progressing, not met).    2. Pt will increase strength to 4+/5 in gross UE musculature to improve tolerance to all functional activities pain free. (progressing, not met).    3. Pt will demonstrate full/maximized tissue mobility mobility to increase ROM and promote healthy tissue to be pain free at discharge. (progressing, not met).    4.  Pt will be pain free with L UE movements at discharge. (progressing, not met)     Plan     Continue with established Plan of Care towards PT goals.    Therapist: Rachel Ulrich, PT  2/5/2018

## 2018-02-06 PROCEDURE — 77412 RADIATION TX DELIVERY LVL 3: CPT | Performed by: RADIOLOGY

## 2018-02-06 NOTE — PLAN OF CARE
Problem: Patient Care Overview  Goal: Plan of Care Review  Outcome: Ongoing (interventions implemented as appropriate)  Day 21 XRT to left breast; itching and brisk erythema; will continue to monitor

## 2018-02-07 PROCEDURE — 77412 RADIATION TX DELIVERY LVL 3: CPT | Performed by: RADIOLOGY

## 2018-02-09 ENCOUNTER — PATIENT MESSAGE (OUTPATIENT)
Dept: HEMATOLOGY/ONCOLOGY | Facility: CLINIC | Age: 63
End: 2018-02-09

## 2018-02-09 ENCOUNTER — PATIENT MESSAGE (OUTPATIENT)
Dept: FAMILY MEDICINE | Facility: CLINIC | Age: 63
End: 2018-02-09

## 2018-02-09 PROCEDURE — 77412 RADIATION TX DELIVERY LVL 3: CPT | Performed by: RADIOLOGY

## 2018-02-09 PROCEDURE — 77321 SPECIAL TELETX PORT PLAN: CPT | Mod: TC | Performed by: RADIOLOGY

## 2018-02-09 PROCEDURE — 77334 RADIATION TREATMENT AID(S): CPT | Mod: 26,,, | Performed by: RADIOLOGY

## 2018-02-09 PROCEDURE — 77334 RADIATION TREATMENT AID(S): CPT | Mod: TC | Performed by: RADIOLOGY

## 2018-02-09 PROCEDURE — 77321 SPECIAL TELETX PORT PLAN: CPT | Mod: 26,,, | Performed by: RADIOLOGY

## 2018-02-12 ENCOUNTER — DOCUMENTATION ONLY (OUTPATIENT)
Dept: RADIATION ONCOLOGY | Facility: CLINIC | Age: 63
End: 2018-02-12

## 2018-02-12 PROCEDURE — 77412 RADIATION TX DELIVERY LVL 3: CPT | Performed by: RADIOLOGY

## 2018-02-12 NOTE — PLAN OF CARE
Problem: Patient Care Overview  Goal: Plan of Care Review  Outcome: Ongoing (interventions implemented as appropriate)  Day 25 XRT to left chest wall. Itching and erythema; no desquamation; Using Miaderm and gel packs

## 2018-02-14 ENCOUNTER — PATIENT MESSAGE (OUTPATIENT)
Dept: FAMILY MEDICINE | Facility: CLINIC | Age: 63
End: 2018-02-14

## 2018-02-14 PROCEDURE — 77336 RADIATION PHYSICS CONSULT: CPT | Performed by: RADIOLOGY

## 2018-02-14 PROCEDURE — 77412 RADIATION TX DELIVERY LVL 3: CPT | Performed by: RADIOLOGY

## 2018-02-14 PROCEDURE — 77417 THER RADIOLOGY PORT IMAGE(S): CPT | Performed by: RADIOLOGY

## 2018-02-15 ENCOUNTER — OFFICE VISIT (OUTPATIENT)
Dept: URGENT CARE | Facility: CLINIC | Age: 63
End: 2018-02-15
Payer: COMMERCIAL

## 2018-02-15 VITALS
OXYGEN SATURATION: 97 % | HEART RATE: 72 BPM | HEIGHT: 68 IN | WEIGHT: 146 LBS | TEMPERATURE: 97 F | BODY MASS INDEX: 22.13 KG/M2 | DIASTOLIC BLOOD PRESSURE: 88 MMHG | SYSTOLIC BLOOD PRESSURE: 141 MMHG

## 2018-02-15 DIAGNOSIS — N39.0 URINARY TRACT INFECTION WITHOUT HEMATURIA, SITE UNSPECIFIED: Primary | ICD-10-CM

## 2018-02-15 DIAGNOSIS — R30.9 URINARY PAIN: ICD-10-CM

## 2018-02-15 LAB
BILIRUB UR QL STRIP: NEGATIVE
GLUCOSE UR QL STRIP: NEGATIVE
KETONES UR QL STRIP: NEGATIVE
LEUKOCYTE ESTERASE UR QL STRIP: POSITIVE
PH, POC UA: 7.5 (ref 5–8)
POC BLOOD, URINE: NEGATIVE
POC NITRATES, URINE: NEGATIVE
PROT UR QL STRIP: NEGATIVE
SP GR UR STRIP: 1.01 (ref 1–1.03)
UROBILINOGEN UR STRIP-ACNC: NORMAL (ref 0.1–1.1)

## 2018-02-15 PROCEDURE — 81003 URINALYSIS AUTO W/O SCOPE: CPT | Mod: QW,S$GLB,, | Performed by: EMERGENCY MEDICINE

## 2018-02-15 PROCEDURE — 3008F BODY MASS INDEX DOCD: CPT | Mod: S$GLB,,, | Performed by: EMERGENCY MEDICINE

## 2018-02-15 PROCEDURE — 77412 RADIATION TX DELIVERY LVL 3: CPT | Performed by: RADIOLOGY

## 2018-02-15 PROCEDURE — 99203 OFFICE O/P NEW LOW 30 MIN: CPT | Mod: 25,S$GLB,, | Performed by: EMERGENCY MEDICINE

## 2018-02-15 RX ORDER — NITROFURANTOIN 25; 75 MG/1; MG/1
100 CAPSULE ORAL 2 TIMES DAILY
Qty: 14 CAPSULE | Refills: 0 | Status: SHIPPED | OUTPATIENT
Start: 2018-02-15 | End: 2018-02-23 | Stop reason: ALTCHOICE

## 2018-02-15 RX ORDER — PHENAZOPYRIDINE HYDROCHLORIDE 100 MG/1
100 TABLET, FILM COATED ORAL 3 TIMES DAILY PRN
Qty: 9 TABLET | Refills: 0 | Status: SHIPPED | OUTPATIENT
Start: 2018-02-15 | End: 2018-02-19

## 2018-02-15 NOTE — PROGRESS NOTES
"Subjective:       Patient ID: Ashlie Redamn is a 62 y.o. female.    Vitals:  height is 5' 8" (1.727 m) and weight is 66.2 kg (146 lb). Her oral temperature is 97.2 °F (36.2 °C). Her blood pressure is 141/88 (abnormal) and her pulse is 72. Her oxygen saturation is 97%.     Chief Complaint: Urinary Tract Infection    Phenazopyridine      Urinary Tract Infection    This is a new problem. The current episode started yesterday. The problem occurs every urination. The problem has been gradually worsening. The quality of the pain is described as aching. There has been no fever. Associated symptoms include frequency, hesitancy and urgency. Pertinent negatives include no chills, hematuria, nausea or vomiting. She has tried home medications for the symptoms. The treatment provided no relief.     Review of Systems   Constitution: Negative for chills and fever.   Skin: Negative for itching.   Musculoskeletal: Negative for back pain.   Gastrointestinal: Negative for abdominal pain, nausea and vomiting.   Genitourinary: Positive for dysuria, frequency, hesitancy, incomplete emptying, pelvic pain and urgency. Negative for genital sores, hematuria, missed menses and non-menstrual bleeding.       Objective:      Physical Exam   Constitutional: She is oriented to person, place, and time. She appears well-developed and well-nourished.   HENT:   Head: Normocephalic and atraumatic.   Right Ear: External ear normal.   Left Ear: External ear normal.   Nose: Nose normal. No nasal deformity. No epistaxis.   Mouth/Throat: Oropharynx is clear and moist and mucous membranes are normal.   Eyes: Conjunctivae and lids are normal.   Neck: Trachea normal, normal range of motion and phonation normal. Neck supple.   Cardiovascular: Normal rate, regular rhythm, normal heart sounds and normal pulses.    Pulmonary/Chest: Effort normal and breath sounds normal.   Abdominal: Soft. Normal appearance. There is no CVA tenderness.   Neurological: She is alert " and oriented to person, place, and time.   Skin: Skin is warm, dry and intact.   Psychiatric: She has a normal mood and affect. Her speech is normal and behavior is normal. Cognition and memory are normal.   Nursing note and vitals reviewed.      Assessment:       1. Urinary tract infection without hematuria, site unspecified    2. Urinary pain        Plan:         Urinary tract infection without hematuria, site unspecified  -     nitrofurantoin, macrocrystal-monohydrate, (MACROBID) 100 MG capsule; Take 1 capsule (100 mg total) by mouth 2 (two) times daily.  Dispense: 14 capsule; Refill: 0  -     phenazopyridine (PYRIDIUM) 100 MG tablet; Take 1 tablet (100 mg total) by mouth 3 (three) times daily as needed for Pain.  Dispense: 9 tablet; Refill: 0    Urinary pain  -     POCT Urinalysis, Dipstick, Automated, W/O Scope

## 2018-02-15 NOTE — PATIENT INSTRUCTIONS

## 2018-02-16 ENCOUNTER — PATIENT MESSAGE (OUTPATIENT)
Dept: HEMATOLOGY/ONCOLOGY | Facility: CLINIC | Age: 63
End: 2018-02-16

## 2018-02-16 ENCOUNTER — LAB VISIT (OUTPATIENT)
Dept: LAB | Facility: HOSPITAL | Age: 63
End: 2018-02-16
Attending: INTERNAL MEDICINE
Payer: COMMERCIAL

## 2018-02-16 DIAGNOSIS — C50.012 MALIGNANT NEOPLASM OF NIPPLE OF LEFT BREAST IN FEMALE, UNSPECIFIED ESTROGEN RECEPTOR STATUS: Primary | ICD-10-CM

## 2018-02-16 DIAGNOSIS — C34.00 MALIGNANT NEOPLASM OF HILUS OF LUNG, UNSPECIFIED LATERALITY: ICD-10-CM

## 2018-02-16 DIAGNOSIS — Z00.00 LABORATORY EXAM ORDERED AS PART OF ROUTINE GENERAL MEDICAL EXAMINATION: ICD-10-CM

## 2018-02-16 LAB
ALBUMIN SERPL BCP-MCNC: 3.9 G/DL
ALP SERPL-CCNC: 102 U/L
ALT SERPL W/O P-5'-P-CCNC: 26 U/L
ANION GAP SERPL CALC-SCNC: 8 MMOL/L
AST SERPL-CCNC: 30 U/L
BASOPHILS # BLD AUTO: 0.03 K/UL
BASOPHILS NFR BLD: 0.7 %
BILIRUB SERPL-MCNC: 0.5 MG/DL
BUN SERPL-MCNC: 11 MG/DL
CALCIUM SERPL-MCNC: 10 MG/DL
CHLORIDE SERPL-SCNC: 107 MMOL/L
CHOLEST SERPL-MCNC: 193 MG/DL
CHOLEST/HDLC SERPL: 2.7 {RATIO}
CO2 SERPL-SCNC: 25 MMOL/L
CREAT SERPL-MCNC: 0.7 MG/DL
DIFFERENTIAL METHOD: ABNORMAL
EOSINOPHIL # BLD AUTO: 0.2 K/UL
EOSINOPHIL NFR BLD: 5.2 %
ERYTHROCYTE [DISTWIDTH] IN BLOOD BY AUTOMATED COUNT: 12.8 %
EST. GFR  (AFRICAN AMERICAN): >60 ML/MIN/1.73 M^2
EST. GFR  (NON AFRICAN AMERICAN): >60 ML/MIN/1.73 M^2
GLUCOSE SERPL-MCNC: 100 MG/DL
HCT VFR BLD AUTO: 36.6 %
HDLC SERPL-MCNC: 72 MG/DL
HDLC SERPL: 37.3 %
HGB BLD-MCNC: 12.4 G/DL
LDLC SERPL CALC-MCNC: 106.2 MG/DL
LYMPHOCYTES # BLD AUTO: 0.6 K/UL
LYMPHOCYTES NFR BLD: 12.9 %
MCH RBC QN AUTO: 32.9 PG
MCHC RBC AUTO-ENTMCNC: 33.9 G/DL
MCV RBC AUTO: 97 FL
MONOCYTES # BLD AUTO: 0.5 K/UL
MONOCYTES NFR BLD: 9.8 %
NEUTROPHILS # BLD AUTO: 3.3 K/UL
NEUTROPHILS NFR BLD: 71.4 %
NONHDLC SERPL-MCNC: 121 MG/DL
PLATELET # BLD AUTO: 216 K/UL
PMV BLD AUTO: 9.8 FL
POTASSIUM SERPL-SCNC: 4.3 MMOL/L
PROT SERPL-MCNC: 6.9 G/DL
RBC # BLD AUTO: 3.77 M/UL
SODIUM SERPL-SCNC: 140 MMOL/L
TRIGL SERPL-MCNC: 74 MG/DL
WBC # BLD AUTO: 4.58 K/UL

## 2018-02-16 PROCEDURE — 85025 COMPLETE CBC W/AUTO DIFF WBC: CPT | Mod: PO

## 2018-02-16 PROCEDURE — 80053 COMPREHEN METABOLIC PANEL: CPT | Mod: PO

## 2018-02-16 PROCEDURE — 36415 COLL VENOUS BLD VENIPUNCTURE: CPT | Mod: PO

## 2018-02-16 PROCEDURE — 80061 LIPID PANEL: CPT

## 2018-02-16 PROCEDURE — 77412 RADIATION TX DELIVERY LVL 3: CPT | Performed by: RADIOLOGY

## 2018-02-19 ENCOUNTER — DOCUMENTATION ONLY (OUTPATIENT)
Dept: RADIATION ONCOLOGY | Facility: CLINIC | Age: 63
End: 2018-02-19

## 2018-02-19 ENCOUNTER — OFFICE VISIT (OUTPATIENT)
Dept: FAMILY MEDICINE | Facility: CLINIC | Age: 63
End: 2018-02-19
Payer: COMMERCIAL

## 2018-02-19 VITALS
SYSTOLIC BLOOD PRESSURE: 119 MMHG | HEART RATE: 70 BPM | WEIGHT: 146.38 LBS | OXYGEN SATURATION: 96 % | BODY MASS INDEX: 22.19 KG/M2 | HEIGHT: 68 IN | DIASTOLIC BLOOD PRESSURE: 62 MMHG

## 2018-02-19 DIAGNOSIS — R10.2 PELVIC PAIN: ICD-10-CM

## 2018-02-19 DIAGNOSIS — R39.89 BLADDER PAIN: ICD-10-CM

## 2018-02-19 DIAGNOSIS — Z00.00 ROUTINE PHYSICAL EXAMINATION: Primary | ICD-10-CM

## 2018-02-19 DIAGNOSIS — Z23 NEED FOR PNEUMOCOCCAL VACCINE: ICD-10-CM

## 2018-02-19 PROCEDURE — 77412 RADIATION TX DELIVERY LVL 3: CPT | Performed by: RADIOLOGY

## 2018-02-19 PROCEDURE — 99396 PREV VISIT EST AGE 40-64: CPT | Mod: 25,S$GLB,, | Performed by: INTERNAL MEDICINE

## 2018-02-19 PROCEDURE — 99999 PR PBB SHADOW E&M-EST. PATIENT-LVL IV: CPT | Mod: PBBFAC,,, | Performed by: INTERNAL MEDICINE

## 2018-02-19 PROCEDURE — 90471 IMMUNIZATION ADMIN: CPT | Mod: S$GLB,,, | Performed by: INTERNAL MEDICINE

## 2018-02-19 PROCEDURE — 90670 PCV13 VACCINE IM: CPT | Mod: S$GLB,,, | Performed by: INTERNAL MEDICINE

## 2018-02-19 NOTE — PLAN OF CARE
Problem: Patient Care Overview  Goal: Plan of Care Review  Outcome: Ongoing (interventions implemented as appropriate)  Pt. On day 29 of xrt to breast.  Pt. With erythema and rash.  Using cream for relief.  RTC per protocol.

## 2018-02-20 PROCEDURE — 77412 RADIATION TX DELIVERY LVL 3: CPT | Performed by: RADIOLOGY

## 2018-02-20 NOTE — PROGRESS NOTES
Subjective:       Patient ID: Ashlie Redman is a 62 y.o. female.    Chief Complaint: Annual Exam    Here for routine health maintenance.   Breast cancer - currently in treatment chemo - HER+ and radiation.  In a trial with [facility].  S/p surgery  Depression - controlled  Complains of bladder pain.  Currently being treated for UTI, but has this pain when exercises.  No incontinence.  Osteopenia - increase calcium and vit D       Review of Systems   Constitutional: Positive for activity change and unexpected weight change. Negative for appetite change and fever.   HENT: Positive for trouble swallowing. Negative for hearing loss, nosebleeds and rhinorrhea.    Eyes: Negative for discharge and visual disturbance.   Respiratory: Negative for choking, chest tightness, shortness of breath and wheezing.    Cardiovascular: Negative for chest pain and palpitations.   Gastrointestinal: Negative for abdominal pain, blood in stool, constipation, diarrhea, nausea and vomiting.   Endocrine: Negative for polydipsia and polyuria.   Genitourinary: Positive for difficulty urinating and dysuria. Negative for hematuria and menstrual problem.   Musculoskeletal: Negative for arthralgias, joint swelling and neck pain.   Skin: Negative for rash and wound.   Neurological: Positive for weakness. Negative for dizziness, syncope and headaches.   Psychiatric/Behavioral: Negative for confusion and dysphoric mood.       Objective:      Vitals:    02/19/18 1738   BP: 119/62   Pulse: 70     Physical Exam   Constitutional: She appears well-nourished.   Eyes: Conjunctivae and EOM are normal.   Neck: Trachea normal and normal range of motion. No thyromegaly present.   Cardiovascular: Normal heart sounds.    Edema negative   Pulmonary/Chest: Effort normal and breath sounds normal.   Abdominal: Soft. There is no hepatomegaly.   Musculoskeletal:   ROM normal bilateral  Strength normal bilateral   Neurological: She has normal reflexes. No cranial nerve  "deficit.   Skin: Skin is warm, dry and intact.   Psychiatric: She has a normal mood and affect.   Alert and Oriented    Vitals reviewed.        Assessment:       1. Routine physical examination    2. Pelvic pain    3. Need for pneumococcal vaccine    4. Bladder pain        Plan:       Routine physical examination    Pelvic pain  -     Ambulatory consult to Urology    Need for pneumococcal vaccine  -     Pneumococcal Conjugate Vaccine (13 Valent) (IM)    Bladder pain  -     Ambulatory consult to Urology    wellness reviewed        Counseled on regular exercise, maintenance of a healthy weight, balanced diet rich in fruits/vegetables and lean protein, and avoidance of unhealthy habits like smoking and excessive alcohol intake.   Also, counseled on importance of being compliant with medication, health appointments, diet and exercise.     Follow-up in about 1 year (around 2/19/2019).    "This note will not be shared with the patient."  "

## 2018-02-21 ENCOUNTER — LAB VISIT (OUTPATIENT)
Dept: LAB | Facility: HOSPITAL | Age: 63
End: 2018-02-21
Attending: INTERNAL MEDICINE
Payer: COMMERCIAL

## 2018-02-21 ENCOUNTER — CLINICAL SUPPORT (OUTPATIENT)
Dept: CARDIOLOGY | Facility: CLINIC | Age: 63
End: 2018-02-21
Attending: INTERNAL MEDICINE
Payer: COMMERCIAL

## 2018-02-21 DIAGNOSIS — C34.00 MALIGNANT NEOPLASM OF HILUS OF LUNG, UNSPECIFIED LATERALITY: ICD-10-CM

## 2018-02-21 DIAGNOSIS — C50.012 MALIGNANT NEOPLASM OF NIPPLE OF LEFT BREAST IN FEMALE, UNSPECIFIED ESTROGEN RECEPTOR STATUS: ICD-10-CM

## 2018-02-21 LAB
ALBUMIN SERPL BCP-MCNC: 4.2 G/DL
ALP SERPL-CCNC: 105 U/L
ALT SERPL W/O P-5'-P-CCNC: 25 U/L
ANION GAP SERPL CALC-SCNC: 9 MMOL/L
AST SERPL-CCNC: 28 U/L
BASOPHILS # BLD AUTO: 0.04 K/UL
BASOPHILS NFR BLD: 0.7 %
BILIRUB SERPL-MCNC: 0.5 MG/DL
BUN SERPL-MCNC: 13 MG/DL
CALCIUM SERPL-MCNC: 10.6 MG/DL
CHLORIDE SERPL-SCNC: 104 MMOL/L
CO2 SERPL-SCNC: 27 MMOL/L
CREAT SERPL-MCNC: 0.8 MG/DL
DIASTOLIC DYSFUNCTION: NO
DIFFERENTIAL METHOD: ABNORMAL
EOSINOPHIL # BLD AUTO: 0.2 K/UL
EOSINOPHIL NFR BLD: 3.1 %
ERYTHROCYTE [DISTWIDTH] IN BLOOD BY AUTOMATED COUNT: 12.8 %
EST. GFR  (AFRICAN AMERICAN): >60 ML/MIN/1.73 M^2
EST. GFR  (NON AFRICAN AMERICAN): >60 ML/MIN/1.73 M^2
ESTIMATED PA SYSTOLIC PRESSURE: 27.01
GLUCOSE SERPL-MCNC: 84 MG/DL
HCT VFR BLD AUTO: 37.9 %
HGB BLD-MCNC: 12.8 G/DL
LYMPHOCYTES # BLD AUTO: 1 K/UL
LYMPHOCYTES NFR BLD: 17.1 %
MCH RBC QN AUTO: 32.9 PG
MCHC RBC AUTO-ENTMCNC: 33.8 G/DL
MCV RBC AUTO: 97 FL
MITRAL VALVE REGURGITATION: NORMAL
MONOCYTES # BLD AUTO: 0.6 K/UL
MONOCYTES NFR BLD: 9.6 %
NEUTROPHILS # BLD AUTO: 4.2 K/UL
NEUTROPHILS NFR BLD: 69.5 %
PLATELET # BLD AUTO: 259 K/UL
PMV BLD AUTO: 9.8 FL
POTASSIUM SERPL-SCNC: 4.2 MMOL/L
PROT SERPL-MCNC: 7.4 G/DL
RBC # BLD AUTO: 3.89 M/UL
RETIRED EF AND QEF - SEE NOTES: 55 (ref 55–65)
SODIUM SERPL-SCNC: 140 MMOL/L
TRICUSPID VALVE REGURGITATION: NORMAL
WBC # BLD AUTO: 6.07 K/UL

## 2018-02-21 PROCEDURE — 80053 COMPREHEN METABOLIC PANEL: CPT | Mod: PO

## 2018-02-21 PROCEDURE — 85025 COMPLETE CBC W/AUTO DIFF WBC: CPT | Mod: PO

## 2018-02-21 PROCEDURE — 36415 COLL VENOUS BLD VENIPUNCTURE: CPT | Mod: PO

## 2018-02-21 PROCEDURE — 93306 TTE W/DOPPLER COMPLETE: CPT | Mod: S$GLB,,, | Performed by: INTERNAL MEDICINE

## 2018-02-22 PROCEDURE — 77336 RADIATION PHYSICS CONSULT: CPT | Performed by: RADIOLOGY

## 2018-02-23 ENCOUNTER — OFFICE VISIT (OUTPATIENT)
Dept: HEMATOLOGY/ONCOLOGY | Facility: CLINIC | Age: 63
End: 2018-02-23
Payer: COMMERCIAL

## 2018-02-23 ENCOUNTER — INFUSION (OUTPATIENT)
Dept: INFUSION THERAPY | Facility: HOSPITAL | Age: 63
End: 2018-02-23
Attending: INTERNAL MEDICINE
Payer: COMMERCIAL

## 2018-02-23 ENCOUNTER — DOCUMENTATION ONLY (OUTPATIENT)
Dept: RADIATION ONCOLOGY | Facility: CLINIC | Age: 63
End: 2018-02-23

## 2018-02-23 VITALS
SYSTOLIC BLOOD PRESSURE: 133 MMHG | TEMPERATURE: 98 F | DIASTOLIC BLOOD PRESSURE: 69 MMHG | WEIGHT: 144.19 LBS | RESPIRATION RATE: 16 BRPM | BODY MASS INDEX: 21.85 KG/M2 | HEIGHT: 68 IN | HEART RATE: 63 BPM

## 2018-02-23 VITALS
DIASTOLIC BLOOD PRESSURE: 66 MMHG | RESPIRATION RATE: 16 BRPM | WEIGHT: 144.19 LBS | TEMPERATURE: 98 F | HEIGHT: 68 IN | BODY MASS INDEX: 21.85 KG/M2 | HEART RATE: 76 BPM | SYSTOLIC BLOOD PRESSURE: 130 MMHG

## 2018-02-23 DIAGNOSIS — C50.012 MALIGNANT NEOPLASM OF NIPPLE OF LEFT BREAST IN FEMALE, UNSPECIFIED ESTROGEN RECEPTOR STATUS: Primary | ICD-10-CM

## 2018-02-23 DIAGNOSIS — C50.011 MALIGNANT NEOPLASM OF NIPPLE OF RIGHT BREAST IN FEMALE, UNSPECIFIED ESTROGEN RECEPTOR STATUS: Primary | ICD-10-CM

## 2018-02-23 DIAGNOSIS — C50.012 MALIGNANT NEOPLASM OF NIPPLE OF LEFT BREAST IN FEMALE, UNSPECIFIED ESTROGEN RECEPTOR STATUS: ICD-10-CM

## 2018-02-23 DIAGNOSIS — I89.0 LYMPHEDEMA: ICD-10-CM

## 2018-02-23 PROCEDURE — 99999 PR PBB SHADOW E&M-EST. PATIENT-LVL III: CPT | Mod: PBBFAC,,, | Performed by: INTERNAL MEDICINE

## 2018-02-23 PROCEDURE — A4216 STERILE WATER/SALINE, 10 ML: HCPCS | Mod: PN | Performed by: INTERNAL MEDICINE

## 2018-02-23 PROCEDURE — 96413 CHEMO IV INFUSION 1 HR: CPT | Mod: PN

## 2018-02-23 PROCEDURE — 63600175 PHARM REV CODE 636 W HCPCS: Mod: TB,PN | Performed by: INTERNAL MEDICINE

## 2018-02-23 PROCEDURE — 99215 OFFICE O/P EST HI 40 MIN: CPT | Mod: S$GLB,,, | Performed by: INTERNAL MEDICINE

## 2018-02-23 PROCEDURE — 25000003 PHARM REV CODE 250: Mod: PN | Performed by: INTERNAL MEDICINE

## 2018-02-23 PROCEDURE — 3008F BODY MASS INDEX DOCD: CPT | Mod: S$GLB,,, | Performed by: INTERNAL MEDICINE

## 2018-02-23 RX ORDER — ONDANSETRON 2 MG/ML
8 INJECTION INTRAMUSCULAR; INTRAVENOUS
Status: CANCELLED
Start: 2018-02-23 | End: 2018-02-23

## 2018-02-23 RX ORDER — EPINEPHRINE 0.3 MG/.3ML
0.3 INJECTION SUBCUTANEOUS ONCE AS NEEDED
Status: CANCELLED | OUTPATIENT
Start: 2018-02-23 | End: 2018-02-23

## 2018-02-23 RX ORDER — HEPARIN 100 UNIT/ML
500 SYRINGE INTRAVENOUS
Status: CANCELLED | OUTPATIENT
Start: 2018-02-23

## 2018-02-23 RX ORDER — SODIUM CHLORIDE 0.9 % (FLUSH) 0.9 %
10 SYRINGE (ML) INJECTION
Status: CANCELLED | OUTPATIENT
Start: 2018-02-23

## 2018-02-23 RX ORDER — DIPHENHYDRAMINE HYDROCHLORIDE 50 MG/ML
50 INJECTION INTRAMUSCULAR; INTRAVENOUS ONCE AS NEEDED
Status: CANCELLED | OUTPATIENT
Start: 2018-02-23 | End: 2018-02-23

## 2018-02-23 RX ORDER — SODIUM CHLORIDE 0.9 % (FLUSH) 0.9 %
10 SYRINGE (ML) INJECTION
Status: DISCONTINUED | OUTPATIENT
Start: 2018-02-23 | End: 2018-02-23 | Stop reason: HOSPADM

## 2018-02-23 RX ORDER — FAMOTIDINE 10 MG/ML
20 INJECTION INTRAVENOUS 2 TIMES DAILY
Status: CANCELLED
Start: 2018-02-23

## 2018-02-23 RX ADMIN — SODIUM CHLORIDE: 9 INJECTION, SOLUTION INTRAVENOUS at 03:02

## 2018-02-23 RX ADMIN — SODIUM CHLORIDE, PRESERVATIVE FREE 10 ML: 5 INJECTION INTRAVENOUS at 04:02

## 2018-02-23 RX ADMIN — TRASTUZUMAB 396 MG: 150 INJECTION, POWDER, LYOPHILIZED, FOR SOLUTION INTRAVENOUS at 03:02

## 2018-02-23 NOTE — PROGRESS NOTES
HISTORY OF PRESENT ILLNESS:  This is a 62-year-old white female for a  diagnosis of Stage IIB left breast carcinoma.  The patient discovered   a mass in her left breast and was seen by Dr. Arce.  She has received 4 cycles of   A/C ,completed 12 cycles weekly taxol and continues q 3 week herceptin/ perjeta    She has bouts of diarrhea that resolve easily with Imodium. She denies any fevers, chills, unexplained   weight loss, new breast complaints, vaginal bleeding, abdominal discomfort, nausea,   vomiting, diarrhea, mouth sores, etc.  No new complaints or pertinent findings on a   14 point review of systems.some fatigue ,went for lumpectomy and axillary eval , revealed 2 LN + hence had a disection  xrt is to be done in [place] per trial, pt here to continue herceptin / perjeta, she had hoped her axilla would be clear has xrt appt scheduled  Dr. Kunzould like to prevent cardiotoxicity from combined xrt/ herceptin /perjeta , she has since completed xrt, had echo wnl ef, here to continue and complete herceptin   feeling numbness to both feet and both hands  PHYSICAL EXAMINATION:  Wt Readings from Last 3 Encounters:   02/19/18 66.4 kg (146 lb 6.2 oz)   02/15/18 66.2 kg (146 lb)   12/22/17 68.9 kg (152 lb 0.1 oz)     Temp Readings from Last 3 Encounters:   02/15/18 97.2 °F (36.2 °C) (Oral)   12/22/17 97.7 °F (36.5 °C)   12/22/17 97.7 °F (36.5 °C)     BP Readings from Last 3 Encounters:   02/19/18 119/62   02/15/18 (!) 141/88   12/22/17 134/84     Pulse Readings from Last 3 Encounters:   02/19/18 70   02/15/18 72   12/22/17 65     GENERAL:  Well-developed, well-nourished white female in no acute distress.    Alert and oriented x4.  :  HEENT:  Normocephalic, atraumatic.  Oral mucosa pink and moist.  Lips without   lesions.  Tongue midline.  Oropharynx clear.  Nonicteric sclerae.  NECK:  Supple, no adenopathy.  No carotid bruits, thyromegaly or thyroid nodule.  HEART:  Regular rate and rhythm without murmur,  gallop.  LUNGS:  Clear to auscultation bilaterally.  Normal respiratory effort.  ABDOMEN:  Soft, nontender, nondistended with positive normoactive bowel sounds,   no hepatosplenomegaly.  EXTREMITIES:  No cyanosis, clubbing or edema.  Distal pulses are intact.  AXILLAE AND GROIN:  No palpable pathologic lymphadenopathy is appreciated.  SKIN:  Intact/turgor normal.  NEUROLOGIC:  Cranial nerves II-XII grossly intact.  Motor:  Good muscle bulk and   tone.  Strength/sensory 5/5 throughout.  Gait stable.    LABORATORY:    Lab Results   Component Value Date    WBC 6.07 02/21/2018    HGB 12.8 02/21/2018    HCT 37.9 02/21/2018    MCV 97 02/21/2018     02/21/2018     Differential remarkable for 71.3% grans, 5% lymph, 17% monos    CMP  Sodium   Date Value Ref Range Status   02/21/2018 140 136 - 145 mmol/L Final     Potassium   Date Value Ref Range Status   02/21/2018 4.2 3.5 - 5.1 mmol/L Final     Chloride   Date Value Ref Range Status   02/21/2018 104 95 - 110 mmol/L Final     CO2   Date Value Ref Range Status   02/21/2018 27 23 - 29 mmol/L Final     Glucose   Date Value Ref Range Status   02/21/2018 84 70 - 110 mg/dL Final     BUN, Bld   Date Value Ref Range Status   02/21/2018 13 8 - 23 mg/dL Final     Creatinine   Date Value Ref Range Status   02/21/2018 0.8 0.5 - 1.4 mg/dL Final     Calcium   Date Value Ref Range Status   02/21/2018 10.6 (H) 8.7 - 10.5 mg/dL Final     Total Protein   Date Value Ref Range Status   02/21/2018 7.4 6.0 - 8.4 g/dL Final     Albumin   Date Value Ref Range Status   02/21/2018 4.2 3.5 - 5.2 g/dL Final     Total Bilirubin   Date Value Ref Range Status   02/21/2018 0.5 0.1 - 1.0 mg/dL Final     Comment:     For infants and newborns, interpretation of results should be based  on gestational age, weight and in agreement with clinical  observations.  Premature Infant recommended reference ranges:  Up to 24 hours.............<8.0 mg/dL  Up to 48 hours............<12.0 mg/dL  3-5  days..................<15.0 mg/dL  6-29 days.................<15.0 mg/dL       Alkaline Phosphatase   Date Value Ref Range Status   02/21/2018 105 55 - 135 U/L Final     AST   Date Value Ref Range Status   02/21/2018 28 10 - 40 U/L Final     ALT   Date Value Ref Range Status   02/21/2018 25 10 - 44 U/L Final     Anion Gap   Date Value Ref Range Status   02/21/2018 9 8 - 16 mmol/L Final     eGFR if    Date Value Ref Range Status   02/21/2018 >60 >60 mL/min/1.73 m^2 Final     eGFR if non    Date Value Ref Range Status   02/21/2018 >60 >60 mL/min/1.73 m^2 Final     Comment:     Calculation used to obtain the estimated glomerular filtration  rate (eGFR) is the CKD-EPI equation.      ECHO wnl  CONCLUSIONS     1 - Normal left ventricular systolic function (EF 55-60%). 2/2018    2 - Normal left ventricular diastolic function.     3 - Normal right ventricular systolic function .     4 - Trivial mitral regurgitation.     5 - Trivial tricuspid regurgitation.     6 - The estimated PA systolic pressure is 26 mmHg.   5.       Left breast, lumpectomy:  - Residual invasive ductal carcinoma, moderately differentiated, [place] Grade 2 (3+2+1=6), 0.2 cm in  greatest linear microscopic dimension.  - Ductal carcinoma in situ (DCIS), high nuclear grade, 2 cm, solid and cribriform types.  - Surgical margins are free of tumor; invasive carcinoma is located 0.1 cm from the closest margin  IMPRESSION:  Stage IIB left breast carcinoma (ER/DE negative, Her-2/clay positive)  Completed neoadjuvant AC and weekly taxol now on herceptin and perjeta, s/p recent lumpectomy and axillary disection , with 2 residual Ln positive  PLAN:  1.  Proceed with   herceptin to complete   will watch neuropthy   rtc 3 weeks for next herceptin with cbc, cmp

## 2018-02-23 NOTE — PLAN OF CARE
Problem: Patient Care Overview  Goal: Plan of Care Review  Outcome: Ongoing (interventions implemented as appropriate)  Pt tolerated Herceptin infusion well.  No s/s of infusion reaction noted.  Instructed to call MD with any problems.

## 2018-02-27 ENCOUNTER — OFFICE VISIT (OUTPATIENT)
Dept: OPHTHALMOLOGY | Facility: CLINIC | Age: 63
End: 2018-02-27
Payer: COMMERCIAL

## 2018-02-27 ENCOUNTER — CLINICAL SUPPORT (OUTPATIENT)
Dept: OPHTHALMOLOGY | Facility: CLINIC | Age: 63
End: 2018-02-27
Attending: OPHTHALMOLOGY
Payer: COMMERCIAL

## 2018-02-27 DIAGNOSIS — Z98.890 HISTORY OF VITRECTOMY: ICD-10-CM

## 2018-02-27 DIAGNOSIS — H52.13 HIGH MYOPIA, BILATERAL: ICD-10-CM

## 2018-02-27 DIAGNOSIS — H40.053 OHT (OCULAR HYPERTENSION), BILATERAL: ICD-10-CM

## 2018-02-27 DIAGNOSIS — H40.053 OHT (OCULAR HYPERTENSION), BILATERAL: Primary | ICD-10-CM

## 2018-02-27 DIAGNOSIS — Z96.1 PSEUDOPHAKIA OF BOTH EYES: ICD-10-CM

## 2018-02-27 PROCEDURE — 92012 INTRM OPH EXAM EST PATIENT: CPT | Mod: S$GLB,,, | Performed by: OPHTHALMOLOGY

## 2018-02-27 PROCEDURE — 92083 EXTENDED VISUAL FIELD XM: CPT | Mod: S$GLB,,, | Performed by: OPHTHALMOLOGY

## 2018-02-27 PROCEDURE — 99999 PR PBB SHADOW E&M-EST. PATIENT-LVL III: CPT | Mod: PBBFAC,,, | Performed by: OPHTHALMOLOGY

## 2018-02-27 NOTE — PROGRESS NOTES
HPI     Glaucoma    Additional comments: 4 month iop ch//  latanoprost QHS OU, Dorzolamide   TID OU//  HVF review today//           Comments   4 month iop ch//  latanoprost QHS OU, Dorzolamide TID OU//  HVF review   today//       Last edited by Tiana Locke on 2/27/2018  9:35 AM. (History)            Assessment /Plan     For exam results, see Encounter Report.    OHT (ocular hypertension), bilateral    Pseudophakia of both eyes    History of vitrectomy    High myopia, bilateral            OHT (ocular hypertension), bilateral - Switched Timolol to Dorzolamide 2/14/17, but IOP still upper 20's. Just finished tamoxifen for newly diagnosed breast cancer.   IOP baseline mid 20's with occasional pain OS. IOP was significantly elevated off Latanoprost - Tmax 37/44! Maternal grandmother with glaucoma, no known first degree relatives. CCT thick. Baseline HVF/HRT WNL, small cups on clinical exam OU. Last HRT - within range of priors, no RNFL thinning identified.   Today's HVF - new nasal defects OU, fixation losses OS, pt reported seeing a glare off to the side and also having hot flashes during exam. Clinical exam appears stable - repeat next year, if WNL then can follow with just imaging.  Last Gonio - open to CB OU, few tiny iris root vessels visible, no NV.   OCT nerve 5/16/17 - thin TS and TI OD, borderline TS OS.  Switched T .5 to Dorzolamide BID, but not as effective. Tried Brimonidine TID - but had hypotension again, so went back to Dorzolamide TID. If goes too high then can consider 0.25% Timolol, as seems to have had a dramatic response in IOP lowering with Timolol. (she is not sure if chemo had something to do with this, may be willing to try a different gtt when she finishes chemo, if Dorzolamide backorder not resolved.)  RTC 4 months for IOP check with OCT nerve.     On Latanoprost since presentation (former pt of Dr. Ordaz).  Dorzolamide added 2/14-7/25/17 - no significant improvement in IOP, may have  forgotten to take? Resumed 8/9/17 TID OU  Timolol 0.5% QAM (7/9/16-2/14/17) - worked great but symptomatic hypotension/bradycardia started in Jan '17  Brimonidine - 7/25-8/9 17 - d/c'd due to symptomatic hypotension.    Pseudophakia of both eyes - stable    History of vitrectomy - for floaters/AH      Addendum: symptomatic hypotension with Brimonidine - switched back to Dorzolamide on 8/9/17 - asked that she try to use it TID.

## 2018-03-02 ENCOUNTER — TELEPHONE (OUTPATIENT)
Dept: RADIATION ONCOLOGY | Facility: CLINIC | Age: 63
End: 2018-03-02

## 2018-03-06 ENCOUNTER — CLINICAL SUPPORT (OUTPATIENT)
Dept: REHABILITATION | Facility: HOSPITAL | Age: 63
End: 2018-03-06
Attending: SURGERY
Payer: COMMERCIAL

## 2018-03-06 DIAGNOSIS — M79.622 PAIN IN AXILLA, LEFT: ICD-10-CM

## 2018-03-06 DIAGNOSIS — M25.612 DECREASED RANGE OF MOTION OF LEFT SHOULDER: ICD-10-CM

## 2018-03-06 PROCEDURE — 97140 MANUAL THERAPY 1/> REGIONS: CPT | Mod: PO

## 2018-03-06 PROCEDURE — 97110 THERAPEUTIC EXERCISES: CPT | Mod: PO

## 2018-03-06 NOTE — PLAN OF CARE
Physical Therapy Progress Note - New POC Initiated     Name: Ashlie Redman  Clinic Number: [id number]  Diagnosis:   Encounter Diagnoses   Name Primary?    Pain in axilla, left     Decreased range of motion of left shoulder      Physician: Alexander Arce MD  Evaluation Date: 12/5/17  Visit # authorized: 20  Visit #: 6  Authorization period: 12/31/2017  Plan of care Expiration: 4 weeks - by April 3, 2018 or 4 visits   Insurance:  [employer] (40)  Precautions: port in R anterior chest   Physician: Alexander Arce MD    Time In: 8:15 am  Time out: 9:10 am   1:1 treatment time: 55 min     Subjective     Pt reports: that she recently completed radiation on February 20, 2018. She states that recently in the last 2 weeks she has experienced increased tightness under her axilla and across upper trap/scapular area since completion of radiation.   Pain Scale: Ashlie rated pain at 0/10 on VAS currently. Sometimes reports twinges of pain in her L breast, under axilla but did not rate pain level  Fatigue: moderate   Dx: L invasive carcinoma ER/FL negative Her2/MARIAMA positive  Surgery: 11/1/2017 - L lumpectomy with magseed, SLNB with axillary dissection level I and II  Treatment: currently in radiation tx  - scheduled to get 33 treatments   Objective   Edema: no pitting edema evident down L arm, slight thinkness/increased density noted on back over scapula and on anterior neck - will continue to monitor   Skin appears slightly red but no breakdown noted under L axilla, lateral chest on the L     Ashlie received individual therapeutic exercises to improve postural correction and alignment, stretching and soft tissue mobility, and strengthening for 30 minutes including the following:   - protraction  - shoulder flexion cane stretches  - side lying horizontal abduction  - upright rows   Shoulder Range of Motion measured this session: improvement noted in flexion and abduction   ACTIVE/PASSIVE ROM LEFT   Flexion  150/160  passive   Abduction 170   Extension 70   IR/90deg 70   ER/90deg 90         Upper Extremity Strength    (L) UE (R) UE   Shoulder flexion: 4+/5 4+/5   Shoulder Abduction: 4+/5 4+/5   Shoulder IR 4+/5 4+/5   Shoulder ER 4+/5 4+/5   Elbow flexion: 5/5 5/5   Elbow extension: 4+/5 4+/5   Wrist flexion: 5/5 5/5   Wrist extension: 5/5 5/5    5/5 5/5       Ashlie Redman received the following manual therapy techniques were performed to increased myofascial/soft tissue length, mobility and pliability, increase PROM, AROM and function as well as to decrease pain for 25 minutes    Soft tissue work to L upper extremity including the following areas: lateral chest wall, anterior chest wall, axilla, upper trap and periscapular area  Soft tissue work used to assist to decrease pain on L UE as well as work on tissue extensibility in lateral trunk, chest wall and anterior chest wall and axilla to stimulate a more functional, less painful healing. The pt was instructed in possible side effects and/or soreness. After manual therapy, pt with decreased pain and improved tissue mobility    Home Exercise Program and Patient Education   Education provided re:  - role of PT in multi - disciplinary team, goals for PT  - progress towards goals   - signs and symptoms of lymphedema - how to self monitor    Pt was able to demonstrate and report understanding and performance  Pt has no cultural, educational or language barriers to learning provided.    Assessment   Pt continues to make steady progress in outpatient physical therapy. New plan of care initiated this session for 4 weeks, 1x week or 4 visits - pt is experiencing some increased tightness s/p radiation. Manual therapy started this session to axilla, periscapular area, upper trap - pt will less discomfort/tigtness compared to start of session. Will see patient for a few more visits to ensure patient has normal tissue extensibility in anterior/lateral chest wall as well as axilla. Pt  has met all but 2 long term goals for outpatient physical therapy. Her ROM and strength have improved since last session and less scapular winging is noted this session compared to last. Will continue to progress as tolerated - no new HEP added this session.     This is a 62 y.o. female referred to outpatient physical therapy and presents with a medical diagnosis of L breast cancer and demonstrates limitations as described in the problem list on evaluation. Pt prognosis is Excellent. Pt will continue to benefit from skilled outpatient physical therapy to address the deficits listed in the problem list on evaluation, provide pt/family education and to maximize pt's level of independence in the home and community environment.     Goals as follows:  Short Term goals: 6 weeks  1. Patient will demonstrate 100% understanding of lymphedema risk reduction practices to include self monitoring for lymphedema.Met 1/16/2018   2. Patient will demonstrate independence with Home Exercise program established. Met 3/6/2018  3. Strength will be assessed and appropriate goals will be set. Met 1/16/2018   4. Pt will increase PROM/AROM in shoulder flexion to 160 degrees L UE to improve functional reach, carry, push, pull pain free. Met 2/5/2018     Long Term Goals: 12 weeks   1.  Pt will increase PROM/AROM in shoulder flexion to 175 degrees L UE to improve functional reach, carry, push, pull pain free. (progressing, not met).    2. Pt will increase strength to 4+/5 in gross UE musculature to improve tolerance to all functional activities pain free. Met 3/6/2018  3. Pt will demonstrate full/maximized tissue mobility mobility to increase ROM and promote healthy tissue to be pain free at discharge. (progressing, not met).    4.  Pt will be pain free with L UE movements at discharge. (progressing, not met)     Plan     New plan of care initiated to meet long term goals set for therapy - 1x week for 4 weeks or 4 visits - POC expiration date  April 3, 2018   - therapeutic exercise  - manual therapy intervention    Therapist: Rachel Ulrich, PT  3/6/2018

## 2018-03-16 ENCOUNTER — LAB VISIT (OUTPATIENT)
Dept: LAB | Facility: HOSPITAL | Age: 63
End: 2018-03-16
Attending: INTERNAL MEDICINE
Payer: COMMERCIAL

## 2018-03-16 ENCOUNTER — OFFICE VISIT (OUTPATIENT)
Dept: HEMATOLOGY/ONCOLOGY | Facility: CLINIC | Age: 63
End: 2018-03-16
Payer: COMMERCIAL

## 2018-03-16 ENCOUNTER — INFUSION (OUTPATIENT)
Dept: INFUSION THERAPY | Facility: HOSPITAL | Age: 63
End: 2018-03-16
Attending: INTERNAL MEDICINE
Payer: COMMERCIAL

## 2018-03-16 VITALS
DIASTOLIC BLOOD PRESSURE: 86 MMHG | HEIGHT: 68 IN | RESPIRATION RATE: 18 BRPM | TEMPERATURE: 97 F | HEART RATE: 59 BPM | WEIGHT: 145.31 LBS | SYSTOLIC BLOOD PRESSURE: 140 MMHG | BODY MASS INDEX: 22.02 KG/M2

## 2018-03-16 VITALS — RESPIRATION RATE: 16 BRPM | HEART RATE: 72 BPM | SYSTOLIC BLOOD PRESSURE: 133 MMHG | DIASTOLIC BLOOD PRESSURE: 62 MMHG

## 2018-03-16 DIAGNOSIS — C50.912 HER2-POSITIVE CARCINOMA OF LEFT BREAST: Primary | ICD-10-CM

## 2018-03-16 DIAGNOSIS — Z11.3 SCREEN FOR STD (SEXUALLY TRANSMITTED DISEASE): ICD-10-CM

## 2018-03-16 DIAGNOSIS — C50.011 MALIGNANT NEOPLASM OF NIPPLE OF RIGHT BREAST IN FEMALE, UNSPECIFIED ESTROGEN RECEPTOR STATUS: ICD-10-CM

## 2018-03-16 DIAGNOSIS — C50.012 MALIGNANT NEOPLASM OF NIPPLE OF LEFT BREAST IN FEMALE, UNSPECIFIED ESTROGEN RECEPTOR STATUS: Primary | ICD-10-CM

## 2018-03-16 LAB
ALBUMIN SERPL BCP-MCNC: 4.2 G/DL
ALP SERPL-CCNC: 88 U/L
ALT SERPL W/O P-5'-P-CCNC: 32 U/L
ANION GAP SERPL CALC-SCNC: 11 MMOL/L
AST SERPL-CCNC: 32 U/L
BASOPHILS # BLD AUTO: 0.04 K/UL
BASOPHILS NFR BLD: 0.8 %
BILIRUB SERPL-MCNC: 0.3 MG/DL
BUN SERPL-MCNC: 13 MG/DL
CALCIUM SERPL-MCNC: 10.4 MG/DL
CHLORIDE SERPL-SCNC: 102 MMOL/L
CO2 SERPL-SCNC: 27 MMOL/L
CREAT SERPL-MCNC: 0.69 MG/DL
DIFFERENTIAL METHOD: ABNORMAL
EOSINOPHIL # BLD AUTO: 0.2 K/UL
EOSINOPHIL NFR BLD: 2.9 %
ERYTHROCYTE [DISTWIDTH] IN BLOOD BY AUTOMATED COUNT: 12.7 %
EST. GFR  (AFRICAN AMERICAN): >60 ML/MIN/1.73 M^2
EST. GFR  (NON AFRICAN AMERICAN): >60 ML/MIN/1.73 M^2
GLUCOSE SERPL-MCNC: 107 MG/DL
HCT VFR BLD AUTO: 36.3 %
HGB BLD-MCNC: 12.2 G/DL
LYMPHOCYTES # BLD AUTO: 0.9 K/UL
LYMPHOCYTES NFR BLD: 18.2 %
MCH RBC QN AUTO: 33.4 PG
MCHC RBC AUTO-ENTMCNC: 33.6 G/DL
MCV RBC AUTO: 100 FL
MONOCYTES # BLD AUTO: 0.5 K/UL
MONOCYTES NFR BLD: 10.4 %
NEUTROPHILS # BLD AUTO: 3.5 K/UL
NEUTROPHILS NFR BLD: 67.7 %
NRBC BLD-RTO: 0 /100 WBC
PLATELET # BLD AUTO: 204 K/UL
PMV BLD AUTO: 10 FL
POTASSIUM SERPL-SCNC: 4.1 MMOL/L
PROT SERPL-MCNC: 7 G/DL
RBC # BLD AUTO: 3.65 M/UL
SODIUM SERPL-SCNC: 140 MMOL/L
WBC # BLD AUTO: 5.1 K/UL

## 2018-03-16 PROCEDURE — 36415 COLL VENOUS BLD VENIPUNCTURE: CPT | Mod: PN

## 2018-03-16 PROCEDURE — 96413 CHEMO IV INFUSION 1 HR: CPT | Mod: PN

## 2018-03-16 PROCEDURE — 80053 COMPREHEN METABOLIC PANEL: CPT

## 2018-03-16 PROCEDURE — 25000003 PHARM REV CODE 250: Mod: PN | Performed by: INTERNAL MEDICINE

## 2018-03-16 PROCEDURE — 86300 IMMUNOASSAY TUMOR CA 15-3: CPT

## 2018-03-16 PROCEDURE — A4216 STERILE WATER/SALINE, 10 ML: HCPCS | Mod: PN | Performed by: INTERNAL MEDICINE

## 2018-03-16 PROCEDURE — 99215 OFFICE O/P EST HI 40 MIN: CPT | Mod: S$GLB,,, | Performed by: INTERNAL MEDICINE

## 2018-03-16 PROCEDURE — 99999 PR PBB SHADOW E&M-EST. PATIENT-LVL III: CPT | Mod: PBBFAC,,, | Performed by: INTERNAL MEDICINE

## 2018-03-16 PROCEDURE — 80053 COMPREHEN METABOLIC PANEL: CPT | Mod: PN

## 2018-03-16 PROCEDURE — 63600175 PHARM REV CODE 636 W HCPCS: Mod: JW,TB,PN | Performed by: INTERNAL MEDICINE

## 2018-03-16 PROCEDURE — 85025 COMPLETE CBC W/AUTO DIFF WBC: CPT | Mod: PN

## 2018-03-16 PROCEDURE — 85025 COMPLETE CBC W/AUTO DIFF WBC: CPT

## 2018-03-16 RX ORDER — SODIUM CHLORIDE 0.9 % (FLUSH) 0.9 %
10 SYRINGE (ML) INJECTION
Status: DISCONTINUED | OUTPATIENT
Start: 2018-03-16 | End: 2018-03-16 | Stop reason: HOSPADM

## 2018-03-16 RX ORDER — SODIUM CHLORIDE 0.9 % (FLUSH) 0.9 %
10 SYRINGE (ML) INJECTION
Status: CANCELLED | OUTPATIENT
Start: 2018-03-16

## 2018-03-16 RX ORDER — HEPARIN 100 UNIT/ML
500 SYRINGE INTRAVENOUS
Status: CANCELLED | OUTPATIENT
Start: 2018-03-16

## 2018-03-16 RX ADMIN — SODIUM CHLORIDE, PRESERVATIVE FREE 10 ML: 5 INJECTION INTRAVENOUS at 03:03

## 2018-03-16 RX ADMIN — TRASTUZUMAB 396 MG: 150 INJECTION, POWDER, LYOPHILIZED, FOR SOLUTION INTRAVENOUS at 02:03

## 2018-03-16 RX ADMIN — SODIUM CHLORIDE: 0.9 INJECTION, SOLUTION INTRAVENOUS at 02:03

## 2018-03-16 NOTE — PROGRESS NOTES
HISTORY OF PRESENT ILLNESS:  This is a 62-year-old white female for a  diagnosis of Stage IIB left breast carcinoma.  The patient discovered   a mass in her left breast and was seen by Dr. Arce.  She has received 4 cycles of   A/C ,completed 12 cycles weekly taxol and continues q 3 week herceptin/ perjeta    She has bouts of diarrhea that resolve easily with Imodium. She denies any fevers, chills, unexplained   weight loss, new breast complaints, vaginal bleeding, abdominal discomfort, nausea,   vomiting, diarrhea, mouth sores, etc.  No new complaints or pertinent findings on a   14 point review of systems.some fatigue ,went for lumpectomy and axillary eval , revealed 2 LN + hence had a disection  xrt is to be done in [place] per trial, pt here to continue herceptin / perjeta, she had hoped her axilla would be clear has xrt appt scheduled  Dr. Kunzould like to prevent cardiotoxicity from combined xrt/ herceptin /perjeta , she has since completed xrt, had echo wnl ef, here to continue and complete herceptin   feeling numbness to both feet and both hands  PHYSICAL EXAMINATION:  Wt Readings from Last 3 Encounters:   03/16/18 65.9 kg (145 lb 4.5 oz)   02/23/18 65.4 kg (144 lb 2.9 oz)   02/23/18 65.4 kg (144 lb 2.9 oz)     Temp Readings from Last 3 Encounters:   03/16/18 96.7 °F (35.9 °C)   02/23/18 97.7 °F (36.5 °C)   02/23/18 97.7 °F (36.5 °C)     BP Readings from Last 3 Encounters:   03/16/18 (!) 140/86   02/23/18 133/69   02/23/18 130/66     Pulse Readings from Last 3 Encounters:   03/16/18 (!) 59   02/23/18 63   02/23/18 76     GENERAL:  Well-developed, well-nourished white female in no acute distress.    Alert and oriented x4.  :  HEENT:  Normocephalic, atraumatic.  Oral mucosa pink and moist.  Lips without   lesions.  Tongue midline.  Oropharynx clear.  Nonicteric sclerae.  NECK:  Supple, no adenopathy.  No carotid bruits, thyromegaly or thyroid nodule.  HEART:  Regular rate and rhythm without murmur,  gallop.  LUNGS:  Clear to auscultation bilaterally.  Normal respiratory effort.  ABDOMEN:  Soft, nontender, nondistended with positive normoactive bowel sounds,   no hepatosplenomegaly.  EXTREMITIES:  No cyanosis, clubbing or edema.  Distal pulses are intact.  AXILLAE AND GROIN:  No palpable pathologic lymphadenopathy is appreciated.  SKIN:  Intact/turgor normal.  NEUROLOGIC:  Cranial nerves II-XII grossly intact.  Motor:  Good muscle bulk and   tone.  Strength/sensory 5/5 throughout.  Gait stable.    LABORATORY:    Lab Results   Component Value Date    WBC 5.10 03/16/2018    HGB 12.2 03/16/2018    HCT 36.3 (L) 03/16/2018     (H) 03/16/2018     03/16/2018     Differential remarkable for 71.3% grans, 5% lymph, 17% monos    CMP  Sodium   Date Value Ref Range Status   03/16/2018 140 136 - 145 mmol/L Final     Potassium   Date Value Ref Range Status   03/16/2018 4.1 3.5 - 5.1 mmol/L Final     Chloride   Date Value Ref Range Status   03/16/2018 102 95 - 110 mmol/L Final     CO2   Date Value Ref Range Status   03/16/2018 27 22 - 31 mmol/L Final     Glucose   Date Value Ref Range Status   03/16/2018 107 70 - 110 mg/dL Final     Comment:     The ADA recommends the following guidelines for fasting glucose:  Normal:       less than 100 mg/dL  Prediabetes:  100 mg/dL to 125 mg/dL  Diabetes:     126 mg/dL or higher       BUN, Bld   Date Value Ref Range Status   03/16/2018 13 7 - 18 mg/dL Final     Creatinine   Date Value Ref Range Status   03/16/2018 0.69 0.50 - 1.40 mg/dL Final     Calcium   Date Value Ref Range Status   03/16/2018 10.4 (H) 8.4 - 10.2 mg/dL Final     Total Protein   Date Value Ref Range Status   03/16/2018 7.0 6.0 - 8.4 g/dL Final     Albumin   Date Value Ref Range Status   03/16/2018 4.2 3.5 - 5.2 g/dL Final     Total Bilirubin   Date Value Ref Range Status   03/16/2018 0.3 0.2 - 1.3 mg/dL Final     Alkaline Phosphatase   Date Value Ref Range Status   03/16/2018 88 38 - 145 U/L Final     AST    Date Value Ref Range Status   03/16/2018 32 14 - 36 U/L Final     ALT   Date Value Ref Range Status   03/16/2018 32 10 - 44 U/L Final     Anion Gap   Date Value Ref Range Status   03/16/2018 11 8 - 16 mmol/L Final     eGFR if    Date Value Ref Range Status   03/16/2018 >60 >60 mL/min/1.73 m^2 Final     eGFR if non    Date Value Ref Range Status   03/16/2018 >60 >60 mL/min/1.73 m^2 Final     Comment:     Calculation used to obtain the estimated glomerular filtration  rate (eGFR) is the CKD-EPI equation.      ECHO wnl  CONCLUSIONS     1 - Normal left ventricular systolic function (EF 55-60%). 2/2018    2 - Normal left ventricular diastolic function.     3 - Normal right ventricular systolic function .     4 - Trivial mitral regurgitation.     5 - Trivial tricuspid regurgitation.     6 - The estimated PA systolic pressure is 26 mmHg.   5.       Left breast, lumpectomy:  - Residual invasive ductal carcinoma, moderately differentiated, [place] Grade 2 (3+2+1=6), 0.2 cm in  greatest linear microscopic dimension.  - Ductal carcinoma in situ (DCIS), high nuclear grade, 2 cm, solid and cribriform types.  - Surgical margins are free of tumor; invasive carcinoma is located 0.1 cm from the closest margin  IMPRESSION:  Stage IIB left breast carcinoma (ER/[place] negative, Her-2/clay positive)  Completed neoadjuvant AC and weekly taxol now on herceptin and I stopped perjeta since she also had xrt to left side due o fear of toxicity  , s/p recent lumpectomy and axillary disection , with 2 residual Ln positive  PLAN:  1.  Proceed with   herceptin to complete   will watch neuropthy   rtc 3 weeks for next herceptin with cbc, cmp

## 2018-03-16 NOTE — PLAN OF CARE
Problem: Patient Care Overview  Goal: Plan of Care Review  Outcome: Ongoing (interventions implemented as appropriate)  Pt tolerated Herceptin infusion well.  Instructed to call MD with any problems.

## 2018-03-19 LAB — CANCER AG27-29 SERPL-ACNC: 33.5 U/ML

## 2018-03-27 ENCOUNTER — CLINICAL SUPPORT (OUTPATIENT)
Dept: REHABILITATION | Facility: HOSPITAL | Age: 63
End: 2018-03-27
Attending: SURGERY
Payer: COMMERCIAL

## 2018-03-27 ENCOUNTER — OFFICE VISIT (OUTPATIENT)
Dept: SURGERY | Facility: CLINIC | Age: 63
End: 2018-03-27
Payer: COMMERCIAL

## 2018-03-27 VITALS
DIASTOLIC BLOOD PRESSURE: 77 MMHG | WEIGHT: 142.19 LBS | BODY MASS INDEX: 21.55 KG/M2 | SYSTOLIC BLOOD PRESSURE: 124 MMHG | TEMPERATURE: 98 F | HEIGHT: 68 IN | HEART RATE: 65 BPM

## 2018-03-27 DIAGNOSIS — C50.912 MALIGNANT NEOPLASM OF LEFT BREAST IN FEMALE, ESTROGEN RECEPTOR POSITIVE, UNSPECIFIED SITE OF BREAST: Primary | ICD-10-CM

## 2018-03-27 DIAGNOSIS — M25.612 DECREASED RANGE OF MOTION OF LEFT SHOULDER: ICD-10-CM

## 2018-03-27 DIAGNOSIS — M79.622 PAIN IN AXILLA, LEFT: ICD-10-CM

## 2018-03-27 DIAGNOSIS — Z17.0 MALIGNANT NEOPLASM OF LEFT BREAST IN FEMALE, ESTROGEN RECEPTOR POSITIVE, UNSPECIFIED SITE OF BREAST: Primary | ICD-10-CM

## 2018-03-27 PROCEDURE — 99999 PR PBB SHADOW E&M-EST. PATIENT-LVL III: CPT | Mod: PBBFAC,,, | Performed by: SURGERY

## 2018-03-27 PROCEDURE — 97150 GROUP THERAPEUTIC PROCEDURES: CPT | Mod: PO

## 2018-03-27 PROCEDURE — 99213 OFFICE O/P EST LOW 20 MIN: CPT | Mod: S$GLB,,, | Performed by: SURGERY

## 2018-03-27 PROCEDURE — 97140 MANUAL THERAPY 1/> REGIONS: CPT | Mod: PO

## 2018-03-27 NOTE — PROGRESS NOTES
Physical Therapy Daily Treatment Note and Discharge Summary      Name: Ashlie Redman  Clinic Number: [id number]  Diagnosis:   Encounter Diagnoses   Name Primary?    Pain in axilla, left     Decreased range of motion of left shoulder      Physician: Alexander Arce MD  Evaluation Date: 12/5/17  Visit # authorized: 20  Visit #: 5  Visits Missed: none   Authorization period: 12/31/2017  Insurance:  BCBS (40)  Precautions: port in R anterior chest - no manual therapy to R chest wall  Physician: Alexander Arce MD    Time In: 9:40 am  Time out: 10: 20 am   1:1 treatment time: 40 min     Subjective   Pt reports: she had follow up with Dr. Arce today and is doing well. Her scapula on the L has improved with less winging and she has been compliant with her HEP given.  She states that she is still having some slight weakness of her L UE. Her swelling across her upper chest near upper trap and anterior neck on the L after radiation have improved. She is working out 3-4 days and week and has lost 8-10 lbs purposefully to get back to her normal weight. She does report still feeling very fatigued and having decreased sensation in her fingers and toes that she hopes will improve over time.     Pain Scale: tightness reported over upper trap down medial boarder of scapula on the L, no pain level indicated  Fatigue: moderate   Dx: L invasive carcinoma ER/HI negative Her2/MARIAMA positive  Surgery: 11/1/2017 - L lumpectomy with magseed, SLNB with axillary dissection level I and II  Treatment: Herceptin infusions - completed radiation in February 2018.   Objective     ACTIVE/PASSIVE ROM LEFT   Flexion 175 active   Abduction 175 active    Extension 60/70   IR/90deg 70   ER/90deg 90     Edema: no pitting edema evident down L arm, improvement noted in density noted on back over scapula and on anterior neck -     Circumferential measurements taken this session:   LANDMARK L UE - taken this session  LEFT UE - taken  "1/22/2018 DIFFERENCE   E + 8" 34 cm 36 cm Decreased 2 cm   E + 6" 28 cm 30.5 cm Decreased 2.5 cm   E + 4" 27 cm 28 cm Decreased 1cm   E + 2" 25.5 cm 27 cm Decreased 1.5 cm   Elbow 24 cm 25 cm Decreased 1 cm   W+ 8" 23 cm 23.5 cm .5 cm   W +  6" 21.5 cm 21.5 cm 0 cm   W + 4" 16 cm 17.5 cm Decreased 1.5 cm   Wrist 14.5 cm 14.5 cm 0 cm   DPC 18 m 18 cm 0 cm   IP Thumb 6 cm 6 cm 0 cm     Manual therapy for 30 min:   Soft tissue work to L upper extremity including the following areas: lateral chest wall, anterior chest wall upper trap and medial scapular boarder Soft tissue work used to assist in decreased pain on L as well as work on tissue extensibility in lateral trunk, chest wall and axilla to stimulate a more functional, less painful healing. The pt was instructed in possible side effects and/or soreness. After manual therapy, pt with decreased pain and improved tissue mobility    Home Exercise Program and Patient Education   Education provided re:  Goals for physical therapy and progress towards them  Pt has no cultural, educational or language barriers to learning provided.    Assessment   Pt has made great improvements with UE strength, ROM and compliance with HEP since starting PT and has met all of her goals established. Pt with decreased stiffness/tightness in upper trap and L UE after soft tissue work this session. Overall, edema measurements have decreased since last taken. Pt independent with HEP given. Pt instructed to call if any concerns arise or status changes. Pt verbalized understanding. Pt is discharged from outpatient physical therapy.     Goals as follows:  Short Term goals: 6 weeks  1. Patient will demonstrate 100% understanding of lymphedema risk reduction practices to include self monitoring for lymphedema.Met 1/16/2018   2. Patient will demonstrate independence with Home Exercise program established. MET 3/27/2018   3. Strength will be assessed and appropriate goals will be set. Met 1/16/2018 "   4. Pt will increase PROM/AROM in shoulder flexion to 160 degrees L UE to improve functional reach, carry, push, pull pain free. Met 2/5/2018     Long Term Goals: 12 weeks   1.  Pt will increase PROM/AROM in shoulder flexion to 175 degrees L UE to improve functional reach, carry, push, pull pain free. Met 3/27/2018   2. Pt will increase strength to 4+/5 in gross UE musculature to improve tolerance to all functional activities pain free. Met 3/27/2018  3. Pt will demonstrate full/maximized tissue mobility mobility to increase ROM and promote healthy tissue to be pain free at discharge.  Met 3/27/2018  4.  Pt will be pain free with L UE movements at discharge.  Met 3/27/2018     Plan     Pt is discharged from outpatient physical therapy.     Therapist: Rachel Ulrich, PT  2/5/2018

## 2018-03-27 NOTE — Clinical Note
Needs B diag MMG with norris in 1 month from now.  Also f/u with me in 6 monhts for port removal and B CBE once she completes her 5 months of remaining Hercepptin therapy.  Thanks, RLC

## 2018-03-27 NOTE — LETTER
[facility]  [address]  [facility] [address]  Phone: [phone]  Fax: [phone] March 28, 2018      Celestina Ragsdale MD  [address] [facility] [address]    Patient: Ashlie Redman   MR Number: [id number]   YOB: 1955   Date of Visit: 3/27/2018     Dear Dr. Ragsdale:    Thank you for referring Ashlie Redman to me for evaluation. Attached you will find relevant portions of my assessment and plan of care.    Ashlie Redman is a very pleasant 63-year-old  female patient of [facility].  She had an ER/NE negative, HER-2/clay positive tumor, treated with neoadjuvant Herceptin and Perjeta based chemotherapy.  She initially had a positive node and went on the Cisse trial and was randomized to a completion lymph node dissection after intraoperative sentinel node was positive.  She ended up having 2 out of 18 additional positive nodes on the axillary dissection and a residual T1a lesion making this a pathologic ypT1a, N1a, ER/NE negative, HER-2/clay positive stage IIB breast cancer.  She was randomized to not receive axillary radiotherapy.  She did undergo chest wall and other regional lymphatic radiotherapy excluding the axilla.  She had some fatigue from the radiation therapy, which she has completed.  She has seven more cycles of every three week Herceptin remaining.  She has had some difficulty with temporary winging of the left scapula.  This has improved with physical therapy and is nearly back to baseline.    Her clinical breast exam reveals good symmetry bilaterally.  There is no axillary, cervical or supraclavicular lymphadenopathy.  There are no suspicious masses, nodules, densities or skin changes or lymphadenopathy.  We can palpate the implant in the area of the breast conservation surgery site in the lateral left breast.  Clinically, she is PAULIE with no evidence of local or regional recurrence.      At this point, she is due for bilateral mammograms approximately one  month from now and we will schedule these.  She will follow up with me in approximately six months once she has completed her adjuvant Herceptin therapy for Port-A-Cath removal from the contralateral right side as well as six-month interval breast cancer screening surveillance followup exam as part of her ongoing breast cancer screening and surveillance at the [facility].  Clinically PAULIE at this time, doing well without subjective complaints other than what was noted above with fatigue for radiation therapy and some difficulty due to temporary winging of the left scapula postoperatively, which has clinically improved and virtually resolved with physical therapy.    If you have questions, please do not hesitate to call me. I look forward to following Ashlie Redman along with you.    Sincerely,      Alexander Arce MD  Medical Director, [facility]  Section of General and Oncologic Surgery  [facility]    RLC/hcr

## 2018-03-27 NOTE — PROGRESS NOTES
The patient is a 62-year-old  female with history of left lateral breast invasive HER-2 positive invasive breast carcinoma, status post Herceptin and Perjeta based preoperative neoadjuvant chemotherapy with preoperatively known positive left lymph node inthe left axilla. She is s/p partial mastectomy and sentinel node dissection on 11/1, positive node noted with negative margins on mastectomy specimen.  She was randomized intraoperatively on the Waldron trial to have a completion axillary lymph node dissection, which revealed 2 of 18 additional positive lymph nodes; so she did not receive axillary radiation.  She did have left breast radiation, which is completed. She has 7 more cycles of Herceptin remaining.    Her post-operative course has been complicated by radiation fatigue and a winged scapula, which are slowly improving.  She otherwise feels well.  She is concerned about the feeling of her left lateral breast.    On physical exam her incisions are well healed, though her drain site and her axillary incision have some skin contraction without underlying mass.  Beneath the lateral aspect of her lumpectomy incision, her breast implant can be felt.  There are no suspicious masses or skin changes in the bilateral breasts/axilla.    She is due for yearly MMG in 1 month  RTC in 6 months for follow up CBE and port removal    CJadyn Gibbons MD  PGY-4  Pager: [phone]    I have personally taken the history and examined this patient and agree with the resident's note as stated above.  Final stage:    [id number], Stage IIB, ER/ID negative, HER-2 positive    Ashlie Redman is a very pleasant 63-year-old  female patient of [employer].    She had an ER/ID negative, HER-2/clay positive tumor, treated with neoadjuvant   Herceptin and Perjeta based chemotherapy.  She initially had a positive node and   went on the Cisse trial and was randomized to a completion lymph node   dissection after intraoperative sentinel  node was positive.  She ended up having   2 out of 18 additional positive nodes on the axillary dissection and a residual   T1a lesion making this a pathologic ypT1a, N1a, ER/MN negative, HER-2/clay   positive stage IIB breast cancer.  She was randomized to not receive axillary   radiotherapy.  She did undergo chest wall and other regional lymphatic   radiotherapy excluding the axilla.  She had some fatigue from the radiation   therapy, which she has completed.  She has seven more cycles of every three week   Herceptin remaining.  She has had some difficulty with temporary winging of the   left scapula.  This has improved with physical therapy and is nearly back to   baseline.    Her clinical breast exam reveals good symmetry bilaterally.  There is no   axillary, cervical or supraclavicular lymphadenopathy.  There are no suspicious   masses, nodules, densities or skin changes or lymphadenopathy.  We can palpate   the implant in the area of the breast conservation surgery site in the lateral   left breast.  Clinically, she is PAULIE with no evidence of local or regional   recurrence.  At this point, she is due for bilateral mammograms approximately   one month from now and we will schedule these.  She will follow up with me in   approximately six months once she has completed her adjuvant Herceptin therapy   for Port-A-Cath removal from the contralateral right side as well as six-month   interval breast cancer screening surveillance followup exam as part of her   ongoing breast cancer screening and surveillance at the [facility].    Clinically PAULIE at this time, doing well without subjective complaints other than   what was noted above with fatigue for radiation therapy and some difficulty due   to temporary winging of the left scapula postoperatively, which has clinically   improved and virtually resolved with physical therapy.      RLC/HN  dd: 03/28/2018 17:02:59 (CDT)  td: 03/29/2018 12:27:03 (CDT)  Doc ID    #[id number]  Job ID #[id number]    CC:

## 2018-04-04 ENCOUNTER — OFFICE VISIT (OUTPATIENT)
Dept: UROLOGY | Facility: CLINIC | Age: 63
End: 2018-04-04
Payer: COMMERCIAL

## 2018-04-04 VITALS
HEART RATE: 75 BPM | WEIGHT: 144.63 LBS | HEIGHT: 68 IN | BODY MASS INDEX: 21.92 KG/M2 | DIASTOLIC BLOOD PRESSURE: 78 MMHG | SYSTOLIC BLOOD PRESSURE: 118 MMHG

## 2018-04-04 DIAGNOSIS — N81.11 CYSTOCELE, MIDLINE: Primary | ICD-10-CM

## 2018-04-04 LAB
BILIRUB SERPL-MCNC: ABNORMAL MG/DL
BLOOD URINE, POC: ABNORMAL
COLOR, POC UA: YELLOW
GLUCOSE UR QL STRIP: ABNORMAL
KETONES UR QL STRIP: ABNORMAL
LEUKOCYTE ESTERASE URINE, POC: ABNORMAL
NITRITE, POC UA: ABNORMAL
PH, POC UA: 8.5
PROTEIN, POC: ABNORMAL
SPECIFIC GRAVITY, POC UA: 1.02
UROBILINOGEN, POC UA: ABNORMAL

## 2018-04-04 PROCEDURE — 99999 PR PBB SHADOW E&M-EST. PATIENT-LVL III: CPT | Mod: PBBFAC,,, | Performed by: UROLOGY

## 2018-04-04 PROCEDURE — 99203 OFFICE O/P NEW LOW 30 MIN: CPT | Mod: 25,S$GLB,, | Performed by: UROLOGY

## 2018-04-04 PROCEDURE — 81002 URINALYSIS NONAUTO W/O SCOPE: CPT | Mod: S$GLB,,, | Performed by: UROLOGY

## 2018-04-04 NOTE — LETTER
April 4, 2018      Kevin Gong MD  [address] [facility] [address] [place] [address]           [facility]  [address] [facility] [address] [place] [address]  Phone: [phone]          Patient: Ashlie Redman   MR Number: [id number]   YOB: 1955   Date of Visit: 4/4/2018       Dear Dr. Kevin Gong:    Thank you for referring Ashlie Redman to me for evaluation. Attached you will find relevant portions of my assessment and plan of care.    If you have questions, please do not hesitate to call me. I look forward to following Ashlie Redman along with you.    Sincerely,    ABILIO Luo MD    Enclosure  CC:  No Recipients    If you would like to receive this communication electronically, please contact [email or web address] or [phone] to request more information on [employer] [facility] access.    For providers and/or their staff who would like to refer a patient to [facility], please contact us through our one-stop-shop provider referral line, [facility], at [phone].    If you feel you have received this communication in error or would no longer like to receive these types of communications, please e-mail [email or web address]

## 2018-04-04 NOTE — PROGRESS NOTES
Subjective:       Patient ID: Ashlie Redman is a 63 y.o. female.    Chief Complaint: Bladder Pain    HPI     63 year old was having bladder pressure sensation 3 years ago.  She was diagnosed with prolapsed bladder.   She began pelvic floor exercises and was seen a specialist in pelvic floor rehad and her symptoms were improved.  She was then diagnosed with breast cancer.  She has completed chemo/radaition for her breast cancer and is feeling better.  She is here for evaluation of her pelvic organ prolapse.  She has minimal symptoms.  She denies incontinence.  She has rare urinary tract infections.    Urine dipstick shows negative for all components.      Past Medical History:   Diagnosis Date    Allergy     Anemia     Asteroid hyalosis of both eyes     Breast cancer     Cataract     Diverticulosis     Encounter for blood transfusion     Glaucoma     High myopia     Osteopenia     S/P dilation and curettage      Past Surgical History:   Procedure Laterality Date    breast augmentation  03/2013    CATARACT EXTRACTION W/  INTRAOCULAR LENS IMPLANT Bilateral 2009    COLONOSCOPY  4/2009, 2015    repeat in 5 years    DILATION AND CURETTAGE OF UTERUS      ECTOPIC PREGNANCY SURGERY      EYE SURGERY Bilateral 3/09    Vitrectomy     POLYPECTOMY      x3 2001    Yag Capsulotomy Bilateral 12/2014       Current Outpatient Prescriptions:     dorzolamide (TRUSOPT) 2 % ophthalmic solution, Place 1 drop into both eyes 3 (three) times daily., Disp: 30 mL, Rfl: 3    latanoprost 0.005 % ophthalmic solution, INSTILL ONE DROP INTO EACH EYE ONCE DAILY IN THE EVENING, Disp: 8 mL, Rfl: 2    venlafaxine (EFFEXOR XR) 75 MG 24 hr capsule, Take 1 capsule (75 mg total) by mouth once daily., Disp: 30 capsule, Rfl: 2    diphenoxylate-atropine 2.5-0.025 mg (LOMOTIL) 2.5-0.025 mg per tablet, Take 1 tablet by mouth 4 (four) times daily as needed for Diarrhea., Disp: 30 tablet, Rfl: 1    hydrocodone-acetaminophen 5-325mg (NORCO)  5-325 mg per tablet, Take 1 tablet by mouth every 6 (six) hours as needed for Pain., Disp: 30 tablet, Rfl: 0    lidocaine-prilocaine (EMLA) cream, Apply topically once., Disp: , Rfl:     ondansetron (ZOFRAN-ODT) 8 MG TbDL, Take 1 tablet (8 mg total) by mouth every 12 (twelve) hours as needed., Disp: 30 tablet, Rfl: 1    prochlorperazine (COMPAZINE) 10 MG tablet, Take 1 tablet (10 mg total) by mouth every 6 (six) hours as needed., Disp: 30 tablet, Rfl: 1    Review of Systems   Constitutional: Negative for chills and fever.   Eyes: Negative for visual disturbance.   Respiratory: Negative for shortness of breath.    Cardiovascular: Negative for chest pain.   Gastrointestinal: Negative for abdominal pain and nausea.   Genitourinary: Negative for dysuria, flank pain and hematuria.   Musculoskeletal: Negative for gait problem.   Skin: Negative for rash.   Neurological: Negative for seizures.   Psychiatric/Behavioral: Negative for confusion.       Objective:      Physical Exam   Constitutional: She is oriented to person, place, and time. She appears well-developed and well-nourished.   HENT:   Head: Normocephalic.   Eyes: Conjunctivae and EOM are normal.   Neck: Normal range of motion.   Cardiovascular: Normal rate.    Pulmonary/Chest: Effort normal.   Abdominal: Soft. She exhibits no distension and no mass. There is no tenderness.   Genitourinary: Vagina normal.   Genitourinary Comments: Bladder non-tender and nondistended  No CVA tenderness  Grade 3 cystoscele   Musculoskeletal: She exhibits no edema.   Neurological: She is alert and oriented to person, place, and time.   Skin: Skin is warm and dry. No rash noted. No erythema.   Psychiatric: She has a normal mood and affect. Her behavior is normal.   Vitals reviewed.      Assessment:       1. Cystocele, midline        Plan:       Cystocele, midline  -     POCT URINE DIPSTICK WITHOUT MICROSCOPE      We discussed surgical treatment options.  She is not very bothered and  I recommend observation.  Resume pelvic floor exercises.

## 2018-04-06 ENCOUNTER — OFFICE VISIT (OUTPATIENT)
Dept: HEMATOLOGY/ONCOLOGY | Facility: CLINIC | Age: 63
End: 2018-04-06
Payer: COMMERCIAL

## 2018-04-06 ENCOUNTER — LAB VISIT (OUTPATIENT)
Dept: LAB | Facility: HOSPITAL | Age: 63
End: 2018-04-06
Attending: INTERNAL MEDICINE
Payer: COMMERCIAL

## 2018-04-06 ENCOUNTER — INFUSION (OUTPATIENT)
Dept: INFUSION THERAPY | Facility: HOSPITAL | Age: 63
End: 2018-04-06
Attending: INTERNAL MEDICINE
Payer: COMMERCIAL

## 2018-04-06 VITALS
TEMPERATURE: 98 F | HEART RATE: 55 BPM | SYSTOLIC BLOOD PRESSURE: 145 MMHG | BODY MASS INDEX: 21.65 KG/M2 | HEIGHT: 68 IN | RESPIRATION RATE: 20 BRPM | DIASTOLIC BLOOD PRESSURE: 70 MMHG | WEIGHT: 142.88 LBS

## 2018-04-06 VITALS — DIASTOLIC BLOOD PRESSURE: 68 MMHG | RESPIRATION RATE: 20 BRPM | HEART RATE: 72 BPM | SYSTOLIC BLOOD PRESSURE: 113 MMHG

## 2018-04-06 DIAGNOSIS — C50.912 HER2-POSITIVE CARCINOMA OF LEFT BREAST: ICD-10-CM

## 2018-04-06 DIAGNOSIS — C50.912 HER2-POSITIVE CARCINOMA OF LEFT BREAST: Primary | ICD-10-CM

## 2018-04-06 DIAGNOSIS — C50.012 MALIGNANT NEOPLASM OF NIPPLE OF LEFT BREAST IN FEMALE, UNSPECIFIED ESTROGEN RECEPTOR STATUS: Primary | ICD-10-CM

## 2018-04-06 LAB
ALBUMIN SERPL BCP-MCNC: 4.2 G/DL
ALP SERPL-CCNC: 84 U/L
ALT SERPL W/O P-5'-P-CCNC: 31 U/L
ANION GAP SERPL CALC-SCNC: 9 MMOL/L
AST SERPL-CCNC: 31 U/L
BASOPHILS # BLD AUTO: 0.04 K/UL
BASOPHILS NFR BLD: 0.8 %
BILIRUB SERPL-MCNC: 0.4 MG/DL
BUN SERPL-MCNC: 15 MG/DL
CALCIUM SERPL-MCNC: 9.8 MG/DL
CHLORIDE SERPL-SCNC: 103 MMOL/L
CO2 SERPL-SCNC: 29 MMOL/L
CREAT SERPL-MCNC: 0.72 MG/DL
DIFFERENTIAL METHOD: ABNORMAL
EOSINOPHIL # BLD AUTO: 0.1 K/UL
EOSINOPHIL NFR BLD: 2.4 %
ERYTHROCYTE [DISTWIDTH] IN BLOOD BY AUTOMATED COUNT: 12.2 %
EST. GFR  (AFRICAN AMERICAN): >60 ML/MIN/1.73 M^2
EST. GFR  (NON AFRICAN AMERICAN): >60 ML/MIN/1.73 M^2
GLUCOSE SERPL-MCNC: 115 MG/DL
HCT VFR BLD AUTO: 39.3 %
HGB BLD-MCNC: 12.8 G/DL
LYMPHOCYTES # BLD AUTO: 1 K/UL
LYMPHOCYTES NFR BLD: 18.4 %
MCH RBC QN AUTO: 33.1 PG
MCHC RBC AUTO-ENTMCNC: 32.6 G/DL
MCV RBC AUTO: 102 FL
MONOCYTES # BLD AUTO: 0.5 K/UL
MONOCYTES NFR BLD: 9.4 %
NEUTROPHILS # BLD AUTO: 3.7 K/UL
NEUTROPHILS NFR BLD: 69 %
NRBC BLD-RTO: 0 /100 WBC
PLATELET # BLD AUTO: 201 K/UL
PMV BLD AUTO: 10.4 FL
POTASSIUM SERPL-SCNC: 4.2 MMOL/L
PROT SERPL-MCNC: 7.2 G/DL
RBC # BLD AUTO: 3.87 M/UL
SODIUM SERPL-SCNC: 141 MMOL/L
WBC # BLD AUTO: 5.32 K/UL

## 2018-04-06 PROCEDURE — 25000003 PHARM REV CODE 250: Mod: PN | Performed by: NURSE PRACTITIONER

## 2018-04-06 PROCEDURE — 99214 OFFICE O/P EST MOD 30 MIN: CPT | Mod: S$GLB,,, | Performed by: NURSE PRACTITIONER

## 2018-04-06 PROCEDURE — 85025 COMPLETE CBC W/AUTO DIFF WBC: CPT

## 2018-04-06 PROCEDURE — A4216 STERILE WATER/SALINE, 10 ML: HCPCS | Mod: PN | Performed by: NURSE PRACTITIONER

## 2018-04-06 PROCEDURE — 36415 COLL VENOUS BLD VENIPUNCTURE: CPT | Mod: PN

## 2018-04-06 PROCEDURE — 99999 PR PBB SHADOW E&M-EST. PATIENT-LVL III: CPT | Mod: PBBFAC,,, | Performed by: NURSE PRACTITIONER

## 2018-04-06 PROCEDURE — 80053 COMPREHEN METABOLIC PANEL: CPT

## 2018-04-06 PROCEDURE — 85025 COMPLETE CBC W/AUTO DIFF WBC: CPT | Mod: PN

## 2018-04-06 PROCEDURE — 96413 CHEMO IV INFUSION 1 HR: CPT | Mod: PN

## 2018-04-06 PROCEDURE — 80053 COMPREHEN METABOLIC PANEL: CPT | Mod: PN

## 2018-04-06 PROCEDURE — 63600175 PHARM REV CODE 636 W HCPCS: Mod: JW,TB,PN | Performed by: NURSE PRACTITIONER

## 2018-04-06 RX ORDER — SODIUM CHLORIDE 0.9 % (FLUSH) 0.9 %
10 SYRINGE (ML) INJECTION
Status: DISCONTINUED | OUTPATIENT
Start: 2018-04-06 | End: 2018-04-06 | Stop reason: HOSPADM

## 2018-04-06 RX ORDER — SODIUM CHLORIDE 0.9 % (FLUSH) 0.9 %
10 SYRINGE (ML) INJECTION
Status: CANCELLED | OUTPATIENT
Start: 2018-04-06

## 2018-04-06 RX ORDER — HEPARIN 100 UNIT/ML
500 SYRINGE INTRAVENOUS
Status: CANCELLED | OUTPATIENT
Start: 2018-04-06

## 2018-04-06 RX ADMIN — TRASTUZUMAB 389 MG: 150 INJECTION, POWDER, LYOPHILIZED, FOR SOLUTION INTRAVENOUS at 03:04

## 2018-04-06 RX ADMIN — SODIUM CHLORIDE, PRESERVATIVE FREE 10 ML: 5 INJECTION INTRAVENOUS at 04:04

## 2018-04-06 RX ADMIN — SODIUM CHLORIDE: 0.9 INJECTION, SOLUTION INTRAVENOUS at 03:04

## 2018-04-06 NOTE — PROGRESS NOTES
HISTORY OF PRESENT ILLNESS:  This is a 63-year-old white female known to Dr. Ragsdale for a diagnosis of Stage IIB left breast carcinoma.  The patient discovered   a mass in her left breast and was seen by Dr. Arce.  She has received 4 cycles of   A/C and 12 cycles of weekly Taxol.  She was receiving Herceptin with Perjeta.  She had   difficulties with diarrhea, but due to prevention of cardiotoxicity - Perjeta was stopped   (total of 8 cycles of Perjeta given from 07/07/17 - 12/01/17).  She remains chronically   fatigued with increasing difficulties with peripheral neuropathy. She is interested in   getting some relief for this.  She denies any fevers, chills, unexplained weight loss,   new breast complaints, vaginal bleeding, abdominal discomfort, nausea, vomiting,   diarrhea, mouth sores, etc.  No new complaints or pertinent findings on a   14 point review of systems.    PHYSICAL EXAMINATION:  GENERAL:  Well-developed, well-nourished white female in no acute distress.    Alert and oriented x4.  VITAL SIGNS:  Weight:  Loss of 2 1/2 pounds in 3 weeks  Wt Readings from Last 3 Encounters:   04/06/18 64.8 kg (142 lb 13.7 oz)   04/04/18 65.6 kg (144 lb 10 oz)   03/27/18 64.5 kg (142 lb 3.2 oz)     Temp Readings from Last 3 Encounters:   04/06/18 97.7 °F (36.5 °C) (Oral)   03/27/18 98.3 °F (36.8 °C)   03/16/18 96.7 °F (35.9 °C)     BP Readings from Last 3 Encounters:   04/06/18 113/68   04/06/18 (!) 145/70   04/04/18 118/78     Pulse Readings from Last 3 Encounters:   04/06/18 72   04/06/18 (!) 55   04/04/18 75       HEENT:  Normocephalic, atraumatic.  Oral mucosa pink and moist.  Lips without   lesions.  Tongue midline.  Oropharynx clear.  Nonicteric sclerae.  NECK:  Supple, no adenopathy.  No carotid bruits, thyromegaly or thyroid nodule.  HEART:  Regular rate and rhythm without murmur, gallop.  LUNGS:  Clear to auscultation bilaterally.  Normal respiratory effort.  ABDOMEN:  Soft, nontender, nondistended with  positive normoactive bowel sounds,   no hepatosplenomegaly.  EXTREMITIES:  No cyanosis, clubbing or edema.  Distal pulses are intact.  AXILLAE AND GROIN:  No palpable pathologic lymphadenopathy is appreciated.  SKIN:  Intact/turgor normal.  NEUROLOGIC:  Cranial nerves II-XII grossly intact.  Motor:  Good muscle bulk and   tone.  Strength/sensory 5/5 throughout.  Gait stable.    LABORATORY:    Lab Results   Component Value Date    WBC 5.32 04/06/2018    HGB 12.8 04/06/2018    HCT 39.3 04/06/2018     (H) 04/06/2018     04/06/2018     Unremarkable differential    CMP  Sodium   Date Value Ref Range Status   04/06/2018 141 136 - 145 mmol/L Final     Potassium   Date Value Ref Range Status   04/06/2018 4.2 3.5 - 5.1 mmol/L Final     Chloride   Date Value Ref Range Status   04/06/2018 103 95 - 110 mmol/L Final     CO2   Date Value Ref Range Status   04/06/2018 29 22 - 31 mmol/L Final     Glucose   Date Value Ref Range Status   04/06/2018 115 (H) 70 - 110 mg/dL Final     Comment:     The ADA recommends the following guidelines for fasting glucose:  Normal:       less than 100 mg/dL  Prediabetes:  100 mg/dL to 125 mg/dL  Diabetes:     126 mg/dL or higher       BUN, Bld   Date Value Ref Range Status   04/06/2018 15 7 - 18 mg/dL Final     Creatinine   Date Value Ref Range Status   04/06/2018 0.72 0.50 - 1.40 mg/dL Final     Calcium   Date Value Ref Range Status   04/06/2018 9.8 8.4 - 10.2 mg/dL Final     Total Protein   Date Value Ref Range Status   04/06/2018 7.2 6.0 - 8.4 g/dL Final     Albumin   Date Value Ref Range Status   04/06/2018 4.2 3.5 - 5.2 g/dL Final     Total Bilirubin   Date Value Ref Range Status   04/06/2018 0.4 0.2 - 1.3 mg/dL Final     Alkaline Phosphatase   Date Value Ref Range Status   04/06/2018 84 38 - 145 U/L Final     AST   Date Value Ref Range Status   04/06/2018 31 14 - 36 U/L Final     ALT   Date Value Ref Range Status   04/06/2018 31 10 - 44 U/L Final     Anion Gap   Date Value Ref  Range Status   04/06/2018 9 8 - 16 mmol/L Final     eGFR if    Date Value Ref Range Status   04/06/2018 >60 >60 mL/min/1.73 m^2 Final     eGFR if non    Date Value Ref Range Status   04/06/2018 >60 >60 mL/min/1.73 m^2 Final     Comment:     Calculation used to obtain the estimated glomerular filtration  rate (eGFR) is the CKD-EPI equation.        IMPRESSION:    1.  Stage IIB left breast carcinoma (ER/AL negative, Her-2/clay positive)  2.  Peripheral neuropathy    PLAN:  1.  Proceed with next cycle of Herceptin dosed at 6 mg/kg (389 mg).  2.  Follow up in clinic in 3 weeks with interval CBC, CMP for evaluation prior to next Herceptin.    Assessment/plan reviewed and approved by Dr. Lau.

## 2018-04-09 DIAGNOSIS — F32.0 CURRENT MILD EPISODE OF MAJOR DEPRESSIVE DISORDER WITHOUT PRIOR EPISODE: ICD-10-CM

## 2018-04-09 RX ORDER — VENLAFAXINE HYDROCHLORIDE 75 MG/1
75 CAPSULE, EXTENDED RELEASE ORAL DAILY
Qty: 30 CAPSULE | Refills: 2 | Status: SHIPPED | OUTPATIENT
Start: 2018-04-09 | End: 2018-09-25

## 2018-04-11 ENCOUNTER — OFFICE VISIT (OUTPATIENT)
Dept: RADIATION ONCOLOGY | Facility: CLINIC | Age: 63
End: 2018-04-11
Payer: COMMERCIAL

## 2018-04-11 ENCOUNTER — PATIENT MESSAGE (OUTPATIENT)
Dept: HEMATOLOGY/ONCOLOGY | Facility: CLINIC | Age: 63
End: 2018-04-11

## 2018-04-11 VITALS
WEIGHT: 145.19 LBS | HEART RATE: 64 BPM | SYSTOLIC BLOOD PRESSURE: 131 MMHG | RESPIRATION RATE: 18 BRPM | HEIGHT: 68 IN | DIASTOLIC BLOOD PRESSURE: 74 MMHG | BODY MASS INDEX: 22.01 KG/M2 | TEMPERATURE: 98 F

## 2018-04-11 DIAGNOSIS — C50.512 PRIMARY CANCER OF LOWER OUTER QUADRANT OF LEFT FEMALE BREAST: Primary | ICD-10-CM

## 2018-04-11 DIAGNOSIS — Z17.1 MALIGNANT NEOPLASM OF NIPPLE OF LEFT BREAST IN FEMALE, ESTROGEN RECEPTOR NEGATIVE: ICD-10-CM

## 2018-04-11 DIAGNOSIS — C50.012 MALIGNANT NEOPLASM OF NIPPLE OF LEFT BREAST IN FEMALE, ESTROGEN RECEPTOR NEGATIVE: ICD-10-CM

## 2018-04-11 DIAGNOSIS — C50.912 HER2-POSITIVE CARCINOMA OF LEFT BREAST: ICD-10-CM

## 2018-04-11 PROCEDURE — 99999 PR PBB SHADOW E&M-EST. PATIENT-LVL III: CPT | Mod: PBBFAC,,, | Performed by: RADIOLOGY

## 2018-04-11 PROCEDURE — 99213 OFFICE O/P EST LOW 20 MIN: CPT | Mod: S$GLB,,, | Performed by: RADIOLOGY

## 2018-04-11 NOTE — PROGRESS NOTES
REFERRING PHYSICIAN: Alexander Arce MD    DIAGNOSIS: cT2 N1 M0 (ypT1a N1a) invasive ductal carcinoma of the left breast     Radiation Treatment Summary:  Ms. Redman completed radiation to the left breast and draining lymph nodes as shown below.    Treatment Site Energy Dose/Fx (Gy) #Fx Total Dose (Gy) Start Date End Date   LT SCLAV 6X 2 25 / 25 50 1/4/2018 2/12/2018   LT BREAST  6X 2 25 / 25 50 1/4/2018 2/12/2018   BOOST 12E 2 5 / 5 10 2/14/2018 2/20/2018     INTERVAL HISTORY:   Ms. Redman is a 63-year-old female with history of bilateral saline breast implants who was recently diagnosed with left breast cancer after she presented with a palpable mass in the upper outer quadrant of the left breast as well as in the left axilla. Mammogram in March 2017 revealed a 6 mm focal asymmetry in the left breast at 3- 4 o'clock at the site of the palpable lesion. An ultrasound revealed a 34 mm area of abnormality at the same site with additional enlarged left axillary lymph node measuring 20 x 17 x 11 mm. A biopsy of the left breast mass on April 5, 2017 revealed grade 2, invasive ductal carcinoma with associated high-grade DCIS with comedonecrosis. This lesion is ER/FL negative and HER-2 positive. On April 18, 2017, she underwent left axillary lymph node biopsy which revealed high-grade infiltrating ductal carcinoma involving the entire lymph node. A PET scan on April 21, 2017 revealed a hypermetabolic mass in the inferior lateral aspect of the left breast abutting the implant measuring approximately 3 cm with SUV max of 3.8. There is also a hypermetabolic left axillary lymph node measuring 1.3 cm with SUV max of 6 noted in addition to additional smaller subcentimeter hypermetabolic left axillary lymph nodes.     She received neoadjuvant chemotherapy with 4 cycles of Adriamycin and Cytoxan followed by weekly Taxol, Herceptin, and Perjeta. On November 1, 2017, she underwent left breast lumpectomy and sentinel node biopsy  "followed by axillary dissection based on the [place] protocol. The pathology revealed the left breast with residual 0.2 cm invasive ductal carcinoma, grade 2, with the closest margin posteriorly at 0.1 cm. There is also associated high nuclear grade DCIS seen measuring 2 cm, with the closest margin at 0.1 cm posteriorly. Additional deep margin was taken from the fibrous capsule which is negative for carcinoma. 2 out of 7 sentinel lymph nodes were found to have involvement with the largest focus measuring 8 mm and the second measuring 2 mm, both of which were negative for extra capsular invasion. She was randomized to the arm receiving axillary dissection with additional 11 lymph nodes negative for involvement.  She then received postoperative radiation to the left breast and draining lymph nodes including the left supraclavicular and left internal mammary nodes as above.  She is here today for a routine follow-up.    She's currently continuing Herceptin as planned.  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  She has occasional shooting pains in the left breast.  She denies left breast edema, erythema, nipple discharge, fever, night sweats, or weight loss.  She is planned to repeat a bilateral mammogram on April 23, 2018.    PHYSICAL EXAMINATION:  VITAL SINGS: /74   Pulse 64   Temp 97.7 °F (36.5 °C) (Oral)   Resp 18   Ht 5' 8" (1.727 m)   Wt 65.9 kg (145 lb 3.2 oz)   BMI 22.08 kg/m²   GENERAL: Patient is alert and oriented, in no acute distress.  HEENT: Extraocular muscles are intact.  Oropharynx is clear without lesions.  There is no cervical or supraclavicular adenopathy palpated.    CHEST: Breath sounds clear bilaterally.  No rales.  No rhonchi.  Unlabored respirations.  CARDIOVASCULAR: Normal S1, S2.  Normal rate.  Regular rhythm.  BREAST EXAM: There is mild hyperpigmentation of the skin of the left breast. Bilateral implants noted. The scar secondary to " lumpectomy is noted in the upper outer quadrant of the left breast. There is also a scar in the left axilla secondary to axillary dissection. No suspicious masses palpated in the left breast or left axilla. The right breast and right axilla are also without palpable masses.  ABDOMEN: Bowel sounds normal.  No tenderness.  No abdominal distention.  No hepatomegaly.  No splenomegaly.  EXTREMITIES: No clubbing, cyanosis, edema  NEUROLOGIC: Cranial nerves II through XII are grossly intact.  Sensation is intact.  Strength is 5 out of 5 in the upper and lower extremities bilaterally.    ASSESSMENT:   This is a 63-year-old female with c T2 N1 M0 (yp T1a N1a) infiltrating ductal carcinoma of the left breast who underwent neoadjuvant chemotherapy, lumpectomy /sentinel lymph node biopsy followed by axillary dissection, and postoperative radiation per [facility] protocol, for 0.2 cm residual invasive ductal carcinoma with associated 2 cm high-grade DCIS, with negative margins, and 2 out of 18 lymph nodes positive for involvement, for a lesion which is ER/TX negative and HER-2 positive.     PLAN:   Ms. Redman is recovering from the radiation without any unexpected side effects.  She will continue Herceptin as planned.  She will repeat a bilateral mammogram on April 23, 2018.  I plan to follow-up with the results of that.  I plan to see her back in approximately 6 months, unless symptoms warrant otherwise.

## 2018-04-12 ENCOUNTER — PATIENT MESSAGE (OUTPATIENT)
Dept: HEMATOLOGY/ONCOLOGY | Facility: CLINIC | Age: 63
End: 2018-04-12

## 2018-04-12 DIAGNOSIS — C50.919 MALIGNANT NEOPLASM OF FEMALE BREAST, UNSPECIFIED ESTROGEN RECEPTOR STATUS, UNSPECIFIED LATERALITY, UNSPECIFIED SITE OF BREAST: Primary | ICD-10-CM

## 2018-04-12 RX ORDER — GABAPENTIN 100 MG/1
100 CAPSULE ORAL 3 TIMES DAILY
Qty: 90 CAPSULE | Refills: 2 | Status: SHIPPED | OUTPATIENT
Start: 2018-04-12 | End: 2018-05-03 | Stop reason: ALTCHOICE

## 2018-04-23 ENCOUNTER — PATIENT MESSAGE (OUTPATIENT)
Dept: SURGERY | Facility: CLINIC | Age: 63
End: 2018-04-23

## 2018-04-23 ENCOUNTER — HOSPITAL ENCOUNTER (OUTPATIENT)
Dept: RADIOLOGY | Facility: HOSPITAL | Age: 63
Discharge: HOME | End: 2018-04-23
Attending: SURGERY
Payer: COMMERCIAL

## 2018-04-23 DIAGNOSIS — C50.912 MALIGNANT NEOPLASM OF LEFT BREAST IN FEMALE, ESTROGEN RECEPTOR POSITIVE, UNSPECIFIED SITE OF BREAST: ICD-10-CM

## 2018-04-23 DIAGNOSIS — Z17.0 MALIGNANT NEOPLASM OF LEFT BREAST IN FEMALE, ESTROGEN RECEPTOR POSITIVE, UNSPECIFIED SITE OF BREAST: ICD-10-CM

## 2018-04-23 PROCEDURE — 77062 BREAST TOMOSYNTHESIS BI: CPT | Mod: 26,,, | Performed by: RADIOLOGY

## 2018-04-23 PROCEDURE — 77066 DX MAMMO INCL CAD BI: CPT | Mod: TC,PO

## 2018-04-23 PROCEDURE — 76642 ULTRASOUND BREAST LIMITED: CPT | Mod: TC,PO,LT

## 2018-04-23 PROCEDURE — 76642 ULTRASOUND BREAST LIMITED: CPT | Mod: 26,LT,, | Performed by: RADIOLOGY

## 2018-04-23 PROCEDURE — 77066 DX MAMMO INCL CAD BI: CPT | Mod: 26,,, | Performed by: RADIOLOGY

## 2018-04-27 ENCOUNTER — INFUSION (OUTPATIENT)
Dept: INFUSION THERAPY | Facility: HOSPITAL | Age: 63
End: 2018-04-27
Attending: INTERNAL MEDICINE
Payer: COMMERCIAL

## 2018-04-27 ENCOUNTER — OFFICE VISIT (OUTPATIENT)
Dept: HEMATOLOGY/ONCOLOGY | Facility: CLINIC | Age: 63
End: 2018-04-27
Payer: COMMERCIAL

## 2018-04-27 ENCOUNTER — LAB VISIT (OUTPATIENT)
Dept: LAB | Facility: HOSPITAL | Age: 63
End: 2018-04-27
Attending: INTERNAL MEDICINE
Payer: COMMERCIAL

## 2018-04-27 VITALS
DIASTOLIC BLOOD PRESSURE: 79 MMHG | TEMPERATURE: 98 F | RESPIRATION RATE: 20 BRPM | BODY MASS INDEX: 22.16 KG/M2 | HEART RATE: 61 BPM | WEIGHT: 146.19 LBS | SYSTOLIC BLOOD PRESSURE: 152 MMHG | HEIGHT: 68 IN

## 2018-04-27 VITALS
HEIGHT: 68 IN | WEIGHT: 146.19 LBS | TEMPERATURE: 98 F | SYSTOLIC BLOOD PRESSURE: 136 MMHG | HEART RATE: 72 BPM | DIASTOLIC BLOOD PRESSURE: 77 MMHG | BODY MASS INDEX: 22.16 KG/M2 | RESPIRATION RATE: 20 BRPM

## 2018-04-27 DIAGNOSIS — G62.9 NEUROPATHY: ICD-10-CM

## 2018-04-27 DIAGNOSIS — C50.012 MALIGNANT NEOPLASM OF NIPPLE OF LEFT BREAST IN FEMALE, UNSPECIFIED ESTROGEN RECEPTOR STATUS: Primary | ICD-10-CM

## 2018-04-27 DIAGNOSIS — C50.912 HER2-POSITIVE CARCINOMA OF LEFT BREAST: ICD-10-CM

## 2018-04-27 DIAGNOSIS — C50.512 PRIMARY CANCER OF LOWER OUTER QUADRANT OF LEFT FEMALE BREAST: ICD-10-CM

## 2018-04-27 LAB
ALBUMIN SERPL BCP-MCNC: 4.3 G/DL
ALP SERPL-CCNC: 93 U/L
ALT SERPL W/O P-5'-P-CCNC: 36 U/L
ANION GAP SERPL CALC-SCNC: 11 MMOL/L
AST SERPL-CCNC: 34 U/L
BASOPHILS # BLD AUTO: 0.05 K/UL
BASOPHILS NFR BLD: 0.7 %
BILIRUB SERPL-MCNC: 0.3 MG/DL
BUN SERPL-MCNC: 17 MG/DL
CALCIUM SERPL-MCNC: 10.1 MG/DL
CHLORIDE SERPL-SCNC: 101 MMOL/L
CO2 SERPL-SCNC: 29 MMOL/L
CREAT SERPL-MCNC: 0.72 MG/DL
DIFFERENTIAL METHOD: ABNORMAL
EOSINOPHIL # BLD AUTO: 0.2 K/UL
EOSINOPHIL NFR BLD: 2.5 %
ERYTHROCYTE [DISTWIDTH] IN BLOOD BY AUTOMATED COUNT: 11.8 %
EST. GFR  (AFRICAN AMERICAN): >60 ML/MIN/1.73 M^2
EST. GFR  (NON AFRICAN AMERICAN): >60 ML/MIN/1.73 M^2
GLUCOSE SERPL-MCNC: 91 MG/DL
HCT VFR BLD AUTO: 39 %
HGB BLD-MCNC: 12.8 G/DL
LYMPHOCYTES # BLD AUTO: 1.2 K/UL
LYMPHOCYTES NFR BLD: 16.9 %
MCH RBC QN AUTO: 33.2 PG
MCHC RBC AUTO-ENTMCNC: 32.8 G/DL
MCV RBC AUTO: 101 FL
MONOCYTES # BLD AUTO: 0.6 K/UL
MONOCYTES NFR BLD: 8.2 %
NEUTROPHILS # BLD AUTO: 4.9 K/UL
NEUTROPHILS NFR BLD: 71.7 %
NRBC BLD-RTO: 0 /100 WBC
PLATELET # BLD AUTO: 239 K/UL
PMV BLD AUTO: 9.7 FL
POTASSIUM SERPL-SCNC: 4.4 MMOL/L
PROT SERPL-MCNC: 7.1 G/DL
RBC # BLD AUTO: 3.85 M/UL
SODIUM SERPL-SCNC: 141 MMOL/L
WBC # BLD AUTO: 6.79 K/UL

## 2018-04-27 PROCEDURE — 25000003 PHARM REV CODE 250: Mod: PN | Performed by: INTERNAL MEDICINE

## 2018-04-27 PROCEDURE — 63600175 PHARM REV CODE 636 W HCPCS: Mod: JW,TB,PN | Performed by: INTERNAL MEDICINE

## 2018-04-27 PROCEDURE — 85025 COMPLETE CBC W/AUTO DIFF WBC: CPT

## 2018-04-27 PROCEDURE — 96413 CHEMO IV INFUSION 1 HR: CPT | Mod: PN

## 2018-04-27 PROCEDURE — 85025 COMPLETE CBC W/AUTO DIFF WBC: CPT | Mod: PN

## 2018-04-27 PROCEDURE — 99999 PR PBB SHADOW E&M-EST. PATIENT-LVL III: CPT | Mod: PBBFAC,,, | Performed by: INTERNAL MEDICINE

## 2018-04-27 PROCEDURE — 80053 COMPREHEN METABOLIC PANEL: CPT | Mod: PN

## 2018-04-27 PROCEDURE — A4216 STERILE WATER/SALINE, 10 ML: HCPCS | Mod: PN | Performed by: INTERNAL MEDICINE

## 2018-04-27 PROCEDURE — 99215 OFFICE O/P EST HI 40 MIN: CPT | Mod: S$GLB,,, | Performed by: INTERNAL MEDICINE

## 2018-04-27 PROCEDURE — 80053 COMPREHEN METABOLIC PANEL: CPT

## 2018-04-27 PROCEDURE — 36415 COLL VENOUS BLD VENIPUNCTURE: CPT | Mod: PN

## 2018-04-27 RX ORDER — HEPARIN 100 UNIT/ML
500 SYRINGE INTRAVENOUS
Status: CANCELLED | OUTPATIENT
Start: 2018-05-18

## 2018-04-27 RX ORDER — SODIUM CHLORIDE 0.9 % (FLUSH) 0.9 %
10 SYRINGE (ML) INJECTION
Status: DISCONTINUED | OUTPATIENT
Start: 2018-04-27 | End: 2018-04-27 | Stop reason: HOSPADM

## 2018-04-27 RX ORDER — SODIUM CHLORIDE 0.9 % (FLUSH) 0.9 %
10 SYRINGE (ML) INJECTION
Status: CANCELLED | OUTPATIENT
Start: 2018-05-18

## 2018-04-27 RX ORDER — SODIUM CHLORIDE 0.9 % (FLUSH) 0.9 %
10 SYRINGE (ML) INJECTION
Status: CANCELLED | OUTPATIENT
Start: 2018-04-27

## 2018-04-27 RX ORDER — HEPARIN 100 UNIT/ML
500 SYRINGE INTRAVENOUS
Status: DISCONTINUED | OUTPATIENT
Start: 2018-04-27 | End: 2018-04-27 | Stop reason: HOSPADM

## 2018-04-27 RX ORDER — HEPARIN 100 UNIT/ML
500 SYRINGE INTRAVENOUS
Status: CANCELLED | OUTPATIENT
Start: 2018-04-27

## 2018-04-27 RX ADMIN — SODIUM CHLORIDE, PRESERVATIVE FREE 10 ML: 5 INJECTION INTRAVENOUS at 03:04

## 2018-04-27 RX ADMIN — SODIUM CHLORIDE: 9 INJECTION, SOLUTION INTRAVENOUS at 03:04

## 2018-04-27 RX ADMIN — TRASTUZUMAB 396 MG: 150 INJECTION, POWDER, LYOPHILIZED, FOR SOLUTION INTRAVENOUS at 03:04

## 2018-04-27 NOTE — PLAN OF CARE
Problem: Patient Care Overview  Goal: Plan of Care Review  Outcome: Ongoing (interventions implemented as appropriate)  Pt tolerated tx well. AVS given. Pt educated to call DR with any issues. Pt verbalized understanding.

## 2018-04-27 NOTE — PROGRESS NOTES
HISTORY OF PRESENT ILLNESS:  This is a 62-year-old white female for a  diagnosis of Stage IIB left breast carcinoma.  The patient discovered   a mass in her left breast and was seen by Dr. Arce.  She has received 4 cycles of   A/C ,completed 12 cycles weekly taxol and continues q 3 week herceptin/ perjeta    She has bouts of diarrhea that resolve easily with Imodium. She denies any fevers, chills, unexplained   weight loss, new breast complaints, vaginal bleeding, abdominal discomfort, nausea,   vomiting, diarrhea, mouth sores, etc.  No new complaints or pertinent findings on a   14 point review of systems.some fatigue ,went for lumpectomy and axillary eval , revealed 2 LN + hence had a disection  xrt is to be done in [place] per trial, pt here to continue herceptin / perjeta, she had hoped her axilla would be clear has xrt appt scheduled  Dr. Kunzould like to prevent cardiotoxicity from combined xrt/ herceptin /perjeta , she has since completed xrt, had echo wnl ef, here to continue and complete herceptin   feeling numbness to both feet and both hands, neuropathy has been getting worse. Pt tried nneurontin but had diarrhea and s/e and stopped it  PHYSICAL EXAMINATION:  Wt Readings from Last 3 Encounters:   04/27/18 66.3 kg (146 lb 2.6 oz)   04/11/18 65.9 kg (145 lb 3.2 oz)   04/06/18 64.8 kg (142 lb 13.7 oz)     Temp Readings from Last 3 Encounters:   04/27/18 97.8 °F (36.6 °C)   04/11/18 97.7 °F (36.5 °C) (Oral)   04/06/18 97.7 °F (36.5 °C) (Oral)     BP Readings from Last 3 Encounters:   04/27/18 (!) 152/79   04/11/18 131/74   04/06/18 113/68     Pulse Readings from Last 3 Encounters:   04/27/18 61   04/11/18 64   04/06/18 72     GENERAL:  Well-developed, well-nourished white female in no acute distress.    Alert and oriented x4.  :  HEENT:  Normocephalic, atraumatic.  Oral mucosa pink and moist.  Lips without   lesions.  Tongue midline.  Oropharynx clear.  Nonicteric sclerae.  NECK:  Supple, no  adenopathy.  No carotid bruits, thyromegaly or thyroid nodule.  HEART:  Regular rate and rhythm without murmur, gallop.  LUNGS:  Clear to auscultation bilaterally.  Normal respiratory effort.  ABDOMEN:  Soft, nontender, nondistended with positive normoactive bowel sounds,   no hepatosplenomegaly.  EXTREMITIES:  No cyanosis, clubbing or edema.  Distal pulses are intact.  AXILLAE AND GROIN:  No palpable pathologic lymphadenopathy is appreciated.  SKIN:  Intact/turgor normal.  NEUROLOGIC:  Cranial nerves II-XII grossly intact.  Motor:  Good muscle bulk and   tone.  Strength/sensory 5/5 throughout.  Gait stable.    LABORATORY:    Lab Results   Component Value Date    WBC 6.79 04/27/2018    HGB 12.8 04/27/2018    HCT 39.0 04/27/2018     (H) 04/27/2018     04/27/2018     Differential remarkable for 71.3% grans, 5% lymph, 17% monos    CMP  Sodium   Date Value Ref Range Status   04/27/2018 141 136 - 145 mmol/L Final     Potassium   Date Value Ref Range Status   04/27/2018 4.4 3.5 - 5.1 mmol/L Final     Chloride   Date Value Ref Range Status   04/27/2018 101 95 - 110 mmol/L Final     CO2   Date Value Ref Range Status   04/27/2018 29 22 - 31 mmol/L Final     Glucose   Date Value Ref Range Status   04/27/2018 91 70 - 110 mg/dL Final     Comment:     The ADA recommends the following guidelines for fasting glucose:  Normal:       less than 100 mg/dL  Prediabetes:  100 mg/dL to 125 mg/dL  Diabetes:     126 mg/dL or higher       BUN, Bld   Date Value Ref Range Status   04/27/2018 17 7 - 18 mg/dL Final     Creatinine   Date Value Ref Range Status   04/27/2018 0.72 0.50 - 1.40 mg/dL Final     Calcium   Date Value Ref Range Status   04/27/2018 10.1 8.4 - 10.2 mg/dL Final     Total Protein   Date Value Ref Range Status   04/27/2018 7.1 6.0 - 8.4 g/dL Final     Albumin   Date Value Ref Range Status   04/27/2018 4.3 3.5 - 5.2 g/dL Final     Total Bilirubin   Date Value Ref Range Status   04/27/2018 0.3 0.2 - 1.3 mg/dL  Final     Alkaline Phosphatase   Date Value Ref Range Status   04/27/2018 93 38 - 145 U/L Final     AST   Date Value Ref Range Status   04/27/2018 34 14 - 36 U/L Final     ALT   Date Value Ref Range Status   04/27/2018 36 10 - 44 U/L Final     Anion Gap   Date Value Ref Range Status   04/27/2018 11 8 - 16 mmol/L Final     eGFR if    Date Value Ref Range Status   04/27/2018 >60 >60 mL/min/1.73 m^2 Final     eGFR if non    Date Value Ref Range Status   04/27/2018 >60 >60 mL/min/1.73 m^2 Final     Comment:     Calculation used to obtain the estimated glomerular filtration  rate (eGFR) is the CKD-EPI equation.      ECHO wnl  CONCLUSIONS     1 - Normal left ventricular systolic function (EF 55-60%). 2/2018    2 - Normal left ventricular diastolic function.     3 - Normal right ventricular systolic function .     4 - Trivial mitral regurgitation.     5 - Trivial tricuspid regurgitation.     6 - The estimated PA systolic pressure is 26 mmHg.   5.       Left breast, lumpectomy:  - Residual invasive ductal carcinoma, moderately differentiated, [place] Grade 2 (3+2+1=6), 0.2 cm in  greatest linear microscopic dimension.  - Ductal carcinoma in situ (DCIS), high nuclear grade, 2 cm, solid and cribriform types.  - Surgical margins are free of tumor; invasive carcinoma is located 0.1 cm from the closest margin  IMPRESSION:  Stage IIB left breast carcinoma (ER/OH negative, Her-2/clay positive)  Completed neoadjuvant AC and weekly taxol now on herceptin and I stopped perjeta since she also had xrt to left side due o fear of toxicity  , s/p recent lumpectomy and axillary disection , with 2 residual Ln positive  PLAN:  1.  Proceed with   herceptin to complete   will watch neuropathy, pt hoping to  Try medical marijuana when available neurology consult   rtc 3 weeks for next herceptin with cbc, cmp

## 2018-04-27 NOTE — PLAN OF CARE
Problem: Chemotherapy Effects (Adult)  Goal: Signs and Symptoms of Listed Potential Problems Will be Absent, Minimized or Managed (Chemotherapy Effects)  Signs and symptoms of listed potential problems will be absent, minimized or managed by discharge/transition of care (reference Chemotherapy Effects (Adult) CPG).   Outcome: Ongoing (interventions implemented as appropriate)  Pt receiving herceptin on shift. No new complaints. Pt continues to have neuropathy to hands and feet and generalized fatigue. Will continue to monitor.

## 2018-05-03 ENCOUNTER — OFFICE VISIT (OUTPATIENT)
Dept: NEUROLOGY | Facility: CLINIC | Age: 63
End: 2018-05-03
Payer: COMMERCIAL

## 2018-05-03 ENCOUNTER — LAB VISIT (OUTPATIENT)
Dept: LAB | Facility: HOSPITAL | Age: 63
End: 2018-05-03
Attending: INTERNAL MEDICINE
Payer: COMMERCIAL

## 2018-05-03 VITALS
HEART RATE: 67 BPM | DIASTOLIC BLOOD PRESSURE: 83 MMHG | WEIGHT: 145.38 LBS | RESPIRATION RATE: 20 BRPM | BODY MASS INDEX: 22.03 KG/M2 | HEIGHT: 68 IN | SYSTOLIC BLOOD PRESSURE: 140 MMHG

## 2018-05-03 DIAGNOSIS — C50.912 HER2-POSITIVE CARCINOMA OF LEFT BREAST: Primary | ICD-10-CM

## 2018-05-03 DIAGNOSIS — T45.1X5A CHEMOTHERAPY-INDUCED PERIPHERAL NEUROPATHY: ICD-10-CM

## 2018-05-03 DIAGNOSIS — G62.0 CHEMOTHERAPY-INDUCED PERIPHERAL NEUROPATHY: ICD-10-CM

## 2018-05-03 DIAGNOSIS — Z11.59 NEED FOR HEPATITIS C SCREENING TEST: ICD-10-CM

## 2018-05-03 LAB
ERYTHROCYTE [SEDIMENTATION RATE] IN BLOOD BY WESTERGREN METHOD: 14 MM/HR
VIT B12 SERPL-MCNC: 1870 PG/ML

## 2018-05-03 PROCEDURE — 86039 ANTINUCLEAR ANTIBODIES (ANA): CPT

## 2018-05-03 PROCEDURE — 84207 ASSAY OF VITAMIN B-6: CPT

## 2018-05-03 PROCEDURE — 99205 OFFICE O/P NEW HI 60 MIN: CPT | Mod: S$GLB,,, | Performed by: PSYCHIATRY & NEUROLOGY

## 2018-05-03 PROCEDURE — 84165 PROTEIN E-PHORESIS SERUM: CPT | Mod: 26,,, | Performed by: PATHOLOGY

## 2018-05-03 PROCEDURE — 84165 PROTEIN E-PHORESIS SERUM: CPT

## 2018-05-03 PROCEDURE — 86235 NUCLEAR ANTIGEN ANTIBODY: CPT

## 2018-05-03 PROCEDURE — 82607 VITAMIN B-12: CPT

## 2018-05-03 PROCEDURE — 86803 HEPATITIS C AB TEST: CPT

## 2018-05-03 PROCEDURE — 85651 RBC SED RATE NONAUTOMATED: CPT

## 2018-05-03 PROCEDURE — 84425 ASSAY OF VITAMIN B-1: CPT

## 2018-05-03 PROCEDURE — 86334 IMMUNOFIX E-PHORESIS SERUM: CPT | Mod: 26,,, | Performed by: PATHOLOGY

## 2018-05-03 PROCEDURE — 86334 IMMUNOFIX E-PHORESIS SERUM: CPT

## 2018-05-03 PROCEDURE — 86256 FLUORESCENT ANTIBODY TITER: CPT

## 2018-05-03 PROCEDURE — 3008F BODY MASS INDEX DOCD: CPT | Mod: CPTII,S$GLB,, | Performed by: PSYCHIATRY & NEUROLOGY

## 2018-05-03 PROCEDURE — 86235 NUCLEAR ANTIGEN ANTIBODY: CPT | Mod: 59

## 2018-05-03 PROCEDURE — 86038 ANTINUCLEAR ANTIBODIES: CPT

## 2018-05-03 PROCEDURE — 36415 COLL VENOUS BLD VENIPUNCTURE: CPT | Mod: PO

## 2018-05-03 PROCEDURE — 99999 PR PBB SHADOW E&M-EST. PATIENT-LVL III: CPT | Mod: PBBFAC,,, | Performed by: PSYCHIATRY & NEUROLOGY

## 2018-05-03 NOTE — PROGRESS NOTES
Subjective:      5/8/2018       Patient ID: Ashlie Redman is a right handed 63 y.o.  female who presents for chemotherapy induced neuropathy    History of Present Illness    Reviewed records in [facility].  The patient is self-referred.  She has a history of stage IIB left-sided breast carcinoma. From most recent heme/onc clinic notes with Dr. Ragsdale:    This is a 62-year-old white female for a  diagnosis of Stage IIB left breast carcinoma.  The patient discovered   a mass in her left breast and was seen by Dr. Arce.  She has received 4 cycles of   A/C ,completed 12 cycles weekly taxol and continues q 3 week herceptin/ perjeta    She has bouts of diarrhea that resolve easily with Imodium. She denies any fevers, chills, unexplained   weight loss, new breast complaints, vaginal bleeding, abdominal discomfort, nausea,   vomiting, diarrhea, mouth sores, etc.  No new complaints or pertinent findings on a   14 point review of systems.some fatigue ,went for lumpectomy and axillary eval , revealed 2 LN + hence had a disection  xrt is to be done in [place] per trial, pt here to continue herceptin / perjeta, she had hoped her axilla would be clear has xrt appt scheduled  Dr. Kunzould like to prevent cardiotoxicity from combined xrt/ herceptin /perjeta , she has since completed xrt, had echo wnl ef, here to continue and complete herceptin   feeling numbness to both feet and both hands, neuropathy has been getting worse. Pt tried nneurontin but had diarrhea and s/e and stopped it    No hx of DM or excessive drinking.  She started on A/C chemotherapy in May 2017 followed by Taxol; she feels that after the first Taxol treatment she became aware of tingling in her fingertips; was only briefly present during the first infusion, went for surgery in November.  Stopped herceptin to prevent cardiotoxicity with XRT, and started to notice the numbness in her fingertips again at the end of Feb 2018.  Has gradually progressed,  further along her fingers, toes and feet, up the left leg to just below the knee. Felt it was progressing perhaps in March, and again 3 weeks later with her next dose of herceptin.     Did not tolerate GBP due to mm cramps, HA, diarrhea, could not focus.     Reviewed medications ; most likely culprit is the Taxol, with perjeta holding about 8% incidence of sensory neuropathy and Herceptin approximately 1%    More numbness, dullness, like wearing gloves.  Most aware of sensation in the AM, does not feel her feet on the floor. Does not go barefoot.  Did not feel razor when shaving her legs.  Has noticed some impairment in her balance.  Not necessary worse in the dark or with the eyes closed.  No falls.  She does experience some lightheadedness when she stands up, has had an episode of syncope but suspects this was from eye drops and historically low blood pressure. No constipation but she does have prolapsed bladder; no incontinence, can hold it.  Has horrible hot flashes, she does become diaphoretic.       Review of patient's allergies indicates:   Allergen Reactions    Bactrim [sulfamethoxazole-trimethoprim] Rash     Fever, vomiting     Current Outpatient Prescriptions   Medication Sig Dispense Refill    diphenoxylate-atropine 2.5-0.025 mg (LOMOTIL) 2.5-0.025 mg per tablet Take 1 tablet by mouth 4 (four) times daily as needed for Diarrhea. 30 tablet 1    dorzolamide (TRUSOPT) 2 % ophthalmic solution Place 1 drop into both eyes 3 (three) times daily. 30 mL 3    hydrocodone-acetaminophen 5-325mg (NORCO) 5-325 mg per tablet Take 1 tablet by mouth every 6 (six) hours as needed for Pain. 30 tablet 0    latanoprost 0.005 % ophthalmic solution INSTILL ONE DROP INTO EACH EYE ONCE DAILY IN THE EVENING 8 mL 2    lidocaine-prilocaine (EMLA) cream Apply topically once.      venlafaxine (EFFEXOR XR) 75 MG 24 hr capsule Take 1 capsule (75 mg total) by mouth once daily. 30 capsule 2    nitrofurantoin (MACRODANTIN) 100 MG  capsule Take 1 capsule (100 mg total) by mouth every 12 (twelve) hours. 20 capsule 0     No current facility-administered medications for this visit.        Past Medical History  Past Medical History:   Diagnosis Date    Allergy     Anemia     Asteroid hyalosis of both eyes     Breast cancer 04/2017    Left    Cataract     Diverticulosis     Encounter for blood transfusion     Glaucoma     High myopia     Osteopenia     S/P dilation and curettage        Past Surgical History  Past Surgical History:   Procedure Laterality Date    breast augmentation  03/2013    BREAST BIOPSY Left 04/2017    Core bx,+ cancer    BREAST LUMPECTOMY Left 10/25/2017    CATARACT EXTRACTION W/  INTRAOCULAR LENS IMPLANT Bilateral 2009    COLONOSCOPY  4/2009, 2015    repeat in 5 years    DILATION AND CURETTAGE OF UTERUS      ECTOPIC PREGNANCY SURGERY      EYE SURGERY Bilateral 3/09    Vitrectomy     POLYPECTOMY      x3 2001    Yag Capsulotomy Bilateral 12/2014       Family History  Family History   Problem Relation Age of Onset    Cancer Sister         rectal; passed    Breast cancer Cousin     Cancer Cousin         breast    Heart disease Father         passed    Colon cancer Mother         hospice    Pancreatic cancer Mother         39yo; 89yo    Cataracts Mother     Hypertension Mother     Thyroid disease Mother     Cancer Mother         colon    Glaucoma Maternal Grandmother     Amblyopia Neg Hx     Blindness Neg Hx     Diabetes Neg Hx     Macular degeneration Neg Hx     Retinal detachment Neg Hx     Strabismus Neg Hx     Stroke Neg Hx        Social History  Social History     Social History    Marital status:      Spouse name: N/A    Number of children: N/A    Years of education: N/A     Occupational History     [facility] (Clinics)     Social History Main Topics    Smoking status: Never Smoker    Smokeless tobacco: Never Used    Alcohol use No      Comment: Quit    Drug  use: No    Sexual activity: Yes     Partners: Male     Birth control/ protection: Post-menopausal     Other Topics Concern    Are You Pregnant Or Think You May Be? No     Social History Narrative    No narrative on file        Review of Systems  Review of Systems    Objective:        Vitals:    05/03/18 1501   BP: (!) 140/83   Pulse: 67   Resp: 20     Body mass index is 22.11 kg/m².  Neurologic Exam     Mental Status   Oriented to person, place, and time.   Attention: normal. Concentration: normal.   Speech: speech is normal   Level of consciousness: alert  Knowledge: good.   Able to name object. Able to repeat. Normal comprehension.     Cranial Nerves   Cranial nerves II through XII intact.     CN II   Visual fields full to confrontation.     CN III, IV, VI   Pupils are equal, round, and reactive to light.  Extraocular motions are normal.   CN III: no CN III palsy  CN VI: no CN VI palsy    CN V   Facial sensation intact.     CN VII   Facial expression full, symmetric.     CN IX, X   CN IX normal.   CN X normal.     CN XI   CN XI normal.     CN XII   CN XII normal.     Motor Exam   Muscle bulk: normal  Right arm pronator drift: absent  Left arm pronator drift: absent    Strength   Strength 5/5 throughout.     Sensory Exam   Right arm light touch: decreased from fingers  Left arm light touch: decreased from fingers  Right leg light touch: decreased from toes  Left leg light touch: decreased from toes  Right leg vibration: decreased from ankle  Left leg vibration: decreased from ankle  Right arm pinprick: decreased from fingers  Left arm pinprick: decreased from fingers  Right leg pinprick: decreased from ankle  Left leg pinprick: decreased from ankle  Diminished pin sensitivity at the tips of her fingers and to mid foot; temp sensitivity is impaired to below the knees.  Early vib dropout at the ankles, absent at the toes    Intact graphesthesia, struggled with stereognosis (coin, paperclip)      Gait,  Coordination, and Reflexes     Gait  Gait: normal    Coordination   Romberg: positive  Finger to nose coordination: normal    Reflexes   Right brachioradialis: 0  Left brachioradialis: 0  Right biceps: 1+  Left biceps: 0  Right triceps: 0  Left triceps: 0  Right patellar: 0  Left patellar: 0  Right achilles: 0  Left achilles: 0  Right plantar: normal  Left plantar: normal      Physical Exam   Constitutional: She is oriented to person, place, and time.   Eyes: EOM are normal. Pupils are equal, round, and reactive to light.   Neurological: She is oriented to person, place, and time. She has normal strength. She has an abnormal Romberg Test. She has a normal Finger-Nose-Finger Test. Gait normal.   Reflex Scores:       Tricep reflexes are 0 on the right side and 0 on the left side.       Bicep reflexes are 1+ on the right side and 0 on the left side.       Brachioradialis reflexes are 0 on the right side and 0 on the left side.       Patellar reflexes are 0 on the right side and 0 on the left side.       Achilles reflexes are 0 on the right side and 0 on the left side.  Psychiatric: Her speech is normal.         Data Review:     Results for KENNA CHOE (MRN [id number]) as of 5/3/2018 15:11   Ref. Range 4/27/2018 13:11   WBC Latest Ref Range: 3.90 - 12.70 K/uL 6.79   RBC Latest Ref Range: 4.00 - 5.40 M/uL 3.85 (L)   Hemoglobin Latest Ref Range: 12.0 - 16.0 g/dL 12.8   Hematocrit Latest Ref Range: 37.0 - 48.5 % 39.0   MCV Latest Ref Range: 82 - 98 fL 101 (H)   MCH Latest Ref Range: 27.0 - 31.0 pg 33.2 (H)   MCHC Latest Ref Range: 32.0 - 36.0 g/dL 32.8   RDW Latest Ref Range: 11.5 - 14.5 % 11.8   Platelets Latest Ref Range: 150 - 350 K/uL 239   MPV Latest Ref Range: 9.2 - 12.9 fL 9.7   Gran% Latest Ref Range: 38.0 - 73.0 % 71.7   Gran # (ANC) Latest Ref Range: 1.8 - 7.7 K/uL 4.9   Lymph% Latest Ref Range: 18.0 - 48.0 % 16.9 (L)   Lymph # Latest Ref Range: 1.0 - 4.8 K/uL 1.2   Mono% Latest Ref Range: 4.0 - 15.0 % 8.2   Mono  # Latest Ref Range: 0.3 - 1.0 K/uL 0.6   Eosinophil% Latest Ref Range: 0.0 - 8.0 % 2.5   Eos # Latest Ref Range: 0.0 - 0.5 K/uL 0.2   Basophil% Latest Ref Range: 0.0 - 1.9 % 0.7   Baso # Latest Ref Range: 0.00 - 0.20 K/uL 0.05   nRBC Latest Ref Range: 0 /100 WBC 0   Sodium Latest Ref Range: 136 - 145 mmol/L 141   Potassium Latest Ref Range: 3.5 - 5.1 mmol/L 4.4   Chloride Latest Ref Range: 95 - 110 mmol/L 101   CO2 Latest Ref Range: 22 - 31 mmol/L 29   Anion Gap Latest Ref Range: 8 - 16 mmol/L 11   BUN, Bld Latest Ref Range: 7 - 18 mg/dL 17   Creatinine Latest Ref Range: 0.50 - 1.40 mg/dL 0.72   eGFR if non African American Latest Ref Range: >60 mL/min/1.73 m^2 >60   eGFR if  Latest Ref Range: >60 mL/min/1.73 m^2 >60   Glucose Latest Ref Range: 70 - 110 mg/dL 91   Calcium Latest Ref Range: 8.4 - 10.2 mg/dL 10.1   Alkaline Phosphatase Latest Ref Range: 38 - 145 U/L 93   Total Protein Latest Ref Range: 6.0 - 8.4 g/dL 7.1   Albumin Latest Ref Range: 3.5 - 5.2 g/dL 4.3   Total Bilirubin Latest Ref Range: 0.2 - 1.3 mg/dL 0.3   AST Latest Ref Range: 14 - 36 U/L 34   ALT Latest Ref Range: 10 - 44 U/L 36     Assessment:       1. HER2-positive carcinoma of left breast    2. Chemotherapy-induced peripheral neuropathy        Plan:         Problem List Items Addressed This Visit        Neuro    Chemotherapy-induced peripheral neuropathy    Current Assessment & Plan     Peripheral neuropathy as a result of chemotherapeutic agents is typically a monophasic event; may or may not improve or resolve over time but would not be expected to progress.  Will have to consider additional neuropathic pain medications, make sure there is no potential interaction with future therapies.    Check for additional potential toxic causes, nutritional causes, inflammatory or autoimmune processes.  Watch for autonomic symptoms such as lightheadedness with position changes or orthostatic hypotension         Relevant Orders    KRISTOPHER  (Completed)    Immunofixation electrophoresis (Completed)    Protein electrophoresis, serum (Completed)    Sedimentation rate, manual (Completed)    Sjogrens syndrome-A extractable nuclear antibody (Completed)    Vitamin B1 (Completed)    Vitamin B12 (Completed)    Vitamin B6 (Completed)    Sjogrens syndrome-B extractable nuclear antibody (Completed)    Paraneoplastic Autoantibody Evaulation, Serum       Oncology    HER2-positive carcinoma of left breast - Primary        Over 60 minutes time spent with patient, > 50% in discussion and counseling with patient regarding diagnosis, prognosis and treatment strategies    Follow-up in about 3 months (around 8/3/2018).        Patient information shared at the time of visit:  We will check some lab studies for alternative or concurrent causes of neuropathy, and for the time being I think we can hold off on any electrical studies.      From a medications perspective, some options to consider would be a) switch from the effexor to cymbalta, or consider Lyrica, or nortriptyline / amitriptyline    Thank you very much for the opportunity to assist in this patient's care.  If you have any questions or concerns, please do not hesitate to contact me at any time.     Sincerely,  Abelardo Mayo, DO

## 2018-05-03 NOTE — LETTER
May 8, 2018      Kevin Gong MD  [address] [facility] [address] [place] [address]           [facility]  [address] [facility] [address] [place] [address]  Phone: [phone]  Fax: [phone]          Patient: Ashlie Redman   MR Number: [id number]   YOB: 1955   Date of Visit: 5/3/2018       Dear Dr. Kevin Gong:    Thank you for referring Ashlie Redman to me for evaluation. Attached you will find relevant portions of my assessment and plan of care.    If you have questions, please do not hesitate to call me. I look forward to following Ashlie Redman along with you.    Sincerely,        Enclosure  CC:  No Recipients    If you would like to receive this communication electronically, please contact [email or web address] or [phone] to request more information on [employer] access.    For providers and/or their staff who would like to refer a patient to [facility], please contact us through our one-stop-shop provider referral line, Tian Kumari, at [phone].    If you feel you have received this communication in error or would no longer like to receive these types of communications, please e-mail [email or web address]

## 2018-05-03 NOTE — PATIENT INSTRUCTIONS
We will check some lab studies for alternative or concurrent causes of neuropathy, and for the time being I think we can hold off on any electrical studies.      From a medications perspective, some options to consider would be a) switch from the effexor to cymbalta, or consider Lyrica, or nortriptyline / amitriptyline

## 2018-05-04 ENCOUNTER — PATIENT MESSAGE (OUTPATIENT)
Dept: SURGERY | Facility: CLINIC | Age: 63
End: 2018-05-04

## 2018-05-04 LAB
ALBUMIN SERPL ELPH-MCNC: 4.43 G/DL
ALPHA1 GLOB SERPL ELPH-MCNC: 0.32 G/DL
ALPHA2 GLOB SERPL ELPH-MCNC: 0.82 G/DL
ANA SER QL IF: POSITIVE
ANA TITR SER IF: NORMAL {TITER}
B-GLOBULIN SERPL ELPH-MCNC: 0.9 G/DL
GAMMA GLOB SERPL ELPH-MCNC: 1.04 G/DL
HCV AB SERPL QL IA: NEGATIVE
INTERPRETATION SERPL IFE-IMP: NORMAL
PATHOLOGIST INTERPRETATION IFE: NORMAL
PATHOLOGIST INTERPRETATION SPE: NORMAL
PROT SERPL-MCNC: 7.5 G/DL

## 2018-05-05 LAB
ANTI-SSA ANTIBODY: 1.24 EU
ANTI-SSA INTERPRETATION: NEGATIVE
ANTI-SSB ANTIBODY: 0.56 EU
ANTI-SSB INTERPRETATION: NEGATIVE

## 2018-05-06 ENCOUNTER — OFFICE VISIT (OUTPATIENT)
Dept: URGENT CARE | Facility: CLINIC | Age: 63
End: 2018-05-06
Payer: COMMERCIAL

## 2018-05-06 VITALS
OXYGEN SATURATION: 99 % | HEIGHT: 68 IN | DIASTOLIC BLOOD PRESSURE: 83 MMHG | HEART RATE: 65 BPM | TEMPERATURE: 97 F | RESPIRATION RATE: 16 BRPM | SYSTOLIC BLOOD PRESSURE: 147 MMHG | BODY MASS INDEX: 21.98 KG/M2 | WEIGHT: 145 LBS

## 2018-05-06 DIAGNOSIS — N30.00 ACUTE CYSTITIS WITHOUT HEMATURIA: Primary | ICD-10-CM

## 2018-05-06 LAB
BILIRUB UR QL STRIP: POSITIVE
GLUCOSE UR QL STRIP: POSITIVE
KETONES UR QL STRIP: NEGATIVE
LEUKOCYTE ESTERASE UR QL STRIP: POSITIVE
PH, POC UA: 7.5 (ref 5–8)
POC BLOOD, URINE: NEGATIVE
POC NITRATES, URINE: POSITIVE
PROT UR QL STRIP: NEGATIVE
SP GR UR STRIP: 1.01 (ref 1–1.03)
UROBILINOGEN UR STRIP-ACNC: 1 (ref 0.1–1.1)

## 2018-05-06 PROCEDURE — 3008F BODY MASS INDEX DOCD: CPT | Mod: CPTII,S$GLB,, | Performed by: INTERNAL MEDICINE

## 2018-05-06 PROCEDURE — 81003 URINALYSIS AUTO W/O SCOPE: CPT | Mod: QW,S$GLB,, | Performed by: INTERNAL MEDICINE

## 2018-05-06 PROCEDURE — 99213 OFFICE O/P EST LOW 20 MIN: CPT | Mod: 25,S$GLB,, | Performed by: INTERNAL MEDICINE

## 2018-05-06 RX ORDER — NITROFURANTOIN (MACROCRYSTALS) 100 MG/1
100 CAPSULE ORAL EVERY 12 HOURS
Qty: 20 CAPSULE | Refills: 0 | Status: SHIPPED | OUTPATIENT
Start: 2018-05-06 | End: 2018-05-16

## 2018-05-06 NOTE — PROGRESS NOTES
"Subjective:       Patient ID: Ashlie Redman is a 63 y.o. female.    Vitals:  height is 5' 8" (1.727 m) and weight is 65.8 kg (145 lb). Her oral temperature is 97 °F (36.1 °C). Her blood pressure is 147/83 (abnormal) and her pulse is 65. Her respiration is 16 and oxygen saturation is 99%.     Chief Complaint: Urinary Tract Infection    Her last UTI was in February; she took pyridium left that was left over for discomfort. She reports that she has a prolapsed bladder        Urinary Tract Infection    This is a new problem. The current episode started yesterday. The problem has been gradually worsening. The quality of the pain is described as aching, burning and stabbing. The pain is at a severity of 7/10. The pain is mild. There has been no fever. She is sexually active. Associated symptoms include frequency and urgency. Pertinent negatives include no chills, flank pain, hematuria, nausea or vomiting. She has tried nothing for the symptoms. The treatment provided no relief.     Review of Systems   Constitution: Negative for chills and fever.   Musculoskeletal: Negative for back pain.   Gastrointestinal: Negative for abdominal pain, nausea and vomiting.   Genitourinary: Positive for dysuria, frequency and urgency. Negative for flank pain, hematuria, non-menstrual bleeding and pelvic pain.       Objective:      Physical Exam   Constitutional: She is oriented to person, place, and time. She appears well-developed and well-nourished. No distress.   HENT:   Nose: No nasal deformity. No epistaxis.   Mouth/Throat: Mucous membranes are normal.   Eyes: Conjunctivae and lids are normal.   Neck: Trachea normal, normal range of motion and phonation normal. Neck supple.   Cardiovascular: Normal rate, regular rhythm and normal pulses.    Pulmonary/Chest: Effort normal.   Abdominal: Soft. Normal appearance and bowel sounds are normal. She exhibits no distension. There is tenderness. There is no guarding and no CVA tenderness. "   Suprapubic discomfort on palpation   Neurological: She is alert and oriented to person, place, and time.   Skin: Skin is warm, dry and intact.   Psychiatric: She has a normal mood and affect. Her speech is normal. Cognition and memory are normal.   Nursing note and vitals reviewed.      Assessment:       1. Acute cystitis without hematuria        Plan:         Acute cystitis without hematuria  -     POCT Urinalysis, Dipstick, Manual, W/O Scope  -     Urine culture  -     nitrofurantoin (MACRODANTIN) 100 MG capsule; Take 1 capsule (100 mg total) by mouth every 12 (twelve) hours.  Dispense: 20 capsule; Refill: 0

## 2018-05-06 NOTE — PATIENT INSTRUCTIONS

## 2018-05-07 LAB
ANTI SM ANTIBODY: 0.33 EU
ANTI SM/RNP ANTIBODY: 0.45 EU
ANTI-SM INTERPRETATION: NEGATIVE
ANTI-SM/RNP INTERPRETATION: NEGATIVE
ANTI-SSA ANTIBODY: 1.24 EU
ANTI-SSA INTERPRETATION: NEGATIVE
ANTI-SSB ANTIBODY: 0.56 EU
ANTI-SSB INTERPRETATION: NEGATIVE
DSDNA AB SER-ACNC: NORMAL [IU]/ML

## 2018-05-08 ENCOUNTER — PATIENT MESSAGE (OUTPATIENT)
Dept: NEUROLOGY | Facility: CLINIC | Age: 63
End: 2018-05-08

## 2018-05-08 LAB
PYRIDOXAL SERPL-MCNC: 75 UG/L (ref 5–50)
VIT B1 SERPL-MCNC: 53 UG/L (ref 38–122)

## 2018-05-08 NOTE — ASSESSMENT & PLAN NOTE
Peripheral neuropathy as a result of chemotherapeutic agents is typically a monophasic event; may or may not improve or resolve over time but would not be expected to progress.  Will have to consider additional neuropathic pain medications, make sure there is no potential interaction with future therapies.    Check for additional potential toxic causes, nutritional causes, inflammatory or autoimmune processes.  Watch for autonomic symptoms such as lightheadedness with position changes or orthostatic hypotension

## 2018-05-09 ENCOUNTER — TELEPHONE (OUTPATIENT)
Dept: URGENT CARE | Facility: CLINIC | Age: 63
End: 2018-05-09

## 2018-05-09 ENCOUNTER — PATIENT MESSAGE (OUTPATIENT)
Dept: NEUROLOGY | Facility: CLINIC | Age: 63
End: 2018-05-09

## 2018-05-11 ENCOUNTER — TELEPHONE (OUTPATIENT)
Dept: URGENT CARE | Facility: CLINIC | Age: 63
End: 2018-05-11

## 2018-05-11 LAB
BACTERIA UR CULT: NORMAL
BACTERIA UR CULT: NORMAL

## 2018-05-11 NOTE — TELEPHONE ENCOUNTER
Left msg to follow up evelyn    ----- Message from Manoj Joyner PA-C sent at 5/11/2018  8:16 AM CDT -----  Please call the patient regarding her abnormal result. Continue abx until empty

## 2018-05-17 LAB
ACHR BIND AB SER-SCNC: 0 NMOL/L
AMPHIPHYSIN AB TITR SER: NEGATIVE TITER
CV2 IGG TITR SER: NEGATIVE TITER
GLIAL NUC TYPE 1 AB TITR SER: NEGATIVE TITER
HU1 AB TITR SER: NEGATIVE TITER
HU2 AB TITR SER IF: NEGATIVE TITER
HU3 AB TITR SER: NEGATIVE TITER
IMMUNOLOGIST REVIEW: NORMAL
NACHR AB SER-SCNC: 0 NMOL/L
PAVAL REFLEX TEST ADDED: NORMAL
PCA-1 AB TITR SER: NEGATIVE TITER
PCA-2 AB TITR SER: NEGATIVE TITER
PCA-TR AB TITR SER: NEGATIVE TITER
STRIA MUS AB TITR SER: NEGATIVE TITER
VGCC-N BIND AB SER-SCNC: 0 NMOL/L
VGCC-P/Q BIND AB SER-SCNC: 0 NMOL/L
VGKC AB SER-SCNC: 0 NMOL/L

## 2018-05-18 ENCOUNTER — LAB VISIT (OUTPATIENT)
Dept: LAB | Facility: HOSPITAL | Age: 63
End: 2018-05-18
Attending: INTERNAL MEDICINE
Payer: COMMERCIAL

## 2018-05-18 ENCOUNTER — OFFICE VISIT (OUTPATIENT)
Dept: HEMATOLOGY/ONCOLOGY | Facility: CLINIC | Age: 63
End: 2018-05-18
Payer: COMMERCIAL

## 2018-05-18 ENCOUNTER — INFUSION (OUTPATIENT)
Dept: INFUSION THERAPY | Facility: HOSPITAL | Age: 63
End: 2018-05-18
Attending: INTERNAL MEDICINE
Payer: COMMERCIAL

## 2018-05-18 VITALS
RESPIRATION RATE: 20 BRPM | WEIGHT: 146.19 LBS | DIASTOLIC BLOOD PRESSURE: 76 MMHG | HEIGHT: 68 IN | SYSTOLIC BLOOD PRESSURE: 132 MMHG | HEART RATE: 65 BPM | BODY MASS INDEX: 22.16 KG/M2 | TEMPERATURE: 98 F

## 2018-05-18 VITALS
DIASTOLIC BLOOD PRESSURE: 60 MMHG | HEIGHT: 68 IN | BODY MASS INDEX: 22.16 KG/M2 | OXYGEN SATURATION: 99 % | TEMPERATURE: 98 F | SYSTOLIC BLOOD PRESSURE: 116 MMHG | RESPIRATION RATE: 16 BRPM | HEART RATE: 73 BPM | WEIGHT: 146.19 LBS

## 2018-05-18 DIAGNOSIS — C50.912 HER2-POSITIVE CARCINOMA OF LEFT BREAST: ICD-10-CM

## 2018-05-18 DIAGNOSIS — C50.512 PRIMARY CANCER OF LOWER OUTER QUADRANT OF LEFT FEMALE BREAST: ICD-10-CM

## 2018-05-18 DIAGNOSIS — C50.012 MALIGNANT NEOPLASM OF NIPPLE OF LEFT BREAST IN FEMALE, UNSPECIFIED ESTROGEN RECEPTOR STATUS: Primary | ICD-10-CM

## 2018-05-18 DIAGNOSIS — G62.0 CHEMOTHERAPY-INDUCED PERIPHERAL NEUROPATHY: ICD-10-CM

## 2018-05-18 DIAGNOSIS — T45.1X5A CHEMOTHERAPY-INDUCED PERIPHERAL NEUROPATHY: ICD-10-CM

## 2018-05-18 DIAGNOSIS — C50.011 MALIGNANT NEOPLASM OF NIPPLE OF RIGHT BREAST IN FEMALE, UNSPECIFIED ESTROGEN RECEPTOR STATUS: Primary | ICD-10-CM

## 2018-05-18 DIAGNOSIS — C50.012 MALIGNANT NEOPLASM OF NIPPLE OF LEFT BREAST IN FEMALE, UNSPECIFIED ESTROGEN RECEPTOR STATUS: ICD-10-CM

## 2018-05-18 DIAGNOSIS — G62.9 NEUROPATHY: ICD-10-CM

## 2018-05-18 LAB
ALBUMIN SERPL BCP-MCNC: 4.2 G/DL
ALP SERPL-CCNC: 91 U/L
ALT SERPL W/O P-5'-P-CCNC: 24 U/L
ANION GAP SERPL CALC-SCNC: 6 MMOL/L
AST SERPL-CCNC: 30 U/L
BASOPHILS # BLD AUTO: 0.04 K/UL
BASOPHILS NFR BLD: 0.6 %
BILIRUB SERPL-MCNC: 0.4 MG/DL
BUN SERPL-MCNC: 15 MG/DL
CALCIUM SERPL-MCNC: 9.8 MG/DL
CHLORIDE SERPL-SCNC: 101 MMOL/L
CO2 SERPL-SCNC: 31 MMOL/L
CREAT SERPL-MCNC: 0.72 MG/DL
DIFFERENTIAL METHOD: ABNORMAL
EOSINOPHIL # BLD AUTO: 0.2 K/UL
EOSINOPHIL NFR BLD: 2.7 %
ERYTHROCYTE [DISTWIDTH] IN BLOOD BY AUTOMATED COUNT: 11.6 %
EST. GFR  (AFRICAN AMERICAN): >60 ML/MIN/1.73 M^2
EST. GFR  (NON AFRICAN AMERICAN): >60 ML/MIN/1.73 M^2
GLUCOSE SERPL-MCNC: 86 MG/DL
HCT VFR BLD AUTO: 39.4 %
HGB BLD-MCNC: 13.1 G/DL
LYMPHOCYTES # BLD AUTO: 1.2 K/UL
LYMPHOCYTES NFR BLD: 17.9 %
MCH RBC QN AUTO: 33.1 PG
MCHC RBC AUTO-ENTMCNC: 33.2 G/DL
MCV RBC AUTO: 100 FL
MONOCYTES # BLD AUTO: 0.6 K/UL
MONOCYTES NFR BLD: 8.6 %
NEUTROPHILS # BLD AUTO: 4.5 K/UL
NEUTROPHILS NFR BLD: 70.2 %
NRBC BLD-RTO: 0 /100 WBC
PLATELET # BLD AUTO: 233 K/UL
PMV BLD AUTO: 10.1 FL
POTASSIUM SERPL-SCNC: 4.2 MMOL/L
PROT SERPL-MCNC: 7.2 G/DL
RBC # BLD AUTO: 3.96 M/UL
SODIUM SERPL-SCNC: 138 MMOL/L
WBC # BLD AUTO: 6.41 K/UL

## 2018-05-18 PROCEDURE — 85025 COMPLETE CBC W/AUTO DIFF WBC: CPT

## 2018-05-18 PROCEDURE — 25000003 PHARM REV CODE 250: Mod: PN | Performed by: INTERNAL MEDICINE

## 2018-05-18 PROCEDURE — 99215 OFFICE O/P EST HI 40 MIN: CPT | Mod: S$GLB,,, | Performed by: INTERNAL MEDICINE

## 2018-05-18 PROCEDURE — 3008F BODY MASS INDEX DOCD: CPT | Mod: CPTII,S$GLB,, | Performed by: INTERNAL MEDICINE

## 2018-05-18 PROCEDURE — A4216 STERILE WATER/SALINE, 10 ML: HCPCS | Mod: PN | Performed by: INTERNAL MEDICINE

## 2018-05-18 PROCEDURE — 80053 COMPREHEN METABOLIC PANEL: CPT

## 2018-05-18 PROCEDURE — 96413 CHEMO IV INFUSION 1 HR: CPT | Mod: PN

## 2018-05-18 PROCEDURE — 36415 COLL VENOUS BLD VENIPUNCTURE: CPT | Mod: PN

## 2018-05-18 PROCEDURE — 80053 COMPREHEN METABOLIC PANEL: CPT | Mod: PN

## 2018-05-18 PROCEDURE — 63600175 PHARM REV CODE 636 W HCPCS: Mod: TB,PN | Performed by: INTERNAL MEDICINE

## 2018-05-18 PROCEDURE — 85025 COMPLETE CBC W/AUTO DIFF WBC: CPT | Mod: PN

## 2018-05-18 PROCEDURE — 99999 PR PBB SHADOW E&M-EST. PATIENT-LVL III: CPT | Mod: PBBFAC,,, | Performed by: INTERNAL MEDICINE

## 2018-05-18 RX ORDER — SODIUM CHLORIDE 0.9 % (FLUSH) 0.9 %
10 SYRINGE (ML) INJECTION
Status: DISCONTINUED | OUTPATIENT
Start: 2018-05-18 | End: 2018-05-18 | Stop reason: HOSPADM

## 2018-05-18 RX ADMIN — TRASTUZUMAB 396 MG: 150 INJECTION, POWDER, LYOPHILIZED, FOR SOLUTION INTRAVENOUS at 03:05

## 2018-05-18 RX ADMIN — SODIUM CHLORIDE: 9 INJECTION, SOLUTION INTRAVENOUS at 03:05

## 2018-05-18 RX ADMIN — SODIUM CHLORIDE, PRESERVATIVE FREE 10 ML: 5 INJECTION INTRAVENOUS at 04:05

## 2018-05-18 NOTE — PROGRESS NOTES
HISTORY OF PRESENT ILLNESS:  This is a 62-year-old white female for a  diagnosis of Stage IIB left breast carcinoma.  The patient discovered   a mass in her left breast and was seen by Dr. Arce.  She has received 4 cycles of   A/C ,completed 12 cycles weekly taxol and continues q 3 week herceptin/ perjeta    She has bouts of diarrhea that resolve easily with Imodium. She denies any fevers, chills, unexplained   weight loss, new breast complaints, vaginal bleeding, abdominal discomfort, nausea,   vomiting, diarrhea, mouth sores, etc.  No new complaints or pertinent findings on a   14 point review of systems.some fatigue ,went for lumpectomy and axillary eval , revealed 2 LN + hence had a disection  xrt is to be done in [place] per trial, pt here to continue herceptin / perjeta, she had hoped her axilla would be clear has xrt appt scheduled  Dr. Kunzould like to prevent cardiotoxicity from combined xrt/ herceptin /perjeta , she has since completed xrt, had echo wnl ef, here to continue and complete herceptin   feeling numbness to both feet and both hands, neuropathy has been getting worse. Pt tried nneurontin but had diarrhea and s/e and stopped it  PHYSICAL EXAMINATION:  Wt Readings from Last 3 Encounters:   05/18/18 66.3 kg (146 lb 2.6 oz)   05/06/18 65.8 kg (145 lb)   05/03/18 66 kg (145 lb 6.4 oz)     Temp Readings from Last 3 Encounters:   05/18/18 98.3 °F (36.8 °C)   05/06/18 97 °F (36.1 °C) (Oral)   04/27/18 97.8 °F (36.6 °C)     BP Readings from Last 3 Encounters:   05/18/18 132/76   05/06/18 (!) 147/83   05/03/18 (!) 140/83     Pulse Readings from Last 3 Encounters:   05/18/18 65   05/06/18 65   05/03/18 67     GENERAL:  Well-developed, well-nourished white female in no acute distress.    Alert and oriented x4.  :  HEENT:  Normocephalic, atraumatic.  Oral mucosa pink and moist.  Lips without   lesions.  Tongue midline.  Oropharynx clear.  Nonicteric sclerae.  NECK:  Supple, no adenopathy.  No  carotid bruits, thyromegaly or thyroid nodule.  HEART:  Regular rate and rhythm without murmur, gallop.  LUNGS:  Clear to auscultation bilaterally.  Normal respiratory effort.  ABDOMEN:  Soft, nontender, nondistended with positive normoactive bowel sounds,   no hepatosplenomegaly.  EXTREMITIES:  No cyanosis, clubbing or edema.  Distal pulses are intact.  AXILLAE AND GROIN:  No palpable pathologic lymphadenopathy is appreciated.  SKIN:  Intact/turgor normal.  NEUROLOGIC:  Cranial nerves II-XII grossly intact.  Motor:  Good muscle bulk and   tone.  Strength/sensory 5/5 throughout.  Gait stable.    LABORATORY:    Lab Results   Component Value Date    WBC 6.41 05/18/2018    HGB 13.1 05/18/2018    HCT 39.4 05/18/2018     (H) 05/18/2018     05/18/2018     Differential remarkable for 71.3% grans, 5% lymph, 17% monos    CMP  Sodium   Date Value Ref Range Status   05/18/2018 138 136 - 145 mmol/L Final     Potassium   Date Value Ref Range Status   05/18/2018 4.2 3.5 - 5.1 mmol/L Final     Chloride   Date Value Ref Range Status   05/18/2018 101 95 - 110 mmol/L Final     CO2   Date Value Ref Range Status   05/18/2018 31 22 - 31 mmol/L Final     Glucose   Date Value Ref Range Status   05/18/2018 86 70 - 110 mg/dL Final     Comment:     The ADA recommends the following guidelines for fasting glucose:  Normal:       less than 100 mg/dL  Prediabetes:  100 mg/dL to 125 mg/dL  Diabetes:     126 mg/dL or higher       BUN, Bld   Date Value Ref Range Status   05/18/2018 15 7 - 18 mg/dL Final     Creatinine   Date Value Ref Range Status   05/18/2018 0.72 0.50 - 1.40 mg/dL Final     Calcium   Date Value Ref Range Status   05/18/2018 9.8 8.4 - 10.2 mg/dL Final     Total Protein   Date Value Ref Range Status   05/18/2018 7.2 6.0 - 8.4 g/dL Final     Albumin   Date Value Ref Range Status   05/18/2018 4.2 3.5 - 5.2 g/dL Final     Total Bilirubin   Date Value Ref Range Status   05/18/2018 0.4 0.2 - 1.3 mg/dL Final     Alkaline  Phosphatase   Date Value Ref Range Status   05/18/2018 91 38 - 145 U/L Final     AST   Date Value Ref Range Status   05/18/2018 30 14 - 36 U/L Final     ALT   Date Value Ref Range Status   05/18/2018 24 10 - 44 U/L Final     Anion Gap   Date Value Ref Range Status   05/18/2018 6 (L) 8 - 16 mmol/L Final     eGFR if    Date Value Ref Range Status   05/18/2018 >60 >60 mL/min/1.73 m^2 Final     eGFR if non    Date Value Ref Range Status   05/18/2018 >60 >60 mL/min/1.73 m^2 Final     Comment:     Calculation used to obtain the estimated glomerular filtration  rate (eGFR) is the CKD-EPI equation.      ECHO wnl  CONCLUSIONS     1 - Normal left ventricular systolic function (EF 55-60%). 2/2018    2 - Normal left ventricular diastolic function.     3 - Normal right ventricular systolic function .     4 - Trivial mitral regurgitation.     5 - Trivial tricuspid regurgitation.     6 - The estimated PA systolic pressure is 26 mmHg.   5.       Left breast, lumpectomy:  - Residual invasive ductal carcinoma, moderately differentiated, Jamal Grade 2 (3+2+1=6), 0.2 cm in  greatest linear microscopic dimension.  - Ductal carcinoma in situ (DCIS), high nuclear grade, 2 cm, solid and cribriform types.  - Surgical margins are free of tumor; invasive carcinoma is located 0.1 cm from the closest margin    IMPRESSION:  Stage IIB left breast carcinoma (ER/MS negative, Her-2/clay positive)  Completed neoadjuvant AC and weekly taxol now on herceptin and I stopped perjeta since she also had xrt to left side due o fear of toxicity  , s/p recent lumpectomy and axillary disection , with 2 residual Ln positive  PLAN:  1.  Proceed with   herceptin to complete   had neurology consult cont to follow   rtc 3 weeks for next herceptin with cbc, cmp    need staging PET

## 2018-05-21 ENCOUNTER — TELEPHONE (OUTPATIENT)
Dept: OPHTHALMOLOGY | Facility: CLINIC | Age: 63
End: 2018-05-21

## 2018-05-21 RX ORDER — LATANOPROST 50 UG/ML
SOLUTION/ DROPS OPHTHALMIC
Qty: 9 ML | Refills: 4 | Status: SHIPPED | OUTPATIENT
Start: 2018-05-21 | End: 2019-03-12

## 2018-05-23 ENCOUNTER — PATIENT MESSAGE (OUTPATIENT)
Dept: HEMATOLOGY/ONCOLOGY | Facility: CLINIC | Age: 63
End: 2018-05-23

## 2018-05-25 ENCOUNTER — OFFICE VISIT (OUTPATIENT)
Dept: UROLOGY | Facility: CLINIC | Age: 63
End: 2018-05-25
Payer: COMMERCIAL

## 2018-05-25 VITALS
BODY MASS INDEX: 21.72 KG/M2 | SYSTOLIC BLOOD PRESSURE: 125 MMHG | HEART RATE: 65 BPM | WEIGHT: 143.31 LBS | DIASTOLIC BLOOD PRESSURE: 83 MMHG | HEIGHT: 68 IN

## 2018-05-25 DIAGNOSIS — N81.11 CYSTOCELE, MIDLINE: Primary | ICD-10-CM

## 2018-05-25 LAB
BILIRUB SERPL-MCNC: NORMAL MG/DL
BLOOD URINE, POC: NORMAL
COLOR, POC UA: YELLOW
GLUCOSE UR QL STRIP: NORMAL
KETONES UR QL STRIP: NORMAL
LEUKOCYTE ESTERASE URINE, POC: NORMAL
NITRITE, POC UA: NORMAL
PH, POC UA: 8
PROTEIN, POC: NORMAL
SPECIFIC GRAVITY, POC UA: 1.01
UROBILINOGEN, POC UA: NORMAL

## 2018-05-25 PROCEDURE — 81002 URINALYSIS NONAUTO W/O SCOPE: CPT | Mod: S$GLB,,, | Performed by: UROLOGY

## 2018-05-25 PROCEDURE — 99213 OFFICE O/P EST LOW 20 MIN: CPT | Mod: 25,S$GLB,, | Performed by: UROLOGY

## 2018-05-25 PROCEDURE — 99999 PR PBB SHADOW E&M-EST. PATIENT-LVL III: CPT | Mod: PBBFAC,,, | Performed by: UROLOGY

## 2018-05-25 PROCEDURE — 3008F BODY MASS INDEX DOCD: CPT | Mod: CPTII,S$GLB,, | Performed by: UROLOGY

## 2018-05-25 NOTE — PROGRESS NOTES
Subjective:       Patient ID: Ashlie Redman is a 63 y.o. female.    Chief Complaint: Follow-up    HPI     63 year old seen last month for cystocele.  After discussion she elected a conservative approach but she says she is having more symptoms recently and is now interested in surgical options.  She complains of bladder pain and pressure  She has no urinary incontinence.  C/o fecal incontinence.  We discussed surgical treatment options and risks.    Review of Systems   Constitutional: Negative for fever.   Genitourinary: Negative for dysuria and hematuria.       Objective:      Physical Exam   Constitutional: She is oriented to person, place, and time. She appears well-developed and well-nourished.   Pulmonary/Chest: Effort normal. No respiratory distress.   Neurological: She is alert and oriented to person, place, and time.   Skin: Skin is warm.   Psychiatric: She has a normal mood and affect. Her behavior is normal.   Vitals reviewed.      Assessment:       1. Cystocele, midline        Plan:       Cystocele, midline  -     POCT URINE DIPSTICK WITHOUT MICROSCOPE      Refer Dr. Acosta for cystocele repair.

## 2018-05-29 ENCOUNTER — HOSPITAL ENCOUNTER (OUTPATIENT)
Dept: RADIOLOGY | Facility: HOSPITAL | Age: 63
Discharge: HOME | End: 2018-05-29
Attending: INTERNAL MEDICINE
Payer: COMMERCIAL

## 2018-05-29 DIAGNOSIS — C50.011 MALIGNANT NEOPLASM OF NIPPLE OF RIGHT BREAST IN FEMALE, UNSPECIFIED ESTROGEN RECEPTOR STATUS: ICD-10-CM

## 2018-05-29 PROCEDURE — A9552 F18 FDG: HCPCS | Mod: PO

## 2018-05-29 PROCEDURE — 78815 PET IMAGE W/CT SKULL-THIGH: CPT | Mod: 26,PS,, | Performed by: RADIOLOGY

## 2018-05-29 PROCEDURE — 78815 PET IMAGE W/CT SKULL-THIGH: CPT | Mod: TC,PO

## 2018-05-31 ENCOUNTER — OFFICE VISIT (OUTPATIENT)
Dept: UROLOGY | Facility: CLINIC | Age: 63
End: 2018-05-31
Payer: COMMERCIAL

## 2018-05-31 VITALS
SYSTOLIC BLOOD PRESSURE: 131 MMHG | HEART RATE: 64 BPM | DIASTOLIC BLOOD PRESSURE: 75 MMHG | BODY MASS INDEX: 21.72 KG/M2 | HEIGHT: 68 IN | WEIGHT: 143.31 LBS

## 2018-05-31 DIAGNOSIS — N81.11 MIDLINE CYSTOCELE: ICD-10-CM

## 2018-05-31 DIAGNOSIS — N39.0 RECURRENT UTI: Primary | ICD-10-CM

## 2018-05-31 DIAGNOSIS — R33.9 INCOMPLETE EMPTYING OF BLADDER: ICD-10-CM

## 2018-05-31 PROCEDURE — 99214 OFFICE O/P EST MOD 30 MIN: CPT | Mod: S$GLB,,, | Performed by: UROLOGY

## 2018-05-31 PROCEDURE — 3008F BODY MASS INDEX DOCD: CPT | Mod: CPTII,S$GLB,, | Performed by: UROLOGY

## 2018-05-31 PROCEDURE — 87086 URINE CULTURE/COLONY COUNT: CPT

## 2018-05-31 PROCEDURE — 99999 PR PBB SHADOW E&M-EST. PATIENT-LVL IV: CPT | Mod: PBBFAC,,, | Performed by: UROLOGY

## 2018-05-31 RX ORDER — LIDOCAINE HYDROCHLORIDE 20 MG/ML
JELLY TOPICAL ONCE
Status: CANCELLED | OUTPATIENT
Start: 2018-05-31 | End: 2018-05-31

## 2018-05-31 RX ORDER — CIPROFLOXACIN 250 MG/1
500 TABLET, FILM COATED ORAL ONCE
Status: CANCELLED | OUTPATIENT
Start: 2018-05-31 | End: 2018-05-31

## 2018-05-31 NOTE — PROGRESS NOTES
CHIEF COMPLAINT:    Mrs. Redman is a 63 y.o. female presenting for a consultation at the request of Dr. Luo. Patient presents with cystocele.    PRESENTING ILLNESS:    Ashlie Redman is a 63 y.o. female who has a history of Stage II B, HER2+, breast cancer, status post chemo and XRT.  She states she has a history of cystocele was diagnosed with a cystocele in 2016, in 2017 was diagnosed with breast cancer and underwent treatment.  She is now ready to address the cystocele.  She states when it was initially discovered, she underwent PT with Mary Aguirre.  She thinks this improved the symptoms.  However, she has a sensation of incomplete bladder emptying.  She has frequency x 6-7, nocturia x 2.      , uterus in place, sexually active, sometimes feels like he bumps up against the prolapse.  Bowels are not the same as they were before    REVIEW OF SYSTEMS:    Review of Systems   Constitutional: Negative.    HENT: Negative.    Eyes: Negative.    Respiratory: Negative.    Cardiovascular: Negative.    Gastrointestinal: Negative.    Genitourinary: Positive for frequency.   Musculoskeletal: Negative.    Skin: Negative.    Neurological: Negative.    Endo/Heme/Allergies: Negative.    Psychiatric/Behavioral: Negative.      PATIENT HISTORY:    Past Medical History:   Diagnosis Date    Allergy     Anemia     Asteroid hyalosis of both eyes     Breast cancer 2017    Left    Cataract     Diverticulosis     Encounter for blood transfusion     Glaucoma     High myopia     Osteopenia     S/P dilation and curettage        Past Surgical History:   Procedure Laterality Date    breast augmentation  2013    BREAST BIOPSY Left 2017    Core bx,+ cancer    BREAST LUMPECTOMY Left 10/25/2017    CATARACT EXTRACTION W/  INTRAOCULAR LENS IMPLANT Bilateral     COLONOSCOPY  2009,     repeat in 5 years    DILATION AND CURETTAGE OF UTERUS      ECTOPIC PREGNANCY SURGERY      EYE SURGERY Bilateral 3/09     Vitrectomy     POLYPECTOMY      x3 2001    Yag Capsulotomy Bilateral 12/2014       Family History   Problem Relation Age of Onset    Cancer Sister         rectal; passed    Breast cancer Cousin     Cancer Cousin         breast    Heart disease Father         passed    Colon cancer Mother         hospice    Pancreatic cancer Mother         39yo; 89yo    Cataracts Mother     Hypertension Mother     Thyroid disease Mother     Cancer Mother         colon    Glaucoma Maternal Grandmother      Social History    Marital status:      Occupational History     [facility]     Social History Main Topics    Smoking status: Never Smoker    Smokeless tobacco: Never Used    Alcohol use No      Comment: Quit    Drug use: No    Sexual activity: Yes     Partners: Male     Birth control/ protection: Post-menopausal       Allergies:  Bactrim [sulfamethoxazole-trimethoprim]    Medications:  Outpatient Encounter Prescriptions as of 5/31/2018   Medication Sig Dispense Refill    diphenoxylate-atropine 2.5-0.025 mg (LOMOTIL) 2.5-0.025 mg per tablet Take 1 tablet by mouth 4 (four) times daily as needed for Diarrhea. 30 tablet 1    dorzolamide (TRUSOPT) 2 % ophthalmic solution Place 1 drop into both eyes 3 (three) times daily. 30 mL 3    latanoprost 0.005 % ophthalmic solution INSTILL ONE DROP INTO EACH EYE ONCE DAILY IN THE EVENING 9 mL 4    lidocaine-prilocaine (EMLA) cream Apply topically once.      venlafaxine (EFFEXOR XR) 75 MG 24 hr capsule Take 1 capsule (75 mg total) by mouth once daily. 30 capsule 2    hydrocodone-acetaminophen 5-325mg (NORCO) 5-325 mg per tablet Take 1 tablet by mouth every 6 (six) hours as needed for Pain. 30 tablet 0     No facility-administered encounter medications on file as of 5/31/2018.          PHYSICAL EXAMINATION:    The patient generally appears in good health, is appropriately interactive, and is in no apparent distress.    Skin: No lesions.    Mental:  Cooperative with normal affect.    Neuro: Grossly intact.    HEENT: Normal. No evidence of lymphadenopathy.    Chest:  normal inspiratory effort.    Abdomen:  Soft, non-tender. No masses or organomegaly. Bladder is not palpable. No evidence of flank discomfort. No evidence of inguinal hernia.    Extremities: No clubbing, cyanosis, or edema    Normal external female genitalia  Urethral meatus is normal  Urethra and bladder are nontender to bimanual exam  Well supported posteriorly  Grade II cystocele, central  Uterus and cervix are normal  No adnexal masses  PVR by catheterization was 200 ml    LABS:    Lab Results   Component Value Date    BUN 15 05/18/2018    CREATININE 0.72 05/18/2018     UA 1.005, pH 7, otherwise, negative    CT/Pet done on 5/29/2018 showed no abnormalities in the urinary tract.     IMPRESSION:    Encounter Diagnoses   Name Primary?    Recurrent UTI Yes    Midline cystocele     Incomplete emptying of bladder        PLAN:    1.  SUDS/cysto  2.  The catheterized specimen was sent for culture

## 2018-05-31 NOTE — PATIENT INSTRUCTIONS
[facility] Ultra 25 Billion CFU Probiotic Complex, Multi Strain.  The Multi Strain is specifically the one that is important as the greater variety of strains is better.  Make sure the product is not .          Urodynamics Studies     The bladder holds urine until it leaves the body through the urethra.     Urodynamics studies are a series of tests that give your doctor a close look at the working of your bladder and urethra. The tests can help your doctor learn about any problems storing urine or voiding (eliminating) urine from your body.  Understanding the lower urinary tract  The lower part of the urinary tract has several parts.  · The bladder stores urine until youre ready to release it.  · The urethra is the tube that carries urine from the bladder out of the body.  · The sphincter is made up of muscles around the opening of the bladder. The sphincter muscles tighten to hold urine in the bladder. They relax to let urine flow. Signals from the brain tell the sphincter when to tighten and relax. These signals also tell the bladder when to contract to let urine flow out of the body.  Why you need a urodynamics study  This test may be ordered if you:  · Are incontinent (leak urine)  · Have a bladder that does not empty all the way.  · Have symptoms such as the need to urinate often or a constant strong need to urinate  · Have intermittent or weak urine stream  · Have persistent urinary tract infections  Preparing for the study  · Tell your doctor about any medicine youre taking. Ask if you should stop them before the study.  · Keep a diary of your bathroom habits. Do this for a few days before the study. This diary can be a helpful part of the evaluation.  · Ask if you need to arrive for the study with a full bladder.  Date Last Reviewed: 2017  © 4444-3839 [employer]. [address] [place] [address], [place] [address]. All rights reserved. This information is not intended as a  substitute for professional medical care. Always follow your healthcare professional's instructions.          Urodynamic Studies     The equipment used for the study varies depending upon the facility and what tests are done.     Urodynamic studies may be done in your doctors office, a clinic, or a hospital. The studies may take up to an hour or more. This depends on which tests your doctor does. The tests are generally painless. You wont need sedating medicine.  Tests that may be done  Uroflowmetry. This measures the amount and speed of urine you void from your bladder. You urinate into a funnel. Its attached to a computer that records your urine flow over time. The amount of urine left in your bladder after you void may also be measured right after this test.  Cystometry. This test evaluates how much your bladder can hold. It also measures how strong your bladder muscle is and how well the signals work that tell you when your bladder is full. Your healthcare provider fills your bladder with sterile water or saline solution, through a catheter. Your doctor will instruct you to report any sensations you feel. Mention if theyre similar to symptoms youve felt at home. Your doctor may ask you to cough, stand and walk, or bear down during this test.  Electromyogram. This helps evaluate the muscle contractions that control urination, such as sphincter muscle contractions. Your healthcare provider may place electrode patches or wires near your rectum or urethra to make the recording. He or she may ask you to try to tighten or relax your sphincter muscles during this test.  Pressure flow study. This test measures your detrusor, urethral, and abdominal pressures. Detrusor is the muscle surrounding the bladder walls that relaxes to allow your bladder to fill, and and contracts to squeeze out urine. A pressure flow study is often done after cystometry. Youre asked to urinate while a probe in your urethra measures  pressures.  Video cystourethrography. This takes video pictures of urine flow through your urinary tract. It can help identify blockages or other problems. The bladder is filled with an X-ray contrast fluid. Then X-ray video pictures are taken as the fluid is urinated out. Ultrasound imaging may also be combined with routine urodynamic studies.  Ambulatory urodynamics. This test can be used to evaluate you while doing usual activities.  Getting your results  After the study, youll get dressed and return to the consultation room. Test results may be ready soon after the study is finished. Or, you may return to your doctors office in a few days for your results. Your doctor can talk with you about the study report and your options.   Date Last Reviewed: 1/1/2017 © 2000-2017 [employer]. [address] [place] [address]. All rights reserved. This information is not intended as a substitute for professional medical care. Always follow your healthcare professional's instructions.          Cystoscopy    Cystoscopy is a procedure that lets your doctor look directly inside your urethra and bladder. It can be used to:  · Help diagnose a problem with your urethra, bladder, or kidneys.  · Take a sample (biopsy) of bladder or urethral tissue.  · Treat certain problems (such as removing kidney stones).  · Place a stent to bypass an obstruction.  · Take special X-rays of the kidneys.  Based on the findings, your doctor may recommend other tests or treatments.  What is a cystoscope?  A cystoscope is a telescope-like instrument that contains lenses and fiberoptics (small glass wires that make bright light). The cystoscope may be straight and rigid, or flexible to bend around curves in the urethra. The doctor may look directly into the cystoscope, or project the image onto a monitor.  Getting ready  · Ask your doctor if you should stop taking any medicines before the procedure.  · Ask whether you should  avoid eating or drinking anything after midnight before the procedure.  · Follow any other instructions your doctor gives you.  Tell your doctor before the exam if you:  · Take any medicines, such as aspirin or blood thinners  · Have allergies to any medicines  · Are pregnant   The procedure  Cystoscopy is done in the doctors office, surgery center, or hospital. The doctor and a nurse are present during the procedure. It takes only a few minutes, longer if a biopsy, X-ray, or treatment needs to be done.  During the procedure:  · You lie on an exam table on your back, knees bent and legs apart. You are covered with a drape.  · Your urethra and the area around it are washed. Anesthetic jelly may be applied to numb the urethra. Other pain medicine is usually not needed. In some cases, you may be offered a mild sedative to help you relax. If a more extensive procedure is to be done, such as a biopsy or kidney stone removal, general anesthesia may be needed.  · The cystoscope is inserted. A sterile fluid is put into the bladder to expand it. You may feel pressure from this fluid.  · When the procedure is done, the cystoscope is removed.  After the procedure  If you had a sedative, general anesthesia, or spinal anesthesia, you must have someone drive you home. Once youre home:  · Drink plenty of fluids.  · You may have burning or light bleeding when you urinate--this is normal.  · Medicines may be prescribed to ease any discomfort or prevent infection. Take these as directed.  · Call your doctor if you have heavy bleeding or blood clots, burning that lasts more than a day, a fever over 100°F  (38° C), or trouble urinating.  Date Last Reviewed: 1/1/2017  © 4668-4274 [employer]. [address] [place] [address], [place] [address]. All rights reserved. This information is not intended as a substitute for professional medical care. Always follow your healthcare professional's instructions.

## 2018-06-01 LAB — BACTERIA UR CULT: NO GROWTH

## 2018-06-05 ENCOUNTER — PATIENT MESSAGE (OUTPATIENT)
Dept: HEMATOLOGY/ONCOLOGY | Facility: CLINIC | Age: 63
End: 2018-06-05

## 2018-06-08 ENCOUNTER — LAB VISIT (OUTPATIENT)
Dept: LAB | Facility: HOSPITAL | Age: 63
End: 2018-06-08
Attending: INTERNAL MEDICINE
Payer: COMMERCIAL

## 2018-06-08 ENCOUNTER — OFFICE VISIT (OUTPATIENT)
Dept: HEMATOLOGY/ONCOLOGY | Facility: CLINIC | Age: 63
End: 2018-06-08
Payer: COMMERCIAL

## 2018-06-08 ENCOUNTER — INFUSION (OUTPATIENT)
Dept: INFUSION THERAPY | Facility: HOSPITAL | Age: 63
End: 2018-06-08
Attending: INTERNAL MEDICINE
Payer: COMMERCIAL

## 2018-06-08 VITALS
TEMPERATURE: 98 F | HEIGHT: 68 IN | DIASTOLIC BLOOD PRESSURE: 81 MMHG | SYSTOLIC BLOOD PRESSURE: 129 MMHG | WEIGHT: 145.94 LBS | BODY MASS INDEX: 22.12 KG/M2 | HEART RATE: 64 BPM | RESPIRATION RATE: 20 BRPM

## 2018-06-08 VITALS
SYSTOLIC BLOOD PRESSURE: 129 MMHG | RESPIRATION RATE: 20 BRPM | WEIGHT: 145.94 LBS | HEIGHT: 68 IN | HEART RATE: 64 BPM | DIASTOLIC BLOOD PRESSURE: 81 MMHG | BODY MASS INDEX: 22.12 KG/M2 | TEMPERATURE: 98 F

## 2018-06-08 DIAGNOSIS — C50.512 PRIMARY CANCER OF LOWER OUTER QUADRANT OF LEFT FEMALE BREAST: ICD-10-CM

## 2018-06-08 DIAGNOSIS — C50.011 MALIGNANT NEOPLASM OF NIPPLE OF RIGHT BREAST IN FEMALE, UNSPECIFIED ESTROGEN RECEPTOR STATUS: Primary | ICD-10-CM

## 2018-06-08 DIAGNOSIS — C50.012 MALIGNANT NEOPLASM OF NIPPLE OF LEFT BREAST IN FEMALE, UNSPECIFIED ESTROGEN RECEPTOR STATUS: Primary | ICD-10-CM

## 2018-06-08 DIAGNOSIS — G62.0 CHEMOTHERAPY-INDUCED PERIPHERAL NEUROPATHY: ICD-10-CM

## 2018-06-08 DIAGNOSIS — T45.1X5A CHEMOTHERAPY-INDUCED PERIPHERAL NEUROPATHY: ICD-10-CM

## 2018-06-08 DIAGNOSIS — R33.9 INCOMPLETE EMPTYING OF BLADDER: ICD-10-CM

## 2018-06-08 DIAGNOSIS — C50.912 HER2-POSITIVE CARCINOMA OF LEFT BREAST: ICD-10-CM

## 2018-06-08 DIAGNOSIS — C50.011 MALIGNANT NEOPLASM OF NIPPLE OF RIGHT BREAST IN FEMALE, UNSPECIFIED ESTROGEN RECEPTOR STATUS: ICD-10-CM

## 2018-06-08 LAB
ALBUMIN SERPL BCP-MCNC: 4.1 G/DL
ALP SERPL-CCNC: 103 U/L
ALT SERPL W/O P-5'-P-CCNC: 34 U/L
ANION GAP SERPL CALC-SCNC: 11 MMOL/L
AST SERPL-CCNC: 30 U/L
BASOPHILS # BLD AUTO: 0.04 K/UL
BASOPHILS NFR BLD: 0.7 %
BILIRUB SERPL-MCNC: 0.3 MG/DL
BUN SERPL-MCNC: 15 MG/DL
CALCIUM SERPL-MCNC: 10 MG/DL
CHLORIDE SERPL-SCNC: 101 MMOL/L
CO2 SERPL-SCNC: 28 MMOL/L
CREAT SERPL-MCNC: 0.72 MG/DL
DIFFERENTIAL METHOD: ABNORMAL
EOSINOPHIL # BLD AUTO: 0.1 K/UL
EOSINOPHIL NFR BLD: 2.2 %
ERYTHROCYTE [DISTWIDTH] IN BLOOD BY AUTOMATED COUNT: 11.9 %
EST. GFR  (AFRICAN AMERICAN): >60 ML/MIN/1.73 M^2
EST. GFR  (NON AFRICAN AMERICAN): >60 ML/MIN/1.73 M^2
GLUCOSE SERPL-MCNC: 91 MG/DL
HCT VFR BLD AUTO: 36.1 %
HGB BLD-MCNC: 12.2 G/DL
LYMPHOCYTES # BLD AUTO: 1.1 K/UL
LYMPHOCYTES NFR BLD: 19.4 %
MCH RBC QN AUTO: 32.9 PG
MCHC RBC AUTO-ENTMCNC: 33.8 G/DL
MCV RBC AUTO: 97 FL
MONOCYTES # BLD AUTO: 0.6 K/UL
MONOCYTES NFR BLD: 9.9 %
NEUTROPHILS # BLD AUTO: 3.9 K/UL
NEUTROPHILS NFR BLD: 67.8 %
NRBC BLD-RTO: 0 /100 WBC
PLATELET # BLD AUTO: 206 K/UL
PMV BLD AUTO: 9.7 FL
POTASSIUM SERPL-SCNC: 4 MMOL/L
PROT SERPL-MCNC: 7.2 G/DL
RBC # BLD AUTO: 3.71 M/UL
SODIUM SERPL-SCNC: 140 MMOL/L
WBC # BLD AUTO: 5.78 K/UL

## 2018-06-08 PROCEDURE — 3008F BODY MASS INDEX DOCD: CPT | Mod: CPTII,S$GLB,, | Performed by: INTERNAL MEDICINE

## 2018-06-08 PROCEDURE — 63600175 PHARM REV CODE 636 W HCPCS: Mod: JW,TB,PN | Performed by: INTERNAL MEDICINE

## 2018-06-08 PROCEDURE — 80053 COMPREHEN METABOLIC PANEL: CPT | Mod: PN

## 2018-06-08 PROCEDURE — 99215 OFFICE O/P EST HI 40 MIN: CPT | Mod: S$GLB,,, | Performed by: INTERNAL MEDICINE

## 2018-06-08 PROCEDURE — 80053 COMPREHEN METABOLIC PANEL: CPT

## 2018-06-08 PROCEDURE — 25000003 PHARM REV CODE 250: Mod: PN | Performed by: INTERNAL MEDICINE

## 2018-06-08 PROCEDURE — 36415 COLL VENOUS BLD VENIPUNCTURE: CPT | Mod: PN

## 2018-06-08 PROCEDURE — 96413 CHEMO IV INFUSION 1 HR: CPT | Mod: PN

## 2018-06-08 PROCEDURE — A4216 STERILE WATER/SALINE, 10 ML: HCPCS | Mod: PN | Performed by: INTERNAL MEDICINE

## 2018-06-08 PROCEDURE — 85025 COMPLETE CBC W/AUTO DIFF WBC: CPT | Mod: PN

## 2018-06-08 PROCEDURE — 99999 PR PBB SHADOW E&M-EST. PATIENT-LVL III: CPT | Mod: PBBFAC,,, | Performed by: INTERNAL MEDICINE

## 2018-06-08 PROCEDURE — 85025 COMPLETE CBC W/AUTO DIFF WBC: CPT

## 2018-06-08 RX ORDER — HEPARIN 100 UNIT/ML
500 SYRINGE INTRAVENOUS
Status: CANCELLED | OUTPATIENT
Start: 2018-06-08

## 2018-06-08 RX ORDER — SODIUM CHLORIDE 0.9 % (FLUSH) 0.9 %
10 SYRINGE (ML) INJECTION
Status: CANCELLED | OUTPATIENT
Start: 2018-06-08

## 2018-06-08 RX ORDER — SODIUM CHLORIDE 0.9 % (FLUSH) 0.9 %
10 SYRINGE (ML) INJECTION
Status: DISCONTINUED | OUTPATIENT
Start: 2018-06-08 | End: 2018-06-08 | Stop reason: HOSPADM

## 2018-06-08 RX ORDER — CYCLOBENZAPRINE HCL 10 MG
10 TABLET ORAL 3 TIMES DAILY PRN
Qty: 30 TABLET | Refills: 0 | Status: SHIPPED | OUTPATIENT
Start: 2018-06-08 | End: 2018-06-18

## 2018-06-08 RX ADMIN — TRASTUZUMAB 396 MG: 150 INJECTION, POWDER, LYOPHILIZED, FOR SOLUTION INTRAVENOUS at 03:06

## 2018-06-08 RX ADMIN — SODIUM CHLORIDE: 0.9 INJECTION, SOLUTION INTRAVENOUS at 03:06

## 2018-06-08 RX ADMIN — SODIUM CHLORIDE, PRESERVATIVE FREE 10 ML: 5 INJECTION INTRAVENOUS at 04:06
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
## 2018-06-13 ENCOUNTER — PROCEDURE VISIT (OUTPATIENT)
Dept: UROLOGY | Facility: CLINIC | Age: 63
End: 2018-06-13
Payer: COMMERCIAL

## 2018-06-13 ENCOUNTER — PATIENT MESSAGE (OUTPATIENT)
Dept: HEMATOLOGY/ONCOLOGY | Facility: CLINIC | Age: 63
End: 2018-06-13

## 2018-06-13 VITALS
DIASTOLIC BLOOD PRESSURE: 68 MMHG | HEART RATE: 54 BPM | HEIGHT: 68 IN | BODY MASS INDEX: 21.92 KG/M2 | SYSTOLIC BLOOD PRESSURE: 142 MMHG | WEIGHT: 144.63 LBS | RESPIRATION RATE: 18 BRPM | TEMPERATURE: 98 F

## 2018-06-13 DIAGNOSIS — R33.9 INCOMPLETE EMPTYING OF BLADDER: ICD-10-CM

## 2018-06-13 DIAGNOSIS — N39.0 RECURRENT UTI: ICD-10-CM

## 2018-06-13 DIAGNOSIS — N81.11 MIDLINE CYSTOCELE: ICD-10-CM

## 2018-06-13 PROCEDURE — 52000 CYSTOURETHROSCOPY: CPT | Mod: 59,S$GLB,, | Performed by: UROLOGY

## 2018-06-13 PROCEDURE — 51798 US URINE CAPACITY MEASURE: CPT | Mod: S$GLB,,, | Performed by: UROLOGY

## 2018-06-13 PROCEDURE — 51728 CYSTOMETROGRAM W/VP: CPT | Mod: 26,S$GLB,, | Performed by: UROLOGY

## 2018-06-13 PROCEDURE — 51784 ANAL/URINARY MUSCLE STUDY: CPT | Mod: 26,51,S$GLB, | Performed by: UROLOGY

## 2018-06-13 PROCEDURE — 51797 INTRAABDOMINAL PRESSURE TEST: CPT | Mod: 26,S$GLB,, | Performed by: UROLOGY

## 2018-06-13 RX ORDER — LIDOCAINE HYDROCHLORIDE 20 MG/ML
JELLY TOPICAL ONCE
Status: COMPLETED | OUTPATIENT
Start: 2018-06-13 | End: 2018-06-13

## 2018-06-13 RX ORDER — CIPROFLOXACIN 250 MG/1
500 TABLET, FILM COATED ORAL ONCE
Status: COMPLETED | OUTPATIENT
Start: 2018-06-13 | End: 2018-06-13

## 2018-06-13 RX ADMIN — LIDOCAINE HYDROCHLORIDE: 20 JELLY TOPICAL at 09:06

## 2018-06-13 RX ADMIN — CIPROFLOXACIN 500 MG: 250 TABLET, FILM COATED ORAL at 10:06

## 2018-06-13 NOTE — PROCEDURES
Procedures     Urodynamic Report    Indication:  High grade cystocele    Patient was taken to the Urodynamic Suite with a comfortably full bladder and asked to perform a free uroflow.  Next, the patient was prepped and the urinary residual was drained with a 14 Fr catheter.  A 7 Fr dual lumen catheter was placed to measure intravesical pressures.  A 10 Fr balloon manometer was placed into the rectum for abdominal pressure measurements.  Patch EMG electrodes were placed on the perineum.  The patient was connected to the [employer] Urodynamic machine, using a multichannel technique, the data were interpreted.  The bladder was filled with sterile water at room temperature at a rate of 30 ml/min.  Patient is filled to urgency.  Filling is performed with the patient in the seated position.  Abdominal leak point pressures are checked at 1st desire, then serially at 50cc increments first with Valsalva then with coughing.  The patient was then asked to sit and void for a pressure flow study.    The following are the results of the study:  1.  Uroflow       Q max:  Could not void    2.  Amount in the bladder was 100 ml    3.  CMG       Sensation:         First Desire:  62.5 ml         Normal Desire:  167.4 ml         Strong Desire:  249.1 ml         Urgency:  355.8 ml       Capacity:  631 ml       Abnormal Contractions:  none       Compliance:  Normal     4.  Abdominal Leak Point Pressure:  No stress incontinence, reduced the prolapse with a half speculum    5.  EMG:  Normal guarding reflex, relaxed with voiding    6.  Voiding phase       Q max:  22.5 ml/sec       P det at Q max:  32.2 cm H2O       Pattern of the curve:  continuous       Voided volume:  106 ml       PVR:  525 ml    7.  Analysis:  Increased sensation, good capacity, incomplete bladder emptying, no occult stress incontinence    8.  Recommendations:       A.  See cystoscopy       B.  Plan anterior colporrhaphy.  Will start the procedure with cystoscopy to look at  the configuration of the cystocele, then can see how the repair straightens out the urethrovesical junction.        C.  Card given for David to schedule when she is ready.  Will give her two appointments one for the surgery and one for pre ops and to sign consents.  We discussed that it is an overnight stay in the hospital, (outpatient extended stay) and that she would be out for 2 weeks at her sit down job.  6 week recovery period.  Discussed limitations.     CYSTOSCOPY REPORT    Pre Procedure Diagnosis:  cystocele    Post Procedure Diagnosis:  Cystocele just behind the urethrovesical junction, in front of the trigone.      Anesthesia: 10 cc 2% lidocaine jelly applied per urethra.    14 FR Flexible Olympus cystoscope used.    FINDINGS:  Dome, anterior, posterior, lateral walls and bladder base free of urothelial abnormalities. Right and left ureteral orifices in the normal postion and configuration, both effluxed clear urine.  Bladder neck and urethra were normal.  The trigone must be supported digitally to view the ureteral orifices straight on.     Specimen:  none    The patient was taken to the cystoscopy suite and placed in dorsal lithotomy position.  The genitalia was prepped and draped  in the usual sterile fashion.  Two percent lidocaine jelly was inserted in the urethra.  After sufficent time had passed to allow good local anesthesia, the cystoscope was inserted in the urethra and passed into the bladder visualizing the urethra along its entire course.  The dome, anterior, posterior and lateral walls were examined systematically.  The ureteral orifices were in their usual position and configuration.  The cystoscope was turned upon itself 180 degrees to visualize the bladder neck.  The cystoscope was then brought to the level of the bladder neck, the water was turned on and the urethra was visualized.  The cystoscope was removed and the patient was instructed to urinate prior to leaving the office.     Post  procedure medication:  cipro 500 mg x 1     ASSESSMENT/PLAN:  63 year old woman status post flexible cystoscopy.  1. Push fluids for 24 hours.  2. May see blood in the urine, this should gradually improve over the next 2-3 days.  3. The patient was instructed to return to the office or go to the emergency should fever, chills, cloudy urine, or inability to urinate develop.  4. Follow up for surgery.

## 2018-06-13 NOTE — PATIENT INSTRUCTIONS
SIMPLE URODYNAMIC STUDY (SUDS) & CYSTOSCOPY  UROLOGY CLINIC DISCHARGE INSTRUCTIONS    You have had a procedure that will require time to properly heal. Follow the instructions you have been given on how to care for yourself once you are home. Below is additional information to help in your recovery.    ACTIVITY  · There are no restrictions in activity. Start doing again the things you did before the procedure.  · You may experience a slight burning sensation. You may notice a small amount of blood in your urine. This will clear up within a day. Call the clinic if this continues beyond 48 hours.    DIET  · Continue your normal diet. You may eat the same foods you ate before your procedure.  · Drink plenty of fluids during the first 24-48 hours following your procedure.    MEDICATIONS  · Resume all other previous medications from your prescribing physician.  · Continue any pre=procedure antibiotics until they are all gone.    SIGNS AND SYMPTOMS TO REPORT TO THE DOCTOR  · Chills or fever greater than 101° F within 24 hours of procedure.  · Changes in urination, such as increased bleeding, foul smell, cloudy urine, or painful urination.  · Call your doctor with any questions or concerns.    For any emergency situation, call [phone] immediately or go to your nearest emergency room.    [facility]  [phone]  After hours or on the weekends call [phone] and ask to speak with an urology resident on-call with any questions or concerns

## 2018-06-18 ENCOUNTER — PATIENT MESSAGE (OUTPATIENT)
Dept: HEMATOLOGY/ONCOLOGY | Facility: CLINIC | Age: 63
End: 2018-06-18

## 2018-06-21 ENCOUNTER — TELEPHONE (OUTPATIENT)
Dept: PULMONOLOGY | Facility: CLINIC | Age: 63
End: 2018-06-21

## 2018-06-21 ENCOUNTER — OFFICE VISIT (OUTPATIENT)
Dept: PULMONOLOGY | Facility: CLINIC | Age: 63
End: 2018-06-21
Payer: COMMERCIAL

## 2018-06-21 ENCOUNTER — HOSPITAL ENCOUNTER (OUTPATIENT)
Dept: PULMONOLOGY | Facility: CLINIC | Age: 63
Discharge: HOME | End: 2018-06-21
Payer: COMMERCIAL

## 2018-06-21 VITALS
OXYGEN SATURATION: 98 % | HEART RATE: 61 BPM | SYSTOLIC BLOOD PRESSURE: 120 MMHG | BODY MASS INDEX: 21.85 KG/M2 | WEIGHT: 144.19 LBS | DIASTOLIC BLOOD PRESSURE: 90 MMHG | HEIGHT: 68 IN

## 2018-06-21 DIAGNOSIS — R93.89 ABNORMAL CT SCAN, CHEST: Primary | ICD-10-CM

## 2018-06-21 DIAGNOSIS — R93.89 ABNORMAL CT SCAN, CHEST: ICD-10-CM

## 2018-06-21 DIAGNOSIS — C50.912 CARCINOMA OF LEFT FEMALE BREAST, UNSPECIFIED ESTROGEN RECEPTOR STATUS, UNSPECIFIED SITE OF BREAST: ICD-10-CM

## 2018-06-21 LAB
PRE FEV1 FVC: 83
PRE FEV1: 2.75
PRE FVC: 3.32
PREDICTED FEV1 FVC: 78
PREDICTED FEV1: 2.52
PREDICTED FVC: 3.19

## 2018-06-21 PROCEDURE — 99203 OFFICE O/P NEW LOW 30 MIN: CPT | Mod: S$GLB,,, | Performed by: INTERNAL MEDICINE

## 2018-06-21 PROCEDURE — 3008F BODY MASS INDEX DOCD: CPT | Mod: CPTII,S$GLB,, | Performed by: INTERNAL MEDICINE

## 2018-06-21 PROCEDURE — 94010 BREATHING CAPACITY TEST: CPT | Mod: S$GLB,,, | Performed by: INTERNAL MEDICINE

## 2018-06-21 PROCEDURE — 94729 DIFFUSING CAPACITY: CPT | Mod: S$GLB,,, | Performed by: INTERNAL MEDICINE

## 2018-06-21 PROCEDURE — 99999 PR PBB SHADOW E&M-EST. PATIENT-LVL III: CPT | Mod: PBBFAC,,, | Performed by: INTERNAL MEDICINE

## 2018-06-21 PROCEDURE — 94727 GAS DIL/WSHOT DETER LNG VOL: CPT | Mod: S$GLB,,, | Performed by: INTERNAL MEDICINE

## 2018-06-21 RX ORDER — PREDNISONE 10 MG/1
TABLET ORAL
Qty: 10 TABLET | Refills: 0 | Status: SHIPPED | OUTPATIENT
Start: 2018-06-21 | End: 2018-08-01 | Stop reason: SDUPTHER

## 2018-06-21 RX ORDER — VALACYCLOVIR HYDROCHLORIDE 500 MG/1
TABLET, FILM COATED ORAL
COMMUNITY
Start: 2014-06-02 | End: 2018-08-30 | Stop reason: SDUPTHER

## 2018-06-21 RX ORDER — PANTOPRAZOLE SODIUM 40 MG/1
40 TABLET, DELAYED RELEASE ORAL DAILY
Qty: 30 TABLET | Refills: 3 | Status: SHIPPED | OUTPATIENT
Start: 2018-06-21 | End: 2018-08-01 | Stop reason: SDUPTHER

## 2018-06-21 NOTE — TELEPHONE ENCOUNTER
Pharmacy checking to see if you mean for this taper to be 11 weeks. Is she to take 10mg for 1 week or 7?

## 2018-06-21 NOTE — TELEPHONE ENCOUNTER
----- Message from Ginger Romeo sent at 6/21/2018 10:04 AM CDT -----  Pharmacy Calling    Reason for call:  Verify Quantity   Pharmacy Name:   [facility]   Prescription Name: predniSONE (DELTASONE) 10 MG tablet  Call back to verify

## 2018-06-21 NOTE — PROGRESS NOTES
Subjective:       Patient ID: Ashlie Redman is a 63 y.o. female.    Chief Complaint: Abnormal PET CT    HPI:   Ashlie Redman is a 63 y.o. female who presents for evaluation of abnormal imaging.    She has a history of stageIIB left breast carcinoma (s/p April 2017) and is s/p 4 cycles of A/C, completed 12 cycles of weekly taxol and continues q3 week herceptin/perjeta. She also underwent radiation Jan- feb 20, 2018.   She had a recent PET/CT that showed new hypermetabolic acitivy within the left lung.      She reports that she has chest heaviness on an ongoing basis.  Active person- exercising, cycling.  She notes after finishing radiation in February she was fatigued, but by April was back to her baseline.  By mid-May she noted that the fatigue had returned.  Noted shortness of breath and cough as well.     Review of Systems   Constitutional: Positive for fatigue. Negative for fever, chills and activity change.   HENT: Negative for postnasal drip, rhinorrhea, sinus pressure and congestion.    Respiratory: Positive for cough, chest tightness (chest pressure), shortness of breath, wheezing and pleurisy. Negative for hemoptysis, sputum production and dyspnea on extertion.    Cardiovascular: Negative for chest pain and leg swelling.   Genitourinary: Negative for difficulty urinating.   Endocrine: Negative for cold intolerance and heat intolerance.    Musculoskeletal: Negative for joint swelling and myalgias.   Skin: Negative for rash.   Gastrointestinal: Negative for nausea, vomiting and abdominal pain.   Neurological: Positive for dizziness and headaches.   Hematological: Negative for adenopathy. No excessive bruising.   Psychiatric/Behavioral: Negative for confusion and sleep disturbance.         Social History   Substance Use Topics    Smoking status: Never Smoker    Smokeless tobacco: Never Used    Alcohol use No      Comment: Quit       Review of patient's allergies indicates:   Allergen Reactions    Bactrim  [sulfamethoxazole-trimethoprim] Rash     Fever, vomiting     Past Medical History:   Diagnosis Date    Allergy     Anemia     Asteroid hyalosis of both eyes     Breast cancer 04/2017    Left    Cataract     Diverticulosis     Encounter for blood transfusion     Glaucoma     High myopia     Osteopenia     S/P dilation and curettage      Past Surgical History:   Procedure Laterality Date    breast augmentation  03/2013    BREAST BIOPSY Left 04/2017    Core bx,+ cancer    BREAST LUMPECTOMY Left 10/25/2017    CATARACT EXTRACTION W/  INTRAOCULAR LENS IMPLANT Bilateral 2009    COLONOSCOPY  4/2009, 2015    repeat in 5 years    DILATION AND CURETTAGE OF UTERUS      ECTOPIC PREGNANCY SURGERY      EYE SURGERY Bilateral 3/09    Vitrectomy     POLYPECTOMY      x3 2001    Yag Capsulotomy Bilateral 12/2014     Current Outpatient Prescriptions on File Prior to Visit   Medication Sig    dorzolamide (TRUSOPT) 2 % ophthalmic solution Place 1 drop into both eyes 3 (three) times daily.    latanoprost 0.005 % ophthalmic solution INSTILL ONE DROP INTO EACH EYE ONCE DAILY IN THE EVENING    venlafaxine (EFFEXOR XR) 75 MG 24 hr capsule Take 1 capsule (75 mg total) by mouth once daily.    [DISCONTINUED] diphenoxylate-atropine 2.5-0.025 mg (LOMOTIL) 2.5-0.025 mg per tablet Take 1 tablet by mouth 4 (four) times daily as needed for Diarrhea.    [DISCONTINUED] hydrocodone-acetaminophen 5-325mg (NORCO) 5-325 mg per tablet Take 1 tablet by mouth every 6 (six) hours as needed for Pain.    [DISCONTINUED] lidocaine-prilocaine (EMLA) cream Apply topically once.     No current facility-administered medications on file prior to visit.        Objective:      Vitals:    06/21/18 0900   BP: (!) 120/90   Pulse: 61     Physical Exam   Constitutional: She is oriented to person, place, and time. She appears well-developed and well-nourished.   HENT:   Head: Normocephalic.   Mouth/Throat: Mallampati Score: IV.   Neck: Normal range of  motion. Neck supple. No tracheal deviation present.   Cardiovascular: Normal rate and regular rhythm.    No murmur heard.  Pulmonary/Chest: Normal expansion, effort normal and breath sounds normal. She has no wheezes. She has no rales.   chemo port on right side of chest   Abdominal: Soft. Bowel sounds are normal. She exhibits no distension. There is no tenderness.   Musculoskeletal: Normal range of motion. She exhibits no edema.   Lymphadenopathy: No supraclavicular adenopathy is present.     She has no cervical adenopathy.     She has no axillary adenopathy.   Neurological: She is alert and oriented to person, place, and time.   Skin: Skin is warm and dry.   Psychiatric: She has a normal mood and affect. Her behavior is normal.   Vitals reviewed.    Personal Diagnostic Review   PET-CT 5/29/18: hypermetabolic activity within the left lung- band like- potentially consistent with radiation pneumonitis vs infection vs malignancy.   Assessment:     Orders Placed This Encounter   Procedures    CT Chest Without Contrast     Standing Status:   Future     Standing Expiration Date:   6/21/2019     Order Specific Question:   May the Radiologist modify the order per protocol to meet the clinical needs of the patient?     Answer:   Yes    Complete PFT with bronchodilator     Spiromtery, lung volumes, dlco     Standing Status:   Future     Standing Expiration Date:   6/21/2019     1. Abnormal CT scan, chest    2. Carcinoma of left female breast, unspecified estrogen receptor status, unspecified site of breast        Plan:       Band like opacities noted on PET-CT.  Symptoms and timelines are consistent with radiation pneumonitis, however cannot rule out malignancy at this juncture. I discussed various avenues with the patient and together we decided that treatment with prednisone (for symptoms management and reduction of inflammation) and repeat CT chest in 8 weeks was reasonable.  If the symptoms and opacities persist we  will plan for bronchoscopy with BAL and TBBX.

## 2018-06-25 ENCOUNTER — PATIENT MESSAGE (OUTPATIENT)
Dept: SURGERY | Facility: CLINIC | Age: 63
End: 2018-06-25

## 2018-06-26 ENCOUNTER — OFFICE VISIT (OUTPATIENT)
Dept: OPHTHALMOLOGY | Facility: CLINIC | Age: 63
End: 2018-06-26
Payer: COMMERCIAL

## 2018-06-26 DIAGNOSIS — Z98.890 HISTORY OF VITRECTOMY: ICD-10-CM

## 2018-06-26 DIAGNOSIS — Z96.1 PSEUDOPHAKIA OF BOTH EYES: Primary | ICD-10-CM

## 2018-06-26 DIAGNOSIS — H52.7 REFRACTIVE ERROR: ICD-10-CM

## 2018-06-26 DIAGNOSIS — H40.053 OHT (OCULAR HYPERTENSION), BILATERAL: ICD-10-CM

## 2018-06-26 PROCEDURE — 92014 COMPRE OPH EXAM EST PT 1/>: CPT | Mod: S$GLB,,, | Performed by: OPHTHALMOLOGY

## 2018-06-26 PROCEDURE — 92133 CPTRZD OPH DX IMG PST SGM ON: CPT | Mod: S$GLB,,, | Performed by: OPHTHALMOLOGY

## 2018-06-26 PROCEDURE — 99999 PR PBB SHADOW E&M-EST. PATIENT-LVL III: CPT | Mod: PBBFAC,,, | Performed by: OPHTHALMOLOGY

## 2018-06-26 NOTE — PROGRESS NOTES
HPI     Glaucoma    Additional comments: 4 month iop ch with OCT N//  Dozolamide BID not TID   OU,  LAtanoprost  QHS OU           Comments   4 month iop ch with OCT N//  Dozolamide BID not TID OU,  LAtanoprost  QHS   OU  CBD OIL    Agree with above. Medical cannibus BID for neuropathy. New lung scan with   some spots.       Last edited by Almaz Vasquez MD on 6/26/2018 11:14 AM.   (History)            Assessment /Plan     For exam results, see Encounter Report.    Pseudophakia of both eyes    OHT (ocular hypertension), bilateral  -     Posterior Segment OCT Optic Nerve- Both eyes    History of vitrectomy    Refractive error            OHT (ocular hypertension), bilateral - Switched Timolol to Dorzolamide 2/14/17, but IOP still upper 20's. Just finished tamoxifen for newly diagnosed breast cancer.   IOP baseline mid 20's with occasional pain OS. IOP was significantly elevated off Latanoprost - Tmax 37/44! Maternal grandmother with glaucoma, no known first degree relatives. CCT thick. Baseline HVF/HRT WNL, small cups on clinical exam OU. Last HRT - within range of priors, no RNFL thinning identified.   Last HVF - new nasal defects OU, fixation losses OS, pt reported seeing a glare off to the side and also having hot flashes during exam. Clinical exam appears stable - repeat next year, if WNL then can follow with just imaging.  Last Gonio - open to CB OU, few tiny iris root vessels visible, no NV.   OCT nerve 5/16/17 - thin TS and TI OD, borderline TS OS. Repeated today, new borderline TI OS.  Switched T .5 to Dorzolamide BID, but not as effective. Tried Brimonidine TID - but had hypotension again, so went back to Dorzolamide TID. If goes too high then can consider 0.25% Timolol, as seems to have had a dramatic response in IOP lowering with Timolol. (she is not sure if chemo had something to do with this, may be willing to try a different gtt when she finishes chemo, if Dorzolamide backorder not  resolved.)  NDFE appears stable - continue Latanoprost QHS OU, Dorzolamide TID OU, medical cannibus not making much difference.   RTC 4 months for IOP check with DFE/HRT.     On Latanoprost since presentation (former pt of Dr. Ordaz).  Dorzolamide added 2/14-7/25/17 - no significant improvement in IOP, may have forgotten to take? Resumed 8/9/17 TID OU  Timolol 0.5% QAM (7/9/16-2/14/17) - worked great but symptomatic hypotension/bradycardia started in Jan '17  Brimonidine - 7/25-8/9 17 - d/c'd due to symptomatic hypotension.    Pseudophakia of both eyes - stable    History of vitrectomy - for floaters/AH      Addendum: symptomatic hypotension with Brimonidine - switched back to Dorzolamide on 8/9/17 - asked that she try to use it TID.

## 2018-06-29 ENCOUNTER — LAB VISIT (OUTPATIENT)
Dept: LAB | Facility: HOSPITAL | Age: 63
End: 2018-06-29
Attending: INTERNAL MEDICINE
Payer: COMMERCIAL

## 2018-06-29 ENCOUNTER — OFFICE VISIT (OUTPATIENT)
Dept: HEMATOLOGY/ONCOLOGY | Facility: CLINIC | Age: 63
End: 2018-06-29
Payer: COMMERCIAL

## 2018-06-29 ENCOUNTER — INFUSION (OUTPATIENT)
Dept: INFUSION THERAPY | Facility: HOSPITAL | Age: 63
End: 2018-06-29
Attending: INTERNAL MEDICINE
Payer: COMMERCIAL

## 2018-06-29 VITALS
WEIGHT: 144.63 LBS | BODY MASS INDEX: 22.7 KG/M2 | DIASTOLIC BLOOD PRESSURE: 77 MMHG | RESPIRATION RATE: 20 BRPM | SYSTOLIC BLOOD PRESSURE: 143 MMHG | HEIGHT: 67 IN | TEMPERATURE: 98 F | HEART RATE: 70 BPM

## 2018-06-29 VITALS
RESPIRATION RATE: 16 BRPM | TEMPERATURE: 98 F | HEIGHT: 67 IN | HEART RATE: 70 BPM | SYSTOLIC BLOOD PRESSURE: 116 MMHG | BODY MASS INDEX: 22.7 KG/M2 | DIASTOLIC BLOOD PRESSURE: 59 MMHG | WEIGHT: 144.63 LBS

## 2018-06-29 DIAGNOSIS — C50.912 HER2-POSITIVE CARCINOMA OF LEFT BREAST: ICD-10-CM

## 2018-06-29 DIAGNOSIS — C50.012 MALIGNANT NEOPLASM OF NIPPLE OF LEFT BREAST IN FEMALE, UNSPECIFIED ESTROGEN RECEPTOR STATUS: Primary | ICD-10-CM

## 2018-06-29 DIAGNOSIS — C50.011 MALIGNANT NEOPLASM OF NIPPLE OF RIGHT BREAST IN FEMALE, UNSPECIFIED ESTROGEN RECEPTOR STATUS: Primary | ICD-10-CM

## 2018-06-29 DIAGNOSIS — C50.012 MALIGNANT NEOPLASM OF NIPPLE OF LEFT BREAST IN FEMALE, UNSPECIFIED ESTROGEN RECEPTOR STATUS: ICD-10-CM

## 2018-06-29 DIAGNOSIS — T45.1X5A CHEMOTHERAPY-INDUCED PERIPHERAL NEUROPATHY: ICD-10-CM

## 2018-06-29 DIAGNOSIS — C50.512 PRIMARY CANCER OF LOWER OUTER QUADRANT OF LEFT FEMALE BREAST: ICD-10-CM

## 2018-06-29 DIAGNOSIS — G62.0 CHEMOTHERAPY-INDUCED PERIPHERAL NEUROPATHY: ICD-10-CM

## 2018-06-29 DIAGNOSIS — J70.0 RADIATION PNEUMONITIS: ICD-10-CM

## 2018-06-29 LAB
ALBUMIN SERPL BCP-MCNC: 4.4 G/DL
ALP SERPL-CCNC: 96 U/L
ALT SERPL W/O P-5'-P-CCNC: 37 U/L
ANION GAP SERPL CALC-SCNC: 11 MMOL/L
AST SERPL-CCNC: 22 U/L
BASOPHILS # BLD AUTO: 0.02 K/UL
BASOPHILS NFR BLD: 0.2 %
BILIRUB SERPL-MCNC: 0.4 MG/DL
BUN SERPL-MCNC: 19 MG/DL
CALCIUM SERPL-MCNC: 10.2 MG/DL
CHLORIDE SERPL-SCNC: 98 MMOL/L
CO2 SERPL-SCNC: 29 MMOL/L
CREAT SERPL-MCNC: 0.76 MG/DL
DIFFERENTIAL METHOD: ABNORMAL
EOSINOPHIL # BLD AUTO: 0 K/UL
EOSINOPHIL NFR BLD: 0.2 %
ERYTHROCYTE [DISTWIDTH] IN BLOOD BY AUTOMATED COUNT: 12 %
EST. GFR  (AFRICAN AMERICAN): >60 ML/MIN/1.73 M^2
EST. GFR  (NON AFRICAN AMERICAN): >60 ML/MIN/1.73 M^2
GLUCOSE SERPL-MCNC: 102 MG/DL
HCT VFR BLD AUTO: 40.9 %
HGB BLD-MCNC: 13.8 G/DL
LYMPHOCYTES # BLD AUTO: 0.8 K/UL
LYMPHOCYTES NFR BLD: 7.6 %
MCH RBC QN AUTO: 33.2 PG
MCHC RBC AUTO-ENTMCNC: 33.7 G/DL
MCV RBC AUTO: 98 FL
MONOCYTES # BLD AUTO: 0.6 K/UL
MONOCYTES NFR BLD: 6.2 %
NEUTROPHILS # BLD AUTO: 8.8 K/UL
NEUTROPHILS NFR BLD: 85.8 %
NRBC BLD-RTO: 0 /100 WBC
PLATELET # BLD AUTO: 220 K/UL
PMV BLD AUTO: 9.7 FL
POTASSIUM SERPL-SCNC: 5.1 MMOL/L
PROT SERPL-MCNC: 7.5 G/DL
RBC # BLD AUTO: 4.16 M/UL
SODIUM SERPL-SCNC: 138 MMOL/L
WBC # BLD AUTO: 10.21 K/UL

## 2018-06-29 PROCEDURE — 80053 COMPREHEN METABOLIC PANEL: CPT

## 2018-06-29 PROCEDURE — 3008F BODY MASS INDEX DOCD: CPT | Mod: CPTII,S$GLB,, | Performed by: INTERNAL MEDICINE

## 2018-06-29 PROCEDURE — 36415 COLL VENOUS BLD VENIPUNCTURE: CPT | Mod: PN

## 2018-06-29 PROCEDURE — 85025 COMPLETE CBC W/AUTO DIFF WBC: CPT | Mod: PN

## 2018-06-29 PROCEDURE — 99215 OFFICE O/P EST HI 40 MIN: CPT | Mod: S$GLB,,, | Performed by: INTERNAL MEDICINE

## 2018-06-29 PROCEDURE — 85025 COMPLETE CBC W/AUTO DIFF WBC: CPT

## 2018-06-29 PROCEDURE — 96413 CHEMO IV INFUSION 1 HR: CPT | Mod: PN

## 2018-06-29 PROCEDURE — 99999 PR PBB SHADOW E&M-EST. PATIENT-LVL III: CPT | Mod: PBBFAC,,, | Performed by: INTERNAL MEDICINE

## 2018-06-29 PROCEDURE — 25000003 PHARM REV CODE 250: Mod: PN | Performed by: INTERNAL MEDICINE

## 2018-06-29 PROCEDURE — 80053 COMPREHEN METABOLIC PANEL: CPT | Mod: PN

## 2018-06-29 PROCEDURE — 63600175 PHARM REV CODE 636 W HCPCS: Mod: PN | Performed by: INTERNAL MEDICINE

## 2018-06-29 PROCEDURE — A4216 STERILE WATER/SALINE, 10 ML: HCPCS | Mod: PN | Performed by: INTERNAL MEDICINE

## 2018-06-29 RX ORDER — HEPARIN 100 UNIT/ML
500 SYRINGE INTRAVENOUS
Status: CANCELLED | OUTPATIENT
Start: 2018-07-20

## 2018-06-29 RX ORDER — SODIUM CHLORIDE 0.9 % (FLUSH) 0.9 %
10 SYRINGE (ML) INJECTION
Status: CANCELLED | OUTPATIENT
Start: 2018-06-29

## 2018-06-29 RX ORDER — HEPARIN 100 UNIT/ML
500 SYRINGE INTRAVENOUS
Status: CANCELLED | OUTPATIENT
Start: 2018-06-29

## 2018-06-29 RX ORDER — SODIUM CHLORIDE 0.9 % (FLUSH) 0.9 %
10 SYRINGE (ML) INJECTION
Status: CANCELLED | OUTPATIENT
Start: 2018-07-20

## 2018-06-29 RX ORDER — HEPARIN 100 UNIT/ML
500 SYRINGE INTRAVENOUS
Status: DISCONTINUED | OUTPATIENT
Start: 2018-06-29 | End: 2018-06-29 | Stop reason: HOSPADM

## 2018-06-29 RX ORDER — SODIUM CHLORIDE 0.9 % (FLUSH) 0.9 %
10 SYRINGE (ML) INJECTION
Status: DISCONTINUED | OUTPATIENT
Start: 2018-06-29 | End: 2018-06-29 | Stop reason: HOSPADM

## 2018-06-29 RX ADMIN — SODIUM CHLORIDE: 9 INJECTION, SOLUTION INTRAVENOUS at 03:06

## 2018-06-29 RX ADMIN — TRASTUZUMAB 396 MG: 150 INJECTION, POWDER, LYOPHILIZED, FOR SOLUTION INTRAVENOUS at 03:06

## 2018-06-29 RX ADMIN — Medication 10 ML: at 04:06
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
## 2018-06-30 PROBLEM — J70.0 RADIATION PNEUMONITIS: Status: ACTIVE | Noted: 2018-06-30

## 2018-07-03 DIAGNOSIS — R91.8 ABNORMAL FINDINGS ON DIAGNOSTIC IMAGING OF LUNG: ICD-10-CM

## 2018-07-05 ENCOUNTER — PATIENT MESSAGE (OUTPATIENT)
Dept: PULMONOLOGY | Facility: CLINIC | Age: 63
End: 2018-07-05

## 2018-07-11 ENCOUNTER — CLINICAL SUPPORT (OUTPATIENT)
Dept: CARDIOLOGY | Facility: CLINIC | Age: 63
End: 2018-07-11
Attending: INTERNAL MEDICINE
Payer: COMMERCIAL

## 2018-07-11 VITALS
WEIGHT: 144 LBS | DIASTOLIC BLOOD PRESSURE: 70 MMHG | BODY MASS INDEX: 22.6 KG/M2 | SYSTOLIC BLOOD PRESSURE: 126 MMHG | HEART RATE: 60 BPM | HEIGHT: 67 IN

## 2018-07-11 DIAGNOSIS — C50.912 HER2-POSITIVE CARCINOMA OF LEFT BREAST: ICD-10-CM

## 2018-07-11 DIAGNOSIS — C50.512 PRIMARY CANCER OF LOWER OUTER QUADRANT OF LEFT FEMALE BREAST: ICD-10-CM

## 2018-07-11 DIAGNOSIS — C50.012 MALIGNANT NEOPLASM OF NIPPLE OF LEFT BREAST IN FEMALE, UNSPECIFIED ESTROGEN RECEPTOR STATUS: ICD-10-CM

## 2018-07-11 PROCEDURE — 99999 PR PBB SHADOW E&M-EST. PATIENT-LVL II: CPT | Mod: PBBFAC,,,

## 2018-07-11 PROCEDURE — 93306 TTE W/DOPPLER COMPLETE: CPT | Mod: S$GLB,,, | Performed by: INTERNAL MEDICINE

## 2018-07-12 LAB
ASCENDING AORTA: 2.93 CM
AV MEAN GRADIENT: 5.26 MMHG
AV PEAK GRADIENT: 9.71 MMHG
AV VALVE AREA: 2.59 CM2
BSA FOR ECHO PROCEDURE: 1.76 M2
CV ECHO LV RWT: 0.35 CM
DOP CALC AO PEAK VEL: 1.56 M/S
DOP CALC AO VTI: 31 CM
DOP CALC LVOT AREA: 3.14 CM2
DOP CALC LVOT DIAMETER: 2 CM
DOP CALC LVOT STROKE VOLUME: 80.32 CM3
DOP CALCLVOT PEAK VEL VTI: 25.58 CM
E WAVE DECELERATION TIME: 170.33 MSEC
E/A RATIO: 1.12
E/E' RATIO: 13.82
ECHO LV POSTERIOR WALL: 0.86 CM (ref 0.6–1.1)
FRACTIONAL SHORTENING: 46 % (ref 28–44)
INTERVENTRICULAR SEPTUM: 0.93 CM (ref 0.6–1.1)
IVRT: 0.15 MSEC
LA MAJOR: 4.4 CM
LA MINOR: 4.49 CM
LA WIDTH: 4.12 CM
LEFT ATRIUM SIZE: 3.02 CM
LEFT ATRIUM VOLUME INDEX: 26.7 ML/M2
LEFT ATRIUM VOLUME: 47.01 CM3
LEFT INTERNAL DIMENSION IN SYSTOLE: 2.81 CM (ref 2.1–4)
LEFT VENTRICLE MASS INDEX: 94.7 G/M2
LEFT VENTRICULAR INTERNAL DIMENSION IN DIASTOLE: 5.18 CM (ref 3.5–6)
LEFT VENTRICULAR MASS: 166.68 G
LV LATERAL E/E' RATIO: 12.67
LV SEPTAL E/E' RATIO: 15.2
MV PEAK A VEL: 0.68 M/S
MV PEAK E VEL: 0.76 M/S
MV STENOSIS PRESSURE HALF TIME: 49.4 MS
MV VALVE AREA P 1/2 METHOD: 4.45 CM2
PISA TR MAX VEL: 2.49 M/S
PULM VEIN S/D RATIO: 1.2
PV PEAK D VEL: 0.51 M/S
PV PEAK S VEL: 0.61 M/S
RA MAJOR: 4.41 CM
RA PRESSURE: 3 MMHG
RA WIDTH: 4.02 CM
RIGHT VENTRICULAR END-DIASTOLIC DIMENSION: 4.71 CM
SINUS: 2.91 CM
STJ: 2.58 CM
TDI LATERAL: 0.06
TDI SEPTAL: 0.05
TDI: 0.06
TR MAX PG: 24.8 MMHG
TRICUSPID ANNULAR PLANE SYSTOLIC EXCURSION: 0.03 CM
TV REST PULMONARY ARTERY PRESSURE: 27.8 MMHG

## 2018-07-17 ENCOUNTER — OFFICE VISIT (OUTPATIENT)
Dept: URGENT CARE | Facility: CLINIC | Age: 63
End: 2018-07-17
Payer: COMMERCIAL

## 2018-07-17 VITALS
WEIGHT: 144 LBS | SYSTOLIC BLOOD PRESSURE: 131 MMHG | TEMPERATURE: 97 F | HEIGHT: 67 IN | OXYGEN SATURATION: 100 % | HEART RATE: 59 BPM | DIASTOLIC BLOOD PRESSURE: 80 MMHG | BODY MASS INDEX: 22.6 KG/M2 | RESPIRATION RATE: 16 BRPM

## 2018-07-17 DIAGNOSIS — N39.0 URINARY TRACT INFECTION WITHOUT HEMATURIA, SITE UNSPECIFIED: Primary | ICD-10-CM

## 2018-07-17 DIAGNOSIS — R30.0 DYSURIA: ICD-10-CM

## 2018-07-17 PROCEDURE — 3008F BODY MASS INDEX DOCD: CPT | Mod: CPTII,S$GLB,, | Performed by: PHYSICIAN ASSISTANT

## 2018-07-17 PROCEDURE — 99214 OFFICE O/P EST MOD 30 MIN: CPT | Mod: 25,S$GLB,, | Performed by: PHYSICIAN ASSISTANT

## 2018-07-17 PROCEDURE — 81003 URINALYSIS AUTO W/O SCOPE: CPT | Mod: QW,S$GLB,, | Performed by: PHYSICIAN ASSISTANT

## 2018-07-17 RX ORDER — NITROFURANTOIN 25; 75 MG/1; MG/1
100 CAPSULE ORAL 2 TIMES DAILY
Qty: 14 CAPSULE | Refills: 0 | Status: SHIPPED | OUTPATIENT
Start: 2018-07-17 | End: 2018-07-24

## 2018-07-17 RX ORDER — PHENAZOPYRIDINE HYDROCHLORIDE 200 MG/1
200 TABLET, FILM COATED ORAL 3 TIMES DAILY PRN
Qty: 20 TABLET | Refills: 0 | Status: SHIPPED | OUTPATIENT
Start: 2018-07-17 | End: 2018-08-21

## 2018-07-17 NOTE — PATIENT INSTRUCTIONS
"UTI  A bladder infection ("cystitis" or "UTI") usually causes a constant urge to urinate and a burning when passing urine. Urine may be cloudy, smelly or dark. There may be pain in the lower abdomen. A bladder infection occurs when bacteria from the vaginal area enter the bladder opening (urethra). This can occur from sexual intercourse, wearing tight clothing, dehydration and other factors.    Cystitis in males is not common. It may be caused by a partial blockage in the urinary system that keeps the bladder from emptying completely. This is most often related to an enlarged prostate gland.      HOME CARE:  1. Drink lots of fluids (at least 6-8 glasses a day, unless you must restrict fluids for other medical reasons). This will force the medicine into your urinary system and flush the bacteria out of your body.  2. Avoid sexual intercourse until your symptoms are gone.  3. Avoid caffeine, alcohol and spicy foods. These can irritate the bladder.  4. A bladder infection is treated with antibiotics. You may also be given Pyridium (generic = phenazopyridine) to reduce the burning sensation. This medicine will cause your urine to become a bright orange color. The orange urine may stain clothing. You may wear a pad or panty-liner to protect clothing.    PREVENTING FUTURE INFECTIONS:  1. Always wipe from front to back after a bowel movement.  2. Keep the genital area clean and dry.  3. Drink plenty of fluids each day to avoid dehydration.  4. Both sexual partners should wash before intercourse.  5. Urinate right after intercourse to flush out the bladder.  6. Wear cotton underwear and cotton-lined panty hose; avoid tight-fitting pants.  7. If you are on birth control pills and are having frequent bladder infections, discuss with your doctor.    FOLLOW UP: Return to this facility or see your doctor if ALL symptoms are not gone after three days of treatment.    GET PROMPT MEDICAL ATTENTION if any of the following " occur:  Fever over 100.0ºF (37.8ºC)  No improvement by the third day of treatment  Increasing back or abdominal pain  Repeated vomiting; unable to keep medicine down  Weakness, dizziness or fainting  Vaginal discharge  Pain, redness or swelling in the labia (outer vaginal area)  If you were prescribed a narcotic medication, do not drive or operate heavy equipment or machinery while taking these medications.  You must understand that you've received an Urgent Care treatment only and that you may be released before all your medical problems are known or treated. You, the patient, will arrange for follow up care as instructed.  Follow up with your PCP or specialty clinic as directed in the next 1-2 weeks if not improved or as needed.  You can call [phone] to schedule an appointment with the appropriate provider.  If your condition worsens we recommend that you receive another evaluation at the emergency room immediately or contact your primary medical clinics after hours call service to discuss your concerns.  Please return here or go to the Emergency Department for any concerns or worsening of condition.

## 2018-07-17 NOTE — PROGRESS NOTES
"Subjective:       Patient ID: Ashlie Redman is a 63 y.o. female.    Vitals:  height is 5' 7" (1.702 m) and weight is 65.3 kg (144 lb). Her oral temperature is 97.3 °F (36.3 °C). Her blood pressure is 131/80 and her pulse is 59 (abnormal). Her respiration is 16 and oxygen saturation is 100%.     Chief Complaint: Dysuria    Pt states she started having pain with urination Sunday.      Dysuria    This is a recurrent problem. The current episode started in the past 7 days. The problem has been gradually worsening. Associated symptoms include frequency, hematuria and urgency. Pertinent negatives include no chills, nausea or vomiting.     Review of Systems   Constitution: Negative for chills and fever.   Skin: Negative for itching.   Musculoskeletal: Positive for back pain.   Gastrointestinal: Negative for abdominal pain, nausea and vomiting.   Genitourinary: Positive for dysuria, frequency, hematuria and urgency. Negative for genital sores, missed menses and non-menstrual bleeding.       Objective:      Physical Exam   Constitutional: She is oriented to person, place, and time. She appears well-developed and well-nourished.   HENT:   Head: Normocephalic and atraumatic.   Right Ear: External ear normal.   Left Ear: External ear normal.   Nose: Nose normal. No nasal deformity. No epistaxis.   Mouth/Throat: Oropharynx is clear and moist and mucous membranes are normal.   Eyes: Conjunctivae and lids are normal.   Neck: Trachea normal, normal range of motion and phonation normal. Neck supple.   Cardiovascular: Normal rate, regular rhythm, normal heart sounds and normal pulses.    Pulmonary/Chest: Effort normal and breath sounds normal.   Abdominal: Soft. Normal appearance and bowel sounds are normal. She exhibits no distension and no mass. There is no tenderness. There is no rigidity, no rebound, no guarding, no CVA tenderness, no tenderness at McBurney's point and negative Weir's sign.   Neurological: She is alert and " "oriented to person, place, and time.   Skin: Skin is warm, dry and intact.   Psychiatric: She has a normal mood and affect. Her speech is normal and behavior is normal. Cognition and memory are normal.   Nursing note and vitals reviewed.      Assessment:       1. Urinary tract infection without hematuria, site unspecified    2. Dysuria        Plan:         Urinary tract infection without hematuria, site unspecified  -     nitrofurantoin, macrocrystal-monohydrate, (MACROBID) 100 MG capsule; Take 1 capsule (100 mg total) by mouth 2 (two) times daily. for 7 days  Dispense: 14 capsule; Refill: 0  -     phenazopyridine (PYRIDIUM) 200 MG tablet; Take 1 tablet (200 mg total) by mouth 3 (three) times daily as needed for Pain.  Dispense: 20 tablet; Refill: 0    Dysuria  -     POCT Urinalysis, Dipstick, Automated, W/O Scope      Results for orders placed or performed in visit on 07/17/18   POCT Urinalysis, Dipstick, Automated, W/O Scope   Result Value Ref Range    POC Blood, Urine Negative Negative    POC Bilirubin, Urine Negative Negative    POC Urobilinogen, Urine Normal 0.1 - 1.1    POC Ketones, Urine Negative Negative    POC Protein, Urine Negative Negative    POC Nitrates, Urine Negative Negative    POC Glucose, Urine Negative Negative    pH, UA 7.5 5 - 8    POC Specific Gravity, Urine 1.010 1.003 - 1.029    POC Leukocytes, Urine Positive (A) Negative      Patient Instructions   UTI  A bladder infection ("cystitis" or "UTI") usually causes a constant urge to urinate and a burning when passing urine. Urine may be cloudy, smelly or dark. There may be pain in the lower abdomen. A bladder infection occurs when bacteria from the vaginal area enter the bladder opening (urethra). This can occur from sexual intercourse, wearing tight clothing, dehydration and other factors.    Cystitis in males is not common. It may be caused by a partial blockage in the urinary system that keeps the bladder from emptying completely. This is " most often related to an enlarged prostate gland.      HOME CARE:  1. Drink lots of fluids (at least 6-8 glasses a day, unless you must restrict fluids for other medical reasons). This will force the medicine into your urinary system and flush the bacteria out of your body.  2. Avoid sexual intercourse until your symptoms are gone.  3. Avoid caffeine, alcohol and spicy foods. These can irritate the bladder.  4. A bladder infection is treated with antibiotics. You may also be given Pyridium (generic = phenazopyridine) to reduce the burning sensation. This medicine will cause your urine to become a bright orange color. The orange urine may stain clothing. You may wear a pad or panty-liner to protect clothing.    PREVENTING FUTURE INFECTIONS:  1. Always wipe from front to back after a bowel movement.  2. Keep the genital area clean and dry.  3. Drink plenty of fluids each day to avoid dehydration.  4. Both sexual partners should wash before intercourse.  5. Urinate right after intercourse to flush out the bladder.  6. Wear cotton underwear and cotton-lined panty hose; avoid tight-fitting pants.  7. If you are on birth control pills and are having frequent bladder infections, discuss with your doctor.    FOLLOW UP: Return to this facility or see your doctor if ALL symptoms are not gone after three days of treatment.    GET PROMPT MEDICAL ATTENTION if any of the following occur:  Fever over 100.0ºF (37.8ºC)  No improvement by the third day of treatment  Increasing back or abdominal pain  Repeated vomiting; unable to keep medicine down  Weakness, dizziness or fainting  Vaginal discharge  Pain, redness or swelling in the labia (outer vaginal area)  If you were prescribed a narcotic medication, do not drive or operate heavy equipment or machinery while taking these medications.  You must understand that you've received an Urgent Care treatment only and that you may be released before all your medical problems are known or  treated. You, the patient, will arrange for follow up care as instructed.  Follow up with your PCP or specialty clinic as directed in the next 1-2 weeks if not improved or as needed.  You can call [phone] to schedule an appointment with the appropriate provider.  If your condition worsens we recommend that you receive another evaluation at the emergency room immediately or contact your primary medical clinics after hours call service to discuss your concerns.  Please return here or go to the Emergency Department for any concerns or worsening of condition.

## 2018-07-20 ENCOUNTER — INFUSION (OUTPATIENT)
Dept: INFUSION THERAPY | Facility: HOSPITAL | Age: 63
End: 2018-07-20
Attending: INTERNAL MEDICINE
Payer: COMMERCIAL

## 2018-07-20 ENCOUNTER — LAB VISIT (OUTPATIENT)
Dept: LAB | Facility: HOSPITAL | Age: 63
End: 2018-07-20
Attending: INTERNAL MEDICINE
Payer: COMMERCIAL

## 2018-07-20 ENCOUNTER — OFFICE VISIT (OUTPATIENT)
Dept: HEMATOLOGY/ONCOLOGY | Facility: CLINIC | Age: 63
End: 2018-07-20
Payer: COMMERCIAL

## 2018-07-20 VITALS
DIASTOLIC BLOOD PRESSURE: 72 MMHG | RESPIRATION RATE: 16 BRPM | HEART RATE: 68 BPM | HEIGHT: 67 IN | WEIGHT: 148.38 LBS | BODY MASS INDEX: 23.29 KG/M2 | TEMPERATURE: 97 F | SYSTOLIC BLOOD PRESSURE: 124 MMHG

## 2018-07-20 VITALS
SYSTOLIC BLOOD PRESSURE: 157 MMHG | RESPIRATION RATE: 20 BRPM | HEART RATE: 67 BPM | DIASTOLIC BLOOD PRESSURE: 84 MMHG | WEIGHT: 148.38 LBS | HEIGHT: 67 IN | BODY MASS INDEX: 23.29 KG/M2 | TEMPERATURE: 97 F

## 2018-07-20 DIAGNOSIS — N34.2 INFECTIVE URETHRITIS: ICD-10-CM

## 2018-07-20 DIAGNOSIS — C50.012 MALIGNANT NEOPLASM OF NIPPLE OF LEFT BREAST IN FEMALE, UNSPECIFIED ESTROGEN RECEPTOR STATUS: Primary | ICD-10-CM

## 2018-07-20 DIAGNOSIS — C50.011 MALIGNANT NEOPLASM OF NIPPLE OF RIGHT BREAST IN FEMALE, UNSPECIFIED ESTROGEN RECEPTOR STATUS: Primary | ICD-10-CM

## 2018-07-20 DIAGNOSIS — J70.0 RADIATION PNEUMONITIS: ICD-10-CM

## 2018-07-20 DIAGNOSIS — N81.11 MIDLINE CYSTOCELE: ICD-10-CM

## 2018-07-20 DIAGNOSIS — C50.012 MALIGNANT NEOPLASM OF NIPPLE OF LEFT BREAST IN FEMALE, UNSPECIFIED ESTROGEN RECEPTOR STATUS: ICD-10-CM

## 2018-07-20 DIAGNOSIS — C50.011 MALIGNANT NEOPLASM OF NIPPLE OF RIGHT BREAST IN FEMALE, UNSPECIFIED ESTROGEN RECEPTOR STATUS: ICD-10-CM

## 2018-07-20 LAB
ALBUMIN SERPL BCP-MCNC: 4 G/DL
ALP SERPL-CCNC: 91 U/L
ALT SERPL W/O P-5'-P-CCNC: 39 U/L
ANION GAP SERPL CALC-SCNC: 8 MMOL/L
AST SERPL-CCNC: 23 U/L
BASOPHILS # BLD AUTO: 0.04 K/UL
BASOPHILS NFR BLD: 0.4 %
BILIRUB SERPL-MCNC: 0.5 MG/DL
BUN SERPL-MCNC: 19 MG/DL
CALCIUM SERPL-MCNC: 9.6 MG/DL
CHLORIDE SERPL-SCNC: 101 MMOL/L
CO2 SERPL-SCNC: 27 MMOL/L
CREAT SERPL-MCNC: 0.66 MG/DL
DIFFERENTIAL METHOD: ABNORMAL
EOSINOPHIL # BLD AUTO: 0.1 K/UL
EOSINOPHIL NFR BLD: 0.5 %
ERYTHROCYTE [DISTWIDTH] IN BLOOD BY AUTOMATED COUNT: 12.6 %
EST. GFR  (AFRICAN AMERICAN): >60 ML/MIN/1.73 M^2
EST. GFR  (NON AFRICAN AMERICAN): >60 ML/MIN/1.73 M^2
GLUCOSE SERPL-MCNC: 98 MG/DL
HCT VFR BLD AUTO: 41.2 %
HGB BLD-MCNC: 13.6 G/DL
LYMPHOCYTES # BLD AUTO: 0.6 K/UL
LYMPHOCYTES NFR BLD: 5.6 %
MCH RBC QN AUTO: 33.5 PG
MCHC RBC AUTO-ENTMCNC: 33 G/DL
MCV RBC AUTO: 102 FL
MONOCYTES # BLD AUTO: 0.5 K/UL
MONOCYTES NFR BLD: 4.5 %
NEUTROPHILS # BLD AUTO: 9.3 K/UL
NEUTROPHILS NFR BLD: 89 %
NRBC BLD-RTO: 0 /100 WBC
PLATELET # BLD AUTO: 195 K/UL
PMV BLD AUTO: 9.6 FL
POTASSIUM SERPL-SCNC: 4.1 MMOL/L
PROT SERPL-MCNC: 7.1 G/DL
RBC # BLD AUTO: 4.06 M/UL
SODIUM SERPL-SCNC: 136 MMOL/L
WBC # BLD AUTO: 10.45 K/UL

## 2018-07-20 PROCEDURE — 85025 COMPLETE CBC W/AUTO DIFF WBC: CPT

## 2018-07-20 PROCEDURE — 63600175 PHARM REV CODE 636 W HCPCS: Mod: TB,PN | Performed by: INTERNAL MEDICINE

## 2018-07-20 PROCEDURE — 96413 CHEMO IV INFUSION 1 HR: CPT | Mod: PN

## 2018-07-20 PROCEDURE — 25000003 PHARM REV CODE 250: Mod: PN | Performed by: INTERNAL MEDICINE

## 2018-07-20 PROCEDURE — A4216 STERILE WATER/SALINE, 10 ML: HCPCS | Mod: PN | Performed by: INTERNAL MEDICINE

## 2018-07-20 PROCEDURE — 80053 COMPREHEN METABOLIC PANEL: CPT

## 2018-07-20 PROCEDURE — 80053 COMPREHEN METABOLIC PANEL: CPT | Mod: PN

## 2018-07-20 PROCEDURE — 85025 COMPLETE CBC W/AUTO DIFF WBC: CPT | Mod: PN

## 2018-07-20 PROCEDURE — 36415 COLL VENOUS BLD VENIPUNCTURE: CPT | Mod: PN

## 2018-07-20 PROCEDURE — 99215 OFFICE O/P EST HI 40 MIN: CPT | Mod: S$GLB,,, | Performed by: INTERNAL MEDICINE

## 2018-07-20 RX ORDER — SODIUM CHLORIDE 0.9 % (FLUSH) 0.9 %
10 SYRINGE (ML) INJECTION
Status: DISCONTINUED | OUTPATIENT
Start: 2018-07-20 | End: 2018-07-20 | Stop reason: HOSPADM

## 2018-07-20 RX ADMIN — SODIUM CHLORIDE, PRESERVATIVE FREE 10 ML: 5 INJECTION INTRAVENOUS at 03:07

## 2018-07-20 RX ADMIN — TRASTUZUMAB 396 MG: 150 INJECTION, POWDER, LYOPHILIZED, FOR SOLUTION INTRAVENOUS at 02:07

## 2018-07-20 RX ADMIN — SODIUM CHLORIDE: 900 INJECTION, SOLUTION INTRAVENOUS at 02:07

## 2018-07-20 NOTE — PLAN OF CARE
Problem: Patient Care Overview  Goal: Plan of Care Review  Outcome: Ongoing (interventions implemented as appropriate)  Pt tolerated Herceptin well.  No s/s of infusion reaction.  Instructed to call MD with any problems.

## 2018-07-20 NOTE — PROGRESS NOTES
HISTORY OF PRESENT ILLNESS:  This is a 62-year-old white female for a  diagnosis of Stage IIB left breast carcinoma.  The patient discovered   a mass in her left breast and was seen by Dr. Arce.  She has received 4 cycles of   A/C ,completed 12 cycles weekly taxol and continues q 3 week herceptin/ perjeta    She has bouts of diarrhea that resolve easily with Imodium. She denies any fevers, chills, unexplained   weight loss, new breast complaints, vaginal bleeding, abdominal discomfort, nausea,   vomiting, diarrhea, mouth sores, etc.  No new complaints or pertinent findings on a   14 point review of systems.some fatigue ,went for lumpectomy and axillary eval , revealed 2 LN + hence had a disection  xrt done in [place] per trial, pt here to continue herceptin last dose today scheduled    neuropathy +. Pt tried neurontin but had diarrhea and s/e and stopped it  Most recently pet showed some ? Areas in lung went to pulmonary, currently on tapering doses of prednisone over 6 weeks now on 10 mg,    Cough better still on steroids,   Rec. Uti, went to a urologist , has a prolapsed bladder  PHYSICAL EXAMINATION:  Wt Readings from Last 3 Encounters:   07/20/18 67.3 kg (148 lb 5.9 oz)   07/17/18 65.3 kg (144 lb)   07/11/18 65.3 kg (144 lb)     Temp Readings from Last 3 Encounters:   07/20/18 97.1 °F (36.2 °C) (Oral)   07/17/18 97.3 °F (36.3 °C) (Oral)   06/29/18 97.8 °F (36.6 °C)     BP Readings from Last 3 Encounters:   07/20/18 (!) 157/84   07/17/18 131/80   07/11/18 126/70     Pulse Readings from Last 3 Encounters:   07/20/18 67   07/17/18 (!) 59   07/11/18 60     GENERAL:  Well-developed, well-nourished white female in no acute distress.    Alert and oriented x4.weight gain and puffiness from steroids  :  HEENT:  Normocephalic, atraumatic.  Oral mucosa pink and moist.  Lips without   lesions.  Tongue midline.  Oropharynx clear.  Nonicteric sclerae.  NECK:  Supple, no adenopathy.  No carotid bruits, thyromegaly or  thyroid nodule.  HEART:  Regular rate and rhythm without murmur, gallop.  LUNGS:  Clear to auscultation bilaterally.  Normal respiratory effort.  ABDOMEN:  Soft, nontender, nondistended with positive normoactive bowel sounds,   no hepatosplenomegaly.  EXTREMITIES:  No cyanosis, clubbing or edema.  Distal pulses are intact.  AXILLAE AND GROIN:  No palpable pathologic lymphadenopathy is appreciated.  SKIN:  Intact/turgor normal.  NEUROLOGIC:  Cranial nerves II-XII grossly intact.  Motor:  Good muscle bulk and   tone.  Strength/sensory 5/5 throughout.  Gait stable.    LABORATORY:    Lab Results   Component Value Date    WBC 10.45 07/20/2018    HGB 13.6 07/20/2018    HCT 41.2 07/20/2018     (H) 07/20/2018     07/20/2018         CMP  Sodium   Date Value Ref Range Status   07/20/2018 136 136 - 145 mmol/L Final     Potassium   Date Value Ref Range Status   07/20/2018 4.1 3.5 - 5.1 mmol/L Final     Chloride   Date Value Ref Range Status   07/20/2018 101 95 - 110 mmol/L Final     CO2   Date Value Ref Range Status   07/20/2018 27 22 - 31 mmol/L Final     Glucose   Date Value Ref Range Status   07/20/2018 98 70 - 110 mg/dL Final     Comment:     The ADA recommends the following guidelines for fasting glucose:  Normal:       less than 100 mg/dL  Prediabetes:  100 mg/dL to 125 mg/dL  Diabetes:     126 mg/dL or higher       BUN, Bld   Date Value Ref Range Status   07/20/2018 19 (H) 7 - 18 mg/dL Final     Creatinine   Date Value Ref Range Status   07/20/2018 0.66 0.50 - 1.40 mg/dL Final     Calcium   Date Value Ref Range Status   07/20/2018 9.6 8.4 - 10.2 mg/dL Final     Total Protein   Date Value Ref Range Status   07/20/2018 7.1 6.0 - 8.4 g/dL Final     Albumin   Date Value Ref Range Status   07/20/2018 4.0 3.5 - 5.2 g/dL Final     Total Bilirubin   Date Value Ref Range Status   07/20/2018 0.5 0.2 - 1.3 mg/dL Final     Alkaline Phosphatase   Date Value Ref Range Status   07/20/2018 91 38 - 145 U/L Final     AST    Date Value Ref Range Status   07/20/2018 23 14 - 36 U/L Final     ALT   Date Value Ref Range Status   07/20/2018 39 10 - 44 U/L Final     Anion Gap   Date Value Ref Range Status   07/20/2018 8 8 - 16 mmol/L Final     eGFR if    Date Value Ref Range Status   07/20/2018 >60 >60 mL/min/1.73 m^2 Final     eGFR if non    Date Value Ref Range Status   07/20/2018 >60 >60 mL/min/1.73 m^2 Final     Comment:     Calculation used to obtain the estimated glomerular filtration  rate (eGFR) is the CKD-EPI equation.      ECHO wnl  CONCLUSIONS     1 - Normal left ventricular systolic function (EF 55-60%). 2/2018    2 - Normal left ventricular diastolic function.     3 - Normal right ventricular systolic function .     4 - Trivial mitral regurgitation.     5 - Trivial tricuspid regurgitation.     6 - The estimated PA systolic pressure is 26 mmHg.   5.       Left breast, lumpectomy:  - Residual invasive ductal carcinoma, moderately differentiated, Alexander Grade 2 (3+2+1=6), 0.2 cm in  greatest linear microscopic dimension.  - Ductal carcinoma in situ (DCIS), high nuclear grade, 2 cm, solid and cribriform types.  - Surgical margins are free of tumor; invasive carcinoma is located 0.1 cm from the closest margin  Impression 5/2018      1. Postsurgical changes and post chemotherapy/radiation changes with absence of the hypermetabolic left axillary nodes and left breast mass that were noted 4/20/17  2. Hypermetabolic activity within the left lung that appears peripheral and almost band like.  While this could relate to infectious or less likely neoplastic change this is most likely relating to radiation pneumonitis.  Follow-up for appropriate resolution and/or stability may be of use.  The small nodules seen on 04/21/2017 are not well evaluated likely secondary to motion     Conclusion 7/2018 echo c/w 2/2018 same    · Left ventricle ejection fraction is normal at 55%  · Normal LV diastolic  function.  · RV systolic function is normal.  · Normal central venous pressure (3 mm Hg).  · Mitral valve shows mild regurgitation.  · Tricuspid valve shows mild regurgitation.          IMPRESSION:  Stage IIB left breast carcinoma (ER/IL negative, Her-2/clay positive)  Completed neoadjuvant AC and weekly taxol now on herceptin last dose today and I stopped perjeta since she also had xrt to left side due o fear of toxicity  , s/p recent lumpectomy and axillary disection , with 2 residual Ln positive  PLAN:  1.  Proceed with   herceptin today is last rx  Neuropathy better  Taking port out with DR Arce seeing him 9/25   pulmonary doing CT chest in august  2. PET reports changes to lungs currently on prednisone for possible xrt pneumonits, per pulmonary if this doesn't clear then BAL may be needed.    Will get PET in November which will be 6 months from last one and she will have chest CT in 8/2018  Cont with pulmonary ,    rtc in 11/2018 with pet, cbc, cmp, [id number]  Cont with urology for prolapse of bladder and rec. uti

## 2018-07-31 ENCOUNTER — PATIENT MESSAGE (OUTPATIENT)
Dept: PULMONOLOGY | Facility: CLINIC | Age: 63
End: 2018-07-31

## 2018-08-01 ENCOUNTER — TELEPHONE (OUTPATIENT)
Dept: PULMONOLOGY | Facility: CLINIC | Age: 63
End: 2018-08-01

## 2018-08-01 RX ORDER — PANTOPRAZOLE SODIUM 40 MG/1
40 TABLET, DELAYED RELEASE ORAL DAILY
Qty: 30 TABLET | Refills: 3 | Status: SHIPPED | OUTPATIENT
Start: 2018-08-01 | End: 2018-09-25 | Stop reason: ALTCHOICE

## 2018-08-01 RX ORDER — PREDNISONE 10 MG/1
TABLET ORAL
Qty: 100 TABLET | Refills: 0 | Status: SHIPPED | OUTPATIENT
Start: 2018-08-01 | End: 2018-09-25

## 2018-08-01 NOTE — TELEPHONE ENCOUNTER
I spoke to the pt and pt confirmed rescheduled ct scan for 8/6/18 at 10:30, restarting steroids and speaking with Dr Lnio. Pt verbalized understanding.

## 2018-08-01 NOTE — TELEPHONE ENCOUNTER
I left the pt a voicemail to return my call. Please re-schedule her CT Friday, Saturday, or early next week and her to restart steroids before having the imaging done per Dr Lino. I rescheduled pt's ct scan for 8/6/18 at 10:30 am at [place] location first available.

## 2018-08-01 NOTE — TELEPHONE ENCOUNTER
----- Message from Sita Murrell sent at 8/1/2018  2:28 PM CDT -----  Contact: Kavitha Rangel    Ph [phone]    Need Clarifications       Prednisone 10 mg         Needs quantity and directions don't match     Please call   Thanks

## 2018-08-01 NOTE — TELEPHONE ENCOUNTER
I spoke to the pt to let her know pt portal message was forwarded to Dr Lino. I rescheduled pt's ct scan for 8/2/18 at [facility] per request of pt. Pt placed on Dr Lino's wait list for sooner appointment. Pt verbalized understanding.

## 2018-08-01 NOTE — TELEPHONE ENCOUNTER
I spoke to the pharmacist. Pt should take 3 tablets daily for 14 days, then 2 tablets daily for 7 days, then 1 tablet daily for 30 days of prednisone 10mg per Dr Lino.

## 2018-08-01 NOTE — TELEPHONE ENCOUNTER
Based on patients recurrence symptoms related to presumed radiation pneumonitis immediately after discontinuing steroids, this would be classified as a relapse. Patient reports that she was not tolerating the side effects of steroids-- diarrhea, anxiety-  However her cough and shortness of breath were considerably better while taking them. Will resume steroids, continue ppi. Re-schedule CT chest.

## 2018-08-02 ENCOUNTER — PATIENT MESSAGE (OUTPATIENT)
Dept: HEMATOLOGY/ONCOLOGY | Facility: CLINIC | Age: 63
End: 2018-08-02

## 2018-08-03 ENCOUNTER — TELEPHONE (OUTPATIENT)
Dept: RESEARCH | Facility: HOSPITAL | Age: 63
End: 2018-08-03

## 2018-08-03 NOTE — TELEPHONE ENCOUNTER
Friday, August 3rd, 2018  [id number]  [id number]  S, S      Pt left voicemail in office.   Contacted Pt back. We confirmed appointment for August 28, 2018. Will complete questionnaires and Lymphedema Measurements per Study Protocol. Pt thanked for following up.

## 2018-08-03 NOTE — TELEPHONE ENCOUNTER
Friday, August 3rd, 2018  [id number]  [id number]  S, S    Pt contacted for follow up. No availability. Both home and mobile numbers same. Voice mail by Pt name. Left message with my office and mobile contact name number to return call to schedule an appointment for Pt Questionnaires and Lymphedema per Study Protocol.

## 2018-08-06 ENCOUNTER — HOSPITAL ENCOUNTER (OUTPATIENT)
Dept: RADIOLOGY | Facility: HOSPITAL | Age: 63
Discharge: HOME | End: 2018-08-06
Attending: INTERNAL MEDICINE
Payer: COMMERCIAL

## 2018-08-06 DIAGNOSIS — R93.89 ABNORMAL CT SCAN, CHEST: ICD-10-CM

## 2018-08-06 PROCEDURE — 71250 CT THORAX DX C-: CPT | Mod: TC,PO

## 2018-08-06 PROCEDURE — 71250 CT THORAX DX C-: CPT | Mod: 26,,, | Performed by: RADIOLOGY

## 2018-08-08 ENCOUNTER — PATIENT MESSAGE (OUTPATIENT)
Dept: HEMATOLOGY/ONCOLOGY | Facility: CLINIC | Age: 63
End: 2018-08-08

## 2018-08-20 ENCOUNTER — TELEPHONE (OUTPATIENT)
Dept: RESEARCH | Facility: HOSPITAL | Age: 63
End: 2018-08-20

## 2018-08-20 ENCOUNTER — PATIENT MESSAGE (OUTPATIENT)
Dept: HEMATOLOGY/ONCOLOGY | Facility: CLINIC | Age: 63
End: 2018-08-20

## 2018-08-20 NOTE — TELEPHONE ENCOUNTER
SE NENA  MRN: [id number]  STUDY: [id number]  ID: [id number]  8/20/2018      Pt contacted office states reschedule appointment for tomorrow 8/21. Confirmed.

## 2018-08-21 ENCOUNTER — RESEARCH ENCOUNTER (OUTPATIENT)
Dept: RESEARCH | Facility: HOSPITAL | Age: 63
End: 2018-08-21

## 2018-08-21 ENCOUNTER — OFFICE VISIT (OUTPATIENT)
Dept: PULMONOLOGY | Facility: CLINIC | Age: 63
End: 2018-08-21
Payer: COMMERCIAL

## 2018-08-21 VITALS
OXYGEN SATURATION: 97 % | BODY MASS INDEX: 23.18 KG/M2 | HEART RATE: 96 BPM | SYSTOLIC BLOOD PRESSURE: 122 MMHG | WEIGHT: 147.69 LBS | DIASTOLIC BLOOD PRESSURE: 68 MMHG | HEIGHT: 67 IN

## 2018-08-21 DIAGNOSIS — J70.0 RADIATION PNEUMONITIS: ICD-10-CM

## 2018-08-21 DIAGNOSIS — R05.9 COUGH: Primary | ICD-10-CM

## 2018-08-21 PROCEDURE — 99213 OFFICE O/P EST LOW 20 MIN: CPT | Mod: S$GLB,,, | Performed by: INTERNAL MEDICINE

## 2018-08-21 PROCEDURE — 3008F BODY MASS INDEX DOCD: CPT | Mod: CPTII,S$GLB,, | Performed by: INTERNAL MEDICINE

## 2018-08-21 PROCEDURE — 99999 PR PBB SHADOW E&M-EST. PATIENT-LVL III: CPT | Mod: PBBFAC,,, | Performed by: INTERNAL MEDICINE

## 2018-08-21 NOTE — PROGRESS NOTES
Subjective:       Patient ID: Ashlie Redman is a 63 y.o. female.    Chief Complaint: Abnormal Ct Scan    HPI:   Ashlie Redman is a 63 y.o. female who presents for follow up of presumed radiation pneumonitis.    She has a history of stageIIB left breast carcinoma (s/p April 2017) and is s/p 4 cycles of A/C, completed 12 cycles of weekly taxol and continues q3 week herceptin/perjeta. She also underwent radiation Jan- feb 20, 2018.   She is followed by Dr. Celestina Ragsdale. She had a PET/CT  In May that showed new hypermetabolic acitivy within the left lung.    At that time she reported that she has chest heaviness on an ongoing basis.  Active person- exercising, cycling.  She notes after finishing radiation in February she was fatigued, but by April was back to her baseline.  By mid-May she noted that the fatigue had returned.  Noted shortness of breath and cough as well.    She was started on steroids at her June appointment.  Once the steroids were tapered off her cough returned.  We reinitiated steroid therapy, however patient hasn't been able to tolerate the sided effects-  Incontinence and blurry vision- generally feeling unwell.    She reports that for now- her cough has subsided.    Review of Systems   Constitutional: Negative for fever, chills and activity change.   HENT: Negative for postnasal drip, rhinorrhea, sinus pressure and congestion.    Respiratory: Positive for cough. Negative for hemoptysis, sputum production, shortness of breath, wheezing and dyspnea on extertion. Choking: very occasional.    Cardiovascular: Negative for chest pain and leg swelling.   Genitourinary: Negative for difficulty urinating.   Endocrine: Negative for cold intolerance and heat intolerance.    Musculoskeletal: Negative for joint swelling and myalgias.   Gastrointestinal: Negative for nausea, vomiting and abdominal pain.   Neurological: Negative for headaches.   Hematological: Negative for adenopathy. No excessive bruising.    Psychiatric/Behavioral: Negative for confusion and sleep disturbance.         Social History     Tobacco Use    Smoking status: Never Smoker    Smokeless tobacco: Never Used   Substance Use Topics    Alcohol use: No     Alcohol/week: 0.6 oz     Types: 1 Glasses of wine per week     Comment: Quit       Current Outpatient Medications on File Prior to Visit   Medication Sig    dorzolamide (TRUSOPT) 2 % ophthalmic solution Place 1 drop into both eyes 3 (three) times daily.    latanoprost 0.005 % ophthalmic solution INSTILL ONE DROP INTO EACH EYE ONCE DAILY IN THE EVENING    pantoprazole (PROTONIX) 40 MG tablet Take 1 tablet (40 mg total) by mouth once daily. To be taken while on prednisone.    phenazopyridine (PYRIDIUM) 200 MG tablet Take 1 tablet (200 mg total) by mouth 3 (three) times daily as needed for Pain.    predniSONE (DELTASONE) 10 MG tablet Take 3 tablets daily x 7 days, then 2 tablets daily for 7 days, then 1 tablet daily for 21 days.    valACYclovir (VALTREX) 500 MG tablet     venlafaxine (EFFEXOR XR) 75 MG 24 hr capsule Take 1 capsule (75 mg total) by mouth once daily.     No current facility-administered medications on file prior to visit.        Objective:      Physical Exam   Constitutional: She is oriented to person, place, and time. She appears well-developed and well-nourished.   HENT:   Head: Normocephalic.   Neck: Normal range of motion. Neck supple. No tracheal deviation present.   Cardiovascular: Normal rate and regular rhythm.   No murmur heard.  Pulmonary/Chest: Normal expansion, effort normal and breath sounds normal. She has no wheezes. She has no rales.   Abdominal: Soft. Bowel sounds are normal. She exhibits no distension. There is no tenderness.   Musculoskeletal: Normal range of motion. She exhibits no edema.   Neurological: She is alert and oriented to person, place, and time.   Skin: Skin is warm and dry.   Psychiatric: She has a normal mood and affect. Her behavior is  normal.   Vitals reviewed.    Personal Diagnostic Review  CT Chest: 8/6/18: bandlike consolidation at the left anterior chest wall shows some improvement.  Stable pulmonary nodules: subcentimeter- stable since 4/2017.  Assessment:     1. Cough    2. Radiation pneumonitis        Plan:       -symptoms and imaging have improved.  -I do not believe that a bronchoscopy would be helpful at this juncture.  -Ms. Redman and I discussed the possibility that the cough may return as her steroids leave her system, as well as the fact that I had intended to continue a taper for a much longer period of time.  She is aware, but feels the side effects from the steroids are worse than her cough.  She has requested a trial inhaler if the cough returns.  -She should continue surveillance imaging per oncology.

## 2018-08-21 NOTE — PROGRESS NOTES
SE NENA  MRN: [id number]  STUDY: [id number]  ID: [id number]  8/21/2018    Meet with Pt at her Doc visit today for follow-up per protocol. Pt states doing well. She is exercising and has a healthy diet. Pt completed questionnaires (QOL). Lymphedema (LMF) was measured and completed. Pt thanked for time and look forward to future to follow-up.

## 2018-08-30 RX ORDER — VALACYCLOVIR HYDROCHLORIDE 500 MG/1
TABLET, FILM COATED ORAL
Qty: 30 TABLET | Refills: 5 | Status: SHIPPED | OUTPATIENT
Start: 2018-08-30 | End: 2018-09-25

## 2018-09-12 RX ORDER — LATANOPROST 50 UG/ML
SOLUTION/ DROPS OPHTHALMIC
Refills: 6 | OUTPATIENT
Start: 2018-09-12

## 2018-09-16 ENCOUNTER — PATIENT MESSAGE (OUTPATIENT)
Dept: UROLOGY | Facility: CLINIC | Age: 63
End: 2018-09-16

## 2018-09-18 ENCOUNTER — TELEPHONE (OUTPATIENT)
Dept: UROLOGY | Facility: CLINIC | Age: 63
End: 2018-09-18

## 2018-09-18 DIAGNOSIS — N81.11 MIDLINE CYSTOCELE: Primary | ICD-10-CM

## 2018-09-25 ENCOUNTER — PATIENT MESSAGE (OUTPATIENT)
Dept: FAMILY MEDICINE | Facility: CLINIC | Age: 63
End: 2018-09-25

## 2018-09-25 ENCOUNTER — OFFICE VISIT (OUTPATIENT)
Dept: SURGERY | Facility: CLINIC | Age: 63
End: 2018-09-25
Payer: COMMERCIAL

## 2018-09-25 VITALS
DIASTOLIC BLOOD PRESSURE: 69 MMHG | SYSTOLIC BLOOD PRESSURE: 160 MMHG | HEIGHT: 67 IN | BODY MASS INDEX: 22.44 KG/M2 | TEMPERATURE: 98 F | WEIGHT: 143 LBS | HEART RATE: 83 BPM

## 2018-09-25 DIAGNOSIS — C50.512 MALIGNANT NEOPLASM OF LOWER-OUTER QUADRANT OF LEFT BREAST OF FEMALE, ESTROGEN RECEPTOR NEGATIVE: Primary | ICD-10-CM

## 2018-09-25 DIAGNOSIS — Z17.1 MALIGNANT NEOPLASM OF LOWER-OUTER QUADRANT OF LEFT BREAST OF FEMALE, ESTROGEN RECEPTOR NEGATIVE: Primary | ICD-10-CM

## 2018-09-25 PROCEDURE — 3008F BODY MASS INDEX DOCD: CPT | Mod: CPTII,S$GLB,, | Performed by: SURGERY

## 2018-09-25 PROCEDURE — 99999 PR PBB SHADOW E&M-EST. PATIENT-LVL III: CPT | Mod: PBBFAC,,, | Performed by: SURGERY

## 2018-09-25 PROCEDURE — 99214 OFFICE O/P EST MOD 30 MIN: CPT | Mod: S$GLB,,, | Performed by: SURGERY

## 2018-09-25 RX ORDER — DORZOLAMIDE HCL 20 MG/ML
SOLUTION/ DROPS OPHTHALMIC
COMMUNITY
End: 2018-11-06 | Stop reason: SDUPTHER

## 2018-09-25 RX ORDER — VALACYCLOVIR HYDROCHLORIDE 500 MG/1
TABLET, FILM COATED ORAL
COMMUNITY
End: 2019-07-22 | Stop reason: SDUPTHER

## 2018-09-25 NOTE — LETTER
[facility]  [address]  [facility] [address]  Phone: [phone]  Fax: [phone] September 25, 2018      Kevin Gong MD  [address] [facility] [address] [place] [address]    Patient: Ashlie Redman   MR Number: [id number]   YOB: 1955   Date of Visit: 9/25/2018     Dear Dr. Gong:    Thank you for referring Ashlie Redman to me for evaluation. Below are the relevant portions of my assessment and plan of care.    Ashlie Redman is a very pleasant 63-year-old  female well known to me from the [place].  She presents alone today for routine followup.  The patient underwent neoadjuvant Herceptin and Perjeta-based chemotherapy for initial presentation of a left invasive HER-2/clay positive cancer with a known positive node.  She subsequently underwent enrollment in the [place] trial as she had a residual positive node following the chemotherapy. She was randomized to completion axillary lymph node dissection, which she underwent.  She underwent postoperative radiotherapy to the left whole breast and the supraclavicular regions without axillary radiation.    The temporary winging of the scapula, which she had postoperatively has completely resolved and improved.  She also has had some post-radiation therapy pneumonitis and is having a dry cough.  She was on steroids at some point, but is now off as she did not like being on steroids.  She finished her Herceptin in July and will have her port removed by me in the coming months.  She did not receive any endocrine therapy as she was ER/TX negative and HER-2/clay positive and she is done with all of her adjuvant therapies.  Her last mammogram was on 04/23/2018, which was within normal limits.  The patient has prepectoral silicone implants, which are approximately 5 years old.    She is experiencing significant pain and discomfort with associated tenderness of the left breast due to contraction capsular fibrosis secondary to the  radiation issues with the existing implant.    The clinical breast exam is otherwise within normal limits.  There are no suspicious masses, nodules, densities, skin changes or lymphadenopathy on either side.  The port is in place on the right anterior chest wall.  There is firmness and some postradiation fibrosis in the left breast with some slight volume loss in the lower outer quadrant.  There is no palpable axillary, cervical or supraclavicular lymphadenopathy.  She is having some tenderness on exam on the left due to the contraction capsular fibrosis.    No evidence of disease, no evidence of local or regional recurrence.  The patient needs her Port-A-Cath removed and this has been scheduled in our minor procedure room at 11:00 a.m. on Friday, 11/09/2018.  She will be next due for a bilateral diagnostic mammogram on approximately 04/24/2019 and this order has been placed.  We also discussed that the patient consider a left completion mastectomy with autologous tissue SUSAN flap reconstruction for the pain and tenderness associated with contraction capsular fibrosis.  She is not ready to make this decision at this point, but will think about it and this is an option that will be on the table at any time.  At the current time, she is okay with the discomfort caused by the contraction capsular fibrosis and we will monitor this closely.  The patient to follow up with me in April of 2019 with a bilateral diagnostic mammogram and clinical breast exam, I have also scheduled her for port removal as outlined above on 11/09/2018.    If you have questions, please do not hesitate to call me. I look forward to following Ashlie along with you.    Sincerely,    Alexander Arce MD   Medical Director, [facility]  Staff Attending Surgeon - Department of Surgery  [facility]  Associate Professor of Surgery  [employer]  [employer]    RL[facility]/hcr

## 2018-09-25 NOTE — TELEPHONE ENCOUNTER
Med list updated pt pt clinical information pasted below.    This clinical information was not verified, but there are updates pending review:   Reported Medications Start Date Reported By  Comments   dorzolamide 2 % ophthalmic solution (KEITH  Ashlie Redman    valACYclovir 500 MG tablet (VALTREX)   Ashlie Redman    No Longer Applicable Medications Start Date Reported By  Comments   pantoprazole 40 MG tablet 8/1/2018 Ashlie Redman not taking prednisone   Allergies   This clinical information was not verified.

## 2018-09-25 NOTE — PROGRESS NOTES
"GENERAL SURGERY CLINIC  HISTORY AND PHYSICAL     CC: 6 month follow up       HPI:  Ashlie Redman is a 63 y.o.  female with Hx of history of left lateral breast invasive HER-2 positive invasive breast carcinoma, status post Herceptin and Perjeta based preoperative neoadjuvant chemotherapy with preoperatively known positive left lymph node inthe left axilla. She is s/p partial mastectomy and sentinel node dissection on 11/1, positive node noted with negative margins on mastectomy specimen.  She was randomized intraoperatively on the [place] trial to have a completion axillary lymph node dissection, which revealed 2 of 18 additional positive lymph nodes; so she did not receive axillary radiation.  She did have left breast radiation, which is completed. Patient has completed chemotherapy regimen.     Patient presents to clinic for 6 month follow up. Patient states she is doing "fair". Patient was diagnosed with radiation pneumonitis since last visit and has described associated fatigue, cough, and pain. Patient was started on prednisone but discontinued due to side effects. She has discussed with pulmonologist starting on an inhaler if cough returns. Patient also describes tightness of the skin with an associated pressure on the left breast. Patient has prepectoral silicone implants (completed 5 years ago). Patient did have winged scapula on last visit that has improved significantly with physical therapy and exercise. Patient is now able to lift 7 lb weights over head without difficulty.        ROS: A 10 point review of systems is negative apart from above stated in HPI.          Past Medical History:   Diagnosis Date    Allergy      Anemia      Asteroid hyalosis of both eyes      Breast cancer 04/2017     Left    Cataract      Diverticulosis      Encounter for blood transfusion      Glaucoma      High myopia      Osteopenia      S/P dilation and curettage                 Past Surgical History: "   Procedure Laterality Date    BIOPSY-LYMPH NODE-SENTINEL Left 11/1/2017     [facility] 2[facility] FLR    breast augmentation   03/2013    BREAST BIOPSY Left 04/2017     Core bx,+ cancer    BREAST LUMPECTOMY Left 10/25/2017    CATARACT EXTRACTION W/  INTRAOCULAR LENS IMPLANT Bilateral 2009    COLONOSCOPY   4/2009, 2015     repeat in 5 years    COLONOSCOPY N/A 5/21/2015     Performed by Evan [facility] at [facility] ENDO (4TH FLR)    DILATION AND CURETTAGE OF UTERUS        DISSECTION-LYMPH NODE-AXILLARY Left 11/1/2017     [facility]formed [facility] at [facility] OR 2ND FLR    ECTOPIC PREGNANCY SURGERY        EYE SURGERY Bilateral 3/09     Vitrectomy     INJECTION-NODE-SENTINEL Left 11/1/2017     Performed by [facility] at [facility] OR 2ND FLR    [id number]-NTPS-A-CATH neck poss chest Right 4/24/2017     Per[facility] by [facility] OR 2ND FLR    LUMPECTOMY with MAGSEED (2) Left 11/1/2017     Performed by [facility] at [facility] OR 2ND FLR    POLYPECTOMY         x3 2001    Yag Capsulotomy Bilateral 12/2014         Social History               Socioeconomic History    Marital status: Single       Spouse name: Not on file    Number of children: Not on file    Years of education: Not on file    Highest education level: Not on file   Social Needs    Financial resource strain: Not on file    Food insecurity - worry: Not on file    Food insecurity - inability: Not on file    Transportation needs - medical: Not on file    Transportation needs - non-medical: Not on file   Occupational History       Employer: [employer] ([employer])   Tobacco Use    Smoking status: Never Smoker    Smokeless tobacco: Never Used   Substance and Sexual Activity    Alcohol use: No       Alcohol/week: 0.6 oz       Types: 1 Glasses of wine per week       Comment: Quit    Drug use: No    Sexual activity: Yes       Partners: Male       Birth control/protection:  Post-menopausal   Other Topics Concern    Are you pregnant or think you may be? No    Breast-feeding Not Asked   Social History Narrative    Not on file                  Review of patient's allergies indicates:   Allergen Reactions    Bactrim [sulfamethoxazole-trimethoprim] Rash       Fever, vomiting            PHYSICAL EXAM:      Vitals:     09/25/18 0835   BP: (!) 160/69   Pulse: 83   Temp: 98.2 °F (36.8 °C)         General: NAD  Neuro: AAOx3  Cardio: RRR  Resp: Breathing even and unlabored  Abd: Soft, ND, NT, no palpable mass, BS+  Ext: Warm and well perfused        PERTINENT LABS:  Reviewed. None.        PERTINENT IMAGING:  Reviewed. None.        ASSESSMENT/PLAN:  Ashlie Redman is a 63 y.o. female history of left breast carcinoma who presents today for 6 month follow-up and is doing well.               Plan Port-o-cath removal on right in November              Mammogram yearly (next one in April 2019)              Clinical breast examination every 6 months              Prepectoral silicone implants with capsular contraction, advised patient with option of removal and autologous     reconstruction           Heber Sevilla, MS3  I have personally taken the history and examined this patient and agree with the resident's note as stated above.    Ashlie Redman is a very pleasant 63-year-old  female well known to me   from the [place].  She presents alone today for routine followup as noted   above.  The patient underwent neoadjuvant Herceptin and Perjeta-based   chemotherapy for initial presentation of a left invasive HER-2/clay positive   cancer with a known positive node.  She subsequently underwent enrollment in the   [place] trial as she had a residual positive node following the chemotherapy.    She was randomized to completion axillary lymph node dissection, which she   underwent.  She underwent postoperative radiotherapy to the left whole breast   and the supraclavicular regions without  axillary radiation.    The temporary winging of the scapula, which she had postoperatively has   completely resolved and improved.  She also has had some post-radiation therapy   pneumonitis and is having a dry cough.  She was on steroids at some point, but   is now off as she did not like being on steroids.  She finished her Herceptin in   July and will have her port removed by me in the coming months.  She did not   receive any endocrine therapy as she was ER/IN negative and HER-2/clay positive   and she is done with all of her adjuvant therapies.  Her last mammogram was on   04/23/2018, which was within normal limits.  The patient has prepectoral   silicone implants, which are approximately 5 years old.    She is experiencing significant pain and discomfort with associated tenderness   of the left breast due to contraction capsular fibrosis secondary to the   radiation issues with the existing implant.    The clinical breast exam is otherwise within normal limits.  There are no   suspicious masses, nodules, densities, skin changes or lymphadenopathy on either   side.  The port is in place on the right anterior chest wall.  There is   firmness and some postradiation fibrosis in the left breast with some slight   volume loss in the lower outer quadrant.  There is no palpable axillary,   cervical or supraclavicular lymphadenopathy.  She is having some tenderness on   exam on the left due to the contraction capsular fibrosis.    ASSESSMENT AND PLAN:  No evidence of disease, no evidence of local or regional   recurrence.  The patient needs her Port-A-Cath removed and this has been   scheduled in our minor procedure room at 11:00 a.m. on Friday, 11/09/2018.  She   will be next due for a bilateral diagnostic mammogram on approximately   04/24/2019 and this order has been placed.  We also discussed that the patient   consider a left completion mastectomy with autologous tissue SUSAN flap   reconstruction for the pain and  tenderness associated with contraction capsular   fibrosis.  She is not ready to make this decision at this point, but will think   about it and this is an option that will be on the table at any time.  At the   current time, she is okay with the discomfort caused by the contraction capsular   fibrosis and we will monitor this closely.  The patient to follow up with me in   April of 2019 with a bilateral diagnostic mammogram and clinical breast exam, I   have also scheduled her for port removal as outlined above on 11/09/2018.      ROSITA/FERNY  dd: 09/25/2018 14:04:55 (CDT)  td: 09/25/2018 23:11:54 (CDT)  Doc ID   #[id number]  Job ID #[id number]    CC:     Job # [id number].

## 2018-09-25 NOTE — MEDICAL/APP STUDENT
"GENERAL SURGERY CLINIC  HISTORY AND PHYSICAL    CC: 6 month follow up      HPI:  Ashlie Redman is a 63 y.o.  female with Hx of history of left lateral breast invasive HER-2 positive invasive breast carcinoma, status post Herceptin and Perjeta based preoperative neoadjuvant chemotherapy with preoperatively known positive left lymph node inthe left axilla. She is s/p partial mastectomy and sentinel node dissection on 11/1, positive node noted with negative margins on mastectomy specimen.  She was randomized intraoperatively on the [place] trial to have a completion axillary lymph node dissection, which revealed 2 of 18 additional positive lymph nodes; so she did not receive axillary radiation.  She did have left breast radiation, which is completed. Patient has completed chemotherapy regimen.    Patient presents to clinic for 6 month follow up. Patient states she is doing "fair". Patient was diagnosed with radiation pneumonitis since last visit and has described associated fatigue, cough, and pain. Patient was started on prednisone but discontinued due to side effects. She has discussed with pulmonologist starting on an inhaler if cough returns. Patient also describes tightness of the skin with an associated pressure on the left breast. Patient has prepectoral silicone implants (completed 5 years ago). Patient did have winged scapula on last visit that has improved significantly with physical therapy and exercise. Patient is now able to lift 7 lb weights over head without difficulty.      ROS: A 10 point review of systems is negative apart from above stated in HPI.    Past Medical History:   Diagnosis Date    Allergy     Anemia     Asteroid hyalosis of both eyes     Breast cancer 04/2017    Left    Cataract     Diverticulosis     Encounter for blood transfusion     Glaucoma     High myopia     Osteopenia     S/P dilation and curettage        Past Surgical History:   Procedure Laterality Date    " BIOPSY-LYMPH NODE-SENTINEL Left 11/1/2017    [facility] 2[facility] F[facility]    breast augmentation  03/2013    BREAST BIOPSY Left 04/2017    Core bx,+ cancer    BREAST LUMPECTOMY Left 10/25/2017    CATARACT EXTRACTION W/  INTRAOCULAR LENS IMPLANT Bilateral 2009    COLONOSCOPY  4/2009, 2015    repeat in 5 years    COLONOSCOPY N/A 5/21/2015    Performed by Evan [facility] at [facility] ENDO (4TH FLR)    DILATION AND CURETTAGE OF UTERUS      DISSECTION-LYMPH NODE-AXILLARY Left 11/1/2017    [facility] at [facility] 2ND FLR    ECTOPIC PREGNANCY SURGERY      EYE SURGERY Bilateral 3/09    Vitrectomy     INJECTION-NODE-SENTINEL Left 11/1/2017    [facility] 2ND FLR    [id number]-TMAS-U-CATH neck poss chest Right 4/24/2017    Performed [facility] at [facility] 2ND FLR    LUMPECTOMY with MAGSEED (2) Left 11/1/2017    [facility] by [facility] at [facility] OR 2ND FLR    POLYPECTOMY      x3 2001    Yag Capsulotomy Bilateral 12/2014       Social History     Socioeconomic History    Marital status: Single     Spouse name: Not on file    Number of children: Not on file    Years of education: Not on file    Highest education level: Not on file   Social Needs    Financial resource strain: Not on file    Food insecurity - worry: Not on file    Food insecurity - inability: Not on file    Transportation needs - medical: Not on file    Transportation needs - non-medical: Not on file   Occupational History     Employer: [employer] ([employer])   Tobacco Use    Smoking status: Never Smoker    Smokeless tobacco: Never Used   Substance and Sexual Activity    Alcohol use: No     Alcohol/week: 0.6 oz     Types: 1 Glasses of wine per week     Comment: Quit    Drug use: No    Sexual activity: Yes     Partners: Male     Birth control/protection: Post-menopausal   Other Topics Concern    Are you pregnant or think you  may be? No    Breast-feeding Not Asked   Social History Narrative    Not on file       Review of patient's allergies indicates:   Allergen Reactions    Bactrim [sulfamethoxazole-trimethoprim] Rash     Fever, vomiting         PHYSICAL EXAM:  Vitals:    09/25/18 0835   BP: (!) 160/69   Pulse: 83   Temp: 98.2 °F (36.8 °C)       General: NAD  Neuro: AAOx3  Cardio: RRR  Resp: Breathing even and unlabored  Abd: Soft, ND, NT, no palpable mass, BS+  Ext: Warm and well perfused      PERTINENT LABS:  Reviewed. None.      PERTINENT IMAGING:  Reviewed. None.      ASSESSMENT/PLAN:  Ashlie Redman is a 63 y.o. female history of left breast carcinoma who presents today for 6 month follow-up and is doing well.    Plan Port-o-cath removal on right in November   Mammogram yearly (next one in April 2019)   Clinical breast examination every 6 months   Prepectoral silicone implants with capsular contraction, advised patient with option of removal and autologous  reconstruction        Heber Sevilla, MS3

## 2018-10-02 ENCOUNTER — TELEPHONE (OUTPATIENT)
Dept: RADIATION ONCOLOGY | Facility: CLINIC | Age: 63
End: 2018-10-02

## 2018-10-11 ENCOUNTER — OFFICE VISIT (OUTPATIENT)
Dept: RADIATION ONCOLOGY | Facility: CLINIC | Age: 63
End: 2018-10-11
Payer: COMMERCIAL

## 2018-10-11 VITALS
RESPIRATION RATE: 16 BRPM | WEIGHT: 144.13 LBS | DIASTOLIC BLOOD PRESSURE: 83 MMHG | TEMPERATURE: 98 F | SYSTOLIC BLOOD PRESSURE: 122 MMHG | BODY MASS INDEX: 22.62 KG/M2 | HEIGHT: 67 IN | HEART RATE: 80 BPM

## 2018-10-11 DIAGNOSIS — C50.512 PRIMARY CANCER OF LOWER OUTER QUADRANT OF LEFT FEMALE BREAST: Primary | ICD-10-CM

## 2018-10-11 DIAGNOSIS — C50.012 MALIGNANT NEOPLASM OF NIPPLE OF LEFT BREAST IN FEMALE, ESTROGEN RECEPTOR NEGATIVE: ICD-10-CM

## 2018-10-11 DIAGNOSIS — Z17.1 MALIGNANT NEOPLASM OF NIPPLE OF LEFT BREAST IN FEMALE, ESTROGEN RECEPTOR NEGATIVE: ICD-10-CM

## 2018-10-11 PROCEDURE — 99999 PR PBB SHADOW E&M-EST. PATIENT-LVL III: CPT | Mod: PBBFAC,,, | Performed by: RADIOLOGY

## 2018-10-11 PROCEDURE — 99213 OFFICE O/P EST LOW 20 MIN: CPT | Mod: S$GLB,,, | Performed by: RADIOLOGY

## 2018-10-11 PROCEDURE — 3008F BODY MASS INDEX DOCD: CPT | Mod: CPTII,S$GLB,, | Performed by: RADIOLOGY

## 2018-10-11 NOTE — PROGRESS NOTES
REFERRING PHYSICIAN: Alexander Arce MD     DIAGNOSIS: cT2 N1 M0 (ypT1a N1a) invasive ductal carcinoma of the left breast      Radiation Treatment Summary:  Ms. Redman completed radiation to the left breast and draining lymph nodes as shown below.     Treatment Site Energy Dose/Fx (Gy) #Fx Total Dose (Gy) Start Date End Date   LT SCLAV 6X 2 25 / 25 50 1/4/2018 2/12/2018   LT BREAST /IM 6X 2 25 / 25 50 1/4/2018 2/12/2018   BOOST 12E 2 5 / 5 10 2/14/2018 2/20/2018      INTERVAL HISTORY:   Ms. Redman is a 63-year-old female with history of radiation to the left breast and draining lymph nodes as above returns for follow-up.    She has history of bilateral saline breast implants and was diagnosed with left breast cancer after she presented with a palpable mass in the upper outer quadrant of the left breast as well as in the left axilla. Mammogram in March 2017 revealed a 6 mm focal asymmetry in the left breast at 3- 4 o'clock at the site of the palpable lesion. An ultrasound revealed a 34 mm area of abnormality at the same site with additional enlarged left axillary lymph node measuring 20 x 17 x 11 mm. A biopsy of the left breast mass on April 5, 2017 revealed grade 2, invasive ductal carcinoma with associated high-grade DCIS with comedonecrosis. This lesion is ER/NH negative and HER-2 positive. On April 18, 2017, she underwent left axillary lymph node biopsy which revealed high-grade infiltrating ductal carcinoma involving the entire lymph node. A PET scan on April 21, 2017 revealed a hypermetabolic mass in the inferior lateral aspect of the left breast abutting the implant measuring approximately 3 cm with SUV max of 3.8. There is also a hypermetabolic left axillary lymph node measuring 1.3 cm with SUV max of 6 noted in addition to additional smaller subcentimeter hypermetabolic left axillary lymph nodes.      She received neoadjuvant chemotherapy with 4 cycles of Adriamycin and Cytoxan followed by weekly Taxol,  Herceptin, and Perjeta. On November 1, 2017, she underwent left breast lumpectomy and sentinel node biopsy followed by axillary dissection based on the [place] protocol. The pathology revealed the left breast with residual 0.2 cm invasive ductal carcinoma, grade 2, with the closest margin posteriorly at 0.1 cm. There is also associated high nuclear grade DCIS seen measuring 2 cm, with the closest margin at 0.1 cm posteriorly. Additional deep margin was taken from the fibrous capsule which is negative for carcinoma. 2 out of 7 sentinel lymph nodes were found to have involvement with the largest focus measuring 8 mm and the second measuring 2 mm, both of which were negative for extra capsular invasion. She was randomized to the arm receiving axillary dissection with additional 11 lymph nodes negative for involvement.  She then received postoperative radiation to the left breast and draining lymph nodes including the left supraclavicular and left internal mammary nodes as above.  She is here today for a routine follow-up.     Since I saw her last, a repeat mammogram on April 23, 2018 revealed no suspicious lesions.  She was diagnosed with pneumonitis of the left upper lobe after she presented with fatigue cough shortness breath.  A PET scan on May 29, 2018 revealed the consolidative changes in the left upper lobe periphery likely related to radiation pneumonitis.  She was started on prednisone with some improvement in her symptoms.  However, the symptoms recurred upon completion of the prednisone taper.  Therefore another round of prednisone was recommended by pulmonology.  However patient declined.  At present, she reports some improvement in her cough.  She continues to have fatigue.  She also reports left breast pain related to capsular contraction of the implant.  She is not ready to undergo any further surgery to correct that at present.  She has completed Herceptin with the last cycle in July 2018.  She denies  "left breast edema, erythema, nipple discharge, fever, night sweats, or weight loss.  She is planned to repeat a bilateral mammogram on April 23, 2018.     PHYSICAL EXAMINATION:  VITAL SINGS: /83   Pulse 80   Temp 97.8 °F (36.6 °C) (Oral)   Resp 16   Ht 5' 7" (1.702 m)   Wt 65.4 kg (144 lb 1.6 oz)   BMI 22.57 kg/m²   GENERAL: Patient is alert and oriented, in no acute distress.  HEENT: Extraocular muscles are intact.  Oropharynx is clear without lesions.  There is no cervical or supraclavicular adenopathy palpated.    CHEST: Breath sounds clear bilaterally.  No rales.  No rhonchi.  Unlabored respirations.  CARDIOVASCULAR: Normal S1, S2.  Normal rate.  Regular rhythm.  BREAST EXAM:  Bilateral implants noted.  The scar secondary to lumpectomy is noted in the upper outer quadrant of the left breast.  There is also a scar in the left axilla secondary to axillary dissection.  The left implant is firm.  There is tenderness to palpation of the left breast.  No abnormal masses palpated in the left breast or left axilla.  The right breast and right axilla are also without palpable masses.  ABDOMEN: Bowel sounds normal.  No tenderness.  No abdominal distention.  No hepatomegaly.  No splenomegaly.  EXTREMITIES: No clubbing, cyanosis, edema  NEUROLOGIC: Cranial nerves II through XII are grossly intact.  Sensation is intact.  Strength is 5 out of 5 in the upper and lower extremities bilaterally.    ASSESSMENT:   This is a 63-year-old female with [id number] ([id number]) infiltrating ductal carcinoma of the left breast who underwent neoadjuvant chemotherapy, lumpectomy/sentinel node biopsy followed by axillary dissection followed by postoperative radiation her Benedict protocol, ER/CT negative, her 2 positive.    PLAN:   Ms. Redman is noted to have no evidence of disease per mammogram.  However, she appears to have developed radiation pneumonitis which is improving.  She does not want undergo further surgery for correction " of the capsular contraction of the left breast implant at this time.  She endorses distress over her diagnosis and treatment related side effects.  I recommend a psychology consult and she is agreeable.  A referral was placed for that.  She would like to obtain that in [facility].  I plan to see her back in approximately 6 months, unless symptoms warrant otherwise.

## 2018-10-16 ENCOUNTER — TELEPHONE (OUTPATIENT)
Dept: INFUSION THERAPY | Facility: HOSPITAL | Age: 63
End: 2018-10-16

## 2018-10-17 ENCOUNTER — TELEPHONE (OUTPATIENT)
Dept: INFUSION THERAPY | Facility: HOSPITAL | Age: 63
End: 2018-10-17

## 2018-11-06 ENCOUNTER — OFFICE VISIT (OUTPATIENT)
Dept: OPHTHALMOLOGY | Facility: CLINIC | Age: 63
End: 2018-11-06
Payer: COMMERCIAL

## 2018-11-06 ENCOUNTER — ANESTHESIA EVENT (OUTPATIENT)
Dept: SURGERY | Facility: HOSPITAL | Age: 63
End: 2018-11-06
Payer: COMMERCIAL

## 2018-11-06 DIAGNOSIS — H52.7 REFRACTIVE ERROR: ICD-10-CM

## 2018-11-06 DIAGNOSIS — Z98.890 HISTORY OF VITRECTOMY: ICD-10-CM

## 2018-11-06 DIAGNOSIS — H40.053 OHT (OCULAR HYPERTENSION), BILATERAL: Primary | ICD-10-CM

## 2018-11-06 DIAGNOSIS — Z96.1 PSEUDOPHAKIA OF BOTH EYES: ICD-10-CM

## 2018-11-06 PROCEDURE — 92014 COMPRE OPH EXAM EST PT 1/>: CPT | Mod: S$GLB,,, | Performed by: OPHTHALMOLOGY

## 2018-11-06 PROCEDURE — 99999 PR PBB SHADOW E&M-EST. PATIENT-LVL II: CPT | Mod: PBBFAC,,, | Performed by: OPHTHALMOLOGY

## 2018-11-06 PROCEDURE — 92133 CPTRZD OPH DX IMG PST SGM ON: CPT | Mod: S$GLB,,, | Performed by: OPHTHALMOLOGY

## 2018-11-06 RX ORDER — DORZOLAMIDE HCL 20 MG/ML
1 SOLUTION/ DROPS OPHTHALMIC 3 TIMES DAILY
Qty: 30 ML | Refills: 4 | Status: SHIPPED | OUTPATIENT
Start: 2018-11-06 | End: 2019-04-15 | Stop reason: SDUPTHER

## 2018-11-06 NOTE — PRE ADMISSION SCREENING
Anesthesia Assessment: Preoperative EQUATION    Planned Procedure: Procedure(s) (LRB):  REPAIR, CYSTOCELE (N/A)  CYSTOSCOPY (N/A)  Requested Anesthesia Type:General  Surgeon: Rebecca Acosta MD  Service: Urology  Known or anticipated Date of Surgery:11/20/2018    Surgeon notes: reviewed    Electronic QUestionnaire Assessment completed via nurse interview with patient.      NO AQ    Triage considerations:     The patient has no apparent active cardiac condition (No unstable coronary Syndrome such as severe unstable angina or recent [<1 month] myocardial infarction, decompensated CHF, severe valvular   disease or significant arrhythmia)    Previous anesthesia records:GETA, Spinal Anesthesia  and No problems   11/1/2017  LUMPECTOMY with MAGSEED (2) (Left Breast) BIOPSY-LYMPH NODE-SENTINEL (Left ) INJECTION-NODE-SENTINEL (Left ) DISSECTION-LYMPH NODE-AXILLARY (Left Axilla)     Airway/Jaw/Neck:  Airway Findings: Mouth Opening: Normal Tongue: Normal  General Airway Assessment: Adult  Mallampati: II  TM Distance: Normal, at least 6 cm  Jaw/Neck Findings:  Neck ROM: Normal ROM  Neck Findings: Normal       11/01/17 Placement Time: 0817 Inserted by: GLENN Hatch  Airway Device: LMA Mask Ventilation: Easy Airway Device Size: 4.0 Style: Cuffed Cuff Inflation: Minimal occlusive pressure Placement Verified By: Auscultation;ETT Condensation;Capnometry Complicating Factors: None Findings Post-Intubation: Positive EtCO2;Bilateral breath sounds;Atraumatic/Condition of teeth unchanged Secured at: Lips Complications: None Breath Sounds: Equal Bilateral Insertion attempts (enter comment if more than 2 attempts): 1     Last PCP note: 6-12 months ago , [facility]   Subspecialty notes: Hematology/Oncology, Pulmonary    Other important co-morbidities: Breast CA, Osteopenia     Tests already available:  Available tests,  3-6 months ago , within [facility] . 7/2018 CMP, CBC with diff, KALI; 2/2018 Echo            Instructions  given. (See in Nurse's note)    Optimization:  Anesthesia Preop Clinic Assessment  Indicated: Not required for this procedure        Plan:   Testing:  None required        Patient  has previously scheduled Medical Appointment: 11/6/2018    Navigation: Straight Line to surgery.               No tests, anesthesia preop clinic visit, or consult required.

## 2018-11-06 NOTE — ANESTHESIA PREPROCEDURE EVALUATION
Anitra Stover, RN   Registered Nurse      Pre Admission Screening   Signed                             []Hide copied text    []Hover for details      Anesthesia Assessment: Preoperative EQUATION     Planned Procedure: Procedure(s) (LRB):  REPAIR, CYSTOCELE (N/A)  CYSTOSCOPY (N/A)  Requested Anesthesia Type:General  Surgeon: Rebecca Acosta MD  Service: Urology  Known or anticipated Date of Surgery:11/20/2018     Surgeon notes: reviewed     Electronic QUestionnaire Assessment completed via nurse interview with patient.      NO AQ     Triage considerations:      The patient has no apparent active cardiac condition (No unstable coronary Syndrome such as severe unstable angina or recent [<1 month] myocardial infarction, decompensated CHF, severe valvular   disease or significant arrhythmia)     Previous anesthesia records:GETA, Spinal Anesthesia  and No problems   11/1/2017  LUMPECTOMY with MAGSEED (2) (Left Breast) BIOPSY-LYMPH NODE-SENTINEL (Left ) INJECTION-NODE-SENTINEL (Left ) DISSECTION-LYMPH NODE-AXILLARY (Left Axilla)      Airway/Jaw/Neck:  Airway Findings: Mouth Opening: Normal Tongue: Normal  General Airway Assessment: Adult  Mallampati: II  TM Distance: Normal, at least 6 cm  Jaw/Neck Findings:  Neck ROM: Normal ROM  Neck Findings: Normal        11/01/17 Placement Time: 0817 Inserted by: GLENN Hatch  Airway Device: LMA Mask Ventilation: Easy Airway Device Size: 4.0 Style: Cuffed Cuff Inflation: Minimal occlusive pressure Placement Verified By: Auscultation;ETT Condensation;Capnometry Complicating Factors: None Findings Post-Intubation: Positive EtCO2;Bilateral breath sounds;Atraumatic/Condition of teeth unchanged Secured at: Lips Complications: None Breath Sounds: Equal Bilateral Insertion attempts (enter comment if more than 2 attempts): 1      Last PCP note: 6-12 months ago , within [facility]   Subspecialty notes: Hematology/Oncology, Pulmonary     Other important co-morbidities: Breast  CA, Osteopenia     Tests already available:  Available tests,  3-6 months ago , within [facility] . 7/2018 CMP, CBC with diff, KALI; 2/2018 Echo                       Instructions given. (See in Nurse's note)     Optimization:  Anesthesia Preop Clinic Assessment  Indicated: Not required for this procedure           Plan:          Testing:  None required                                                          Patient  has previously scheduled Medical Appointment: 11/6/2018     Navigation: Straight Line to surgery.                     No tests, anesthesia preop clinic visit, or consult required.                                                                                                                           11/06/2018  Ashlie Redman is a 63 y.o., female.    Anesthesia Evaluation    I have reviewed the Patient Summary Reports.    I have reviewed the Nursing Notes.   I have reviewed the Medications.     Review of Systems  Anesthesia Hx:  No problems with previous Anesthesia  History of prior surgery of interest to airway management or planning: Previous anesthesia: General, Spinal 11/1/2017 Lumpectomy with [place] Node injection and Biopsy; Axillary Lymph node dissection with general anesthesia.  Procedure performed at an [facility] Facility. Denies Family Hx of Anesthesia complications.   Denies Personal Hx of Anesthesia complications.   Social:  Non-Smoker, Social Alcohol Use    Hematology/Oncology:         -- Anemia: s/p chemotherapy Breast s/p surgery, s/p radiation therapy and s/p chemotherapy  Surgery: with sentinel nodes dissection, with complete lymph node dissection and axillary   Current/Recent Cancer. Breast radiation, chemotherapy and surgery    EENT/Dental:EENT/Dental Normal   Cardiovascular:   Exercise tolerance: good  Denies Angina. Patient is an avid cyclist. No change in functional capacity Functional Capacity good / => 4 METS    Pulmonary:   Denies Recent URI.  Denies Sleep Apnea.  Pulmonary  Symptoms: (secondary to radiation treatment)  are shortness of breath with activity and pleuritic chest pain.    Renal/:   Cystocele   Hepatic/GI:  Hepatic/GI Normal    Musculoskeletal:  Musculoskeletal Normal    Neurological:   Peripheral Neuropathy (Chemotherapy induced)    Endocrine:  Endocrine Normal    Dermatological:  Skin Normal    Psych:  Psychiatric Normal           Physical Exam  General:  Well nourished    Airway/Jaw/Neck:  Airway Findings: Mouth Opening: Normal Tongue: Normal  General Airway Assessment: Adult  Mallampati: II  Improves to II with phonation.  TM Distance: Normal, at least 6 cm  Jaw/Neck Findings:  Neck ROM: Normal ROM      Dental:  Dental Findings: In tact   Chest/Lungs:  Chest/Lungs Findings: Clear to auscultation, Normal Respiratory Rate     Heart/Vascular:  Heart Findings: Rate: Normal  Rhythm: Regular Rhythm  Sounds: Normal        Mental Status:  Mental Status Findings: Normal        Anesthesia Plan  Type of Anesthesia, risks & benefits discussed:  Anesthesia Type:  general, MAC  Patient's Preference:   Intra-op Monitoring Plan: standard ASA monitors  Intra-op Monitoring Plan Comments:   Post Op Pain Control Plan: multimodal analgesia, IV/PO Opioids PRN and per primary service following discharge from PACU  Post Op Pain Control Plan Comments:   Induction:   IV  Beta Blocker:         Informed Consent: Patient understands risks and agrees with Anesthesia plan.  Questions answered. Anesthesia consent signed with patient.  ASA Score: 2     Day of Surgery Review of History & Physical: I have interviewed and examined the patient. I have reviewed the patient's H&P dated: 11/16/18. There are no significant changes.  H&P update referred to the surgeon.         Ready For Surgery From Anesthesia Perspective.

## 2018-11-06 NOTE — PROGRESS NOTES
HPI     4 month IOP, DFE, HRT. Denies eye pain but she feels her vision has   changed from her left eye having trouble with intermidiate distance. C/o   some Dryness has FB sensation left eye frequently. States she was on oral   steroids from lung damage from the radiation was on them for 8 weeks one   of the side effects was glaucoma from the steroids.     Last edited by Tiffany Chamberlain on 11/6/2018  3:49 PM. (History)            Assessment /Plan     For exam results, see Encounter Report.    OHT (ocular hypertension), bilateral  -     dorzolamide (TRUSOPT) 2 % ophthalmic solution; Place 1 drop into both eyes 3 (three) times daily.  Dispense: 30 mL; Refill: 4    Pseudophakia of both eyes    History of vitrectomy    Refractive error            OHT (ocular hypertension), bilateral - Switched Timolol to Dorzolamide 2/14/17, but IOP still upper 20's. Just finished tamoxifen for newly diagnosed breast cancer.   IOP baseline mid 20's with occasional pain OS. IOP was significantly elevated off Latanoprost - Tmax 37/44! Back to ~23 today. Maternal grandmother with glaucoma, no known first degree relatives. CCT thick. Baseline HVF/HRT WNL, small cups on clinical exam OU. Last HRT - within range of priors, no RNFL thinning identified. Today's re-traced due to transferring to [place], larger C:D, decrease RNFL, but may just be due to re-trace?  Last HVF - new nasal defects OU, fixation losses OS, pt reported seeing a glare off to the side and also having hot flashes during exam. Clinical exam appears stable - repeat next year, if WNL then can follow with just imaging.  Last Gonio - open to CB OU, few tiny iris root vessels visible, no NV.   OCT nerve 5/16/17 - thin TS and TI OD, borderline TS OS. Repeated 6/26/18, new borderline TI OS.  Switched T .5 to Dorzolamide BID, but not as effective. Tried Brimonidine TID - but had hypotension again, so went back to Dorzolamide TID. If goes too high then can consider 0.25% Timolol, as  seems to have had a dramatic response in IOP lowering with Timolol. (she is not sure if chemo had something to do with this, may be willing to try a different gtt when she finishes chemo, if Dorzolamide backorder not resolved.)  NDFE appears stable - continue Latanoprost QHS OU, Dorzolamide TID OU, medical cannibus not making much difference.   RTC 4 months for IOP check.     On Latanoprost since presentation (former pt of Dr. Ordaz).  Dorzolamide added 2/14-7/25/17 - no significant improvement in IOP, may have forgotten to take? Resumed 8/9/17 TID OU  Timolol 0.5% QAM (7/9/16-2/14/17) - worked great but symptomatic hypotension/bradycardia started in Jan '17  Brimonidine - 7/25-8/9 17 - d/c'd due to symptomatic hypotension.    Pseudophakia of both eyes - stable    History of vitrectomy - for floaters/AH      Addendum: symptomatic hypotension with Brimonidine - switched back to Dorzolamide on 8/9/17 - asked that she try to use it TID.

## 2018-11-09 ENCOUNTER — OFFICE VISIT (OUTPATIENT)
Dept: UROLOGY | Facility: CLINIC | Age: 63
End: 2018-11-09
Payer: COMMERCIAL

## 2018-11-09 ENCOUNTER — PROCEDURE VISIT (OUTPATIENT)
Dept: SURGERY | Facility: CLINIC | Age: 63
End: 2018-11-09
Payer: COMMERCIAL

## 2018-11-09 VITALS
HEART RATE: 68 BPM | DIASTOLIC BLOOD PRESSURE: 81 MMHG | WEIGHT: 144 LBS | HEIGHT: 67 IN | TEMPERATURE: 98 F | BODY MASS INDEX: 22.6 KG/M2 | SYSTOLIC BLOOD PRESSURE: 122 MMHG

## 2018-11-09 VITALS
HEIGHT: 67 IN | SYSTOLIC BLOOD PRESSURE: 121 MMHG | HEART RATE: 68 BPM | WEIGHT: 142.88 LBS | BODY MASS INDEX: 22.43 KG/M2 | DIASTOLIC BLOOD PRESSURE: 81 MMHG

## 2018-11-09 DIAGNOSIS — C50.912 HER2-POSITIVE CARCINOMA OF LEFT BREAST: Primary | ICD-10-CM

## 2018-11-09 DIAGNOSIS — R33.9 INCOMPLETE EMPTYING OF BLADDER: ICD-10-CM

## 2018-11-09 DIAGNOSIS — R30.0 DYSURIA: ICD-10-CM

## 2018-11-09 DIAGNOSIS — N81.11 MIDLINE CYSTOCELE: Primary | ICD-10-CM

## 2018-11-09 PROCEDURE — 3008F BODY MASS INDEX DOCD: CPT | Mod: CPTII,S$GLB,, | Performed by: UROLOGY

## 2018-11-09 PROCEDURE — 99213 OFFICE O/P EST LOW 20 MIN: CPT | Mod: 25,S$GLB,, | Performed by: UROLOGY

## 2018-11-09 PROCEDURE — 51701 INSERT BLADDER CATHETER: CPT | Mod: S$GLB,,, | Performed by: UROLOGY

## 2018-11-09 PROCEDURE — 87086 URINE CULTURE/COLONY COUNT: CPT

## 2018-11-09 PROCEDURE — 81002 URINALYSIS NONAUTO W/O SCOPE: CPT | Mod: S$GLB,,, | Performed by: UROLOGY

## 2018-11-09 PROCEDURE — 36590 REMOVAL TUNNELED CV CATH: CPT | Mod: S$GLB,,, | Performed by: SURGERY

## 2018-11-09 PROCEDURE — 99999 PR PBB SHADOW E&M-EST. PATIENT-LVL IV: CPT | Mod: PBBFAC,,, | Performed by: UROLOGY

## 2018-11-09 NOTE — PATIENT INSTRUCTIONS
Vitamin E 400 IU daily  Resveratrol   Omega 3 fatty acids (found in Fish Oil [employer] is a good brand)

## 2018-11-09 NOTE — H&P (VIEW-ONLY)
CHIEF COMPLAINT:    Mrs. Redman is a 63 y.o. female presenting to sign consents for anterior colporrhaphy and cystoscopy    PRESENTING ILLNESS:    Ashlie Redman is a 63 y.o. female who returns today to sign consents.  She has a history of cystocele first diagnosed in 2016, but did not undergo treatment because shortly thereafter, was diagnosed with breast cancer and underwnet treatment.  She has undergone physical therapy in the past with Mary Aguirre.  She has symptoms of incomplete bladder emptying, frequency x 6-7, nocturia x 2.  She is pleased because she had her port removed recently.     She underwent urodynamics and was found not to have occult stress incontinence.  She had incomplete bladder emptying but that was voiding without reduction of the prolapse.  Her sensation was normal as was the capacity.      , uterus in place, sexually active, sometimes feels like he bumps up against the prolapse.  Bowels are not the same as they were before.       REVIEW OF SYSTEMS:    Review of Systems   Constitutional: Negative.    Eyes: Negative.    Respiratory: Positive for cough.         Interstitial lung disease from the radiation therapy   Cardiovascular: Negative.    Gastrointestinal: Negative.    Genitourinary: Positive for frequency and urgency.        Incomplete bladder emptying   Musculoskeletal: Positive for back pain.   Skin: Negative.    Neurological: Negative.    Endo/Heme/Allergies: Negative.    Psychiatric/Behavioral: Positive for memory loss (brain fog after chemotherapy).       PATIENT HISTORY:    Past Medical History:   Diagnosis Date    Allergy     Anemia     Asteroid hyalosis of both eyes     Breast cancer 2017    Left    Cataract     Diverticulosis     Encounter for blood transfusion     Glaucoma     High myopia     Osteopenia     S/P dilation and curettage        Past Surgical History:   Procedure Laterality Date    BIOPSY-LYMPH NODE-SENTINEL Left 2017    Performed by Alexander SMART  [facility] 2ND F[facility]    breast augmentation  03/2013    BREAST BIOPSY Left 04/2017    Core bx,+ cancer    BREAST LUMPECTOMY Left 10/25/2017    CATARACT EXTRACTION W/  INTRAOCULAR LENS IMPLANT Bilateral 2009    COLONOSCOPY  4/2009, 2015    repeat in 5 years    COLONOSCOPY N/A 5/21/2015    Performed by Evan Alexander MD [facility] ENDO (4TH FLR)    DILATION AND CURETTAGE OF UTERUS      DISSECTION-LYMPH NODE-AXILLARY Left 11/1/2017    [facility] OR 2ND FLR    ECTOPIC PREGNANCY SURGERY      EYE SURGERY Bilateral 3/09    Vitrectomy     INJECTION-NODE-SENTINEL Left 11/1/2017    [facility] OR 2ND FLR    [id number] neck poss chest Right 4/24/2017    Per[facility] by [facility] OR 2ND FLR    LUMPECTOMY with MAGSEED (2) Left 11/1/2017    [facility] OR 2ND FLR    POLYPECTOMY      x3 2001    Yag Capsulotomy Bilateral 12/2014       Family History   Problem Relation Age of Onset    Cancer Sister         rectal; passed    Breast cancer Cousin     Cancer Cousin         breast    Heart disease Father         passed    Colon cancer Mother         hospice    Pancreatic cancer Mother         39yo; 89yo    Cataracts Mother     Hypertension Mother     Thyroid disease Mother     Cancer Mother         colon    Glaucoma Maternal Grandmother      Socioeconomic History    Marital status: Single   Occupational History     Employer: [employer]   Tobacco Use    Smoking status: Never Smoker    Smokeless tobacco: Never Used   Substance and Sexual Activity    Alcohol use: No     Alcohol/week: 0.6 oz     Types: 1 Glasses of wine per week     Comment: Quit    Drug use: No    Sexual activity: Yes     Partners: Male     Birth control/protection: Post-menopausal       Allergies:  Bactrim [sulfamethoxazole-trimethoprim]    Medications:  Outpatient Encounter Medications as  of 11/9/2018   Medication Sig Dispense Refill    dorzolamide (TRUSOPT) 2 % ophthalmic solution Place 1 drop into both eyes 3 (three) times daily. 30 mL 4    latanoprost 0.005 % ophthalmic solution INSTILL ONE DROP INTO EACH EYE ONCE DAILY IN THE EVENING 9 mL 4    valACYclovir (VALTREX) 500 MG tablet        No facility-administered encounter medications on file as of 11/9/2018.          PHYSICAL EXAMINATION:    The patient generally appears in good health, is appropriately interactive, and is in no apparent distress.    Skin: No lesions.    Mental: Cooperative with normal affect.    Neuro: Grossly intact.    HEENT: Normal. No evidence of lymphadenopathy.    Chest:  normal inspiratory effort.    Abdomen:  Soft, non-tender. No masses or organomegaly. Bladder is not palpable. No evidence of flank discomfort. No evidence of inguinal hernia.    Extremities: No clubbing, cyanosis, or edema    From initial exam:  Normal external female genitalia  Urethral meatus is normal  Urethra and bladder are nontender to bimanual exam  Well supported posteriorly  Grade II cystocele, central  Uterus and cervix are normal  No adnexal masses    PVR by catheterization today was 110 ml    LABS:    Lab Results   Component Value Date    BUN 19 (H) 07/20/2018    CREATININE 0.66 07/20/2018     UA 1.015, pH 7, tr leuk, tr protein, small ketones, tr blood, otherwise, negative    IMPRESSION:    Encounter Diagnoses   Name Primary?    Midline cystocele Yes    Dysuria     Incomplete emptying of bladder        PLAN:    1.  The catheterized specimen was sent for culture  2.  Consent signed for anterior colporrhaphy and cystoscopy.    3.  Due to her back pain, will place in stirrups first then have anesthesia induce her.   4.  Plan to start the repair with a cystoscopy first, then repair and assess the repair from the bladder side.    5.  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  Will be able to return to doing Jazzercise after 6 weeks but not ride a bike until 12 weeks out.   6.  Anti oxidants were also discussed and recommended.

## 2018-11-09 NOTE — PROGRESS NOTES
CHIEF COMPLAINT:    Mrs. Redman is a 63 y.o. female presenting to sign consents for anterior colporrhaphy and cystoscopy    PRESENTING ILLNESS:    Ashlie Redman is a 63 y.o. female who returns today to sign consents.  She has a history of cystocele first diagnosed in 2016, but did not undergo treatment because shortly thereafter, was diagnosed with breast cancer and underwnet treatment.  She has undergone physical therapy in the past with Mary Aguirre.  She has symptoms of incomplete bladder emptying, frequency x 6-7, nocturia x 2.  She is pleased because she had her port removed recently.     She underwent urodynamics and was found not to have occult stress incontinence.  She had incomplete bladder emptying but that was voiding without reduction of the prolapse.  Her sensation was normal as was the capacity.      , uterus in place, sexually active, sometimes feels like he bumps up against the prolapse.  Bowels are not the same as they were before.       REVIEW OF SYSTEMS:    Review of Systems   Constitutional: Negative.    Eyes: Negative.    Respiratory: Positive for cough.         Interstitial lung disease from the radiation therapy   Cardiovascular: Negative.    Gastrointestinal: Negative.    Genitourinary: Positive for frequency and urgency.        Incomplete bladder emptying   Musculoskeletal: Positive for back pain.   Skin: Negative.    Neurological: Negative.    Endo/Heme/Allergies: Negative.    Psychiatric/Behavioral: Positive for memory loss (brain fog after chemotherapy).       PATIENT HISTORY:    Past Medical History:   Diagnosis Date    Allergy     Anemia     Asteroid hyalosis of both eyes     Breast cancer 2017    Left    Cataract     Diverticulosis     Encounter for blood transfusion     Glaucoma     High myopia     Osteopenia     S/P dilation and curettage        Past Surgical History:   Procedure Laterality Date    BIOPSY-LYMPH NODE-SENTINEL Left 2017    Performed by Alexander SMART  MD Claude at [facility] OR 2ND FLR    breast augmentation  03/2013    BREAST BIOPSY Left 04/2017    Core bx,+ cancer    BREAST LUMPECTOMY Left 10/25/2017    CATARACT EXTRACTION W/  INTRAOCULAR LENS IMPLANT Bilateral 2009    COLONOSCOPY  4/2009, 2015    repeat in 5 years    COLONOSCOPY N/A 5/21/2015    Performed by Evan Alexander MD at [facility] ENDO (4TH FLR)    DILATION AND CURETTAGE OF UTERUS      DISSECTION-LYMPH NODE-AXILLARY Left 11/1/2017    Performed by Alexander Arce MD at [facility] OR 2ND FLR    ECTOPIC PREGNANCY SURGERY      EYE SURGERY Bilateral 3/09    Vitrectomy     INJECTION-NODE-SENTINEL Left 11/1/2017    Performed by Alexander Arce MD at [facility] OR 2ND FLR    UWEEIXHGD-JGVF-R-CATH neck poss chest Right 4/24/2017    Performed by Alexander Arce MD at [facility] OR 2ND FLR    LUMPECTOMY with MAGSEED (2) Left 11/1/2017    Performed by Alexander Arce MD at [facility] OR 2ND FLR    POLYPECTOMY      x3 2001    Yag Capsulotomy Bilateral 12/2014       Family History   Problem Relation Age of Onset    Cancer Sister         rectal; passed    Breast cancer Cousin     Cancer Cousin         breast    Heart disease Father         passed    Colon cancer Mother         hospice    Pancreatic cancer Mother         39yo; 89yo    Cataracts Mother     Hypertension Mother     Thyroid disease Mother     Cancer Mother         colon    Glaucoma Maternal Grandmother      Socioeconomic History    Marital status: Single   Occupational History     Employer: [employer]   Tobacco Use    Smoking status: Never Smoker    Smokeless tobacco: Never Used   Substance and Sexual Activity    Alcohol use: No     Alcohol/week: 0.6 oz     Types: 1 Glasses of wine per week     Comment: Quit    Drug use: No    Sexual activity: Yes     Partners: Male     Birth control/protection: Post-menopausal       Allergies:  Bactrim [sulfamethoxazole-trimethoprim]    Medications:  Outpatient Encounter Medications as  of 11/9/2018   Medication Sig Dispense Refill    dorzolamide (TRUSOPT) 2 % ophthalmic solution Place 1 drop into both eyes 3 (three) times daily. 30 mL 4    latanoprost 0.005 % ophthalmic solution INSTILL ONE DROP INTO EACH EYE ONCE DAILY IN THE EVENING 9 mL 4    valACYclovir (VALTREX) 500 MG tablet        No facility-administered encounter medications on file as of 11/9/2018.          PHYSICAL EXAMINATION:    The patient generally appears in good health, is appropriately interactive, and is in no apparent distress.    Skin: No lesions.    Mental: Cooperative with normal affect.    Neuro: Grossly intact.    HEENT: Normal. No evidence of lymphadenopathy.    Chest:  normal inspiratory effort.    Abdomen:  Soft, non-tender. No masses or organomegaly. Bladder is not palpable. No evidence of flank discomfort. No evidence of inguinal hernia.    Extremities: No clubbing, cyanosis, or edema    From initial exam:  Normal external female genitalia  Urethral meatus is normal  Urethra and bladder are nontender to bimanual exam  Well supported posteriorly  Grade II cystocele, central  Uterus and cervix are normal  No adnexal masses    PVR by catheterization today was 110 ml    LABS:    Lab Results   Component Value Date    BUN 19 (H) 07/20/2018    CREATININE 0.66 07/20/2018     UA 1.015, pH 7, tr leuk, tr protein, small ketones, tr blood, otherwise, negative    IMPRESSION:    Encounter Diagnoses   Name Primary?    Midline cystocele Yes    Dysuria     Incomplete emptying of bladder        PLAN:    1.  The catheterized specimen was sent for culture  2.  Consent signed for anterior colporrhaphy and cystoscopy.    3.  Due to her back pain, will place in stirrups first then have anesthesia induce her.   4.  Plan to start the repair with a cystoscopy first, then repair and assess the repair from the bladder side.    5.  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  Will be able to return to doing Jazzercise after 6 weeks but not ride a bike until 12 weeks out.   6.  Anti oxidants were also discussed and recommended.

## 2018-11-10 LAB — BACTERIA UR CULT: NO GROWTH

## 2018-11-13 ENCOUNTER — HOSPITAL ENCOUNTER (OUTPATIENT)
Dept: RADIOLOGY | Facility: HOSPITAL | Age: 63
Discharge: HOME | End: 2018-11-13
Attending: INTERNAL MEDICINE
Payer: COMMERCIAL

## 2018-11-13 DIAGNOSIS — C50.011 MALIGNANT NEOPLASM OF NIPPLE OF RIGHT BREAST IN FEMALE, UNSPECIFIED ESTROGEN RECEPTOR STATUS: ICD-10-CM

## 2018-11-13 PROCEDURE — 78815 PET IMAGE W/CT SKULL-THIGH: CPT | Mod: 26,PS,, | Performed by: RADIOLOGY

## 2018-11-13 PROCEDURE — 78815 PET IMAGE W/CT SKULL-THIGH: CPT | Mod: TC,PO

## 2018-11-13 PROCEDURE — A9552 F18 FDG: HCPCS | Mod: PO

## 2018-11-16 ENCOUNTER — OFFICE VISIT (OUTPATIENT)
Dept: HEMATOLOGY/ONCOLOGY | Facility: CLINIC | Age: 63
End: 2018-11-16
Payer: COMMERCIAL

## 2018-11-16 VITALS
SYSTOLIC BLOOD PRESSURE: 127 MMHG | TEMPERATURE: 98 F | WEIGHT: 142.63 LBS | HEART RATE: 65 BPM | HEIGHT: 67 IN | BODY MASS INDEX: 22.39 KG/M2 | DIASTOLIC BLOOD PRESSURE: 75 MMHG | RESPIRATION RATE: 16 BRPM

## 2018-11-16 DIAGNOSIS — J70.0 RADIATION PNEUMONITIS: ICD-10-CM

## 2018-11-16 DIAGNOSIS — T45.1X5A CHEMOTHERAPY-INDUCED PERIPHERAL NEUROPATHY: ICD-10-CM

## 2018-11-16 DIAGNOSIS — C50.611 MALIGNANT NEOPLASM OF AXILLARY TAIL OF RIGHT FEMALE BREAST, UNSPECIFIED ESTROGEN RECEPTOR STATUS: Primary | ICD-10-CM

## 2018-11-16 DIAGNOSIS — G62.0 CHEMOTHERAPY-INDUCED PERIPHERAL NEUROPATHY: ICD-10-CM

## 2018-11-16 DIAGNOSIS — C50.912 HER2-POSITIVE CARCINOMA OF LEFT BREAST: ICD-10-CM

## 2018-11-16 PROCEDURE — 99214 OFFICE O/P EST MOD 30 MIN: CPT | Mod: S$GLB,,, | Performed by: INTERNAL MEDICINE

## 2018-11-16 PROCEDURE — 3008F BODY MASS INDEX DOCD: CPT | Mod: CPTII,S$GLB,, | Performed by: INTERNAL MEDICINE

## 2018-11-16 PROCEDURE — 99999 PR PBB SHADOW E&M-EST. PATIENT-LVL III: CPT | Mod: PBBFAC,,, | Performed by: INTERNAL MEDICINE

## 2018-11-17 NOTE — PROGRESS NOTES
HISTORY OF PRESENT ILLNESS:  This is a 62-year-old white female treated for a  diagnosis of Stage IIB left breast carcinoma.  The patient discovered   a mass in her left breast and was seen by Dr. Arce.  She has received 4 cycles of   A/C ,completed 12 cycles weekly taxol and  herceptin/ perjeta  Here for f/u with pet and labs doing very well   reconstruction done.   Pt has bladder prolapse going to sx soon   radiation induced interstitial findings with cough better now   sonme neuropathy post rx still persisits  PHYSICAL EXAMINATION:  Wt Readings from Last 3 Encounters:   11/16/18 64.7 kg (142 lb 10.2 oz)   11/09/18 64.8 kg (142 lb 13.7 oz)   11/09/18 65.3 kg (144 lb)     Temp Readings from Last 3 Encounters:   11/16/18 98 °F (36.7 °C) (Oral)   11/09/18 98.1 °F (36.7 °C)   10/11/18 97.8 °F (36.6 °C) (Oral)     BP Readings from Last 3 Encounters:   11/16/18 127/75   11/09/18 121/81   11/09/18 122/81     Pulse Readings from Last 3 Encounters:   11/16/18 65   11/09/18 68   11/09/18 68     GENERAL:  Well-developed, well-nourished white female in no acute distress.    Alert and oriented x4.weight gain and puffiness from steroids  :  HEENT:  Normocephalic, atraumatic.  Oral mucosa pink and moist.  Lips without   lesions.  Tongue midline.  Oropharynx clear.  Nonicteric sclerae.  NECK:  Supple, no adenopathy.  No carotid bruits, thyromegaly or thyroid nodule.  HEART:  Regular rate and rhythm without murmur, gallop.  LUNGS:  Clear to auscultation bilaterally.  Normal respiratory effort.  ABDOMEN:  Soft, nontender, nondistended with positive normoactive bowel sounds,   no hepatosplenomegaly.  EXTREMITIES:  No cyanosis, clubbing or edema.  Distal pulses are intact.  AXILLAE AND GROIN:  No palpable pathologic lymphadenopathy is appreciated.  SKIN:  Intact/turgor normal.  NEUROLOGIC:  Cranial nerves II-XII grossly intact.  Motor:  Good muscle bulk and   tone.  Strength/sensory 5/5 throughout.  Gait stable.    LABORATORY:     Lab Results   Component Value Date    WBC 5.80 11/13/2018    HGB 15.2 11/13/2018    HCT 45.3 11/13/2018    MCV 97 11/13/2018     11/13/2018         CMP  Sodium   Date Value Ref Range Status   11/13/2018 142 136 - 145 mmol/L Final     Potassium   Date Value Ref Range Status   11/13/2018 4.2 3.5 - 5.1 mmol/L Final     Chloride   Date Value Ref Range Status   11/13/2018 108 95 - 110 mmol/L Final     CO2   Date Value Ref Range Status   11/13/2018 25 23 - 29 mmol/L Final     Glucose   Date Value Ref Range Status   11/13/2018 88 70 - 110 mg/dL Final     BUN, Bld   Date Value Ref Range Status   11/13/2018 14 8 - 23 mg/dL Final     Creatinine   Date Value Ref Range Status   11/13/2018 0.8 0.5 - 1.4 mg/dL Final     Calcium   Date Value Ref Range Status   11/13/2018 10.4 8.7 - 10.5 mg/dL Final     Total Protein   Date Value Ref Range Status   11/13/2018 7.4 6.0 - 8.4 g/dL Final     Albumin   Date Value Ref Range Status   11/13/2018 4.3 3.5 - 5.2 g/dL Final     Total Bilirubin   Date Value Ref Range Status   11/13/2018 0.4 0.1 - 1.0 mg/dL Final     Comment:     For infants and newborns, interpretation of results should be based  on gestational age, weight and in agreement with clinical  observations.  Premature Infant recommended reference ranges:  Up to 24 hours.............<8.0 mg/dL  Up to 48 hours............<12.0 mg/dL  3-5 days..................<15.0 mg/dL  6-29 days.................<15.0 mg/dL       Alkaline Phosphatase   Date Value Ref Range Status   11/13/2018 98 55 - 135 U/L Final     AST   Date Value Ref Range Status   11/13/2018 25 10 - 40 U/L Final     ALT   Date Value Ref Range Status   11/13/2018 18 10 - 44 U/L Final     Anion Gap   Date Value Ref Range Status   11/13/2018 9 8 - 16 mmol/L Final     eGFR if    Date Value Ref Range Status   11/13/2018 >60 >60 mL/min/1.73 m^2 Final     eGFR if non    Date Value Ref Range Status   11/13/2018 >60 >60 mL/min/1.73 m^2 Final      Comment:     Calculation used to obtain the estimated glomerular filtration  rate (eGFR) is the CKD-EPI equation.      5.       Left breast, lumpectomy:  - Residual invasive ductal carcinoma, moderately differentiated, Jamal Grade 2 (3+2+1=6), 0.2 cm in  greatest linear microscopic dimension.  - Ductal carcinoma in situ (DCIS), high nuclear grade, 2 cm, solid and cribriform types.  - Surgical margins are free of tumor; invasive carcinoma is located 0.1 cm from the closest margin    Impression 11/2-18       New nondisplaced fracture of the distal left 6th rib which is most likely posttraumatic in origin.  The fracture is hypermetabolic most likely related to ongoing healing.  Clinical correlation is advised             IMPRESSION:  Stage IIB left breast carcinoma (ER/NV negative, Her-2/clay positive)  Completed neoadjuvant AC and weekly taxol now on herceptin last dose today and I stopped perjeta since she also had xrt to left side due o fear of toxicity  , s/p recent lumpectomy and axillary disection , with 2 residual Ln positive  PLAN:  1.  rtc 6 months with pet/ cbc . Cmp, ca 2792   possibly fracture of ribs from coughing   cont f/u wit pcp

## 2018-11-18 NOTE — PROCEDURES
Procedures  Date: 11-09-18     Procedures   PREOPERATIVE DIAGNOSIS:  left invasive breast cancer, status post systemic    chemotherapy treatment.     PROCEDURE: Removal of right subclavian vein anterior chest wall, 8-Frisian PowerPort Port-A-Cath.     POSTOPERATIVE DIAGNOSIS: Same       PRIMARY SURGEON: Alexander Arce M.D.     ANESTHESIA: Local anesthesia.     PROCEDURE IN DETAIL: The patient was seen in the clinic. She underwent    informed consent for right-sided Port-A-Cath removal.  She was brought to the    minor procedure room.  She was placed in a supine position. The right anterior    chest was prepped and draped in a sterile fashion after the area was marked.    Local anesthesia was infiltrated at the prior Port-A-Cath insertion site. Skin    was incised sharply with a 15 blade scalpel. The subcutaneous tissue was    dissected with electrocautery to open the fibrous capsule from around the port    and catheter system. The catheter tract was identified. A figure-of-eight 3-0    Vicryl suture was placed around the catheter tract and this was ligated as the    catheter was pulled while the patient underwent Valsalva maneuver to minimize    risk of back bleeding or air embolism. The port and catheter system were    removed intact. The port and catheter were shown for gross identification. The    deep dermal and subcutaneous layers were reapproximated with interrupted deep    three-point fixation sutures down to the posterior fibrous capsule to obliterate  any potential dead space to minimize risk of post-procedure fluid or seroma    buildup. This was done with three interrupted sutures of this kind. With the    skin reapproximated, the skin itself was closed with a running 4-0 Monocryl    subcuticular skin closure. Sterile topical dressing was applied. Postop wound care and analgesic instructions    were provided. The patient will follow up in clinic prn.  She will monitor for any    signs of infection.   She tolerated the procedure well without complication.  No specimens were sent.     Alexander Arce     11-09-18

## 2018-11-19 ENCOUNTER — TELEPHONE (OUTPATIENT)
Dept: UROLOGY | Facility: CLINIC | Age: 63
End: 2018-11-19

## 2018-11-19 NOTE — TELEPHONE ENCOUNTER
Called pt to confirm 5am arrival time for procedure. Gave pt NPO instructions and gave pt opportunity to ask questions. Pt verbalized understanding.

## 2018-11-20 ENCOUNTER — ANESTHESIA (OUTPATIENT)
Dept: SURGERY | Facility: HOSPITAL | Age: 63
End: 2018-11-20
Payer: COMMERCIAL

## 2018-11-20 ENCOUNTER — HOSPITAL ENCOUNTER (OUTPATIENT)
Facility: HOSPITAL | Age: 63
Discharge: HOME | End: 2018-11-21
Attending: UROLOGY | Admitting: UROLOGY
Payer: COMMERCIAL

## 2018-11-20 DIAGNOSIS — N81.2 UTEROVAGINAL PROLAPSE, INCOMPLETE: ICD-10-CM

## 2018-11-20 DIAGNOSIS — R39.89 SENSATION OF PRESSURE IN BLADDER AREA: ICD-10-CM

## 2018-11-20 DIAGNOSIS — R10.2 PELVIC PAIN IN FEMALE: Primary | ICD-10-CM

## 2018-11-20 DIAGNOSIS — N81.11 CYSTOCELE, MIDLINE: ICD-10-CM

## 2018-11-20 PROCEDURE — 94799 UNLISTED PULMONARY SVC/PX: CPT

## 2018-11-20 PROCEDURE — 57240 ANTERIOR COLPORRHAPHY: CPT | Mod: ,,, | Performed by: UROLOGY

## 2018-11-20 PROCEDURE — 94761 N-INVAS EAR/PLS OXIMETRY MLT: CPT

## 2018-11-20 PROCEDURE — 63600175 PHARM REV CODE 636 W HCPCS: Performed by: STUDENT IN AN ORGANIZED HEALTH CARE EDUCATION/TRAINING PROGRAM

## 2018-11-20 PROCEDURE — 37000009 HC ANESTHESIA EA ADD 15 MINS: Performed by: UROLOGY

## 2018-11-20 PROCEDURE — 25000003 PHARM REV CODE 250: Performed by: UROLOGY

## 2018-11-20 PROCEDURE — D9220A PRA ANESTHESIA: Mod: CRNA,,, | Performed by: NURSE ANESTHETIST, CERTIFIED REGISTERED

## 2018-11-20 PROCEDURE — 88304 TISSUE EXAM BY PATHOLOGIST: CPT | Performed by: PATHOLOGY

## 2018-11-20 PROCEDURE — 88304 TISSUE EXAM BY PATHOLOGIST: CPT | Mod: 26,,, | Performed by: PATHOLOGY

## 2018-11-20 PROCEDURE — 37000008 HC ANESTHESIA 1ST 15 MINUTES: Performed by: UROLOGY

## 2018-11-20 PROCEDURE — 36000707: Performed by: UROLOGY

## 2018-11-20 PROCEDURE — 36000706: Performed by: UROLOGY

## 2018-11-20 PROCEDURE — 25000003 PHARM REV CODE 250: Performed by: STUDENT IN AN ORGANIZED HEALTH CARE EDUCATION/TRAINING PROGRAM

## 2018-11-20 PROCEDURE — 71000039 HC RECOVERY, EACH ADD'L HOUR: Performed by: UROLOGY

## 2018-11-20 PROCEDURE — 71000033 HC RECOVERY, INTIAL HOUR: Performed by: UROLOGY

## 2018-11-20 PROCEDURE — 63600175 PHARM REV CODE 636 W HCPCS: Performed by: NURSE ANESTHETIST, CERTIFIED REGISTERED

## 2018-11-20 PROCEDURE — 27201423 OPTIME MED/SURG SUP & DEVICES STERILE SUPPLY: Performed by: UROLOGY

## 2018-11-20 PROCEDURE — D9220A PRA ANESTHESIA: Mod: ANES,,, | Performed by: ANESTHESIOLOGY

## 2018-11-20 PROCEDURE — 27000221 HC OXYGEN, UP TO 24 HOURS

## 2018-11-20 PROCEDURE — 25000003 PHARM REV CODE 250: Performed by: NURSE ANESTHETIST, CERTIFIED REGISTERED

## 2018-11-20 RX ORDER — GLYCOPYRROLATE 0.2 MG/ML
INJECTION INTRAMUSCULAR; INTRAVENOUS
Status: DISCONTINUED | OUTPATIENT
Start: 2018-11-20 | End: 2018-11-20

## 2018-11-20 RX ORDER — ACETAMINOPHEN 10 MG/ML
INJECTION, SOLUTION INTRAVENOUS
Status: DISCONTINUED | OUTPATIENT
Start: 2018-11-20 | End: 2018-11-20

## 2018-11-20 RX ORDER — LIDOCAINE HCL/PF 100 MG/5ML
SYRINGE (ML) INTRAVENOUS
Status: DISCONTINUED | OUTPATIENT
Start: 2018-11-20 | End: 2018-11-20

## 2018-11-20 RX ORDER — NEOSTIGMINE METHYLSULFATE 1 MG/ML
INJECTION, SOLUTION INTRAVENOUS
Status: DISCONTINUED | OUTPATIENT
Start: 2018-11-20 | End: 2018-11-20

## 2018-11-20 RX ORDER — ONDANSETRON 2 MG/ML
INJECTION INTRAMUSCULAR; INTRAVENOUS
Status: DISCONTINUED | OUTPATIENT
Start: 2018-11-20 | End: 2018-11-20

## 2018-11-20 RX ORDER — SODIUM CHLORIDE 9 MG/ML
INJECTION, SOLUTION INTRAVENOUS CONTINUOUS
Status: DISCONTINUED | OUTPATIENT
Start: 2018-11-20 | End: 2018-11-20

## 2018-11-20 RX ORDER — SODIUM CHLORIDE 0.9 % (FLUSH) 0.9 %
3 SYRINGE (ML) INJECTION
Status: DISCONTINUED | OUTPATIENT
Start: 2018-11-20 | End: 2018-11-21 | Stop reason: HOSPADM

## 2018-11-20 RX ORDER — FENTANYL CITRATE 50 UG/ML
50 INJECTION, SOLUTION INTRAMUSCULAR; INTRAVENOUS EVERY 5 MIN PRN
Status: DISCONTINUED | OUTPATIENT
Start: 2018-11-20 | End: 2018-11-20 | Stop reason: HOSPADM

## 2018-11-20 RX ORDER — ONDANSETRON 2 MG/ML
8 INJECTION INTRAMUSCULAR; INTRAVENOUS EVERY 6 HOURS PRN
Status: DISCONTINUED | OUTPATIENT
Start: 2018-11-20 | End: 2018-11-21 | Stop reason: HOSPADM

## 2018-11-20 RX ORDER — PANTOPRAZOLE SODIUM 40 MG/1
40 TABLET, DELAYED RELEASE ORAL DAILY
Status: DISCONTINUED | OUTPATIENT
Start: 2018-11-20 | End: 2018-11-21 | Stop reason: HOSPADM

## 2018-11-20 RX ORDER — BUPIVACAINE HYDROCHLORIDE 2.5 MG/ML
INJECTION, SOLUTION EPIDURAL; INFILTRATION; INTRACAUDAL
Status: DISCONTINUED | OUTPATIENT
Start: 2018-11-20 | End: 2018-11-20

## 2018-11-20 RX ORDER — POLYETHYLENE GLYCOL 3350 17 G/17G
17 POWDER, FOR SOLUTION ORAL DAILY
Status: DISCONTINUED | OUTPATIENT
Start: 2018-11-20 | End: 2018-11-21 | Stop reason: HOSPADM

## 2018-11-20 RX ORDER — BACITRACIN 50000 [IU]/1
INJECTION, POWDER, FOR SOLUTION INTRAMUSCULAR
Status: DISCONTINUED | OUTPATIENT
Start: 2018-11-20 | End: 2018-11-20

## 2018-11-20 RX ORDER — FENTANYL CITRATE 50 UG/ML
INJECTION, SOLUTION INTRAMUSCULAR; INTRAVENOUS
Status: DISCONTINUED | OUTPATIENT
Start: 2018-11-20 | End: 2018-11-20

## 2018-11-20 RX ORDER — OXYCODONE AND ACETAMINOPHEN 5; 325 MG/1; MG/1
1 TABLET ORAL EVERY 4 HOURS PRN
Status: DISCONTINUED | OUTPATIENT
Start: 2018-11-20 | End: 2018-11-21 | Stop reason: HOSPADM

## 2018-11-20 RX ORDER — DEXAMETHASONE SODIUM PHOSPHATE 4 MG/ML
INJECTION, SOLUTION INTRA-ARTICULAR; INTRALESIONAL; INTRAMUSCULAR; INTRAVENOUS; SOFT TISSUE
Status: DISCONTINUED | OUTPATIENT
Start: 2018-11-20 | End: 2018-11-20

## 2018-11-20 RX ORDER — LIDOCAINE HYDROCHLORIDE 10 MG/ML
INJECTION INFILTRATION; PERINEURAL
Status: DISCONTINUED | OUTPATIENT
Start: 2018-11-20 | End: 2018-11-20 | Stop reason: HOSPADM

## 2018-11-20 RX ORDER — ROCURONIUM BROMIDE 10 MG/ML
INJECTION, SOLUTION INTRAVENOUS
Status: DISCONTINUED | OUTPATIENT
Start: 2018-11-20 | End: 2018-11-20

## 2018-11-20 RX ORDER — PROPOFOL 10 MG/ML
VIAL (ML) INTRAVENOUS
Status: DISCONTINUED | OUTPATIENT
Start: 2018-11-20 | End: 2018-11-20

## 2018-11-20 RX ORDER — MIDAZOLAM HYDROCHLORIDE 1 MG/ML
INJECTION, SOLUTION INTRAMUSCULAR; INTRAVENOUS
Status: DISCONTINUED | OUTPATIENT
Start: 2018-11-20 | End: 2018-11-20

## 2018-11-20 RX ORDER — HYDROMORPHONE HYDROCHLORIDE 1 MG/ML
0.2 INJECTION, SOLUTION INTRAMUSCULAR; INTRAVENOUS; SUBCUTANEOUS EVERY 5 MIN PRN
Status: DISCONTINUED | OUTPATIENT
Start: 2018-11-20 | End: 2018-11-20 | Stop reason: HOSPADM

## 2018-11-20 RX ORDER — LIDOCAINE HYDROCHLORIDE 10 MG/ML
1 INJECTION, SOLUTION EPIDURAL; INFILTRATION; INTRACAUDAL; PERINEURAL ONCE
Status: COMPLETED | OUTPATIENT
Start: 2018-11-20 | End: 2018-11-20

## 2018-11-20 RX ORDER — CEFAZOLIN SODIUM 1 G/3ML
2 INJECTION, POWDER, FOR SOLUTION INTRAMUSCULAR; INTRAVENOUS
Status: COMPLETED | OUTPATIENT
Start: 2018-11-20 | End: 2018-11-20

## 2018-11-20 RX ORDER — EPHEDRINE SULFATE 50 MG/ML
INJECTION, SOLUTION INTRAVENOUS
Status: DISCONTINUED | OUTPATIENT
Start: 2018-11-20 | End: 2018-11-20

## 2018-11-20 RX ORDER — SODIUM CHLORIDE 0.9 % (FLUSH) 0.9 %
3 SYRINGE (ML) INJECTION
Status: DISCONTINUED | OUTPATIENT
Start: 2018-11-20 | End: 2018-11-20

## 2018-11-20 RX ORDER — OXYCODONE AND ACETAMINOPHEN 10; 325 MG/1; MG/1
1 TABLET ORAL EVERY 4 HOURS PRN
Status: DISCONTINUED | OUTPATIENT
Start: 2018-11-20 | End: 2018-11-21 | Stop reason: HOSPADM

## 2018-11-20 RX ORDER — ONDANSETRON 2 MG/ML
4 INJECTION INTRAMUSCULAR; INTRAVENOUS DAILY PRN
Status: DISCONTINUED | OUTPATIENT
Start: 2018-11-20 | End: 2018-11-20 | Stop reason: HOSPADM

## 2018-11-20 RX ORDER — SODIUM CHLORIDE 9 MG/ML
INJECTION, SOLUTION INTRAVENOUS CONTINUOUS
Status: DISCONTINUED | OUTPATIENT
Start: 2018-11-20 | End: 2018-11-21

## 2018-11-20 RX ADMIN — OXYCODONE HYDROCHLORIDE AND ACETAMINOPHEN 1 TABLET: 5; 325 TABLET ORAL at 05:11

## 2018-11-20 RX ADMIN — ROCURONIUM BROMIDE 40 MG: 10 INJECTION, SOLUTION INTRAVENOUS at 07:11

## 2018-11-20 RX ADMIN — ONDANSETRON 4 MG: 2 INJECTION INTRAMUSCULAR; INTRAVENOUS at 07:11

## 2018-11-20 RX ADMIN — LIDOCAINE HYDROCHLORIDE: 10 INJECTION, SOLUTION EPIDURAL; INFILTRATION; INTRACAUDAL; PERINEURAL at 05:11

## 2018-11-20 RX ADMIN — NEOSTIGMINE METHYLSULFATE 4 MG: 1 INJECTION INTRAVENOUS at 08:11

## 2018-11-20 RX ADMIN — EPHEDRINE SULFATE 10 MG: 50 INJECTION, SOLUTION INTRAMUSCULAR; INTRAVENOUS; SUBCUTANEOUS at 07:11

## 2018-11-20 RX ADMIN — PROPOFOL 150 MG: 10 INJECTION, EMULSION INTRAVENOUS at 07:11

## 2018-11-20 RX ADMIN — DEXAMETHASONE SODIUM PHOSPHATE 8 MG: 4 INJECTION, SOLUTION INTRAMUSCULAR; INTRAVENOUS at 07:11

## 2018-11-20 RX ADMIN — LIDOCAINE HYDROCHLORIDE 100 MG: 20 INJECTION, SOLUTION INTRAVENOUS at 07:11

## 2018-11-20 RX ADMIN — ACETAMINOPHEN 1000 MG: 10 INJECTION, SOLUTION INTRAVENOUS at 07:11

## 2018-11-20 RX ADMIN — GLYCOPYRROLATE 0.6 MG: 0.2 INJECTION, SOLUTION INTRAMUSCULAR; INTRAVENOUS at 08:11

## 2018-11-20 RX ADMIN — OXYCODONE HYDROCHLORIDE AND ACETAMINOPHEN 1 TABLET: 10; 325 TABLET ORAL at 08:11

## 2018-11-20 RX ADMIN — MIDAZOLAM HYDROCHLORIDE 2 MG: 1 INJECTION, SOLUTION INTRAMUSCULAR; INTRAVENOUS at 06:11

## 2018-11-20 RX ADMIN — GLYCOPYRROLATE 0.2 MG: 0.2 INJECTION, SOLUTION INTRAMUSCULAR; INTRAVENOUS at 08:11

## 2018-11-20 RX ADMIN — OXYCODONE HYDROCHLORIDE AND ACETAMINOPHEN 1 TABLET: 10; 325 TABLET ORAL at 10:11

## 2018-11-20 RX ADMIN — EPHEDRINE SULFATE 10 MG: 50 INJECTION, SOLUTION INTRAMUSCULAR; INTRAVENOUS; SUBCUTANEOUS at 08:11

## 2018-11-20 RX ADMIN — FENTANYL CITRATE 25 MCG: 50 INJECTION, SOLUTION INTRAMUSCULAR; INTRAVENOUS at 08:11

## 2018-11-20 RX ADMIN — FENTANYL CITRATE 50 MCG: 50 INJECTION, SOLUTION INTRAMUSCULAR; INTRAVENOUS at 08:11

## 2018-11-20 RX ADMIN — CEFAZOLIN 2 G: 330 INJECTION, POWDER, FOR SOLUTION INTRAMUSCULAR; INTRAVENOUS at 07:11

## 2018-11-20 RX ADMIN — FENTANYL CITRATE 100 MCG: 50 INJECTION, SOLUTION INTRAMUSCULAR; INTRAVENOUS at 07:11

## 2018-11-20 RX ADMIN — SODIUM CHLORIDE, SODIUM GLUCONATE, SODIUM ACETATE, POTASSIUM CHLORIDE, MAGNESIUM CHLORIDE, SODIUM PHOSPHATE, DIBASIC, AND POTASSIUM PHOSPHATE: .53; .5; .37; .037; .03; .012; .00082 INJECTION, SOLUTION INTRAVENOUS at 07:11

## 2018-11-20 RX ADMIN — OXYCODONE HYDROCHLORIDE AND ACETAMINOPHEN 1 TABLET: 5; 325 TABLET ORAL at 01:11

## 2018-11-20 RX ADMIN — INDIGO CARMINE 5 ML: 8 INJECTION, SOLUTION INTRAMUSCULAR; INTRAVENOUS at 08:11

## 2018-11-20 RX ADMIN — SODIUM CHLORIDE: 0.9 INJECTION, SOLUTION INTRAVENOUS at 05:11

## 2018-11-20 NOTE — PROGRESS NOTES
Patient is warm and comfortable as verbalized-denies post op pain and ponv. Michaela signs are stable and within normal range. Patient is awake, oriented x4.  Uneventful recovery.

## 2018-11-20 NOTE — ANESTHESIA POSTPROCEDURE EVALUATION
"Anesthesia Post Evaluation    Patient: Ashlie Redman    Procedure(s) Performed: Procedure(s) (LRB):  REPAIR, CYSTOCELE (N/A)  CYSTOSCOPY (N/A)    Final Anesthesia Type: general  Patient location during evaluation: PACU  Patient participation: Yes- Able to Participate  Level of consciousness: awake and alert and oriented  Post-procedure vital signs: reviewed and stable  Pain management: adequate  Airway patency: patent  PONV status at discharge: No PONV  Anesthetic complications: no      Cardiovascular status: blood pressure returned to baseline  Respiratory status: unassisted  Hydration status: euvolemic  Follow-up not needed.        Visit Vitals  BP (!) 129/57 (BP Location: Right arm, Patient Position: Lying)   Pulse 85   Temp 36.4 °C (97.5 °F) (Temporal)   Resp 16   Ht 5' 7" (1.702 m)   Wt 63.1 kg (139 lb 0 oz)   SpO2 99%   Breastfeeding? No   BMI 21.77 kg/m²       Pain/Lillian Score: Pain Assessment Performed: Yes (11/20/2018 12:20 PM)  Presence of Pain: complains of pain/discomfort (11/20/2018 12:20 PM)  Pain Rating Prior to Med Admin: 6 (11/20/2018  1:27 PM)  Pain Rating Post Med Admin: 2 (11/20/2018  9:40 AM)  Lillian Score: 10 (11/20/2018 12:00 PM)        "

## 2018-11-20 NOTE — INTERVAL H&P NOTE
The patient has been examined and the H&P has been reviewed:    I concur with the findings and no changes have occurred since H&P was written.    Anesthesia/Surgery risks, benefits and alternative options discussed and understood by patient/family.        Active Hospital Problems    Diagnosis  POA    Cystocele, midline [N81.11]  Yes      Resolved Hospital Problems   No resolved problems to display.       Patient seen in holding.  No changes in clinical condition.  Proceed with planned procedure.

## 2018-11-20 NOTE — NURSING TRANSFER
Nursing Transfer Note      11/20/2018     Transfer To: 541b    Transfer via stretcher    Transfer with room air    Transported by pct    Medicines sent: iv fluids    Chart send with patient: Yes    Notified: spouse    Patient reassessed at: 11/20/18 see flowsheets

## 2018-11-20 NOTE — OP NOTE
[facility]  Operative Note    Date: 11/20/2018    Pre-Op Diagnosis:   1. Anterior compartment prolapse  2. Incomplete bladder emptying  3. Urinary frequency   4. Nocturia    Post-Op Diagnosis: same    Procedure(s) Performed:   1. Anterior colporrhaphy  2. Cystoscopy    Specimen(s): vaginal epithelium     Staff Surgeon: Rebecca Acosta MD    Assistant Surgeon: Adamaris Villa MD; Garrett Conti MD    Anesthesia: General endotracheal anesthesia    Indications: Ashlie Redman is a 63 y.o. female with anterior compartment prolapse, electing for repair.  She does not have stress incontinence when her prolapse is reduced.      Findings:   - cystocele reduced without complication  - bilateral efflux seen following reduction  - no bladder abnormalities noted    Estimated Blood Loss: minimal    Drains: 16 Fr van catheter    Procedure in detail:  After the risks and benefits of the procedure were explained, informed consent was obtained. The patient was taken to the operating suite and placed in the supine position.  General anesthesia was administered.  The patient was then placed in the dorsal lithotomy position and prepped and draped in the usual sterile fashion. Timeout was performed and pre-operative antibiotics were confirmed.    Cystoscopy was performed to evaluate the prolapse. A large degree of prolapse was noted in the bladder floor from the posterior wall to the bladder neck involving the trigone. The scope was removed.     A 16 Fr van catheter was placed to empty the bladder.  The [employer] retractor was assembled.  An allis clamp was placed on the vaginal epithelium between the urethra and the cystocele. 0.50% marcaine and 1% lidocaine in a 1:1 solution was injected below the vaginal epithelium of the anterior vaginal wall to allow hydrodissection.  A 15 blade was used to make vertical midline incision in the anterior vaginal wall along the length cystocele.   The vaginal epithelium was  dissected off the pubocervical fascia sharply. This dissection was performed circumferentially and taken down to the apex of the prolapsed area on both sides. The cystocele was then reduced and the pubocervical fascia was reapproximated using 3-0 vicryl.  This restored the support of the anterior vaginal wall and reduced the prolapse.      The van was removed.  The cystoscope was reinserted into the bladder. The prolapse had been corrected.  Bilateral efflux was identified from the ureteral orifices.  The scope was removed and the van was replaced.    The vaginal epithelium was closed with 2-0 vicryl in an interrupted vertical mattress fashion.  The vagina was packed with lubricated kerlex.      The patient tolerated the procedure well and was transferred to the recovery room in stable condition.     Disposition:  The patient will be admitted overnight for observation.  Plan for van removal and vaginal packing removal in AM.    MD TOMASA Wall was present for the entire case and agree with the above note.

## 2018-11-20 NOTE — TRANSFER OF CARE
"Anesthesia Transfer of Care Note    Patient: Ashlie Redman    Procedure(s) Performed: Procedure(s) (LRB):  REPAIR, CYSTOCELE (N/A)  CYSTOSCOPY (N/A)    Patient location: PACU    Anesthesia Type: general    Transport from OR: Transported from OR on 6-10 L/min O2 by face mask with adequate spontaneous ventilation    Post pain: adequate analgesia    Post assessment: no apparent anesthetic complications    Post vital signs: stable    Level of consciousness: awake, alert and oriented    Nausea/Vomiting: no nausea/vomiting    Complications: none    Transfer of care protocol was followed      Last vitals:   Visit Vitals  BP (!) 153/74 (BP Location: Right arm, Patient Position: Lying)   Pulse 64   Temp 36.4 °C (97.6 °F) (Oral)   Resp 17   Ht 5' 7" (1.702 m)   Wt 63 kg (139 lb)   SpO2 100%   Breastfeeding? No   BMI 21.77 kg/m²     "

## 2018-11-21 VITALS
SYSTOLIC BLOOD PRESSURE: 138 MMHG | TEMPERATURE: 98 F | BODY MASS INDEX: 21.82 KG/M2 | OXYGEN SATURATION: 97 % | WEIGHT: 139 LBS | HEIGHT: 67 IN | DIASTOLIC BLOOD PRESSURE: 72 MMHG | HEART RATE: 72 BPM | RESPIRATION RATE: 18 BRPM

## 2018-11-21 LAB
BASOPHILS # BLD AUTO: 0.02 K/UL
BASOPHILS NFR BLD: 0.2 %
DIFFERENTIAL METHOD: ABNORMAL
EOSINOPHIL # BLD AUTO: 0 K/UL
EOSINOPHIL NFR BLD: 0.2 %
ERYTHROCYTE [DISTWIDTH] IN BLOOD BY AUTOMATED COUNT: 11.8 %
HCT VFR BLD AUTO: 35.8 %
HGB BLD-MCNC: 11.7 G/DL
IMM GRANULOCYTES # BLD AUTO: 0.04 K/UL
IMM GRANULOCYTES NFR BLD AUTO: 0.4 %
LYMPHOCYTES # BLD AUTO: 1.2 K/UL
LYMPHOCYTES NFR BLD: 10.3 %
MCH RBC QN AUTO: 32.7 PG
MCHC RBC AUTO-ENTMCNC: 32.7 G/DL
MCV RBC AUTO: 100 FL
MONOCYTES # BLD AUTO: 0.8 K/UL
MONOCYTES NFR BLD: 6.7 %
NEUTROPHILS # BLD AUTO: 9.3 K/UL
NEUTROPHILS NFR BLD: 82.2 %
NRBC BLD-RTO: 0 /100 WBC
PLATELET # BLD AUTO: 183 K/UL
PMV BLD AUTO: 10.9 FL
RBC # BLD AUTO: 3.58 M/UL
WBC # BLD AUTO: 11.29 K/UL

## 2018-11-21 PROCEDURE — 85025 COMPLETE CBC W/AUTO DIFF WBC: CPT

## 2018-11-21 PROCEDURE — 25000003 PHARM REV CODE 250: Performed by: STUDENT IN AN ORGANIZED HEALTH CARE EDUCATION/TRAINING PROGRAM

## 2018-11-21 PROCEDURE — 36415 COLL VENOUS BLD VENIPUNCTURE: CPT

## 2018-11-21 RX ORDER — OXYCODONE AND ACETAMINOPHEN 5; 325 MG/1; MG/1
1 TABLET ORAL EVERY 4 HOURS PRN
Qty: 15 TABLET | Refills: 0 | Status: SHIPPED | OUTPATIENT
Start: 2018-11-21 | End: 2018-12-05

## 2018-11-21 RX ORDER — CEPHALEXIN 500 MG/1
500 CAPSULE ORAL EVERY 12 HOURS
Qty: 6 CAPSULE | Refills: 0 | Status: SHIPPED | OUTPATIENT
Start: 2018-11-21 | End: 2018-11-24

## 2018-11-21 RX ORDER — POLYETHYLENE GLYCOL 3350 17 G/17G
17 POWDER, FOR SOLUTION ORAL DAILY
Qty: 30 PACKET | Refills: 0 | Status: SHIPPED | OUTPATIENT
Start: 2018-11-21 | End: 2018-12-05

## 2018-11-21 RX ADMIN — OXYCODONE HYDROCHLORIDE AND ACETAMINOPHEN 1 TABLET: 5; 325 TABLET ORAL at 09:11

## 2018-11-21 RX ADMIN — POLYETHYLENE GLYCOL 3350 17 G: 17 POWDER, FOR SOLUTION ORAL at 09:11

## 2018-11-21 RX ADMIN — PANTOPRAZOLE SODIUM 40 MG: 40 TABLET, DELAYED RELEASE ORAL at 09:11

## 2018-11-21 RX ADMIN — OXYCODONE HYDROCHLORIDE AND ACETAMINOPHEN 1 TABLET: 10; 325 TABLET ORAL at 05:11

## 2018-11-21 NOTE — PROGRESS NOTES
[facility]  Urology  Progress Note    Patient Name: Ashlie Redman  MRN: [id number]  Admission Date: 11/20/2018  Hospital Length of Stay: 0 days  Code Status: Full Code   Attending Provider: Rebecca Acosta MD   Primary Care Physician: Kevin Gong MD    Subjective:     HPI:  No notes on file    Interval History:   Did well overnight  Ambulated  tolerated diet  Pain well controlled  Packing and Davis removed on rounds    Review of Systems  Objective:     Temp:  [97 °F (36.1 °C)-98.4 °F (36.9 °C)] 97.8 °F (36.6 °C)  Pulse:  [47-85] 68  Resp:  [10-22] 18  SpO2:  [95 %-100 %] 98 %  BP: ()/(52-69) 108/69     Body mass index is 21.77 kg/m².            Drains     Drain                 Urethral Catheter 11/20/18 0736 Non-latex;Straight-tip 16 Fr. less than 1 day                Physical Exam   Nursing note and vitals reviewed.  Constitutional: She is oriented to person, place, and time. She appears well-developed and well-nourished.   HENT:   Head: Normocephalic and atraumatic.   Eyes: Pupils are equal, round, and reactive to light.   Neck: Normal range of motion.   Cardiovascular: Normal rate and intact distal pulses.    Pulmonary/Chest: Effort normal. No respiratory distress.   Abdominal: Soft. She exhibits no distension and no mass. There is no tenderness. There is no rebound and no guarding.   Genitourinary:   Genitourinary Comments: Davis with clear yellow urine, removed on rounds  Vaginal packing removed on rounds   Musculoskeletal: Normal range of motion. She exhibits no edema.   Neurological: She is alert and oriented to person, place, and time.   Skin: Skin is warm and dry.     Psychiatric: She has a normal mood and affect. Her behavior is normal. Judgment and thought content normal.       Significant Labs:    BMP:  No results for input(s): NA, K, CL, CO2, BUN, CREATININE, LABGLOM, GLUCOSE, CALCIUM in the last 168 hours.    CBC:   Recent Labs   Lab 11/21/18  0358   WBC 11.29   HGB 11.7*    HCT 35.8*          All pertinent labs results from the past 24 hours have been reviewed.    Significant Imaging:  All pertinent imaging results/findings from the past 24 hours have been reviewed.                  Assessment/Plan:     Cystocele, midline    -IVF Hep lock  -PO pain meds  -Regular diet  -Ambulate  -Davis and vaginal packing removed today, voiding trial  -DVT ppx: SCDs, TEDs  -GI ppx: protonix    D/C after passes voiding trial           VTE Risk Mitigation (From admission, onward)        Ordered     IP VTE HIGH RISK PATIENT  Once      11/20/18 0836     Place DYLAN hose  Until discontinued      11/20/18 0836     Place sequential compression device  Until discontinued      11/20/18 0836          Garrett Conti MD  Urology  [facility]

## 2018-11-21 NOTE — PLAN OF CARE
Problem: Patient Care Overview  Goal: Plan of Care Review  Outcome: Ongoing (interventions implemented as appropriate)  Pt AAOx4, VSS, No falls noted, fall precautions remain. Skin intact, Pain assessed, pain controlled with PRN regimen, pt comfortable, frequent rounds made for safety and patient care. SCD's in place. Call light within reach, bed wheels locked, bed in lowest position, side rails ^x2, safety maintained. NADN, Will continue monitor.

## 2018-11-21 NOTE — NURSING
Pt in bed resting. Pt denies c/o pain or n/v. D/c orders and prescriptions given to pt. Pt verbalized understanding. Removed PIV and pt tolerated well. Pt waiting for family member to arrive for transportation.

## 2018-11-21 NOTE — SUBJECTIVE & OBJECTIVE
Interval History:   Did well overnight  Ambulated  tolerated diet  Pain well controlled  Packing and Davis removed on rounds    Review of Systems  Objective:     Temp:  [97 °F (36.1 °C)-98.4 °F (36.9 °C)] 97.8 °F (36.6 °C)  Pulse:  [47-85] 68  Resp:  [10-22] 18  SpO2:  [95 %-100 %] 98 %  BP: ()/(52-69) 108/69     Body mass index is 21.77 kg/m².            Drains     Drain                 Urethral Catheter 11/20/18 0736 Non-latex;Straight-tip 16 Fr. less than 1 day                Physical Exam   Nursing note and vitals reviewed.  Constitutional: She is oriented to person, place, and time. She appears well-developed and well-nourished.   HENT:   Head: Normocephalic and atraumatic.   Eyes: Pupils are equal, round, and reactive to light.   Neck: Normal range of motion.   Cardiovascular: Normal rate and intact distal pulses.    Pulmonary/Chest: Effort normal. No respiratory distress.   Abdominal: Soft. She exhibits no distension and no mass. There is no tenderness. There is no rebound and no guarding.   Genitourinary:   Genitourinary Comments: Davis with clear yellow urine, removed on rounds  Vaginal packing removed on rounds   Musculoskeletal: Normal range of motion. She exhibits no edema.   Neurological: She is alert and oriented to person, place, and time.   Skin: Skin is warm and dry.     Psychiatric: She has a normal mood and affect. Her behavior is normal. Judgment and thought content normal.       Significant Labs:    BMP:  No results for input(s): NA, K, CL, CO2, BUN, CREATININE, LABGLOM, GLUCOSE, CALCIUM in the last 168 hours.    CBC:   Recent Labs   Lab 11/21/18  0358   WBC 11.29   HGB 11.7*   HCT 35.8*          All pertinent labs results from the past 24 hours have been reviewed.    Significant Imaging:  All pertinent imaging results/findings from the past 24 hours have been reviewed.

## 2018-11-21 NOTE — ASSESSMENT & PLAN NOTE
-IVF Hep lock  -PO pain meds  -Regular diet  -Ambulate  -Davis and vaginal packing removed today, voiding trial  -DVT ppx: SCDs, TEDs  -GI ppx: protonix    D/C after passes voiding trial

## 2018-11-22 NOTE — DISCHARGE SUMMARY
[facility]  Urology  Discharge Summary      Patient Name: Ashlie Redman  MRN: [id number]  Admission Date: 2018  Hospital Length of Stay: 0 days  Discharge Date and Time: 2018  1:27 PM  Attending Physician: Rebecca Acosta MD  Discharging Provider: Garrett Conti MD  Primary Care Physician: Kevin Gong MD    HPI:   Ashlie Redman is a 63 y.o. female who returns today to sign consents.  She has a history of cystocele first diagnosed in 2016, but did not undergo treatment because shortly thereafter, was diagnosed with breast cancer and underwnet treatment.  She has undergone physical therapy in the past with Mary Aguirre.  She has symptoms of incomplete bladder emptying, frequency x 6-7, nocturia x 2.  She is pleased because she had her port removed recently.     She underwent urodynamics and was found not to have occult stress incontinence.  She had incomplete bladder emptying but that was voiding without reduction of the prolapse.  Her sensation was normal as was the capacity.       , uterus in place, sexually active, sometimes feels like he bumps up against the prolapse.  Bowels are not the same as they were before.      Procedure(s) (LRB):  REPAIR, CYSTOCELE (N/A)  CYSTOSCOPY (N/A)     Indwelling Lines/Drains at time of discharge:   Lines/Drains/Airways     Central Venous Catheter Line                 Port A Cath Single Lumen right subclavian -- days          Drain                 Urethral Catheter 18 0736 Non-latex;Straight-tip 16 Fr. 1 day                Hospital Course    Patient underwent the above described procedures (cystocele repair, cystoscopy) and tolerated the surgery well. She ambulated on POD 0 and tolerated a regular diet. Her vaginal packing and vna catheter were removed the morning on POD 1. She was stable for discharge on POD 1 ambulating in the halls, tolerating regular diet, passed a voiding trial with good urine output and pain well controlled on oral pain  medications. She was discharged home with oral antibiotics, oral pain medications, and stool softeners. She will follow up with Dr. Acosta in 2 weeks for a post operative visit.      Consults: none    Significant Diagnostic Studies: none    Pending Diagnostic Studies:     None          Final Active Diagnoses:    Diagnosis Date Noted POA    Cystocele, midline [N81.11] 11/20/2018 Yes      Problems Resolved During this Admission:       Discharged Condition: good    Disposition: Home or Self Care    Follow Up:  Follow-up Information     Rebecca Acosta MD In 2 weeks.    Specialty:  Urology  Contact information:  [address]  [facility] [address]  [phone]                 Patient Instructions:      Lifting restrictions     Notify your health care provider if you experience any of the following:  temperature >100.4     Notify your health care provider if you experience any of the following:  persistent nausea and vomiting or diarrhea     Notify your health care provider if you experience any of the following:  severe uncontrolled pain     Notify your health care provider if you experience any of the following:  redness, tenderness, or signs of infection (pain, swelling, redness, odor or green/yellow discharge around incision site)     Notify your health care provider if you experience any of the following:  difficulty breathing or increased cough     Notify your health care provider if you experience any of the following:  severe persistent headache     Notify your health care provider if you experience any of the following:  worsening rash     Notify your health care provider if you experience any of the following:  persistent dizziness, light-headedness, or visual disturbances     Notify your health care provider if you experience any of the following:  increased confusion or weakness     Activity as tolerated     Weight bearing restrictions (specify):     Medications:  Reconciled Home Medications:       Medication List      START taking these medications    cephALEXin 500 MG capsule  Commonly known as:  KEFLEX  Take 1 capsule (500 mg total) by mouth every 12 (twelve) hours. for 3 days     oxyCODONE-acetaminophen 5-325 mg per tablet  Commonly known as:  PERCOCET  Take 1 tablet by mouth every 4 (four) hours as needed.     polyethylene glycol 17 gram Pwpk  Commonly known as:  GLYCOLAX  Take 17 g by mouth once daily.        CONTINUE taking these medications    dorzolamide 2 % ophthalmic solution  Commonly known as:  TRUSOPT  Place 1 drop into both eyes 3 (three) times daily.     latanoprost 0.005 % ophthalmic solution  INSTILL ONE DROP INTO EACH EYE ONCE DAILY IN THE EVENING     valACYclovir 500 MG tablet  Commonly known as:  VALTREX            Time spent on the discharge of patient: 25 minutes    Garrett Conti MD  Urology  [facility]    Patient seen the morning of discharge.  She had ambulated, tolerated breakfast.  We reviewed post op limitations.  She has an appointment in 2 weeks.

## 2018-11-28 ENCOUNTER — PATIENT MESSAGE (OUTPATIENT)
Dept: PULMONOLOGY | Facility: CLINIC | Age: 63
End: 2018-11-28

## 2018-11-28 ENCOUNTER — PATIENT MESSAGE (OUTPATIENT)
Dept: RADIATION ONCOLOGY | Facility: CLINIC | Age: 63
End: 2018-11-28

## 2018-12-03 NOTE — PROGRESS NOTES
CHIEF COMPLAINT:    Mrs. Redman is a 63 y.o. female presenting for a follow up status post anterior colporrhaphy and cystoscopy 11/20/2018.    PRESENTING ILLNESS:    Ashlie Redman is a 63 y.o. female who returns stating that she is doing well.  She is starting to get her energy back.  No discharge, she feels like she is emptying well. No problems with bowel movements.  She feels like everything is up.      Allergies:  Bactrim [sulfamethoxazole-trimethoprim]    Medications:  Outpatient Encounter Medications as of 12/5/2018   Medication Sig Dispense Refill    dorzolamide (TRUSOPT) 2 % ophthalmic solution Place 1 drop into both eyes 3 (three) times daily. 30 mL 4    latanoprost 0.005 % ophthalmic solution INSTILL ONE DROP INTO EACH EYE ONCE DAILY IN THE EVENING 9 mL 4    valACYclovir (VALTREX) 500 MG tablet       [DISCONTINUED] oxyCODONE-acetaminophen (PERCOCET) 5-325 mg per tablet Take 1 tablet by mouth every 4 (four) hours as needed. 15 tablet 0    [DISCONTINUED] polyethylene glycol (GLYCOLAX) 17 gram PwPk Take 17 g by mouth once daily. 30 packet 0     No facility-administered encounter medications on file as of 12/5/2018.          PHYSICAL EXAMINATION:    The patient generally appears in good health, is appropriately interactive, and is in no apparent distress.    Skin: No lesions.    Mental: Cooperative with normal affect.    Neuro: Grossly intact.    HEENT: Normal. No evidence of lymphadenopathy.    Chest:  normal inspiratory effort.    Extremities: No clubbing, cyanosis, or edema      LABS:    Lab Results   Component Value Date    BUN 14 11/13/2018    CREATININE 0.8 11/13/2018     UA 1.010, PH 7, + leuk, otherwise, negative    IMPRESSION:    Encounter Diagnoses   Name Primary?    Post-operative state Yes       PLAN:    1. Continue post op limitations  2.  Follow up in 1 month for exam.  Warned that she may see sutures.

## 2018-12-05 ENCOUNTER — OFFICE VISIT (OUTPATIENT)
Dept: UROLOGY | Facility: CLINIC | Age: 63
End: 2018-12-05
Payer: COMMERCIAL

## 2018-12-05 VITALS
HEART RATE: 65 BPM | WEIGHT: 136.69 LBS | SYSTOLIC BLOOD PRESSURE: 133 MMHG | BODY MASS INDEX: 21.41 KG/M2 | DIASTOLIC BLOOD PRESSURE: 81 MMHG

## 2018-12-05 DIAGNOSIS — Z98.890 POST-OPERATIVE STATE: Primary | ICD-10-CM

## 2018-12-05 PROCEDURE — 99024 POSTOP FOLLOW-UP VISIT: CPT | Mod: S$GLB,,, | Performed by: UROLOGY

## 2018-12-05 PROCEDURE — 99999 PR PBB SHADOW E&M-EST. PATIENT-LVL III: CPT | Mod: PBBFAC,,, | Performed by: UROLOGY

## 2018-12-14 ENCOUNTER — TELEPHONE (OUTPATIENT)
Dept: PSYCHIATRY | Facility: CLINIC | Age: 63
End: 2018-12-14

## 2018-12-14 NOTE — TELEPHONE ENCOUNTER
----- Message from Josephine Conroy sent at 12/14/2018 10:40 AM CST -----  Contact: Request from [facility]  ----- Message -----     From: Ashlie Redman     Sent: 12/11/2018 12:42 PM CST       To: Patient Appointment Schedule Request Mailing List  Subject: Appointment Request    Appointment Request From: Ashlie Redman    With Provider: Erika Frost    Preferred Date Range: 12/17/2018 - 12/31/2018    Preferred Times: Any time    Reason for visit: Referral    Comments:  Suggested by my oncologist, would like to be seen in [facility]

## 2019-01-04 ENCOUNTER — OFFICE VISIT (OUTPATIENT)
Dept: UROLOGY | Facility: CLINIC | Age: 64
End: 2019-01-04
Payer: COMMERCIAL

## 2019-01-04 VITALS
BODY MASS INDEX: 21.93 KG/M2 | DIASTOLIC BLOOD PRESSURE: 84 MMHG | HEIGHT: 67 IN | SYSTOLIC BLOOD PRESSURE: 139 MMHG | HEART RATE: 78 BPM | WEIGHT: 139.75 LBS

## 2019-01-04 DIAGNOSIS — Z98.890 POST-OPERATIVE STATE: ICD-10-CM

## 2019-01-04 PROCEDURE — 99024 POSTOP FOLLOW-UP VISIT: CPT | Mod: S$GLB,,, | Performed by: UROLOGY

## 2019-01-04 PROCEDURE — 99999 PR PBB SHADOW E&M-EST. PATIENT-LVL III: ICD-10-PCS | Mod: PBBFAC,,, | Performed by: UROLOGY

## 2019-01-04 PROCEDURE — 99999 PR PBB SHADOW E&M-EST. PATIENT-LVL III: CPT | Mod: PBBFAC,,, | Performed by: UROLOGY

## 2019-01-04 PROCEDURE — 99024 PR POST-OP FOLLOW-UP VISIT: ICD-10-PCS | Mod: S$GLB,,, | Performed by: UROLOGY

## 2019-01-04 NOTE — PROGRESS NOTES
"Urology - [facility]    SUBJECTIVE:     Chief Complaint:  Post op status post anterior colporrhaphy and cystoscopy 11/20/2018    History of Present Illness:  Ashlie Redman is a 63 y.o. female who presents to clinic for post-op follow-up.  She states she feels well, everything feels like it is up, no problems urinating or defecating.  She is anxious to start exercising again.     Review of patient's allergies indicates:   Allergen Reactions    Bactrim [sulfamethoxazole-trimethoprim] Rash     Fever, vomiting       OBJECTIVE:     Estimated body mass index is 21.89 kg/m² as calculated from the following:    Height as of this encounter: 5' 7" (1.702 m).    Weight as of this encounter: 63.4 kg (139 lb 12.4 oz).    Vital Signs (Most Recent)  Pulse: 78 (01/04/19 1329)  BP: 139/84 (01/04/19 1329)    Physical Exam   Constitutional: She appears well-developed and well-nourished.   HENT:   Head: Normocephalic and atraumatic.   Eyes: Conjunctivae are normal. Pupils are equal, round, and reactive to light.   Neck: Normal range of motion. Neck supple.   Pulmonary/Chest: Effort normal.   Abdominal: Soft.   Genitourinary: Vagina normal.   Genitourinary Comments: Normal external female genitalia  Urethral meatus is normal  Urethra and bladder are nontender to bimanual exam  Anteriorly, has some descensus, well healed, no tenderness on bimanual exam  Uterus and cervix are normal  No adnexal masses     Musculoskeletal: Normal range of motion.   Neurological: She is alert.   Skin: Skin is warm and dry.     Psychiatric: She has a normal mood and affect. Her behavior is normal. Judgment and thought content normal.       BMP  Lab Results   Component Value Date     11/13/2018    K 4.2 11/13/2018     11/13/2018    CO2 25 11/13/2018    BUN 14 11/13/2018    CREATININE 0.8 11/13/2018    CALCIUM 10.4 11/13/2018    ANIONGAP 9 11/13/2018    ESTGFRAFRICA >60 11/13/2018    EGFRNONAA >60 11/13/2018       Lab Results   Component " Value Date    WBC 11.29 11/21/2018    HGB 11.7 (L) 11/21/2018    HCT 35.8 (L) 11/21/2018     (H) 11/21/2018     11/21/2018       ASSESSMENT     1. Post-operative state        PLAN:     1) Post-operative state  2)  Follow up in 3 months  3) lifted post op limitations, but do not start bike riding for another 6 weeks.     Rebecca Acosta MD  Note patient was not seen by the resident.

## 2019-01-14 ENCOUNTER — OFFICE VISIT (OUTPATIENT)
Dept: PSYCHIATRY | Facility: CLINIC | Age: 64
End: 2019-01-14
Payer: COMMERCIAL

## 2019-01-14 DIAGNOSIS — F43.23 ADJUSTMENT REACTION WITH ANXIETY AND DEPRESSION: Primary | ICD-10-CM

## 2019-01-14 PROCEDURE — 90791 PSYCH DIAGNOSTIC EVALUATION: CPT | Mod: S$GLB,,, | Performed by: SOCIAL WORKER

## 2019-01-14 PROCEDURE — 90791 PR PSYCHIATRIC DIAGNOSTIC EVALUATION: ICD-10-PCS | Mod: S$GLB,,, | Performed by: SOCIAL WORKER

## 2019-01-14 PROCEDURE — 99999 PR PBB SHADOW E&M-EST. PATIENT-LVL II: CPT | Mod: PBBFAC,,, | Performed by: SOCIAL WORKER

## 2019-01-14 PROCEDURE — 99999 PR PBB SHADOW E&M-EST. PATIENT-LVL II: ICD-10-PCS | Mod: PBBFAC,,, | Performed by: SOCIAL WORKER

## 2019-01-16 NOTE — PROGRESS NOTES
"Psychiatry Initial Visit (PhD/LCSW)  Diagnostic Interview - CPT 83420    Date: 1/14/2019    Site: [facility]    Referral source: Dr. Ragsdale    Clinical status of patient: Outpatient    Ashlie Redman, a 63 y.o. female, for initial evaluation visit.  Met with patient.    Chief complaint/reason for encounter: depression, anxiety, interpersonal and grief and adjustment as related to cancer recovery    HPI:  Patient presented to the initial session on time.  She began providing social history information and establishing rapport with the .  Patient is interested in biweekly sessions.  Her therapy goal is to "get out of the tank", to learn how to "deal with cancer", and adjust to her "new normal".    Review of Systems   Psychiatric/Behavioral: Positive for dysphoric mood. The patient is nervous/anxious.        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"  · Health behaviors: breast cancer removal in Nov 2017, lung damage due to radiation, recent bladder surgery in November prevented exercise during that time, cancer antigen rising recently which may indicate a tumor developing elsewhere in her body    Psychiatric history: none    Medical history:   Past Medical History:   Diagnosis Date    Allergy     Anemia     Asteroid hyalosis of both eyes     Breast cancer 04/2017    Left    Cataract     Diverticulosis     Encounter for blood transfusion     Glaucoma     High myopia     Osteopenia     S/P dilation and curettage        Family history of psychiatric illness:   Family History   Problem Relation Age of Onset    Cancer Sister         rectal; passed    Breast cancer Cousin     Cancer Cousin         breast    Heart disease Father         passed    Colon cancer Mother         " hospice    Pancreatic cancer Mother         41yo; 91yo    Cataracts Mother     Hypertension Mother     Thyroid disease Mother     Cancer Mother         colon    Glaucoma Maternal Grandmother     Amblyopia Neg Hx     Blindness Neg Hx     Diabetes Neg Hx     Macular degeneration Neg Hx     Retinal detachment Neg Hx     Strabismus Neg Hx     Stroke Neg Hx        Social history (marriage, employment, etc.):  Social History     Socioeconomic History    Marital status: Legally      Spouse name: Not on file    Number of children: 2    Years of education: Not on file    Highest education level: Not on file   Social Needs    Financial resource strain: Not on file    Food insecurity - worry: Not on file    Food insecurity - inability: Not on file    Transportation needs - medical: Not on file    Transportation needs - non-medical: Not on file   Occupational History     Employer: [employer] ([employer])   Tobacco Use    Smoking status: Never Smoker    Smokeless tobacco: Never Used   Substance and Sexual Activity    Alcohol use: No     Alcohol/week: 0.6 oz     Types: 1 Glasses of wine per week     Comment: Quit    Drug use: No    Sexual activity: Yes     Partners: Male     Birth control/protection: Post-menopausal   Other Topics Concern    Are you pregnant or think you may be? No    Breast-feeding Not Asked    Patient feels they ought to cut down on drinking/drug use Not Asked    Patient annoyed by others criticizing their drinking/drug use Not Asked    Patient has felt bad or guilty about drinking/drug use Not Asked    Patient has had a drink/used drugs as an eye opener in the AM Not Asked   Social History Narrative    Not on file       Current medications and drug reactions (include OTC, herbal):   Current Outpatient Medications   Medication Sig    dorzolamide (TRUSOPT) 2 % ophthalmic solution Place 1 drop into both eyes 3 (three) times daily.    latanoprost 0.005 %  ophthalmic solution INSTILL ONE DROP INTO EACH EYE ONCE DAILY IN THE EVENING    valACYclovir (VALTREX) 500 MG tablet      No current facility-administered medications for this visit.        Strengths and liabilities: Strength: Patient accepts guidance/feedback, Strength: Patient is expressive/articulate., Strength: Patient is intelligent., Strength: Patient is motivated for change., Strength: Patient has positive support network., Strength: Patient has reasonable judgment., Strength: Patient is stable., Liability: Patient has poor health., Liability: Patient lacks coping skills.    Current Evaluation:     Mental Status Exam:  General Appearance:  age appropriate, normal weight, well dressed, neatly groomed   Speech: normal tone, normal rate, normal pitch, normal volume      Level of Cooperation: cooperative      Thought Processes: normal and logical   Mood: steady, anxious, sad      Thought Content: normal, no suicidality, no homicidality, delusions, or paranoia   Affect: congruent and appropriate, sad, anxious   Orientation: Oriented x3   Memory: intact   Attention Span & Concentration: intact   Fund of General Knowledge: intact and appropriate to age and level of education   Judgment & Insight: good     Language  intact     Diagnostic Impression - Plan:       ICD-10-CM ICD-9-CM   1. Adjustment reaction with anxiety and depression F43.23 309.28       Plan:individual psychotherapy, Pt to go to ED or call 911 if symptoms worsen or if she has thoughts of harming self and/or others. Pt verbalized understanding.     Return to Clinic: Follow-up in about 3 weeks (around 2/1/2019).    Total session time: 60 minutes

## 2019-01-25 ENCOUNTER — OFFICE VISIT (OUTPATIENT)
Dept: URGENT CARE | Facility: CLINIC | Age: 64
End: 2019-01-25
Payer: COMMERCIAL

## 2019-01-25 VITALS
HEIGHT: 67 IN | BODY MASS INDEX: 21.82 KG/M2 | TEMPERATURE: 97 F | OXYGEN SATURATION: 100 % | WEIGHT: 139 LBS | HEART RATE: 67 BPM | DIASTOLIC BLOOD PRESSURE: 93 MMHG | RESPIRATION RATE: 14 BRPM | SYSTOLIC BLOOD PRESSURE: 140 MMHG

## 2019-01-25 DIAGNOSIS — R30.0 DYSURIA: Primary | ICD-10-CM

## 2019-01-25 LAB
BILIRUB UR QL STRIP: NEGATIVE
GLUCOSE UR QL STRIP: NEGATIVE
KETONES UR QL STRIP: NEGATIVE
LEUKOCYTE ESTERASE UR QL STRIP: POSITIVE
PH, POC UA: 6.5 (ref 5–8)
POC BLOOD, URINE: NEGATIVE
POC NITRATES, URINE: NEGATIVE
PROT UR QL STRIP: NEGATIVE
SP GR UR STRIP: 1.01 (ref 1–1.03)
UROBILINOGEN UR STRIP-ACNC: NORMAL (ref 0.1–1.1)

## 2019-01-25 PROCEDURE — 3008F BODY MASS INDEX DOCD: CPT | Mod: CPTII,S$GLB,, | Performed by: FAMILY MEDICINE

## 2019-01-25 PROCEDURE — 99214 PR OFFICE/OUTPT VISIT, EST, LEVL IV, 30-39 MIN: ICD-10-PCS | Mod: 25,S$GLB,, | Performed by: FAMILY MEDICINE

## 2019-01-25 PROCEDURE — 99214 OFFICE O/P EST MOD 30 MIN: CPT | Mod: 25,S$GLB,, | Performed by: FAMILY MEDICINE

## 2019-01-25 PROCEDURE — 81003 URINALYSIS AUTO W/O SCOPE: CPT | Mod: QW,S$GLB,, | Performed by: FAMILY MEDICINE

## 2019-01-25 PROCEDURE — 81003 POCT URINALYSIS, DIPSTICK, AUTOMATED, W/O SCOPE: ICD-10-PCS | Mod: QW,S$GLB,, | Performed by: FAMILY MEDICINE

## 2019-01-25 PROCEDURE — 3008F PR BODY MASS INDEX (BMI) DOCUMENTED: ICD-10-PCS | Mod: CPTII,S$GLB,, | Performed by: FAMILY MEDICINE

## 2019-01-25 RX ORDER — PHENAZOPYRIDINE HYDROCHLORIDE 200 MG/1
200 TABLET, FILM COATED ORAL 3 TIMES DAILY PRN
Qty: 6 TABLET | Refills: 2 | Status: SHIPPED | OUTPATIENT
Start: 2019-01-25 | End: 2019-01-27

## 2019-01-25 RX ORDER — NITROFURANTOIN 25; 75 MG/1; MG/1
100 CAPSULE ORAL 2 TIMES DAILY
Qty: 10 CAPSULE | Refills: 1 | Status: SHIPPED | OUTPATIENT
Start: 2019-01-25 | End: 2019-01-30

## 2019-01-25 NOTE — PROGRESS NOTES
"Subjective:       Patient ID: Ashlie Redman is a 63 y.o. female.    Vitals:  height is 5' 7" (1.702 m) and weight is 63 kg (139 lb). Her oral temperature is 97.3 °F (36.3 °C). Her blood pressure is 140/93 (abnormal) and her pulse is 67. Her respiration is 14 and oxygen saturation is 100%.     Chief Complaint: Urinary Tract Infection    Pt c/o frequency, cloudy, urgency and dysuria, x 7 days   11/2018 bladder surgery   Pt states she has reoccurring dysuria      Urinary Tract Infection    This is a new problem. The current episode started in the past 7 days. The problem has been unchanged. The quality of the pain is described as burning. The pain is at a severity of 8/10. The pain is moderate. There has been no fever. She is not sexually active. Associated symptoms include frequency and urgency. Pertinent negatives include no chills, flank pain, hematuria, nausea, vomiting or rash. She has tried nothing for the symptoms. Her past medical history is significant for a urological procedure.       Constitution: Negative for chills and fever.   Neck: Negative for painful lymph nodes.   Gastrointestinal: Negative for abdominal pain, nausea and vomiting.   Genitourinary: Positive for dysuria, frequency, urgency and pelvic pain. Negative for urine decreased, flank pain, bed wetting, hematuria, history of kidney stones, painful menstruation, irregular menstruation, missed menses, heavy menstrual bleeding, ovarian cysts, genital trauma, vaginal pain, vaginal discharge, vaginal bleeding, vaginal odor, painful intercourse, genital sore and painful ejaculation.   Musculoskeletal: Negative for back pain.   Skin: Negative for rash and lesion.   Hematologic/Lymphatic: Negative for swollen lymph nodes.       Objective:      Physical Exam   Constitutional: She is oriented to person, place, and time. She appears well-developed and well-nourished.   HENT:   Head: Normocephalic and atraumatic.   Nose: Nose normal.   Eyes: Lids are normal. "   Neck: Trachea normal, normal range of motion and phonation normal. Neck supple.   Cardiovascular: Normal rate and normal pulses.   Pulmonary/Chest: Effort normal.   Abdominal: Soft. Normal appearance and bowel sounds are normal. She exhibits no distension. There is tenderness. There is no CVA tenderness.   Neurological: She is alert and oriented to person, place, and time.   Skin: Skin is warm, dry and intact.   Psychiatric: She has a normal mood and affect. Her speech is normal and behavior is normal. Cognition and memory are normal.   Nursing note and vitals reviewed.      Assessment:       1. Dysuria        Plan:         Dysuria  -     POCT Urinalysis, Dipstick, Automated, W/O Scope  -     Urine culture    Other orders  -     nitrofurantoin, macrocrystal-monohydrate, (MACROBID) 100 MG capsule; Take 1 capsule (100 mg total) by mouth 2 (two) times daily. for 5 days  Dispense: 10 capsule; Refill: 1  -     phenazopyridine (PYRIDIUM) 200 MG tablet; Take 1 tablet (200 mg total) by mouth 3 (three) times daily as needed for Pain.  Dispense: 6 tablet; Refill: 2

## 2019-01-29 ENCOUNTER — TELEPHONE (OUTPATIENT)
Dept: URGENT CARE | Facility: CLINIC | Age: 64
End: 2019-01-29

## 2019-01-29 LAB
BACTERIA UR CULT: ABNORMAL
BACTERIA UR CULT: ABNORMAL
OTHER ANTIBIOTIC SUSC ISLT: ABNORMAL

## 2019-02-01 ENCOUNTER — OFFICE VISIT (OUTPATIENT)
Dept: PSYCHIATRY | Facility: CLINIC | Age: 64
End: 2019-02-01
Payer: COMMERCIAL

## 2019-02-01 DIAGNOSIS — F43.23 ADJUSTMENT REACTION WITH ANXIETY AND DEPRESSION: Primary | ICD-10-CM

## 2019-02-01 PROCEDURE — 99999 PR PBB SHADOW E&M-EST. PATIENT-LVL II: ICD-10-PCS | Mod: PBBFAC,,, | Performed by: SOCIAL WORKER

## 2019-02-01 PROCEDURE — 90834 PSYTX W PT 45 MINUTES: CPT | Mod: S$GLB,,, | Performed by: SOCIAL WORKER

## 2019-02-01 PROCEDURE — 99999 PR PBB SHADOW E&M-EST. PATIENT-LVL II: CPT | Mod: PBBFAC,,, | Performed by: SOCIAL WORKER

## 2019-02-01 PROCEDURE — 90834 PR PSYCHOTHERAPY W/PATIENT, 45 MIN: ICD-10-PCS | Mod: S$GLB,,, | Performed by: SOCIAL WORKER

## 2019-02-04 NOTE — PROGRESS NOTES
Individual Psychotherapy (PhD/LCSW)    2/1/2019    Site:  [facility]         Therapeutic Intervention: Met with patient.  Outpatient - Insight oriented psychotherapy 45 min - CPT code 88379 and Outpatient - Supportive psychotherapy 45 min - CPT Code 14190    Chief complaint/reason for encounter: depression, grief, adjustment, anxiety and interpersonal     Review of Systems: Patient reported improvement    Interval history and content of current session: Patient presented to the follow up session on time and in a fairly good mood.  She reported that her trip to [place] to spend time with family went well.  Patient has been journaling and started exercising again, and she is feeling emotionally better.  Patient stated that her financial concerns since her breakup have eased and she doesn't feel lonely at home alone.  We discussed her relationship problems with [place] that led to their breakup.  Patient is still uncertain as to whether or not they should get back together.  We discussed their similarities, differences, and various stressors in the relationship.   recommended a book for the patient to read to help her understand her attraction to emotionally unavailable men.  Patient was accepting.    Treatment plan:  · Target symptoms: depression, anxiety , adjustment, grief  · Why chosen therapy is appropriate versus another modality: relevant to diagnosis, patient responds to this modality  · Outcome monitoring methods: self-report, observation  · Therapeutic intervention type: insight oriented psychotherapy, supportive psychotherapy    Risk parameters:  Patient reports no suicidal ideation  Patient reports no homicidal ideation  Patient reports no self-injurious behavior  Patient reports no violent behavior    Verbal deficits: None    Patient's response to intervention:  The patient's response to intervention is accepting.    Progress toward goals and other mental status changes:  The patient's  progress toward goals is good.    Diagnosis:     ICD-10-CM ICD-9-CM   1. Adjustment reaction with anxiety and depression F43.23 309.28       Plan:  individual psychotherapy, Pt to go to ED or call 911 if symptoms worsen or if she has thoughts of harming self and/or others. Pt verbalized understanding.     Return to clinic: Follow-up in about 2 weeks (around 2/15/2019).    Length of Service (minutes): 45

## 2019-02-15 ENCOUNTER — OFFICE VISIT (OUTPATIENT)
Dept: PSYCHIATRY | Facility: CLINIC | Age: 64
End: 2019-02-15
Payer: COMMERCIAL

## 2019-02-15 DIAGNOSIS — F43.23 ADJUSTMENT REACTION WITH ANXIETY AND DEPRESSION: Primary | ICD-10-CM

## 2019-02-15 PROCEDURE — 90834 PSYTX W PT 45 MINUTES: CPT | Mod: S$GLB,,, | Performed by: SOCIAL WORKER

## 2019-02-15 PROCEDURE — 90834 PR PSYCHOTHERAPY W/PATIENT, 45 MIN: ICD-10-PCS | Mod: S$GLB,,, | Performed by: SOCIAL WORKER

## 2019-02-15 PROCEDURE — 99999 PR PBB SHADOW E&M-EST. PATIENT-LVL II: CPT | Mod: PBBFAC,,, | Performed by: SOCIAL WORKER

## 2019-02-15 PROCEDURE — 99999 PR PBB SHADOW E&M-EST. PATIENT-LVL II: ICD-10-PCS | Mod: PBBFAC,,, | Performed by: SOCIAL WORKER

## 2019-02-18 NOTE — PROGRESS NOTES
"Individual Psychotherapy (PhD/LCSW)    2/15/2019    Site:  [facility]         Therapeutic Intervention: Met with patient.  Outpatient - Insight oriented psychotherapy 45 min - CPT code 10170 and Outpatient - Supportive psychotherapy 45 min - CPT Code 35458    Chief complaint/reason for encounter: depression, grief, adjustment, anxiety and interpersonal     Review of Systems: Patient reported improvement    Interval history and content of current session: Patient presented to the follow up session on time and in a fair mood.  She reported that she read the recommended book and it was startling to her to discover that all of her romantic relationships, except for one, have been with emotional manipulators.  This includes her recent lengthy relationship with [place].  Patient discussed their recent phone conversations and her attempts to communicate with him about the way that their break up occurred.  He has been avoiding talking to her in person about it despite several requests to have the conversation.  He actually laughed at her on Sunday when she was sincerely sharing her feelings.  We discussed the control dynamics of the relationship and what the patient is hoping to achieve in her desired meeting to end their relationship for good.   made suggestions to help her maintain boundaries with him and begin to focus on her own needs.   suggested that she read the book "Codependency No More" to help understand her own role in these relationships. Patient was accepting.    Treatment plan:  · Target symptoms: depression, anxiety , adjustment, grief  · Why chosen therapy is appropriate versus another modality: relevant to diagnosis, patient responds to this modality  · Outcome monitoring methods: self-report, observation  · Therapeutic intervention type: insight oriented psychotherapy, supportive psychotherapy    Risk parameters:  Patient reports no suicidal ideation  Patient reports no " homicidal ideation  Patient reports no self-injurious behavior  Patient reports no violent behavior    Verbal deficits: None    Patient's response to intervention:  The patient's response to intervention is accepting.    Progress toward goals and other mental status changes:  The patient's progress toward goals is good.    Diagnosis:     ICD-10-CM ICD-9-CM   1. Adjustment reaction with anxiety and depression F43.23 309.28       Plan:  individual psychotherapy, Pt to go to ED or call 911 if symptoms worsen or if she has thoughts of harming self and/or others. Pt verbalized understanding.     Return to clinic: Follow-up in about 4 weeks (around 3/18/2019).    Length of Service (minutes): 45

## 2019-02-24 ENCOUNTER — PATIENT MESSAGE (OUTPATIENT)
Dept: UROLOGY | Facility: CLINIC | Age: 64
End: 2019-02-24

## 2019-02-24 DIAGNOSIS — N30.90 BLADDER INFECTION: Primary | ICD-10-CM

## 2019-02-25 ENCOUNTER — PATIENT MESSAGE (OUTPATIENT)
Dept: UROLOGY | Facility: CLINIC | Age: 64
End: 2019-02-25

## 2019-02-25 RX ORDER — CEFDINIR 300 MG/1
300 CAPSULE ORAL 2 TIMES DAILY
Qty: 14 CAPSULE | Refills: 0 | Status: SHIPPED | OUTPATIENT
Start: 2019-02-25 | End: 2019-03-04

## 2019-02-25 NOTE — TELEPHONE ENCOUNTER
Patient requesting an antibiotic for UTI, her previous culture was pan sensitive E coli.  She states she could not get a specimen to the lab.  She requested [facility] [address].  omnicef sent.

## 2019-02-28 ENCOUNTER — TELEPHONE (OUTPATIENT)
Dept: RESEARCH | Facility: HOSPITAL | Age: 64
End: 2019-02-28

## 2019-02-28 NOTE — TELEPHONE ENCOUNTER
Thursday, February 28, 2019  Pt Initials: SE KATRINA  Protocol: [id number]  Study #: [id number]  IRB#: [id number]    Contacted Pt for follow-up per protocol. No availability. Left message with office and mobile number. Also send message via e-mail.     Pt received mailed QOL and returned it on 3/18/2019.

## 2019-03-12 ENCOUNTER — OFFICE VISIT (OUTPATIENT)
Dept: OPHTHALMOLOGY | Facility: CLINIC | Age: 64
End: 2019-03-12
Payer: COMMERCIAL

## 2019-03-12 DIAGNOSIS — H52.7 REFRACTIVE ERROR: ICD-10-CM

## 2019-03-12 DIAGNOSIS — Z96.1 PSEUDOPHAKIA OF BOTH EYES: ICD-10-CM

## 2019-03-12 DIAGNOSIS — Z98.890 HISTORY OF VITRECTOMY: ICD-10-CM

## 2019-03-12 DIAGNOSIS — H40.053 OHT (OCULAR HYPERTENSION), BILATERAL: Primary | ICD-10-CM

## 2019-03-12 PROCEDURE — 92012 INTRM OPH EXAM EST PATIENT: CPT | Mod: S$GLB,,, | Performed by: OPHTHALMOLOGY

## 2019-03-12 PROCEDURE — 99999 PR PBB SHADOW E&M-EST. PATIENT-LVL II: ICD-10-PCS | Mod: PBBFAC,,, | Performed by: OPHTHALMOLOGY

## 2019-03-12 PROCEDURE — 99999 PR PBB SHADOW E&M-EST. PATIENT-LVL II: CPT | Mod: PBBFAC,,, | Performed by: OPHTHALMOLOGY

## 2019-03-12 PROCEDURE — 92012 PR EYE EXAM, EST PATIENT,INTERMED: ICD-10-PCS | Mod: S$GLB,,, | Performed by: OPHTHALMOLOGY

## 2019-03-12 RX ORDER — LATANOPROST 50 UG/ML
SOLUTION/ DROPS OPHTHALMIC
Qty: 3 BOTTLE | Refills: 4 | Status: SHIPPED | OUTPATIENT
Start: 2019-03-12 | End: 2021-01-04 | Stop reason: SDUPTHER

## 2019-03-12 NOTE — PROGRESS NOTES
HPI     4 month IOP check,     Last edited by Tiffany Chamberlain on 3/12/2019  3:28 PM. (History)            Assessment /Plan     For exam results, see Encounter Report.    OHT (ocular hypertension), bilateral  -     latanoprost 0.005 % ophthalmic solution; INSTILL ONE DROP INTO EACH EYE ONCE DAILY IN THE EVENING  Dispense: 3 Bottle; Refill: 4    Pseudophakia of both eyes    History of vitrectomy    Refractive error            OHT (ocular hypertension), bilateral - Switched Timolol to Dorzolamide 2/14/17, but IOP still upper 20's. Just finished tamoxifen for newly diagnosed breast cancer.   IOP baseline mid 20's with occasional pain OS. IOP was significantly elevated off Latanoprost - Tmax 37/44! Back to ~23 today. Maternal grandmother with glaucoma, no known first degree relatives. CCT thick. Baseline HVF/HRT WNL, small cups on clinical exam OU. Last HRT - within range of priors, no RNFL thinning identified. Re-traced 11/6/18 due to transferring to [place], larger C:D, decrease RNFL, but may just be due to re-trace?  Last HVF - new nasal defects OU, fixation losses OS, pt reported seeing a glare off to the side and also having hot flashes during exam. Clinical exam appears stable - repeat next year, if WNL then can follow with just imaging.  Last Gonio - open to CB OU, few tiny iris root vessels visible, no NV.   OCT nerve 5/16/17 - thin TS and TI OD, borderline TS OS. Repeated 6/26/18, new borderline TI OS.  Switched T .5 to Dorzolamide BID, but not as effective. Tried Brimonidine TID - but had hypotension again, so went back to Dorzolamide TID. If goes too high then can consider 0.25% Timolol, as seems to have had a dramatic response in IOP lowering with Timolol. (she is not sure if chemo had something to do with this, may be willing to try a different gtt when she finishes chemo, if Dorzolamide backorder not resolved.)  NDFE appears stable - continue Latanoprost QHS OU, Dorzolamide TID OU, medical cannibus not  making much difference.   Follow-up in about 4 months (around 7/12/2019) for OCT optic nerve, IOP check.    On Latanoprost since presentation (former pt of Dr. Ordaz).  Dorzolamide added 2/14-7/25/17 - no significant improvement in IOP, may have forgotten to take? Resumed 8/9/17 TID OU  Timolol 0.5% QAM (7/9/16-2/14/17) - worked great but symptomatic hypotension/bradycardia started in Jan '17  Brimonidine - 7/25-8/9 17 - d/c'd due to symptomatic hypotension.    Pseudophakia of both eyes - stable    History of vitrectomy - for floaters/AH      Addendum: symptomatic hypotension with Brimonidine - switched back to Dorzolamide on 8/9/17 - asked that she try to use it TID.

## 2019-03-21 ENCOUNTER — OFFICE VISIT (OUTPATIENT)
Dept: DERMATOLOGY | Facility: CLINIC | Age: 64
End: 2019-03-21
Payer: COMMERCIAL

## 2019-03-21 DIAGNOSIS — T14.8XXA EXCORIATION: Primary | ICD-10-CM

## 2019-03-21 DIAGNOSIS — D48.5 NEOPLASM OF UNCERTAIN BEHAVIOR OF SKIN: ICD-10-CM

## 2019-03-21 PROCEDURE — 11103 PR TANGENTIAL BIOPSY, SKIN, EA ADDTL LESION: ICD-10-PCS | Mod: 59,S$GLB,, | Performed by: DERMATOLOGY

## 2019-03-21 PROCEDURE — 99202 PR OFFICE/OUTPT VISIT, NEW, LEVL II, 15-29 MIN: ICD-10-PCS | Mod: 25,S$GLB,, | Performed by: DERMATOLOGY

## 2019-03-21 PROCEDURE — 88342 IMHCHEM/IMCYTCHM 1ST ANTB: CPT | Mod: 26,,, | Performed by: PATHOLOGY

## 2019-03-21 PROCEDURE — 88305 TISSUE EXAM BY PATHOLOGIST: CPT | Performed by: PATHOLOGY

## 2019-03-21 PROCEDURE — 88341 PR IHC OR ICC EACH ADD'L SINGLE ANTIBODY  STAINPR: ICD-10-PCS | Mod: 26,,, | Performed by: PATHOLOGY

## 2019-03-21 PROCEDURE — 99202 OFFICE O/P NEW SF 15 MIN: CPT | Mod: 25,S$GLB,, | Performed by: DERMATOLOGY

## 2019-03-21 PROCEDURE — 11102 PR TANGENTIAL BIOPSY, SKIN, SINGLE LESION: ICD-10-PCS | Mod: S$GLB,,, | Performed by: DERMATOLOGY

## 2019-03-21 PROCEDURE — 88341 IMHCHEM/IMCYTCHM EA ADD ANTB: CPT | Mod: 26,,, | Performed by: PATHOLOGY

## 2019-03-21 PROCEDURE — 99999 PR PBB SHADOW E&M-EST. PATIENT-LVL II: CPT | Mod: PBBFAC,,, | Performed by: DERMATOLOGY

## 2019-03-21 PROCEDURE — 88305 TISSUE SPECIMEN TO PATHOLOGY, DERMATOLOGY: ICD-10-PCS | Mod: 26,,, | Performed by: PATHOLOGY

## 2019-03-21 PROCEDURE — 88342 TISSUE SPECIMEN TO PATHOLOGY, DERMATOLOGY: ICD-10-PCS | Mod: 26,,, | Performed by: PATHOLOGY

## 2019-03-21 PROCEDURE — 11102 TANGNTL BX SKIN SINGLE LES: CPT | Mod: S$GLB,,, | Performed by: DERMATOLOGY

## 2019-03-21 PROCEDURE — 11103 TANGNTL BX SKIN EA SEP/ADDL: CPT | Mod: 59,S$GLB,, | Performed by: DERMATOLOGY

## 2019-03-21 PROCEDURE — 99999 PR PBB SHADOW E&M-EST. PATIENT-LVL II: ICD-10-PCS | Mod: PBBFAC,,, | Performed by: DERMATOLOGY

## 2019-03-21 NOTE — LETTER
March 24, 2019      Kevin Gong MD  [address] [facility] [address] [place] [address]           [facility]  [address] [facility] [address] [place] [address]  Phone: [phone]  Fax: [phone]          Patient: Ashlie Redman   MR Number: [id number]   YOB: 1955   Date of Visit: 3/21/2019       Dear Dr. Kevin Gong:    Thank you for referring Ashlie Redman to me for evaluation. Attached you will find relevant portions of my assessment and plan of care.    If you have questions, please do not hesitate to call me. I look forward to following Ashlie Redman along with you.    Sincerely,    Bessy Ghosh MD    Enclosure  CC:  No Recipients    If you would like to receive this communication electronically, please contact [email or web address] or [phone] to request more information on [email or web address] Link access.    For providers and/or their staff who would like to refer a patient to [facility], please contact us through our one-stop-shop provider referral line, [facility], at [phone].    If you feel you have received this communication in error or would no longer like to receive these types of communications, please e-mail [email or web address]

## 2019-03-21 NOTE — PROGRESS NOTES
Subjective:       Patient ID:  Ashlie Redman is a 64 y.o. female who presents for   Chief Complaint   Patient presents with    Lesion     63 y/o F presents for initial visit for evaluation of a lesion on left cheek x 3 months.  She complains of itching, but denies bleeding. It has increased in size. There is a similar looking spot nearby that she just noticed.  No prior tx    No h/o skin cancer. Denies family melanoma.  .  Past Medical History:   Diagnosis Date    Allergy     Anemia     Asteroid hyalosis of both eyes     Breast cancer 04/2017    Left    Cataract     Diverticulosis     Encounter for blood transfusion     Glaucoma     High myopia     Osteopenia     S/P dilation and curettage      Review of Systems   Constitutional: Negative for fever and chills.   Skin: Positive for itching, activity-related sunscreen use and wears hat. Negative for daily sunscreen use.        Objective:    Physical Exam   Constitutional: She appears well-developed and well-nourished.   HENT:   Mouth/Throat: Lips normal.    Eyes: Lids are normal.    Neurological: She is alert and oriented to person, place, and time.   Psychiatric: She has a normal mood and affect.   Skin:   Areas Examined (abnormalities noted in diagram):   Scalp / Hair Palpated and Inspected  Head / Face Inspection Performed  Neck Inspection Performed                    Diagram Legend     Erythematous scaling macule/papule c/w actinic keratosis       Vascular papule c/w angioma      Pigmented verrucoid papule/plaque c/w seborrheic keratosis      Yellow umbilicated papule c/w sebaceous hyperplasia      Irregularly shaped tan macule c/w lentigo     1-2 mm smooth white papules consistent with Milia      Movable subcutaneous cyst with punctum c/w epidermal inclusion cyst      Subcutaneous movable cyst c/w pilar cyst      Firm pink to brown papule c/w dermatofibroma      Pedunculated fleshy papule(s) c/w skin tag(s)      Evenly pigmented macule c/w  junctional nevus     Mildly variegated pigmented, slightly irregular-bordered macule c/w mildly atypical nevus      Flesh colored to evenly pigmented papule c/w intradermal nevus       Pink pearly papule/plaque c/w basal cell carcinoma      Erythematous hyperkeratotic cursted plaque c/w SCC      Surgical scar with no sign of skin cancer recurrence      Open and closed comedones      Inflammatory papules and pustules      Verrucoid papule consistent consistent with wart     Erythematous eczematous patches and plaques     Dystrophic onycholytic nail with subungual debris c/w onychomycosis     Umbilicated papule    Erythematous-base heme-crusted tan verrucoid plaque consistent with inflamed seborrheic keratosis     Erythematous Silvery Scaling Plaque c/w Psoriasis     See annotation      Assessment / Plan:      Pathology Orders:     Normal Orders This Visit    Tissue Specimen To Pathology, Dermatology     Questions:    Directional Terms:  Other(comment)    Clinical Information:  ISK vs NMSC vs other (pt has h/o breast cancer)    Specific Site:  L neck-superior    Tissue Specimen To Pathology, Dermatology     Questions:    Directional Terms:  Other(comment)    Clinical Information:  ISK vs NMSC vs other (pt has a h/o breast cancer)    Specific Site:  L neck-inferior        Neoplasm of uncertain behavior of skin  -     Tissue Specimen To Pathology, Dermatology  -     Tissue Specimen To Pathology, Dermatology  Shave biopsy procedure note:    Shave biopsy performed after verbal consent including risk of infection, scar, recurrence, need for additional treatment of site. Area prepped with alcohol, anesthetized with approximately 1.0cc of 1% lidocaine with epinephrine. Lesional tissue shaved with razor blade. Hemostasis achieved with application of aluminum chloride followed by hyfrecation. No complications. Dressing applied. Wound care explained.      Excoriation  Pt thinks she was scratching this area since the lesions  were pruritic  Encouraged pt to apply OTC hydrocortisone bid and try to avoid scratching this area         No follow-ups on file.

## 2019-03-26 ENCOUNTER — OFFICE VISIT (OUTPATIENT)
Dept: URGENT CARE | Facility: CLINIC | Age: 64
End: 2019-03-26
Payer: COMMERCIAL

## 2019-03-26 VITALS
BODY MASS INDEX: 21.82 KG/M2 | OXYGEN SATURATION: 99 % | RESPIRATION RATE: 18 BRPM | DIASTOLIC BLOOD PRESSURE: 89 MMHG | TEMPERATURE: 97 F | HEART RATE: 63 BPM | HEIGHT: 67 IN | WEIGHT: 139 LBS | SYSTOLIC BLOOD PRESSURE: 142 MMHG

## 2019-03-26 DIAGNOSIS — N39.0 URINARY TRACT INFECTION WITH HEMATURIA, SITE UNSPECIFIED: ICD-10-CM

## 2019-03-26 DIAGNOSIS — R31.9 URINARY TRACT INFECTION WITH HEMATURIA, SITE UNSPECIFIED: ICD-10-CM

## 2019-03-26 DIAGNOSIS — R35.0 FREQUENCY OF URINATION: Primary | ICD-10-CM

## 2019-03-26 LAB
BILIRUB UR QL STRIP: NEGATIVE
GLUCOSE UR QL STRIP: NEGATIVE
KETONES UR QL STRIP: NEGATIVE
LEUKOCYTE ESTERASE UR QL STRIP: POSITIVE
PH, POC UA: 5.5 (ref 5–8)
POC BLOOD, URINE: POSITIVE
POC NITRATES, URINE: NEGATIVE
PROT UR QL STRIP: NEGATIVE
SP GR UR STRIP: 1.01 (ref 1–1.03)
UROBILINOGEN UR STRIP-ACNC: NORMAL (ref 0.1–1.1)

## 2019-03-26 PROCEDURE — 99214 OFFICE O/P EST MOD 30 MIN: CPT | Mod: 25,S$GLB,, | Performed by: NURSE PRACTITIONER

## 2019-03-26 PROCEDURE — 81003 POCT URINALYSIS, DIPSTICK, AUTOMATED, W/O SCOPE: ICD-10-PCS | Mod: QW,S$GLB,, | Performed by: NURSE PRACTITIONER

## 2019-03-26 PROCEDURE — 3008F PR BODY MASS INDEX (BMI) DOCUMENTED: ICD-10-PCS | Mod: CPTII,S$GLB,, | Performed by: NURSE PRACTITIONER

## 2019-03-26 PROCEDURE — 99214 PR OFFICE/OUTPT VISIT, EST, LEVL IV, 30-39 MIN: ICD-10-PCS | Mod: 25,S$GLB,, | Performed by: NURSE PRACTITIONER

## 2019-03-26 PROCEDURE — 81003 URINALYSIS AUTO W/O SCOPE: CPT | Mod: QW,S$GLB,, | Performed by: NURSE PRACTITIONER

## 2019-03-26 PROCEDURE — 3008F BODY MASS INDEX DOCD: CPT | Mod: CPTII,S$GLB,, | Performed by: NURSE PRACTITIONER

## 2019-03-26 RX ORDER — PHENAZOPYRIDINE HYDROCHLORIDE 200 MG/1
200 TABLET, FILM COATED ORAL 3 TIMES DAILY PRN
Qty: 20 TABLET | Refills: 0 | Status: SHIPPED | OUTPATIENT
Start: 2019-03-26 | End: 2020-03-25

## 2019-03-26 RX ORDER — NITROFURANTOIN 25; 75 MG/1; MG/1
100 CAPSULE ORAL 2 TIMES DAILY
Qty: 14 CAPSULE | Refills: 0 | Status: SHIPPED | OUTPATIENT
Start: 2019-03-26 | End: 2019-04-02

## 2019-03-26 NOTE — PATIENT INSTRUCTIONS
"Follow up with your doctor in a few days.  Return to the urgent care or go to the ER if symptoms get worse.      Start macrobid.  We will call with results of urine culture.  F/u with urology.        Bladder Infection, Female (Adult)    Urine is normally doesn't have any bacteria in it. But bacteria can get into the urinary tract from the skin around the rectum. Or they can travel in the blood from elsewhere in the body. Once they are in your urinary tract, they can cause infection in the urethra (urethritis), the bladder (cystitis), or the kidneys (pyelonephritis).  The most common place for an infection is in the bladder. This is called a bladder infection. This is one of the most common infections in women. Most bladder infections are easily treated. They are not serious unless the infection spreads to the kidney.  The phrases "bladder infection," "UTI," and "cystitis" are often used to describe the same thing. But they are not always the same. Cystitis is an inflammation of the bladder. The most common cause of cystitis is an infection.  Symptoms  The infection causes inflammation in the urethra and bladder. This causes many of the symptoms. The most common symptoms of a bladder infection are:  · Pain or burning when urinating  · Having to urinate more often than usual  · Urgent need to urinate  · Only a small amount of urine comes out  · Blood in urine  · Abdominal discomfort. This is usually in the lower abdomen above the pubic bone.  · Cloudy urine  · Strong- or bad-smelling urine  · Unable to urinate (urinary retention)  · Unable to hold urine in (urinary incontinence)  · Fever  · Loss of appetite  · Confusion (in older adults)  Causes  Bladder infections are not contagious. You can't get one from someone else, from a toilet seat, or from sharing a bath.  The most common cause of bladder infections is bacteria from the bowels. The bacteria get onto the skin around the opening of the urethra. From there, they " can get into the urine and travel up to the bladder, causing inflammation and infection. This usually happens because of:  · Wiping improperly after urinating. Always wipe from front to back.  · Bowel incontinence  · Pregnancy  · Procedures such as having a catheter inserted  · Older age  · Not emptying your bladder. This can allow bacteria a chance to grow in your urine.  · Dehydration  · Constipation  · Sex  · Use of a diaphragm for birth control   Treatment  Bladder infections are diagnosed by a urine test. They are treated with antibiotics and usually clear up quickly without complications. Treatment helps prevent a more serious kidney infection.  Medicines  Medicines can help in the treatment of a bladder infection:  · Take antibiotics until they are used up, even if you feel better. It is important to finish them to make sure the infection has cleared.  · You can use acetaminophen or ibuprofen for pain, fever, or discomfort, unless another medicine was prescribed. If you have chronic liver or kidney disease, talk with your healthcare provider before using these medicines. Also talk with your provider if you've ever had a stomach ulcer or gastrointestinal bleeding, or are taking blood-thinner medicines.  · If you are given phenazopydridine to reduce burning with urination, it will cause your urine to become a bright orange color. This can stain clothing.  Care and prevention  These self-care steps can help prevent future infections:  · Drink plenty of fluids to prevent dehydration and flush out your bladder. Do this unless you must restrict fluids for other health reasons, or your doctor told you not to.  · Proper cleaning after going to the bathroom is important. Wipe from front to back after using the toilet to prevent the spread of bacteria.  · Urinate more often. Don't try to hold urine in for a long time.  · Wear loose-fitting clothes and cotton underwear. Avoid tight-fitting pants.  · Improve your diet  and prevent constipation. Eat more fresh fruit and vegetables, and fiber, and less junk and fatty foods.  · Avoid sex until your symptoms are gone.  · Avoid caffeine, alcohol, and spicy foods. These can irritate your bladder.  · Urinate right after intercourse to flush out your bladder.  · If you use birth control pills and have frequent bladder infections, discuss it with your doctor.  Follow-up care  Call your healthcare provider if all symptoms are not gone after 3 days of treatment. This is especially important if you have repeat infections.  If a culture was done, you will be told if your treatment needs to be changed. If directed, you can call to find out the results.  If X-rays were done, you will be told if the results will affect your treatment.  Call 911  Call 911 if any of the following occur:  · Trouble breathing  · Hard to wake up or confusion  · Fainting or loss of consciousness  · Rapid heart rate  When to seek medical advice  Call your healthcare provider right away if any of these occur:  · Fever of 100.4ºF (38.0ºC) or higher, or as directed by your healthcare provider  · Symptoms are not better by the third day of treatment  · Back or belly (abdominal) pain that gets worse  · Repeated vomiting, or unable to keep medicine down  · Weakness or dizziness  · Vaginal discharge  · Pain, redness, or swelling in the outer vaginal area (labia)  Date Last Reviewed: 10/1/2016  © 2551-5172 [employer]. [address] [place] [address], [place] [address]. All rights reserved. This information is not intended as a substitute for professional medical care. Always follow your healthcare professional's instructions.

## 2019-03-26 NOTE — PROGRESS NOTES
"Subjective:       Patient ID: Ashlie Redman is a 64 y.o. female.    Vitals:  height is 5' 7" (1.702 m) and weight is 63 kg (139 lb). Her oral temperature is 97.4 °F (36.3 °C). Her blood pressure is 142/89 (abnormal) and her pulse is 63. Her respiration is 18 and oxygen saturation is 99%.     Chief Complaint: Urinary Tract Infection    Presents today with frequent urination and cloudy urine, hx of colporrhaphy/cystoscopy November 2018 after frequent UTI's. Patient reports this will be 3rd uti since surgery  (5 months ago). Last symptoms of uti treated with omnicef per urology 1 month ago. Denies fever/chills. Reports some mild abdominal tenderness.     Urinary Tract Infection    This is a recurrent problem. The current episode started more than 1 month ago. The problem has been unchanged. The quality of the pain is described as aching. The pain is mild. There has been no fever. She is sexually active. There is a history of pyelonephritis. Associated symptoms include frequency and urgency. Pertinent negatives include no chills, hematuria, nausea, vomiting or rash. Treatments tried: azo and increased fluids. The treatment provided no relief.       Constitution: Negative for chills and fever.   Neck: Negative for painful lymph nodes.   Gastrointestinal: Positive for abdominal pain. Negative for nausea and vomiting.   Genitourinary: Positive for frequency and urgency. Negative for dysuria, urine decreased, hematuria, history of kidney stones, painful menstruation, irregular menstruation, missed menses, heavy menstrual bleeding, ovarian cysts, genital trauma, vaginal pain, vaginal discharge, vaginal bleeding, vaginal odor, painful intercourse, genital sore, painful ejaculation and pelvic pain.   Musculoskeletal: Negative for back pain.   Skin: Negative for rash and lesion.   Hematologic/Lymphatic: Negative for swollen lymph nodes.       Objective:      Physical Exam   Constitutional: She is oriented to person, place, and " time. She appears well-developed and well-nourished.  Non-toxic appearance. She does not have a sickly appearance. She does not appear ill. No distress.   HENT:   Head: Normocephalic and atraumatic.   Right Ear: External ear normal.   Left Ear: External ear normal.   Nose: Nose normal. No nasal deformity. No epistaxis.   Mouth/Throat: Oropharynx is clear and moist and mucous membranes are normal.   Eyes: Conjunctivae and lids are normal.   Neck: Trachea normal, normal range of motion and phonation normal. Neck supple.   Cardiovascular: Normal rate, regular rhythm, normal heart sounds and normal pulses.   Pulmonary/Chest: Effort normal and breath sounds normal.   Abdominal: Soft. Normal appearance and bowel sounds are normal. She exhibits no distension and no mass. There is tenderness (mild) in the suprapubic area. There is no rigidity, no rebound, no guarding, no CVA tenderness, no tenderness at McBurney's point and negative Weir's sign.   Neurological: She is alert and oriented to person, place, and time.   Skin: Skin is warm, dry and intact.   Psychiatric: She has a normal mood and affect. Her speech is normal and behavior is normal. Cognition and memory are normal.   Nursing note and vitals reviewed.      Results for orders placed or performed in visit on 03/26/19   POCT Urinalysis, Dipstick, Automated, W/O Scope   Result Value Ref Range    POC Blood, Urine Positive (A) Negative    POC Bilirubin, Urine Negative Negative    POC Urobilinogen, Urine normal 0.1 - 1.1    POC Ketones, Urine Negative Negative    POC Protein, Urine Negative Negative    POC Nitrates, Urine Negative Negative    POC Glucose, Urine Negative Negative    pH, UA 5.5 5 - 8    POC Specific Gravity, Urine 1.015 1.003 - 1.029    POC Leukocytes, Urine Positive (A) Negative       Assessment:       1. Frequency of urination    2. Urinary tract infection with hematuria, site unspecified        Plan:     will cover with macrobid based on previous  "urine cx's-urine cx sent and advised to f/u with urology.     Frequency of urination  -     POCT Urinalysis, Dipstick, Automated, W/O Scope    Urinary tract infection with hematuria, site unspecified  -     nitrofurantoin, macrocrystal-monohydrate, (MACROBID) 100 MG capsule; Take 1 capsule (100 mg total) by mouth 2 (two) times daily. for 7 days  Dispense: 14 capsule; Refill: 0  -     Urine culture  -     phenazopyridine (PYRIDIUM) 200 MG tablet; Take 1 tablet (200 mg total) by mouth 3 (three) times daily as needed for Pain.  Dispense: 20 tablet; Refill: 0      Patient Instructions   Follow up with your doctor in a few days.  Return to the urgent care or go to the ER if symptoms get worse.      Start macrobid.  We will call with results of urine culture.  F/u with urology.        Bladder Infection, Female (Adult)    Urine is normally doesn't have any bacteria in it. But bacteria can get into the urinary tract from the skin around the rectum. Or they can travel in the blood from elsewhere in the body. Once they are in your urinary tract, they can cause infection in the urethra (urethritis), the bladder (cystitis), or the kidneys (pyelonephritis).  The most common place for an infection is in the bladder. This is called a bladder infection. This is one of the most common infections in women. Most bladder infections are easily treated. They are not serious unless the infection spreads to the kidney.  The phrases "bladder infection," "UTI," and "cystitis" are often used to describe the same thing. But they are not always the same. Cystitis is an inflammation of the bladder. The most common cause of cystitis is an infection.  Symptoms  The infection causes inflammation in the urethra and bladder. This causes many of the symptoms. The most common symptoms of a bladder infection are:  · Pain or burning when urinating  · Having to urinate more often than usual  · Urgent need to urinate  · Only a small amount of urine comes " out  · Blood in urine  · Abdominal discomfort. This is usually in the lower abdomen above the pubic bone.  · Cloudy urine  · Strong- or bad-smelling urine  · Unable to urinate (urinary retention)  · Unable to hold urine in (urinary incontinence)  · Fever  · Loss of appetite  · Confusion (in older adults)  Causes  Bladder infections are not contagious. You can't get one from someone else, from a toilet seat, or from sharing a bath.  The most common cause of bladder infections is bacteria from the bowels. The bacteria get onto the skin around the opening of the urethra. From there, they can get into the urine and travel up to the bladder, causing inflammation and infection. This usually happens because of:  · Wiping improperly after urinating. Always wipe from front to back.  · Bowel incontinence  · Pregnancy  · Procedures such as having a catheter inserted  · Older age  · Not emptying your bladder. This can allow bacteria a chance to grow in your urine.  · Dehydration  · Constipation  · Sex  · Use of a diaphragm for birth control   Treatment  Bladder infections are diagnosed by a urine test. They are treated with antibiotics and usually clear up quickly without complications. Treatment helps prevent a more serious kidney infection.  Medicines  Medicines can help in the treatment of a bladder infection:  · Take antibiotics until they are used up, even if you feel better. It is important to finish them to make sure the infection has cleared.  · You can use acetaminophen or ibuprofen for pain, fever, or discomfort, unless another medicine was prescribed. If you have chronic liver or kidney disease, talk with your healthcare provider before using these medicines. Also talk with your provider if you've ever had a stomach ulcer or gastrointestinal bleeding, or are taking blood-thinner medicines.  · If you are given phenazopydridine to reduce burning with urination, it will cause your urine to become a bright orange color.  This can stain clothing.  Care and prevention  These self-care steps can help prevent future infections:  · Drink plenty of fluids to prevent dehydration and flush out your bladder. Do this unless you must restrict fluids for other health reasons, or your doctor told you not to.  · Proper cleaning after going to the bathroom is important. Wipe from front to back after using the toilet to prevent the spread of bacteria.  · Urinate more often. Don't try to hold urine in for a long time.  · Wear loose-fitting clothes and cotton underwear. Avoid tight-fitting pants.  · Improve your diet and prevent constipation. Eat more fresh fruit and vegetables, and fiber, and less junk and fatty foods.  · Avoid sex until your symptoms are gone.  · Avoid caffeine, alcohol, and spicy foods. These can irritate your bladder.  · Urinate right after intercourse to flush out your bladder.  · If you use birth control pills and have frequent bladder infections, discuss it with your doctor.  Follow-up care  Call your healthcare provider if all symptoms are not gone after 3 days of treatment. This is especially important if you have repeat infections.  If a culture was done, you will be told if your treatment needs to be changed. If directed, you can call to find out the results.  If X-rays were done, you will be told if the results will affect your treatment.  Call 911  Call 911 if any of the following occur:  · Trouble breathing  · Hard to wake up or confusion  · Fainting or loss of consciousness  · Rapid heart rate  When to seek medical advice  Call your healthcare provider right away if any of these occur:  · Fever of 100.4ºF (38.0ºC) or higher, or as directed by your healthcare provider  · Symptoms are not better by the third day of treatment  · Back or belly (abdominal) pain that gets worse  · Repeated vomiting, or unable to keep medicine down  · Weakness or dizziness  · Vaginal discharge  · Pain, redness, or swelling in the outer  vaginal area (labia)  Date Last Reviewed: 10/1/2016  © 8218-0424 [employer]. [address] [place] [address], [place] [address]. All rights reserved. This information is not intended as a substitute for professional medical care. Always follow your healthcare professional's instructions.

## 2019-03-31 ENCOUNTER — TELEPHONE (OUTPATIENT)
Dept: URGENT CARE | Facility: CLINIC | Age: 64
End: 2019-03-31

## 2019-03-31 LAB
BACTERIA UR CULT: ABNORMAL
OTHER ANTIBIOTIC SUSC ISLT: ABNORMAL

## 2019-03-31 NOTE — TELEPHONE ENCOUNTER
1/3 left      ----- Message from Giovanny Yip MD sent at 3/31/2019 12:57 PM CDT -----  Urine culture is growing E coli.  It should be sensitive to the Macrobid you were placed on.  Finish her antibiotics follow-up with your PCP if symptoms have not resolved or if they recur or.

## 2019-04-01 ENCOUNTER — TELEPHONE (OUTPATIENT)
Dept: URGENT CARE | Facility: CLINIC | Age: 64
End: 2019-04-01

## 2019-04-01 NOTE — TELEPHONE ENCOUNTER
----- Message from Giovanny Yip MD sent at 3/31/2019 12:57 PM CDT -----  Urine culture is growing E coli.  It should be sensitive to the Macrobid you were placed on.  Finish her antibiotics follow-up with your PCP if symptoms have not resolved or if they recur or.

## 2019-04-02 ENCOUNTER — TELEPHONE (OUTPATIENT)
Dept: URGENT CARE | Facility: CLINIC | Age: 64
End: 2019-04-02

## 2019-04-04 ENCOUNTER — PATIENT MESSAGE (OUTPATIENT)
Dept: DERMATOLOGY | Facility: CLINIC | Age: 64
End: 2019-04-04

## 2019-04-10 NOTE — PROGRESS NOTES
CHIEF COMPLAINT:    Mrs. Redman is a 64 y.o. female presenting for a follow up status post anterior colporrhaphy and cystoscopy 11/20/2018.    PRESENTING ILLNESS:    Ashlie Redman is a 64 y.o. female who returns for follow up stating that she feels like vagina is up.  No problems urinating or defecating.  However, she has had a UTI 3 months in a row, starting in January.  She went to the urgent care on [facility].  Two times there is a culture with a pan sensitive E coli.  She has a PET scan scheduled for 4/16/2019.      She has a history of HER 2+ breast cancer and has been advised not to use estrogen.     REVIEW OF SYSTEMS:    Review of Systems   Constitutional: Negative.    HENT: Negative.    Eyes: Negative.    Respiratory: Negative.    Cardiovascular: Negative.    Gastrointestinal: Negative for constipation.   Genitourinary: Negative.    Musculoskeletal: Negative.    Skin: Negative.    Neurological: Negative.    Endo/Heme/Allergies: Negative.    Psychiatric/Behavioral: Negative.        PATIENT HISTORY:    Past Medical History:   Diagnosis Date    Allergy     Anemia     Asteroid hyalosis of both eyes     Breast cancer 04/2017    Left    Cataract     Diverticulosis     Encounter for blood transfusion     Glaucoma     High myopia     Osteopenia     S/P dilation and curettage        Past Surgical History:   Procedure Laterality Date    BIOPSY-LYMPH NODE-SENTINEL Left 11/1/2017    [facility] 2ND FLR    breast augmentation  03/2013    BREAST BIOPSY Left 04/2017    Core bx,+ cancer    BREAST LUMPECTOMY Left 10/25/2017    CATARACT EXTRACTION W/  INTRAOCULAR LENS IMPLANT Bilateral 2009    COLONOSCOPY  4/2009, 2015    repeat in 5 years    COLONOSCOPY N/A 5/21/2015    Performed by Evan Alexander [facility] at [facility] ENDO (4TH FLR)    CYSTOSCOPY N/A 11/20/2018    Performed by [facility] at [facility] OR 2ND FLR    DILATION AND CURETTAGE OF UTERUS       DISSECTION-LYMPH NODE-AXILLARY Left 11/1/2017    [facility] 2ND F[facility]    ECTOPIC PREGNANCY SURGERY      EYE SURGERY Bilateral 3/09    Vitrectomy     INJECTION-NODE-SENTINEL Left 11/1/2017    [facility] 2ND FLR    [id number]-P-CATH neck poss chest Right 4/24/2017    [facility] 2ND FLR    LUMPECTOMY with MAGSEED (2) Left 11/1/2017    [facility] 2ND FLR    POLYPECTOMY      x3 2001    REPAIR, CYSTOCELE N/A 11/20/2018    [facility]    Yag Capsulotomy Bilateral 12/2014       Family History   Problem Relation Age of Onset    Cancer Sister         rectal; passed    Breast cancer Cousin     Cancer Cousin         breast    Heart disease Father         passed    Colon cancer Mother         hospice    Pancreatic cancer Mother         39yo; 91yo    Cataracts Mother     Hypertension Mother     Thyroid disease Mother     Cancer Mother         colon    Glaucoma Maternal Grandmother      Socioeconomic History    Marital status: Single    Number of children: 2   Tobacco Use    Smoking status: Never Smoker    Smokeless tobacco: Never Used   Substance and Sexual Activity    Alcohol use: No     Alcohol/week: 0.6 oz     Types: 1 Glasses of wine per week     Comment: Quit    Drug use: No    Sexual activity: Yes     Partners: Male     Birth control/protection: Post-menopausal       Allergies:  Bactrim [sulfamethoxazole-trimethoprim]    Medications:  Outpatient Encounter Medications as of 4/12/2019   Medication Sig Dispense Refill    dorzolamide (TRUSOPT) 2 % ophthalmic solution Place 1 drop into both eyes 3 (three) times daily. 30 mL 4    latanoprost 0.005 % ophthalmic solution INSTILL ONE DROP INTO EACH EYE ONCE DAILY IN THE EVENING 3 Bottle 4    phenazopyridine (PYRIDIUM) 200 MG tablet Take 1 tablet (200 mg total) by mouth 3  (three) times daily as needed for Pain. 20 tablet 0    valACYclovir (VALTREX) 500 MG tablet        No facility-administered encounter medications on file as of 4/12/2019.          PHYSICAL EXAMINATION:    The patient generally appears in good health, is appropriately interactive, and is in no apparent distress.    Skin: No lesions.    Mental: Cooperative with normal affect.    Neuro: Grossly intact.    HEENT: Normal. No evidence of lymphadenopathy.    Chest:  normal inspiratory effort.    Abdomen:  Soft, non-tender. No masses or organomegaly. Bladder is not palpable. No evidence of flank discomfort. No evidence of inguinal hernia.    Extremities: No clubbing, cyanosis, or edema    Normal external female genitalia  Grade II urogenital atrophy  Urethral meatus is normal  Urethra and bladder are nontender to bimanual exam  Well supported anteriorly and posteriorly   Uterus and cervix are normal  No adnexal masses  PVR by catheterization was 50 ml      LABS:    Lab Results   Component Value Date    BUN 14 11/13/2018    CREATININE 0.8 11/13/2018     CT Pet scan 11/13/2018 reviewed    IMPRESSION:    Encounter Diagnoses   Name Primary?    Recurrent UTI Yes       PLAN:    1.  The catheterized specimen was sent for culture  2.  Will follow up on the CT Pet scan   3.  Follow up in 2 months.  If has another UTI, would consider cystoscopy.  4.  Ellura and D mannose discussed  5.  Discussed Becky Gladys Touch and the effects on the vagina.

## 2019-04-12 ENCOUNTER — OFFICE VISIT (OUTPATIENT)
Dept: UROLOGY | Facility: CLINIC | Age: 64
End: 2019-04-12
Payer: COMMERCIAL

## 2019-04-12 VITALS
DIASTOLIC BLOOD PRESSURE: 79 MMHG | SYSTOLIC BLOOD PRESSURE: 137 MMHG | HEART RATE: 55 BPM | WEIGHT: 138.88 LBS | BODY MASS INDEX: 21.8 KG/M2 | HEIGHT: 67 IN

## 2019-04-12 DIAGNOSIS — N39.0 RECURRENT UTI: Primary | ICD-10-CM

## 2019-04-12 PROCEDURE — 87086 URINE CULTURE/COLONY COUNT: CPT

## 2019-04-12 PROCEDURE — 51701 INSERT BLADDER CATHETER: CPT | Mod: S$GLB,,, | Performed by: UROLOGY

## 2019-04-12 PROCEDURE — 99213 OFFICE O/P EST LOW 20 MIN: CPT | Mod: 25,S$GLB,, | Performed by: UROLOGY

## 2019-04-12 PROCEDURE — 51701 PR INSERTION OF NON-INDWELLING BLADDER CATHETERIZATION FOR RESIDUAL UR: ICD-10-PCS | Mod: S$GLB,,, | Performed by: UROLOGY

## 2019-04-12 PROCEDURE — 99999 PR PBB SHADOW E&M-EST. PATIENT-LVL III: CPT | Mod: PBBFAC,,, | Performed by: UROLOGY

## 2019-04-12 PROCEDURE — 3008F BODY MASS INDEX DOCD: CPT | Mod: CPTII,S$GLB,, | Performed by: UROLOGY

## 2019-04-12 PROCEDURE — 3008F PR BODY MASS INDEX (BMI) DOCUMENTED: ICD-10-PCS | Mod: CPTII,S$GLB,, | Performed by: UROLOGY

## 2019-04-12 PROCEDURE — 99213 PR OFFICE/OUTPT VISIT, EST, LEVL III, 20-29 MIN: ICD-10-PCS | Mod: 25,S$GLB,, | Performed by: UROLOGY

## 2019-04-12 PROCEDURE — 99999 PR PBB SHADOW E&M-EST. PATIENT-LVL III: ICD-10-PCS | Mod: PBBFAC,,, | Performed by: UROLOGY

## 2019-04-14 LAB — BACTERIA UR CULT: NORMAL

## 2019-04-15 DIAGNOSIS — H40.053 OHT (OCULAR HYPERTENSION), BILATERAL: ICD-10-CM

## 2019-04-15 RX ORDER — DORZOLAMIDE HCL 20 MG/ML
1 SOLUTION/ DROPS OPHTHALMIC 3 TIMES DAILY
Qty: 30 ML | Refills: 4 | Status: SHIPPED | OUTPATIENT
Start: 2019-04-15 | End: 2019-11-15

## 2019-04-16 ENCOUNTER — HOSPITAL ENCOUNTER (OUTPATIENT)
Dept: RADIOLOGY | Facility: HOSPITAL | Age: 64
Discharge: HOME | End: 2019-04-16
Attending: INTERNAL MEDICINE
Payer: COMMERCIAL

## 2019-04-16 ENCOUNTER — TELEPHONE (OUTPATIENT)
Dept: HEMATOLOGY/ONCOLOGY | Facility: CLINIC | Age: 64
End: 2019-04-16

## 2019-04-16 ENCOUNTER — PATIENT MESSAGE (OUTPATIENT)
Dept: HEMATOLOGY/ONCOLOGY | Facility: CLINIC | Age: 64
End: 2019-04-16

## 2019-04-16 DIAGNOSIS — C50.611 MALIGNANT NEOPLASM OF AXILLARY TAIL OF RIGHT FEMALE BREAST, UNSPECIFIED ESTROGEN RECEPTOR STATUS: ICD-10-CM

## 2019-04-16 NOTE — TELEPHONE ENCOUNTER
----- Message from Claudio Barksdale sent at 4/16/2019  9:46 AM CDT -----  Contact: Zack (Imaging)  Name of Who is Calling: Zack (Imaging)      What is the request in detail: Would like to speak with with Dr. Oscar in regards to patient Pet Scan.       Can the clinic reply by [facility]: no      What Number to Call Back if not in [facility]: [phone]

## 2019-04-17 ENCOUNTER — HOSPITAL ENCOUNTER (OUTPATIENT)
Dept: RADIOLOGY | Facility: HOSPITAL | Age: 64
Discharge: HOME | End: 2019-04-17
Attending: INTERNAL MEDICINE
Payer: COMMERCIAL

## 2019-04-17 ENCOUNTER — TELEPHONE (OUTPATIENT)
Dept: HEMATOLOGY/ONCOLOGY | Facility: CLINIC | Age: 64
End: 2019-04-17

## 2019-04-17 ENCOUNTER — PATIENT MESSAGE (OUTPATIENT)
Dept: HEMATOLOGY/ONCOLOGY | Facility: CLINIC | Age: 64
End: 2019-04-17

## 2019-04-17 VITALS — HEIGHT: 67 IN | WEIGHT: 138.88 LBS | BODY MASS INDEX: 21.8 KG/M2

## 2019-04-17 DIAGNOSIS — C50.611 MALIGNANT NEOPLASM OF AXILLARY TAIL OF RIGHT FEMALE BREAST, UNSPECIFIED ESTROGEN RECEPTOR STATUS: ICD-10-CM

## 2019-04-17 PROCEDURE — 77062 BREAST TOMOSYNTHESIS BI: CPT | Mod: 26,,, | Performed by: RADIOLOGY

## 2019-04-17 PROCEDURE — 77066 MAMMO DIGITAL DIAGNOSTIC BILAT WITH TOMOSYNTHESIS_CAD: ICD-10-PCS | Mod: 26,,, | Performed by: RADIOLOGY

## 2019-04-17 PROCEDURE — 77066 DX MAMMO INCL CAD BI: CPT | Mod: TC,PO

## 2019-04-17 PROCEDURE — 77062 MAMMO DIGITAL DIAGNOSTIC BILAT WITH TOMOSYNTHESIS_CAD: ICD-10-PCS | Mod: 26,,, | Performed by: RADIOLOGY

## 2019-04-17 PROCEDURE — 76642 US BREAST RIGHT LIMITED: ICD-10-PCS | Mod: 26,RT,, | Performed by: RADIOLOGY

## 2019-04-17 PROCEDURE — 76642 ULTRASOUND BREAST LIMITED: CPT | Mod: 26,RT,, | Performed by: RADIOLOGY

## 2019-04-17 PROCEDURE — 77066 DX MAMMO INCL CAD BI: CPT | Mod: 26,,, | Performed by: RADIOLOGY

## 2019-04-17 PROCEDURE — 76642 ULTRASOUND BREAST LIMITED: CPT | Mod: TC,PO,RT

## 2019-04-17 NOTE — TELEPHONE ENCOUNTER
----- Message from Otoniel Maravilla sent at 4/17/2019 11:31 AM CDT -----  Type: Needs Medical Advice    Who Called:  Patient  Best Call Back Number: [phone] (home) [phone] (work)  Additional Information: Needs to speak with office regarding mammogram she just had done

## 2019-04-17 NOTE — TELEPHONE ENCOUNTER
Returned call to patient who stated she had mammogram and ultrasound today and ultimately needs a biopsy. She is upset and would like Dr. Ragsdale's advisement regarding rx of Effexor or something.   Patient Advice Request message from patient regarding above forwarded to Dr. Ragsdale., awaiting advisement. Patient aware.

## 2019-04-18 ENCOUNTER — PATIENT MESSAGE (OUTPATIENT)
Dept: HEMATOLOGY/ONCOLOGY | Facility: CLINIC | Age: 64
End: 2019-04-18

## 2019-04-18 DIAGNOSIS — C50.611 MALIGNANT NEOPLASM OF AXILLARY TAIL OF RIGHT FEMALE BREAST, UNSPECIFIED ESTROGEN RECEPTOR STATUS: Primary | ICD-10-CM

## 2019-04-18 RX ORDER — BUPROPION HYDROCHLORIDE 150 MG/1
150 TABLET ORAL EVERY MORNING
Qty: 30 TABLET | Refills: 2 | Status: SHIPPED | OUTPATIENT
Start: 2019-04-18 | End: 2019-07-09 | Stop reason: SDUPTHER

## 2019-05-02 ENCOUNTER — HOSPITAL ENCOUNTER (OUTPATIENT)
Dept: RADIOLOGY | Facility: HOSPITAL | Age: 64
Discharge: HOME | End: 2019-05-02
Attending: INTERNAL MEDICINE
Payer: COMMERCIAL

## 2019-05-02 DIAGNOSIS — R92.8 ABNORMAL MAMMOGRAM: ICD-10-CM

## 2019-05-02 PROCEDURE — 88305 TISSUE SPECIMEN TO PATHOLOGY, RADIOLOGY: ICD-10-PCS | Mod: 26,,, | Performed by: PATHOLOGY

## 2019-05-02 PROCEDURE — 27201044 MAMMO BREAST STEREOTACTIC BREAST BIOPSY RIGHT: Mod: PO

## 2019-05-02 PROCEDURE — 88305 TISSUE EXAM BY PATHOLOGIST: CPT | Performed by: PATHOLOGY

## 2019-05-02 PROCEDURE — 19081 BX BREAST 1ST LESION STRTCTC: CPT | Mod: RT,,, | Performed by: RADIOLOGY

## 2019-05-02 PROCEDURE — 19081 BX BREAST 1ST LESION STRTCTC: CPT | Mod: PO,RT

## 2019-05-02 PROCEDURE — 25000003 PHARM REV CODE 250: Mod: PO | Performed by: INTERNAL MEDICINE

## 2019-05-02 PROCEDURE — 19081 MAMMO BREAST STEREOTACTIC BREAST BIOPSY RIGHT: ICD-10-PCS | Mod: RT,,, | Performed by: RADIOLOGY

## 2019-05-02 PROCEDURE — 88305 TISSUE EXAM BY PATHOLOGIST: CPT | Mod: 26,,, | Performed by: PATHOLOGY

## 2019-05-02 RX ORDER — LIDOCAINE HYDROCHLORIDE AND EPINEPHRINE 20; 10 MG/ML; UG/ML
20 INJECTION, SOLUTION INFILTRATION; PERINEURAL ONCE
Status: COMPLETED | OUTPATIENT
Start: 2019-05-02 | End: 2019-05-02

## 2019-05-02 RX ORDER — LIDOCAINE HYDROCHLORIDE 10 MG/ML
5 INJECTION INFILTRATION; PERINEURAL ONCE
Status: COMPLETED | OUTPATIENT
Start: 2019-05-02 | End: 2019-05-02

## 2019-05-02 RX ADMIN — LIDOCAINE HYDROCHLORIDE 5 ML: 10 INJECTION, SOLUTION EPIDURAL; INFILTRATION; INTRACAUDAL; PERINEURAL at 10:05

## 2019-05-02 RX ADMIN — LIDOCAINE HYDROCHLORIDE,EPINEPHRINE BITARTRATE 20 ML: 20; .01 INJECTION, SOLUTION INFILTRATION; PERINEURAL at 10:05

## 2019-05-07 ENCOUNTER — TELEPHONE (OUTPATIENT)
Dept: RADIOLOGY | Facility: HOSPITAL | Age: 64
End: 2019-05-07

## 2019-05-07 ENCOUNTER — PATIENT MESSAGE (OUTPATIENT)
Dept: HEMATOLOGY/ONCOLOGY | Facility: CLINIC | Age: 64
End: 2019-05-07

## 2019-05-07 ENCOUNTER — HOSPITAL ENCOUNTER (OUTPATIENT)
Dept: RADIOLOGY | Facility: HOSPITAL | Age: 64
Discharge: HOME | End: 2019-05-07
Attending: INTERNAL MEDICINE
Payer: COMMERCIAL

## 2019-05-07 PROCEDURE — 78815 PET IMAGE W/CT SKULL-THIGH: CPT | Mod: 26,PS,, | Performed by: RADIOLOGY

## 2019-05-07 PROCEDURE — 78815 NM PET CT ROUTINE: ICD-10-PCS | Mod: 26,PS,, | Performed by: RADIOLOGY

## 2019-05-07 PROCEDURE — 78815 PET IMAGE W/CT SKULL-THIGH: CPT | Mod: TC,PO

## 2019-05-07 PROCEDURE — A9552 F18 FDG: HCPCS | Mod: PO

## 2019-05-07 NOTE — TELEPHONE ENCOUNTER
..Patient notified of breast biopsy results.     FINAL PATHOLOGIC DIAGNOSIS  1. BREAST, RIGHT, CENTRAL REGION MIDDLE DEPTH, CALCIFICATIONS, STEREOTACTIC-GUIDED  BIOPSY:  Benign breast tissue with fibrocystic changes, columnar cell changes, and focal features suggestive of duct  rupture.  Microcalcifications: Seen in association with areas of columnar cell change.  Negative for atypia or carcinoma.  2. BREAST, RIGHT, CENTRAL REGION MIDDLE DEPTH, WITHOUT CALCIFICATIONS,  STEREOTACTIC-GUIDED BIOPSY:  Benign breast tissue with fibrocystic changes, columnar cell changes, and focal features suggestive of duct  rupture.  Microcalcifications: Not identified.  Negative for atypia or carcinoma.    Instructed to repeat diagnostic mammogram in 6 months

## 2019-05-09 ENCOUNTER — PATIENT MESSAGE (OUTPATIENT)
Dept: SURGERY | Facility: CLINIC | Age: 64
End: 2019-05-09

## 2019-05-17 ENCOUNTER — OFFICE VISIT (OUTPATIENT)
Dept: HEMATOLOGY/ONCOLOGY | Facility: CLINIC | Age: 64
End: 2019-05-17
Payer: COMMERCIAL

## 2019-05-17 VITALS
WEIGHT: 137.81 LBS | RESPIRATION RATE: 18 BRPM | BODY MASS INDEX: 21.63 KG/M2 | DIASTOLIC BLOOD PRESSURE: 87 MMHG | SYSTOLIC BLOOD PRESSURE: 135 MMHG | TEMPERATURE: 98 F | HEART RATE: 70 BPM | HEIGHT: 67 IN

## 2019-05-17 DIAGNOSIS — C50.611 MALIGNANT NEOPLASM OF AXILLARY TAIL OF RIGHT FEMALE BREAST, UNSPECIFIED ESTROGEN RECEPTOR STATUS: Primary | ICD-10-CM

## 2019-05-17 PROCEDURE — 99999 PR PBB SHADOW E&M-EST. PATIENT-LVL IV: CPT | Mod: PBBFAC,,, | Performed by: INTERNAL MEDICINE

## 2019-05-17 PROCEDURE — 3008F PR BODY MASS INDEX (BMI) DOCUMENTED: ICD-10-PCS | Mod: CPTII,S$GLB,, | Performed by: INTERNAL MEDICINE

## 2019-05-17 PROCEDURE — 3008F BODY MASS INDEX DOCD: CPT | Mod: CPTII,S$GLB,, | Performed by: INTERNAL MEDICINE

## 2019-05-17 PROCEDURE — 99214 PR OFFICE/OUTPT VISIT, EST, LEVL IV, 30-39 MIN: ICD-10-PCS | Mod: S$GLB,,, | Performed by: INTERNAL MEDICINE

## 2019-05-17 PROCEDURE — 99999 PR PBB SHADOW E&M-EST. PATIENT-LVL IV: ICD-10-PCS | Mod: PBBFAC,,, | Performed by: INTERNAL MEDICINE

## 2019-05-17 PROCEDURE — 99214 OFFICE O/P EST MOD 30 MIN: CPT | Mod: S$GLB,,, | Performed by: INTERNAL MEDICINE

## 2019-05-17 NOTE — PROGRESS NOTES
HISTORY OF PRESENT ILLNESS:  This is a 62-year-old white female treated for a  diagnosis of Stage IIB left breast carcinoma.ER/VT negative. Her - 2 positive Neoadjuvant A/C ,completed 12 cycles weekly taxol and  herceptin/ perjeta had lumpectomy was on [facility] trail completed the entire year of herceptin /perjeta, but didn't undergo axillary xrt per trial  Here for f/u with pet and labs recnt mammogram showed calcifications , that were biospied and came back negative   but bpt has many questions and is very anxious about the future   he questions the wisdom of not having double madtectomy at the time and if she shold pursue this route   she sees DR kline end of may   reconstruction done.      radiation induced interstitial findings with cough better now   sonme neuropathy post rx still persisits   the radiated breast still hurts   PHYSICAL EXAMINATION:  Wt Readings from Last 3 Encounters:   04/17/19 63 kg (138 lb 14.2 oz)   04/12/19 63 kg (138 lb 14.2 oz)   03/26/19 63 kg (139 lb)     Temp Readings from Last 3 Encounters:   03/26/19 97.4 °F (36.3 °C) (Oral)   01/25/19 97.3 °F (36.3 °C) (Oral)   11/21/18 97.5 °F (36.4 °C) (Oral)     BP Readings from Last 3 Encounters:   04/12/19 137/79   03/26/19 (!) 142/89   01/25/19 (!) 140/93     Pulse Readings from Last 3 Encounters:   04/12/19 (!) 55   03/26/19 63   01/25/19 67     GENERAL:  Well-developed, well-nourished white female in no acute distress.    Alert and oriented x4. Well grommed  moist.  Lips without   lesions.  Tongue midline.  Oropharynx clear.  Nonicteric sclerae.  NECK:  Supple, no adenopathy.  No carotid bruits, thyromegaly or thyroid nodule.  HEART:  Regular rate and rhythm without murmur, gallop.  LUNGS:  Clear to auscultation bilaterally.  Normal respiratory effort.  ABDOMEN:  Soft, nontender, nondistended with positive normoactive bowel sounds,   no hepatosplenomegaly.  EXTREMITIES:  No cyanosis, clubbing or edema.  Distal pulses are intact.  AXILLAE  AND GROIN:  No palpable pathologic lymphadenopathy is appreciated.  SKIN:  Intact/turgor normal.  NEUROLOGIC:  Cranial nerves II-XII grossly intact.  Motor:  Good muscle bulk and   tone.  Strength/sensory 5/5 throughout.  Gait stable.    LABORATORY:    Lab Results   Component Value Date    WBC 5.16 04/16/2019    HGB 13.1 04/16/2019    HCT 39.0 04/16/2019    MCV 98 04/16/2019     04/16/2019         CMP  Sodium   Date Value Ref Range Status   04/16/2019 141 136 - 145 mmol/L Final     Potassium   Date Value Ref Range Status   04/16/2019 3.8 3.5 - 5.1 mmol/L Final     Chloride   Date Value Ref Range Status   04/16/2019 107 95 - 110 mmol/L Final     CO2   Date Value Ref Range Status   04/16/2019 26 23 - 29 mmol/L Final     Glucose   Date Value Ref Range Status   04/16/2019 80 70 - 110 mg/dL Final     BUN, Bld   Date Value Ref Range Status   04/16/2019 12 8 - 23 mg/dL Final     Creatinine   Date Value Ref Range Status   04/16/2019 0.8 0.5 - 1.4 mg/dL Final     Calcium   Date Value Ref Range Status   04/16/2019 10.0 8.7 - 10.5 mg/dL Final     Total Protein   Date Value Ref Range Status   04/16/2019 7.0 6.0 - 8.4 g/dL Final     Albumin   Date Value Ref Range Status   04/16/2019 4.0 3.5 - 5.2 g/dL Final     Total Bilirubin   Date Value Ref Range Status   04/16/2019 0.5 0.1 - 1.0 mg/dL Final     Comment:     For infants and newborns, interpretation of results should be based  on gestational age, weight and in agreement with clinical  observations.  Premature Infant recommended reference ranges:  Up to 24 hours.............<8.0 mg/dL  Up to 48 hours............<12.0 mg/dL  3-5 days..................<15.0 mg/dL  6-29 days.................<15.0 mg/dL       Alkaline Phosphatase   Date Value Ref Range Status   04/16/2019 100 55 - 135 U/L Final     AST   Date Value Ref Range Status   04/16/2019 18 10 - 40 U/L Final     ALT   Date Value Ref Range Status   04/16/2019 16 10 - 44 U/L Final     Anion Gap   Date Value Ref Range  Status   04/16/2019 8 8 - 16 mmol/L Final     eGFR if    Date Value Ref Range Status   04/16/2019 >60 >60 mL/min/1.73 m^2 Final     eGFR if non    Date Value Ref Range Status   04/16/2019 >60 >60 mL/min/1.73 m^2 Final     Comment:     Calculation used to obtain the estimated glomerular filtration  rate (eGFR) is the CKD-EPI equation.      5.       Left breast, lumpectomy:  - Residual invasive ductal carcinoma, moderately differentiated, [place] Grade 2 (3+2+1=6), 0.2 cm in  greatest linear microscopic dimension.  - Ductal carcinoma in situ (DCIS), high nuclear grade, 2 cm, solid and cribriform types.  - Surgical margins are free of tumor; invasive carcinoma is located 0.1 cm from the closest margin    Impression 11/2-18       New nondisplaced fracture of the distal left 6th rib which is most likely posttraumatic in origin.  The fracture is hypermetabolic most likely related to ongoing healing.  Clinical correlation is advised             IMPRESSION:  Stage IIB left breast carcinoma (ER/WV negative, Her-2/clay positive)  Completed neoadjuvant AC and weekly taxol / herceptin  stopped perjeta since she also had xrt to left side due o fear of toxicity  , s/p recent lumpectomy and axillary disection , with 2 residual Ln positive. [facility] did not undergo xrt to axilla    PLAN:  1.  rtc 6 months with pet/ cbc . Cmp, ca 2792   possibly fracture of ribs from coughing   cont f/u wit pcp  Keep appt with dr Arce   discuss double mastectmy , axillary dissection on the side she didn't receive xrt post op for the positive nodes  Advance Care Planning   pts level of anxiety and tearfulness today precluded the advanced care planning to be iappropriately timed

## 2019-05-30 ENCOUNTER — OFFICE VISIT (OUTPATIENT)
Dept: SURGERY | Facility: CLINIC | Age: 64
End: 2019-05-30
Payer: COMMERCIAL

## 2019-05-30 VITALS
WEIGHT: 138.69 LBS | HEIGHT: 68 IN | TEMPERATURE: 98 F | BODY MASS INDEX: 21.02 KG/M2 | DIASTOLIC BLOOD PRESSURE: 90 MMHG | SYSTOLIC BLOOD PRESSURE: 147 MMHG | HEART RATE: 56 BPM

## 2019-05-30 DIAGNOSIS — Z85.3 PERSONAL HISTORY OF BREAST CANCER: Primary | ICD-10-CM

## 2019-05-30 DIAGNOSIS — C50.512 PRIMARY CANCER OF LOWER OUTER QUADRANT OF LEFT FEMALE BREAST: ICD-10-CM

## 2019-05-30 PROCEDURE — 3008F PR BODY MASS INDEX (BMI) DOCUMENTED: ICD-10-PCS | Mod: CPTII,S$GLB,, | Performed by: SURGERY

## 2019-05-30 PROCEDURE — 3008F BODY MASS INDEX DOCD: CPT | Mod: CPTII,S$GLB,, | Performed by: SURGERY

## 2019-05-30 PROCEDURE — 99213 PR OFFICE/OUTPT VISIT, EST, LEVL III, 20-29 MIN: ICD-10-PCS | Mod: S$GLB,,, | Performed by: SURGERY

## 2019-05-30 PROCEDURE — 99999 PR PBB SHADOW E&M-EST. PATIENT-LVL III: ICD-10-PCS | Mod: PBBFAC,,, | Performed by: SURGERY

## 2019-05-30 PROCEDURE — 99999 PR PBB SHADOW E&M-EST. PATIENT-LVL III: CPT | Mod: PBBFAC,,, | Performed by: SURGERY

## 2019-05-30 PROCEDURE — 99213 OFFICE O/P EST LOW 20 MIN: CPT | Mod: S$GLB,,, | Performed by: SURGERY

## 2019-06-02 NOTE — PROGRESS NOTES
"GENERAL SURGERY CLINIC  HISTORY AND PHYSICAL     CC: 6 month follow up       HPI:  Ashlie Redman is a 64 y.o.  female with Hx of history of left lateral breast invasive HER-2 positive invasive breast carcinoma, status post Herceptin and Perjeta based preoperative neoadjuvant chemotherapy with preoperatively known positive left lymph node inthe left axilla. She is s/p partial mastectomy and sentinel node dissection on 11/1, positive node noted with negative margins on mastectomy specimen.  She was randomized intraoperatively on the [place] trial to have a completion axillary lymph node dissection, which revealed 2 of 18 additional positive lymph nodes; so she did not receive axillary radiation.  She did have left breast radiation, which is completed. Patient has completed chemotherapy regimen.     Patient presents to clinic for 6 month follow up. Patient states she is doing "fair". Patient was diagnosed with radiation pneumonitis since last visit and has described associated fatigue, cough, and pain. Patient was started on prednisone but discontinued due to side effects. She has discussed with pulmonologist starting on an inhaler if cough returns. Patient also describes tightness of the skin with an associated pressure on the left breast. Patient has prepectoral silicone implants (completed 5 years ago). Patient did have winged scapula on last visit that has improved significantly with physical therapy and exercise. Patient is now able to lift 7 lb weights over head without difficulty.        ROS: A 10 point review of systems is negative apart from above stated in HPI.             Past Medical History:   Diagnosis Date    Allergy      Anemia      Asteroid hyalosis of both eyes      Breast cancer 04/2017     Left    Cataract      Diverticulosis      Encounter for blood transfusion      Glaucoma      High myopia      Osteopenia      S/P dilation and curettage                     Past Surgical " History:   Procedure Laterality Date    BIOPSY-LYMPH NODE-SENTINEL Left 11/1/2017     Performed by [facility] at [facility] OR 2ND FLR    breast augmentation   03/2013    BREAST BIOPSY Left 04/2017     Core bx,+ cancer    BREAST LUMPECTOMY Left 10/25/2017    CATARACT EXTRACTION W/  INTRAOCULAR LENS IMPLANT Bilateral 2009    COLONOSCOPY   4/2009, 2015     repeat in 5 years    COLONOSCOPY N/A 5/21/2015     Performed by Evan Alexander MD at [facility] ENDO (4TH FLR)    DILATION AND CURETTAGE OF UTERUS        DISSECTION-LYMPH NODE-AXILLARY Left 11/1/2017     Performed by [facility] at [facility] OR 2ND FLR    ECTOPIC PREGNANCY SURGERY        EYE SURGERY Bilateral 3/09     Vitrectomy     INJECTION-NODE-SENTINEL Left 11/1/2017     Performed by [facility] at [facility] OR 2ND FLR    UHGAAMUNK-BIKU-X-CATH neck poss chest Right 4/24/2017     Performed by [facility] at [facility] OR 2ND FLR    LUMPECTOMY with MAGSEED (2) Left 11/1/2017     Performed by [facility] at [facility] OR 2ND FLR    POLYPECTOMY         x3 2001    Yag Capsulotomy Bilateral 12/2014             Social History                   Socioeconomic History    Marital status: Single       Spouse name: Not on file    Number of children: Not on file    Years of education: Not on file    Highest education level: Not on file   Social Needs    Financial resource strain: Not on file    Food insecurity - worry: Not on file    Food insecurity - inability: Not on file    Transportation needs - medical: Not on file    Transportation needs - non-medical: Not on file   Occupational History       Employer: [employer] ([employer])   Tobacco Use    Smoking status: Never Smoker    Smokeless tobacco: Never Used   Substance and Sexual Activity    Alcohol use: No       Alcohol/week: 0.6 oz       Types: 1 Glasses of wine per week       Comment: Quit    Drug use: No    Sexual activity: Yes       Partners: Male       Birth  control/protection: Post-menopausal   Other Topics Concern    Are you pregnant or think you may be? No    Breast-feeding Not Asked   Social History Narrative    Not on file                       Review of patient's allergies indicates:   Allergen Reactions    Bactrim [sulfamethoxazole-trimethoprim] Rash       Fever, vomiting            PHYSICAL EXAM:        Vitals:     09/25/18 0835   BP: (!) 160/69   Pulse: 83   Temp: 98.2 °F (36.8 °C)         General: NAD  Neuro: AAOx3  Cardio: RRR  Resp: Breathing even and unlabored  Abd: Soft, ND, NT, no palpable mass, BS+  Ext: Warm and well perfused        PERTINENT LABS:  Reviewed. None.        PERTINENT IMAGING:  Reviewed. None.        ASSESSMENT/PLAN:  Ashlie Redman is a 63 y.o. female history of left breast carcinoma who presents today for 6 month follow-up and is doing well.               Plan Port-o-cath removal on right in November              Mammogram yearly (next one in April 2019)              Clinical breast examination every 6 months              Prepectoral silicone implants with capsular contraction, advised patient with option of removal and autologous     reconstruction           Heber Sevilla, MS3  I have personally taken the history and examined this patient and agree with the resident's note as stated above.     Ashlie Redman is a very pleasant 63-year-old  female well known to me   from the [place].  She presents alone today for routine followup as noted   above.  The patient underwent neoadjuvant Herceptin and Perjeta-based   chemotherapy for initial presentation of a left invasive HER-2/clay positive   cancer with a known positive node.  She subsequently underwent enrollment in the   [place] trial as she had a residual positive node following the chemotherapy.    She was randomized to completion axillary lymph node dissection, which she   underwent.  She underwent postoperative radiotherapy to the left whole breast   and the  supraclavicular regions without axillary radiation.     The temporary winging of the scapula, which she had postoperatively has   completely resolved and improved.  She also has had some post-radiation therapy   pneumonitis and is having a dry cough.  She was on steroids at some point, but   is now off as she did not like being on steroids.  She finished her Herceptin in   July and will have her port removed by me in the coming months.  She did not   receive any endocrine therapy as she was ER/NE negative and HER-2/clay positive   and she is done with all of her adjuvant therapies.  Her last mammogram was on   04/23/2018, which was within normal limits.  The patient has prepectoral   silicone implants, which are approximately 5 years old.     She is experiencing significant pain and discomfort with associated tenderness   of the left breast due to contraction capsular fibrosis secondary to the   radiation issues with the existing implant.     The clinical breast exam is otherwise within normal limits.  There are no   suspicious masses, nodules, densities, skin changes or lymphadenopathy on either   side.  The port is in place on the right anterior chest wall.  There is   firmness and some postradiation fibrosis in the left breast with some slight   volume loss in the lower outer quadrant.  There is no palpable axillary,   cervical or supraclavicular lymphadenopathy.  She is having some tenderness on   exam on the left due to the contraction capsular fibrosis.     ASSESSMENT AND PLAN:  No evidence of disease, no evidence of local or regional   recurrence.  The patient had her Port-A-Cath removed on Friday, 11/09/2018.      The patient presents today having had her Port-A-Cath removed approximately 6 months as noted above without difficulty. She still has significant anxiety and pain on the left side due to the radiation induced contraction capsular fibrosis on the breast implant.  She states the pain is 5/10 at rest.   The patient also expresses anxiety over a recent right-sided breast core needle biopsy that she had on May 2nd, 2019 for an abnormal mammogram.  The findings were consistent with fibrocystic changes.  The patient's symptoms to her neurapraxia from a winged scapula have also improved and resolved.  Her main concern is her anxiety over the breast pain on the left due to the contraction capsular fibrosis around the silicone implant due to the history of radiation.  She also has new anxiety regarding the abnormality found on the right breast although this turned out to be benign.  Given some of the new guidelines we will order genetic testing given her history of breast cancer.  We will order a breast MRI in the future, but I would not like to get this at the present time due to the significant amount of ecchymosis in the right breast related to the biopsy changes.  Her clinical breast exam remains essentially unchanged.  There is firm contraction capsular fibrosis which is tender and she states this is causing significant severe pain.    I will refer her to Plastic surgery to discuss elective completion mastectomy on the left with implant removal and autologous tissue reconstruction.  She may consider prophylactic surgery on the right due to the recent anxiety produced by the recent mammographic changes as well. Some of this will be dictated by her genetic testing results and future MRI of the breast.  Again, I will hold off on getting an MRI this point given the significant post biopsy ecchymosis of the right breast at this time.  She will be referred to Dr. Linares for possible completion mastectomy with autologous tissue reconstruction on an elective basis due to pain and tenderness associated with radiation induced fibrosis around the left breast silicone implant.

## 2019-06-04 DIAGNOSIS — Z12.31 ENCOUNTER FOR SCREENING MAMMOGRAM FOR BREAST CANCER: Primary | ICD-10-CM

## 2019-06-19 ENCOUNTER — PATIENT MESSAGE (OUTPATIENT)
Dept: SURGERY | Facility: CLINIC | Age: 64
End: 2019-06-19

## 2019-06-19 ENCOUNTER — OFFICE VISIT (OUTPATIENT)
Dept: URGENT CARE | Facility: CLINIC | Age: 64
End: 2019-06-19
Payer: COMMERCIAL

## 2019-06-19 VITALS
DIASTOLIC BLOOD PRESSURE: 70 MMHG | WEIGHT: 138 LBS | HEIGHT: 68 IN | BODY MASS INDEX: 20.92 KG/M2 | OXYGEN SATURATION: 100 % | TEMPERATURE: 98 F | RESPIRATION RATE: 16 BRPM | HEART RATE: 70 BPM | SYSTOLIC BLOOD PRESSURE: 133 MMHG

## 2019-06-19 DIAGNOSIS — M62.838 CERVICAL PARASPINAL MUSCLE SPASM: ICD-10-CM
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
## 2019-06-19 DIAGNOSIS — M50.30 DDD (DEGENERATIVE DISC DISEASE), CERVICAL: ICD-10-CM

## 2019-06-19 PROCEDURE — 99214 PR OFFICE/OUTPT VISIT, EST, LEVL IV, 30-39 MIN: ICD-10-PCS | Mod: S$GLB,,, | Performed by: PHYSICIAN ASSISTANT

## 2019-06-19 PROCEDURE — 99214 OFFICE O/P EST MOD 30 MIN: CPT | Mod: S$GLB,,, | Performed by: PHYSICIAN ASSISTANT

## 2019-06-19 PROCEDURE — 72040 XR CERVICAL SPINE 2 OR 3 VIEWS: ICD-10-PCS | Mod: S$GLB,,, | Performed by: RADIOLOGY

## 2019-06-19 PROCEDURE — 72040 X-RAY EXAM NECK SPINE 2-3 VW: CPT | Mod: S$GLB,,, | Performed by: RADIOLOGY

## 2019-06-19 RX ORDER — MELOXICAM 7.5 MG/1
7.5 TABLET ORAL DAILY
Qty: 30 TABLET | Refills: 0 | Status: SHIPPED | OUTPATIENT
Start: 2019-06-19 | End: 2019-07-26
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
## 2019-06-25 ENCOUNTER — SURGICAL CONSULT (OUTPATIENT)
Dept: PLASTIC SURGERY | Facility: CLINIC | Age: 64
End: 2019-06-25
Payer: COMMERCIAL

## 2019-06-25 VITALS
DIASTOLIC BLOOD PRESSURE: 68 MMHG | SYSTOLIC BLOOD PRESSURE: 138 MMHG | HEART RATE: 60 BPM | HEIGHT: 67 IN | WEIGHT: 138.25 LBS | BODY MASS INDEX: 21.7 KG/M2

## 2019-06-25 DIAGNOSIS — C50.912 HER2-POSITIVE CARCINOMA OF LEFT BREAST: Primary | ICD-10-CM

## 2019-06-25 DIAGNOSIS — C50.012 MALIGNANT NEOPLASM OF NIPPLE OF LEFT BREAST IN FEMALE, ESTROGEN RECEPTOR NEGATIVE: ICD-10-CM

## 2019-06-25 DIAGNOSIS — Z17.1 MALIGNANT NEOPLASM OF NIPPLE OF LEFT BREAST IN FEMALE, ESTROGEN RECEPTOR NEGATIVE: ICD-10-CM

## 2019-06-25 PROCEDURE — 99205 PR OFFICE/OUTPT VISIT, NEW, LEVL V, 60-74 MIN: ICD-10-PCS | Mod: S$GLB,,, | Performed by: PLASTIC SURGERY

## 2019-06-25 PROCEDURE — 3008F BODY MASS INDEX DOCD: CPT | Mod: CPTII,S$GLB,, | Performed by: PLASTIC SURGERY

## 2019-06-25 PROCEDURE — 3008F PR BODY MASS INDEX (BMI) DOCUMENTED: ICD-10-PCS | Mod: CPTII,S$GLB,, | Performed by: PLASTIC SURGERY

## 2019-06-25 PROCEDURE — 99205 OFFICE O/P NEW HI 60 MIN: CPT | Mod: S$GLB,,, | Performed by: PLASTIC SURGERY

## 2019-06-25 NOTE — PROGRESS NOTES
REFERRAL FOR BREAST RECONSTRUCTION    CHIEF COMPLAINT  Left breast cancer    Referring Provider: Alexander Arce MD  PCP: Kevin Gong MD    HPI  Ashlie Redman is a 64 y.o. female presenting s/p left breast lumpectomy and radiation for left breast cancer diagnosed in 2017.  She completed radiation in 2/2018 but subsequently developed severe capsular contracture and pain around her previous left breast implant.  She notably had prior breast augmentation with subparenchymal silicone smooth round [employer]ks 350 cc implants (350-2501 BC bilateral).  She was previously a large A/small B cup and desires to now be roughly the same or slightly smaller.  She has additionally completed Herceptin in July 2018.  Her family history is positive for colon cancer in her mother and sister, breast cancer in first cousin and genetic testing is negative.  She is now seeking consultation regarding the left breast deformity and is considering left breast mastectomy.  She recently has had biopsy of the right breast for benign calcifications.      She is otherwise healthy and very active.  She has had previous surgery for an ectopic pregnancy, currently has 2 children.  She is a non-smoker.  Current BMI 21.65.     Oncologic Hx  Diagnosis: Left breast   Tumor stage: Stage IIB, ER/MT negative, H2+  Adjuvant therapy  - chemotherapy: none current  - radiation therapy: completed 2/2018  Oncologist: Jn    Previous Surgery:  Left lumpectomy, XRT    PMH  Patient Active Problem List   Diagnosis    Pelvic pain in female    Urinary tract infection    Screen for STD (sexually transmitted disease)    Osteopenia    Family history of osteoporosis    Family history of rectal cancer    Sensation of pressure in bladder area    Screening    High myopia    Glaucoma suspect of both eyes    Primary cancer of lower outer quadrant of left female breast    Breast cancer, left    HER2-positive carcinoma of left breast    Breast cancer, left breast     Status post partial mastectomy    Decreased range of motion of left shoulder    Lymphedema    Pain in axilla, left    Neuropathy    Chemotherapy-induced peripheral neuropathy    Midline cystocele    Incomplete emptying of bladder    Radiation pneumonitis    Cystocele, midline    Post-operative state       PSH  Past Surgical History:   Procedure Laterality Date    BIOPSY-LYMPH NODE-SENTINEL Left 11/1/2017    [facility] at [facility] OR 2ND FLR    breast augmentation  03/2013    BREAST BIOPSY Left 04/2017    Core bx,+ cancer    BREAST LUMPECTOMY Left 10/25/2017    CATARACT EXTRACTION W/  INTRAOCULAR LENS IMPLANT Bilateral 2009    COLONOSCOPY  4/2009, 2015    repeat in 5 years    COLONOSCOPY N/A 5/21/2015    Performed by Evan Alexander MD at [facility] ENDO (4TH FLR)    CYSTOSCOPY N/A 11/20/2018    Performed by Rebecca Acosta MD at [facility] OR 2ND FLR    DILATION AND CURETTAGE OF UTERUS      DISSECTION-LYMPH NODE-AXILLARY Left 11/1/2017    Performed by [facility] at [facility] OR 2ND FLR    ECTOPIC PREGNANCY SURGERY      EYE SURGERY Bilateral 3/09    Vitrectomy     INJECTION-NODE-SENTINEL Left 11/1/2017    Performed by [facility] at [facility] OR 2ND FLR    [id number] neck poss chest Right 4/24/2017    Performed by [facility] at [facility] OR 2ND FLR    LUMPECTOMY with MAGSEED (2) Left 11/1/2017    Performed by [facility] at [facility] OR 2ND FLR    POLYPECTOMY      x3 2001    REPAIR, CYSTOCELE N/A 11/20/2018    Performed by Rebecca [facility]osta [facility] at [facility] OR 2ND FLR    Yag Capsulotomy Bilateral 12/2014       FH  Family History   Problem Relation Age of Onset    Cancer Sister         rectal; passed    Breast cancer Cousin     Cancer Cousin         breast    Heart disease Father         passed    Colon cancer Mother         hospice    Pancreatic cancer Mother         39yo; 89yo    Cataracts Mother     Hypertension Mother     Thyroid  disease Mother     Cancer Mother         colon    Glaucoma Maternal Grandmother     Amblyopia Neg Hx     Blindness Neg Hx     Diabetes Neg Hx     Macular degeneration Neg Hx     Retinal detachment Neg Hx     Strabismus Neg Hx     Stroke Neg Hx        MEDICATIONS  Outpatient Medications Marked as Taking for the 6/25/19 encounter (Surgical Consult) with Martir Linares MD   Medication Sig Dispense Refill    buPROPion (WELLBUTRIN XL) 150 MG TB24 tablet Take 1 tablet (150 mg total) by mouth every morning. 30 tablet 2    dorzolamide (TRUSOPT) 2 % ophthalmic solution Place 1 drop into both eyes 3 (three) times daily. 30 mL 4    latanoprost 0.005 % ophthalmic solution INSTILL ONE DROP INTO EACH EYE ONCE DAILY IN THE EVENING 3 Bottle 4    meloxicam (MOBIC) 7.5 MG tablet Take 1 tablet (7.5 mg total) by mouth once daily. 30 tablet 0    phenazopyridine (PYRIDIUM) 200 MG tablet Take 1 tablet (200 mg total) by mouth 3 (three) times daily as needed for Pain. 20 tablet 0    valACYclovir (VALTREX) 500 MG tablet          ALLERGIES  Review of patient's allergies indicates:   Allergen Reactions    Bactrim [sulfamethoxazole-trimethoprim] Rash     Fever, vomiting       SOCIAL HISTORY  Tobacco:   Social History     Tobacco Use   Smoking Status Never Smoker   Smokeless Tobacco Never Used     EtOH:   Social History     Substance and Sexual Activity   Alcohol Use No    Alcohol/week: 0.6 oz    Types: 1 Glasses of wine per week    Comment: Quit       ROS  Review of Systems - General ROS: negative for - chills, fatigue, fever, hot flashes, malaise or night sweats  Psychological ROS: negative for - mood swings or sleep disturbances  Hematological and Lymphatic ROS: negative for - bleeding problems, blood clots, blood transfusions, bruising or fatigue  Endocrine ROS: negative for - hair pattern changes, hot flashes, malaise/lethargy, palpitations, polydipsia/polyuria or temperature intolerance  Breast ROS: positive for  "findings described above, negative for - nipple changes or nipple discharge  Respiratory ROS: no cough, shortness of breath, or wheezing  Cardiovascular ROS: no chest pain or dyspnea on exertion  Gastrointestinal ROS: no abdominal pain, change in bowel habits, or black or bloody stools  Genito-Urinary ROS: no dysuria, trouble voiding, or hematuria  Musculoskeletal ROS: negative for - gait disturbance, joint pain, joint stiffness, joint swelling or muscle pain  Neurological ROS: no TIA or stroke symptoms  Dermatological ROS: negative for acne, dry skin, eczema and hair changes    PHYSICAL EXAM  /68   Pulse 60   Ht 5' 7" (1.702 m)   Wt 62.7 kg (138 lb 3.7 oz)   BMI 21.65 kg/m²     Constitutional: She is oriented to person, place, and time. She appears well-developed and well-nourished.   HENT: Normocephalic and atraumatic.   Neck: Normal range of motion. Neck supple. No JVD present.   Cardiovascular: Normal rate, regular rhythm and normal heart sounds.    Pulmonary/Chest: Effort normal. No respiratory distress.   Musculoskeletal: Normal range of motion. She exhibits no edema or deformity.   Neurological: She is alert and oriented to person, place, and time. No sensory deficit. She exhibits normal muscle tone.   Skin: Skin is warm. No rash noted. No erythema.   Psychiatric: She has a normal mood and affect. Her behavior is normal.     Sn-IMF: 22 cm    Right breast  SN-N: 24cm  IMF-N: 7.5 cm  Base width: 12 cm  Height: 12 cm  Ptosis: grade 1  Masses absent  Scars: IMF  Adenopathy: axillary, supraclavicular absent    Left breast  SN-N: 21.5 cm  IMF-N: 8 cm  Base width: 12cm  Height: 12 cm  Ptosis: grade 1  Baker grade IV capsular contracture  Constricted base  Masses absent  Scars: lateral radial  Adenopathy: axillary, supraclavicular absent    Back  - fat pad 2 cm  - latissimus functional    Abdomen/Trunk/Thigh/Buttock  Abdomen: soft, nontender, nondistended, hernias absent  Fat excess in hypogastrium present, " healed pfannensteil (ectoptic pregnancy)  Buttock/Thigh: moderate    ASSESSMENT  Encounter Diagnoses   Name Primary?    HER2-positive carcinoma of left breast Yes    Malignant neoplasm of nipple of left breast in female, estrogen receptor negative      Options for breast reconstruction were discussed as detailed below, including the option for no reconstruction, implant-based reconstruction, and autologous tissue reconstruction.  The expected outcomes, risks, benefits, and alternatives of each option were discussed.  I additionally discussed the options for timing of reconstruction including immediate, delayed and delayed-immediate.     Options for breast reconstruction reviewed:    Two-stage expander and long-term implant vs direct-to-implant  Risks and limitations of this choice were discussed and written for these procedures.  A particular focus was placed upon possible limited aesthetic results in both shape and feel with this option.  Future need for surgery was also reviewed related to deflation and rupture of implant, distortion, capsular contracture and poor aesthetic outcome including visible wrinkling.    If staged technique is recommended, the first stage is performed immediately after completion of the mastectomy provided the sentinel lymph node biopsy is negative. An adjustable breast implant (expander) is placed above or deep to the pectoralis major muscle, and sometimes it is covered with a human acellular dermal graft. After uneventful healing the expander is inflated over several months, and subsequently replaced with a long term implant.    SUSAN, TRAM flap, Other  flaps (SGAP, PAP, Lateral thigh)  Details, risks and limitations of the use of free autologous tissue from the abdomen, thigh, and buttock were discussed.  Potential flap complications including fat necrosis, partial or complete flap loss, seroma, infection, bleeding, and need for further surgery.  For abdominal-based flaps,  the risks of a functional deficit created by sacrifice of some or all of the rectus muscle, potential abdominal bulge or hernia that may not be correctable, abdominal wall numbness, umbilical necrosis and skin flap necrosis with resultant need for additional surgery were discussed.  Recovery time of at least two months and possible prolonged recovery of 3-6 months were likewise emphasized.    Latissimus dorsi flap with or without implant  Details, risks and limitations of this choice were discussed.  Specific focus was placed upon the asymmetric scar across her entire back with possible puckering at each end, and possible long-term seroma, dehiscence, and back skin flap necrosis. Permanent functional restrictions were reviewed, as well as the potential for prolonged early disability of at least two months. A longer recovery of at least 3-6 months before unrestricted activity might be achieved were also discussed. The frequent need for an implant under the latissimus muscle to provide sufficient breast mound projection was reviewed, along with potential problems this might create.    SYMMETRY  A contralateral breast reduction or mastopexy may improve symmetry. Potential problems were outlined. Contrateral mastectomy and reconstruction was also reviewed in this context.  Possible risks of breast reduction and mastopexy include but are not limited to: bleeding, infection, heavy and prolonged or permanent scarring, possible keloid formation, breasts different size and shape, breast lumps, loss of nipple sensation, hypersensitivity of nipple-areolar complex, wound separation or delayed wound healing, venous thromboembolism, complications from anesthesia, difficulty with future mammograms, emotional problems from altered breast size, a negative impact on relationships with a significant other sexual partner, desired breast size not attained: breast size too small or too large, difficult bra fitting, poor cosmetic outcome,  puckering of incisions, irregular shape, nipple location, need for further surgery    REVISION  Secondary revision surgery including autogenous fat grafting and nipple areolar reconstruction was reviewed.    Autogenous fat grafts might be necessary to improve skin thickness and enhance symmetric chest wall and breast mound symmetry and contour at a subsequent reconstructive procedure. Possible adverse outcomes, risks, and complications of fat grafting discussed include but are not limited to: Fat necrosis with a lumps, bleeding, infection, insufficient or excessive change in the size or shape, unevenness in the area grafted or donor sites, poor wound healing, inability to achieve desired cosmetic outcome, need for further interventions or surgery,iImplant puncture, venous thromboembolism    NIPPLE AREOLAR RECONSTRUCTION  Nipple reconstruction may be performed in 2 steps.     Risks of tattoo reviewed: pigment loss or irregularity, infection, slow healing, loss of implant (from infection), need for repeat tattooing    Risks of nipple mound creation reviewed:  Nipple flattening, asymmetry, fading tattoo pigment, poor cosmetic outcome, need for further surgery, bleeding, infection, poor scarring, wound separation, failure to heal, pain, need for further surgery, loss of implant from infection or wound separation    ---------------------------------  I discussed all options for reconstruction, starting with the scenario of reconstructing the left lumpectomy defect alone after implant removal and capsulectomy for grade IV capsular contracture.  This would include SUSAN free flap reconstruction or TDAP/LDF to fill the defect.  If she prefers unilateral mastectomy for the left breast (which she is leaning toward), the left breast could be reconstructed with a SUSAN free flap, stacked from the abdomen.  If she prefers bilateral reconstruction this could be accomplished with SUSAN flap + implant, SUSAN + PAP flap or staged left  stacked SUSAN followed by right stacked PAPs.  She understands all of these options and is leaning toward unilateral stacked SUSAN with mastectomy for the left.    PLAN  - Leaning toward left unilateral mastectomy and stacked SUSAN flap with implant removal + capsulectomy  - MRA abdomen/thigh for SUSAN, possible PAP  - Photos scheduled  - Will await patient's final decision prior to booking    This encounter length was 60 minutes for  Encounter Diagnoses   Name Primary?    HER2-positive carcinoma of left breast Yes    Malignant neoplasm of nipple of left breast in female, estrogen receptor negative      Over 50% of the encounter length was spent in face to face counseling about the relevant issues pertaining to the diagnoses, management choices, and prognosis.    Electronically signed by:  Martir Linares  6/25/2019  5:19 PM

## 2019-06-25 NOTE — Clinical Note
Hi all- I think she is leaning toward unilateral mastectomy and stacked SUSAN.  I haven't booked the case as I'm not clear on her final decision.I did order imaging studies and photos.  We can discuss again with her after studies are complete.  If she notifies us differently, let me know and I will book.Thanks!Martir

## 2019-06-26 ENCOUNTER — PATIENT MESSAGE (OUTPATIENT)
Dept: PLASTIC SURGERY | Facility: CLINIC | Age: 64
End: 2019-06-26

## 2019-07-01 ENCOUNTER — TELEPHONE (OUTPATIENT)
Dept: SURGERY | Facility: CLINIC | Age: 64
End: 2019-07-01

## 2019-07-01 NOTE — TELEPHONE ENCOUNTER
----- Message from Alexander Arce MD sent at 6/29/2019 11:34 AM CDT -----  Tomás, please make sure patient is aware of her negative genetic testing results.  Thanks, [facility]    BOYN Mcmahan    ---    Called pt.  No answer; left VM with my contact info, and asked pt to call me back.

## 2019-07-02 ENCOUNTER — PATIENT MESSAGE (OUTPATIENT)
Dept: PLASTIC SURGERY | Facility: CLINIC | Age: 64
End: 2019-07-02

## 2019-07-02 ENCOUNTER — TELEPHONE (OUTPATIENT)
Dept: SURGERY | Facility: CLINIC | Age: 64
End: 2019-07-02

## 2019-07-02 NOTE — TELEPHONE ENCOUNTER
Returned call to  regarding message left . Patient stated she was returning Tomás Fofana call from yesterday .Tried to help  but she wanted to speak with Tomás.

## 2019-07-03 ENCOUNTER — TELEPHONE (OUTPATIENT)
Dept: SURGERY | Facility: CLINIC | Age: 64
End: 2019-07-03

## 2019-07-03 NOTE — TELEPHONE ENCOUNTER
----- Message from Alexander Arce MD sent at 6/29/2019 11:34 AM CDT -----  Tomás, please make sure patient is aware of her negative genetic testing results.  Thanks, [facility]    BONY Mcmahan    ---    Spoke with patient on phone regarding her genetic testing results.  Discussed that her results were negative in the [facility] [employer] [facility] [employer] Hereditary Cancer panel.  Discussed that this does not completely rule out the possibility of a hereditary breast cancer.  Patient was advised to keep any appointments related to her breast cancer.  Advised pt to bring genetic test results packet with her to next visit with her colonoscopy provider, and discussed with pt [facility]'s recommendation for colonoscopy given her family hx of colon cancer (pt denies personal hx).  Pt stated she is currently undergoing colonoscopies every 5 years.  Patient was informed that a copy of her genetic testing results will be mailed to her and to contact us if not received.   Questions were encouraged and answered to patient's satisfaction, and patient verbalized understanding of all information.

## 2019-07-08 ENCOUNTER — PATIENT MESSAGE (OUTPATIENT)
Dept: SURGERY | Facility: CLINIC | Age: 64
End: 2019-07-08

## 2019-07-08 ENCOUNTER — PATIENT MESSAGE (OUTPATIENT)
Dept: FAMILY MEDICINE | Facility: CLINIC | Age: 64
End: 2019-07-08

## 2019-07-08 NOTE — TELEPHONE ENCOUNTER
Pre op apt for tomorrow please advise if not correct. Thank you it is for a implant removal. Breast reconstruction.

## 2019-07-09 ENCOUNTER — HOSPITAL ENCOUNTER (OUTPATIENT)
Dept: RADIOLOGY | Facility: HOSPITAL | Age: 64
Discharge: HOME | End: 2019-07-09
Attending: NURSE PRACTITIONER
Payer: COMMERCIAL

## 2019-07-09 ENCOUNTER — OFFICE VISIT (OUTPATIENT)
Dept: FAMILY MEDICINE | Facility: CLINIC | Age: 64
End: 2019-07-09
Payer: COMMERCIAL

## 2019-07-09 VITALS
BODY MASS INDEX: 21.8 KG/M2 | SYSTOLIC BLOOD PRESSURE: 130 MMHG | WEIGHT: 138.88 LBS | HEIGHT: 67 IN | DIASTOLIC BLOOD PRESSURE: 88 MMHG | OXYGEN SATURATION: 99 % | HEART RATE: 60 BPM | TEMPERATURE: 98 F

## 2019-07-09 DIAGNOSIS — Z01.818 PREOPERATIVE EXAMINATION: Primary | ICD-10-CM

## 2019-07-09 DIAGNOSIS — Z01.818 PREOPERATIVE EXAMINATION: ICD-10-CM

## 2019-07-09 DIAGNOSIS — C50.611 MALIGNANT NEOPLASM OF AXILLARY TAIL OF RIGHT FEMALE BREAST, UNSPECIFIED ESTROGEN RECEPTOR STATUS: ICD-10-CM

## 2019-07-09 PROCEDURE — 99999 PR PBB SHADOW E&M-EST. PATIENT-LVL IV: CPT | Mod: PBBFAC,,, | Performed by: NURSE PRACTITIONER

## 2019-07-09 PROCEDURE — 71046 X-RAY EXAM CHEST 2 VIEWS: CPT | Mod: 26,,, | Performed by: RADIOLOGY

## 2019-07-09 PROCEDURE — 71046 X-RAY EXAM CHEST 2 VIEWS: CPT | Mod: TC,FY,PO

## 2019-07-09 PROCEDURE — 3008F BODY MASS INDEX DOCD: CPT | Mod: CPTII,S$GLB,, | Performed by: NURSE PRACTITIONER

## 2019-07-09 PROCEDURE — 93010 EKG 12-LEAD: ICD-10-PCS | Mod: S$GLB,,, | Performed by: INTERNAL MEDICINE

## 2019-07-09 PROCEDURE — 99214 PR OFFICE/OUTPT VISIT, EST, LEVL IV, 30-39 MIN: ICD-10-PCS | Mod: S$GLB,,, | Performed by: NURSE PRACTITIONER

## 2019-07-09 PROCEDURE — 93005 EKG 12-LEAD: ICD-10-PCS | Mod: S$GLB,,, | Performed by: NURSE PRACTITIONER

## 2019-07-09 PROCEDURE — 71046 XR CHEST PA AND LATERAL: ICD-10-PCS | Mod: 26,,, | Performed by: RADIOLOGY

## 2019-07-09 PROCEDURE — 93010 ELECTROCARDIOGRAM REPORT: CPT | Mod: S$GLB,,, | Performed by: INTERNAL MEDICINE

## 2019-07-09 PROCEDURE — 3008F PR BODY MASS INDEX (BMI) DOCUMENTED: ICD-10-PCS | Mod: CPTII,S$GLB,, | Performed by: NURSE PRACTITIONER

## 2019-07-09 PROCEDURE — 93005 ELECTROCARDIOGRAM TRACING: CPT | Mod: S$GLB,,, | Performed by: NURSE PRACTITIONER

## 2019-07-09 PROCEDURE — 99214 OFFICE O/P EST MOD 30 MIN: CPT | Mod: S$GLB,,, | Performed by: NURSE PRACTITIONER

## 2019-07-09 PROCEDURE — 99999 PR PBB SHADOW E&M-EST. PATIENT-LVL IV: ICD-10-PCS | Mod: PBBFAC,,, | Performed by: NURSE PRACTITIONER

## 2019-07-09 NOTE — PROGRESS NOTES
Subjective:       Patient ID: Ashlie Redman is a 64 y.o. female.    Chief Complaint: Surgery clearance    Ms. Redman is new patient to me. She presents today for preoperative exam    HPI   She is scheduled for bilateral breast implant removal on 8/5/19 per Dr. Modi. Recall she is s/p left breast lumpectomy and radiation for left breast cancer diagnosed in 2017.  She completed radiation in 2/2018 but subsequently developed severe capsular contracture and pain around her previous left breast implant.  She notably had prior breast augmentation with subparenchymal silicone smooth round [employer] 350 cc implants (350-2501 BC bilateral). There is no plan for reconstruction at this time--plastics will wait 2-3 months and if desired will discuss reconstruction options     She is doing well otherwise  Denies history of complications from anesthesia  Denies sleep apnea  Vitals:    07/09/19 1235   BP: 130/88   Pulse: 60   Temp: 97.8 °F (36.6 °C)     Review of Systems   Constitutional: Negative for activity change and unexpected weight change.   HENT: Negative for hearing loss, rhinorrhea and trouble swallowing.    Eyes: Negative for discharge and visual disturbance.   Respiratory: Negative for chest tightness and wheezing.    Cardiovascular: Negative for chest pain and palpitations.   Gastrointestinal: Negative for blood in stool, constipation, diarrhea and vomiting.   Endocrine: Negative for polydipsia.   Genitourinary: Negative for difficulty urinating, hematuria and menstrual problem.   Musculoskeletal: Negative for arthralgias, joint swelling and neck pain.   Skin: Negative for pallor.   Neurological: Negative for weakness and headaches.   Psychiatric/Behavioral: Negative for confusion and dysphoric mood.       Past Medical History:   Diagnosis Date    Allergy     Anemia     Asteroid hyalosis of both eyes     Breast cancer 04/2017    Left    Cataract     Diverticulosis     Encounter for blood transfusion      Glaucoma     High myopia     Osteopenia     S/P dilation and curettage      Objective:      Physical Exam   Constitutional: She is oriented to person, place, and time. She does not have a sickly appearance. No distress.   HENT:   Head: Normocephalic.   Right Ear: Hearing normal.   Left Ear: Hearing normal.   Nose: Nose normal.   Eyes: Conjunctivae and lids are normal.   Neck: No JVD present. No tracheal deviation present.   Cardiovascular: Normal rate and normal heart sounds.   Pulmonary/Chest: Effort normal and breath sounds normal.   Abdominal: She exhibits no distension.   Musculoskeletal: She exhibits no deformity.   Neurological: She is alert and oriented to person, place, and time.   Skin: She is not diaphoretic. No pallor.   Psychiatric: She has a normal mood and affect. Her speech is normal and behavior is normal. Judgment and thought content normal. Cognition and memory are normal.   Nursing note and vitals reviewed.      Assessment:       1. Preoperative examination        Plan:       Preoperative examination  -     CBC auto differential; Future; Expected date: 07/09/2019  -     Comprehensive metabolic panel; Future; Expected date: 07/09/2019  -     EKG 12-lead; Future  -     X-Ray Chest PA And Lateral; Future; Expected date: 07/09/2019      Without contraindications to scheduled procedure   Follow up for routine visits with PCP and specialists as scheduled.    Medication List with Changes/Refills   Current Medications    BUPROPION (WELLBUTRIN XL) 150 MG TB24 TABLET    Take 1 tablet (150 mg total) by mouth every morning.    DORZOLAMIDE (TRUSOPT) 2 % OPHTHALMIC SOLUTION    Place 1 drop into both eyes 3 (three) times daily.    LATANOPROST 0.005 % OPHTHALMIC SOLUTION    INSTILL ONE DROP INTO EACH EYE ONCE DAILY IN THE EVENING    MELOXICAM (MOBIC) 7.5 MG TABLET    Take 1 tablet (7.5 mg total) by mouth once daily.    PHENAZOPYRIDINE (PYRIDIUM) 200 MG TABLET    Take 1 tablet (200 mg total) by mouth 3 (three)  times daily as needed for Pain.    VALACYCLOVIR (VALTREX) 500 MG TABLET

## 2019-07-10 ENCOUNTER — PATIENT MESSAGE (OUTPATIENT)
Dept: FAMILY MEDICINE | Facility: CLINIC | Age: 64
End: 2019-07-10

## 2019-07-10 RX ORDER — BUPROPION HYDROCHLORIDE 150 MG/1
TABLET ORAL
Qty: 90 TABLET | Refills: 0 | Status: SHIPPED | OUTPATIENT
Start: 2019-07-10 | End: 2019-07-19 | Stop reason: SDUPTHER

## 2019-07-16 ENCOUNTER — OFFICE VISIT (OUTPATIENT)
Dept: OPHTHALMOLOGY | Facility: CLINIC | Age: 64
End: 2019-07-16
Payer: COMMERCIAL

## 2019-07-16 DIAGNOSIS — Z96.1 PSEUDOPHAKIA OF BOTH EYES: Primary | ICD-10-CM

## 2019-07-16 DIAGNOSIS — H40.053 OHT (OCULAR HYPERTENSION), BILATERAL: ICD-10-CM

## 2019-07-16 DIAGNOSIS — H52.7 REFRACTIVE ERROR: ICD-10-CM

## 2019-07-16 DIAGNOSIS — Z98.890 HISTORY OF VITRECTOMY: ICD-10-CM

## 2019-07-16 PROCEDURE — 99999 PR PBB SHADOW E&M-EST. PATIENT-LVL III: CPT | Mod: PBBFAC,,, | Performed by: OPHTHALMOLOGY

## 2019-07-16 PROCEDURE — 92133 POSTERIOR SEGMENT OCT OPTIC NERVE(OCULAR COHERENCE TOMOGRAPHY) - OU - BOTH EYES: ICD-10-PCS | Mod: S$GLB,,, | Performed by: OPHTHALMOLOGY

## 2019-07-16 PROCEDURE — 92014 PR EYE EXAM, EST PATIENT,COMPREHESV: ICD-10-PCS | Mod: S$GLB,,, | Performed by: OPHTHALMOLOGY

## 2019-07-16 PROCEDURE — 92014 COMPRE OPH EXAM EST PT 1/>: CPT | Mod: S$GLB,,, | Performed by: OPHTHALMOLOGY

## 2019-07-16 PROCEDURE — 99999 PR PBB SHADOW E&M-EST. PATIENT-LVL III: ICD-10-PCS | Mod: PBBFAC,,, | Performed by: OPHTHALMOLOGY

## 2019-07-16 PROCEDURE — 92133 CPTRZD OPH DX IMG PST SGM ON: CPT | Mod: S$GLB,,, | Performed by: OPHTHALMOLOGY

## 2019-07-16 NOTE — PROGRESS NOTES
HPI     4 month IOP check , OCT Nerve. Denies eye pain noticed new floaters left   eye about 3-4 weeks no flashes. Using latanoprost OU HS, Dorzolamide OU   TID most states compliance.     Last edited by Tiffany Chamberlain on 7/16/2019  3:33 PM. (History)        ROS     Positive for: Neurological (headaches - do occur in am - currently under   stress, just moved to new house, attributes to sinuses; history of   migraines), Eyes (CE OU, vitrectomy OU for floaters 2009 (AH?)),   Heme/Lymph (breast CA - under chemo)    Negative for: Endocrine (denies DM), Cardiovascular (denies HTN - some   changes while on chemo), Respiratory (denies asthma)    Last edited by Almaz Vasquez MD on 7/16/2019  4:19 PM.   (History)        Assessment /Plan     For exam results, see Encounter Report.    Pseudophakia of both eyes    OHT (ocular hypertension), bilateral  -     Posterior Segment OCT Optic Nerve- Both eyes    History of vitrectomy    Refractive error            OHT (ocular hypertension), bilateral - Switched Timolol to Dorzolamide 2/14/17, but IOP still upper 20's. Finished tamoxifen for breast cancer.   IOP baseline mid 20's with occasional pain OS. IOP was significantly elevated off Latanoprost - Tmax 37/44! Back to ~23 today. Maternal grandmother with glaucoma, no known first degree relatives. CCT thick. Baseline HVF/HRT WNL, small cups on clinical exam OU. Last HRT - within range of priors, no RNFL thinning identified. Re-traced 11/6/18 due to transferring to [place], larger C:D, decrease RNFL, but may just be due to re-trace?  Last HVF - new nasal defects OU, fixation losses OS, pt reported seeing a glare off to the side and also having hot flashes during exam. Clinical exam appears stable - repeat next year, if WNL then can follow with just imaging.  Last Gonio - open to CB OU, few tiny iris root vessels visible, no NV.   OCT nerve 5/16/17 - thin TS and TI OD, borderline TS OS. Repeated 6/26/18, new borderline TI  OS. 7/16/19 - new borderline G OD but minimal change in numbers, OS TS changed from borderline to low  Switched T .5 to Dorzolamide BID, but not as effective. Tried Brimonidine TID - but had hypotension again, so went back to Dorzolamide TID. If goes too high then can consider 0.25% Timolol, as seems to have had a dramatic response in IOP lowering with Timolol. (she is not sure if chemo had something to do with this, may be willing to try a different gtt when she finishes chemo, if Dorzolamide backorder not resolved.)  DFE appears stable - continue Latanoprost QHS OU, Dorzolamide TID OU, medical cannibus did not make much difference. At this point, I do not think the small changes in testing warrant the additional potential side effects of adding drops.   Follow up in about 4 months (around 11/16/2019) for IOP check.    On Latanoprost since presentation (former pt of Dr. Ordaz).  Dorzolamide added 2/14-7/25/17 - no significant improvement in IOP, may have forgotten to take? Resumed 8/9/17 TID OU  Timolol 0.5% QAM (7/9/16-2/14/17) - worked great but symptomatic hypotension/bradycardia started in Jan '17  Brimonidine - 7/25-8/9 17 - d/c'd due to symptomatic hypotension.    Pseudophakia of both eyes - stable    History of vitrectomy - for floaters/AH; RD precautions.      Addendum: symptomatic hypotension with Brimonidine - switched back to Dorzolamide on 8/9/17 - asked that she try to use it TID.

## 2019-07-19 DIAGNOSIS — C50.611 MALIGNANT NEOPLASM OF AXILLARY TAIL OF RIGHT FEMALE BREAST, UNSPECIFIED ESTROGEN RECEPTOR STATUS: ICD-10-CM

## 2019-07-22 ENCOUNTER — PATIENT MESSAGE (OUTPATIENT)
Dept: FAMILY MEDICINE | Facility: CLINIC | Age: 64
End: 2019-07-22

## 2019-07-22 RX ORDER — VALACYCLOVIR HYDROCHLORIDE 500 MG/1
500 TABLET, FILM COATED ORAL DAILY
Qty: 90 TABLET | Refills: 3 | Status: SHIPPED | OUTPATIENT
Start: 2019-07-22 | End: 2020-03-22 | Stop reason: SDUPTHER

## 2019-07-23 RX ORDER — BUPROPION HYDROCHLORIDE 150 MG/1
150 TABLET ORAL DAILY
Qty: 90 TABLET | Refills: 0 | Status: SHIPPED | OUTPATIENT
Start: 2019-07-23 | End: 2020-01-13 | Stop reason: SDUPTHER

## 2019-07-26 ENCOUNTER — ANESTHESIA EVENT (OUTPATIENT)
Dept: SURGERY | Facility: OTHER | Age: 64
End: 2019-07-26
Payer: COMMERCIAL

## 2019-07-26 ENCOUNTER — HOSPITAL ENCOUNTER (OUTPATIENT)
Dept: PREADMISSION TESTING | Facility: OTHER | Age: 64
Discharge: HOME | End: 2019-07-26
Attending: SURGERY
Payer: COMMERCIAL

## 2019-07-26 VITALS
TEMPERATURE: 97 F | SYSTOLIC BLOOD PRESSURE: 155 MMHG | OXYGEN SATURATION: 100 % | HEART RATE: 81 BPM | DIASTOLIC BLOOD PRESSURE: 72 MMHG

## 2019-07-26 RX ORDER — ACETAMINOPHEN 500 MG
1000 TABLET ORAL
Status: CANCELLED | OUTPATIENT
Start: 2019-07-26 | End: 2019-07-26

## 2019-07-26 RX ORDER — LIDOCAINE HYDROCHLORIDE 10 MG/ML
0.5 INJECTION, SOLUTION EPIDURAL; INFILTRATION; INTRACAUDAL; PERINEURAL ONCE
Status: CANCELLED | OUTPATIENT
Start: 2019-07-26 | End: 2019-07-26

## 2019-07-26 RX ORDER — SODIUM CHLORIDE, SODIUM LACTATE, POTASSIUM CHLORIDE, CALCIUM CHLORIDE 600; 310; 30; 20 MG/100ML; MG/100ML; MG/100ML; MG/100ML
INJECTION, SOLUTION INTRAVENOUS CONTINUOUS
Status: CANCELLED | OUTPATIENT
Start: 2019-07-26

## 2019-07-26 NOTE — ANESTHESIA PREPROCEDURE EVALUATION
07/26/2019  Ashlie Redman is a 64 y.o., female.    Anesthesia Evaluation    I have reviewed the Patient Summary Reports.    I have reviewed the Nursing Notes.   I have reviewed the Medications.     Review of Systems  Anesthesia Hx:  No problems with previous Anesthesia  History of prior surgery of interest to airway management or planning: Previous anesthesia: General, Spinal 11/1/2017 Lumpectomy with Meredith Node injection and Biopsy; Axillary Lymph node dissection with general anesthesia.  Denies Family Hx of Anesthesia complications.   Denies Personal Hx of Anesthesia complications.   Social:  Non-Smoker, Social Alcohol Use    Hematology/Oncology:         -- Anemia: s/p chemotherapy Breast s/p surgery, s/p radiation therapy and s/p chemotherapy  Surgery: with sentinel nodes dissection, with complete lymph node dissection and axillary   Current/Recent Cancer. Breast radiation, chemotherapy and surgery    EENT/Dental:EENT/Dental Normal   Cardiovascular:   Exercise tolerance: good Patient is an avid cyclist. No change in functional capacity Functional Capacity good / => 4 METS    Pulmonary:  Pulmonary Normal Some lung damage due to radiation Pulmonary Symptoms: (secondary to radiation treatment)  are shortness of breath with activity and pleuritic chest pain.    Renal/:  Renal/ Normal  Cystocele   Hepatic/GI:  Hepatic/GI Normal    Musculoskeletal:  Musculoskeletal Normal    Neurological:   Peripheral Neuropathy (Chemotherapy induced)    Endocrine:  Endocrine Normal    Dermatological:  Skin Normal    Psych:  Psychiatric Normal           Physical Exam  General:  Well nourished    Airway/Jaw/Neck:  Airway Findings: Mouth Opening: Normal Tongue: Normal  General Airway Assessment: Adult  Mallampati: II  Improves to II with phonation.  TM Distance: Normal, at least 6 cm  Jaw/Neck Findings:  Neck ROM: Normal ROM       Dental:  Dental Findings: In tact   Chest/Lungs:  Chest/Lungs Findings: Clear to auscultation, Normal Respiratory Rate     Heart/Vascular:  Heart Findings: Rate: Normal  Rhythm: Regular Rhythm  Sounds: Normal        Mental Status:  Mental Status Findings:  Alert and Oriented, Cooperative         Anesthesia Plan  Type of Anesthesia, risks & benefits discussed:  Anesthesia Type:  general  Patient's Preference:   Intra-op Monitoring Plan: standard ASA monitors  Intra-op Monitoring Plan Comments:   Post Op Pain Control Plan: multimodal analgesia  Post Op Pain Control Plan Comments:   Induction:   IV  Beta Blocker:         Informed Consent: Patient understands risks and agrees with Anesthesia plan.  Questions answered. Anesthesia consent signed with patient.  ASA Score: 2     Day of Surgery Review of History & Physical: 11/16/18.   H&P update referred to the surgeon.     Anesthesia Plan Notes: Labs in epic        Ready For Surgery From Anesthesia Perspective.

## 2019-07-26 NOTE — DISCHARGE INSTRUCTIONS
PRE-ADMIT TESTING -  [phone]    [address]  [facility]          Your surgery has been scheduled at [facility]. We are pleased to have the opportunity to serve you. For Further Information please call [phone].    On the day of surgery please report to the Information Desk on the 1st floor.    · CONTACT YOUR PHYSICIAN'S OFFICE THE DAY PRIOR TO YOUR SURGERY TO OBTAIN YOUR ARRIVAL TIME.     · The evening before surgery do not eat anything after 9 p.m. ( this includes hard candy, chewing gum and mints).  You may only have GATORADE, POWERADE AND WATER  from 9 p.m. until you leave your home.   DO NOT DRINK ANY LIQUIDS ON THE WAY TO THE HOSPITAL.      SPECIAL MEDICATION INSTRUCTIONS: TAKE medications checked off by the Anesthesiologist on your Medication List.    Angiogram Patients: Take medications as instructed by your physician, including aspirin.     Surgery Patients:    If you take ASPIRIN - Your PHYSICIAN/SURGEON will need to inform you IF/OR when you need to stop taking aspirin prior to your surgery.     Do Not take any medications containing IBUPROFEN.  Do Not Wear any make-up or dark nail polish   (especially eye make-up) to surgery. If you come to surgery with makeup on you will be required to remove the makeup or nail polish.    Do not shave your surgical area at least 5 days prior to your surgery. The surgical prep will be performed at the hospital according to Infection Control regulations.    Leave all valuables at home.   Do Not wear any jewelry or watches, including any metal in body piercings. Jewelry must be removed prior to coming to the hospital.  There is a possibility that rings that are unable to be removed may be cut off if they are on the surgical extremity.    Contact Lens must be removed before surgery. Either do not wear the contact lens or bring a case and solution for storage.  Please bring a container for eyeglasses or dentures as required.  Bring  any paperwork your physician has provided, such as consent forms,  history and physicals, doctor's orders, etc.   Bring comfortable clothes that are loose fitting to wear upon discharge. Take into consideration the type of surgery being performed.  Maintain your diet as advised per your physician the day prior to surgery.      Adequate rest the night before surgery is advised.   Park in the Parking lot behind the hospital or in the [place] [facility] across the street from the parking lot. Parking is complimentary.  If you will be discharged the same day as your procedure, please arrange for a responsible adult to drive you home or to accompany you if traveling by taxi.   YOU WILL NOT BE PERMITTED TO DRIVE OR TO LEAVE THE HOSPITAL ALONE AFTER SURGERY.   It is strongly recommended that you arrange for someone to remain with you for the first 24 hrs following your surgery.    The Surgeon will speak to your family/visitor after your surgery regarding the outcome of your surgery and post op care.  The Surgeon may speak to you after your surgery, but there is a possibility you may not remember the details.  Please check with your family members regarding the conversation with the Surgeon.      Thank you for your cooperation.  The Staff of [facility].                Bathing Instructions with Hibiclens     Shower the evening before and morning of your procedure with Hibiclens:   Wash your face with water and your regular face wash/soap   Apply Hibiclens directly on your skin or on a wet washcloth and wash gently. When showering: Move away from the shower stream when applying Hibiclens to avoid rinsing off too soon.   Rinse thoroughly with warm water   Do not dilute Hibiclens         Dry off as usual, do not use any deodorant, powder, body lotions, perfume, after shave or cologne.

## 2019-07-29 NOTE — PROGRESS NOTES
CHIEF COMPLAINT:    Mrs. Redman is a 64 y.o. female presenting for a follow up status post anterior colporrhaphy and cystoscopy 11/20/2018.    PRESENTING ILLNESS:    Ashlie Redman is a 64 y.o. female who states she has had recurrent bladder infections.  She feels like she empties to completion, but sometimes she does not.  She uses Ellura intermittently, states she forgets to take it on a regular basis.  She has a history of breast cancer and cannot use estrogen products.  States her bowel movements are normal.  She feels that everything is up and has no vaginal prolapse symptoms.  She still works out, as this is how she manages her stress.     REVIEW OF SYSTEMS:    Review of Systems   Constitutional: Negative.    HENT: Negative.    Eyes: Negative.    Respiratory: Negative.    Cardiovascular: Negative.    Gastrointestinal: Negative.  Negative for constipation.   Genitourinary: Positive for dysuria.   Musculoskeletal: Negative.    Skin: Negative.    Neurological: Negative.    Endo/Heme/Allergies: Negative.    Psychiatric/Behavioral: Positive for memory loss (status post chemotherapy).       PATIENT HISTORY:    Past Medical History:   Diagnosis Date    Allergy     Anemia     Asteroid hyalosis of both eyes     Breast cancer 04/2017    Left    Cataract     Diverticulosis     Encounter for blood transfusion     Glaucoma     High myopia     Osteopenia     S/P dilation and curettage        Past Surgical History:   Procedure Laterality Date    BIOPSY-LYMPH NODE-SENTINEL Left 11/1/2017    [facility] OR 2ND FLR    breast augmentation  03/2013    BREAST BIOPSY Left 04/2017    Core bx,+ cancer    BREAST LUMPECTOMY Left 10/25/2017    CATARACT EXTRACTION W/  INTRAOCULAR LENS IMPLANT Bilateral 2009    COLONOSCOPY  4/2009, 2015    repeat in 5 years    COLONOSCOPY N/A 5/21/2015    Performed by [facility] ENDO (4TH FLR)    CYSTOSCOPY N/A 11/20/2018    [facility]    DILATION AND CURETTAGE OF UTERUS      DISSECTION-LYMPH NODE-AXILLARY Left 11/1/2017    [facility]    ECTOPIC PREGNANCY SURGERY      EYE SURGERY Bilateral 3/09    Vitrectomy     INJECTION-NODE-SENTINEL Left 11/1/2017    [facility]    [id number]-N-CATH neck poss chest Right 4/24/2017    [facility]    LUMPECTOMY with MAGSEED (2) Left 11/1/2017    [facility]    POLYPECTOMY      x3 2001    REPAIR, CYSTOCELE N/A 11/20/2018    [facility]    Yag Capsulotomy Bilateral 12/2014       Family History   Problem Relation Age of Onset    Cancer Sister         rectal; passed    Breast cancer Cousin     Cancer Cousin         breast    Heart disease Father         passed    Colon cancer Mother         hospice    Pancreatic cancer Mother         39yo; 91yo    Cataracts Mother     Hypertension Mother     Thyroid disease Mother     Cancer Mother         colon    Glaucoma Maternal Grandmother      Socioeconomic History    Marital status: Single    Number of children: 2   Occupational History    Not on file   Social Needs    Financial resource strain: Not very hard    Food insecurity:     Worry: Never true     Inability: Never true    Transportation needs:     Medical: No     Non-medical: No   Tobacco Use    Smoking status: Never Smoker    Smokeless tobacco: Never Used   Substance and Sexual Activity    Alcohol use: No     Alcohol/week: 0.6 oz     Types: 1 Glasses of wine per week     Frequency: 4 or more times a week     Drinks per session: 1 or 2     Binge frequency: Never     Comment: Quit    Drug use: No    Sexual activity: Yes     Partners: Male     Birth control/protection: Post-menopausal   Lifestyle    Physical activity:     Days per week: 4  days     Minutes per session: 60 min    Stress: Only a little   Relationships    Social connections:     Talks on phone: More than three times a week     Gets together: More than three times a week     Attends Jew service: Not on file     Active member of club or organization: Yes     Attends meetings of clubs or organizations: More than 4 times per year     Relationship status:    Other Topics Concern    Are you pregnant or think you may be? No       Allergies:  Bactrim [sulfamethoxazole-trimethoprim]    Medications:  Outpatient Encounter Medications as of 8/2/2019   Medication Sig Dispense Refill    buPROPion (WELLBUTRIN XL) 150 MG TB24 tablet Take 1 tablet (150 mg total) by mouth once daily. 90 tablet 0    dorzolamide (TRUSOPT) 2 % ophthalmic solution Place 1 drop into both eyes 3 (three) times daily. 30 mL 4    latanoprost 0.005 % ophthalmic solution INSTILL ONE DROP INTO EACH EYE ONCE DAILY IN THE EVENING 3 Bottle 4    phenazopyridine (PYRIDIUM) 200 MG tablet Take 1 tablet (200 mg total) by mouth 3 (three) times daily as needed for Pain. 20 tablet 0    valACYclovir (VALTREX) 500 MG tablet Take 1 tablet (500 mg total) by mouth once daily. (Patient taking differently: Take 500 mg by mouth daily as needed. ) 90 tablet 3     No facility-administered encounter medications on file as of 8/2/2019.          PHYSICAL EXAMINATION:    The patient generally appears in good health, is appropriately interactive, and is in no apparent distress.    Skin: No lesions.    Mental: Cooperative with normal affect.    Neuro: Grossly intact.    HEENT: Normal. No evidence of lymphadenopathy.    Chest:  normal inspiratory effort.    Abdomen:  Soft, non-tender. No masses or organomegaly. Bladder is not palpable. No evidence of flank discomfort. No evidence of inguinal hernia.    Extremities: No clubbing, cyanosis, or edema    Normal external female genitalia  Grade II urogenital atrophy  Urethral meatus is  normal  Urethra and bladder are nontender to bimanual exam  Anteriorly, she has a stage I cystocele, but it comes down to meet the posterior wall  Well supported posteriorly   Uterus and cervix are normal  No adnexal masses  PVR by catheterization was 130 ml    LABS:    Lab Results   Component Value Date    BUN 9 07/09/2019    CREATININE 0.8 07/09/2019     UA 1.005, pH 7, + leuk, tr blood, otherwise, negative    IMPRESSION:    Encounter Diagnoses   Name Primary?    Recurrent UTI Yes    S/P urological surgery, follow-up exam        PLAN:    1.  JAH ramirez and [place] recommended.  Discussed why low dose antibiotics are not recommended  2.  The catheterized specimen was sent for culture  3.  Follow up in 6 months.

## 2019-08-02 ENCOUNTER — OFFICE VISIT (OUTPATIENT)
Dept: UROLOGY | Facility: CLINIC | Age: 64
End: 2019-08-02
Payer: COMMERCIAL

## 2019-08-02 VITALS
DIASTOLIC BLOOD PRESSURE: 79 MMHG | HEIGHT: 67 IN | WEIGHT: 135.38 LBS | SYSTOLIC BLOOD PRESSURE: 123 MMHG | HEART RATE: 68 BPM | BODY MASS INDEX: 21.25 KG/M2

## 2019-08-02 DIAGNOSIS — N39.0 RECURRENT UTI: Primary | ICD-10-CM

## 2019-08-02 DIAGNOSIS — Z09 S/P UROLOGICAL SURGERY, FOLLOW-UP EXAM: ICD-10-CM

## 2019-08-02 PROCEDURE — 81002 URINALYSIS NONAUTO W/O SCOPE: CPT | Mod: S$GLB,,, | Performed by: UROLOGY

## 2019-08-02 PROCEDURE — 99213 PR OFFICE/OUTPT VISIT, EST, LEVL III, 20-29 MIN: ICD-10-PCS | Mod: 25,S$GLB,, | Performed by: UROLOGY

## 2019-08-02 PROCEDURE — 3008F BODY MASS INDEX DOCD: CPT | Mod: CPTII,S$GLB,, | Performed by: UROLOGY

## 2019-08-02 PROCEDURE — 3008F PR BODY MASS INDEX (BMI) DOCUMENTED: ICD-10-PCS | Mod: CPTII,S$GLB,, | Performed by: UROLOGY

## 2019-08-02 PROCEDURE — 51701 INSERT BLADDER CATHETER: CPT | Mod: S$GLB,,, | Performed by: UROLOGY

## 2019-08-02 PROCEDURE — 87088 URINE BACTERIA CULTURE: CPT

## 2019-08-02 PROCEDURE — 51701 PR INSERTION OF NON-INDWELLING BLADDER CATHETERIZATION FOR RESIDUAL UR: ICD-10-PCS | Mod: S$GLB,,, | Performed by: UROLOGY

## 2019-08-02 PROCEDURE — 99999 PR PBB SHADOW E&M-EST. PATIENT-LVL III: ICD-10-PCS | Mod: PBBFAC,,, | Performed by: UROLOGY

## 2019-08-02 PROCEDURE — 87086 URINE CULTURE/COLONY COUNT: CPT

## 2019-08-02 PROCEDURE — 81002 PR URINALYSIS NONAUTO W/O SCOPE: ICD-10-PCS | Mod: S$GLB,,, | Performed by: UROLOGY

## 2019-08-02 PROCEDURE — 87186 SC STD MICRODIL/AGAR DIL: CPT

## 2019-08-02 PROCEDURE — 99999 PR PBB SHADOW E&M-EST. PATIENT-LVL III: CPT | Mod: PBBFAC,,, | Performed by: UROLOGY

## 2019-08-02 PROCEDURE — 87077 CULTURE AEROBIC IDENTIFY: CPT

## 2019-08-02 PROCEDURE — 99213 OFFICE O/P EST LOW 20 MIN: CPT | Mod: 25,S$GLB,, | Performed by: UROLOGY

## 2019-08-02 NOTE — PATIENT INSTRUCTIONS
For Prevention:     Keep bowel movements soft  D mannose can be obtained over the counter.  1 gram twice a day for maintenance.  If you think you may be starting an infection 1.5 grams twice a day for 3-7 days can be used but if symptoms persist longer, then come in for a culture.   Can continue to use the Ellura

## 2019-08-04 LAB — BACTERIA UR CULT: ABNORMAL

## 2019-08-05 ENCOUNTER — TELEPHONE (OUTPATIENT)
Dept: UROLOGY | Facility: CLINIC | Age: 64
End: 2019-08-05

## 2019-08-05 ENCOUNTER — HOSPITAL ENCOUNTER (OUTPATIENT)
Facility: OTHER | Age: 64
Discharge: HOME | End: 2019-08-05
Attending: SURGERY | Admitting: SURGERY
Payer: COMMERCIAL

## 2019-08-05 ENCOUNTER — ANESTHESIA (OUTPATIENT)
Dept: SURGERY | Facility: OTHER | Age: 64
End: 2019-08-05
Payer: COMMERCIAL

## 2019-08-05 VITALS
WEIGHT: 136 LBS | RESPIRATION RATE: 16 BRPM | BODY MASS INDEX: 21.35 KG/M2 | HEIGHT: 67 IN | DIASTOLIC BLOOD PRESSURE: 67 MMHG | TEMPERATURE: 99 F | OXYGEN SATURATION: 94 % | SYSTOLIC BLOOD PRESSURE: 130 MMHG | HEART RATE: 76 BPM

## 2019-08-05 DIAGNOSIS — Z85.3 PERSONAL HISTORY OF MALIGNANT NEOPLASM OF BREAST: Primary | ICD-10-CM

## 2019-08-05 DIAGNOSIS — N30.90 BLADDER INFECTION: Primary | ICD-10-CM

## 2019-08-05 PROCEDURE — 88300 SURGICAL PATH GROSS: CPT | Mod: 26,,, | Performed by: PATHOLOGY

## 2019-08-05 PROCEDURE — 88300 SURGICAL PATH GROSS: CPT | Performed by: PATHOLOGY

## 2019-08-05 PROCEDURE — 25000003 PHARM REV CODE 250: Performed by: SURGERY

## 2019-08-05 PROCEDURE — 63600175 PHARM REV CODE 636 W HCPCS: Performed by: NURSE ANESTHETIST, CERTIFIED REGISTERED

## 2019-08-05 PROCEDURE — 71000033 HC RECOVERY, INTIAL HOUR: Performed by: SURGERY

## 2019-08-05 PROCEDURE — C1729 CATH, DRAINAGE: HCPCS | Performed by: SURGERY

## 2019-08-05 PROCEDURE — 71000039 HC RECOVERY, EACH ADD'L HOUR: Performed by: SURGERY

## 2019-08-05 PROCEDURE — 63600175 PHARM REV CODE 636 W HCPCS: Performed by: ANESTHESIOLOGY

## 2019-08-05 PROCEDURE — 63600175 PHARM REV CODE 636 W HCPCS: Performed by: SURGERY

## 2019-08-05 PROCEDURE — 25000003 PHARM REV CODE 250: Performed by: NURSE ANESTHETIST, CERTIFIED REGISTERED

## 2019-08-05 PROCEDURE — 88304 TISSUE EXAM BY PATHOLOGIST: CPT | Mod: 26,,, | Performed by: PATHOLOGY

## 2019-08-05 PROCEDURE — 25000003 PHARM REV CODE 250: Performed by: ANESTHESIOLOGY

## 2019-08-05 PROCEDURE — 71000016 HC POSTOP RECOV ADDL HR: Performed by: SURGERY

## 2019-08-05 PROCEDURE — 37000008 HC ANESTHESIA 1ST 15 MINUTES: Performed by: SURGERY

## 2019-08-05 PROCEDURE — 36000707: Performed by: SURGERY

## 2019-08-05 PROCEDURE — 88304 TISSUE SPECIMEN TO PATHOLOGY - SURGERY: ICD-10-PCS | Mod: 26,,, | Performed by: PATHOLOGY

## 2019-08-05 PROCEDURE — 88300 TISSUE SPECIMEN TO PATHOLOGY - SURGERY: ICD-10-PCS | Mod: 26,,, | Performed by: PATHOLOGY

## 2019-08-05 PROCEDURE — 37000009 HC ANESTHESIA EA ADD 15 MINS: Performed by: SURGERY

## 2019-08-05 PROCEDURE — 36000706: Performed by: SURGERY

## 2019-08-05 PROCEDURE — 71000015 HC POSTOP RECOV 1ST HR: Performed by: SURGERY

## 2019-08-05 RX ORDER — ONDANSETRON 2 MG/ML
INJECTION INTRAMUSCULAR; INTRAVENOUS
Status: DISCONTINUED | OUTPATIENT
Start: 2019-08-05 | End: 2019-08-05

## 2019-08-05 RX ORDER — ACETAMINOPHEN 500 MG
1000 TABLET ORAL
Status: COMPLETED | OUTPATIENT
Start: 2019-08-05 | End: 2019-08-05

## 2019-08-05 RX ORDER — DOXYCYCLINE 100 MG/1
100 CAPSULE ORAL 2 TIMES DAILY
Qty: 14 CAPSULE | Refills: 0 | Status: SHIPPED | OUTPATIENT
Start: 2019-08-05 | End: 2019-08-12

## 2019-08-05 RX ORDER — NEOSTIGMINE METHYLSULFATE 1 MG/ML
INJECTION, SOLUTION INTRAVENOUS
Status: DISCONTINUED | OUTPATIENT
Start: 2019-08-05 | End: 2019-08-05

## 2019-08-05 RX ORDER — SODIUM CHLORIDE 0.9 % (FLUSH) 0.9 %
3 SYRINGE (ML) INJECTION
Status: DISCONTINUED | OUTPATIENT
Start: 2019-08-05 | End: 2019-08-05 | Stop reason: HOSPADM

## 2019-08-05 RX ORDER — ROCURONIUM BROMIDE 10 MG/ML
INJECTION, SOLUTION INTRAVENOUS
Status: DISCONTINUED | OUTPATIENT
Start: 2019-08-05 | End: 2019-08-05

## 2019-08-05 RX ORDER — CEFAZOLIN SODIUM 1 G/3ML
2 INJECTION, POWDER, FOR SOLUTION INTRAMUSCULAR; INTRAVENOUS
Status: COMPLETED | OUTPATIENT
Start: 2019-08-05 | End: 2019-08-05

## 2019-08-05 RX ORDER — KETAMINE HYDROCHLORIDE 50 MG/ML
INJECTION, SOLUTION INTRAMUSCULAR; INTRAVENOUS
Status: DISCONTINUED | OUTPATIENT
Start: 2019-08-05 | End: 2019-08-05

## 2019-08-05 RX ORDER — BACITRACIN 50000 [IU]/1
INJECTION, POWDER, FOR SOLUTION INTRAMUSCULAR
Status: DISCONTINUED | OUTPATIENT
Start: 2019-08-05 | End: 2019-08-05 | Stop reason: HOSPADM

## 2019-08-05 RX ORDER — SODIUM CHLORIDE, SODIUM LACTATE, POTASSIUM CHLORIDE, CALCIUM CHLORIDE 600; 310; 30; 20 MG/100ML; MG/100ML; MG/100ML; MG/100ML
INJECTION, SOLUTION INTRAVENOUS CONTINUOUS
Status: DISCONTINUED | OUTPATIENT
Start: 2019-08-05 | End: 2019-08-05 | Stop reason: HOSPADM

## 2019-08-05 RX ORDER — DEXAMETHASONE SODIUM PHOSPHATE 4 MG/ML
INJECTION, SOLUTION INTRA-ARTICULAR; INTRALESIONAL; INTRAMUSCULAR; INTRAVENOUS; SOFT TISSUE
Status: DISCONTINUED | OUTPATIENT
Start: 2019-08-05 | End: 2019-08-05

## 2019-08-05 RX ORDER — LIDOCAINE HYDROCHLORIDE 10 MG/ML
0.5 INJECTION, SOLUTION EPIDURAL; INFILTRATION; INTRACAUDAL; PERINEURAL ONCE
Status: DISCONTINUED | OUTPATIENT
Start: 2019-08-05 | End: 2019-08-05 | Stop reason: HOSPADM

## 2019-08-05 RX ORDER — LIDOCAINE HYDROCHLORIDE AND EPINEPHRINE 10; 10 MG/ML; UG/ML
INJECTION, SOLUTION INFILTRATION; PERINEURAL
Status: DISCONTINUED | OUTPATIENT
Start: 2019-08-05 | End: 2019-08-05 | Stop reason: HOSPADM

## 2019-08-05 RX ORDER — MIDAZOLAM HYDROCHLORIDE 1 MG/ML
INJECTION, SOLUTION INTRAMUSCULAR; INTRAVENOUS
Status: DISCONTINUED | OUTPATIENT
Start: 2019-08-05 | End: 2019-08-05

## 2019-08-05 RX ORDER — PHENYLEPHRINE HYDROCHLORIDE 10 MG/ML
INJECTION INTRAVENOUS
Status: DISCONTINUED | OUTPATIENT
Start: 2019-08-05 | End: 2019-08-05

## 2019-08-05 RX ORDER — HYDROMORPHONE HYDROCHLORIDE 2 MG/ML
0.4 INJECTION, SOLUTION INTRAMUSCULAR; INTRAVENOUS; SUBCUTANEOUS EVERY 5 MIN PRN
Status: DISCONTINUED | OUTPATIENT
Start: 2019-08-05 | End: 2019-08-05 | Stop reason: HOSPADM

## 2019-08-05 RX ORDER — FENTANYL CITRATE 50 UG/ML
INJECTION, SOLUTION INTRAMUSCULAR; INTRAVENOUS
Status: DISCONTINUED | OUTPATIENT
Start: 2019-08-05 | End: 2019-08-05

## 2019-08-05 RX ORDER — LIDOCAINE HCL/PF 100 MG/5ML
SYRINGE (ML) INTRAVENOUS
Status: DISCONTINUED | OUTPATIENT
Start: 2019-08-05 | End: 2019-08-05

## 2019-08-05 RX ORDER — SODIUM CHLORIDE, SODIUM LACTATE, POTASSIUM CHLORIDE, CALCIUM CHLORIDE 600; 310; 30; 20 MG/100ML; MG/100ML; MG/100ML; MG/100ML
INJECTION, SOLUTION INTRAVENOUS CONTINUOUS PRN
Status: DISCONTINUED | OUTPATIENT
Start: 2019-08-05 | End: 2019-08-05

## 2019-08-05 RX ORDER — MEPERIDINE HYDROCHLORIDE 25 MG/ML
12.5 INJECTION INTRAMUSCULAR; INTRAVENOUS; SUBCUTANEOUS ONCE AS NEEDED
Status: DISCONTINUED | OUTPATIENT
Start: 2019-08-05 | End: 2019-08-05 | Stop reason: HOSPADM

## 2019-08-05 RX ORDER — GLYCOPYRROLATE 0.2 MG/ML
INJECTION INTRAMUSCULAR; INTRAVENOUS
Status: DISCONTINUED | OUTPATIENT
Start: 2019-08-05 | End: 2019-08-05

## 2019-08-05 RX ORDER — ONDANSETRON 2 MG/ML
4 INJECTION INTRAMUSCULAR; INTRAVENOUS DAILY PRN
Status: DISCONTINUED | OUTPATIENT
Start: 2019-08-05 | End: 2019-08-05 | Stop reason: HOSPADM

## 2019-08-05 RX ORDER — PROPOFOL 10 MG/ML
VIAL (ML) INTRAVENOUS
Status: DISCONTINUED | OUTPATIENT
Start: 2019-08-05 | End: 2019-08-05

## 2019-08-05 RX ORDER — OXYCODONE HYDROCHLORIDE 5 MG/1
5 TABLET ORAL
Status: DISCONTINUED | OUTPATIENT
Start: 2019-08-05 | End: 2019-08-05 | Stop reason: HOSPADM

## 2019-08-05 RX ADMIN — PHENYLEPHRINE HYDROCHLORIDE 100 MCG: 10 INJECTION INTRAVENOUS at 09:08

## 2019-08-05 RX ADMIN — OXYCODONE HYDROCHLORIDE 5 MG: 5 TABLET ORAL at 10:08

## 2019-08-05 RX ADMIN — MIDAZOLAM 2 MG: 1 INJECTION INTRAMUSCULAR; INTRAVENOUS at 07:08

## 2019-08-05 RX ADMIN — FENTANYL CITRATE 100 MCG: 50 INJECTION, SOLUTION INTRAMUSCULAR; INTRAVENOUS at 07:08

## 2019-08-05 RX ADMIN — KETAMINE HYDROCHLORIDE 20 MG: 50 INJECTION, SOLUTION INTRAMUSCULAR; INTRAVENOUS at 08:08

## 2019-08-05 RX ADMIN — NEOSTIGMINE METHYLSULFATE 4 MG: 1 INJECTION INTRAVENOUS at 09:08

## 2019-08-05 RX ADMIN — SODIUM CHLORIDE, SODIUM LACTATE, POTASSIUM CHLORIDE, AND CALCIUM CHLORIDE: 600; 310; 30; 20 INJECTION, SOLUTION INTRAVENOUS at 06:08

## 2019-08-05 RX ADMIN — ONDANSETRON 4 MG: 2 INJECTION INTRAMUSCULAR; INTRAVENOUS at 01:08

## 2019-08-05 RX ADMIN — FENTANYL CITRATE 25 MCG: 50 INJECTION, SOLUTION INTRAMUSCULAR; INTRAVENOUS at 09:08

## 2019-08-05 RX ADMIN — SODIUM CHLORIDE, SODIUM LACTATE, POTASSIUM CHLORIDE, AND CALCIUM CHLORIDE: 600; 310; 30; 20 INJECTION, SOLUTION INTRAVENOUS at 07:08

## 2019-08-05 RX ADMIN — FENTANYL CITRATE 50 MCG: 50 INJECTION, SOLUTION INTRAMUSCULAR; INTRAVENOUS at 08:08

## 2019-08-05 RX ADMIN — CEFAZOLIN 2 G: 330 INJECTION, POWDER, FOR SOLUTION INTRAMUSCULAR; INTRAVENOUS at 08:08

## 2019-08-05 RX ADMIN — HYDROMORPHONE HYDROCHLORIDE 0.4 MG: 2 INJECTION, SOLUTION INTRAMUSCULAR; INTRAVENOUS; SUBCUTANEOUS at 10:08

## 2019-08-05 RX ADMIN — PROMETHAZINE HYDROCHLORIDE 6.25 MG: 25 INJECTION INTRAMUSCULAR; INTRAVENOUS at 02:08

## 2019-08-05 RX ADMIN — ACETAMINOPHEN 1000 MG: 500 TABLET, FILM COATED ORAL at 06:08

## 2019-08-05 RX ADMIN — GLYCOPYRROLATE 0.2 MG: 0.2 INJECTION, SOLUTION INTRAMUSCULAR; INTRAVENOUS at 08:08

## 2019-08-05 RX ADMIN — DEXAMETHASONE SODIUM PHOSPHATE 8 MG: 4 INJECTION, SOLUTION INTRAMUSCULAR; INTRAVENOUS at 08:08

## 2019-08-05 RX ADMIN — KETAMINE HYDROCHLORIDE 10 MG: 50 INJECTION, SOLUTION INTRAMUSCULAR; INTRAVENOUS at 09:08

## 2019-08-05 RX ADMIN — ONDANSETRON 4 MG: 2 INJECTION INTRAMUSCULAR; INTRAVENOUS at 09:08

## 2019-08-05 RX ADMIN — PROPOFOL 150 MG: 10 INJECTION, EMULSION INTRAVENOUS at 07:08

## 2019-08-05 RX ADMIN — GLYCOPYRROLATE 0.4 MG: 0.2 INJECTION, SOLUTION INTRAMUSCULAR; INTRAVENOUS at 09:08

## 2019-08-05 RX ADMIN — ROCURONIUM BROMIDE 40 MG: 10 INJECTION, SOLUTION INTRAVENOUS at 07:08

## 2019-08-05 RX ADMIN — LIDOCAINE HYDROCHLORIDE 70 MG: 20 INJECTION, SOLUTION INTRAVENOUS at 07:08

## 2019-08-05 NOTE — TRANSFER OF CARE
"Anesthesia Transfer of Care Note    Patient: Ashlie Redman    Procedure(s) Performed: Procedure(s) (LRB):  BREAST REVISION SURGERY (Bilateral)  REMOVAL, IMPLANT, BREAST (Bilateral)  CAPSULECTOMY, BREAST (Left)    Patient location: PACU    Anesthesia Type: general    Transport from OR: Transported from OR on 6-10 L/min O2 by face mask with adequate spontaneous ventilation    Post pain: adequate analgesia    Post assessment: no apparent anesthetic complications    Post vital signs: stable    Level of consciousness: awake and alert    Nausea/Vomiting: no nausea/vomiting    Complications: none    Transfer of care protocol was followed      Last vitals:   Visit Vitals  /65 (BP Location: Right arm, Patient Position: Lying)   Pulse 80   Temp 37 °C (98.6 °F) (Oral)   Resp 16   Ht 5' 7" (1.702 m)   Wt 61.7 kg (136 lb)   SpO2 100%   Breastfeeding? No   BMI 21.30 kg/m²     "

## 2019-08-05 NOTE — DISCHARGE INSTRUCTIONS
REMOVE DRESSING IN 24 HOURS!    NO HEAVY LIFTING!          Anesthesia: After Your Surgery  Youve just had surgery. During surgery, you received medication called anesthesia to keep you comfortable and pain-free. After surgery, you may experience some pain or nausea. This is common. Here are some tips for feeling better and recovering after surgery.    Going home  Your doctor or nurse will show you how to take care of yourself when you go home. He or she will also answer your questions. Have an adult family member or friend drive you home. For the first 24 hours after your surgery:    · Do not drive or use heavy equipment.  · Do not make important decisions or sign legal documents.  · Avoid alcohol.  · Have someone stay with you, if needed. He or she can watch for problems and help keep you safe.  · Take your time getting up from a seated or lying position. You may experience dizziness for 24 hours.    Be sure to keep all follow-up appointments with your doctor. And rest after your procedure for as long as your doctor tells you to.    Coping with pain  If you have pain after surgery, pain medication will help you feel better. Take it as directed, before pain becomes severe. Also, ask your doctor or pharmacist about other ways to control pain, such as with heat, ice, and relaxation. And follow any other instructions your surgeon or nurse gives you.    URINARY RETENTION  Should you experience a decrease in your urine output or are unable to urinate following surgery, this can be due to the medications given during surgery.  We recommend you going to the nearest Emergency Department.    Tips for taking pain medication  To get the best relief possible, remember these points:    · Pain medications can upset your stomach. Taking them with a little food may help.  · Most pain relievers taken by mouth need at least 20 to 30 minutes to take effect.  · Taking medication on a schedule can help you remember to take it. Try to  time your medication so that you can take it before beginning an activity, such as dressing, walking, or sitting down for dinner.  · Constipation is a common side effect of pain medications. Contact your doctor before taking any medications like laxatives or stool softeners to help relieve constipation. Also ask about any dietary restrictions, because drinking lots of fluids and eating foods like fruits and vegetables that are high in fiber can also help. Remember, dont take laxatives unless your surgeon has prescribed them.  · Mixing alcohol and pain medication can cause dizziness and slow your breathing. It can even be fatal. Dont drink alcohol while taking pain medication.  · Pain medication can slow your reflexes. Dont drive or operate machinery while taking pain medication.    If your health care provider tells you to take acetaminophen to help relieve your pain, ask him or her how much you are supposed to take each day. (Acetaminophen is the generic name for Tylenol and other brand-name pain relievers.) Acetaminophen or other pain relievers may interact with your prescription medicines or other over-the-counter (OTC) drugs. Some prescription medications contain acetaminophen along with other active ingredients. Using both prescription and OTC acetaminophen for pain can cause you to overdose. The FDA recommends that you read the labels on your OTC medications carefully. This will help you to clearly understand the list of active ingredients, dosing instructions, and any warnings. It may also help you avoid taking too much acetaminophen. If you have questions or don't understand the information, ask your pharmacist or health care provider to explain it to you before you take the OTC medication.    Managing nausea  Some people have an upset stomach after surgery. This is often due to anesthesia, pain, pain medications, or the stress of surgery. The following tips will help you manage nausea and get good  nutrition as you recover. If you were on a special diet before surgery, ask your doctor if you should follow it during recovery. These tips may help:    · Dont push yourself to eat. Your body will tell you when to eat and how much.  · Start off with clear liquids and soup. They are easier to digest.  · Progress to semi-solid foods (mashed potatoes, applesauce, and gelatin) as you feel ready.  · Slowly move to solid foods. Dont eat fatty, rich, or spicy foods at first.  · Dont force yourself to have three large meals a day. Instead, eat smaller amounts more often.  · Take pain medications with a small amount of solid food, such as crackers or toast to avoid nausea.      Call your surgeon if    · You feel too sleepy, dizzy, or groggy (medication may be too strong).  · You have side effects like nausea, vomiting, or skin changes (rash, itching, or hives).     © 3848-5147 [employer]. [address] [place] [address], [place] [address]. All rights reserved. This information is not intended as a substitute for professional medical care. Always follow your healthcare professional's instructions.    PLEASE FOLLOW ANY OTHER INSTRUCTIONS PROVIDED TO YOU BY DR. HAMILTON!

## 2019-08-05 NOTE — BRIEF OP NOTE
[facility]   Operative Note     SUMMARY     Surgery Date: 8/5/2019     Surgeon(s) and Role:     * Diego Modi MD - Primary    Assisting Surgeon: None    Pre-op Diagnosis:  Personal history of malignant neoplasm of breast [Z85.3]  Capsular contracture of breast implant, initial encounter [T85.44XA]    Post-op Diagnosis:  Post-Op Diagnosis Codes:     * Personal history of malignant neoplasm of breast [Z85.3]     * Capsular contracture of breast implant, initial encounter [T85.44XA]    Procedure:  1.  Right breast implant removal 2.  Left breast implant removal with capsulectomy    Anesthesia: General     Findings/Key Components: Left capsular contracture, implants removed.     Estimated Blood Loss:  Minimal    Complications:  None    Condition: Stable    Brief Indications for surgery:  Ms. Redman came to see me regarding implant removal.  Patient had previous breast reconstruction several years ago and now the patient has a left capsular contracture. Risks and benefits were discussed with the patient regarding implant removal.    Intraoperative Detail:  After risks and benefits were thoroughly discussed with the procedure and the patient expressed verbal understanding, informed consent was obtained.  The patient was subsequently taken to the operating room and placed under general anesthesia.  Patient's breasts were prepped and draped in the standard surgical fashion. Attention was directed towards the left breast. This breast had a capsular contracture.Iinframammary fold incision was then made and the total capsulectomy was performed and including the implant.  Hemostasis was obtained.  Attention was then directed towards the opposite side. Inframammary fold incision was then made. Dissection was carried down to the capsule and the capsule was then entered.  Implant was then removed and the capsule was then scored and a drain was placed.  Hemostasis was obtained. There was no capsular  contracture on this side and therefore the capsule was left intact. The capsule was then closed with 3-0 Vicryl, suture subcutaneous tissue was then closed with 3-0 Vicryl suture and 4-0 Vicryl was used for deep dermal sutures and a 2-0 Quill subcuticular stitch for the skin. 4-0 Vicryl was used to secure the drain.  Attention was directed back to the left side. Hemostasis was obtained. Drain was then placed and the wound was then closed in layers 3-0 Vicryl for the subcutaneous tissue and Danna's fascia, 4-0 Vicryl for deep dermal sutures, and 2-0 Quill subcuticular stitch for skin. 4-0 vicryl was used to secure the drain. Dermabond tape was then applied followed by ABD pads and postoperative bra. The sponge, needle, and instrument  counts were correct at the end of the case. Patient tolerated procedure well and was transferred to recovery room in stable condition.          Specimens:   Specimen (12h ago, onward)    Start     Ordered    08/05/19 0904  Specimen to Pathology - Surgery  Once     Comments:  1) Left Breast Implant and Capsule2) Right Breast Implant     Start Status     08/05/19 0904 Collected (08/05/19 0905) Order ID: [id number]       08/05/19 0905          Discharge Note    SUMMARY     Admit Date: 8/5/2019    Discharge Date and Time:  08/05/2019 10:16 AM    Hospital Course Tolerated the above procedures without complications.     Final Diagnosis: Post-Op Diagnosis Codes:     * Personal history of malignant neoplasm of breast [Z85.3]     * Capsular contracture of breast implant, initial encounter [T85.44XA]    Disposition: Home or Self Care    Follow Up/Patient Instructions:     Fu in 1 week  May shower tomorrow  No heavy lifting    Medications:  Reconciled Home Medications:      Medication List      START taking these medications    doxycycline 100 MG capsule  Commonly known as:  MONODOX  Take 1 capsule (100 mg total) by mouth 2 (two) times daily. Use sunscreen or cover and continue 1 week after  completed. for 7 days        CHANGE how you take these medications    valACYclovir 500 MG tablet  Commonly known as:  VALTREX  Take 1 tablet (500 mg total) by mouth once daily.  What changed:    · when to take this  · reasons to take this        CONTINUE taking these medications    buPROPion 150 MG TB24 tablet  Commonly known as:  WELLBUTRIN XL  Take 1 tablet (150 mg total) by mouth once daily.     dorzolamide 2 % ophthalmic solution  Commonly known as:  TRUSOPT  Place 1 drop into both eyes 3 (three) times daily.     latanoprost 0.005 % ophthalmic solution  INSTILL ONE DROP INTO EACH EYE ONCE DAILY IN THE EVENING     phenazopyridine 200 MG tablet  Commonly known as:  PYRIDIUM  Take 1 tablet (200 mg total) by mouth 3 (three) times daily as needed for Pain.          Discharge Procedure Orders   Diet Adult Regular     Lifting restrictions     Notify your health care provider if you experience any of the following:  temperature >100.4     Notify your health care provider if you experience any of the following:  persistent nausea and vomiting or diarrhea     Notify your health care provider if you experience any of the following:  severe uncontrolled pain     Notify your health care provider if you experience any of the following:  redness, tenderness, or signs of infection (pain, swelling, redness, odor or green/yellow discharge around incision site)     Notify your health care provider if you experience any of the following:  difficulty breathing or increased cough     Notify your health care provider if you experience any of the following:  severe persistent headache     Notify your health care provider if you experience any of the following:  worsening rash     Notify your health care provider if you experience any of the following:  persistent dizziness, light-headedness, or visual disturbances     Notify your health care provider if you experience any of the following:  increased confusion or weakness     Notify  your health care provider if you experience any of the following:     Remove dressing in 24 hours     Follow-up Information     Diego Modi MD In 1 week.    Specialty:  Plastic Surgery  Why:  For post-op visit  Contact information:  [address]  [address]  [employer]  [facility] [address] [phone]

## 2019-08-05 NOTE — INTERVAL H&P NOTE
The patient has been examined and the H&P has been reviewed:    I concur with the findings and no changes have occurred since H&P was written.    Anesthesia/Surgery risks, benefits and alternative options discussed and understood by patient/family.          Active Hospital Problems    Diagnosis  POA    Personal history of malignant neoplasm of breast [Z85.3]  Not Applicable      Resolved Hospital Problems   No resolved problems to display.

## 2019-08-05 NOTE — PLAN OF CARE
Ashlie JOSHUA Redman has met all discharge criteria from Phase II. Vital Signs are stable, ambulating  without difficulty. Discharge instructions given, patient verbalized understanding. Discharged from facility via wheelchair in stable condition.

## 2019-08-05 NOTE — ANESTHESIA POSTPROCEDURE EVALUATION
Anesthesia Post Evaluation    Patient: Ashlie Redman    Procedure(s) Performed: Procedure(s) (LRB):  BREAST REVISION SURGERY (Bilateral)  REMOVAL, IMPLANT, BREAST (Bilateral)  CAPSULECTOMY, BREAST (Left)    Final Anesthesia Type: general  Patient location during evaluation: PACU  Patient participation: Yes- Able to Participate  Level of consciousness: awake and alert  Post-procedure vital signs: reviewed and stable  Pain management: adequate  Airway patency: patent  PONV status at discharge: No PONV  Anesthetic complications: no      Cardiovascular status: blood pressure returned to baseline  Respiratory status: unassisted  Hydration status: euvolemic  Follow-up not needed.          Vitals Value Taken Time   /67 8/5/2019 10:37 AM   Temp 37 °C (98.6 °F) 8/5/2019  9:49 AM   Pulse 61 8/5/2019 10:43 AM   Resp 18 8/5/2019 10:20 AM   SpO2 99 % 8/5/2019 10:43 AM   Vitals shown include unvalidated device data.      No case tracking events are documented in the log.      Pain/Lillian Score: Pain Rating Prior to Med Admin: 7 (8/5/2019 10:28 AM)  Lillian Score: 10 (8/5/2019 10:20 AM)

## 2019-08-06 ENCOUNTER — PATIENT MESSAGE (OUTPATIENT)
Dept: UROLOGY | Facility: CLINIC | Age: 64
End: 2019-08-06

## 2019-08-21 ENCOUNTER — ANESTHESIA EVENT (OUTPATIENT)
Dept: SURGERY | Facility: OTHER | Age: 64
End: 2019-08-21
Payer: COMMERCIAL

## 2019-08-21 ENCOUNTER — HOSPITAL ENCOUNTER (OUTPATIENT)
Facility: OTHER | Age: 64
Discharge: HOME | End: 2019-08-21
Attending: SURGERY | Admitting: SURGERY
Payer: COMMERCIAL

## 2019-08-21 ENCOUNTER — ANESTHESIA (OUTPATIENT)
Dept: SURGERY | Facility: OTHER | Age: 64
End: 2019-08-21
Payer: COMMERCIAL

## 2019-08-21 VITALS
OXYGEN SATURATION: 96 % | DIASTOLIC BLOOD PRESSURE: 78 MMHG | SYSTOLIC BLOOD PRESSURE: 119 MMHG | HEIGHT: 67 IN | BODY MASS INDEX: 21.35 KG/M2 | HEART RATE: 89 BPM | RESPIRATION RATE: 16 BRPM | TEMPERATURE: 98 F | WEIGHT: 136 LBS

## 2019-08-21 DIAGNOSIS — Z85.3 PERSONAL HISTORY OF BREAST CANCER: Primary | ICD-10-CM

## 2019-08-21 LAB
GRAM STN SPEC: NORMAL
GRAM STN SPEC: NORMAL

## 2019-08-21 PROCEDURE — 63600175 PHARM REV CODE 636 W HCPCS: Performed by: ANESTHESIOLOGY

## 2019-08-21 PROCEDURE — C1789 PROSTHESIS, BREAST, IMP: HCPCS | Performed by: SURGERY

## 2019-08-21 PROCEDURE — 87070 CULTURE OTHR SPECIMN AEROBIC: CPT

## 2019-08-21 PROCEDURE — 87205 SMEAR GRAM STAIN: CPT

## 2019-08-21 PROCEDURE — 87075 CULTR BACTERIA EXCEPT BLOOD: CPT

## 2019-08-21 PROCEDURE — 25000003 PHARM REV CODE 250: Performed by: NURSE ANESTHETIST, CERTIFIED REGISTERED

## 2019-08-21 PROCEDURE — 87186 SC STD MICRODIL/AGAR DIL: CPT

## 2019-08-21 PROCEDURE — 71000016 HC POSTOP RECOV ADDL HR: Performed by: SURGERY

## 2019-08-21 PROCEDURE — 87206 SMEAR FLUORESCENT/ACID STAI: CPT

## 2019-08-21 PROCEDURE — 63600175 PHARM REV CODE 636 W HCPCS: Performed by: SURGERY

## 2019-08-21 PROCEDURE — 63600175 PHARM REV CODE 636 W HCPCS: Performed by: NURSE ANESTHETIST, CERTIFIED REGISTERED

## 2019-08-21 PROCEDURE — 87102 FUNGUS ISOLATION CULTURE: CPT

## 2019-08-21 PROCEDURE — 37000009 HC ANESTHESIA EA ADD 15 MINS: Performed by: SURGERY

## 2019-08-21 PROCEDURE — 36000706: Performed by: SURGERY

## 2019-08-21 PROCEDURE — 71000033 HC RECOVERY, INTIAL HOUR: Performed by: SURGERY

## 2019-08-21 PROCEDURE — 36000707: Performed by: SURGERY

## 2019-08-21 PROCEDURE — 71000015 HC POSTOP RECOV 1ST HR: Performed by: SURGERY

## 2019-08-21 PROCEDURE — 25000003 PHARM REV CODE 250: Performed by: ANESTHESIOLOGY

## 2019-08-21 PROCEDURE — 25000003 PHARM REV CODE 250: Performed by: SURGERY

## 2019-08-21 PROCEDURE — 87077 CULTURE AEROBIC IDENTIFY: CPT

## 2019-08-21 PROCEDURE — 87116 MYCOBACTERIA CULTURE: CPT

## 2019-08-21 PROCEDURE — 37000008 HC ANESTHESIA 1ST 15 MINUTES: Performed by: SURGERY

## 2019-08-21 PROCEDURE — 71000039 HC RECOVERY, EACH ADD'L HOUR: Performed by: SURGERY

## 2019-08-21 DEVICE — ALLODERM PERF MED 16CMX20CM: Type: IMPLANTABLE DEVICE | Site: BREAST | Status: FUNCTIONAL

## 2019-08-21 DEVICE — IMPLANTABLE DEVICE: Type: IMPLANTABLE DEVICE | Site: BREAST | Status: FUNCTIONAL

## 2019-08-21 RX ORDER — HYDROMORPHONE HYDROCHLORIDE 2 MG/ML
0.4 INJECTION, SOLUTION INTRAMUSCULAR; INTRAVENOUS; SUBCUTANEOUS EVERY 5 MIN PRN
Status: DISCONTINUED | OUTPATIENT
Start: 2019-08-21 | End: 2019-08-21 | Stop reason: HOSPADM

## 2019-08-21 RX ORDER — ONDANSETRON 2 MG/ML
4 INJECTION INTRAMUSCULAR; INTRAVENOUS DAILY PRN
Status: DISCONTINUED | OUTPATIENT
Start: 2019-08-21 | End: 2019-08-21 | Stop reason: HOSPADM

## 2019-08-21 RX ORDER — OXYCODONE HYDROCHLORIDE 5 MG/1
5 TABLET ORAL
Status: DISCONTINUED | OUTPATIENT
Start: 2019-08-21 | End: 2019-08-21 | Stop reason: HOSPADM

## 2019-08-21 RX ORDER — ONDANSETRON 2 MG/ML
INJECTION INTRAMUSCULAR; INTRAVENOUS
Status: DISCONTINUED | OUTPATIENT
Start: 2019-08-21 | End: 2019-08-21

## 2019-08-21 RX ORDER — HETASTARCH 6 G/100ML
INJECTION, SOLUTION INTRAVENOUS CONTINUOUS PRN
Status: DISCONTINUED | OUTPATIENT
Start: 2019-08-21 | End: 2019-08-21

## 2019-08-21 RX ORDER — ROCURONIUM BROMIDE 10 MG/ML
INJECTION, SOLUTION INTRAVENOUS
Status: DISCONTINUED | OUTPATIENT
Start: 2019-08-21 | End: 2019-08-21

## 2019-08-21 RX ORDER — SCOLOPAMINE TRANSDERMAL SYSTEM 1 MG/1
1 PATCH, EXTENDED RELEASE TRANSDERMAL
Status: DISCONTINUED | OUTPATIENT
Start: 2019-08-21 | End: 2019-08-21 | Stop reason: HOSPADM

## 2019-08-21 RX ORDER — MIDAZOLAM HYDROCHLORIDE 1 MG/ML
INJECTION INTRAMUSCULAR; INTRAVENOUS
Status: DISCONTINUED | OUTPATIENT
Start: 2019-08-21 | End: 2019-08-21

## 2019-08-21 RX ORDER — MEPERIDINE HYDROCHLORIDE 25 MG/ML
12.5 INJECTION INTRAMUSCULAR; INTRAVENOUS; SUBCUTANEOUS ONCE AS NEEDED
Status: DISCONTINUED | OUTPATIENT
Start: 2019-08-21 | End: 2019-08-21 | Stop reason: HOSPADM

## 2019-08-21 RX ORDER — NEOSTIGMINE METHYLSULFATE 1 MG/ML
INJECTION, SOLUTION INTRAVENOUS
Status: DISCONTINUED | OUTPATIENT
Start: 2019-08-21 | End: 2019-08-21

## 2019-08-21 RX ORDER — ACETAMINOPHEN 10 MG/ML
1000 INJECTION, SOLUTION INTRAVENOUS ONCE
Status: COMPLETED | OUTPATIENT
Start: 2019-08-21 | End: 2019-08-21

## 2019-08-21 RX ORDER — FENTANYL CITRATE 50 UG/ML
INJECTION, SOLUTION INTRAMUSCULAR; INTRAVENOUS
Status: DISCONTINUED | OUTPATIENT
Start: 2019-08-21 | End: 2019-08-21

## 2019-08-21 RX ORDER — SODIUM CHLORIDE 0.9 % (FLUSH) 0.9 %
3 SYRINGE (ML) INJECTION
Status: DISCONTINUED | OUTPATIENT
Start: 2019-08-21 | End: 2019-08-21 | Stop reason: HOSPADM

## 2019-08-21 RX ORDER — PROPOFOL 10 MG/ML
VIAL (ML) INTRAVENOUS
Status: DISCONTINUED | OUTPATIENT
Start: 2019-08-21 | End: 2019-08-21

## 2019-08-21 RX ORDER — GLYCOPYRROLATE 0.2 MG/ML
INJECTION INTRAMUSCULAR; INTRAVENOUS
Status: DISCONTINUED | OUTPATIENT
Start: 2019-08-21 | End: 2019-08-21

## 2019-08-21 RX ORDER — DEXAMETHASONE SODIUM PHOSPHATE 4 MG/ML
INJECTION, SOLUTION INTRA-ARTICULAR; INTRALESIONAL; INTRAMUSCULAR; INTRAVENOUS; SOFT TISSUE
Status: DISCONTINUED | OUTPATIENT
Start: 2019-08-21 | End: 2019-08-21

## 2019-08-21 RX ORDER — LIDOCAINE HCL/PF 100 MG/5ML
SYRINGE (ML) INTRAVENOUS
Status: DISCONTINUED | OUTPATIENT
Start: 2019-08-21 | End: 2019-08-21

## 2019-08-21 RX ORDER — SODIUM CHLORIDE, SODIUM LACTATE, POTASSIUM CHLORIDE, CALCIUM CHLORIDE 600; 310; 30; 20 MG/100ML; MG/100ML; MG/100ML; MG/100ML
INJECTION, SOLUTION INTRAVENOUS CONTINUOUS PRN
Status: DISCONTINUED | OUTPATIENT
Start: 2019-08-21 | End: 2019-08-21

## 2019-08-21 RX ORDER — DIPHENHYDRAMINE HYDROCHLORIDE 50 MG/ML
INJECTION INTRAMUSCULAR; INTRAVENOUS
Status: DISCONTINUED | OUTPATIENT
Start: 2019-08-21 | End: 2019-08-21

## 2019-08-21 RX ADMIN — FENTANYL CITRATE 50 MCG: 50 INJECTION, SOLUTION INTRAMUSCULAR; INTRAVENOUS at 11:08

## 2019-08-21 RX ADMIN — FENTANYL CITRATE 100 MCG: 50 INJECTION, SOLUTION INTRAMUSCULAR; INTRAVENOUS at 11:08

## 2019-08-21 RX ADMIN — DEXAMETHASONE SODIUM PHOSPHATE 8 MG: 4 INJECTION, SOLUTION INTRAMUSCULAR; INTRAVENOUS at 11:08

## 2019-08-21 RX ADMIN — PROMETHAZINE HYDROCHLORIDE 6.25 MG: 25 INJECTION INTRAMUSCULAR; INTRAVENOUS at 03:08

## 2019-08-21 RX ADMIN — NEOSTIGMINE METHYLSULFATE 5 MG: 1 INJECTION INTRAVENOUS at 02:08

## 2019-08-21 RX ADMIN — SODIUM CHLORIDE, SODIUM LACTATE, POTASSIUM CHLORIDE, AND CALCIUM CHLORIDE: 600; 310; 30; 20 INJECTION, SOLUTION INTRAVENOUS at 10:08

## 2019-08-21 RX ADMIN — MIDAZOLAM HYDROCHLORIDE 2 MG: 1 INJECTION, SOLUTION INTRAMUSCULAR; INTRAVENOUS at 11:08

## 2019-08-21 RX ADMIN — HYDROMORPHONE HYDROCHLORIDE 0.4 MG: 2 INJECTION, SOLUTION INTRAMUSCULAR; INTRAVENOUS; SUBCUTANEOUS at 03:08

## 2019-08-21 RX ADMIN — AMPICILLIN SODIUM AND SULBACTAM SODIUM 3 G: 2; 1 INJECTION, POWDER, FOR SOLUTION INTRAMUSCULAR; INTRAVENOUS at 11:08

## 2019-08-21 RX ADMIN — SODIUM CHLORIDE, SODIUM LACTATE, POTASSIUM CHLORIDE, AND CALCIUM CHLORIDE: 600; 310; 30; 20 INJECTION, SOLUTION INTRAVENOUS at 12:08

## 2019-08-21 RX ADMIN — FENTANYL CITRATE 50 MCG: 50 INJECTION, SOLUTION INTRAMUSCULAR; INTRAVENOUS at 12:08

## 2019-08-21 RX ADMIN — ROCURONIUM BROMIDE 40 MG: 10 INJECTION, SOLUTION INTRAVENOUS at 11:08

## 2019-08-21 RX ADMIN — ACETAMINOPHEN 1000 MG: 10 INJECTION, SOLUTION INTRAVENOUS at 03:08

## 2019-08-21 RX ADMIN — ROCURONIUM BROMIDE 10 MG: 10 INJECTION, SOLUTION INTRAVENOUS at 11:08

## 2019-08-21 RX ADMIN — PROPOFOL 150 MG: 10 INJECTION, EMULSION INTRAVENOUS at 11:08

## 2019-08-21 RX ADMIN — PROPOFOL 50 MG: 10 INJECTION, EMULSION INTRAVENOUS at 12:08

## 2019-08-21 RX ADMIN — HETASTARCH IN SODIUM CHLORIDE: 6; .9 INJECTION, SOLUTION INTRAVENOUS at 11:08

## 2019-08-21 RX ADMIN — DIPHENHYDRAMINE HYDROCHLORIDE 12.5 MG: 50 INJECTION, SOLUTION INTRAMUSCULAR; INTRAVENOUS at 11:08

## 2019-08-21 RX ADMIN — LIDOCAINE HYDROCHLORIDE 50 MG: 20 INJECTION, SOLUTION INTRAVENOUS at 11:08

## 2019-08-21 RX ADMIN — SCOPALAMINE 1 PATCH: 1 PATCH, EXTENDED RELEASE TRANSDERMAL at 10:08

## 2019-08-21 RX ADMIN — ONDANSETRON 4 MG: 2 INJECTION INTRAMUSCULAR; INTRAVENOUS at 11:08

## 2019-08-21 RX ADMIN — GLYCOPYRROLATE 0.8 MG: 0.2 INJECTION, SOLUTION INTRAMUSCULAR; INTRAVENOUS at 02:08

## 2019-08-21 NOTE — DISCHARGE INSTRUCTIONS

## 2019-08-21 NOTE — OP NOTE
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  The patient was subsequently taken to the operating room and placed supine on the operating room table. After general anesthesia was provided, the patient's breasts were prepped and draped in the standard surgical fashion. Attention was directed towards the right breast.  Previous incision was then opened and the mastectomy defect was recreated.  There was some mild fluid which was sent for cultures.  Even though this patient had a previous lumpectomy and radiation it was essentially a mastectomy defect with very minimal breast tissue. The breast pocket was opened superiorly medially and laterally to relieve some of the constriction from the prior radiation.  The patient had previous submuscular implants placed and the pectoralis muscle on this side was very fibrotic and retracted superiorly.  The plan was to perform a prepectoral reconstruction on this side. Once hemostasis was obtained, a sizer was then placed.  The attention was then directed towards the right breast.  Previous  incision was then opened. Capsulotomy is were then performed superiorly and medially.  An anterior capsulotomy was also performed. Sizer was placed on this side and the patient was placed upright to confirm symmetry.  I elected to place a 255 implant on the right side and 310 implant on the left side. AlloDerm medium thickness was then wrapped around the Sizer and sewn with 2-0 PDS suture. Pockets were then irrigated with triple antibiotic solution.  Surgical gloves were changed.  The implant was then placed in the AlloDerm wrap and closed with 2-0 PDS suture.  Cantor funnel was then used to place the implant in AlloDerm into the left breast pocket.  The implant was then secured to the IMF by sewing the AlloDerm to the fold with 2-0 PDS horizontal sutures. 2 15 round drains were placed 1 superiorly and 1 inferiorly.  And secured with 4-0 Vicryl suture.  The incision was then closed with 2-0 Quill subcutaneous suture 3-0 Monocryl deep dermal suture and 4-0 running Prolene suture. The right breast implant pocket was then irrigated with triple antibiotic solution.  Surgical gloves were changed and a 255 implant was placed into the pocket with the aid of the Cantor funnel.  The wound was then closed in layers.  3-0 Monocryl for Danna's fascia 3-0 Monocryl for the subcutaneous tissue 4-0 Monocryl deep dermal sutures and 4-0 Prolene running stitch.  Dermabond tape was applied followed by bio patches and Tegaderm.  Sponge needle instrument counts were correct at the end of the case patient tolerated the procedure well was transferred to recovery room stable condition.    Findings/Key Components: As above    Estimated Blood Loss: Minimal         Specimens:   Specimen (12h ago, onward)    None          Discharge Note    SUMMARY     Admit Date: 8/21/2019    Discharge Date and Time:  08/21/2019 3:11 PM    Hospital Course Admitted for outpatient procedure, did well without complications.    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  What changed:    · when to take this  · reasons to take this        CONTINUE taking these medications    buPROPion 150 MG TB24 tablet  Commonly known as:  WELLBUTRIN XL  Take 1 tablet (150 mg total) by mouth once daily.     dorzolamide 2 % ophthalmic solution  Commonly known as:  TRUSOPT  Place 1 drop into both eyes 3 (three) times daily.     latanoprost 0.005 % ophthalmic solution  INSTILL ONE DROP INTO EACH EYE ONCE DAILY IN THE EVENING     phenazopyridine 200 MG tablet  Commonly known as:  PYRIDIUM  Take 1 tablet (200 mg total) by mouth 3 (three) times daily as needed for Pain.          Discharge Procedure Orders   Diet Adult Regular     Lifting restrictions     Notify your health care provider if you experience any of the following:  temperature >100.4     Notify your health care provider if you experience any of the following:  persistent nausea and vomiting or diarrhea     Notify your health care provider if you experience any of the following:     Notify your health care provider if you experience any of the following:  increased confusion or weakness     Notify your health care provider if you experience any of the following:  persistent dizziness, light-headedness, or visual disturbances     Notify your health care provider if you experience any of the following:  worsening rash     Notify your health care provider if you experience any of the following:  severe persistent headache     Notify your  health care provider if you experience any of the following:  difficulty breathing or increased cough     Notify your health care provider if you experience any of the following:  redness, tenderness, or signs of infection (pain, swelling, redness, odor or green/yellow discharge around incision site)     Notify your health care provider if you experience any of the following:  severe uncontrolled pain     Leave dressing on - Keep it clean, dry, and intact until clinic visit     Follow-up Information     Diego Modi MD On 8/28/2019.    Specialty:  Plastic Surgery  Why:  For post-op visit  Contact information:  [address]  [address]  [place] [facility] [address]  [phone]

## 2019-08-21 NOTE — ANESTHESIA POSTPROCEDURE EVALUATION
Anesthesia Post Evaluation    Patient: Ashlie Redman    Procedure(s) Performed: Procedure(s) (LRB):  RECONSTRUCTION, BREAST WITH ALLODERM (Bilateral)  INSERTION-IMPLANT BREAST (Bilateral)    Final Anesthesia Type: general  Patient location during evaluation: PACU  Patient participation: Yes- Able to Participate  Level of consciousness: awake and alert  Post-procedure vital signs: reviewed and stable  Pain management: adequate  Airway patency: patent  PONV status at discharge: No PONV  Anesthetic complications: no      Cardiovascular status: blood pressure returned to baseline  Respiratory status: unassisted and spontaneous ventilation  Hydration status: euvolemic  Follow-up not needed.          Vitals Value Taken Time   /77 8/21/2019  3:06 PM   Temp 36.8 °C (98.3 °F) 8/21/2019  9:58 AM   Pulse 72 8/21/2019  3:07 PM   Resp 16 8/21/2019  2:35 PM   SpO2 100 % 8/21/2019  3:07 PM   Vitals shown include unvalidated device data.      No case tracking events are documented in the log.      Pain/Lillian Score: Pain Rating Prior to Med Admin: 7 (8/21/2019  3:00 PM)  Lillian Score: 8 (8/21/2019  2:50 PM)

## 2019-08-21 NOTE — TRANSFER OF CARE
"Anesthesia Transfer of Care Note    Patient: Ashlie Redman    Procedure(s) Performed: Procedure(s) (LRB):  RECONSTRUCTION, BREAST WITH ALLODERM (Bilateral)  INSERTION-IMPLANT BREAST (Bilateral)    Patient location: PACU    Anesthesia Type: general    Transport from OR: Transported from OR on 2-3 L/min O2 by NC with adequate spontaneous ventilation    Post pain: adequate analgesia    Post assessment: no apparent anesthetic complications    Post vital signs: stable    Level of consciousness: awake    Nausea/Vomiting: no nausea/vomiting    Complications: none    Transfer of care protocol was followed      Last vitals:   Visit Vitals  /74 (BP Location: Right arm, Patient Position: Lying)   Pulse 68   Temp 36.8 °C (98.3 °F) (Oral)   Resp 18   Ht 5' 7" (1.702 m)   Wt 61.7 kg (136 lb)   SpO2 100%   Breastfeeding? No   BMI 21.30 kg/m²     "

## 2019-08-21 NOTE — ANESTHESIA PREPROCEDURE EVALUATION
08/21/2019  Ashlie Redman is a 64 y.o., female.    Pre-op Assessment    I have reviewed the Patient Summary Reports.     I have reviewed the Nursing Notes.   I have reviewed the Medications.     Review of Systems  Anesthesia Hx:  No problems with previous Anesthesia  History of prior surgery of interest to airway management or planning: Previous anesthesia: General, Spinal 11/1/2017 Lumpectomy with [place] Node injection and Biopsy; Axillary Lymph node dissection with general anesthesia.  Denies Family Hx of Anesthesia complications.   Denies Personal Hx of Anesthesia complications.   Social:  Non-Smoker, Social Alcohol Use    Hematology/Oncology:         -- Anemia: s/p chemotherapy Breast s/p surgery, s/p radiation therapy and s/p chemotherapy  Surgery: with sentinel nodes dissection, with complete lymph node dissection and axillary   Current/Recent Cancer. Breast radiation, chemotherapy and surgery    EENT/Dental:EENT/Dental Normal   Cardiovascular:   Exercise tolerance: good Patient is an avid cyclist. No change in functional capacity Functional Capacity good / => 4 METS    Pulmonary:  Pulmonary Normal Some lung damage due to radiation Pulmonary Symptoms: (secondary to radiation treatment)  are shortness of breath with activity and pleuritic chest pain.    Renal/:  Renal/ Normal  Cystocele   Hepatic/GI:  Hepatic/GI Normal    Musculoskeletal:  Musculoskeletal Normal    Neurological:   Peripheral Neuropathy (Chemotherapy induced)    Endocrine:  Endocrine Normal    Dermatological:  Skin Normal    Psych:  Psychiatric Normal           Physical Exam  General:  Well nourished    Airway/Jaw/Neck:  Airway Findings: Mouth Opening: Normal Tongue: Normal  General Airway Assessment: Adult  Mallampati: II  Improves to II with phonation.  TM Distance: Normal, at least 6 cm  Jaw/Neck Findings:  Neck ROM: Normal ROM       Dental:  Dental Findings: In tact   Chest/Lungs:  Chest/Lungs Findings: Clear to auscultation, Normal Respiratory Rate     Heart/Vascular:  Heart Findings: Rate: Normal  Rhythm: Regular Rhythm  Sounds: Normal        Mental Status:  Mental Status Findings:  Alert and Oriented, Cooperative         Anesthesia Plan  Type of Anesthesia, risks & benefits discussed:  Anesthesia Type:  general  Patient's Preference:   Intra-op Monitoring Plan: standard ASA monitors  Intra-op Monitoring Plan Comments:   Post Op Pain Control Plan: multimodal analgesia  Post Op Pain Control Plan Comments:   Induction:   IV  Beta Blocker:         Informed Consent: Patient understands risks and agrees with Anesthesia plan.  Questions answered. Anesthesia consent signed with patient.  ASA Score: 2     Day of Surgery Review of History & Physical:    H&P update referred to the surgeon.     Anesthesia Plan Notes: Labs in epic        Ready For Surgery From Anesthesia Perspective.

## 2019-08-21 NOTE — INTERVAL H&P NOTE
The patient has been examined and the H&P has been reviewed:    I concur with the findings and no changes have occurred since H&P was written.    Anesthesia/Surgery risks, benefits and alternative options discussed and understood by patient/family.          Active Hospital Problems    Diagnosis  POA    Personal history of breast cancer [Z85.3]  Not Applicable      Resolved Hospital Problems   No resolved problems to display.

## 2019-08-24 LAB — BACTERIA SPEC AEROBE CULT: ABNORMAL

## 2019-08-25 ENCOUNTER — HOSPITAL ENCOUNTER (INPATIENT)
Facility: OTHER | Age: 64
LOS: 6 days | Discharge: HOME | DRG: 909 | End: 2019-08-31
Attending: SURGERY | Admitting: SURGERY
Payer: COMMERCIAL

## 2019-08-25 DIAGNOSIS — N61.0 CELLULITIS OF LEFT BREAST: ICD-10-CM

## 2019-08-25 LAB
ANION GAP SERPL CALC-SCNC: 9 MMOL/L (ref 8–16)
BASOPHILS # BLD AUTO: 0.03 K/UL (ref 0–0.2)
BASOPHILS NFR BLD: 0.2 % (ref 0–1.9)
BUN SERPL-MCNC: 13 MG/DL (ref 8–23)
CALCIUM SERPL-MCNC: 9.6 MG/DL (ref 8.7–10.5)
CHLORIDE SERPL-SCNC: 102 MMOL/L (ref 95–110)
CO2 SERPL-SCNC: 26 MMOL/L (ref 23–29)
CREAT SERPL-MCNC: 0.8 MG/DL (ref 0.5–1.4)
DIFFERENTIAL METHOD: ABNORMAL
EOSINOPHIL # BLD AUTO: 0.7 K/UL (ref 0–0.5)
EOSINOPHIL NFR BLD: 5.3 % (ref 0–8)
ERYTHROCYTE [DISTWIDTH] IN BLOOD BY AUTOMATED COUNT: 11.7 % (ref 11.5–14.5)
EST. GFR  (AFRICAN AMERICAN): >60 ML/MIN/1.73 M^2
EST. GFR  (NON AFRICAN AMERICAN): >60 ML/MIN/1.73 M^2
GLUCOSE SERPL-MCNC: 106 MG/DL (ref 70–110)
HCT VFR BLD AUTO: 34.8 % (ref 37–48.5)
HGB BLD-MCNC: 11.8 G/DL (ref 12–16)
IMM GRANULOCYTES # BLD AUTO: 0.07 K/UL (ref 0–0.04)
IMM GRANULOCYTES NFR BLD AUTO: 0.5 % (ref 0–0.5)
LYMPHOCYTES # BLD AUTO: 1.5 K/UL (ref 1–4.8)
LYMPHOCYTES NFR BLD: 10.7 % (ref 18–48)
MCH RBC QN AUTO: 32.9 PG (ref 27–31)
MCHC RBC AUTO-ENTMCNC: 33.9 G/DL (ref 32–36)
MCV RBC AUTO: 97 FL (ref 82–98)
MONOCYTES # BLD AUTO: 1.1 K/UL (ref 0.3–1)
MONOCYTES NFR BLD: 7.8 % (ref 4–15)
NEUTROPHILS # BLD AUTO: 10.4 K/UL (ref 1.8–7.7)
NEUTROPHILS NFR BLD: 75.5 % (ref 38–73)
NRBC BLD-RTO: 0 /100 WBC
PLATELET # BLD AUTO: 232 K/UL (ref 150–350)
PMV BLD AUTO: 10.3 FL (ref 9.2–12.9)
POTASSIUM SERPL-SCNC: 3.9 MMOL/L (ref 3.5–5.1)
RBC # BLD AUTO: 3.59 M/UL (ref 4–5.4)
SODIUM SERPL-SCNC: 137 MMOL/L (ref 136–145)
WBC # BLD AUTO: 13.82 K/UL (ref 3.9–12.7)

## 2019-08-25 PROCEDURE — 25000003 PHARM REV CODE 250: Performed by: SURGERY

## 2019-08-25 PROCEDURE — 85025 COMPLETE CBC W/AUTO DIFF WBC: CPT

## 2019-08-25 PROCEDURE — 63600175 PHARM REV CODE 636 W HCPCS: Performed by: SURGERY

## 2019-08-25 PROCEDURE — 80048 BASIC METABOLIC PNL TOTAL CA: CPT

## 2019-08-25 PROCEDURE — 94761 N-INVAS EAR/PLS OXIMETRY MLT: CPT

## 2019-08-25 PROCEDURE — 87040 BLOOD CULTURE FOR BACTERIA: CPT

## 2019-08-25 PROCEDURE — 36415 COLL VENOUS BLD VENIPUNCTURE: CPT

## 2019-08-25 PROCEDURE — 11000001 HC ACUTE MED/SURG PRIVATE ROOM

## 2019-08-25 RX ORDER — HYDROMORPHONE HYDROCHLORIDE 1 MG/ML
1 INJECTION, SOLUTION INTRAMUSCULAR; INTRAVENOUS; SUBCUTANEOUS EVERY 4 HOURS PRN
Status: DISCONTINUED | OUTPATIENT
Start: 2019-08-25 | End: 2019-08-25

## 2019-08-25 RX ORDER — ACETAMINOPHEN 325 MG/1
650 TABLET ORAL EVERY 6 HOURS PRN
Status: DISCONTINUED | OUTPATIENT
Start: 2019-08-25 | End: 2019-08-31 | Stop reason: HOSPADM

## 2019-08-25 RX ORDER — ONDANSETRON 8 MG/1
8 TABLET, ORALLY DISINTEGRATING ORAL EVERY 6 HOURS PRN
Status: DISCONTINUED | OUTPATIENT
Start: 2019-08-25 | End: 2019-08-31 | Stop reason: HOSPADM

## 2019-08-25 RX ORDER — CEFEPIME HYDROCHLORIDE 2 G/50ML
2 INJECTION, SOLUTION INTRAVENOUS
Status: DISCONTINUED | OUTPATIENT
Start: 2019-08-25 | End: 2019-08-28

## 2019-08-25 RX ORDER — HYDROMORPHONE HYDROCHLORIDE 1 MG/ML
1 INJECTION, SOLUTION INTRAMUSCULAR; INTRAVENOUS; SUBCUTANEOUS EVERY 4 HOURS PRN
Status: DISCONTINUED | OUTPATIENT
Start: 2019-08-25 | End: 2019-08-31 | Stop reason: HOSPADM

## 2019-08-25 RX ORDER — ONDANSETRON 2 MG/ML
4 INJECTION INTRAMUSCULAR; INTRAVENOUS EVERY 6 HOURS PRN
Status: DISCONTINUED | OUTPATIENT
Start: 2019-08-25 | End: 2019-08-31 | Stop reason: HOSPADM

## 2019-08-25 RX ORDER — DIPHENHYDRAMINE HCL 25 MG
25 CAPSULE ORAL EVERY 4 HOURS PRN
Status: DISCONTINUED | OUTPATIENT
Start: 2019-08-25 | End: 2019-08-31 | Stop reason: HOSPADM

## 2019-08-25 RX ORDER — DOCUSATE SODIUM 100 MG/1
100 CAPSULE, LIQUID FILLED ORAL EVERY 12 HOURS
Status: DISCONTINUED | OUTPATIENT
Start: 2019-08-25 | End: 2019-08-31 | Stop reason: HOSPADM

## 2019-08-25 RX ORDER — HYDROCODONE BITARTRATE AND ACETAMINOPHEN 5; 325 MG/1; MG/1
1 TABLET ORAL EVERY 4 HOURS PRN
Status: DISCONTINUED | OUTPATIENT
Start: 2019-08-25 | End: 2019-08-25

## 2019-08-25 RX ORDER — HYDROCODONE BITARTRATE AND ACETAMINOPHEN 10; 325 MG/1; MG/1
1 TABLET ORAL EVERY 4 HOURS PRN
Status: DISCONTINUED | OUTPATIENT
Start: 2019-08-25 | End: 2019-08-26

## 2019-08-25 RX ORDER — SODIUM CHLORIDE 0.9 % (FLUSH) 0.9 %
10 SYRINGE (ML) INJECTION
Status: DISCONTINUED | OUTPATIENT
Start: 2019-08-25 | End: 2019-08-31 | Stop reason: HOSPADM

## 2019-08-25 RX ORDER — HYDROCODONE BITARTRATE AND ACETAMINOPHEN 10; 325 MG/1; MG/1
1 TABLET ORAL EVERY 6 HOURS PRN
COMMUNITY
End: 2020-04-14

## 2019-08-25 RX ADMIN — HYDROCODONE BITARTRATE AND ACETAMINOPHEN 1 TABLET: 10; 325 TABLET ORAL at 09:08

## 2019-08-25 RX ADMIN — HYDROCODONE BITARTRATE AND ACETAMINOPHEN 1 TABLET: 5; 325 TABLET ORAL at 06:08

## 2019-08-25 RX ADMIN — DOCUSATE SODIUM 100 MG: 100 CAPSULE, LIQUID FILLED ORAL at 08:08

## 2019-08-25 RX ADMIN — CEFEPIME HYDROCHLORIDE 2 G: 2 INJECTION, SOLUTION INTRAVENOUS at 06:08

## 2019-08-25 NOTE — PLAN OF CARE
5:43 PM    VSS on RA & afebrile. AOx4   2 MELY drain c scant yellow drainageand Left breast swollen, red, and hot - Noted    All labs have been collected and WBC results read back to Ly per his request.     Oriented to room, POC, & all equipment. Regular diet initiated. Fall precautions implemented & call light in reach. Resting comfortably in low bed, in NAD.     Awaiting ABx from Pharmacy to start infusion

## 2019-08-25 NOTE — H&P
[facility]  History & Physical  Plastic Surgery    SUBJECTIVE:     Chief Complaint/Reason for Admission: Left breast cellulitis    History of Present Illness:  Patient is a 64 y.o. female presents with left breast redness since yesterday. Patient underwent revision breast reconstruction with implant placement last week and now has left breast cellulitis.    No medications prior to admission.       Review of patient's allergies indicates:   Allergen Reactions    Bactrim [sulfamethoxazole-trimethoprim] Rash     Fever, vomiting       Past Medical History:   Diagnosis Date    Allergy     Anemia     Asteroid hyalosis of both eyes     Breast cancer 04/2017    Left    Cataract     Diverticulosis     Encounter for blood transfusion     Glaucoma     High myopia     Osteopenia     PONV (postoperative nausea and vomiting)     S/P dilation and curettage      Past Surgical History:   Procedure Laterality Date    BIOPSY-LYMPH NODE-SENTINEL Left 11/1/2017    Performed by [facility] at [facility] OR 2ND FLR    breast augmentation  03/2013    BREAST BIOPSY Left 04/2017    Core bx,+ cancer    BREAST LUMPECTOMY Left 10/25/2017    BREAST REVISION SURGERY Bilateral 8/5/2019    [facility]    CAPSULECTOMY, BREAST Left 8/5/2019    [facility] by [facility] at [facility] OR    CATARACT EXTRACTION W/  INTRAOCULAR LENS IMPLANT Bilateral 2009    COLONOSCOPY  4/2009, 2015    repeat in 5 years    COLONOSCOPY N/A 5/21/2015    Performed by Evan Alexander MD at [facility] ENDO (4TH FLR)    CYSTOSCOPY N/A 11/20/2018    Performed by Rebecca Acosta MD at [facility] OR 2ND FLR    DILATION AND CURETTAGE OF UTERUS      DISSECTION-LYMPH NODE-AXILLARY Left 11/1/2017    Performed by [facility]ce MD at [facility] OR 2ND FLR    ECTOPIC PREGNANCY SURGERY      EYE SURGERY Bilateral 3/09    Vitrectomy     INJECTION-NODE-SENTINEL Left 11/1/2017    Performed by [facility] MD  at [facility] OR 2ND FLR    INSERTION-IMPLANT BREAST Bilateral 8/21/2019    [facility] OR    [id number]-FOWW-U-CATH neck poss chest Right 4/24/2017    Performed by [facility] at [facility] OR 2ND FLR    LUMPECTOMY with MAGSEED (2) Left 11/1/2017    Performed by [facility] MD at [facility] OR 2ND FLR    POLYPECTOMY      x3 2001    RECONSTRUCTION, BREAST WITH ALLODERM Bilateral 8/21/2019    [facility] OR    REMOVAL, IMPLANT, BREAST Bilateral 8/5/2019    [facility] OR    REPAIR, CYSTOCELE N/A 11/20/2018    Performed by [facility] at [facility] OR 2ND FLR    Yag Capsulotomy Bilateral 12/2014     Family History   Problem Relation Age of Onset    Cancer Sister         rectal; passed    Breast cancer Cousin     Cancer Cousin         breast    Heart disease Father         passed    Colon cancer Mother         hospice    Pancreatic cancer Mother         41yo; 91yo    Cataracts Mother     Hypertension Mother     Thyroid disease Mother     Cancer Mother         colon    Glaucoma Maternal Grandmother     Amblyopia Neg Hx     Blindness Neg Hx     Diabetes Neg Hx     Macular degeneration Neg Hx     Retinal detachment Neg Hx     Strabismus Neg Hx     Stroke Neg Hx      Social History     Tobacco Use    Smoking status: Never Smoker    Smokeless tobacco: Never Used   Substance Use Topics    Alcohol use: No     Alcohol/week: 0.6 oz     Types: 1 Glasses of wine per week     Frequency: 4 or more times a week     Drinks per session: 1 or 2     Binge frequency: Never     Comment: Quit    Drug use: No        Review of Systems:    Negative except for left breast pain.  Denies fever or chills or night sweats.    OBJECTIVE:     Vital Signs (Most Recent):       Physical Exam:  WDWN female in NAD  NC/AT  RRR  CTA  Left breast with cellulitis, drains in place, right breast ok      Laboratory:  Cultures from surgery  positive for pseudomonas    Diagnostic Results:      ASSESSMENT/PLAN:       Left breast cellulitis    Admit, labs, IV antibiotics  If no improvement, removal of implant

## 2019-08-26 LAB
BACTERIA SPEC ANAEROBE CULT: NORMAL
BASOPHILS # BLD AUTO: 0.04 K/UL (ref 0–0.2)
BASOPHILS NFR BLD: 0.4 % (ref 0–1.9)
DIFFERENTIAL METHOD: ABNORMAL
EOSINOPHIL # BLD AUTO: 0.7 K/UL (ref 0–0.5)
EOSINOPHIL NFR BLD: 6.3 % (ref 0–8)
ERYTHROCYTE [DISTWIDTH] IN BLOOD BY AUTOMATED COUNT: 11.9 % (ref 11.5–14.5)
HCT VFR BLD AUTO: 35 % (ref 37–48.5)
HGB BLD-MCNC: 11.5 G/DL (ref 12–16)
IMM GRANULOCYTES # BLD AUTO: 0.06 K/UL (ref 0–0.04)
IMM GRANULOCYTES NFR BLD AUTO: 0.5 % (ref 0–0.5)
LYMPHOCYTES # BLD AUTO: 0.9 K/UL (ref 1–4.8)
LYMPHOCYTES NFR BLD: 7.6 % (ref 18–48)
MCH RBC QN AUTO: 32.8 PG (ref 27–31)
MCHC RBC AUTO-ENTMCNC: 32.9 G/DL (ref 32–36)
MCV RBC AUTO: 100 FL (ref 82–98)
MONOCYTES # BLD AUTO: 0.9 K/UL (ref 0.3–1)
MONOCYTES NFR BLD: 8.1 % (ref 4–15)
NEUTROPHILS # BLD AUTO: 8.7 K/UL (ref 1.8–7.7)
NEUTROPHILS NFR BLD: 77.1 % (ref 38–73)
NRBC BLD-RTO: 0 /100 WBC
PLATELET # BLD AUTO: 228 K/UL (ref 150–350)
PMV BLD AUTO: 9.9 FL (ref 9.2–12.9)
RBC # BLD AUTO: 3.51 M/UL (ref 4–5.4)
WBC # BLD AUTO: 11.3 K/UL (ref 3.9–12.7)

## 2019-08-26 PROCEDURE — 25000003 PHARM REV CODE 250: Performed by: SURGERY

## 2019-08-26 PROCEDURE — 99223 1ST HOSP IP/OBS HIGH 75: CPT | Mod: ,,, | Performed by: INTERNAL MEDICINE

## 2019-08-26 PROCEDURE — 11000001 HC ACUTE MED/SURG PRIVATE ROOM

## 2019-08-26 PROCEDURE — 63600175 PHARM REV CODE 636 W HCPCS: Performed by: SURGERY

## 2019-08-26 PROCEDURE — 36415 COLL VENOUS BLD VENIPUNCTURE: CPT

## 2019-08-26 PROCEDURE — 85025 COMPLETE CBC W/AUTO DIFF WBC: CPT

## 2019-08-26 PROCEDURE — 94761 N-INVAS EAR/PLS OXIMETRY MLT: CPT

## 2019-08-26 PROCEDURE — 99223 PR INITIAL HOSPITAL CARE,LEVL III: ICD-10-PCS | Mod: ,,, | Performed by: INTERNAL MEDICINE

## 2019-08-26 RX ORDER — BUPROPION HYDROCHLORIDE 150 MG/1
150 TABLET ORAL DAILY
Status: DISCONTINUED | OUTPATIENT
Start: 2019-08-26 | End: 2019-08-31 | Stop reason: HOSPADM

## 2019-08-26 RX ORDER — DORZOLAMIDE HCL 20 MG/ML
1 SOLUTION/ DROPS OPHTHALMIC 3 TIMES DAILY
Status: DISCONTINUED | OUTPATIENT
Start: 2019-08-26 | End: 2019-08-31 | Stop reason: HOSPADM

## 2019-08-26 RX ORDER — LATANOPROST 50 UG/ML
1 SOLUTION/ DROPS OPHTHALMIC NIGHTLY
Status: DISCONTINUED | OUTPATIENT
Start: 2019-08-26 | End: 2019-08-31 | Stop reason: HOSPADM

## 2019-08-26 RX ORDER — HYDROCODONE BITARTRATE AND ACETAMINOPHEN 10; 325 MG/1; MG/1
1 TABLET ORAL EVERY 4 HOURS PRN
Status: DISCONTINUED | OUTPATIENT
Start: 2019-08-26 | End: 2019-08-31 | Stop reason: HOSPADM

## 2019-08-26 RX ADMIN — LATANOPROST 1 DROP: 50 SOLUTION OPHTHALMIC at 09:08

## 2019-08-26 RX ADMIN — DOCUSATE SODIUM 100 MG: 100 CAPSULE, LIQUID FILLED ORAL at 09:08

## 2019-08-26 RX ADMIN — DORZOLAMIDE HYDROCHLORIDE 1 DROP: 20 SOLUTION/ DROPS OPHTHALMIC at 09:08

## 2019-08-26 RX ADMIN — HYDROCODONE BITARTRATE AND ACETAMINOPHEN 1 TABLET: 10; 325 TABLET ORAL at 05:08

## 2019-08-26 RX ADMIN — HYDROCODONE BITARTRATE AND ACETAMINOPHEN 1 TABLET: 10; 325 TABLET ORAL at 01:08

## 2019-08-26 RX ADMIN — CEFEPIME HYDROCHLORIDE 2 G: 2 INJECTION, SOLUTION INTRAVENOUS at 09:08

## 2019-08-26 RX ADMIN — BUPROPION HYDROCHLORIDE 150 MG: 150 TABLET, FILM COATED, EXTENDED RELEASE ORAL at 09:08

## 2019-08-26 RX ADMIN — CEFEPIME HYDROCHLORIDE 2 G: 2 INJECTION, SOLUTION INTRAVENOUS at 04:08

## 2019-08-26 RX ADMIN — HYDROCODONE BITARTRATE AND ACETAMINOPHEN 1 TABLET: 10; 325 TABLET ORAL at 09:08

## 2019-08-26 RX ADMIN — CEFEPIME HYDROCHLORIDE 2 G: 2 INJECTION, SOLUTION INTRAVENOUS at 01:08

## 2019-08-26 NOTE — PROGRESS NOTES
"No acute changes.    Blood pressure 116/67, pulse 65, temperature 98.1 °F (36.7 °C), temperature source Oral, resp. rate 18, height 5' 7" (1.702 m), weight 61.2 kg (135 lb), SpO2 98 %, not currently breastfeeding.      Left breast still red and tender to palpation    A/P Left breast cellulitis    1.)  Continue IV abx,  CBC improved today  2.) Consult Dr. Abarca with ID  "

## 2019-08-26 NOTE — PLAN OF CARE
SW met with pt at bedside to complete discharge assessment, verified PCP and uses [facility] on [address]  Pt doesn't have a POA or living will.  Pt's daughter, Kira will provide transportation home.  No needs identified by SW or pt.     08/26/19 0856   Discharge Assessment   Assessment Type Discharge Planning Assessment   Confirmed/corrected address and phone number on facesheet? Yes   Assessment information obtained from? Patient   Communicated expected length of stay with patient/caregiver no   Prior to hospitilization cognitive status: Alert/Oriented   Prior to hospitalization functional status: Independent   Current cognitive status: Alert/Oriented   Current Functional Status: Independent   Lives With alone   Is patient able to care for self after discharge? Unable to determine at this time (comments)   Who are your caregiver(s) and their phone number(s)?   (friend/neighbor)   Readmission Within the Last 30 Days no previous admission in last 30 days   Patient currently being followed by outpatient case management? No   Patient currently receives any other outside agency services? No   Equipment Currently Used at Home none   Do you have any problems affording any of your prescribed medications? No   Is the patient taking medications as prescribed? yes   Does the patient have transportation home? Yes   Transportation Anticipated family or friend will provide   Does the patient receive services at the [facility]? No   Discharge Plan A Home   DME Needed Upon Discharge  none   Patient/Family in Agreement with Plan yes

## 2019-08-26 NOTE — PLAN OF CARE
Problem: Adult Inpatient Plan of Care  Goal: Plan of Care Review  Outcome: Ongoing (interventions implemented as appropriate)  AAOX4. VSS.Pt free of trauma, falls, injury and skin breakdown. Pt pain mildly controlled with PO analgesics. Pt has been eating and voiding adequately throughout shift.  L breast red and tender to touch. MELY x2 in place, draining small amount of serosangunieous drainage. Surgical bra in place. Pt treated with IV Abx.Pt ambulates and repositions self independently. Fluids and Coughing & deep breathing encouraged throughout shift.Purposeful hourly rounding. Pt has call light in reach, side rails up X2, bed in low position and nonskid socks on. Pt lying in bed in no distress eating dinner and watching TV.

## 2019-08-26 NOTE — PLAN OF CARE
Problem: Adult Inpatient Plan of Care  Goal: Plan of Care Review  Outcome: Ongoing (interventions implemented as appropriate)  Patient is alert and oriented x 4. RR even and unlabored. Patient remains afebrile and vitals stable throughout shift. Bilateral incisions to breast are CDI. X2 MELY drain in place. Serous drainage noted. No additional swelling or redness noted to left breast this shift. Skin remains warm to touch. Patient is voiding accurately getting up to toilet. Spoke with Dr. Evans about pt's pain rated at 9, new orders given for higher dosage of Norco. Patient states moderate pain relief. Patient is postioning self independently. Patient remains free from falls and injury. All needs and concerns are met. All purposeful rounding done. Bed is locked and in lowest position, side rails up x 2, call light in reach. Will continue to monitor.

## 2019-08-27 ENCOUNTER — TELEPHONE (OUTPATIENT)
Dept: INFECTIOUS DISEASES | Facility: HOSPITAL | Age: 64
End: 2019-08-27

## 2019-08-27 DIAGNOSIS — N61.0 CELLULITIS OF LEFT BREAST: Primary | ICD-10-CM

## 2019-08-27 LAB
BASOPHILS # BLD AUTO: 0.04 K/UL (ref 0–0.2)
BASOPHILS NFR BLD: 0.4 % (ref 0–1.9)
DIFFERENTIAL METHOD: ABNORMAL
EOSINOPHIL # BLD AUTO: 0.8 K/UL (ref 0–0.5)
EOSINOPHIL NFR BLD: 7.8 % (ref 0–8)
ERYTHROCYTE [DISTWIDTH] IN BLOOD BY AUTOMATED COUNT: 11.6 % (ref 11.5–14.5)
HCT VFR BLD AUTO: 33.9 % (ref 37–48.5)
HGB BLD-MCNC: 11.2 G/DL (ref 12–16)
IMM GRANULOCYTES # BLD AUTO: 0.05 K/UL (ref 0–0.04)
IMM GRANULOCYTES NFR BLD AUTO: 0.5 % (ref 0–0.5)
LYMPHOCYTES # BLD AUTO: 1.1 K/UL (ref 1–4.8)
LYMPHOCYTES NFR BLD: 10.3 % (ref 18–48)
MCH RBC QN AUTO: 32.9 PG (ref 27–31)
MCHC RBC AUTO-ENTMCNC: 33 G/DL (ref 32–36)
MCV RBC AUTO: 100 FL (ref 82–98)
MONOCYTES # BLD AUTO: 1.1 K/UL (ref 0.3–1)
MONOCYTES NFR BLD: 10.4 % (ref 4–15)
NEUTROPHILS # BLD AUTO: 7.3 K/UL (ref 1.8–7.7)
NEUTROPHILS NFR BLD: 70.6 % (ref 38–73)
NRBC BLD-RTO: 0 /100 WBC
PLATELET # BLD AUTO: 253 K/UL (ref 150–350)
PMV BLD AUTO: 10 FL (ref 9.2–12.9)
RBC # BLD AUTO: 3.4 M/UL (ref 4–5.4)
WBC # BLD AUTO: 10.34 K/UL (ref 3.9–12.7)

## 2019-08-27 PROCEDURE — 11000001 HC ACUTE MED/SURG PRIVATE ROOM

## 2019-08-27 PROCEDURE — 25000003 PHARM REV CODE 250: Performed by: SURGERY

## 2019-08-27 PROCEDURE — 63600175 PHARM REV CODE 636 W HCPCS: Performed by: SURGERY

## 2019-08-27 PROCEDURE — 36415 COLL VENOUS BLD VENIPUNCTURE: CPT

## 2019-08-27 PROCEDURE — 94761 N-INVAS EAR/PLS OXIMETRY MLT: CPT

## 2019-08-27 PROCEDURE — 85025 COMPLETE CBC W/AUTO DIFF WBC: CPT

## 2019-08-27 RX ADMIN — CEFEPIME HYDROCHLORIDE 2 G: 2 INJECTION, SOLUTION INTRAVENOUS at 09:08

## 2019-08-27 RX ADMIN — CEFEPIME HYDROCHLORIDE 2 G: 2 INJECTION, SOLUTION INTRAVENOUS at 04:08

## 2019-08-27 RX ADMIN — LATANOPROST 1 DROP: 50 SOLUTION OPHTHALMIC at 09:08

## 2019-08-27 RX ADMIN — DORZOLAMIDE HYDROCHLORIDE 1 DROP: 20 SOLUTION/ DROPS OPHTHALMIC at 09:08

## 2019-08-27 RX ADMIN — CEFEPIME HYDROCHLORIDE 2 G: 2 INJECTION, SOLUTION INTRAVENOUS at 01:08

## 2019-08-27 RX ADMIN — HYDROCODONE BITARTRATE AND ACETAMINOPHEN 1 TABLET: 10; 325 TABLET ORAL at 09:08

## 2019-08-27 RX ADMIN — ONDANSETRON 4 MG: 2 INJECTION INTRAMUSCULAR; INTRAVENOUS at 12:08

## 2019-08-27 RX ADMIN — HYDROCODONE BITARTRATE AND ACETAMINOPHEN 1 TABLET: 10; 325 TABLET ORAL at 06:08

## 2019-08-27 RX ADMIN — HYDROCODONE BITARTRATE AND ACETAMINOPHEN 1 TABLET: 10; 325 TABLET ORAL at 01:08

## 2019-08-27 RX ADMIN — HYDROCODONE BITARTRATE AND ACETAMINOPHEN 1 TABLET: 10; 325 TABLET ORAL at 03:08

## 2019-08-27 RX ADMIN — DOCUSATE SODIUM 100 MG: 100 CAPSULE, LIQUID FILLED ORAL at 09:08

## 2019-08-27 RX ADMIN — BUPROPION HYDROCHLORIDE 150 MG: 150 TABLET, FILM COATED, EXTENDED RELEASE ORAL at 09:08

## 2019-08-27 NOTE — PROGRESS NOTES
"No acute changes.    Blood pressure 126/73, pulse 72, temperature 97.8 °F (36.6 °C), temperature source Oral, resp. rate 18, height 5' 7" (1.702 m), weight 61.2 kg (135 lb), SpO2 98 %, not currently breastfeeding.      Left breast still red but improving, minimal tenderness to palpation  Drains in place    A/P left breast implant infection    1.) Continue IV abx for now  2.) Appreciate ID input - awaiting full recs  "

## 2019-08-27 NOTE — PLAN OF CARE
spoke with nurse, visited patient as requested by nurse, and provided a compassionate presence, reflective listening and prayer support for patient.

## 2019-08-27 NOTE — SUBJECTIVE & OBJECTIVE
Past Medical History:   Diagnosis Date    Allergy     Anemia     Asteroid hyalosis of both eyes     Breast cancer 04/2017    Left    Cataract     Diverticulosis     Encounter for blood transfusion     Glaucoma     High myopia     Osteopenia     PONV (postoperative nausea and vomiting)     S/P dilation and curettage        Past Surgical History:   Procedure Laterality Date    BIOPSY-LYMPH NODE-SENTINEL Left 11/1/2017    [facility] OR 2ND FLR    breast augmentation  03/2013    BREAST BIOPSY Left 04/2017    Core bx,+ cancer    BREAST LUMPECTOMY Left 10/25/2017    BREAST REVISION SURGERY Bilateral 8/5/2019    [facility] OR    CAPSULECTOMY, BREAST Left 8/5/2019    [facility] at [facility] OR    CATARACT EXTRACTION W/  INTRAOCULAR LENS IMPLANT Bilateral 2009    COLONOSCOPY  4/2009, 2015    repeat in 5 years    COLONOSCOPY N/A 5/21/2015    Performed by Evan [facility] MD at [facility] ENDO (4TH FLR)    CYSTOSCOPY N/A 11/20/2018    Performed by [facility] at [facility] OR 2ND FLR    DILATION AND CURETTAGE OF UTERUS      DISSECTION-LYMPH NODE-AXILLARY Left 11/1/2017    Performed by [facility] at [facility] OR 2ND FLR    ECTOPIC PREGNANCY SURGERY      EYE SURGERY Bilateral 3/09    Vitrectomy     INJECTION-NODE-SENTINEL Left 11/1/2017    Performed by [facility] at [facility] OR 2ND FLR    INSERTION-IMPLANT BREAST Bilateral 8/21/2019    [facility] at [facility] OR    [id number]-P-CATH neck poss chest Right 4/24/2017    Performed by Alexander Acre [facility] at [facility] OR 2ND FLR    LUMPECTOMY with MAGSEED (2) Left 11/1/2017    Performed by [facility] at [facility] OR 2ND FLR    POLYPECTOMY      x3 2001    RECONSTRUCTION, BREAST WITH ALLODERM Bilateral 8/21/2019    Performed by [facility] at [facility] OR    REMOVAL, IMPLANT, BREAST Bilateral 8/5/2019    Performed by Diego Modi  [facility] at [facility] [place] OR    REPAIR, CYSTOCELE N/A 11/20/2018    [facility] at [facility] OR 2ND FLR    Yag Capsulotomy Bilateral 12/2014       Review of patient's allergies indicates:   Allergen Reactions    Bactrim [sulfamethoxazole-trimethoprim] Rash     Fever, vomiting       Medications:  Medications Prior to Admission   Medication Sig    buPROPion (WELLBUTRIN XL) 150 MG TB24 tablet Take 1 tablet (150 mg total) by mouth once daily.    dorzolamide (TRUSOPT) 2 % ophthalmic solution Place 1 drop into both eyes 3 (three) times daily.    HYDROcodone-acetaminophen (NORCO)  mg per tablet Take 1 tablet by mouth every 6 (six) hours as needed for Pain.    latanoprost 0.005 % ophthalmic solution INSTILL ONE DROP INTO EACH EYE ONCE DAILY IN THE EVENING    phenazopyridine (PYRIDIUM) 200 MG tablet Take 1 tablet (200 mg total) by mouth 3 (three) times daily as needed for Pain.    valACYclovir (VALTREX) 500 MG tablet Take 1 tablet (500 mg total) by mouth once daily. (Patient taking differently: Take 500 mg by mouth daily as needed. )     Antibiotics (From admission, onward)    Start     Stop Route Frequency Ordered    08/25/19 1645  ceFEPIme in dextrose 5% 2 gram/50 mL IVPB 2 g      -- IV Every 8 hours (non-standard times) 08/25/19 1651        Antifungals (From admission, onward)    None        Antivirals (From admission, onward)    None           Immunization History   Administered Date(s) Administered    Influenza - Quadrivalent 02/08/2016    Influenza - Quadrivalent - PF (6 months and older) 12/23/2017    Pneumococcal Conjugate - 13 Valent 02/19/2018    Tdap 08/28/2015       Family History     Problem Relation (Age of Onset)    Breast cancer Cousin    Cancer Sister, Cousin, Mother    Cataracts Mother    Colon cancer Mother    Glaucoma Maternal Grandmother    Heart disease Father    Hypertension Mother    Pancreatic cancer Mother    Thyroid disease Mother        Social History     Socioeconomic History     Marital status: Single     Spouse name: Not on file    Number of children: 2    Years of education: Not on file    Highest education level: Not on file   Occupational History    Not on file   Social Needs    Financial resource strain: Not very hard    Food insecurity:     Worry: Never true     Inability: Never true    Transportation needs:     Medical: No     Non-medical: No   Tobacco Use    Smoking status: Never Smoker    Smokeless tobacco: Never Used   Substance and Sexual Activity    Alcohol use: No     Alcohol/week: 0.6 oz     Types: 1 Glasses of wine per week     Frequency: 4 or more times a week     Drinks per session: 1 or 2     Binge frequency: Never     Comment: Quit    Drug use: No    Sexual activity: Yes     Partners: Male     Birth control/protection: Post-menopausal   Lifestyle    Physical activity:     Days per week: 4 days     Minutes per session: 60 min    Stress: Only a little   Relationships    Social connections:     Talks on phone: More than three times a week     Gets together: More than three times a week     Attends [employer] service: Not on file     Active member of club or organization: Yes     Attends meetings of clubs or organizations: More than 4 times per year     Relationship status:    Other Topics Concern    Are you pregnant or think you may be? No    Breast-feeding Not Asked    Patient feels they ought to cut down on drinking/drug use Not Asked    Patient annoyed by others criticizing their drinking/drug use Not Asked    Patient has felt bad or guilty about drinking/drug use Not Asked    Patient has had a drink/used drugs as an eye opener in the AM Not Asked   Social History Narrative    Not on file     Review of Systems   Constitutional: Negative for fever.   HENT: Negative for sinus pressure and sore throat.    Respiratory: Negative for cough and shortness of breath.    Cardiovascular: Negative for chest pain and leg swelling.   Gastrointestinal:  Negative for abdominal distention and abdominal pain.   Genitourinary: Negative for dysuria.   Musculoskeletal: Negative for arthralgias, back pain and joint swelling.   Skin: Positive for wound.        L breast wound with drains   Neurological: Negative for dizziness and weakness.     Objective:     Vital Signs (Most Recent):  Temp: 97.5 °F (36.4 °C) (08/27/19 1102)  Pulse: 72 (08/27/19 1102)  Resp: 16 (08/27/19 1102)  BP: 129/73 (08/27/19 1102)  SpO2: 98 % (08/27/19 0800) Vital Signs (24h Range):  Temp:  [97.5 °F (36.4 °C)-98.7 °F (37.1 °C)] 97.5 °F (36.4 °C)  Pulse:  [70-80] 72  Resp:  [16-18] 16  SpO2:  [96 %-98 %] 98 %  BP: (121-129)/(63-73) 129/73     Weight: 61.2 kg (135 lb)  Body mass index is 21.14 kg/m².    Estimated Creatinine Clearance: 68.6 mL/min (based on SCr of 0.8 mg/dL).    Physical Exam   Constitutional: She is oriented to person, place, and time. She appears well-developed and well-nourished. No distress.   Cardiovascular: Normal rate and regular rhythm.   Pulmonary/Chest: Effort normal and breath sounds normal.   Abdominal: Soft. She exhibits no distension. There is no tenderness.   Musculoskeletal: Normal range of motion.   Neurological: She is alert and oriented to person, place, and time.   Skin: There is erythema.   L breast very tight and tender with redness and swelling and erythema       Significant Labs: All pertinent labs within the past 24 hours have been reviewed.    Significant Imaging: I have reviewed all pertinent imaging results/findings within the past 24 hours.

## 2019-08-27 NOTE — PLAN OF CARE
Problem: Adult Inpatient Plan of Care  Goal: Plan of Care Review  Outcome: Ongoing (interventions implemented as appropriate)  No significant events overnight. Remains free from fall, injury, and skin breakdown. Voiding without difficulty. Ambulating independently. VSS on RA throughout the night. Positions self independently. Pain well controlled with PO meds; denies pain. All alarms active and audible. Neuro check WNL. Left breast red and tender. No change in area redness. MELY drains x2 to left breast put out 5mL serous fluid each. Surgical bra on. TEDs/SCDs in place. Plan of care reviewed with patient and all questions answered. Bed low, locked. Call light within reach. Purposeful rounding performed. Resting comfortably in bed, no other complaints at this time.

## 2019-08-27 NOTE — HPI
"64F with h/o breast augmentation surgery admitted 8/25 with redness and swelling and tenderness of breast. Pt recently underwent revision breast reconstruction with implant placement on 8/21. Per op note "There was some mild fluid which was sent for cultures". This is now growing psedudomonas. Pt reports had mild symptoms before surgery, but redness and tenderness and swelling has progressed since time of surgery. Denies fever/chills. Has several surgical drains in place with serosanguinous output. Currently on IV cefepime 2q8h. Denies having much improvement since admission. Patient very reluctant to have surgery to remove implants. ID consulted for antibiotic recommendations.     Procedure on 8/21:  1.) Left Breast Reconstruction with prepectoral silicone implant and acellular dermal matrix  2.) Right augmentation mammaplasty for symmetry      Breast, Left; Body Fluid         Component 6d ago   Aerobic Bacterial Culture Abnormal    PSEUDOMONAS AERUGINOSA   Moderate     Resulting Agency [facility]   Susceptibility      Pseudomonas aeruginosa     CULTURE, AEROBIC  (SPECIFY SOURCE)     Amikacin <=16 mcg/mL Sensitive     Cefepime <=8 mcg/mL Sensitive     Ciprofloxacin <=1 mcg/mL Sensitive     Gentamicin <=4 mcg/mL Sensitive     Piperacillin/Tazo <=16 mcg/mL Sensitive     Tobramycin <=4 mcg/mL Sensitive                 "

## 2019-08-27 NOTE — CONSULTS
[facility]  Infectious Disease  Consult Note    Patient Name: Ashlie Redman  MRN: [id number]  Admission Date: 8/25/2019  Hospital Length of Stay: 2 days  Attending Physician: Diego Modi MD  Primary Care Provider: Kevin Gong MD     Isolation Status: No active isolations    Patient information was obtained from patient and ER records.      Inpatient consult to Infectious Diseases  Consult performed by: Maryan Mercado MD  Consult ordered by: Diego Modi MD        Assessment/Plan:     * Cellulitis of left breast  Pt with retained breast hardware and surgical cultures growing pseudomonas. BCx negative this admit. Ideally would remove hardware for source control, but discussed this with both patient and surgeon and goal is to try to avoid another surgery. Plan for 6 week trial of antibiotics. At end of antibiotics, discuss chronic suppression vs stopping. If relapse, low threshold for implant removal.  Agree with IV cefepime while inpatient. Would like to see cellulitis/wound improving before she goes home. If it don't improve with another 24 hours of antibiotics, would consider imaging to r/o fluid collection/abscess. On discharge, would transition to po cipro 750 BID and rifampin 300mg po TID.     Discussed with patient side effects of quinolones (including but not limited to qtc prolongation, hypoglycemia, achilles tendon rupture, worsening of aneurysms, c.diff) and these are acceptable risks and preferable to IV medication and risk of picc line complications.  Qtc = 433    Have ordered esr/crp/bmp q2 weeks and pt will go to [facility] for lab draws.     We will arrange for a follow-up appointment in Infectious Diseases clinic in 2-3 weeks. Prior to discharge, please ensure the patient's follow-up has been scheduled.  If there is still no follow-up scheduled in Infectious Diseases clinic, please send an EPIC message to the Infectious Diseases charge nurse Gretta  "Gerhard.        Thank you for your consult. I will follow-up with patient. Please contact us if you have any additional questions.    Maryan Mercado MD  Infectious Disease  [facility]    Subjective:     Principal Problem: Cellulitis of left breast    HPI:   64F with h/o breast augmentation surgery admitted 8/25 with redness and swelling and tenderness of breast. Pt recently underwent revision breast reconstruction with implant placement on 8/21. Per op note "There was some mild fluid which was sent for cultures". This is now growing psedudomonas. Pt reports had mild symptoms before surgery, but redness and tenderness and swelling has progressed since time of surgery. Denies fever/chills. Has several surgical drains in place with serosanguinous output. Currently on IV cefepime 2q8h. Denies having much improvement since admission. Patient very reluctant to have surgery to remove implants. ID consulted for antibiotic recommendations.     Procedure on 8/21:  1.) Left Breast Reconstruction with prepectoral silicone implant and acellular dermal matrix  2.) Right augmentation mammaplasty for symmetry      Breast, Left; Body Fluid         Component 6d ago   Aerobic Bacterial Culture Abnormal    PSEUDOMONAS AERUGINOSA   Moderate     Resulting Agency [facility]   Susceptibility      Pseudomonas aeruginosa     CULTURE, AEROBIC  (SPECIFY SOURCE)     Amikacin <=16 mcg/mL Sensitive     Cefepime <=8 mcg/mL Sensitive     Ciprofloxacin <=1 mcg/mL Sensitive     Gentamicin <=4 mcg/mL Sensitive     Piperacillin/Tazo <=16 mcg/mL Sensitive     Tobramycin <=4 mcg/mL Sensitive                 Past Medical History:   Diagnosis Date    Allergy     Anemia     Asteroid hyalosis of both eyes     Breast cancer 04/2017    Left    Cataract     Diverticulosis     Encounter for blood transfusion     Glaucoma     High myopia     Osteopenia     PONV (postoperative nausea and vomiting)     S/P dilation and curettage  "       Past Surgical History:   Procedure Laterality Date    BIOPSY-LYMPH NODE-SENTINEL Left 11/1/2017    Performed by [facility] at [facility] 2ND FLR    breast augmentation  03/2013    BREAST BIOPSY Left 04/2017    Core bx,+ cancer    BREAST LUMPECTOMY Left 10/25/2017    BREAST REVISION SURGERY Bilateral 8/5/2019    [facility]    CAPSULECTOMY, BREAST Left 8/5/2019    [facility] at [facility]    CATARACT EXTRACTION W/  INTRAOCULAR LENS IMPLANT Bilateral 2009    COLONOSCOPY  4/2009, 2015    repeat in 5 years    COLONOSCOPY N/A 5/21/2015    Performed by Evan Alexander [facility] at [facility] (4TH FLR)    CYSTOSCOPY N/A 11/20/2018    Performed by Rebecca [facility]osta [facility] at [facility] 2ND FLR    DILATION AND CURETTAGE OF UTERUS      DISSECTION-LYMPH NODE-AXILLARY Left 11/1/2017    Performed by [facility] at [facility] 2ND FLR    ECTOPIC PREGNANCY SURGERY      EYE SURGERY Bilateral 3/09    Vitrectomy     INJECTION-NODE-SENTINEL Left 11/1/2017    Performed by [facility] at [facility] 2ND FLR    INSERTION-IMPLANT BREAST Bilateral 8/21/2019    [facility]    XUBSUTUUZ-[id number]-I-CATH neck poss chest Right 4/24/2017    Performed by [facility] at [facility] 2ND FLR    LUMPECTOMY with MAGSEED (2) Left 11/1/2017    Performed by [facility]ce [facility] 2ND FLR    POLYPECTOMY      x3 2001    RECONSTRUCTION, BREAST WITH ALLODERM Bilateral 8/21/2019    Performed by [facility]    REMOVAL, IMPLANT, BREAST Bilateral 8/5/2019    [facility]    REPAIR, CYSTOCELE N/A 11/20/2018    Performed by Rebecca [facility] at [facility] 2ND FLR    Yag Capsulotomy Bilateral 12/2014       Review of patient's allergies indicates:   Allergen Reactions    Bactrim [sulfamethoxazole-trimethoprim] Rash     Fever, vomiting       Medications:  Medications Prior to  Admission   Medication Sig    buPROPion (WELLBUTRIN XL) 150 MG TB24 tablet Take 1 tablet (150 mg total) by mouth once daily.    dorzolamide (TRUSOPT) 2 % ophthalmic solution Place 1 drop into both eyes 3 (three) times daily.    HYDROcodone-acetaminophen (NORCO)  mg per tablet Take 1 tablet by mouth every 6 (six) hours as needed for Pain.    latanoprost 0.005 % ophthalmic solution INSTILL ONE DROP INTO EACH EYE ONCE DAILY IN THE EVENING    phenazopyridine (PYRIDIUM) 200 MG tablet Take 1 tablet (200 mg total) by mouth 3 (three) times daily as needed for Pain.    valACYclovir (VALTREX) 500 MG tablet Take 1 tablet (500 mg total) by mouth once daily. (Patient taking differently: Take 500 mg by mouth daily as needed. )     Antibiotics (From admission, onward)    Start     Stop Route Frequency Ordered    08/25/19 1645  ceFEPIme in dextrose 5% 2 gram/50 mL IVPB 2 g      -- IV Every 8 hours (non-standard times) 08/25/19 1651        Antifungals (From admission, onward)    None        Antivirals (From admission, onward)    None           Immunization History   Administered Date(s) Administered    Influenza - Quadrivalent 02/08/2016    Influenza - Quadrivalent - PF (6 months and older) 12/23/2017    Pneumococcal Conjugate - 13 Valent 02/19/2018    Tdap 08/28/2015       Family History     Problem Relation (Age of Onset)    Breast cancer Cousin    Cancer Sister, Cousin, Mother    Cataracts Mother    Colon cancer Mother    Glaucoma Maternal Grandmother    Heart disease Father    Hypertension Mother    Pancreatic cancer Mother    Thyroid disease Mother        Social History     Socioeconomic History    Marital status: Single     Spouse name: Not on file    Number of children: 2    Years of education: Not on file    Highest education level: Not on file   Occupational History    Not on file   Social Needs    Financial resource strain: Not very hard    Food insecurity:     Worry: Never true     Inability: Never  true    Transportation needs:     Medical: No     Non-medical: No   Tobacco Use    Smoking status: Never Smoker    Smokeless tobacco: Never Used   Substance and Sexual Activity    Alcohol use: No     Alcohol/week: 0.6 oz     Types: 1 Glasses of wine per week     Frequency: 4 or more times a week     Drinks per session: 1 or 2     Binge frequency: Never     Comment: Quit    Drug use: No    Sexual activity: Yes     Partners: Male     Birth control/protection: Post-menopausal   Lifestyle    Physical activity:     Days per week: 4 days     Minutes per session: 60 min    Stress: Only a little   Relationships    Social connections:     Talks on phone: More than three times a week     Gets together: More than three times a week     Attends Orthodox service: Not on file     Active member of club or organization: Yes     Attends meetings of clubs or organizations: More than 4 times per year     Relationship status:    Other Topics Concern    Are you pregnant or think you may be? No    Breast-feeding Not Asked    Patient feels they ought to cut down on drinking/drug use Not Asked    Patient annoyed by others criticizing their drinking/drug use Not Asked    Patient has felt bad or guilty about drinking/drug use Not Asked    Patient has had a drink/used drugs as an eye opener in the AM Not Asked   Social History Narrative    Not on file     Review of Systems   Constitutional: Negative for fever.   HENT: Negative for sinus pressure and sore throat.    Respiratory: Negative for cough and shortness of breath.    Cardiovascular: Negative for chest pain and leg swelling.   Gastrointestinal: Negative for abdominal distention and abdominal pain.   Genitourinary: Negative for dysuria.   Musculoskeletal: Negative for arthralgias, back pain and joint swelling.   Skin: Positive for wound.        L breast wound with drains   Neurological: Negative for dizziness and weakness.     Objective:     Vital Signs (Most  Recent):  Temp: 97.5 °F (36.4 °C) (08/27/19 1102)  Pulse: 72 (08/27/19 1102)  Resp: 16 (08/27/19 1102)  BP: 129/73 (08/27/19 1102)  SpO2: 98 % (08/27/19 0800) Vital Signs (24h Range):  Temp:  [97.5 °F (36.4 °C)-98.7 °F (37.1 °C)] 97.5 °F (36.4 °C)  Pulse:  [70-80] 72  Resp:  [16-18] 16  SpO2:  [96 %-98 %] 98 %  BP: (121-129)/(63-73) 129/73     Weight: 61.2 kg (135 lb)  Body mass index is 21.14 kg/m².    Estimated Creatinine Clearance: 68.6 mL/min (based on SCr of 0.8 mg/dL).    Physical Exam   Constitutional: She is oriented to person, place, and time. She appears well-developed and well-nourished. No distress.   Cardiovascular: Normal rate and regular rhythm.   Pulmonary/Chest: Effort normal and breath sounds normal.   Abdominal: Soft. She exhibits no distension. There is no tenderness.   Musculoskeletal: Normal range of motion.   Neurological: She is alert and oriented to person, place, and time.   Skin: There is erythema.   L breast very tight and tender with redness and swelling and erythema       Significant Labs: All pertinent labs within the past 24 hours have been reviewed.    Significant Imaging: I have reviewed all pertinent imaging results/findings within the past 24 hours.

## 2019-08-27 NOTE — ASSESSMENT & PLAN NOTE
Pt with retained breast hardware and surgical cultures growing pseudomonas. BCx negative this admit. Ideally would remove hardware for source control, but discussed this with both patient and surgeon and goal is to try to avoid another surgery. Plan for 6 week trial of antibiotics. At end of antibiotics, discuss chronic suppression vs stopping. If relapse, low threshold for implant removal.  Agree with IV cefepime while inpatient. Would like to see cellulitis/wound improving before she goes home. If it don't improve with another 24 hours of antibiotics, would consider imaging to r/o fluid collection/abscess. On discharge, would transition to po cipro 750 BID and rifampin 300mg po TID.     Discussed with patient side effects of quinolones (including but not limited to qtc prolongation, hypoglycemia, achilles tendon rupture, worsening of aneurysms, c.diff) and these are acceptable risks and preferable to IV medication and risk of picc line complications.  Qtc = 433    Have ordered esr/crp/bmp q2 weeks and pt will go to [facility] for lab draws.     We will arrange for a follow-up appointment in Infectious Diseases clinic in 2-3 weeks. Prior to discharge, please ensure the patient's follow-up has been scheduled.  If there is still no follow-up scheduled in Infectious Diseases clinic, please send an EPIC message to the Infectious Diseases charge nurse Gretta Hennessy.

## 2019-08-27 NOTE — PLAN OF CARE
Problem: Adult Inpatient Plan of Care  Goal: Plan of Care Review  Outcome: Ongoing (interventions implemented as appropriate)  AAOX4. VSS.Pt free of trauma, falls, injury and skin breakdown. Pt pain mildly controlled with PO analgesics. Pt has been eating and voiding adequately throughout shift.  L breast reddened and tender to touch. MELY x2 in place, draining small amount of serous drainage. Surgical bra in place. Pt treated with IV Abx.Pt ambulates and repositions self independently. Fluids and Coughing & deep breathing encouraged throughout shift.Purposeful hourly rounding. Pt has call light in reach, side rails up X2, bed in low position and nonskid socks on. Pt lying in bed in no distress.

## 2019-08-28 LAB
BASOPHILS # BLD AUTO: 0.05 K/UL (ref 0–0.2)
BASOPHILS NFR BLD: 0.6 % (ref 0–1.9)
DIFFERENTIAL METHOD: ABNORMAL
EOSINOPHIL # BLD AUTO: 1.2 K/UL (ref 0–0.5)
EOSINOPHIL NFR BLD: 13 % (ref 0–8)
ERYTHROCYTE [DISTWIDTH] IN BLOOD BY AUTOMATED COUNT: 11.7 % (ref 11.5–14.5)
HCT VFR BLD AUTO: 35.4 % (ref 37–48.5)
HGB BLD-MCNC: 11.6 G/DL (ref 12–16)
IMM GRANULOCYTES # BLD AUTO: 0.05 K/UL (ref 0–0.04)
IMM GRANULOCYTES NFR BLD AUTO: 0.6 % (ref 0–0.5)
LYMPHOCYTES # BLD AUTO: 1 K/UL (ref 1–4.8)
LYMPHOCYTES NFR BLD: 11.4 % (ref 18–48)
MCH RBC QN AUTO: 32.6 PG (ref 27–31)
MCHC RBC AUTO-ENTMCNC: 32.8 G/DL (ref 32–36)
MCV RBC AUTO: 99 FL (ref 82–98)
MONOCYTES # BLD AUTO: 1 K/UL (ref 0.3–1)
MONOCYTES NFR BLD: 11.3 % (ref 4–15)
NEUTROPHILS # BLD AUTO: 5.6 K/UL (ref 1.8–7.7)
NEUTROPHILS NFR BLD: 63.1 % (ref 38–73)
NRBC BLD-RTO: 0 /100 WBC
PLATELET # BLD AUTO: 281 K/UL (ref 150–350)
PMV BLD AUTO: 9.8 FL (ref 9.2–12.9)
RBC # BLD AUTO: 3.56 M/UL (ref 4–5.4)
WBC # BLD AUTO: 8.91 K/UL (ref 3.9–12.7)

## 2019-08-28 PROCEDURE — 11000001 HC ACUTE MED/SURG PRIVATE ROOM

## 2019-08-28 PROCEDURE — 99233 SBSQ HOSP IP/OBS HIGH 50: CPT | Mod: ,,, | Performed by: INTERNAL MEDICINE

## 2019-08-28 PROCEDURE — 99233 PR SUBSEQUENT HOSPITAL CARE,LEVL III: ICD-10-PCS | Mod: ,,, | Performed by: INTERNAL MEDICINE

## 2019-08-28 PROCEDURE — 63600175 PHARM REV CODE 636 W HCPCS: Performed by: SURGERY

## 2019-08-28 PROCEDURE — 85025 COMPLETE CBC W/AUTO DIFF WBC: CPT

## 2019-08-28 PROCEDURE — 94761 N-INVAS EAR/PLS OXIMETRY MLT: CPT

## 2019-08-28 PROCEDURE — 36415 COLL VENOUS BLD VENIPUNCTURE: CPT

## 2019-08-28 PROCEDURE — 25000003 PHARM REV CODE 250: Performed by: SURGERY

## 2019-08-28 RX ORDER — RIFAMPIN 300 MG/1
300 CAPSULE ORAL 3 TIMES DAILY
Status: DISCONTINUED | OUTPATIENT
Start: 2019-08-28 | End: 2019-08-30

## 2019-08-28 RX ADMIN — CIPROFLOXACIN HYDROCHLORIDE 750 MG: 500 TABLET, FILM COATED ORAL at 03:08

## 2019-08-28 RX ADMIN — DOCUSATE SODIUM 100 MG: 100 CAPSULE, LIQUID FILLED ORAL at 08:08

## 2019-08-28 RX ADMIN — ONDANSETRON 8 MG: 8 TABLET, ORALLY DISINTEGRATING ORAL at 06:08

## 2019-08-28 RX ADMIN — LATANOPROST 1 DROP: 50 SOLUTION OPHTHALMIC at 09:08

## 2019-08-28 RX ADMIN — DORZOLAMIDE HYDROCHLORIDE 1 DROP: 20 SOLUTION/ DROPS OPHTHALMIC at 08:08

## 2019-08-28 RX ADMIN — HYDROCODONE BITARTRATE AND ACETAMINOPHEN 1 TABLET: 10; 325 TABLET ORAL at 09:08

## 2019-08-28 RX ADMIN — CIPROFLOXACIN HYDROCHLORIDE 750 MG: 500 TABLET, FILM COATED ORAL at 09:08

## 2019-08-28 RX ADMIN — DORZOLAMIDE HYDROCHLORIDE 1 DROP: 20 SOLUTION/ DROPS OPHTHALMIC at 09:08

## 2019-08-28 RX ADMIN — RIFAMPIN 300 MG: 300 CAPSULE ORAL at 09:08

## 2019-08-28 RX ADMIN — HYDROCODONE BITARTRATE AND ACETAMINOPHEN 1 TABLET: 10; 325 TABLET ORAL at 08:08

## 2019-08-28 RX ADMIN — DOCUSATE SODIUM 100 MG: 100 CAPSULE, LIQUID FILLED ORAL at 09:08

## 2019-08-28 RX ADMIN — RIFAMPIN 300 MG: 300 CAPSULE ORAL at 03:08

## 2019-08-28 RX ADMIN — HYDROCODONE BITARTRATE AND ACETAMINOPHEN 1 TABLET: 10; 325 TABLET ORAL at 05:08

## 2019-08-28 RX ADMIN — BUPROPION HYDROCHLORIDE 150 MG: 150 TABLET, FILM COATED, EXTENDED RELEASE ORAL at 08:08

## 2019-08-28 RX ADMIN — CEFEPIME HYDROCHLORIDE 2 G: 2 INJECTION, SOLUTION INTRAVENOUS at 12:08

## 2019-08-28 RX ADMIN — HYDROCODONE BITARTRATE AND ACETAMINOPHEN 1 TABLET: 10; 325 TABLET ORAL at 04:08

## 2019-08-28 RX ADMIN — CEFEPIME HYDROCHLORIDE 2 G: 2 INJECTION, SOLUTION INTRAVENOUS at 08:08

## 2019-08-28 NOTE — PLAN OF CARE
Problem: Adult Inpatient Plan of Care  Goal: Plan of Care Review  Outcome: Ongoing (interventions implemented as appropriate)  No significant events overnight. Remains free from fall, injury, and skin breakdown. Voiding without difficulty. Ambulating independently. VSS on RA throughout the night. Positions self independently. Pain well controlled with PO meds; denies pain. All alarms active and audible. Neuro check WNL. Left breast red and tender. MELY drains x2 to left breast putting out minimal serous drainage.Surgical bra in place. SCDs in place. Plan of care reviewed with patient and all questions answered. Bed low, locked. Call light within reach. Purposeful rounding performed. Resting comfortably in bed, no other complaints at this time.

## 2019-08-28 NOTE — PROGRESS NOTES
"Doing well.    Blood pressure 132/64, pulse 72, temperature 97.6 °F (36.4 °C), temperature source Oral, resp. rate 16, height 5' 7" (1.702 m), weight 61.2 kg (135 lb), SpO2 98 %, not currently breastfeeding.      Still with cellulitis to left breast    A/P left breast implant infection    1.) Will dc cefepime and start PO abx per ID  2.) Will reassess labs and patient in am  "

## 2019-08-29 LAB
BASOPHILS # BLD AUTO: 0.05 K/UL (ref 0–0.2)
BASOPHILS NFR BLD: 0.5 % (ref 0–1.9)
DIFFERENTIAL METHOD: ABNORMAL
EOSINOPHIL # BLD AUTO: 1.2 K/UL (ref 0–0.5)
EOSINOPHIL NFR BLD: 13.5 % (ref 0–8)
ERYTHROCYTE [DISTWIDTH] IN BLOOD BY AUTOMATED COUNT: 11.6 % (ref 11.5–14.5)
HCT VFR BLD AUTO: 35 % (ref 37–48.5)
HGB BLD-MCNC: 11.5 G/DL (ref 12–16)
IMM GRANULOCYTES # BLD AUTO: 0.1 K/UL (ref 0–0.04)
IMM GRANULOCYTES NFR BLD AUTO: 1.1 % (ref 0–0.5)
LYMPHOCYTES # BLD AUTO: 1 K/UL (ref 1–4.8)
LYMPHOCYTES NFR BLD: 10.4 % (ref 18–48)
MCH RBC QN AUTO: 32.5 PG (ref 27–31)
MCHC RBC AUTO-ENTMCNC: 32.9 G/DL (ref 32–36)
MCV RBC AUTO: 99 FL (ref 82–98)
MONOCYTES # BLD AUTO: 1.1 K/UL (ref 0.3–1)
MONOCYTES NFR BLD: 11.9 % (ref 4–15)
NEUTROPHILS # BLD AUTO: 5.8 K/UL (ref 1.8–7.7)
NEUTROPHILS NFR BLD: 62.6 % (ref 38–73)
NRBC BLD-RTO: 0 /100 WBC
PLATELET # BLD AUTO: 302 K/UL (ref 150–350)
PMV BLD AUTO: 10.1 FL (ref 9.2–12.9)
RBC # BLD AUTO: 3.54 M/UL (ref 4–5.4)
WBC # BLD AUTO: 9.21 K/UL (ref 3.9–12.7)

## 2019-08-29 PROCEDURE — 36415 COLL VENOUS BLD VENIPUNCTURE: CPT

## 2019-08-29 PROCEDURE — 25000003 PHARM REV CODE 250: Performed by: SURGERY

## 2019-08-29 PROCEDURE — 11000001 HC ACUTE MED/SURG PRIVATE ROOM

## 2019-08-29 PROCEDURE — 85025 COMPLETE CBC W/AUTO DIFF WBC: CPT

## 2019-08-29 PROCEDURE — 94761 N-INVAS EAR/PLS OXIMETRY MLT: CPT

## 2019-08-29 RX ADMIN — CIPROFLOXACIN HYDROCHLORIDE 750 MG: 500 TABLET, FILM COATED ORAL at 08:08

## 2019-08-29 RX ADMIN — HYDROCODONE BITARTRATE AND ACETAMINOPHEN 1 TABLET: 10; 325 TABLET ORAL at 12:08

## 2019-08-29 RX ADMIN — HYDROCODONE BITARTRATE AND ACETAMINOPHEN 1 TABLET: 10; 325 TABLET ORAL at 06:08

## 2019-08-29 RX ADMIN — HYDROCODONE BITARTRATE AND ACETAMINOPHEN 1 TABLET: 10; 325 TABLET ORAL at 10:08

## 2019-08-29 RX ADMIN — LATANOPROST 1 DROP: 50 SOLUTION OPHTHALMIC at 09:08

## 2019-08-29 RX ADMIN — RIFAMPIN 300 MG: 300 CAPSULE ORAL at 03:08

## 2019-08-29 RX ADMIN — CIPROFLOXACIN HYDROCHLORIDE 750 MG: 500 TABLET, FILM COATED ORAL at 09:08

## 2019-08-29 RX ADMIN — RIFAMPIN 300 MG: 300 CAPSULE ORAL at 08:08

## 2019-08-29 RX ADMIN — DOCUSATE SODIUM 100 MG: 100 CAPSULE, LIQUID FILLED ORAL at 09:08

## 2019-08-29 RX ADMIN — RIFAMPIN 300 MG: 300 CAPSULE ORAL at 09:08

## 2019-08-29 RX ADMIN — DOCUSATE SODIUM 100 MG: 100 CAPSULE, LIQUID FILLED ORAL at 08:08

## 2019-08-29 RX ADMIN — DORZOLAMIDE HYDROCHLORIDE 1 DROP: 20 SOLUTION/ DROPS OPHTHALMIC at 09:08

## 2019-08-29 RX ADMIN — BUPROPION HYDROCHLORIDE 150 MG: 150 TABLET, FILM COATED, EXTENDED RELEASE ORAL at 08:08

## 2019-08-29 RX ADMIN — DORZOLAMIDE HYDROCHLORIDE 1 DROP: 20 SOLUTION/ DROPS OPHTHALMIC at 08:08

## 2019-08-29 NOTE — CONSULTS
[facility]  Infectious Disease  Consult Note    Patient Name: Ashlie Redman  MRN: [id number]  Admission Date: 8/25/2019  Hospital Length of Stay: 4 days  Attending Physician: Diego Modi MD  Primary Care Provider: Kevin Gong MD     Isolation Status: No active isolations    Patient information was obtained from patient and ER records.      Consults  Assessment/Plan:     * Cellulitis of left breast  Pt with retained breast hardware and surgical cultures growing pseudomonas. BCx negative this admit. Ideally would remove hardware for source control, but discussed this with both patient and surgeon and goal is to try to avoid another surgery. Plan for 6 week trial of antibiotics. At end of antibiotics, discuss chronic suppression vs stopping. If relapse, low threshold for implant removal. Erythema and tenderness of L breast improved on iv cefepime while inpatient. Agree with transitioning to oral regimen on discharge based on sensitivities; po cipro 750 BID and rifampin 300mg po TID.     Discussed with patient side effects of quinolones (including but not limited to qtc prolongation, hypoglycemia, achilles tendon rupture, worsening of aneurysms, c.diff) and these are acceptable risks and preferable to IV medication and risk of picc line complications.  Qtc = 433    Have ordered esr/crp/bmp q2 weeks and pt will go to [facility] for lab draws.     We will arrange for a follow-up appointment in Infectious Diseases clinic in 2-3 weeks. Prior to discharge, please ensure the patient's follow-up has been scheduled.  If there is still no follow-up scheduled in Infectious Diseases clinic, please send an EPIC message to the Infectious Diseases charge nurse Gretta Hennessy.        Thank you for your consult. I will sign off. Please contact us if you have any additional questions.    Maryan Mercado MD  Infectious Disease  [facility]    Subjective:     Principal Problem: Cellulitis  "of left breast    HPI:   64F with h/o breast augmentation surgery admitted 8/25 with redness and swelling and tenderness of breast. Pt recently underwent revision breast reconstruction with implant placement on 8/21. Per op note "There was some mild fluid which was sent for cultures". This is now growing psedudomonas. Pt reports had mild symptoms before surgery, but redness and tenderness and swelling has progressed since time of surgery. Denies fever/chills. Has several surgical drains in place with serosanguinous output. Currently on IV cefepime 2q8h. Denies having much improvement since admission. Patient very reluctant to have surgery to remove implants. ID consulted for antibiotic recommendations.     Procedure on 8/21:  1.) Left Breast Reconstruction with prepectoral silicone implant and acellular dermal matrix  2.) Right augmentation mammaplasty for symmetry      Breast, Left; Body Fluid         Component 6d ago   Aerobic Bacterial Culture Abnormal    PSEUDOMONAS AERUGINOSA   Moderate     Resulting Agency [facility]   Susceptibility      Pseudomonas aeruginosa     CULTURE, AEROBIC  (SPECIFY SOURCE)     Amikacin <=16 mcg/mL Sensitive     Cefepime <=8 mcg/mL Sensitive     Ciprofloxacin <=1 mcg/mL Sensitive     Gentamicin <=4 mcg/mL Sensitive     Piperacillin/Tazo <=16 mcg/mL Sensitive     Tobramycin <=4 mcg/mL Sensitive                 Past Medical History:   Diagnosis Date    Allergy     Anemia     Asteroid hyalosis of both eyes     Breast cancer 04/2017    Left    Cataract     Diverticulosis     Encounter for blood transfusion     Glaucoma     High myopia     Osteopenia     PONV (postoperative nausea and vomiting)     S/P dilation and curettage        Past Surgical History:   Procedure Laterality Date    BIOPSY-LYMPH NODE-SENTINEL Left 11/1/2017    [facility] OR [address] [place] [address]    breast augmentation  03/2013    BREAST BIOPSY Left 04/2017    Core bx,+ cancer    BREAST " LUMPECTOMY Left 10/25/2017    BREAST REVISION SURGERY Bilateral 8/5/2019    [facility]    CAPSULECTOMY, BREAST Left 8/5/2019    [facility] at [facility] OR    CATARACT EXTRACTION W/  INTRAOCULAR LENS IMPLANT Bilateral 2009    COLONOSCOPY  4/2009, 2015    repeat in 5 years    COLONOSCOPY N/A 5/21/2015    Performed by Evan [facility] at [facility] ENDO (4TH FLR)    CYSTOSCOPY N/A 11/20/2018    Performed by Rebecca [facility]osta [facility] at [facility] OR 2ND FLR    DILATION AND CURETTAGE OF UTERUS      DISSECTION-LYMPH NODE-AXILLARY Left 11/1/2017    Performed by [facility] at [facility] OR 2ND FLR    ECTOPIC PREGNANCY SURGERY      EYE SURGERY Bilateral 3/09    Vitrectomy     INJECTION-NODE-SENTINEL Left 11/1/2017    Performed by [facility]ce MD at [facility] OR 2ND FLR    INSERTION-IMPLANT BREAST Bilateral 8/21/2019    Performed by [facility] OR    ATLMOOTOG-BZZH-Y-CATH neck poss chest Right 4/24/2017    Performed by [facility] at [facility] OR 2ND FLR    LUMPECTOMY with MAGSEED (2) Left 11/1/2017    Performed by [facility] at [facility] OR 2ND FLR    POLYPECTOMY      x3 2001    RECONSTRUCTION, BREAST WITH ALLODERM Bilateral 8/21/2019    Performed by [facility] at [facility] OR    REMOVAL, IMPLANT, BREAST Bilateral 8/5/2019    Per[facility] by [facility] at [facility] OR    REPAIR, CYSTOCELE N/A 11/20/2018    Performed by [facility] at [facility] OR 2ND FLR    Yag Capsulotomy Bilateral 12/2014       Review of patient's allergies indicates:   Allergen Reactions    Bactrim [sulfamethoxazole-trimethoprim] Rash     Fever, vomiting       Medications:  Medications Prior to Admission   Medication Sig    buPROPion (WELLBUTRIN XL) 150 MG TB24 tablet Take 1 tablet (150 mg total) by mouth once daily.    dorzolamide (TRUSOPT) 2 % ophthalmic solution Place 1 drop into both eyes 3 (three) times daily.    HYDROcodone-acetaminophen  (NORCO)  mg per tablet Take 1 tablet by mouth every 6 (six) hours as needed for Pain.    latanoprost 0.005 % ophthalmic solution INSTILL ONE DROP INTO EACH EYE ONCE DAILY IN THE EVENING    phenazopyridine (PYRIDIUM) 200 MG tablet Take 1 tablet (200 mg total) by mouth 3 (three) times daily as needed for Pain.    valACYclovir (VALTREX) 500 MG tablet Take 1 tablet (500 mg total) by mouth once daily. (Patient taking differently: Take 500 mg by mouth daily as needed. )     Antibiotics (From admission, onward)    Start     Stop Route Frequency Ordered    08/28/19 1215  ciprofloxacin HCl tablet 750 mg      -- Oral Every 12 hours 08/28/19 1214    08/28/19 1215  rifAMpin capsule 300 mg      -- Oral 3 times daily 08/28/19 1214        Antifungals (From admission, onward)    None        Antivirals (From admission, onward)    None           Immunization History   Administered Date(s) Administered    Influenza - Quadrivalent 02/08/2016    Influenza - Quadrivalent - PF (6 months and older) 12/23/2017    Pneumococcal Conjugate - 13 Valent 02/19/2018    Tdap 08/28/2015       Family History     Problem Relation (Age of Onset)    Breast cancer Cousin    Cancer Sister, Cousin, Mother    Cataracts Mother    Colon cancer Mother    Glaucoma Maternal Grandmother    Heart disease Father    Hypertension Mother    Pancreatic cancer Mother    Thyroid disease Mother        Social History     Socioeconomic History    Marital status: Single     Spouse name: Not on file    Number of children: 2    Years of education: Not on file    Highest education level: Not on file   Occupational History    Not on file   Social Needs    Financial resource strain: Not very hard    Food insecurity:     Worry: Never true     Inability: Never true    Transportation needs:     Medical: No     Non-medical: No   Tobacco Use    Smoking status: Never Smoker    Smokeless tobacco: Never Used   Substance and Sexual Activity    Alcohol use: No      Alcohol/week: 0.6 oz     Types: 1 Glasses of wine per week     Frequency: 4 or more times a week     Drinks per session: 1 or 2     Binge frequency: Never     Comment: Quit    Drug use: No    Sexual activity: Yes     Partners: Male     Birth control/protection: Post-menopausal   Lifestyle    Physical activity:     Days per week: 4 days     Minutes per session: 60 min    Stress: Only a little   Relationships    Social connections:     Talks on phone: More than three times a week     Gets together: More than three times a week     Attends Hoahaoism service: Not on file     Active member of club or organization: Yes     Attends meetings of clubs or organizations: More than 4 times per year     Relationship status:    Other Topics Concern    Are you pregnant or think you may be? No    Breast-feeding Not Asked    Patient feels they ought to cut down on drinking/drug use Not Asked    Patient annoyed by others criticizing their drinking/drug use Not Asked    Patient has felt bad or guilty about drinking/drug use Not Asked    Patient has had a drink/used drugs as an eye opener in the AM Not Asked   Social History Narrative    Not on file     Review of Systems   Constitutional: Negative for fever.   HENT: Negative for sinus pressure and sore throat.    Respiratory: Negative for cough and shortness of breath.    Cardiovascular: Negative for chest pain and leg swelling.   Gastrointestinal: Negative for abdominal distention and abdominal pain.   Genitourinary: Negative for dysuria.   Musculoskeletal: Negative for arthralgias, back pain and joint swelling.   Skin: Positive for wound.        L breast wound with drains   Neurological: Negative for dizziness and weakness.     Objective:     Vital Signs (Most Recent):  Temp: 98.4 °F (36.9 °C) (08/29/19 0735)  Pulse: 67 (08/29/19 0735)  Resp: 17 (08/29/19 0735)  BP: 126/74 (08/29/19 0735)  SpO2: 98 % (08/29/19 0735) Vital Signs (24h Range):  Temp:  [97.6 °F (36.4  °C)-98.4 °F (36.9 °C)] 98.4 °F (36.9 °C)  Pulse:  [67-70] 67  Resp:  [16-18] 17  SpO2:  [95 %-99 %] 98 %  BP: (120-135)/(59-78) 126/74     Weight: 61.2 kg (135 lb)  Body mass index is 21.14 kg/m².    Estimated Creatinine Clearance: 68.6 mL/min (based on SCr of 0.8 mg/dL).    Physical Exam   Constitutional: She is oriented to person, place, and time. She appears well-developed and well-nourished. No distress.   Cardiovascular: Normal rate and regular rhythm.   Pulmonary/Chest: Effort normal and breath sounds normal.   Abdominal: Soft. She exhibits no distension. There is no tenderness.   Musculoskeletal: Normal range of motion.   Neurological: She is alert and oriented to person, place, and time.   Skin: There is erythema.   L breast erythema and tenderness improved       Significant Labs: All pertinent labs within the past 24 hours have been reviewed.    Significant Imaging: I have reviewed all pertinent imaging results/findings within the past 24 hours.

## 2019-08-29 NOTE — PROGRESS NOTES
"Pt reports feeling ok.    Blood pressure 126/74, pulse 67, temperature 98.4 °F (36.9 °C), temperature source Oral, resp. rate 17, height 5' 7" (1.702 m), weight 61.2 kg (135 lb), SpO2 98 %, not currently breastfeeding.      Left breast still with edema and cellulitis, mild drainage from incision    A/P left breast implant infection    1.) NPO after MN  2.) Plan to go to OR in AM for exploration/likely implant removal  "

## 2019-08-29 NOTE — ASSESSMENT & PLAN NOTE
Pt with retained breast hardware and surgical cultures growing pseudomonas. BCx negative this admit. Ideally would remove hardware for source control, but discussed this with both patient and surgeon and goal is to try to avoid another surgery. Plan for 6 week trial of antibiotics. At end of antibiotics, discuss chronic suppression vs stopping. If relapse, low threshold for implant removal. Erythema and tenderness of L breast improved on iv cefepime while inpatient. Agree with transitioning to oral regimen on discharge based on sensitivities; po cipro 750 BID and rifampin 300mg po TID.     Discussed with patient side effects of quinolones (including but not limited to qtc prolongation, hypoglycemia, achilles tendon rupture, worsening of aneurysms, c.diff) and these are acceptable risks and preferable to IV medication and risk of picc line complications.  Qtc = 433    Have ordered esr/crp/bmp q2 weeks and pt will go to [facility] for lab draws.     We will arrange for a follow-up appointment in Infectious Diseases clinic in 2-3 weeks. Prior to discharge, please ensure the patient's follow-up has been scheduled.  If there is still no follow-up scheduled in Infectious Diseases clinic, please send an EPIC message to the Infectious Diseases charge nurse Gretta Hennessy.

## 2019-08-29 NOTE — SUBJECTIVE & OBJECTIVE
Past Medical History:   Diagnosis Date    Allergy     Anemia     Asteroid hyalosis of both eyes     Breast cancer 04/2017    Left    Cataract     Diverticulosis     Encounter for blood transfusion     Glaucoma     High myopia     Osteopenia     PONV (postoperative nausea and vomiting)     S/P dilation and curettage        Past Surgical History:   Procedure Laterality Date    BIOPSY-LYMPH NODE-SENTINEL Left 11/1/2017    [facility]formed [facility] OR 2ND FLR    breast augmentation  03/2013    BREAST BIOPSY Left 04/2017    Core bx,+ cancer    BREAST LUMPECTOMY Left 10/25/2017    BREAST REVISION SURGERY Bilateral 8/5/2019    [facility]    CAPSULECTOMY, BREAST Left 8/5/2019    [facility] at [facility]    CATARACT EXTRACTION W/  INTRAOCULAR LENS IMPLANT Bilateral 2009    COLONOSCOPY  4/2009, 2015    repeat in 5 years    COLONOSCOPY N/A 5/21/2015    Performed by Evan [facility] at [facility] ENDO (4TH FLR)    CYSTOSCOPY N/A 11/20/2018    Performed by [facility] at [facility] OR 2ND FLR    DILATION AND CURETTAGE OF UTERUS      DISSECTION-LYMPH NODE-AXILLARY Left 11/1/2017    Performed by [facility] at [facility] OR 2ND FLR    ECTOPIC PREGNANCY SURGERY      EYE SURGERY Bilateral 3/09    Vitrectomy     INJECTION-NODE-SENTINEL Left 11/1/2017    Performed by [facility] at [facility] OR 2ND FLR    INSERTION-IMPLANT BREAST Bilateral 8/21/2019    [facility]    ACXGUDOED-VPAJ-R-CATH neck poss chest Right 4/24/2017    Performed by [facility] at [facility] OR 2ND FLR    LUMPECTOMY with MAGSEED (2) Left 11/1/2017    Performed by [facility] at [facility] OR 2ND FLR    POLYPECTOMY      x3 2001    RECONSTRUCTION, BREAST WITH ALLODERM Bilateral 8/21/2019    [facility] at [facility] OR    REMOVAL, IMPLANT, BREAST Bilateral 8/5/2019    [facility]    REPAIR, CYSTOCELE N/A 11/20/2018    [facility] 2ND FLR    Yag Capsulotomy Bilateral 12/2014       Review of patient's allergies indicates:   Allergen Reactions    Bactrim [sulfamethoxazole-trimethoprim] Rash     Fever, vomiting       Medications:  Medications Prior to Admission   Medication Sig    buPROPion (WELLBUTRIN XL) 150 MG TB24 tablet Take 1 tablet (150 mg total) by mouth once daily.    dorzolamide (TRUSOPT) 2 % ophthalmic solution Place 1 drop into both eyes 3 (three) times daily.    HYDROcodone-acetaminophen (NORCO)  mg per tablet Take 1 tablet by mouth every 6 (six) hours as needed for Pain.    latanoprost 0.005 % ophthalmic solution INSTILL ONE DROP INTO EACH EYE ONCE DAILY IN THE EVENING    phenazopyridine (PYRIDIUM) 200 MG tablet Take 1 tablet (200 mg total) by mouth 3 (three) times daily as needed for Pain.    valACYclovir (VALTREX) 500 MG tablet Take 1 tablet (500 mg total) by mouth once daily. (Patient taking differently: Take 500 mg by mouth daily as needed. )     Antibiotics (From admission, onward)    Start     Stop Route Frequency Ordered    08/28/19 1215  ciprofloxacin HCl tablet 750 mg      -- Oral Every 12 hours 08/28/19 1214    08/28/19 1215  rifAMpin capsule 300 mg      -- Oral 3 times daily 08/28/19 1214        Antifungals (From admission, onward)    None        Antivirals (From admission, onward)    None           Immunization History   Administered Date(s) Administered    Influenza - Quadrivalent 02/08/2016    Influenza - Quadrivalent - PF (6 months and older) 12/23/2017    Pneumococcal Conjugate - 13 Valent 02/19/2018    Tdap 08/28/2015       Family History     Problem Relation (Age of Onset)    Breast cancer Cousin    Cancer Sister, Cousin, Mother    Cataracts Mother    Colon cancer Mother    Glaucoma Maternal Grandmother    Heart disease Father    Hypertension Mother    Pancreatic cancer Mother    Thyroid disease  Mother        Social History     Socioeconomic History    Marital status: Single     Spouse name: Not on file    Number of children: 2    Years of education: Not on file    Highest education level: Not on file   Occupational History    Not on file   Social Needs    Financial resource strain: Not very hard    Food insecurity:     Worry: Never true     Inability: Never true    Transportation needs:     Medical: No     Non-medical: No   Tobacco Use    Smoking status: Never Smoker    Smokeless tobacco: Never Used   Substance and Sexual Activity    Alcohol use: No     Alcohol/week: 0.6 oz     Types: 1 Glasses of wine per week     Frequency: 4 or more times a week     Drinks per session: 1 or 2     Binge frequency: Never     Comment: Quit    Drug use: No    Sexual activity: Yes     Partners: Male     Birth control/protection: Post-menopausal   Lifestyle    Physical activity:     Days per week: 4 days     Minutes per session: 60 min    Stress: Only a little   Relationships    Social connections:     Talks on phone: More than three times a week     Gets together: More than three times a week     Attends Buddhism service: Not on file     Active member of club or organization: Yes     Attends meetings of clubs or organizations: More than 4 times per year     Relationship status:    Other Topics Concern    Are you pregnant or think you may be? No    Breast-feeding Not Asked    Patient feels they ought to cut down on drinking/drug use Not Asked    Patient annoyed by others criticizing their drinking/drug use Not Asked    Patient has felt bad or guilty about drinking/drug use Not Asked    Patient has had a drink/used drugs as an eye opener in the AM Not Asked   Social History Narrative    Not on file     Review of Systems   Constitutional: Negative for fever.   HENT: Negative for sinus pressure and sore throat.    Respiratory: Negative for cough and shortness of breath.    Cardiovascular:  Negative for chest pain and leg swelling.   Gastrointestinal: Negative for abdominal distention and abdominal pain.   Genitourinary: Negative for dysuria.   Musculoskeletal: Negative for arthralgias, back pain and joint swelling.   Skin: Positive for wound.        L breast wound with drains   Neurological: Negative for dizziness and weakness.     Objective:     Vital Signs (Most Recent):  Temp: 98.4 °F (36.9 °C) (08/29/19 0735)  Pulse: 67 (08/29/19 0735)  Resp: 17 (08/29/19 0735)  BP: 126/74 (08/29/19 0735)  SpO2: 98 % (08/29/19 0735) Vital Signs (24h Range):  Temp:  [97.6 °F (36.4 °C)-98.4 °F (36.9 °C)] 98.4 °F (36.9 °C)  Pulse:  [67-70] 67  Resp:  [16-18] 17  SpO2:  [95 %-99 %] 98 %  BP: (120-135)/(59-78) 126/74     Weight: 61.2 kg (135 lb)  Body mass index is 21.14 kg/m².    Estimated Creatinine Clearance: 68.6 mL/min (based on SCr of 0.8 mg/dL).    Physical Exam   Constitutional: She is oriented to person, place, and time. She appears well-developed and well-nourished. No distress.   Cardiovascular: Normal rate and regular rhythm.   Pulmonary/Chest: Effort normal and breath sounds normal.   Abdominal: Soft. She exhibits no distension. There is no tenderness.   Musculoskeletal: Normal range of motion.   Neurological: She is alert and oriented to person, place, and time.   Skin: There is erythema.   L breast erythema and tenderness improved       Significant Labs: All pertinent labs within the past 24 hours have been reviewed.    Significant Imaging: I have reviewed all pertinent imaging results/findings within the past 24 hours.

## 2019-08-30 ENCOUNTER — ANESTHESIA (OUTPATIENT)
Dept: SURGERY | Facility: OTHER | Age: 64
DRG: 909 | End: 2019-08-30
Payer: COMMERCIAL

## 2019-08-30 ENCOUNTER — ANESTHESIA EVENT (OUTPATIENT)
Dept: SURGERY | Facility: OTHER | Age: 64
DRG: 909 | End: 2019-08-30
Payer: COMMERCIAL

## 2019-08-30 LAB
BASOPHILS # BLD AUTO: 0.08 K/UL (ref 0–0.2)
BASOPHILS NFR BLD: 0.8 % (ref 0–1.9)
DIFFERENTIAL METHOD: ABNORMAL
EOSINOPHIL # BLD AUTO: 1.2 K/UL (ref 0–0.5)
EOSINOPHIL NFR BLD: 12.5 % (ref 0–8)
ERYTHROCYTE [DISTWIDTH] IN BLOOD BY AUTOMATED COUNT: 11.6 % (ref 11.5–14.5)
HCT VFR BLD AUTO: 34.9 % (ref 37–48.5)
HGB BLD-MCNC: 11.8 G/DL (ref 12–16)
IMM GRANULOCYTES # BLD AUTO: 0.16 K/UL (ref 0–0.04)
IMM GRANULOCYTES NFR BLD AUTO: 1.7 % (ref 0–0.5)
LYMPHOCYTES # BLD AUTO: 1.2 K/UL (ref 1–4.8)
LYMPHOCYTES NFR BLD: 12.9 % (ref 18–48)
MCH RBC QN AUTO: 32.5 PG (ref 27–31)
MCHC RBC AUTO-ENTMCNC: 33.8 G/DL (ref 32–36)
MCV RBC AUTO: 96 FL (ref 82–98)
MONOCYTES # BLD AUTO: 1.2 K/UL (ref 0.3–1)
MONOCYTES NFR BLD: 12.7 % (ref 4–15)
NEUTROPHILS # BLD AUTO: 5.6 K/UL (ref 1.8–7.7)
NEUTROPHILS NFR BLD: 59.4 % (ref 38–73)
NRBC BLD-RTO: 0 /100 WBC
PLATELET # BLD AUTO: 334 K/UL (ref 150–350)
PMV BLD AUTO: 9.9 FL (ref 9.2–12.9)
RBC # BLD AUTO: 3.63 M/UL (ref 4–5.4)
WBC # BLD AUTO: 9.47 K/UL (ref 3.9–12.7)

## 2019-08-30 PROCEDURE — 37000009 HC ANESTHESIA EA ADD 15 MINS: Performed by: SURGERY

## 2019-08-30 PROCEDURE — 63600175 PHARM REV CODE 636 W HCPCS: Performed by: NURSE ANESTHETIST, CERTIFIED REGISTERED

## 2019-08-30 PROCEDURE — 11000001 HC ACUTE MED/SURG PRIVATE ROOM

## 2019-08-30 PROCEDURE — 99233 PR SUBSEQUENT HOSPITAL CARE,LEVL III: ICD-10-PCS | Mod: ,,, | Performed by: INTERNAL MEDICINE

## 2019-08-30 PROCEDURE — 63600175 PHARM REV CODE 636 W HCPCS: Performed by: ANESTHESIOLOGY

## 2019-08-30 PROCEDURE — 71000033 HC RECOVERY, INTIAL HOUR: Performed by: SURGERY

## 2019-08-30 PROCEDURE — 36000707: Performed by: SURGERY

## 2019-08-30 PROCEDURE — 25000003 PHARM REV CODE 250: Performed by: NURSE ANESTHETIST, CERTIFIED REGISTERED

## 2019-08-30 PROCEDURE — 37000008 HC ANESTHESIA 1ST 15 MINUTES: Performed by: SURGERY

## 2019-08-30 PROCEDURE — 36000706: Performed by: SURGERY

## 2019-08-30 PROCEDURE — 85025 COMPLETE CBC W/AUTO DIFF WBC: CPT

## 2019-08-30 PROCEDURE — 94761 N-INVAS EAR/PLS OXIMETRY MLT: CPT

## 2019-08-30 PROCEDURE — 25000003 PHARM REV CODE 250: Performed by: SURGERY

## 2019-08-30 PROCEDURE — 36415 COLL VENOUS BLD VENIPUNCTURE: CPT

## 2019-08-30 PROCEDURE — 99233 SBSQ HOSP IP/OBS HIGH 50: CPT | Mod: ,,, | Performed by: INTERNAL MEDICINE

## 2019-08-30 PROCEDURE — C1729 CATH, DRAINAGE: HCPCS | Performed by: SURGERY

## 2019-08-30 PROCEDURE — 63600175 PHARM REV CODE 636 W HCPCS: Performed by: SURGERY

## 2019-08-30 RX ORDER — MEPERIDINE HYDROCHLORIDE 25 MG/ML
12.5 INJECTION INTRAMUSCULAR; INTRAVENOUS; SUBCUTANEOUS ONCE AS NEEDED
Status: DISCONTINUED | OUTPATIENT
Start: 2019-08-30 | End: 2019-08-30 | Stop reason: HOSPADM

## 2019-08-30 RX ORDER — DIPHENHYDRAMINE HYDROCHLORIDE 50 MG/ML
INJECTION INTRAMUSCULAR; INTRAVENOUS
Status: DISCONTINUED | OUTPATIENT
Start: 2019-08-30 | End: 2019-08-30

## 2019-08-30 RX ORDER — CIPROFLOXACIN 2 MG/ML
INJECTION, SOLUTION INTRAVENOUS
Status: DISCONTINUED | OUTPATIENT
Start: 2019-08-30 | End: 2019-08-30

## 2019-08-30 RX ORDER — DEXAMETHASONE SODIUM PHOSPHATE 4 MG/ML
INJECTION, SOLUTION INTRA-ARTICULAR; INTRALESIONAL; INTRAMUSCULAR; INTRAVENOUS; SOFT TISSUE
Status: DISCONTINUED | OUTPATIENT
Start: 2019-08-30 | End: 2019-08-30

## 2019-08-30 RX ORDER — SODIUM CHLORIDE 0.9 % (FLUSH) 0.9 %
3 SYRINGE (ML) INJECTION
Status: DISCONTINUED | OUTPATIENT
Start: 2019-08-30 | End: 2019-08-31 | Stop reason: HOSPADM

## 2019-08-30 RX ORDER — PHENYLEPHRINE HYDROCHLORIDE 10 MG/ML
INJECTION INTRAVENOUS
Status: DISCONTINUED | OUTPATIENT
Start: 2019-08-30 | End: 2019-08-30

## 2019-08-30 RX ORDER — NEOSTIGMINE METHYLSULFATE 1 MG/ML
INJECTION, SOLUTION INTRAVENOUS
Status: DISCONTINUED | OUTPATIENT
Start: 2019-08-30 | End: 2019-08-30

## 2019-08-30 RX ORDER — FENTANYL CITRATE 50 UG/ML
INJECTION, SOLUTION INTRAMUSCULAR; INTRAVENOUS
Status: DISCONTINUED | OUTPATIENT
Start: 2019-08-30 | End: 2019-08-30

## 2019-08-30 RX ORDER — ROCURONIUM BROMIDE 10 MG/ML
INJECTION, SOLUTION INTRAVENOUS
Status: DISCONTINUED | OUTPATIENT
Start: 2019-08-30 | End: 2019-08-30

## 2019-08-30 RX ORDER — ONDANSETRON 2 MG/ML
4 INJECTION INTRAMUSCULAR; INTRAVENOUS DAILY PRN
Status: DISCONTINUED | OUTPATIENT
Start: 2019-08-30 | End: 2019-08-30 | Stop reason: HOSPADM

## 2019-08-30 RX ORDER — OXYCODONE HYDROCHLORIDE 5 MG/1
5 TABLET ORAL
Status: DISCONTINUED | OUTPATIENT
Start: 2019-08-30 | End: 2019-08-30 | Stop reason: HOSPADM

## 2019-08-30 RX ORDER — LIDOCAINE HCL/PF 100 MG/5ML
SYRINGE (ML) INTRAVENOUS
Status: DISCONTINUED | OUTPATIENT
Start: 2019-08-30 | End: 2019-08-30

## 2019-08-30 RX ORDER — ONDANSETRON 2 MG/ML
INJECTION INTRAMUSCULAR; INTRAVENOUS
Status: DISCONTINUED | OUTPATIENT
Start: 2019-08-30 | End: 2019-08-30

## 2019-08-30 RX ORDER — GLYCOPYRROLATE 0.2 MG/ML
INJECTION INTRAMUSCULAR; INTRAVENOUS
Status: DISCONTINUED | OUTPATIENT
Start: 2019-08-30 | End: 2019-08-30

## 2019-08-30 RX ORDER — SODIUM CHLORIDE, SODIUM LACTATE, POTASSIUM CHLORIDE, CALCIUM CHLORIDE 600; 310; 30; 20 MG/100ML; MG/100ML; MG/100ML; MG/100ML
INJECTION, SOLUTION INTRAVENOUS CONTINUOUS PRN
Status: DISCONTINUED | OUTPATIENT
Start: 2019-08-30 | End: 2019-08-30

## 2019-08-30 RX ORDER — PROMETHAZINE HYDROCHLORIDE 25 MG/ML
INJECTION, SOLUTION INTRAMUSCULAR; INTRAVENOUS
Status: DISPENSED
Start: 2019-08-30 | End: 2019-08-30

## 2019-08-30 RX ORDER — MIDAZOLAM HYDROCHLORIDE 1 MG/ML
INJECTION INTRAMUSCULAR; INTRAVENOUS
Status: DISCONTINUED | OUTPATIENT
Start: 2019-08-30 | End: 2019-08-30

## 2019-08-30 RX ORDER — PROPOFOL 10 MG/ML
VIAL (ML) INTRAVENOUS
Status: DISCONTINUED | OUTPATIENT
Start: 2019-08-30 | End: 2019-08-30

## 2019-08-30 RX ORDER — HYDROMORPHONE HYDROCHLORIDE 2 MG/ML
0.4 INJECTION, SOLUTION INTRAMUSCULAR; INTRAVENOUS; SUBCUTANEOUS EVERY 5 MIN PRN
Status: DISCONTINUED | OUTPATIENT
Start: 2019-08-30 | End: 2019-08-30 | Stop reason: HOSPADM

## 2019-08-30 RX ADMIN — GLYCOPYRROLATE 0.4 MG: 0.2 INJECTION, SOLUTION INTRAMUSCULAR; INTRAVENOUS at 08:08

## 2019-08-30 RX ADMIN — HYDROCODONE BITARTRATE AND ACETAMINOPHEN 1 TABLET: 10; 325 TABLET ORAL at 09:08

## 2019-08-30 RX ADMIN — HYDROMORPHONE HYDROCHLORIDE 0.4 MG: 2 INJECTION, SOLUTION INTRAMUSCULAR; INTRAVENOUS; SUBCUTANEOUS at 08:08

## 2019-08-30 RX ADMIN — RIFAMPIN 300 MG: 300 CAPSULE ORAL at 04:08

## 2019-08-30 RX ADMIN — FENTANYL CITRATE 50 MCG: 50 INJECTION, SOLUTION INTRAMUSCULAR; INTRAVENOUS at 08:08

## 2019-08-30 RX ADMIN — HYDROMORPHONE HYDROCHLORIDE 0.4 MG: 2 INJECTION, SOLUTION INTRAMUSCULAR; INTRAVENOUS; SUBCUTANEOUS at 09:08

## 2019-08-30 RX ADMIN — FENTANYL CITRATE 50 MCG: 50 INJECTION, SOLUTION INTRAMUSCULAR; INTRAVENOUS at 07:08

## 2019-08-30 RX ADMIN — SODIUM CHLORIDE, SODIUM LACTATE, POTASSIUM CHLORIDE, AND CALCIUM CHLORIDE: .6; .31; .03; .02 INJECTION, SOLUTION INTRAVENOUS at 07:08

## 2019-08-30 RX ADMIN — ONDANSETRON 4 MG: 2 INJECTION INTRAMUSCULAR; INTRAVENOUS at 07:08

## 2019-08-30 RX ADMIN — HYDROCODONE BITARTRATE AND ACETAMINOPHEN 1 TABLET: 10; 325 TABLET ORAL at 02:08

## 2019-08-30 RX ADMIN — DOCUSATE SODIUM 100 MG: 100 CAPSULE, LIQUID FILLED ORAL at 09:08

## 2019-08-30 RX ADMIN — CARBOXYMETHYLCELLULOSE SODIUM 2 DROP: 2.5 SOLUTION/ DROPS OPHTHALMIC at 07:08

## 2019-08-30 RX ADMIN — DEXAMETHASONE SODIUM PHOSPHATE 8 MG: 4 INJECTION, SOLUTION INTRAMUSCULAR; INTRAVENOUS at 07:08

## 2019-08-30 RX ADMIN — HYDROCODONE BITARTRATE AND ACETAMINOPHEN 1 TABLET: 10; 325 TABLET ORAL at 04:08

## 2019-08-30 RX ADMIN — BUPROPION HYDROCHLORIDE 150 MG: 150 TABLET, FILM COATED, EXTENDED RELEASE ORAL at 10:08

## 2019-08-30 RX ADMIN — MIDAZOLAM HYDROCHLORIDE 2 MG: 1 INJECTION, SOLUTION INTRAMUSCULAR; INTRAVENOUS at 07:08

## 2019-08-30 RX ADMIN — NEOSTIGMINE METHYLSULFATE 4 MG: 1 INJECTION INTRAVENOUS at 08:08

## 2019-08-30 RX ADMIN — ROCURONIUM BROMIDE 10 MG: 10 INJECTION, SOLUTION INTRAVENOUS at 07:08

## 2019-08-30 RX ADMIN — CIPROFLOXACIN HYDROCHLORIDE 750 MG: 500 TABLET, FILM COATED ORAL at 10:08

## 2019-08-30 RX ADMIN — HYDROCODONE BITARTRATE AND ACETAMINOPHEN 1 TABLET: 10; 325 TABLET ORAL at 10:08

## 2019-08-30 RX ADMIN — LIDOCAINE HYDROCHLORIDE 40 MG: 20 INJECTION, SOLUTION INTRAVENOUS at 07:08

## 2019-08-30 RX ADMIN — DIPHENHYDRAMINE HYDROCHLORIDE 12.5 MG: 50 INJECTION, SOLUTION INTRAMUSCULAR; INTRAVENOUS at 07:08

## 2019-08-30 RX ADMIN — RIFAMPIN 300 MG: 300 CAPSULE ORAL at 10:08

## 2019-08-30 RX ADMIN — PHENYLEPHRINE HYDROCHLORIDE 100 MCG: 10 INJECTION INTRAVENOUS at 07:08

## 2019-08-30 RX ADMIN — PROMETHAZINE HYDROCHLORIDE 6.25 MG: 25 INJECTION INTRAMUSCULAR; INTRAVENOUS at 08:08

## 2019-08-30 RX ADMIN — PHENYLEPHRINE HYDROCHLORIDE 50 MCG: 10 INJECTION INTRAVENOUS at 08:08

## 2019-08-30 RX ADMIN — CIPROFLOXACIN 400 MG: 2 INJECTION, SOLUTION INTRAVENOUS at 07:08

## 2019-08-30 RX ADMIN — DORZOLAMIDE HYDROCHLORIDE 1 DROP: 20 SOLUTION/ DROPS OPHTHALMIC at 09:08

## 2019-08-30 RX ADMIN — GLYCOPYRROLATE 0.2 MG: 0.2 INJECTION, SOLUTION INTRAMUSCULAR; INTRAVENOUS at 07:08

## 2019-08-30 RX ADMIN — CIPROFLOXACIN HYDROCHLORIDE 750 MG: 500 TABLET, FILM COATED ORAL at 09:08

## 2019-08-30 RX ADMIN — PROPOFOL 150 MG: 10 INJECTION, EMULSION INTRAVENOUS at 07:08

## 2019-08-30 RX ADMIN — ROCURONIUM BROMIDE 30 MG: 10 INJECTION, SOLUTION INTRAVENOUS at 07:08

## 2019-08-30 RX ADMIN — LATANOPROST 1 DROP: 50 SOLUTION OPHTHALMIC at 09:08

## 2019-08-30 RX ADMIN — DORZOLAMIDE HYDROCHLORIDE 1 DROP: 20 SOLUTION/ DROPS OPHTHALMIC at 10:08

## 2019-08-30 NOTE — NURSING
Pt arrived to floor, AAOX4, BP hypertensive. Surgical incisions clean/dry/intact. MELY to bulb suction. Infectious Disease MD at bedside. Dr. Modi states ok to order diet. Will monitor.

## 2019-08-30 NOTE — TRANSFER OF CARE
"Anesthesia Transfer of Care Note    Patient: Ashlie Redman    Procedure(s) Performed: Procedure(s) (LRB):  REMOVAL, IMPLANT, BREAST (Left)    Patient location: PACU    Anesthesia Type: general    Transport from OR: Transported from OR on 2-3 L/min O2 by NC with adequate spontaneous ventilation    Post pain: adequate analgesia    Post assessment: no apparent anesthetic complications    Post vital signs: stable    Level of consciousness: awake and alert    Nausea/Vomiting: no nausea/vomiting    Complications: none    Transfer of care protocol was followed      Last vitals:   Visit Vitals  /61 (BP Location: Right arm, Patient Position: Lying)   Pulse 85   Temp 36.4 °C (97.6 °F) (Oral)   Resp 16   Ht 5' 7" (1.702 m)   Wt 61.2 kg (135 lb)   SpO2 100%   Breastfeeding? No   BMI 21.14 kg/m²     "

## 2019-08-30 NOTE — SUBJECTIVE & OBJECTIVE
Interval History: Notes reviewed. Patient is without complaints, except for some post-op pain. Purulence seen in OR is noted.     Review of Systems   Constitutional: Negative for chills and fever.   All other systems reviewed and are negative.    Objective:     Vital Signs (Most Recent):  Temp: 97.7 °F (36.5 °C) (08/30/19 1000)  Pulse: (!) 59 (08/30/19 1000)  Resp: 12 (08/30/19 1000)  BP: (!) 144/70 (08/30/19 1000)  SpO2: 99 % (08/30/19 1000) Vital Signs (24h Range):  Temp:  [97.6 °F (36.4 °C)-98.1 °F (36.7 °C)] 97.7 °F (36.5 °C)  Pulse:  [59-85] 59  Resp:  [12-20] 12  SpO2:  [97 %-100 %] 99 %  BP: (131-144)/(61-81) 144/70     Weight: 61.2 kg (135 lb)  Body mass index is 21.14 kg/m².    Estimated Creatinine Clearance: 68.6 mL/min (based on SCr of 0.8 mg/dL).    Physical Exam   Constitutional: She is oriented to person, place, and time. She appears well-developed and well-nourished.   HENT:   Head: Normocephalic and atraumatic.   Right Ear: External ear normal.   Eyes: Pupils are equal, round, and reactive to light. Conjunctivae and EOM are normal.   Neurological: She is alert and oriented to person, place, and time.   Psychiatric: She has a normal mood and affect. Her behavior is normal. Judgment and thought content normal.   Nursing note and vitals reviewed.      Significant Labs:   Wound Culture:   Recent Labs   Lab 08/21/19  1151   LABAERO PSEUDOMONAS AERUGINOSA  Moderate  *       Significant Imaging: I have reviewed all pertinent imaging results/findings within the past 24 hours.

## 2019-08-30 NOTE — PROGRESS NOTES
"[facility]  Infectious Disease  Progress Note    Patient Name: Ashlie Redman  MRN: [id number]  Admission Date: 8/25/2019  Length of Stay: 5 days  Attending Physician: Diego Modi MD  Primary Care Provider: Kevin Gong MD    Isolation Status: No active isolations     Assessment/Plan:      * Cellulitis of left breast    Patient is s/p implant removal and abx washout this am and pan-susceptible Pseudomonas on previous culture.    I recommend at least 14 days of Cipro therapy (agree with 750 mg po BID). Discussed avoidance of dairy products and mineral supplements within 2 hours of Cipro dose (minerals chelate quinolones and reduce their absorption).    (Dr. Mercado previously discussed side effects of quinolones (including but not limited to qtc prolongation, hypoglycemia, achilles tendon rupture, worsening of aneurysms, c.diff) and these are acceptable risks and preferable to IV medication and risk of picc line complications.)        Thank you for your consult. I will sign off. Please contact us if you have any additional questions.    Bao Abarca MD  Infectious Disease  [facility]    Subjective:     Principal Problem:Cellulitis of left breast    HPI:   64F with h/o breast augmentation surgery admitted 8/25 with redness and swelling and tenderness of breast. Pt recently underwent revision breast reconstruction with implant placement on 8/21. Per op note "There was some mild fluid which was sent for cultures". This is now growing psedudomonas. Pt reports had mild symptoms before surgery, but redness and tenderness and swelling has progressed since time of surgery. Denies fever/chills. Has several surgical drains in place with serosanguinous output. Currently on IV cefepime 2q8h. Denies having much improvement since admission. Patient very reluctant to have surgery to remove implants. ID consulted for antibiotic recommendations.     Procedure on 8/21:  1.) Left " Breast Reconstruction with prepectoral silicone implant and acellular dermal matrix  2.) Right augmentation mammaplasty for symmetry      Breast, Left; Body Fluid         Component 6d ago   Aerobic Bacterial Culture Abnormal    PSEUDOMONAS AERUGINOSA   Moderate     Resulting Agency [facility]   Susceptibility      Pseudomonas aeruginosa     CULTURE, AEROBIC  (SPECIFY SOURCE)     Amikacin <=16 mcg/mL Sensitive     Cefepime <=8 mcg/mL Sensitive     Ciprofloxacin <=1 mcg/mL Sensitive     Gentamicin <=4 mcg/mL Sensitive     Piperacillin/Tazo <=16 mcg/mL Sensitive     Tobramycin <=4 mcg/mL Sensitive               Interval History: Notes reviewed. Patient is without complaints, except for some post-op pain. Purulence seen in OR is noted.     Review of Systems   Constitutional: Negative for chills and fever.   All other systems reviewed and are negative.    Objective:     Vital Signs (Most Recent):  Temp: 97.7 °F (36.5 °C) (08/30/19 1000)  Pulse: (!) 59 (08/30/19 1000)  Resp: 12 (08/30/19 1000)  BP: (!) 144/70 (08/30/19 1000)  SpO2: 99 % (08/30/19 1000) Vital Signs (24h Range):  Temp:  [97.6 °F (36.4 °C)-98.1 °F (36.7 °C)] 97.7 °F (36.5 °C)  Pulse:  [59-85] 59  Resp:  [12-20] 12  SpO2:  [97 %-100 %] 99 %  BP: (131-144)/(61-81) 144/70     Weight: 61.2 kg (135 lb)  Body mass index is 21.14 kg/m².    Estimated Creatinine Clearance: 68.6 mL/min (based on SCr of 0.8 mg/dL).    Physical Exam   Constitutional: She is oriented to person, place, and time. She appears well-developed and well-nourished.   HENT:   Head: Normocephalic and atraumatic.   Right Ear: External ear normal.   Eyes: Pupils are equal, round, and reactive to light. Conjunctivae and EOM are normal.   Neurological: She is alert and oriented to person, place, and time.   Psychiatric: She has a normal mood and affect. Her behavior is normal. Judgment and thought content normal.   Nursing note and vitals reviewed.      Significant Labs:   Wound Culture:   Recent Labs   Lab  08/21/19  1151   LABAERO PSEUDOMONAS AERUGINOSA  Moderate  *       Significant Imaging: I have reviewed all pertinent imaging results/findings within the past 24 hours.

## 2019-08-30 NOTE — ANESTHESIA POSTPROCEDURE EVALUATION
Anesthesia Post Evaluation    Patient: Ashlie Redman    Procedure(s) Performed: Procedure(s) (LRB):  REMOVAL, IMPLANT, BREAST (Left)    Final Anesthesia Type: general  Patient location during evaluation: PACU  Patient participation: Yes- Able to Participate  Level of consciousness: awake and alert  Post-procedure vital signs: reviewed and stable  Pain management: adequate  Airway patency: patent  PONV status at discharge: No PONV  Anesthetic complications: no      Cardiovascular status: blood pressure returned to baseline  Respiratory status: unassisted, spontaneous ventilation and room air  Hydration status: euvolemic  Follow-up not needed.          Vitals Value Taken Time   /63 8/30/2019  9:26 AM   Temp 36.6 °C (97.9 °F) 8/30/2019  9:30 AM   Pulse 57 8/30/2019  9:34 AM   Resp 20 8/30/2019  9:30 AM   SpO2 100 % 8/30/2019  9:34 AM   Vitals shown include unvalidated device data.      No case tracking events are documented in the log.      Pain/Lillian Score: Pain Rating Prior to Med Admin: 5 (8/30/2019  9:28 AM)  Pain Rating Post Med Admin: 5 (8/30/2019  9:11 AM)  Lillian Score: 10 (8/30/2019  9:30 AM)

## 2019-08-30 NOTE — ASSESSMENT & PLAN NOTE
Patient is s/p implant removal and abx washout this am and pan-susceptible Pseudomonas on previous culture.    I recommend at least 14 days of Cipro therapy (agree with 750 mg po BID). Discussed avoidance of dairy products and mineral supplements within 2 hours of Cipro dose (minerals chelate quinolones and reduce their absorption).    (Dr. Mercado previously discussed side effects of quinolones (including but not limited to qtc prolongation, hypoglycemia, achilles tendon rupture, worsening of aneurysms, c.diff) and these are acceptable risks and preferable to IV medication and risk of picc line complications.)

## 2019-08-30 NOTE — ANESTHESIA PREPROCEDURE EVALUATION
08/30/2019  Ashlie Redman is a 64 y.o., female.    Anesthesia Evaluation    I have reviewed the Patient Summary Reports.    I have reviewed the Nursing Notes.   I have reviewed the Medications.     Review of Systems  Anesthesia Hx:  No problems with previous Anesthesia  History of prior surgery of interest to airway management or planning: Previous anesthesia: General, Spinal Aug 2019 Scientologist with general anesthesia.  Procedure performed at an [facility]. Denies Family Hx of Anesthesia complications.  Personal Hx of Anesthesia complications, Post-Operative Nausea/Vomiting, in the past, but not with recent anesthetics / prophylaxis   Social:  Non-Smoker, Social Alcohol Use    Hematology/Oncology:         -- Anemia: s/p chemotherapy Breast s/p surgery, s/p radiation therapy and s/p chemotherapy  Surgery: with sentinel nodes dissection, with complete lymph node dissection and axillary   Current/Recent Cancer. Breast radiation, chemotherapy and surgery    EENT/Dental:EENT/Dental Normal   Cardiovascular:   Exercise tolerance: good Patient is an avid cyclist. No change in functional capacity Functional Capacity good / => 4 METS    Pulmonary:  Pulmonary Normal Some lung damage due to radiation Pulmonary Symptoms: (secondary to radiation treatment)  are shortness of breath with activity and pleuritic chest pain.    Renal/:  Renal/ Normal  Cystocele   Hepatic/GI:  Hepatic/GI Normal    Musculoskeletal:  Musculoskeletal Normal    Neurological:   Peripheral Neuropathy (Chemotherapy induced)    Endocrine:  Endocrine Normal    Dermatological:  Skin Normal    Psych:  Psychiatric Normal           Physical Exam  General:  Well nourished    Airway/Jaw/Neck:  Airway Findings: Mouth Opening: Normal Tongue: Normal  Mallampati: II      Dental:  Dental Findings: In tact        Mental Status:  Mental Status Findings:   Cooperative, Alert and Oriented         Anesthesia Plan  Type of Anesthesia, risks & benefits discussed:  Anesthesia Type:  general  Patient's Preference:   Intra-op Monitoring Plan:   Intra-op Monitoring Plan Comments:   Post Op Pain Control Plan: multimodal analgesia and per primary service following discharge from PACU  Post Op Pain Control Plan Comments:   Induction:   IV  Beta Blocker:         Informed Consent:  Anesthesia consent signed with patient.  ASA Score: 2     Day of Surgery Review of History & Physical:    H&P update referred to the surgeon.         Ready For Surgery From Anesthesia Perspective.

## 2019-08-31 VITALS
WEIGHT: 135 LBS | SYSTOLIC BLOOD PRESSURE: 128 MMHG | RESPIRATION RATE: 18 BRPM | BODY MASS INDEX: 21.19 KG/M2 | HEART RATE: 67 BPM | DIASTOLIC BLOOD PRESSURE: 77 MMHG | HEIGHT: 67 IN | OXYGEN SATURATION: 98 % | TEMPERATURE: 97 F

## 2019-08-31 LAB
BACTERIA BLD CULT: NORMAL
BACTERIA BLD CULT: NORMAL

## 2019-08-31 PROCEDURE — 94761 N-INVAS EAR/PLS OXIMETRY MLT: CPT

## 2019-08-31 PROCEDURE — 25000003 PHARM REV CODE 250: Performed by: SURGERY

## 2019-08-31 RX ORDER — CIPROFLOXACIN 750 MG/1
750 TABLET, FILM COATED ORAL EVERY 12 HOURS
Qty: 28 TABLET | Refills: 0 | Status: SHIPPED | OUTPATIENT
Start: 2019-08-31 | End: 2020-04-14

## 2019-08-31 RX ADMIN — HYDROCODONE BITARTRATE AND ACETAMINOPHEN 1 TABLET: 10; 325 TABLET ORAL at 05:08

## 2019-08-31 RX ADMIN — CIPROFLOXACIN HYDROCHLORIDE 750 MG: 500 TABLET, FILM COATED ORAL at 09:08

## 2019-08-31 RX ADMIN — BUPROPION HYDROCHLORIDE 150 MG: 150 TABLET, FILM COATED, EXTENDED RELEASE ORAL at 09:08

## 2019-08-31 RX ADMIN — HYDROCODONE BITARTRATE AND ACETAMINOPHEN 1 TABLET: 10; 325 TABLET ORAL at 09:08

## 2019-08-31 RX ADMIN — HYDROCODONE BITARTRATE AND ACETAMINOPHEN 1 TABLET: 10; 325 TABLET ORAL at 01:08

## 2019-08-31 RX ADMIN — DORZOLAMIDE HYDROCHLORIDE 1 DROP: 20 SOLUTION/ DROPS OPHTHALMIC at 08:08

## 2019-08-31 RX ADMIN — BUPROPION HYDROCHLORIDE 150 MG: 150 TABLET, FILM COATED, EXTENDED RELEASE ORAL at 11:08

## 2019-08-31 RX ADMIN — DOCUSATE SODIUM 100 MG: 100 CAPSULE, LIQUID FILLED ORAL at 09:08

## 2019-08-31 RX ADMIN — HYDROCODONE BITARTRATE AND ACETAMINOPHEN 1 TABLET: 10; 325 TABLET ORAL at 12:08

## 2019-08-31 NOTE — PLAN OF CARE
Problem: Adult Inpatient Plan of Care  Goal: Plan of Care Review  Outcome: Ongoing (interventions implemented as appropriate)  AAOx4.  NAD noted.  Pt remained free from injury or falls.  Pt remains free from skin breakdowns. Vitals signs stable throughout shift on RA.  Positions self independently.  Voiding adequately throughout shift.  Pain managed with PO medication.  Pt remained afebrile this shift. Left breast incision, sutures intact.  Surgical bra in place.  MELY x1 with serosanguineous drainage.  Left limb alert.  Pt able to voice needs and concerns.  Purposeful rounding done.  All needs met.  Bed locked in lowest position, call bell in reach. Will continue to monitor.

## 2019-08-31 NOTE — DISCHARGE SUMMARY
[facility]  Discharge Summary     Patient ID:  Ashlie Redman  [id number]  64 y.o.  1955    Admit date: 8/25/2019    Discharge Date and Time:  08/31/2019 11:06 AM    Admitting Physician: Diego Modi MD     Discharge Provider: Diego Modi    Reason for Admission: Cellulitis of left breast [N61.0]    Admission Condition: good    Procedures Performed: Procedure(s) (LRB):  REMOVAL, IMPLANT, BREAST (Left)    Hospital Course The patient was admitted for left breast cellulitis status post breast reconstruction revision.  The patient was started on IV antibiotics and Infectious Disease was consulted.  Patient remained on IV antibiotics and then was switched to a p.o. regimen.  On 08/29/2019 patient was shown to have some drainage from her wound.  I elected to take her to the operating room the following day for implant removal. Postop day 1 from implant removal patient is doing well and incision is less red.  Plan will be to discharge the patient on p.o. antibiotics and have her follow up with me in 1 week.    Consults: Infectious Disease  Significant Diagnostic Studies: None    Final Diagnoses:    Principal Problem: Cellulitis of left breast       Discharged Condition: good    Discharge Exam:  WDWN female  NAD  Left breast wound less red, incision intact    Disposition: Home or Self Care    Follow Up/Patient Instructions:     FU next week  May shower  No heavy lifting    Medications:  Reconciled Home Medications:      Medication List      START taking these medications    ciprofloxacin HCl 750 MG tablet  Commonly known as:  CIPRO  Take 1 tablet (750 mg total) by mouth every 12 (twelve) hours.        CHANGE how you take these medications    valACYclovir 500 MG tablet  Commonly known as:  VALTREX  Take 1 tablet (500 mg total) by mouth once daily.  What changed:    · when to take this  · reasons to take this        CONTINUE taking these medications    buPROPion 150 MG TB24 tablet  Commonly known  as:  WELLBUTRIN XL  Take 1 tablet (150 mg total) by mouth once daily.     dorzolamide 2 % ophthalmic solution  Commonly known as:  TRUSOPT  Place 1 drop into both eyes 3 (three) times daily.     HYDROcodone-acetaminophen  mg per tablet  Commonly known as:  NORCO  Take 1 tablet by mouth every 6 (six) hours as needed for Pain.     latanoprost 0.005 % ophthalmic solution  INSTILL ONE DROP INTO EACH EYE ONCE DAILY IN THE EVENING     phenazopyridine 200 MG tablet  Commonly known as:  PYRIDIUM  Take 1 tablet (200 mg total) by mouth 3 (three) times daily as needed for Pain.          Discharge Procedure Orders   Diet Adult Regular     Notify your health care provider if you experience any of the following:  temperature >100.4     Notify your health care provider if you experience any of the following:  persistent nausea and vomiting or diarrhea     Notify your health care provider if you experience any of the following:  severe uncontrolled pain     Notify your health care provider if you experience any of the following:  redness, tenderness, or signs of infection (pain, swelling, redness, odor or green/yellow discharge around incision site)     Notify your health care provider if you experience any of the following:  difficulty breathing or increased cough     Notify your health care provider if you experience any of the following:  severe persistent headache     Notify your health care provider if you experience any of the following:  worsening rash     Notify your health care provider if you experience any of the following:  persistent dizziness, light-headedness, or visual disturbances     Notify your health care provider if you experience any of the following:  increased confusion or weakness     Notify your health care provider if you experience any of the following:     No dressing needed     Activity as tolerated     Follow-up Information     Diego Modi MD. Call in 1 week.    Specialty:  Plastic  Surgery  Why:  For post-op visit  Contact information:  [address]  [address]  [facility]  [facility] [address]  [phone]                 Activity: activity as tolerated  Diet: regular diet  Wound Care: keep wound clean and dry      Signed:  Diego Modi  8/31/2019  11:05 AM

## 2019-08-31 NOTE — DISCHARGE INSTRUCTIONS
"  Caring for a Closed Suction Drainage Tube  A drainage tube removes fluid from around an incision. This helps prevent infection and promotes healing. The collection bulb at the end of the tube is squeezed and plugged to create suction. The bulb should be emptied and reset when half full to maintain adequate suction. You need to empty the bulb and clean the skin around the drain as often as your healthcare provider tells you to. Follow the steps below.     What youll need  Have the following items ready:  · Disposable gloves  · Measuring cup  · Record sheet  · Gauze or paper towel  · Sterile cotton swabs or 4" x 4" gauze pads  · Sterile saline or soap and water       Step 1. Empty the bulb  · Wash your hands and put on a new pair of disposable gloves.  · Point the top of the bulb away from you and remove the stopper.  · Turn the bulb upside down over a measuring cup. Squeeze the fluid into the cup. Make sure the bulb is totally empty.  · Put the cup to one side. You can record the volume of liquid in the cup after you clean and reconnect the bulb in step 2.    Step 2. Clean and reconnect the bulb  · Clean the top of the bulb with clean gauze or a paper towel, if needed.  · Squeeze the bulb tight, and put the stopper back on the top.  · Record the amount of fluid in the cup. Then, empty the cup as directed.    Step 3. Clean the site  · Remove your disposable gloves and wash your hands before cleaning the site.  · Put on a new pair of disposable gloves.  · Wet a sterile cotton swab or 4" x 4" gauze pad with sterile saline or soap and water.  · Gently clean the skin around the drain. Always wipe away from the incision.  · Apply an antibacterial ointment if directed.   When to call your healthcare provider  Call your healthcare provider if you notice any of these changes:  · The amount of fluid increases or decreases suddenly  · Large amount of blood or a clot in drainage  · Color, odor, or thickness of the fluid " "changes  · Tube falls out or the incision opens  · Skin around the drain is red, swollen, painful, or seeping pus  · You have a fever of 100.4°F (38°C) or higher, or as directed by your healthcare provider     If the tube isn't draining  Here are tips to drain the tube:  · Uncurl any kinks in the tube.  · With one hand, firmly hold the base of the tube between your thumb and index finger. Do not touch the incision.  · Put the thumb and index finger of your other hand on the tube, next to the first hand. Pinch your fingers together. Then pull them along the tube toward the bag. This will help push any clogged fluid through the tube. This is called "stripping the tube." You may find it helpful to hold an alcohol swab between your fingers and the tube to lubricate the tubing.  · If the tube still does not drain, call your healthcare provider.   Date Last Reviewed: 12/1/2016  © 6291-2120 [employer]. [address] [place] [address]. All rights reserved. This information is not intended as a substitute for professional medical care. Always follow your healthcare professional's instructions.        Incision Care  Remember: Follow-up visits allow your healthcare provider to make sure your incision is healing well. Be sure to keep your appointments.     Stitches (sutures), surgical staples, special strips of surgical tape, or surgical skin glue may be used to close incisions. They also help stop bleeding and speed healing. To help your incision heal, follow the tips on this handout.  Home care  Tips for home care include the following:  · Always wash your hands before touching your incision.  · Keep your incision clean and dry.  · Avoid doing things that could cause dirt or sweat to get on your incision.  · Dont pick at scabs. They help protect the wound.  · Keep your incision out of water.  · Take a sponge bath to avoid getting your incision wet, unless your healthcare provider tells you " otherwise.  · Ask your provider when can you take a shower or bathe.  · Ask your provider about the best way to keep your incision dry when bathing or showering.  · Pat stitches dry if they get wet. Dont rub.  · Leave the bandage (dressing) in place until you are told to remove it or change it. Change it only as directed, using clean hands.  · After the first 12 hours, change your dressing every 24 hours, or as directed by your healthcare provider.  · Change your dressing if it gets wet or soiled.  Care for specific closures  Follow these guidelines unless your healthcare provider tells you otherwise:  · Stitches or staples. Once you no longer need to keep these dry, clean the wound daily. First remove the bandage using clean hands. Then wash the area gently with soap and warm water. Use a wet cotton swab to loosen and remove any blood or crust that forms. After cleaning, put a thin layer of antibiotic ointment on. Then put on a new bandage.  · Skin glue. Dont put liquid, ointment, or cream on your wound while the glue is in place. Avoid activities that cause heavy sweating. Protect the wound from sunlight. Do not scratch, rub, or pick at the glue. Do not put tape directly over the glue. The glue should peel off within 5 to 10 days.  · Surgical tape. Keep the area dry. If it gets wet, blot the area dry with a clean towel. Surgical tape usually falls off within 7 to 10 days. If it has not fallen off after 10 days, contact your healthcare provider before taking it off yourself. If you are told to remove the tape, put mineral oil or petroleum jelly on a cotton ball. Gently rub the tape until it is removed.  Changing your dressing  Leave the dressing (bandage) in place until you are told to remove it or change it. Follow the instructions below unless told otherwise by your healthcare provider:  · Always wash your hands before changing your dressing.  · After the first 48 hours, the incision wound usually will have  closed. At this point, leave the incision uncovered and open to the air. If the incision has not closed keep it covered.  · Cover your incision only if your clothing is rubbing it or causing irritation.  · Change your dressing if it gets wet or soiled.  Follow-up care  Follow up with your healthcare provider to ask how long sutures or staples should be left in place. Be sure to return for stitch or staple removal as directed. If dissolving stitches were used in your mouth, these will not need to be removed. They should fall out or dissolve on their own.  If tape closures were used, remove them yourself when your provider recommends if they have not fallen off on their own. If skin glue was used, the glue will wear off by itself.  When to seek medical care  Call your healthcare provider if you have any of the following:  · More pain, redness, swelling, bleeding, or foul-smelling discharge around the incision area  · Fever of 100.4°F (38ºC) or higher, or as directed by your healthcare provider  · Shaking chills  · Vomiting or nausea that doesn't go away  · Numbness, coldness, or tingling around the incision area, or changes in skin color  · Opening of the sutures or wound  · Stitches or staples come apart or fall out or surgical tape falls off before 7 days or as directed by your healthcare provider   Date Last Reviewed: 12/1/2016  © 9508-8029 [employer]. [address] [place] [address], [place] [address]. All rights reserved. This information is not intended as a substitute for professional medical care. Always follow your healthcare professional's instructions.

## 2019-09-01 ENCOUNTER — PATIENT MESSAGE (OUTPATIENT)
Dept: INFECTIOUS DISEASES | Facility: CLINIC | Age: 64
End: 2019-09-01

## 2019-09-01 DIAGNOSIS — N61.0 CELLULITIS OF LEFT BREAST: Primary | ICD-10-CM

## 2019-09-03 ENCOUNTER — TELEPHONE (OUTPATIENT)
Dept: INFECTIOUS DISEASES | Facility: CLINIC | Age: 64
End: 2019-09-03

## 2019-09-03 NOTE — TELEPHONE ENCOUNTER
Spoke with patient. Discussed risk of cipro vs iv cefepime. Answered questions. She is worried about her h/o tendinitis and risk of achilles tendon rupture with cipro. She is not currently having any symptoms. She will continue cipro and will call us back tomorrow if she wants us to get midline/iv cefepime arranged

## 2019-09-03 NOTE — TELEPHONE ENCOUNTER
Called patient left message asking for return call.   Patient has f/u appt scheduled (9/16/19) needs to be scheduled for labs on [facility] (9/6, 9/13?)

## 2019-09-12 ENCOUNTER — PATIENT MESSAGE (OUTPATIENT)
Dept: SURGERY | Facility: CLINIC | Age: 64
End: 2019-09-12

## 2019-09-13 ENCOUNTER — LAB VISIT (OUTPATIENT)
Dept: LAB | Facility: HOSPITAL | Age: 64
End: 2019-09-13
Attending: INTERNAL MEDICINE
Payer: COMMERCIAL

## 2019-09-13 DIAGNOSIS — N61.0 CELLULITIS OF LEFT BREAST: ICD-10-CM

## 2019-09-13 LAB
ANION GAP SERPL CALC-SCNC: 7 MMOL/L (ref 8–16)
BUN SERPL-MCNC: 12 MG/DL (ref 8–23)
CALCIUM SERPL-MCNC: 10 MG/DL (ref 8.7–10.5)
CHLORIDE SERPL-SCNC: 105 MMOL/L (ref 95–110)
CO2 SERPL-SCNC: 27 MMOL/L (ref 23–29)
CREAT SERPL-MCNC: 0.8 MG/DL (ref 0.5–1.4)
CRP SERPL-MCNC: 3.8 MG/L (ref 0–8.2)
ERYTHROCYTE [SEDIMENTATION RATE] IN BLOOD BY WESTERGREN METHOD: 23 MM/HR (ref 0–20)
EST. GFR  (AFRICAN AMERICAN): >60 ML/MIN/1.73 M^2
EST. GFR  (NON AFRICAN AMERICAN): >60 ML/MIN/1.73 M^2
GLUCOSE SERPL-MCNC: 84 MG/DL (ref 70–110)
POTASSIUM SERPL-SCNC: 4.6 MMOL/L (ref 3.5–5.1)
SODIUM SERPL-SCNC: 139 MMOL/L (ref 136–145)

## 2019-09-13 PROCEDURE — 80048 BASIC METABOLIC PNL TOTAL CA: CPT

## 2019-09-13 PROCEDURE — 85651 RBC SED RATE NONAUTOMATED: CPT | Mod: PO

## 2019-09-13 PROCEDURE — 86140 C-REACTIVE PROTEIN: CPT

## 2019-09-13 PROCEDURE — 36415 COLL VENOUS BLD VENIPUNCTURE: CPT | Mod: PO

## 2019-09-16 ENCOUNTER — OFFICE VISIT (OUTPATIENT)
Dept: INFECTIOUS DISEASES | Facility: CLINIC | Age: 64
End: 2019-09-16
Payer: COMMERCIAL

## 2019-09-16 VITALS
BODY MASS INDEX: 21.14 KG/M2 | DIASTOLIC BLOOD PRESSURE: 82 MMHG | TEMPERATURE: 98 F | SYSTOLIC BLOOD PRESSURE: 128 MMHG | HEIGHT: 67 IN | WEIGHT: 134.69 LBS | HEART RATE: 78 BPM

## 2019-09-16 DIAGNOSIS — C50.012 MALIGNANT NEOPLASM OF NIPPLE OF LEFT BREAST IN FEMALE, UNSPECIFIED ESTROGEN RECEPTOR STATUS: ICD-10-CM

## 2019-09-16 DIAGNOSIS — B35.6 TINEA CRURIS: ICD-10-CM

## 2019-09-16 DIAGNOSIS — T85.9XXA COMPLICATION OF BREAST IMPLANT: ICD-10-CM

## 2019-09-16 DIAGNOSIS — N61.0 CELLULITIS OF LEFT BREAST: Primary | ICD-10-CM

## 2019-09-16 DIAGNOSIS — A49.8 PSEUDOMONAS INFECTION: ICD-10-CM

## 2019-09-16 PROCEDURE — 3008F BODY MASS INDEX DOCD: CPT | Mod: CPTII,S$GLB,, | Performed by: INTERNAL MEDICINE

## 2019-09-16 PROCEDURE — 99999 PR PBB SHADOW E&M-EST. PATIENT-LVL III: CPT | Mod: PBBFAC,,, | Performed by: INTERNAL MEDICINE

## 2019-09-16 PROCEDURE — 3008F PR BODY MASS INDEX (BMI) DOCUMENTED: ICD-10-PCS | Mod: CPTII,S$GLB,, | Performed by: INTERNAL MEDICINE

## 2019-09-16 PROCEDURE — 99214 OFFICE O/P EST MOD 30 MIN: CPT | Mod: S$GLB,,, | Performed by: INTERNAL MEDICINE

## 2019-09-16 PROCEDURE — 99214 PR OFFICE/OUTPT VISIT, EST, LEVL IV, 30-39 MIN: ICD-10-PCS | Mod: S$GLB,,, | Performed by: INTERNAL MEDICINE

## 2019-09-16 PROCEDURE — 99999 PR PBB SHADOW E&M-EST. PATIENT-LVL III: ICD-10-PCS | Mod: PBBFAC,,, | Performed by: INTERNAL MEDICINE

## 2019-09-16 RX ORDER — CLOTRIMAZOLE AND BETAMETHASONE DIPROPIONATE 10; .64 MG/G; MG/G
CREAM TOPICAL
Qty: 45 G | Refills: 1 | Status: SHIPPED | OUTPATIENT
Start: 2019-09-16 | End: 2020-04-14

## 2019-09-16 NOTE — PROGRESS NOTES
"INFECTIOUS DISEASE CLINIC  09/16/2019 4:00 PM    Subjective:      Chief Complaint:   Chief Complaint   Patient presents with    Hospital Follow Up       History of Present Illness:    Patient Ashlie Redman is a 64 y.o. female who presents today for hospital discharge follow up. h/o breast augmentation surgery admitted 8/25 with redness and swelling and tenderness of breast. Pt recently underwent revision breast reconstruction with implant placement on 8/21. Per op note "There was some mild fluid which was sent for cultures". grew psedudomonas. Was taken back for implant removal 8/30. Was treated with iv cefepime while inpatient and discharged on high dose po cipro based on suceptibilities. Reports persistent tenderness, but much improved. Drainage resolved and drains removed in clinic last Wednesday. Took cipro x 2 weeks, completed last Friday. Toward end of abx course reports she felt fatigued with sob, which has been improving with stopping. Says plastic surgery planning on waiting 6 months before replacing implant. Denies f/c         C-reactive protein      Ref Range & Units 3d ago   CRP 0.0 - 8.2 mg/L 3.8                Sedimentation rate      Ref Range & Units 3d ago 1yr ago   Sed Rate 0 - 20 mm/Hr 23High   14                Specimen Information: Breast, Left; Body Fluid        Component 3wk ago   Aerobic Bacterial Culture Abnormal    PSEUDOMONAS AERUGINOSA   Moderate     Resulting Agency [facility]   Susceptibility      Pseudomonas aeruginosa     CULTURE, AEROBIC  (SPECIFY SOURCE)     Amikacin <=16 mcg/mL Sensitive     Cefepime <=8 mcg/mL Sensitive     Ciprofloxacin <=1 mcg/mL Sensitive     Gentamicin <=4 mcg/mL Sensitive     Piperacillin/Tazo <=16 mcg/mL Sensitive     Tobramycin <=4 mcg/mL Sensitive                   Review of Symptoms:  Constitutional: Denies fevers, chills, or weakness. + fatigue  ENT: Denies dysphagia, nasal discharge, ear pain or discharge.  Cardiovascular: Denies chest pain, palpitations, " orthopnea, or claudication.  Respiratory: Denies shortness of breath, cough, hemoptysis, or wheezing.  GI: Denies nausea/vomitting, hematochezia, melena, abd pain, or changes in appetite.  : Denies dysuria, incontinence, or hematuria.  Musculoskeletal: Denies joint pain or myalgias.  Skin/breast: Denies rashes, lumps, lesions, or discharge.  Neurologic: Denies headache, dizziness, vertigo, or paresthesias.    Past Medical History:   Diagnosis Date    Allergy     Anemia     Asteroid hyalosis of both eyes     Breast cancer 04/2017    Left    Cataract     Diverticulosis     Encounter for blood transfusion     Glaucoma     High myopia     Osteopenia     PONV (postoperative nausea and vomiting)     S/P dilation and curettage        Past Surgical History:   Procedure Laterality Date    BIOPSY-LYMPH NODE-SENTINEL Left 11/1/2017    [facility] 2ND FLR    breast augmentation  03/2013    BREAST BIOPSY Left 04/2017    Core bx,+ cancer    BREAST LUMPECTOMY Left 10/25/2017    BREAST REVISION SURGERY Bilateral 8/5/2019    [facility]    CAPSULECTOMY, BREAST Left 8/5/2019    [facility]    CATARACT EXTRACTION W/  INTRAOCULAR LENS IMPLANT Bilateral 2009    COLONOSCOPY  4/2009, 2015    repeat in 5 years    COLONOSCOPY N/A 5/21/2015    Performed by Evan [facility] at [facility] ENDO (4TH FLR)    CYSTOSCOPY N/A 11/20/2018    Performed [facility] at [facility] OR 2ND FLR    DILATION AND CURETTAGE OF UTERUS      DISSECTION-LYMPH NODE-AXILLARY Left 11/1/2017    [facility] at [facility] OR 2ND FLR    ECTOPIC PREGNANCY SURGERY      EYE SURGERY Bilateral 3/09    Vitrectomy     INJECTION-NODE-SENTINEL Left 11/1/2017    [facility] OR 2ND FLR    INSERTION-IMPLANT BREAST Bilateral 8/21/2019    [facility] OR     [id number] neck poss chest Right 4/24/2017    [facility] 2ND FLR    LUMPECTOMY with MAGSEED (2) Left 11/1/2017    Performed by [facility] OR 2ND FLR    POLYPECTOMY      x3 2001    RECONSTRUCTION, BREAST WITH ALLODERM Bilateral 8/21/2019    [facility] OR    REMOVAL, IMPLANT, BREAST Left 8/30/2019    [facility] OR    REMOVAL, IMPLANT, BREAST Bilateral 8/5/2019    [facility] OR    REPAIR, CYSTOCELE N/A 11/20/2018    Performed by [facility] at [facility] OR 2ND FLR    Yag Capsulotomy Bilateral 12/2014       Family History   Problem Relation Age of Onset    Cancer Sister         rectal; passed    Breast cancer Cousin     Cancer Cousin         breast    Heart disease Father         passed    Colon cancer Mother         hospice    Pancreatic cancer Mother         39yo; 89yo    Cataracts Mother     Hypertension Mother     Thyroid disease Mother     Cancer Mother         colon    Glaucoma Maternal Grandmother     Amblyopia Neg Hx     Blindness Neg Hx     Diabetes Neg Hx     Macular degeneration Neg Hx     Retinal detachment Neg Hx     Strabismus Neg Hx     Stroke Neg Hx        Social History     Socioeconomic History    Marital status: Single     Spouse name: Not on file    Number of children: 2    Years of education: Not on file    Highest education level: Not on file   Occupational History    Not on file   Social Needs    Financial resource strain: Not very hard    Food insecurity:     Worry: Never true     Inability: Never true    Transportation needs:     Medical: No     Non-medical: No   Tobacco Use    Smoking status: Never Smoker    Smokeless tobacco: Never Used   Substance and Sexual Activity    Alcohol use: No     Alcohol/week: 0.6 oz     Types: 1 Glasses of wine per week     Frequency: 4 or more times a week     Drinks  "per session: 1 or 2     Binge frequency: Never     Comment: Quit    Drug use: No    Sexual activity: Yes     Partners: Male     Birth control/protection: Post-menopausal   Lifestyle    Physical activity:     Days per week: 4 days     Minutes per session: 60 min    Stress: Only a little   Relationships    Social connections:     Talks on phone: More than three times a week     Gets together: More than three times a week     Attends [employer] service: Not on file     Active member of club or organization: Yes     Attends meetings of clubs or organizations: More than 4 times per year     Relationship status:    Other Topics Concern    Are you pregnant or think you may be? No    Breast-feeding Not Asked    Patient feels they ought to cut down on drinking/drug use Not Asked    Patient annoyed by others criticizing their drinking/drug use Not Asked    Patient has felt bad or guilty about drinking/drug use Not Asked    Patient has had a drink/used drugs as an eye opener in the AM Not Asked   Social History Narrative    Not on file       Review of patient's allergies indicates:   Allergen Reactions    Bactrim [sulfamethoxazole-trimethoprim] Rash     Fever, vomiting         Objective:   /82 (BP Location: Right arm, Patient Position: Sitting)   Pulse 78   Temp 98.1 °F (36.7 °C) (Oral)   Ht 5' 7" (1.702 m)   Wt 61.1 kg (134 lb 11.2 oz)   BMI 21.10 kg/m²     General: Afebrile, alert, comfortable, no acute distress.   HEENT: JADA. EOMI, no scleral icterus.   Pulmonary: Non labored,clear to auscultation A/P/L. No wheezing, crackles, or rhonchi.  Cardiac: normal S1 & S2 w/o rubs/murmurs/gallops  Extremities: Moves all extremities x 4. No peripheral edema. 2+ pulses.  Skin: surgical scar at lower L breast c/d/i. Some erythema, but no warmth or drianage. No areas of fluctuance  Neurological:  Alert and oriented x 4.     Labs:  Glucose   Date Value Ref Range Status   09/13/2019 84 70 - 110 mg/dL " Final   08/25/2019 106 70 - 110 mg/dL Final   07/09/2019 92 70 - 110 mg/dL Final     Calcium   Date Value Ref Range Status   09/13/2019 10.0 8.7 - 10.5 mg/dL Final   08/25/2019 9.6 8.7 - 10.5 mg/dL Final   07/09/2019 10.7 (H) 8.7 - 10.5 mg/dL Final     Albumin   Date Value Ref Range Status   07/09/2019 4.3 3.5 - 5.2 g/dL Final   04/16/2019 4.0 3.5 - 5.2 g/dL Final   11/13/2018 4.3 3.5 - 5.2 g/dL Final     Total Protein   Date Value Ref Range Status   07/09/2019 7.4 6.0 - 8.4 g/dL Final   04/16/2019 7.0 6.0 - 8.4 g/dL Final   11/13/2018 7.4 6.0 - 8.4 g/dL Final     Sodium   Date Value Ref Range Status   09/13/2019 139 136 - 145 mmol/L Final   08/25/2019 137 136 - 145 mmol/L Final   07/09/2019 141 136 - 145 mmol/L Final     Potassium   Date Value Ref Range Status   09/13/2019 4.6 3.5 - 5.1 mmol/L Final   08/25/2019 3.9 3.5 - 5.1 mmol/L Final   07/09/2019 4.0 3.5 - 5.1 mmol/L Final     CO2   Date Value Ref Range Status   09/13/2019 27 23 - 29 mmol/L Final   08/25/2019 26 23 - 29 mmol/L Final   07/09/2019 26 23 - 29 mmol/L Final     Chloride   Date Value Ref Range Status   09/13/2019 105 95 - 110 mmol/L Final   08/25/2019 102 95 - 110 mmol/L Final   07/09/2019 106 95 - 110 mmol/L Final     BUN, Bld   Date Value Ref Range Status   09/13/2019 12 8 - 23 mg/dL Final   08/25/2019 13 8 - 23 mg/dL Final   07/09/2019 9 8 - 23 mg/dL Final     Creatinine   Date Value Ref Range Status   09/13/2019 0.8 0.5 - 1.4 mg/dL Final   08/25/2019 0.8 0.5 - 1.4 mg/dL Final   07/09/2019 0.8 0.5 - 1.4 mg/dL Final     Alkaline Phosphatase   Date Value Ref Range Status   07/09/2019 99 55 - 135 U/L Final   04/16/2019 100 55 - 135 U/L Final   11/13/2018 98 55 - 135 U/L Final     ALT   Date Value Ref Range Status   07/09/2019 13 10 - 44 U/L Final   04/16/2019 16 10 - 44 U/L Final   11/13/2018 18 10 - 44 U/L Final     AST   Date Value Ref Range Status   07/09/2019 19 10 - 40 U/L Final   04/16/2019 18 10 - 40 U/L Final   11/13/2018 25 10 - 40 U/L  Final     Total Bilirubin   Date Value Ref Range Status   07/09/2019 0.5 0.1 - 1.0 mg/dL Final     Comment:     For infants and newborns, interpretation of results should be based  on gestational age, weight and in agreement with clinical  observations.  Premature Infant recommended reference ranges:  Up to 24 hours.............<8.0 mg/dL  Up to 48 hours............<12.0 mg/dL  3-5 days..................<15.0 mg/dL  6-29 days.................<15.0 mg/dL     04/16/2019 0.5 0.1 - 1.0 mg/dL Final     Comment:     For infants and newborns, interpretation of results should be based  on gestational age, weight and in agreement with clinical  observations.  Premature Infant recommended reference ranges:  Up to 24 hours.............<8.0 mg/dL  Up to 48 hours............<12.0 mg/dL  3-5 days..................<15.0 mg/dL  6-29 days.................<15.0 mg/dL     11/13/2018 0.4 0.1 - 1.0 mg/dL Final     Comment:     For infants and newborns, interpretation of results should be based  on gestational age, weight and in agreement with clinical  observations.  Premature Infant recommended reference ranges:  Up to 24 hours.............<8.0 mg/dL  Up to 48 hours............<12.0 mg/dL  3-5 days..................<15.0 mg/dL  6-29 days.................<15.0 mg/dL       WBC   Date Value Ref Range Status   08/30/2019 9.47 3.90 - 12.70 K/uL Final   08/29/2019 9.21 3.90 - 12.70 K/uL Final   08/28/2019 8.91 3.90 - 12.70 K/uL Final     Hemoglobin   Date Value Ref Range Status   08/30/2019 11.8 (L) 12.0 - 16.0 g/dL Final   08/29/2019 11.5 (L) 12.0 - 16.0 g/dL Final   08/28/2019 11.6 (L) 12.0 - 16.0 g/dL Final     Hematocrit   Date Value Ref Range Status   08/30/2019 34.9 (L) 37.0 - 48.5 % Final   08/29/2019 35.0 (L) 37.0 - 48.5 % Final   08/28/2019 35.4 (L) 37.0 - 48.5 % Final     Mean Corpuscular Volume   Date Value Ref Range Status   08/30/2019 96 82 - 98 fL Final   08/29/2019 99 (H) 82 - 98 fL Final   08/28/2019 99 (H) 82 - 98 fL Final      Platelets   Date Value Ref Range Status   08/30/2019 334 150 - 350 K/uL Final   08/29/2019 302 150 - 350 K/uL Final   08/28/2019 281 150 - 350 K/uL Final     Lab Results   Component Value Date    CHOL 193 02/16/2018    CHOL 200 (H) 03/07/2016    CHOL 211 (H) 03/16/2015     Lab Results   Component Value Date    HDL 72 02/16/2018    HDL 79 (H) 03/07/2016    HDL 78 (H) 03/16/2015     Lab Results   Component Value Date    LDLCALC 106.2 02/16/2018    LDLCALC 110.8 03/07/2016    LDLCALC 122.0 03/16/2015     Lab Results   Component Value Date    TRIG 74 02/16/2018    TRIG 51 03/07/2016    TRIG 55 03/16/2015     Lab Results   Component Value Date    CHOLHDL 37.3 02/16/2018    CHOLHDL 39.5 03/07/2016    CHOLHDL 37.0 03/16/2015     No results found for: RPR  No results found for: QUANTIFERON    Medications:  Current Outpatient Medications on File Prior to Visit   Medication Sig Dispense Refill    buPROPion (WELLBUTRIN XL) 150 MG TB24 tablet Take 1 tablet (150 mg total) by mouth once daily. 90 tablet 0    ciprofloxacin HCl (CIPRO) 750 MG tablet Take 1 tablet (750 mg total) by mouth every 12 (twelve) hours. 28 tablet 0    dorzolamide (TRUSOPT) 2 % ophthalmic solution Place 1 drop into both eyes 3 (three) times daily. 30 mL 4    HYDROcodone-acetaminophen (NORCO)  mg per tablet Take 1 tablet by mouth every 6 (six) hours as needed for Pain.      latanoprost 0.005 % ophthalmic solution INSTILL ONE DROP INTO EACH EYE ONCE DAILY IN THE EVENING 3 Bottle 4    phenazopyridine (PYRIDIUM) 200 MG tablet Take 1 tablet (200 mg total) by mouth 3 (three) times daily as needed for Pain. 20 tablet 0    valACYclovir (VALTREX) 500 MG tablet Take 1 tablet (500 mg total) by mouth once daily. (Patient taking differently: Take 500 mg by mouth daily as needed. ) 90 tablet 3     No current facility-administered medications on file prior to visit.        Antibiotics:   Antibiotics (From admission, onward)    None          HIV: No  components found for: HIV 1/2 AG/AB  Hepatitis C IgG: No components found for: HEPATITIS C  Syphilis: No results found for: RPR    Hepatitis A IgG: No components found for: HEPATITIS A IGG  Hepatitis Bc IgG: No components found for: HEPATITIS B CORE IGG  Hepatitis Bs IgG:  Quantiferon: No results found for: QUANTIFERON  VZV IgG: No components found for: VARICELLA IGG    No components found for: SEDIMENTATION RATE  No components found for: C-REACTIVE PROTEIN      Microbiology x 7d:   Microbiology Results (last 7 days)     ** No results found for the last 168 hours. **          Immunization History   Administered Date(s) Administered    Influenza - Quadrivalent 02/08/2016    Influenza - Quadrivalent - PF (6 months and older) 12/23/2017    Pneumococcal Conjugate - 13 Valent 02/19/2018    Tdap 08/28/2015         Assessment:     Tinea cruris  Cellulitis of L breast  Implant infection s/p removal  Pseudomonas infection    Plan:     s/p implant removal and abx washout; operative cultures with pan-susceptible Pseudomonas. Received cipro for 2 weeks after removal and symptoms improved. Only one ESR/CRP value, so can't trend; at threshold of normal. Pt has now been off cipro x 3 days and clinically improved and was having side effects of abx (sob/fagtigue). Agree with stopping antibiotics. Clotrimazole-betamethasone for tinea, which is not directly involving surgical scar. Pt to let us know if erythema doesn't resolve or for any clinical worsening. Would wait 2-4 weeks off of antibiotics prior to implant replacement.     Maryan Mercado MD, MPH  Infectious Disease

## 2019-09-19 ENCOUNTER — PATIENT MESSAGE (OUTPATIENT)
Dept: INFECTIOUS DISEASES | Facility: CLINIC | Age: 64
End: 2019-09-19

## 2019-09-20 ENCOUNTER — TELEPHONE (OUTPATIENT)
Dept: INFECTIOUS DISEASES | Facility: CLINIC | Age: 64
End: 2019-09-20

## 2019-09-20 DIAGNOSIS — N61.0 CELLULITIS OF LEFT BREAST: Primary | ICD-10-CM

## 2019-09-20 NOTE — TELEPHONE ENCOUNTER
Tried to call patient = no answer, left message asking for return call.  (need to schedule lab appt)

## 2019-09-22 ENCOUNTER — PATIENT MESSAGE (OUTPATIENT)
Dept: INFECTIOUS DISEASES | Facility: CLINIC | Age: 64
End: 2019-09-22

## 2019-09-24 ENCOUNTER — LAB VISIT (OUTPATIENT)
Dept: LAB | Facility: HOSPITAL | Age: 64
End: 2019-09-24
Attending: INTERNAL MEDICINE
Payer: COMMERCIAL

## 2019-09-24 DIAGNOSIS — N61.0 CELLULITIS OF LEFT BREAST: ICD-10-CM

## 2019-09-24 LAB
ANION GAP SERPL CALC-SCNC: 9 MMOL/L (ref 8–16)
BASOPHILS # BLD AUTO: 0.06 K/UL (ref 0–0.2)
BASOPHILS NFR BLD: 1.1 % (ref 0–1.9)
BUN SERPL-MCNC: 11 MG/DL (ref 8–23)
CALCIUM SERPL-MCNC: 9.9 MG/DL (ref 8.7–10.5)
CHLORIDE SERPL-SCNC: 105 MMOL/L (ref 95–110)
CO2 SERPL-SCNC: 26 MMOL/L (ref 23–29)
CREAT SERPL-MCNC: 0.8 MG/DL (ref 0.5–1.4)
CRP SERPL-MCNC: 1.2 MG/L (ref 0–8.2)
DIFFERENTIAL METHOD: ABNORMAL
EOSINOPHIL # BLD AUTO: 0.2 K/UL (ref 0–0.5)
EOSINOPHIL NFR BLD: 3.4 % (ref 0–8)
ERYTHROCYTE [DISTWIDTH] IN BLOOD BY AUTOMATED COUNT: 12 % (ref 11.5–14.5)
ERYTHROCYTE [SEDIMENTATION RATE] IN BLOOD BY WESTERGREN METHOD: 13 MM/HR (ref 0–20)
EST. GFR  (AFRICAN AMERICAN): >60 ML/MIN/1.73 M^2
EST. GFR  (NON AFRICAN AMERICAN): >60 ML/MIN/1.73 M^2
FUNGUS SPEC CULT: NORMAL
GLUCOSE SERPL-MCNC: 73 MG/DL (ref 70–110)
HCT VFR BLD AUTO: 39.2 % (ref 37–48.5)
HGB BLD-MCNC: 12.2 G/DL (ref 12–16)
IMM GRANULOCYTES # BLD AUTO: 0.02 K/UL (ref 0–0.04)
IMM GRANULOCYTES NFR BLD AUTO: 0.4 % (ref 0–0.5)
LYMPHOCYTES # BLD AUTO: 1.3 K/UL (ref 1–4.8)
LYMPHOCYTES NFR BLD: 23.2 % (ref 18–48)
MCH RBC QN AUTO: 32.1 PG (ref 27–31)
MCHC RBC AUTO-ENTMCNC: 31.1 G/DL (ref 32–36)
MCV RBC AUTO: 103 FL (ref 82–98)
MONOCYTES # BLD AUTO: 0.6 K/UL (ref 0.3–1)
MONOCYTES NFR BLD: 11.4 % (ref 4–15)
NEUTROPHILS # BLD AUTO: 3.4 K/UL (ref 1.8–7.7)
NEUTROPHILS NFR BLD: 60.5 % (ref 38–73)
NRBC BLD-RTO: 0 /100 WBC
PLATELET # BLD AUTO: 190 K/UL (ref 150–350)
PMV BLD AUTO: 11.3 FL (ref 9.2–12.9)
POTASSIUM SERPL-SCNC: 4 MMOL/L (ref 3.5–5.1)
RBC # BLD AUTO: 3.8 M/UL (ref 4–5.4)
SODIUM SERPL-SCNC: 140 MMOL/L (ref 136–145)
WBC # BLD AUTO: 5.55 K/UL (ref 3.9–12.7)

## 2019-09-24 PROCEDURE — 85651 RBC SED RATE NONAUTOMATED: CPT | Mod: PO

## 2019-09-24 PROCEDURE — 80048 BASIC METABOLIC PNL TOTAL CA: CPT

## 2019-09-24 PROCEDURE — 36415 COLL VENOUS BLD VENIPUNCTURE: CPT | Mod: PO

## 2019-09-24 PROCEDURE — 85025 COMPLETE CBC W/AUTO DIFF WBC: CPT

## 2019-09-24 PROCEDURE — 86140 C-REACTIVE PROTEIN: CPT

## 2019-10-22 ENCOUNTER — PATIENT MESSAGE (OUTPATIENT)
Dept: OPHTHALMOLOGY | Facility: CLINIC | Age: 64
End: 2019-10-22

## 2019-10-22 ENCOUNTER — OFFICE VISIT (OUTPATIENT)
Dept: OPHTHALMOLOGY | Facility: CLINIC | Age: 64
End: 2019-10-22
Payer: COMMERCIAL

## 2019-10-22 DIAGNOSIS — Z98.890 HISTORY OF VITRECTOMY: ICD-10-CM

## 2019-10-22 DIAGNOSIS — H52.7 REFRACTIVE ERROR: ICD-10-CM

## 2019-10-22 DIAGNOSIS — Z96.1 PSEUDOPHAKIA OF BOTH EYES: ICD-10-CM

## 2019-10-22 DIAGNOSIS — H40.053 OHT (OCULAR HYPERTENSION), BILATERAL: ICD-10-CM

## 2019-10-22 DIAGNOSIS — H57.12 EYE PAIN, LEFT: Primary | ICD-10-CM

## 2019-10-22 PROCEDURE — 99999 PR PBB SHADOW E&M-EST. PATIENT-LVL III: ICD-10-PCS | Mod: PBBFAC,,, | Performed by: OPHTHALMOLOGY

## 2019-10-22 PROCEDURE — 99999 PR PBB SHADOW E&M-EST. PATIENT-LVL III: CPT | Mod: PBBFAC,,, | Performed by: OPHTHALMOLOGY

## 2019-10-22 PROCEDURE — 92012 INTRM OPH EXAM EST PATIENT: CPT | Mod: S$GLB,,, | Performed by: OPHTHALMOLOGY

## 2019-10-22 PROCEDURE — 92012 PR EYE EXAM, EST PATIENT,INTERMED: ICD-10-PCS | Mod: S$GLB,,, | Performed by: OPHTHALMOLOGY

## 2019-10-22 RX ORDER — PREDNISOLONE ACETATE 10 MG/ML
SUSPENSION/ DROPS OPHTHALMIC
Qty: 1 BOTTLE | Refills: 0 | Status: SHIPPED | OUTPATIENT
Start: 2019-10-22 | End: 2019-11-06

## 2019-10-22 NOTE — PROGRESS NOTES
HPI     Pt states about 3 weeks ago OS started bothering her. Was light   sensitive, tearing, painful when blinking. Has been wearing Cl like   normal. Did not wear today. Not using AT's. Improved some but still   bothering her.     Agree with above. Thinks it was flaky mascara. Removed contact lens on way   home. Next day it was bad. Seems to be healing over time, but not as   quickly as would like. No longer having light sensitivity.    Last edited by Almaz Vasquez MD on 10/22/2019  2:54 PM.   (History)            Assessment /Plan     For exam results, see Encounter Report.    Eye pain, left    OHT (ocular hypertension), bilateral    History of vitrectomy    Pseudophakia of both eyes    Refractive error    Other orders  -     prednisoLONE acetate (PRED FORTE) 1 % DrpS; Place 1 drop into the left eye 3 (three) times daily for 4 days, THEN 1 drop 2 (two) times daily for 4 days, THEN 1 drop once daily for 7 days.  Dispense: 1 Bottle; Refill: 0          Tiny punctate infiltrate. Will treat with steroid taper. Notify MD if worsens or fails to improve.     Follow up for as previously scheduled.

## 2019-10-23 LAB
ACID FAST MOD KINY STN SPEC: NORMAL
MYCOBACTERIUM SPEC QL CULT: NORMAL

## 2019-10-30 ENCOUNTER — PATIENT MESSAGE (OUTPATIENT)
Dept: HEMATOLOGY/ONCOLOGY | Facility: CLINIC | Age: 64
End: 2019-10-30

## 2019-11-01 DIAGNOSIS — C50.512 PRIMARY CANCER OF LOWER OUTER QUADRANT OF LEFT FEMALE BREAST: Primary | ICD-10-CM

## 2019-11-01 DIAGNOSIS — R52 PAIN: ICD-10-CM

## 2019-11-06 ENCOUNTER — HOSPITAL ENCOUNTER (OUTPATIENT)
Dept: RADIOLOGY | Facility: HOSPITAL | Age: 64
Discharge: HOME | End: 2019-11-06
Attending: INTERNAL MEDICINE
Payer: COMMERCIAL

## 2019-11-06 DIAGNOSIS — R52 PAIN: ICD-10-CM

## 2019-11-06 DIAGNOSIS — C50.512 PRIMARY CANCER OF LOWER OUTER QUADRANT OF LEFT FEMALE BREAST: ICD-10-CM

## 2019-11-06 DIAGNOSIS — C50.611 MALIGNANT NEOPLASM OF AXILLARY TAIL OF RIGHT FEMALE BREAST, UNSPECIFIED ESTROGEN RECEPTOR STATUS: Primary | ICD-10-CM

## 2019-11-06 PROCEDURE — 71260 CT THORAX DX C+: CPT | Mod: 26,,, | Performed by: RADIOLOGY

## 2019-11-06 PROCEDURE — 74177 CT ABD & PELVIS W/CONTRAST: CPT | Mod: 26,,, | Performed by: RADIOLOGY

## 2019-11-06 PROCEDURE — 74177 CT CHEST ABDOMEN PELVIS WITH CONTRAST (XPD): ICD-10-PCS | Mod: 26,,, | Performed by: RADIOLOGY

## 2019-11-06 PROCEDURE — 71260 CT CHEST ABDOMEN PELVIS WITH CONTRAST (XPD): ICD-10-PCS | Mod: 26,,, | Performed by: RADIOLOGY

## 2019-11-06 PROCEDURE — 25500020 PHARM REV CODE 255: Mod: PO | Performed by: INTERNAL MEDICINE

## 2019-11-06 PROCEDURE — 74177 CT ABD & PELVIS W/CONTRAST: CPT | Mod: TC,PO

## 2019-11-06 RX ADMIN — IOHEXOL 75 ML: 350 INJECTION, SOLUTION INTRAVENOUS at 02:11

## 2019-11-06 RX ADMIN — IOHEXOL 1000 ML: 9 SOLUTION ORAL at 02:11

## 2019-11-12 ENCOUNTER — PATIENT MESSAGE (OUTPATIENT)
Dept: HEMATOLOGY/ONCOLOGY | Facility: CLINIC | Age: 64
End: 2019-11-12

## 2019-11-15 ENCOUNTER — OFFICE VISIT (OUTPATIENT)
Dept: OPHTHALMOLOGY | Facility: CLINIC | Age: 64
End: 2019-11-15
Payer: COMMERCIAL

## 2019-11-15 ENCOUNTER — TELEPHONE (OUTPATIENT)
Dept: PLASTIC SURGERY | Facility: CLINIC | Age: 64
End: 2019-11-15

## 2019-11-15 DIAGNOSIS — H40.053 OHT (OCULAR HYPERTENSION), BILATERAL: Primary | ICD-10-CM

## 2019-11-15 DIAGNOSIS — Z98.890 HISTORY OF VITRECTOMY: ICD-10-CM

## 2019-11-15 DIAGNOSIS — Z96.1 PSEUDOPHAKIA OF BOTH EYES: ICD-10-CM

## 2019-11-15 DIAGNOSIS — H52.7 REFRACTIVE ERROR: ICD-10-CM

## 2019-11-15 PROCEDURE — 99999 PR PBB SHADOW E&M-EST. PATIENT-LVL III: ICD-10-PCS | Mod: PBBFAC,,, | Performed by: OPHTHALMOLOGY

## 2019-11-15 PROCEDURE — 99999 PR PBB SHADOW E&M-EST. PATIENT-LVL III: CPT | Mod: PBBFAC,,, | Performed by: OPHTHALMOLOGY

## 2019-11-15 PROCEDURE — 92012 INTRM OPH EXAM EST PATIENT: CPT | Mod: S$GLB,,, | Performed by: OPHTHALMOLOGY

## 2019-11-15 PROCEDURE — 92012 PR EYE EXAM, EST PATIENT,INTERMED: ICD-10-PCS | Mod: S$GLB,,, | Performed by: OPHTHALMOLOGY

## 2019-11-15 RX ORDER — DORZOLAMIDE HCL 20 MG/ML
1 SOLUTION/ DROPS OPHTHALMIC 2 TIMES DAILY
Qty: 30 ML | Refills: 4 | Status: SHIPPED | OUTPATIENT
Start: 2019-11-15 | End: 2020-03-23 | Stop reason: SDUPTHER

## 2019-11-15 NOTE — TELEPHONE ENCOUNTER
Ashlie called today to discuss scheduling a consult with Dr. Bae. She has previously seen Dr. Linares who she said suggested using multiple donor sites. I will go over her chart with Dr. Bae and get back to her next week. The patient was fine with this plan.

## 2019-11-15 NOTE — PROGRESS NOTES
HPI     DLS: 10/22/19    Pt here for IOP check. States she finished using pred forte as directed.   States was wearing cl OS while using gtts. Currently using dorzolamide OU   TID and latanoprost OU QHS as directed. OS feels improved. Denies F/F,   Denies pain in eyes today.     Wears CL OS to help more for computer distance.     Last edited by Almaz Vasquez MD on 11/15/2019  1:43 PM.   (History)            Assessment /Plan     For exam results, see Encounter Report.    OHT (ocular hypertension), bilateral  -     Hayden Visual Field - OU - Extended - Both Eyes; Standing    History of vitrectomy    Pseudophakia of both eyes    Refractive error          OHT (ocular hypertension), bilateral - Switched Timolol to Dorzolamide 2/14/17, but IOP still upper 20's. Finished tamoxifen for breast cancer.   IOP baseline mid 20's with occasional pain OS. IOP was significantly elevated off Latanoprost - Tmax 37/44! Back to ~23 today. Maternal grandmother with glaucoma, no known first degree relatives. CCT thick. Baseline HVF/HRT WNL, small cups on clinical exam OU. Last HRT - within range of priors, no RNFL thinning identified. Re-traced 11/6/18 due to transferring to [place], larger C:D, decrease RNFL, but may just be due to re-trace?  Last HVF - new nasal defects OU, fixation losses OS, pt reported seeing a glare off to the side and also having hot flashes during exam. Clinical exam appears stable - repeat next year, if WNL then can follow with just imaging.  Last Gonio - open to CB OU, few tiny iris root vessels visible, no NV.   OCT nerve 5/16/17 - thin TS and TI OD, borderline TS OS. Repeated 6/26/18, new borderline TI OS. 7/16/19 - new borderline G OD but minimal change in numbers, OS TS changed from borderline to low  Switched T .5 to Dorzolamide BID, but not as effective.   Tried Brimonidine TID - but had hypotension again, so went back to Dorzolamide TID on 8/9/17. Pt reports she rarely gets mid-day dose -  wears contact lens OS for computer.   11/15/19 - switch Dorzolamide back to BID  If goes too high then can consider 0.25% Timolol, as seems to have had a dramatic response in IOP lowering with Timolol. (she is not sure if chemo had something to do with this, may be willing to try a different gtt when she finishes chemo, if Dorzolamide backorder not resolved.)  Last DFE appears stable - continue Latanoprost QHS OU, Dorzolamide TID OU, medical cannibus did not make much difference. At this point, I do not think the small changes in testing warrant the additional potential side effects of adding drops.   Follow up in about 4 months (around 3/15/2020) for IOP check, 24-2 HVF.    On Latanoprost since presentation (former pt of Dr. Ordaz).  Dorzolamide added 2/14-7/25/17 - no significant improvement in IOP, may have forgotten to take? Resumed 8/9/17 TID OU  Timolol 0.5% QAM (7/9/16-2/14/17) - worked great but symptomatic hypotension/bradycardia started in Jan '17  Brimonidine - 7/25-8/9 17 - d/c'd due to symptomatic hypotension.    Pseudophakia of both eyes - stable    History of vitrectomy - for floaters/AH; RD precautions.      Addendum: symptomatic hypotension with Brimonidine - switched back to Dorzolamide on 8/9/17 - asked that she try to use it TID.           Reduce dorzolamide to BID

## 2019-11-19 ENCOUNTER — LAB VISIT (OUTPATIENT)
Dept: LAB | Facility: HOSPITAL | Age: 64
End: 2019-11-19
Attending: INTERNAL MEDICINE
Payer: COMMERCIAL

## 2019-11-19 DIAGNOSIS — C50.611 MALIGNANT NEOPLASM OF AXILLARY TAIL OF RIGHT FEMALE BREAST, UNSPECIFIED ESTROGEN RECEPTOR STATUS: ICD-10-CM

## 2019-11-19 LAB
ALBUMIN SERPL BCP-MCNC: 4.3 G/DL (ref 3.5–5.2)
ALP SERPL-CCNC: 98 U/L (ref 55–135)
ALT SERPL W/O P-5'-P-CCNC: 25 U/L (ref 10–44)
ANION GAP SERPL CALC-SCNC: 9 MMOL/L (ref 8–16)
AST SERPL-CCNC: 25 U/L (ref 10–40)
BASOPHILS # BLD AUTO: 0.03 K/UL (ref 0–0.2)
BASOPHILS NFR BLD: 0.5 % (ref 0–1.9)
BILIRUB SERPL-MCNC: 0.6 MG/DL (ref 0.1–1)
BUN SERPL-MCNC: 10 MG/DL (ref 8–23)
CALCIUM SERPL-MCNC: 9.9 MG/DL (ref 8.7–10.5)
CHLORIDE SERPL-SCNC: 109 MMOL/L (ref 95–110)
CO2 SERPL-SCNC: 24 MMOL/L (ref 23–29)
CREAT SERPL-MCNC: 0.9 MG/DL (ref 0.5–1.4)
DIFFERENTIAL METHOD: ABNORMAL
EOSINOPHIL # BLD AUTO: 0.2 K/UL (ref 0–0.5)
EOSINOPHIL NFR BLD: 2.7 % (ref 0–8)
ERYTHROCYTE [DISTWIDTH] IN BLOOD BY AUTOMATED COUNT: 13.1 % (ref 11.5–14.5)
EST. GFR  (AFRICAN AMERICAN): >60 ML/MIN/1.73 M^2
EST. GFR  (NON AFRICAN AMERICAN): >60 ML/MIN/1.73 M^2
GLUCOSE SERPL-MCNC: 77 MG/DL (ref 70–110)
HCT VFR BLD AUTO: 41 % (ref 37–48.5)
HGB BLD-MCNC: 13.7 G/DL (ref 12–16)
LYMPHOCYTES # BLD AUTO: 1.2 K/UL (ref 1–4.8)
LYMPHOCYTES NFR BLD: 21.7 % (ref 18–48)
MCH RBC QN AUTO: 32.9 PG (ref 27–31)
MCHC RBC AUTO-ENTMCNC: 33.4 G/DL (ref 32–36)
MCV RBC AUTO: 98 FL (ref 82–98)
MONOCYTES # BLD AUTO: 0.4 K/UL (ref 0.3–1)
MONOCYTES NFR BLD: 6.6 % (ref 4–15)
NEUTROPHILS # BLD AUTO: 3.9 K/UL (ref 1.8–7.7)
NEUTROPHILS NFR BLD: 68.5 % (ref 38–73)
PLATELET # BLD AUTO: 270 K/UL (ref 150–350)
PMV BLD AUTO: 10.1 FL (ref 9.2–12.9)
POTASSIUM SERPL-SCNC: 4.3 MMOL/L (ref 3.5–5.1)
PROT SERPL-MCNC: 7.3 G/DL (ref 6–8.4)
RBC # BLD AUTO: 4.17 M/UL (ref 4–5.4)
SODIUM SERPL-SCNC: 142 MMOL/L (ref 136–145)
WBC # BLD AUTO: 5.63 K/UL (ref 3.9–12.7)

## 2019-11-19 PROCEDURE — 80053 COMPREHEN METABOLIC PANEL: CPT | Mod: PO

## 2019-11-19 PROCEDURE — 85025 COMPLETE CBC W/AUTO DIFF WBC: CPT | Mod: PO

## 2019-11-19 PROCEDURE — 36415 COLL VENOUS BLD VENIPUNCTURE: CPT | Mod: PO

## 2019-11-19 PROCEDURE — 86300 IMMUNOASSAY TUMOR CA 15-3: CPT

## 2019-11-20 ENCOUNTER — TELEPHONE (OUTPATIENT)
Dept: HEMATOLOGY/ONCOLOGY | Facility: CLINIC | Age: 64
End: 2019-11-20

## 2019-11-20 NOTE — TELEPHONE ENCOUNTER
Attempted to contact pt to notify Dr Ragsdale will not be in clinic tomorrow and her appt needs to be r/s. Left message with contact number.

## 2019-11-22 ENCOUNTER — PATIENT MESSAGE (OUTPATIENT)
Dept: HEMATOLOGY/ONCOLOGY | Facility: CLINIC | Age: 64
End: 2019-11-22

## 2019-11-22 LAB — CANCER AG27-29 SERPL-ACNC: 39.3 U/ML

## 2019-12-03 ENCOUNTER — OFFICE VISIT (OUTPATIENT)
Dept: HEMATOLOGY/ONCOLOGY | Facility: CLINIC | Age: 64
End: 2019-12-03
Payer: COMMERCIAL

## 2019-12-03 VITALS
DIASTOLIC BLOOD PRESSURE: 77 MMHG | RESPIRATION RATE: 18 BRPM | TEMPERATURE: 98 F | WEIGHT: 138.13 LBS | HEIGHT: 67 IN | OXYGEN SATURATION: 100 % | SYSTOLIC BLOOD PRESSURE: 163 MMHG | BODY MASS INDEX: 21.68 KG/M2 | HEART RATE: 75 BPM

## 2019-12-03 DIAGNOSIS — C50.012 MALIGNANT NEOPLASM OF NIPPLE OF LEFT BREAST IN FEMALE, UNSPECIFIED ESTROGEN RECEPTOR STATUS: ICD-10-CM

## 2019-12-03 DIAGNOSIS — M85.80 OSTEOPENIA, UNSPECIFIED LOCATION: ICD-10-CM

## 2019-12-03 DIAGNOSIS — C50.912 HER2-POSITIVE CARCINOMA OF LEFT BREAST: Primary | ICD-10-CM

## 2019-12-03 PROCEDURE — 3008F PR BODY MASS INDEX (BMI) DOCUMENTED: ICD-10-PCS | Mod: CPTII,S$GLB,, | Performed by: INTERNAL MEDICINE

## 2019-12-03 PROCEDURE — 99999 PR PBB SHADOW E&M-EST. PATIENT-LVL IV: ICD-10-PCS | Mod: PBBFAC,,, | Performed by: INTERNAL MEDICINE

## 2019-12-03 PROCEDURE — 3008F BODY MASS INDEX DOCD: CPT | Mod: CPTII,S$GLB,, | Performed by: INTERNAL MEDICINE

## 2019-12-03 PROCEDURE — 99999 PR PBB SHADOW E&M-EST. PATIENT-LVL IV: CPT | Mod: PBBFAC,,, | Performed by: INTERNAL MEDICINE

## 2019-12-03 PROCEDURE — 99214 OFFICE O/P EST MOD 30 MIN: CPT | Mod: S$GLB,,, | Performed by: INTERNAL MEDICINE

## 2019-12-03 PROCEDURE — 99214 PR OFFICE/OUTPT VISIT, EST, LEVL IV, 30-39 MIN: ICD-10-PCS | Mod: S$GLB,,, | Performed by: INTERNAL MEDICINE

## 2019-12-03 NOTE — PROGRESS NOTES
HISTORY OF PRESENT ILLNESS:  This is a 64-year-old white female treated for a  diagnosis of Stage IIB left breast carcinoma.ER/MA negative. Her - 2 positive Neoadjuvant A/C ,completed 12 cycles weekly taxol and  herceptin/ perjeta had lumpectomy was on [facility] trial completed the entire year of herceptin /perjeta, but didn't undergo axillary xrt per trial  Here for f/u with pet and labs recnt mammogram showed calcifications , that were biospied and came back negative  radiation induced interstitial findings with cough better now   sonme neuropathy post rx still everetteisits   had a difficult summer. Had removal of her original implant for pain in the area, after removal pt developed pseudomonas infection stayed in hospital  For a week. She saw Dr. Modi, a lot of this was xrt related. She wont go through implants again, she has an appt with Dr Bae for a flap evaluation which has been the plastic sx recs , depending on where they may be able to get tissue for the flap.  PHYSICAL EXAMINATION:  Wt Readings from Last 3 Encounters:   12/03/19 62.6 kg (138 lb 1.9 oz)   09/16/19 61.1 kg (134 lb 11.2 oz)   08/25/19 61.2 kg (135 lb)     Temp Readings from Last 3 Encounters:   12/03/19 98.2 °F (36.8 °C) (Oral)   09/16/19 98.1 °F (36.7 °C) (Oral)   08/31/19 97.2 °F (36.2 °C)     BP Readings from Last 3 Encounters:   12/03/19 (!) 163/77   09/16/19 128/82   08/31/19 128/77     Pulse Readings from Last 3 Encounters:   12/03/19 75   09/16/19 78   08/31/19 67     GENERAL:  Well-developed, well-nourished white female in no acute distress.    Alert and oriented x4. Well grommed  moist.  Lips without   lesions.  Tongue midline.  Oropharynx clear.  Nonicteric sclerae.  NECK:  Supple, no adenopathy.  No carotid bruits, thyromegaly or thyroid nodule.  HEART:  Regular rate and rhythm without murmur, gallop.  LUNGS:  Clear to auscultation bilaterally.  Normal respiratory effort.  ABDOMEN:  Soft, nontender, nondistended with positive  normoactive bowel sounds,   no hepatosplenomegaly.  EXTREMITIES:  No cyanosis, clubbing or edema.  Distal pulses are intact.  AXILLAE AND GROIN:  No palpable pathologic lymphadenopathy is appreciated.  SKIN:  Intact/turgor normal.  NEUROLOGIC:  Cranial nerves II-XII grossly intact.  Motor:  Good muscle bulk and   tone.  Strength/sensory 5/5 throughout.  Gait stable.    LABORATORY:    Lab Results   Component Value Date    WBC 5.63 11/19/2019    HGB 13.7 11/19/2019    HCT 41.0 11/19/2019    MCV 98 11/19/2019     11/19/2019         CMP  Sodium   Date Value Ref Range Status   11/19/2019 142 136 - 145 mmol/L Final     Potassium   Date Value Ref Range Status   11/19/2019 4.3 3.5 - 5.1 mmol/L Final     Chloride   Date Value Ref Range Status   11/19/2019 109 95 - 110 mmol/L Final     CO2   Date Value Ref Range Status   11/19/2019 24 23 - 29 mmol/L Final     Glucose   Date Value Ref Range Status   11/19/2019 77 70 - 110 mg/dL Final     BUN, Bld   Date Value Ref Range Status   11/19/2019 10 8 - 23 mg/dL Final     Creatinine   Date Value Ref Range Status   11/19/2019 0.9 0.5 - 1.4 mg/dL Final     Calcium   Date Value Ref Range Status   11/19/2019 9.9 8.7 - 10.5 mg/dL Final     Total Protein   Date Value Ref Range Status   11/19/2019 7.3 6.0 - 8.4 g/dL Final     Albumin   Date Value Ref Range Status   11/19/2019 4.3 3.5 - 5.2 g/dL Final     Total Bilirubin   Date Value Ref Range Status   11/19/2019 0.6 0.1 - 1.0 mg/dL Final     Comment:     For infants and newborns, interpretation of results should be based  on gestational age, weight and in agreement with clinical  observations.  Premature Infant recommended reference ranges:  Up to 24 hours.............<8.0 mg/dL  Up to 48 hours............<12.0 mg/dL  3-5 days..................<15.0 mg/dL  6-29 days.................<15.0 mg/dL       Alkaline Phosphatase   Date Value Ref Range Status   11/19/2019 98 55 - 135 U/L Final     AST   Date Value Ref Range Status   11/19/2019  25 10 - 40 U/L Final     ALT   Date Value Ref Range Status   11/19/2019 25 10 - 44 U/L Final     Anion Gap   Date Value Ref Range Status   11/19/2019 9 8 - 16 mmol/L Final     eGFR if    Date Value Ref Range Status   11/19/2019 >60 >60 mL/min/1.73 m^2 Final     eGFR if non    Date Value Ref Range Status   11/19/2019 >60 >60 mL/min/1.73 m^2 Final     Comment:     Calculation used to obtain the estimated glomerular filtration  rate (eGFR) is the CKD-EPI equation.      39.3 ca 2729     Left breast, lumpectomy:  - Residual invasive ductal carcinoma, moderately differentiated, Jamal Grade 2 (3+2+1=6), 0.2 cm in  greatest linear microscopic dimension.  - Ductal carcinoma in situ (DCIS), high nuclear grade, 2 cm, solid and cribriform types.  - Surgical margins are free of tumor; invasive carcinoma is located 0.1 cm from the closest margin    Impression 11/2-18       New nondisplaced fracture of the distal left 6th rib which is most likely posttraumatic in origin.  The fracture is hypermetabolic most likely related to ongoing healing.  Clinical correlation is advised     Impression CT CAP 11/2019       1. No evidence of metastatic disease in the chest, abdomen, or pelvis.  2. Interval removal of left-sided breast implant with nonspecific skin and subcutaneous soft tissue thickening along the anterior left chest wall, which may reflect or postoperative or postradiation change versus potential infectious process.  3. Stable pulmonary nodules, suggestive of benign etiology.  No new pulmonary nodule.  4. Stable right angiomyolipoma.  5. Additional stable findings, as above       SPECIMEN when calcifications were found in the summer  In rt breast  1) Right breast, central, cores with calcifications.  2) Right breast, central, cores without calcifications.  FINAL PATHOLOGIC DIAGNOSIS  1. BREAST, RIGHT, CENTRAL REGION MIDDLE DEPTH, CALCIFICATIONS, STEREOTACTIC-GUIDED  BIOPSY:  Benign breast  tissue with fibrocystic changes, columnar cell changes, and focal features suggestive of duct  rupture.  Microcalcifications: Seen in association with areas of columnar cell change.  Negative for atypia or carcinoma.  Osteopenia 3/2017 dexa    IMPRESSION:  Stage IIB left breast carcinoma (ER/OH negative, Her-2/clay positive)  Completed neoadjuvant AC and weekly taxol / herceptin  stopped perjeta since she also had xrt to left side due o fear of toxicity  , s/p  lumpectomy and axillary disection , with 2 residual Ln positive. Per [place] did not undergo xrt to axilla ( Dr. Modi believes there is no breast tissue left at lumpectomy site)   MRI of rt breast rather than a mammogram for next eval recommended by Dr. Arce  Last mammo was in 4/2019had calcification sna d had usg and bx pt is very anxious about continuing to do work on her left  Breast reconstruction if the rt breast has issues. She would  Benefit from repeating mammo or MRI of the rt breast will order MRI  PLAN:  1.  rtc 6 weeks with repeat dexa  And MRI of breast RT and [id number] which is slightly elevated  possibly fracture of ribs from coughing per xray. Pt is concerned to see if her osteopenia has worsed   cont f/u wit pcp   Ad[facility] Care Planning

## 2020-01-02 ENCOUNTER — HOSPITAL ENCOUNTER (OUTPATIENT)
Dept: RADIOLOGY | Facility: HOSPITAL | Age: 65
Discharge: HOME | End: 2020-01-02
Attending: INTERNAL MEDICINE
Payer: COMMERCIAL

## 2020-01-02 DIAGNOSIS — C50.912 HER2-POSITIVE CARCINOMA OF LEFT BREAST: ICD-10-CM

## 2020-01-02 PROCEDURE — 77080 DXA BONE DENSITY AXIAL: CPT | Mod: TC,PO

## 2020-01-02 PROCEDURE — 77049 MRI BREAST C-+ W/CAD BI: CPT | Mod: TC,PO

## 2020-01-02 PROCEDURE — 77049 MRI BREAST W/WO CONTRAST, W/CAD, BILATERAL: ICD-10-PCS | Mod: 26,,, | Performed by: RADIOLOGY

## 2020-01-02 PROCEDURE — 77049 MRI BREAST C-+ W/CAD BI: CPT | Mod: 26,,, | Performed by: RADIOLOGY

## 2020-01-02 PROCEDURE — 77080 DXA BONE DENSITY AXIAL: CPT | Mod: 26,,, | Performed by: RADIOLOGY

## 2020-01-02 PROCEDURE — A9577 INJ MULTIHANCE: HCPCS | Mod: PO | Performed by: INTERNAL MEDICINE

## 2020-01-02 PROCEDURE — 25500020 PHARM REV CODE 255: Mod: PO | Performed by: INTERNAL MEDICINE

## 2020-01-02 PROCEDURE — 77080 DEXA BONE DENSITY SPINE HIP: ICD-10-PCS | Mod: 26,,, | Performed by: RADIOLOGY

## 2020-01-02 RX ADMIN — GADOBENATE DIMEGLUMINE 12 ML: 529 INJECTION, SOLUTION INTRAVENOUS at 02:01

## 2020-01-07 ENCOUNTER — PATIENT MESSAGE (OUTPATIENT)
Dept: HEMATOLOGY/ONCOLOGY | Facility: CLINIC | Age: 65
End: 2020-01-07

## 2020-01-13 DIAGNOSIS — C50.611 MALIGNANT NEOPLASM OF AXILLARY TAIL OF RIGHT FEMALE BREAST, UNSPECIFIED ESTROGEN RECEPTOR STATUS: ICD-10-CM

## 2020-01-14 RX ORDER — BUPROPION HYDROCHLORIDE 150 MG/1
150 TABLET ORAL DAILY
Qty: 90 TABLET | Refills: 0 | Status: SHIPPED | OUTPATIENT
Start: 2020-01-14 | End: 2020-04-09

## 2020-01-16 ENCOUNTER — TELEPHONE (OUTPATIENT)
Dept: RESEARCH | Facility: HOSPITAL | Age: 65
End: 2020-01-16

## 2020-01-16 NOTE — TELEPHONE ENCOUNTER
Thursday, January 16, 2020  Pt Initials: SE KATRINA  Protocol: [id number]  Study #: [id number]  IRB#: [id number]     Contacted Pt for follow-up per protocol. Pt will come in office next month 2/11 to complete QOL and measure lymphedema. Also send message via e-mail as reminder. Pt thanked for call and assent to future appointments.       Tuesday, February 11, 2020 @ 10:30am  Pt came in office to complete QOL and Lymphedema.

## 2020-01-29 ENCOUNTER — PATIENT OUTREACH (OUTPATIENT)
Dept: ADMINISTRATIVE | Facility: OTHER | Age: 65
End: 2020-01-29

## 2020-01-30 ENCOUNTER — OFFICE VISIT (OUTPATIENT)
Dept: DERMATOLOGY | Facility: CLINIC | Age: 65
End: 2020-01-30
Payer: COMMERCIAL

## 2020-01-30 VITALS — BODY MASS INDEX: 21.66 KG/M2 | WEIGHT: 138 LBS | HEIGHT: 67 IN | RESPIRATION RATE: 18 BRPM

## 2020-01-30 DIAGNOSIS — D48.5 NEOPLASM OF UNCERTAIN BEHAVIOR OF SKIN: Primary | ICD-10-CM

## 2020-01-30 PROCEDURE — 11102 TANGNTL BX SKIN SINGLE LES: CPT | Mod: S$GLB,,, | Performed by: DERMATOLOGY

## 2020-01-30 PROCEDURE — 99999 PR PBB SHADOW E&M-EST. PATIENT-LVL III: ICD-10-PCS | Mod: PBBFAC,,, | Performed by: DERMATOLOGY

## 2020-01-30 PROCEDURE — 99499 UNLISTED E&M SERVICE: CPT | Mod: S$GLB,,, | Performed by: DERMATOLOGY

## 2020-01-30 PROCEDURE — 99999 PR PBB SHADOW E&M-EST. PATIENT-LVL III: CPT | Mod: PBBFAC,,, | Performed by: DERMATOLOGY

## 2020-01-30 PROCEDURE — 88305 TISSUE EXAM BY PATHOLOGIST: ICD-10-PCS | Mod: 26,,, | Performed by: DERMATOLOGY

## 2020-01-30 PROCEDURE — 99499 NO LOS: ICD-10-PCS | Mod: S$GLB,,, | Performed by: DERMATOLOGY

## 2020-01-30 PROCEDURE — 88305 TISSUE EXAM BY PATHOLOGIST: CPT | Performed by: DERMATOLOGY

## 2020-01-30 PROCEDURE — 88305 TISSUE EXAM BY PATHOLOGIST: CPT | Mod: 26,,, | Performed by: DERMATOLOGY

## 2020-01-30 PROCEDURE — 11102 PR TANGENTIAL BIOPSY, SKIN, SINGLE LESION: ICD-10-PCS | Mod: S$GLB,,, | Performed by: DERMATOLOGY

## 2020-01-30 NOTE — PROGRESS NOTES
Subjective:       Patient ID:  Ashlie Redman is a 64 y.o. female who presents for   Chief Complaint   Patient presents with    Lesion     64 y.o. F  who presents for a lesion on her L arm that has been present since chemo in 2017. The lesion scales, bleeds, and sometimes itches.     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                                                                                                                                                                                                                                                                                                                                                                                                                                                                                                                                                                                                                                                                                                                                                                                                                                                                                                                                                                                                                                                                                                                                                                                                                                                                                                                                         Review of Systems   Skin: Positive for activity-related sunscreen use and wears hat. Negative for itching, rash, dry skin and daily sunscreen use.   Hematologic/Lymphatic: Bruises/bleeds easily.       Past Medical  History:   Diagnosis Date    Allergy     Anemia     Asteroid hyalosis of both eyes     Breast cancer 04/2017    Left    Cataract     Diverticulosis     Encounter for blood transfusion     Glaucoma     High myopia     Osteopenia     PONV (postoperative nausea and vomiting)     S/P dilation and curettage        Objective:    Physical Exam   Constitutional: She appears well-developed and well-nourished. No distress.   HENT:   Mouth/Throat: Lips normal.    Eyes: Lids are normal.    Neurological: She is alert and oriented to person, place, and time. She is not disoriented.   Psychiatric: She has a normal mood and affect. She is not agitated.   Skin:   Areas Examined (abnormalities noted in diagram):   Head / Face Inspection Performed  Neck Inspection Performed  LUE Inspection Performed                  Diagram Legend     Erythematous scaling macule/papule c/w actinic keratosis       Vascular papule c/w angioma      Pigmented verrucoid papule/plaque c/w seborrheic keratosis      Yellow umbilicated papule c/w sebaceous hyperplasia      Irregularly shaped tan macule c/w lentigo     1-2 mm smooth white papules consistent with Milia      Movable subcutaneous cyst with punctum c/w epidermal inclusion cyst      Subcutaneous movable cyst c/w pilar cyst      Firm pink to brown papule c/w dermatofibroma      Pedunculated fleshy papule(s) c/w skin tag(s)      Evenly pigmented macule c/w junctional nevus     Mildly variegated pigmented, slightly irregular-bordered macule c/w mildly atypical nevus      Flesh colored to evenly pigmented papule c/w intradermal nevus       Pink pearly papule/plaque c/w basal cell carcinoma      Erythematous hyperkeratotic cursted plaque c/w SCC      Surgical scar with no sign of skin cancer recurrence      Open and closed comedones      Inflammatory papules and pustules      Verrucoid papule consistent consistent with wart     Erythematous eczematous patches and plaques     Dystrophic  onycholytic nail with subungual debris c/w onychomycosis     Umbilicated papule    Erythematous-base heme-crusted tan verrucoid plaque consistent with inflamed seborrheic keratosis     Erythematous Silvery Scaling Plaque c/w Psoriasis     See annotation      Assessment / Plan:      Pathology Orders:     Normal Orders This Visit    Specimen to Pathology, Dermatology     Comments:    Number of Specimens:->1  ------------------------->-------------------------  Spec 1 Procedure:->Biopsy  Spec 1 Clinical Impression:->HAK vs SCC vs other  Spec 1 Source:->L dorsal forearm    Questions:    Procedure Type:  Dermatology and skin neoplasms    Number of Specimens:  1    ------------------------:  -------------------------    Spec 1 Procedure:  Biopsy    Spec 1 Clinical Impression:  HAK vs SCC vs other    Spec 1 Source:  L dorsal forearm        Neoplasm of uncertain behavior of skin  -     Specimen to Pathology, Dermatology    Shave biopsy procedure note:    Shave biopsy performed after verbal consent including risk of infection, scar, recurrence, need for additional treatment of site. Area prepped with alcohol, anesthetized with approximately 1.0cc of 1% lidocaine with epinephrine. Lesional tissue shaved with razor blade. Hemostasis achieved with application of aluminum chloride followed by hyfrecation. No complications. Dressing applied. Wound care explained.    If +, excision    *Gentle massage with Vaseline to skin of L chest*         Follow up for pending Pathology.

## 2020-02-06 LAB
FINAL PATHOLOGIC DIAGNOSIS: NORMAL
GROSS: NORMAL
MICROSCOPIC EXAM: NORMAL

## 2020-02-07 ENCOUNTER — OFFICE VISIT (OUTPATIENT)
Dept: URGENT CARE | Facility: CLINIC | Age: 65
End: 2020-02-07
Payer: COMMERCIAL

## 2020-02-07 VITALS
DIASTOLIC BLOOD PRESSURE: 85 MMHG | WEIGHT: 130 LBS | BODY MASS INDEX: 20.4 KG/M2 | SYSTOLIC BLOOD PRESSURE: 145 MMHG | RESPIRATION RATE: 18 BRPM | HEART RATE: 68 BPM | TEMPERATURE: 98 F | OXYGEN SATURATION: 97 % | HEIGHT: 67 IN

## 2020-02-07 DIAGNOSIS — J06.9 VIRAL URI WITH COUGH: Primary | ICD-10-CM

## 2020-02-07 PROCEDURE — 99214 PR OFFICE/OUTPT VISIT, EST, LEVL IV, 30-39 MIN: ICD-10-PCS | Mod: S$GLB,,, | Performed by: PHYSICIAN ASSISTANT

## 2020-02-07 PROCEDURE — 99214 OFFICE O/P EST MOD 30 MIN: CPT | Mod: S$GLB,,, | Performed by: PHYSICIAN ASSISTANT

## 2020-02-07 RX ORDER — ALBUTEROL SULFATE 90 UG/1
2 AEROSOL, METERED RESPIRATORY (INHALATION) EVERY 6 HOURS PRN
Qty: 18 G | Refills: 0 | Status: SHIPPED | OUTPATIENT
Start: 2020-02-07 | End: 2020-04-14

## 2020-02-07 RX ORDER — FLUTICASONE PROPIONATE 50 MCG
1 SPRAY, SUSPENSION (ML) NASAL 2 TIMES DAILY
Qty: 16 G | Refills: 0 | Status: SHIPPED | OUTPATIENT
Start: 2020-02-07 | End: 2020-04-14

## 2020-02-07 RX ORDER — BENZONATATE 100 MG/1
100 CAPSULE ORAL EVERY 4 HOURS PRN
Qty: 60 CAPSULE | Refills: 1 | Status: SHIPPED | OUTPATIENT
Start: 2020-02-07 | End: 2020-04-14

## 2020-02-07 NOTE — PATIENT INSTRUCTIONS
Viral Upper Respiratory Illness (Adult)  You have a viral upper respiratory illness (URI), which is another term for the common cold. This illness is contagious during the first few days. It is spread through the air by coughing and sneezing. It may also be spread by direct contact (touching the sick person and then touching your own eyes, nose, or mouth). Frequent handwashing will decrease risk of spread. Most viral illnesses go away within 7 to 10 days with rest and simple home remedies. Sometimes the illness may last for several weeks. Antibiotics will not kill a virus, and they are generally not prescribed for this condition.    Home care  · If symptoms are severe, rest at home for the first 2 to 3 days. When you resume activity, don't let yourself get too tired.  · Avoid being exposed to cigarette smoke (yours or others).  · You may use acetaminophen or ibuprofen to control pain and fever, unless another medicine was prescribed. (Note: If you have chronic liver or kidney disease, have ever had a stomach ulcer or gastrointestinal bleeding, or are taking blood-thinning medicines, talk with your healthcare provider before using these medicines.) Aspirin should never be given to anyone under 18 years of age who is ill with a viral infection or fever. It may cause severe liver or brain damage.  · Your appetite may be poor, so a light diet is fine. Avoid dehydration by drinking 6 to 8 glasses of fluids per day (water, soft drinks, juices, tea, or soup). Extra fluids will help loosen secretions in the nose and lungs.  · Over-the-counter cold medicines will not shorten the length of time youre sick, but they may be helpful for the following symptoms: cough, sore throat, and nasal and sinus congestion. (Note: Do not use decongestants if you have high blood pressure.)  Follow-up care  Follow up with your healthcare provider, or as advised.  When to seek medical advice  Call your healthcare provider right away if any  of these occur:  · Cough with lots of colored sputum (mucus)  · Severe headache; face, neck, or ear pain  · Difficulty swallowing due to throat pain  · Fever of 100.4°F (38°C)  Call 911, or get immediate medical care  Call emergency services right away if any of these occur:  · Chest pain, shortness of breath, wheezing, or difficulty breathing  · Coughing up blood  · Inability to swallow due to throat pain  Date Last Reviewed: 9/13/2015  © 2095-3068 [employer]. [address] [place] [address]. All rights reserved. This information is not intended as a substitute for professional medical care. Always follow your healthcare professional's instructions.

## 2020-02-07 NOTE — PROGRESS NOTES
"Subjective:       Patient ID: Ashlie Redman is a 64 y.o. female.    Vitals:  height is 5' 7" (1.702 m) and weight is 59 kg (130 lb). Her temperature is 98.1 °F (36.7 °C). Her blood pressure is 145/85 (abnormal) and her pulse is 68. Her respiration is 18 and oxygen saturation is 97%.     Chief Complaint: Cough    Pt presents today with cough, congestion, rhinorrhea, and sinus pressure x2 days. Pt also having left sided chest congestion.    Cough   This is a new problem. The current episode started yesterday. The problem has been gradually worsening. The problem occurs constantly. The cough is non-productive. Associated symptoms include ear pain, nasal congestion, postnasal drip and rhinorrhea. Pertinent negatives include no chest pain, chills, ear congestion, eye redness, fever, headaches, heartburn, hemoptysis, myalgias, rash, sore throat, shortness of breath, sweats, weight loss or wheezing. Nothing aggravates the symptoms. Treatments tried: theraflu. The treatment provided no relief. Her past medical history is significant for bronchitis. There is no history of asthma, bronchiectasis, COPD, emphysema, environmental allergies or pneumonia.       Constitution: Negative for chills, sweating, fatigue and fever.   HENT: Positive for ear pain and postnasal drip. Negative for congestion, sinus pain, sinus pressure, sore throat and voice change.    Neck: Negative for painful lymph nodes.   Cardiovascular: Negative for chest pain.   Eyes: Negative for eye redness.   Respiratory: Positive for cough. Negative for chest tightness, sputum production, bloody sputum, COPD, shortness of breath, stridor, wheezing and asthma.    Gastrointestinal: Negative for nausea, vomiting and heartburn.   Musculoskeletal: Negative for muscle ache.   Skin: Negative for rash.   Allergic/Immunologic: Negative for environmental allergies, seasonal allergies and asthma.   Neurological: Negative for headaches.   Hematologic/Lymphatic: Negative for " swollen lymph nodes.       Objective:      Physical Exam   Constitutional: She is oriented to person, place, and time. She appears well-developed and well-nourished. She is cooperative.  Non-toxic appearance. She does not have a sickly appearance. She does not appear ill. No distress.   HENT:   Head: Normocephalic and atraumatic.   Right Ear: Hearing, tympanic membrane, external ear and ear canal normal.   Left Ear: Hearing, tympanic membrane, external ear and ear canal normal.   Nose: Nose normal. No mucosal edema, rhinorrhea or nasal deformity. No epistaxis. Right sinus exhibits no maxillary sinus tenderness and no frontal sinus tenderness. Left sinus exhibits no maxillary sinus tenderness and no frontal sinus tenderness.   Mouth/Throat: Uvula is midline, oropharynx is clear and moist and mucous membranes are normal. No trismus in the jaw. Normal dentition. No uvula swelling. No oropharyngeal exudate, posterior oropharyngeal edema or posterior oropharyngeal erythema.   Eyes: Conjunctivae and lids are normal. No scleral icterus.   Neck: Trachea normal, full passive range of motion without pain and phonation normal. Neck supple. No neck rigidity. No edema and no erythema present.   Cardiovascular: Normal rate, regular rhythm, normal heart sounds, intact distal pulses and normal pulses.   Pulmonary/Chest: Effort normal and breath sounds normal. No respiratory distress. She has no decreased breath sounds. She has no rhonchi.   Abdominal: Normal appearance.   Musculoskeletal: Normal range of motion. She exhibits no edema or deformity.   Neurological: She is alert and oriented to person, place, and time. She exhibits normal muscle tone. Coordination normal.   Skin: Skin is warm, dry, intact, not diaphoretic and not pale.   Psychiatric: She has a normal mood and affect. Her speech is normal and behavior is normal. Judgment and thought content normal. Cognition and memory are normal.   Nursing note and vitals  reviewed.        Assessment:       1. Viral URI with cough        Plan:         Viral URI with cough    Other orders  -     fluticasone propionate (FLONASE ALLERGY RELIEF) 50 mcg/actuation nasal spray; 1 spray (50 mcg total) by Each Nostril route 2 (two) times daily.  Dispense: 16 g; Refill: 0  -     benzonatate (TESSALON PERLES) 100 MG capsule; Take 1 capsule (100 mg total) by mouth every 4 (four) hours as needed.  Dispense: 60 capsule; Refill: 1  -     albuterol (PROVENTIL HFA) 90 mcg/actuation inhaler; Inhale 2 puffs into the lungs every 6 (six) hours as needed for Wheezing. Rescue  Dispense: 18 g; Refill: 0     Lungs clear to auscultation bilaterally. Patient is afebrile.  Do not suspect influenza at this time.  Suspect viral URI.  Discussed this with patient.  Will treat with above-listed medications.    Pocket script for doxycycline given to patient in case symptoms fail to improve or worsen. Pt advised on risk of taking medication too soon and verbalized understanding.    Explained r/b and patient v/u to fill only if symptoms progress or worsen after maximizing home remedies sheet given and/or explained during encounter.     Viral Upper Respiratory Illness (Adult)  You have a viral upper respiratory illness (URI), which is another term for the common cold. This illness is contagious during the first few days. It is spread through the air by coughing and sneezing. It may also be spread by direct contact (touching the sick person and then touching your own eyes, nose, or mouth). Frequent handwashing will decrease risk of spread. Most viral illnesses go away within 7 to 10 days with rest and simple home remedies. Sometimes the illness may last for several weeks. Antibiotics will not kill a virus, and they are generally not prescribed for this condition.    Home care  · If symptoms are severe, rest at home for the first 2 to 3 days. When you resume activity, don't let yourself get too tired.  · Avoid being  exposed to cigarette smoke (yours or others).  · You may use acetaminophen or ibuprofen to control pain and fever, unless another medicine was prescribed. (Note: If you have chronic liver or kidney disease, have ever had a stomach ulcer or gastrointestinal bleeding, or are taking blood-thinning medicines, talk with your healthcare provider before using these medicines.) Aspirin should never be given to anyone under 18 years of age who is ill with a viral infection or fever. It may cause severe liver or brain damage.  · Your appetite may be poor, so a light diet is fine. Avoid dehydration by drinking 6 to 8 glasses of fluids per day (water, soft drinks, juices, tea, or soup). Extra fluids will help loosen secretions in the nose and lungs.  · Over-the-counter cold medicines will not shorten the length of time youre sick, but they may be helpful for the following symptoms: cough, sore throat, and nasal and sinus congestion. (Note: Do not use decongestants if you have high blood pressure.)  Follow-up care  Follow up with your healthcare provider, or as advised.  When to seek medical advice  Call your healthcare provider right away if any of these occur:  · Cough with lots of colored sputum (mucus)  · Severe headache; face, neck, or ear pain  · Difficulty swallowing due to throat pain  · Fever of 100.4°F (38°C)  Call 911, or get immediate medical care  Call emergency services right away if any of these occur:  · Chest pain, shortness of breath, wheezing, or difficulty breathing  · Coughing up blood  · Inability to swallow due to throat pain  Date Last Reviewed: 9/13/2015 © 2000-2017 [employer]. [address] [place] [address], [place] [address]. All rights reserved. This information is not intended as a substitute for professional medical care. Always follow your healthcare professional's instructions.

## 2020-02-08 ENCOUNTER — PATIENT OUTREACH (OUTPATIENT)
Dept: ADMINISTRATIVE | Facility: OTHER | Age: 65
End: 2020-02-08

## 2020-02-08 NOTE — PROGRESS NOTES
Chart reviewed.   Immunizations: Triggered Imm Registry     Orders placed: n/a  Upcoming appts to satisfy MARTHA topics: n/a

## 2020-02-10 ENCOUNTER — PATIENT MESSAGE (OUTPATIENT)
Dept: DERMATOLOGY | Facility: CLINIC | Age: 65
End: 2020-02-10

## 2020-02-10 NOTE — PROGRESS NOTES
CHIEF COMPLAINT  Left breast cancer    Referring Provider: Martir Linares MD  PCP: Kevin Gong MD    HPI  Ashlie Redman is a 64 y.o. female s/p breast reconstruction with implant placement in 8/21/2019 with Dr. Diego Modi. Patient underwent left Breast Reconstruction with prepectoral silicone implant and acellular dermal matrix and right augmentation mammaplasty for symmetry. Patient reports her left breast appeared red several days later and the implant was removed on 8/30/19. Patient was was treated with IV abx for cellulitis. Patient reports she had left breast lumpectomy and radiation for left breast cancer diagnosed in 2017. She completed radiation in 2/2018 but subsequently developed severe capsular contracture and pain around her previous left breast implant. She has additionally completed Herceptin in July 2018.      Her family history is positive for colon cancer in her mother and sister, breast cancer in first cousin and genetic testing is negative. She is otherwise healthy and very active.  She has had previous surgery for an ectopic pregnancy, currently has 2 children.  She is a non-smoker.      Oncologic Hx  Diagnosis: Left breast   Tumor stage: Stage IIB, ER/CO negative, H2+  Adjuvant therapy  - chemotherapy: none current  - radiation therapy: completed 2/2018  Oncologist: Jn    Previous Surgery:  Left lumpectomy, XRT  Left Breast Reconstruction with prepectoral silicone implant and acellular dermal matrix  Right augmentation mammaplasty for symmetry.      PMH  Patient Active Problem List   Diagnosis    Pelvic pain in female    Urinary tract infection    Screen for STD (sexually transmitted disease)    Osteopenia    Family history of osteoporosis    Family history of rectal cancer    Sensation of pressure in bladder area    High myopia    Glaucoma suspect of both eyes    Primary cancer of lower outer quadrant of left female breast    Breast cancer, left    HER2-positive  carcinoma of left breast    Breast cancer, left breast    Status post partial mastectomy    Decreased range of motion of left shoulder    Lymphedema    Pain in axilla, left    Neuropathy    Chemotherapy-induced peripheral neuropathy    Midline cystocele    Incomplete emptying of bladder    Radiation pneumonitis    Cystocele, midline    Post-operative state    Personal history of malignant neoplasm of breast    Personal history of breast cancer    Cellulitis of left breast       PSH  Past Surgical History:   Procedure Laterality Date    breast augmentation  03/2013    BREAST BIOPSY Left 04/2017    Core bx,+ cancer    BREAST CAPSULECTOMY Left 8/5/2019    Procedure: CAPSULECTOMY, BREAST;  Surgeon: Diego Modi MD;  Location: [facility];  Service: General;  Laterality: Left;    BREAST LUMPECTOMY Left 10/25/2017    BREAST RECONSTRUCTION Bilateral 8/21/2019    Procedure: RECONSTRUCTION, BREAST WITH ALLODERM;  Surgeon: Diego Modi MD;  Location: [facility];  Service: General;  Laterality: Bilateral;    BREAST RECONSTRUCTION Left     BREAST REVISION SURGERY Bilateral 8/5/2019    Procedure: BREAST REVISION SURGERY;  Surgeon: Diego Modi MD;  Location: [facility];  Service: General;  Laterality: Bilateral;    CATARACT EXTRACTION W/  INTRAOCULAR LENS IMPLANT Bilateral 2009    COLONOSCOPY  4/2009, 2015    repeat in 5 years    CYSTOCELE REPAIR N/A 11/20/2018    Procedure: REPAIR, CYSTOCELE;  Surgeon: Rebecca Acosta MD;  Location: [facility] OR [address] [place] [address];  Service: Urology;  Laterality: N/A;  1.5 hours    CYSTOSCOPY N/A 11/20/2018    Procedure: CYSTOSCOPY;  Surgeon: Rebecca Acosta MD;  Location: [facility] OR 2ND FLR;  Service: Urology;  Laterality: N/A;    DILATION AND CURETTAGE OF UTERUS      ECTOPIC PREGNANCY SURGERY      EYE SURGERY Bilateral 3/09    Vitrectomy     POLYPECTOMY      x3 2001    REMOVAL OF BREAST IMPLANT Bilateral 8/5/2019    Procedure: REMOVAL, IMPLANT, BREAST;  Surgeon: Diego ARROYO  MD Ly;  Location: [facility];  Service: General;  Laterality: Bilateral;    REMOVAL OF BREAST IMPLANT Left 8/30/2019    Procedure: REMOVAL, IMPLANT, BREAST;  Surgeon: Diego Modi MD;  Location: [facility];  Service: General;  Laterality: Left;    Yag Capsulotomy Bilateral 12/2014       FH  Family History   Problem Relation Age of Onset    Cancer Sister         rectal; passed    Breast cancer Cousin     Cancer Cousin         breast    Heart disease Father         passed    Colon cancer Mother         hospice    Pancreatic cancer Mother         39yo; 91yo    Cataracts Mother     Hypertension Mother     Thyroid disease Mother     Cancer Mother         colon    Glaucoma Maternal Grandmother     Amblyopia Neg Hx     Blindness Neg Hx     Diabetes Neg Hx     Macular degeneration Neg Hx     Retinal detachment Neg Hx     Strabismus Neg Hx     Stroke Neg Hx        MEDICATIONS  No outpatient medications have been marked as taking for the 2/11/20 encounter (Initial consult) with Pasquale Bae MD.       ALLERGIES  Review of patient's allergies indicates:   Allergen Reactions    Bactrim [sulfamethoxazole-trimethoprim] Rash     Fever, vomiting       SOCIAL HISTORY  Tobacco:   Social History     Tobacco Use   Smoking Status Never Smoker   Smokeless Tobacco Never Used     EtOH:   Social History     Substance and Sexual Activity   Alcohol Use No    Alcohol/week: 1.0 standard drinks    Types: 1 Glasses of wine per week    Frequency: 4 or more times a week    Drinks per session: 1 or 2    Binge frequency: Never    Comment: Quit       ROS  Review of Systems - General ROS: negative for - chills, fatigue, fever, hot flashes, malaise or night sweats  Psychological ROS: negative for - mood swings or sleep disturbances  Hematological and Lymphatic ROS: negative for - bleeding problems, blood clots, blood transfusions, bruising or fatigue  Endocrine ROS: negative for - hair pattern changes, hot flashes,  malaise/lethargy, palpitations, polydipsia/polyuria or temperature intolerance  Breast ROS: positive for findings described above, negative for - nipple changes or nipple discharge  Respiratory ROS: no cough, shortness of breath, or wheezing  Cardiovascular ROS: no chest pain or dyspnea on exertion  Gastrointestinal ROS: no abdominal pain, change in bowel habits, or black or bloody stools  Genito-Urinary ROS: no dysuria, trouble voiding, or hematuria  Musculoskeletal ROS: negative for - gait disturbance, joint pain, joint stiffness, joint swelling or muscle pain  Neurological ROS: no TIA or stroke symptoms  Dermatological ROS: negative for acne, dry skin, eczema and hair changes    PHYSICAL EXAM  BP (!) 149/89   Pulse 75     Constitutional: She is oriented to person, place, and time. She appears well-developed and well-nourished.   HENT: Normocephalic and atraumatic.   Neck: Normal range of motion. Neck supple. No JVD present.   Cardiovascular: Normal rate, regular rhythm and normal heart sounds.    Pulmonary/Chest: Effort normal. No respiratory distress.   Musculoskeletal: Normal range of motion. She exhibits no edema or deformity.   Neurological: She is alert and oriented to person, place, and time. No sensory deficit. She exhibits normal muscle tone.   Skin: Skin is warm. No rash noted. No erythema.   Psychiatric: She has a normal mood and affect. Her behavior is normal.       Breast  On examination the patient has the left anterior chest wall with very little breast tissue remaining.  The right augmentation is in place.  She is fairly thin.  She has had previous surgery on the abdomen through a low transverse incision for previous ectopic pregnancy.  There is some tissue available for reconstruction.    Back  - fat pad 2 cm  - latissimus functional    Abdomen/Trunk/Thigh/Buttock  Abdomen: soft, nontender, nondistended, hernias absent  Fat excess in hypogastrium present, healed pfannensteil (ectoptic  pregnancy)  Buttock/Thigh: moderate    ASSESSMENT  Encounter Diagnoses   Name Primary?    Malignant neoplasm of nipple of left breast in female, estrogen receptor negative Yes    History of breast reconstruction        PLAN  - MRA abdomen for SUSAN and SIEA  - Will await patient's final decision prior to booking

## 2020-02-11 ENCOUNTER — INITIAL CONSULT (OUTPATIENT)
Dept: PLASTIC SURGERY | Facility: CLINIC | Age: 65
End: 2020-02-11
Attending: PLASTIC SURGERY
Payer: COMMERCIAL

## 2020-02-11 VITALS — HEART RATE: 75 BPM | SYSTOLIC BLOOD PRESSURE: 149 MMHG | DIASTOLIC BLOOD PRESSURE: 89 MMHG

## 2020-02-11 DIAGNOSIS — Z98.890 HISTORY OF BREAST RECONSTRUCTION: ICD-10-CM

## 2020-02-11 DIAGNOSIS — Z17.1 MALIGNANT NEOPLASM OF NIPPLE OF LEFT BREAST IN FEMALE, ESTROGEN RECEPTOR NEGATIVE: Primary | ICD-10-CM

## 2020-02-11 DIAGNOSIS — C50.012 MALIGNANT NEOPLASM OF NIPPLE OF LEFT BREAST IN FEMALE, ESTROGEN RECEPTOR NEGATIVE: Primary | ICD-10-CM

## 2020-02-11 PROCEDURE — 99213 PR OFFICE/OUTPT VISIT, EST, LEVL III, 20-29 MIN: ICD-10-PCS | Mod: S$GLB,,, | Performed by: PLASTIC SURGERY

## 2020-02-11 PROCEDURE — 99213 OFFICE O/P EST LOW 20 MIN: CPT | Mod: S$GLB,,, | Performed by: PLASTIC SURGERY

## 2020-02-11 NOTE — PROGRESS NOTES
This patient was seen in consultation for a 2nd opinion for breast reconstruction.  She was diagnosed with breast cancer in 2017 of the left breast.  She previously in 2013 had had bilateral breast augmentation.  Her breast cancer treatment consisted of left partial mastectomy chemotherapy followed by radiation therapy.  The implant was moved to a PRE pectoral location covered with ADM.  An infection resulted and the implant and ADM had to be removed.Dr Modi is her plastic surgeon.    Abscess custom different options with the patient.  On examination the patient has the left anterior chest wall with very little breast tissue remaining.  The right augmentation is in place.  She is fairly thin.  She has had previous surgery on the abdomen through a low transverse incision for previous ectopic pregnancy.  There is some tissue available for reconstruction.    My recommendation would be to discuss her case with Dr. Modi.  To do a surgical delay procedure and advance of transfer of tissue from the lower abdomen would be recommended.  This could be coordinated with Dr. Modi.  She would need a transfer of the flap which we usually do the following week.  I would be able to be 1st assistant  Dr. Modi with the transfer.  She will also need to see Dr. Arce for probable removal of residual breast tissue on the left.  This could be done at the time of the surgical delay or at the time of the flap transfer.

## 2020-02-11 NOTE — LETTER
February 11, 2020      Martir Linares MD  [address] [address]           [facility] [address]   [address] [address]  Phone: [phone]  Fax: [phone]          Patient: Ashlie Redman   MR Number: [id number]   YOB: 1955   Date of Visit: 2/11/2020       Dear Dr. Martir Linares:    Thank you for referring Ashlie Redman to me for evaluation. Attached you will find relevant portions of my assessment and plan of care.    If you have questions, please do not hesitate to call me. I look forward to following Ashlie Redman along with you.    Sincerely,    RADHA Alcala  CC:  No Recipients    If you would like to receive this communication electronically, please contact [email or web address] or [phone] to request more information on [facility] access.    For providers and/or their staff who would like to refer a patient to [facility], please contact us through our one-stop-shop provider referral line, [facility], at [phone].    If you feel you have received this communication in error or would no longer like to receive these types of communications, please e-mail [email or web address]

## 2020-02-17 ENCOUNTER — LAB VISIT (OUTPATIENT)
Dept: LAB | Facility: HOSPITAL | Age: 65
End: 2020-02-17
Attending: INTERNAL MEDICINE
Payer: COMMERCIAL

## 2020-02-17 DIAGNOSIS — C50.912 HER2-POSITIVE CARCINOMA OF LEFT BREAST: ICD-10-CM

## 2020-02-17 PROCEDURE — 86300 IMMUNOASSAY TUMOR CA 15-3: CPT

## 2020-02-17 PROCEDURE — 36415 COLL VENOUS BLD VENIPUNCTURE: CPT | Mod: PO

## 2020-02-20 ENCOUNTER — OFFICE VISIT (OUTPATIENT)
Dept: HEMATOLOGY/ONCOLOGY | Facility: CLINIC | Age: 65
End: 2020-02-20
Payer: COMMERCIAL

## 2020-02-20 VITALS
WEIGHT: 137.25 LBS | HEART RATE: 60 BPM | TEMPERATURE: 98 F | RESPIRATION RATE: 18 BRPM | DIASTOLIC BLOOD PRESSURE: 76 MMHG | SYSTOLIC BLOOD PRESSURE: 137 MMHG | HEIGHT: 67 IN | BODY MASS INDEX: 21.54 KG/M2

## 2020-02-20 DIAGNOSIS — C50.912 HER2-POSITIVE CARCINOMA OF LEFT BREAST: ICD-10-CM

## 2020-02-20 DIAGNOSIS — M80.00XA AGE-RELATED OSTEOPOROSIS WITH CURRENT PATHOLOGICAL FRACTURE, INITIAL ENCOUNTER: ICD-10-CM

## 2020-02-20 DIAGNOSIS — C50.012 MALIGNANT NEOPLASM OF NIPPLE OF LEFT BREAST IN FEMALE, UNSPECIFIED ESTROGEN RECEPTOR STATUS: Primary | ICD-10-CM

## 2020-02-20 DIAGNOSIS — J70.0 RADIATION PNEUMONITIS: ICD-10-CM

## 2020-02-20 LAB — CANCER AG27-29 SERPL-ACNC: 30.6 U/ML

## 2020-02-20 PROCEDURE — 3008F PR BODY MASS INDEX (BMI) DOCUMENTED: ICD-10-PCS | Mod: CPTII,S$GLB,, | Performed by: INTERNAL MEDICINE

## 2020-02-20 PROCEDURE — 99999 PR PBB SHADOW E&M-EST. PATIENT-LVL IV: ICD-10-PCS | Mod: PBBFAC,,, | Performed by: INTERNAL MEDICINE

## 2020-02-20 PROCEDURE — 99214 PR OFFICE/OUTPT VISIT, EST, LEVL IV, 30-39 MIN: ICD-10-PCS | Mod: S$GLB,,, | Performed by: INTERNAL MEDICINE

## 2020-02-20 PROCEDURE — 99214 OFFICE O/P EST MOD 30 MIN: CPT | Mod: S$GLB,,, | Performed by: INTERNAL MEDICINE

## 2020-02-20 PROCEDURE — 3008F BODY MASS INDEX DOCD: CPT | Mod: CPTII,S$GLB,, | Performed by: INTERNAL MEDICINE

## 2020-02-20 PROCEDURE — 99999 PR PBB SHADOW E&M-EST. PATIENT-LVL IV: CPT | Mod: PBBFAC,,, | Performed by: INTERNAL MEDICINE

## 2020-02-20 NOTE — PROGRESS NOTES
HISTORY OF PRESENT ILLNESS:  This is a 64-year-old white female treated for a  diagnosis of Stage IIB left breast carcinoma.ER/AL negative. Her - 2 positive Neoadjuvant A/C ,completed 12 cycles weekly taxol and  herceptin/ perjeta had lumpectomy was on [employer] trial completed the entire year of herceptin /perjeta, but didn't undergo axillary xrt per trial  Here for f/u with pet and labs recnt mammogram showed calcifications , that were biospied and came back negative  radiation induced interstitial findings still having the cough but getting of her upper respiratory infection today   sonme neuropathy post rx still persisits   had a difficult summer 2019. Had removal of her original implant for pain in the area, after removal pt developed pseudomonas infection stayed in hospital  For a week. She saw Dr. Modi, a lot of this was xrt related. She Is deciding between implants and reconstruction seen multiple surgeons/plastics she has an appt with Dr Bae for a flap evaluation which has been the plastic sx recs , depending on where they may be able to get tissue for the flap.  This discussion has been ongoing she had a bone density an MRI of breast recommended by General surgery here to discuss results  PHYSICAL EXAMINATION:  Wt Readings from Last 3 Encounters:   02/07/20 59 kg (130 lb)   01/30/20 62.6 kg (138 lb 0.1 oz)   12/03/19 62.6 kg (138 lb 1.9 oz)     Temp Readings from Last 3 Encounters:   02/07/20 98.1 °F (36.7 °C)   12/03/19 98.2 °F (36.8 °C) (Oral)   09/16/19 98.1 °F (36.7 °C) (Oral)     BP Readings from Last 3 Encounters:   02/11/20 (!) 149/89   02/07/20 (!) 145/85   12/03/19 (!) 163/77     Pulse Readings from Last 3 Encounters:   02/11/20 75   02/07/20 68   12/03/19 75     GENERAL:  Well-developed, well-nourished white female in no acute distress.    Alert and oriented x4. Well grommed  moist.  Lips without   lesions.  Tongue midline.  Oropharynx clear.  Nonicteric sclerae.  NECK:  Supple, no  adenopathy.  No carotid bruits, thyromegaly or thyroid nodule.  HEART:  Regular rate and rhythm without murmur, gallop.  LUNGS:  Clear to auscultation bilaterally.  Normal respiratory effort.  ABDOMEN:  Soft, nontender, nondistended with positive normoactive bowel sounds,   no hepatosplenomegaly.  EXTREMITIES:  No cyanosis, clubbing or edema.  Distal pulses are intact.  AXILLAE AND GROIN:  No palpable pathologic lymphadenopathy is appreciated.  SKIN:  Intact/turgor normal.  NEUROLOGIC:  Cranial nerves II-XII grossly intact.  Motor:  Good muscle bulk and   tone.  Strength/sensory 5/5 throughout.  Gait stable.  BREAST: left  resembles a mastectomy although a lumpectomy was performed.  LABORATORY:    Lab Results   Component Value Date    WBC 5.63 11/19/2019    HGB 13.7 11/19/2019    HCT 41.0 11/19/2019    MCV 98 11/19/2019     11/19/2019         CMP  Sodium   Date Value Ref Range Status   11/19/2019 142 136 - 145 mmol/L Final     Potassium   Date Value Ref Range Status   11/19/2019 4.3 3.5 - 5.1 mmol/L Final     Chloride   Date Value Ref Range Status   11/19/2019 109 95 - 110 mmol/L Final     CO2   Date Value Ref Range Status   11/19/2019 24 23 - 29 mmol/L Final     Glucose   Date Value Ref Range Status   11/19/2019 77 70 - 110 mg/dL Final     BUN, Bld   Date Value Ref Range Status   11/19/2019 10 8 - 23 mg/dL Final     Creatinine   Date Value Ref Range Status   01/02/2020 0.9 0.5 - 1.4 mg/dL Final     Calcium   Date Value Ref Range Status   11/19/2019 9.9 8.7 - 10.5 mg/dL Final     Total Protein   Date Value Ref Range Status   11/19/2019 7.3 6.0 - 8.4 g/dL Final     Albumin   Date Value Ref Range Status   11/19/2019 4.3 3.5 - 5.2 g/dL Final     Total Bilirubin   Date Value Ref Range Status   11/19/2019 0.6 0.1 - 1.0 mg/dL Final     Comment:     For infants and newborns, interpretation of results should be based  on gestational age, weight and in agreement with clinical  observations.  Premature Infant  recommended reference ranges:  Up to 24 hours.............<8.0 mg/dL  Up to 48 hours............<12.0 mg/dL  3-5 days..................<15.0 mg/dL  6-29 days.................<15.0 mg/dL       Alkaline Phosphatase   Date Value Ref Range Status   11/19/2019 98 55 - 135 U/L Final     AST   Date Value Ref Range Status   11/19/2019 25 10 - 40 U/L Final     ALT   Date Value Ref Range Status   11/19/2019 25 10 - 44 U/L Final     Anion Gap   Date Value Ref Range Status   11/19/2019 9 8 - 16 mmol/L Final     eGFR if    Date Value Ref Range Status   01/02/2020 >60 >60 mL/min/1.73 m^2 Final     eGFR if non    Date Value Ref Range Status   01/02/2020 >60 >60 mL/min/1.73 m^2 Final     Comment:     Calculation used to obtain the estimated glomerular filtration  rate (eGFR) is the CKD-EPI equation.      39.3 ca 2729     Left breast, lumpectomy:  - Residual invasive ductal carcinoma, moderately differentiated, Nickerson Grade 2 (3+2+1=6), 0.2 cm in  greatest linear microscopic dimension.  - Ductal carcinoma in situ (DCIS), high nuclear grade, 2 cm, solid and cribriform types.  - Surgical margins are free of tumor; invasive carcinoma is located 0.1 cm from the closest margin    Impression 11/2-18       New nondisplaced fracture of the distal left 6th rib which is most likely posttraumatic in origin.  The fracture is hypermetabolic most likely related to ongoing healing.  Clinical correlation is advised     Impression CT CAP 11/2019       1. No evidence of metastatic disease in the chest, abdomen, or pelvis.  2. Interval removal of left-sided breast implant with nonspecific skin and subcutaneous soft tissue thickening along the anterior left chest wall, which may reflect or postoperative or postradiation change versus potential infectious process.  3. Stable pulmonary nodules, suggestive of benign etiology.  No new pulmonary nodule.  4. Stable right angiomyolipoma.  5. Additional stable findings, as  above     Findings: 1/2/2020  The breasts have scattered fibroglandular tissue. The background parenchymal enhancement is minimal.     No significant masses, enhancements, or other abnormal findings are seen.     Left mastectomy is noted.     A stable T2 bright T1 precontrast bright, non-enhancing mass is noted along the implant on the right posteriorly and laterally near 3 o'clock of 17 x 12 x 7 mm . This is favored to be post surgical and the stability and lack of convincing enhancement  favors a benign etiology     Several normal size and appearing  rigt axillary nodes are noted     Impression:  No significant masses, enhancements, or other abnormal findings are seen.    SPECIMEN when calcifications were found in the summer  In rt breast  1) Right breast, central, cores with calcifications.  2) Right breast, central, cores without calcifications.  FINAL PATHOLOGIC DIAGNOSIS  1. BREAST, RIGHT, CENTRAL REGION MIDDLE DEPTH, CALCIFICATIONS, STEREOTACTIC-GUIDED  BIOPSY:  Benign breast tissue with fibrocystic changes, columnar cell changes, and focal features suggestive of duct  rupture.  Microcalcifications: Seen in association with areas of columnar cell change.  Negative for atypia or carcinoma.  Osteopenia 3/2017 dexa  Now 2020 osteoporosis    IMPRESSION:  Stage IIB left breast carcinoma (ER/AZ negative, Her-2/clay positive)  Completed neoadjuvant AC and weekly taxol / herceptin  stopped perjeta since she also had xrt to left side due o fear of toxicity   BRCA negative  , s/p  lumpectomy and axillary disection , with 2 residual Ln positive. Per scout trail did not undergo xrt to axilla ( Dr. Modi believes there is no breast tissue left at lumpectomy site I agree as this appears to be complete mastectomy visually)   MRI of rt breast  negative February 2020  Last mammo was in 4/2019 repeat mammogram has been ordered by surgery Clinic continuing to do work on her left  Breast reconstruction .   PLAN:  1.   dexa  showing osteoporosis, start prolia.  And MRI of breast RT and ev1956 which was slightly elevated has normalized now  possibly fracture of ribs from coughing per xray.  Fractures on rib toe possibly related to osteoporosis   cont f/u wit pcp  Return to clinic 6 months in time for the next dose of Prolia with CBC CMP and CA 2729 and PET scan   Advance Care Planning

## 2020-02-26 ENCOUNTER — PATIENT MESSAGE (OUTPATIENT)
Dept: HEMATOLOGY/ONCOLOGY | Facility: CLINIC | Age: 65
End: 2020-02-26

## 2020-03-04 ENCOUNTER — PATIENT MESSAGE (OUTPATIENT)
Dept: HEMATOLOGY/ONCOLOGY | Facility: CLINIC | Age: 65
End: 2020-03-04

## 2020-03-05 DIAGNOSIS — M80.00XA AGE-RELATED OSTEOPOROSIS WITH CURRENT PATHOLOGICAL FRACTURE, INITIAL ENCOUNTER: Primary | ICD-10-CM

## 2020-03-05 RX ORDER — RISEDRONATE SODIUM 35 MG/1
35 TABLET, FILM COATED ORAL
Qty: 5 TABLET | Refills: 11 | Status: SHIPPED | OUTPATIENT
Start: 2020-03-05 | End: 2020-10-23 | Stop reason: CLARIF

## 2020-03-19 ENCOUNTER — TELEPHONE (OUTPATIENT)
Dept: SURGERY | Facility: CLINIC | Age: 65
End: 2020-03-19

## 2020-03-19 NOTE — TELEPHONE ENCOUNTER
Contacted the patient regarding appointment scheduled on Tuesday 3/24/2020.  The patient did not answer, message left with my name and direct number for patient to contact back.

## 2020-03-23 DIAGNOSIS — H40.053 OHT (OCULAR HYPERTENSION), BILATERAL: ICD-10-CM

## 2020-03-23 RX ORDER — DORZOLAMIDE HCL 20 MG/ML
1 SOLUTION/ DROPS OPHTHALMIC 3 TIMES DAILY
Qty: 30 ML | Refills: 4 | Status: SHIPPED | OUTPATIENT
Start: 2020-03-23 | End: 2021-01-04 | Stop reason: SDUPTHER

## 2020-03-23 RX ORDER — DORZOLAMIDE HCL 20 MG/ML
1 SOLUTION/ DROPS OPHTHALMIC 2 TIMES DAILY
Qty: 30 ML | Refills: 4 | Status: SHIPPED | OUTPATIENT
Start: 2020-03-23 | End: 2020-03-23 | Stop reason: SDUPTHER

## 2020-03-24 ENCOUNTER — PATIENT MESSAGE (OUTPATIENT)
Dept: OPHTHALMOLOGY | Facility: CLINIC | Age: 65
End: 2020-03-24

## 2020-03-24 NOTE — PROGRESS NOTES
Refill Authorization Note     is requesting a refill authorization.    Brief assessment and rationale for refill: REVIEW: unclear if pt. follows with PCP          Medication Therapy Plan: LOV with Dr. Gong(2/18); Unclear if pt. follows with PCP, review    Medication reconciliation completed: No   Pharmacist Review Requested: Yes                     Comments:   Refill Center Care Gap Closure protocols temporarily suspended.   Requested Prescriptions   Pending Prescriptions Disp Refills    valACYclovir (VALTREX) 500 MG tablet 90 tablet 0     Sig: Take 1 tablet (500 mg total) by mouth once daily.       Antimicrobials:  Oral Antiviral Agents - Anti-Herpetic Passed - 3/24/2020 12:57 PM        Passed - Patient is at least 18 years old        Passed - Office visit in past 12 months or future 90 days.     Recent Outpatient Visits            1 month ago Malignant neoplasm of nipple of left breast in female, unspecified estrogen receptor status    [facility] Celestina Ragsdale MD    1 month ago Viral URI with cough    [facility] [place] SAUNDRA Alatorre    1 month ago Neoplasm of uncertain behavior of skin    [facility] Bessy Ghosh MD    3 months ago HER2-positive carcinoma of left breast    [facility] Celestina Ragsdale MD    4 months ago OHT (ocular hypertension), bilateral    [facility] Almaz Vasquez MD          Future Appointments              In 1 month MD [facility], [place]    In 1 month Bessy Ghosh MD [facility], [place]    In 1 month [facility], [place]    In 1 month Almaz Vasquez MD [facility] [place]                Passed - Cr is 1.3 or below and within 360 days     Creatinine   Date Value Ref Range Status   01/02/2020 0.9 0.5 - 1.4 mg/dL Final    11/19/2019 0.9 0.5 - 1.4 mg/dL Final   11/06/2019 0.8 0.5 - 1.4 mg/dL Final              Passed - WBC in normal range and within 360 days     WBC   Date Value Ref Range Status   11/19/2019 5.63 3.90 - 12.70 K/uL Final   09/24/2019 5.55 3.90 - 12.70 K/uL Final   08/30/2019 9.47 3.90 - 12.70 K/uL Final              Passed - eGFR within 360 days     eGFR if non    Date Value Ref Range Status   01/02/2020 >60 >60 mL/min/1.73 m^2 Final     Comment:     Calculation used to obtain the estimated glomerular filtration  rate (eGFR) is the CKD-EPI equation.      11/19/2019 >60 >60 mL/min/1.73 m^2 Final     Comment:     Calculation used to obtain the estimated glomerular filtration  rate (eGFR) is the CKD-EPI equation.      11/06/2019 >60 >60 mL/min/1.73 m^2 Final     Comment:     Calculation used to obtain the estimated glomerular filtration  rate (eGFR) is the CKD-EPI equation.        eGFR if    Date Value Ref Range Status   01/02/2020 >60 >60 mL/min/1.73 m^2 Final   11/19/2019 >60 >60 mL/min/1.73 m^2 Final   11/06/2019 >60 >60 mL/min/1.73 m^2 Final               Appointments  past 12m or future 3m with PCP    Date Provider   Last Visit   2/19/2018 Kevin Gong MD   Next Visit   Visit date not found Kevin Gong MD   .  ED visits in past 90 days: 0       Note composed:1:02 PM 03/24/2020

## 2020-03-25 NOTE — TELEPHONE ENCOUNTER
I have reviewed and agree with the assessment below.  Unclear if pt still follows with you.  Thanks     Appointments  past 12m or future 3m with PCP    Date Provider   Last Visit   2/19/2018 Kevin Gong MD   Next Visit   Visit date not found Kevin Gong MD

## 2020-03-26 RX ORDER — VALACYCLOVIR HYDROCHLORIDE 500 MG/1
500 TABLET, FILM COATED ORAL DAILY
Qty: 90 TABLET | Refills: 0 | Status: SHIPPED | OUTPATIENT
Start: 2020-03-26 | End: 2020-07-01 | Stop reason: SDUPTHER

## 2020-03-30 DIAGNOSIS — H40.053 OHT (OCULAR HYPERTENSION), BILATERAL: ICD-10-CM

## 2020-03-30 RX ORDER — LATANOPROST 50 UG/ML
SOLUTION/ DROPS OPHTHALMIC
Qty: 3 BOTTLE | Refills: 4 | Status: CANCELLED | OUTPATIENT
Start: 2020-03-30

## 2020-04-09 ENCOUNTER — PATIENT MESSAGE (OUTPATIENT)
Dept: HEMATOLOGY/ONCOLOGY | Facility: CLINIC | Age: 65
End: 2020-04-09

## 2020-04-09 DIAGNOSIS — C50.611 MALIGNANT NEOPLASM OF AXILLARY TAIL OF RIGHT FEMALE BREAST, UNSPECIFIED ESTROGEN RECEPTOR STATUS: ICD-10-CM

## 2020-04-09 RX ORDER — BUPROPION HYDROCHLORIDE 150 MG/1
TABLET ORAL
Qty: 90 TABLET | Refills: 0 | Status: SHIPPED | OUTPATIENT
Start: 2020-04-09 | End: 2020-07-02

## 2020-04-09 NOTE — TELEPHONE ENCOUNTER
Patient called requesting refill of Wellbutrin.    Last visit:2/20/2020    Last prescribed:01/14/2020

## 2020-04-13 ENCOUNTER — TELEPHONE (OUTPATIENT)
Dept: SURGERY | Facility: CLINIC | Age: 65
End: 2020-04-13

## 2020-04-13 NOTE — TELEPHONE ENCOUNTER
Attempted to contact patient regarding clinic visit with Dr. Arce tomorrow. Called to offer patient virtual or audio visit to limit her exposure to COVID-19. Unable to reach patient, voicemail left with brief message, my name, and direct call back number.

## 2020-04-13 NOTE — PROGRESS NOTES
[facility]  [facility] Breast [place]  4/13/2020      SUBJECTIVE:   Ms. Ashlie Redman is a 65 y.o. female with a history of LEFT breast cancer who presents today to discuss a left completion mastectomy with reconstruction.      History of Present Illness:  Patient had a bilateral breast augmentation in 2013.   She has a history of left lateral breast invasive HER-2 positive invasive breast carcinoma, status post Herceptin and Perjeta based preoperative neoadjuvant chemotherapy with preoperatively known positive left lymph node inthe left axilla. She is s/p partial mastectomy and sentinel node dissection on 11/1/17, positive node noted with negative margins on mastectomy specimen.  She was randomized intraoperatively on the [place] trial to have a completion axillary lymph node dissection, which revealed 2 of 18 additional positive lymph nodes; so she did not receive axillary radiation.  She did have left breast radiation and completed the chemotherapy regimen.    The implant on the left was moved to the pre-pectoral location and covered with an ADM.  An infection resulted and the implant and ADM had to be removed.  She was seen by Dr. Pasquale Bae on 2/11/20 and on exam had very little breast tissue remaining.    He recommended a surgical delayed procedure with advance of transfer of tissue from the lower abdomen, coordinated with Dr. Modi.  She was advised to see Dr. Arce to discuss removal of the the residual breast tissue on the left.    Review of Systems: Denies any chest pain or shortness of breath.  Denies any fever or chills.  See HPI/ Interval History for other systems reviewed.    OBJECTIVE:     Physical Exam   Constitutional: She is well-developed, well-nourished, and in no distress.   Pulmonary/Chest:           ASSESSMENT:  Ms. Ashlie Redman is a 65 y.o. year old female with a history of LEFT breast cancer who presents today to discuss a left completion mastectomy with reconstruction.       PLAN:   We discussed that we could make an incision adjacent to her previous one and lift up that flap to excise the remaining breast tissue.  There is really no remaining tissue at the inferior portion of her left breast.  We discussed that by doing this she would be able to keep her nipple.  She also seems to have adequate abdominal tissue to do a stacked flap on the left.  Will further discuss this with Dr. Modi.  This elective surgery will be delayed until June or July due to the impact of Covid-19.      ~Xi Sam PA-C      Surgical Oncology            4/13/2020     I agree with the physician assistant's findings, assessment , and plan as stated above.  The patient is well known to me as above.  She has had breast conservation surgery and radiotherapy of the left breast as outlined above.  She subsequently developed contraction capsular fibrosis with intense pain and contraction around her pre-existing prior implant for augmentation.  She subsequently had the implant removed and follow-up placement with an AlloDerm wrapped tissue expander became infected and she has since lost her tissue expander.  She has very little residual disease superiorly.  She has the nipple-areolar complex in place.  There is an S-shaped transverse scar along the chest wall running transversely inferior to the nipple-areolar complex.  I told her we can make an incision through that and perform a completion nipple sparing mastectomy superiorly taking all of the remaining superior breast tissue but preserving the skin and nipple-areolar complex from the superior chest wall and remaining breast.  We could attempt to preserve the inferior aspect although most of the inferior side aspect this tethered directly to the chest wall and ribs.  We could then use a stacked autologous tissue flap to reconstruct the left side in a similar size and symmetry with the contralateral right breast which is causing her no problem which still  has the previous remotely placed augmentation implant giving her a large B/small C cup breast currently.  We could theoretically leave the right side alone and performed the autologous tissue stack flap through this transverse S-shaped incision inferiorly on the left preserving the nipple-areolar complex in a completion nipple sparing mastectomy fashion again preserving the nipple-areolar complex and all the superior breast skin and we could use the skin paddle from the autologous tissue to fill in the defect inferiorly whether it be with preserved native inferior skin or with removal of the inferior breast skin which is tethered directly to the chest wall at this point.  She has no evidence of disease at the present time and is clinically doing well.  Since this would be elective reconstruction we would like to perhaps perform this in conjunction with Plastic surgery in the late summer or early fall.  The patient is comfortable with this.  I discussed this directly with her plastic surgeon Dr. Davida Modi following the clinic visit.    The breast MRI performed in January of 2020 had been benign BI-RADS category 1 with negative findings so I am comfortable proceeding with directly to surgery without any further imaging at this point.  Time spent with the patient today was approximately 30 min with greater than 50% of that time in counseling

## 2020-04-14 ENCOUNTER — OFFICE VISIT (OUTPATIENT)
Dept: SURGERY | Facility: CLINIC | Age: 65
End: 2020-04-14
Payer: COMMERCIAL

## 2020-04-14 VITALS
WEIGHT: 137.13 LBS | DIASTOLIC BLOOD PRESSURE: 85 MMHG | SYSTOLIC BLOOD PRESSURE: 178 MMHG | BODY MASS INDEX: 21.52 KG/M2 | HEIGHT: 67 IN | TEMPERATURE: 98 F | HEART RATE: 73 BPM

## 2020-04-14 DIAGNOSIS — Z85.3 HX OF BREAST CANCER: Primary | ICD-10-CM

## 2020-04-14 PROCEDURE — 1100F PR PT FALLS ASSESS DOC 2+ FALLS/FALL W/INJURY/YR: ICD-10-PCS | Mod: CPTII,S$GLB,, | Performed by: SURGERY

## 2020-04-14 PROCEDURE — 99213 OFFICE O/P EST LOW 20 MIN: CPT | Mod: S$GLB,,, | Performed by: SURGERY

## 2020-04-14 PROCEDURE — 3008F BODY MASS INDEX DOCD: CPT | Mod: CPTII,S$GLB,, | Performed by: SURGERY

## 2020-04-14 PROCEDURE — 99999 PR PBB SHADOW E&M-EST. PATIENT-LVL III: CPT | Mod: PBBFAC,,, | Performed by: SURGERY

## 2020-04-14 PROCEDURE — 3008F PR BODY MASS INDEX (BMI) DOCUMENTED: ICD-10-PCS | Mod: CPTII,S$GLB,, | Performed by: SURGERY

## 2020-04-14 PROCEDURE — 1100F PTFALLS ASSESS-DOCD GE2>/YR: CPT | Mod: CPTII,S$GLB,, | Performed by: SURGERY

## 2020-04-14 PROCEDURE — 99999 PR PBB SHADOW E&M-EST. PATIENT-LVL III: ICD-10-PCS | Mod: PBBFAC,,, | Performed by: SURGERY

## 2020-04-14 PROCEDURE — 99213 PR OFFICE/OUTPT VISIT, EST, LEVL III, 20-29 MIN: ICD-10-PCS | Mod: S$GLB,,, | Performed by: SURGERY

## 2020-04-14 PROCEDURE — 3288F PR FALLS RISK ASSESSMENT DOCUMENTED: ICD-10-PCS | Mod: CPTII,S$GLB,, | Performed by: SURGERY

## 2020-04-14 PROCEDURE — 3288F FALL RISK ASSESSMENT DOCD: CPT | Mod: CPTII,S$GLB,, | Performed by: SURGERY

## 2020-05-07 ENCOUNTER — PATIENT MESSAGE (OUTPATIENT)
Dept: DERMATOLOGY | Facility: CLINIC | Age: 65
End: 2020-05-07

## 2020-05-12 ENCOUNTER — PROCEDURE VISIT (OUTPATIENT)
Dept: DERMATOLOGY | Facility: CLINIC | Age: 65
End: 2020-05-12
Payer: COMMERCIAL

## 2020-05-12 VITALS — BODY MASS INDEX: 21.73 KG/M2 | HEIGHT: 67 IN | RESPIRATION RATE: 18 BRPM | WEIGHT: 138.44 LBS

## 2020-05-12 DIAGNOSIS — C44.91 SUPERFICIAL BASAL CELL CARCINOMA (BCC): Primary | ICD-10-CM

## 2020-05-12 PROCEDURE — 99213 OFFICE O/P EST LOW 20 MIN: CPT | Mod: S$GLB,,, | Performed by: DERMATOLOGY

## 2020-05-12 PROCEDURE — 99213 PR OFFICE/OUTPT VISIT, EST, LEVL III, 20-29 MIN: ICD-10-PCS | Mod: S$GLB,,, | Performed by: DERMATOLOGY

## 2020-05-12 NOTE — PROGRESS NOTES
Subjective:       Patient ID:  Ashlie Redman is a 65 y.o. female who presents for   Chief Complaint   Patient presents with    Follow-up    Lesion     Left forearm     64 y/o F presents for evaluation of a biopsy site. Last OV 1-30-20.  She thinks the biopsy site healed well.     Skin, left dorsal forearm, DATED 1-30-20  -BASAL CELL CARCINOMA, SUPERFICIAL, MULTIFOCAL, EXTENDING TO THE BASE OF THE SPECIMEN              Review of Systems   Constitutional: Negative for fever and chills.   Skin: Positive for itching, activity-related sunscreen use and wears hat. Negative for daily sunscreen use.   Hematologic/Lymphatic: Bruises/bleeds easily.      Past Medical History:   Diagnosis Date    Allergy     Anemia     Asteroid hyalosis of both eyes     Basal cell carcinoma     L. dorsal forearm     Breast cancer 04/2017    Left    Cataract     Diverticulosis     Encounter for blood transfusion     Glaucoma     High myopia     Osteopenia     PONV (postoperative nausea and vomiting)     S/P dilation and curettage        Objective:    Physical Exam   Constitutional: She appears well-developed and well-nourished.   Neurological: She is alert and oriented to person, place, and time.   Psychiatric: She has a normal mood and affect.   Skin:   Areas Examined (abnormalities noted in diagram):   Head / Face Inspection Performed  Neck Inspection Performed  LUE Inspection Performed                    Diagram Legend     Erythematous scaling macule/papule c/w actinic keratosis       Vascular papule c/w angioma      Pigmented verrucoid papule/plaque c/w seborrheic keratosis      Yellow umbilicated papule c/w sebaceous hyperplasia      Irregularly shaped tan macule c/w lentigo     1-2 mm smooth white papules consistent with Milia      Movable subcutaneous cyst with punctum c/w epidermal inclusion cyst      Subcutaneous movable cyst c/w pilar cyst      Firm pink to brown papule c/w dermatofibroma      Pedunculated fleshy  papule(s) c/w skin tag(s)      Evenly pigmented macule c/w junctional nevus     Mildly variegated pigmented, slightly irregular-bordered macule c/w mildly atypical nevus      Flesh colored to evenly pigmented papule c/w intradermal nevus       Pink pearly papule/plaque c/w basal cell carcinoma      Erythematous hyperkeratotic cursted plaque c/w SCC      Surgical scar with no sign of skin cancer recurrence      Open and closed comedones      Inflammatory papules and pustules      Verrucoid papule consistent consistent with wart     Erythematous eczematous patches and plaques     Dystrophic onycholytic nail with subungual debris c/w onychomycosis     Umbilicated papule    Erythematous-base heme-crusted tan verrucoid plaque consistent with inflamed seborrheic keratosis     Erythematous Silvery Scaling Plaque c/w Psoriasis     See annotation      Assessment / Plan:        Superficial basal cell carcinoma (BCC)    The biopsy site is well-healed. There is no clinical evidence of BCC persistence/recurrence. No need for additional treatment at this time.  Pt agreed with this plan. Pt will monitor for recurrence.     She declined skin check today          Follow up for 4-6 months.

## 2020-05-21 ENCOUNTER — PATIENT MESSAGE (OUTPATIENT)
Dept: HEMATOLOGY/ONCOLOGY | Facility: CLINIC | Age: 65
End: 2020-05-21

## 2020-06-02 ENCOUNTER — CLINICAL SUPPORT (OUTPATIENT)
Dept: OPHTHALMOLOGY | Facility: CLINIC | Age: 65
End: 2020-06-02
Payer: COMMERCIAL

## 2020-06-02 DIAGNOSIS — H40.053 OHT (OCULAR HYPERTENSION), BILATERAL: ICD-10-CM

## 2020-06-02 NOTE — PROGRESS NOTES
Pt had HVF 24-2sf done today. Will review with  On the 10th. No latex allergies. Coverlet used during exam. TP

## 2020-06-10 ENCOUNTER — OFFICE VISIT (OUTPATIENT)
Dept: OPHTHALMOLOGY | Facility: CLINIC | Age: 65
End: 2020-06-10
Payer: COMMERCIAL

## 2020-06-10 DIAGNOSIS — H40.053 OHT (OCULAR HYPERTENSION), BILATERAL: Primary | ICD-10-CM

## 2020-06-10 DIAGNOSIS — Z98.890 HISTORY OF VITRECTOMY: ICD-10-CM

## 2020-06-10 DIAGNOSIS — Z96.1 PSEUDOPHAKIA OF BOTH EYES: ICD-10-CM

## 2020-06-10 PROCEDURE — 92012 INTRM OPH EXAM EST PATIENT: CPT | Mod: S$GLB,,, | Performed by: OPHTHALMOLOGY

## 2020-06-10 PROCEDURE — 92012 PR EYE EXAM, EST PATIENT,INTERMED: ICD-10-PCS | Mod: S$GLB,,, | Performed by: OPHTHALMOLOGY

## 2020-06-10 PROCEDURE — 99999 PR PBB SHADOW E&M-EST. PATIENT-LVL III: CPT | Mod: PBBFAC,,, | Performed by: OPHTHALMOLOGY

## 2020-06-10 PROCEDURE — 99999 PR PBB SHADOW E&M-EST. PATIENT-LVL III: ICD-10-PCS | Mod: PBBFAC,,, | Performed by: OPHTHALMOLOGY

## 2020-06-10 PROCEDURE — 92083 EXTENDED VISUAL FIELD XM: CPT | Mod: S$GLB,,, | Performed by: OPHTHALMOLOGY

## 2020-06-10 PROCEDURE — 92083 HUMPHREY VISUAL FIELD - OU - BOTH EYES: ICD-10-PCS | Mod: S$GLB,,, | Performed by: OPHTHALMOLOGY

## 2020-06-10 NOTE — PROGRESS NOTES
HPI     DLE- 11/15/19     Pt is here for review of HVF and IOP ck. Pt states, no new changes since   last seen. Pt has been occasionally having some eye irritation with a lump   on LEFTY. Pt is taking Trusopt BID OU and Latanoprost QHS OU as directed. No   new floaters.     Last edited by Eligio Snyder on 6/10/2020  9:14 AM. (History)        ROS     Negative for: Constitutional, Gastrointestinal, Neurological, Skin,   Genitourinary, Musculoskeletal, HENT, Endocrine, Cardiovascular, Eyes,   Respiratory, Psychiatric, Allergic/Imm, Heme/Lymph    Last edited by Tyrone Jimenez Jr., MD on 6/10/2020  9:36 AM. (History)        Assessment /Plan     For exam results, see Encounter Report.    OHT (ocular hypertension), bilateral  -     Hayden Visual Field - OU - Extended - Both Eyes    Pseudophakia of both eyes    History of vitrectomy      OHT (ocular hypertension), bilateral - Switched Timolol to Dorzolamide 2/14/17, but IOP still upper 20's. Finished tamoxifen for breast cancer.   IOP baseline mid 20's with occasional pain OS. IOP was significantly elevated off Latanoprost - Tmax 37/44! Back to ~23 today. Maternal grandmother with glaucoma, no known first degree relatives. CCT thick. Baseline HVF/HRT WNL, small cups on clinical exam OU. Last HRT - within range of priors, no RNFL thinning identified. Re-traced 11/6/18 due to transferring to [place], larger C:D, decrease RNFL, but may just be due to re-trace?  Last HVF - new nasal defects OU, fixation losses OS, pt reported seeing a glare off to the side and also having hot flashes during exam. Clinical exam appears stable - repeat next year, if WNL then can follow with just imaging.  Last Gonio - open to CB OU, few tiny iris root vessels visible, no NV.   OCT nerve 5/16/17 - thin TS and TI OD, borderline TS OS. Repeated 6/26/18, new borderline TI OS. 7/16/19 - new borderline G OD but minimal change in numbers, OS TS changed from borderline to low  Switched T .5 to  Dorzolamide BID, but not as effective.   Tried Brimonidine TID - but had hypotension again, so went back to Dorzolamide TID on 8/9/17. Pt reports she rarely gets mid-day dose - wears contact lens OS for computer.   11/15/19 - switch Dorzolamide back to BID  If goes too high then can consider 0.25% Timolol, as seems to have had a dramatic response in IOP lowering with Timolol. (she is not sure if chemo had something to do with this, may be willing to try a different gtt when she finishes chemo, if Dorzolamide backorder not resolved.)  Last DFE appears stable - continue Latanoprost QHS OU, Dorzolamide TID OU, medical cannibus did not make much difference. At this point, I do not think the small changes in testing warrant the additional potential side effects of adding drops.   6/10/20- HVF OU- WNL OU IOP 16 OU stable  Continue trusopt BID and latanoprost qhs OU  Follow up in about 4 months (around 10/10/2020) for IOP and Medication check.    On Latanoprost since presentation (former pt of Dr. Ordaz).  Dorzolamide added 2/14-7/25/17 - no significant improvement in IOP, may have forgotten to take? Resumed 8/9/17 TID OU  Timolol 0.5% QAM (7/9/16-2/14/17) - worked great but symptomatic hypotension/bradycardia started in Jan '17  Brimonidine - 7/25-8/9 17 - d/c'd due to symptomatic hypotension.    Pseudophakia of both eyes - stable    History of vitrectomy - for floaters/AH; RD precautions.      Addendum: symptomatic hypotension with Brimonidine - switched back to Dorzolamide on 8/9/17 - asked that she try to use it TID.   Patient mentioned been on chemo but has not taken any chemo recently; counseled patient that once off drug, usually low risk of developing an ocular toxicities        Reduce dorzolamide to BID

## 2020-07-02 DIAGNOSIS — C50.611 MALIGNANT NEOPLASM OF AXILLARY TAIL OF RIGHT FEMALE BREAST, UNSPECIFIED ESTROGEN RECEPTOR STATUS: ICD-10-CM

## 2020-07-02 RX ORDER — BUPROPION HYDROCHLORIDE 150 MG/1
TABLET ORAL
Qty: 90 TABLET | Refills: 0 | Status: SHIPPED | OUTPATIENT
Start: 2020-07-02 | End: 2021-01-02 | Stop reason: SDUPTHER

## 2020-07-06 ENCOUNTER — TELEPHONE (OUTPATIENT)
Dept: HEMATOLOGY/ONCOLOGY | Facility: CLINIC | Age: 65
End: 2020-07-06

## 2020-07-06 RX ORDER — VALACYCLOVIR HYDROCHLORIDE 500 MG/1
500 TABLET, FILM COATED ORAL DAILY
Qty: 90 TABLET | Refills: 1 | Status: SHIPPED | OUTPATIENT
Start: 2020-07-06 | End: 2021-07-12 | Stop reason: SDUPTHER

## 2020-07-06 RX ORDER — VALACYCLOVIR HYDROCHLORIDE 500 MG/1
500 TABLET, FILM COATED ORAL DAILY
Qty: 90 TABLET | Refills: 0 | Status: SHIPPED | OUTPATIENT
Start: 2020-07-06 | End: 2020-10-23 | Stop reason: CLARIF

## 2020-07-06 NOTE — TELEPHONE ENCOUNTER
----- Message from Josie Charlton LPN sent at 7/6/2020  4:43 PM CDT -----  Regarding: Prolia  Per [employer], there is a Prolia denial on file dated 2/26/20. A new request can not be submitted until 8/27/20, 6 months after rhe denial date. You have the option to request a peer to peer  to have the denial overturned by calling [phone]. The case # is [id number]. Please advise on how you will proceed with the case.

## 2020-07-06 NOTE — PROGRESS NOTES
Patient reports significant side effects to risedronate, with abdominal cramping difficulty nausea aches and pains  Have recommended discontinuation orders for Prolia placed for insurance approval

## 2020-07-06 NOTE — TELEPHONE ENCOUNTER
----- Message from Celestina Ragsdale MD sent at 7/6/2020 11:22 AM CDT -----  Please advise patient to discontinue risedronate I have put in orders for Prolia previously this was not covered until patient attempted oral now that she is having side effects this please attempt Prolia again for coverage and if covered provide patient with treatment date

## 2020-07-24 ENCOUNTER — LAB VISIT (OUTPATIENT)
Dept: LAB | Facility: HOSPITAL | Age: 65
End: 2020-07-24
Attending: SURGERY
Payer: COMMERCIAL

## 2020-07-24 DIAGNOSIS — Z01.82 ENCOUNTER FOR ALLERGY TESTING: Primary | ICD-10-CM

## 2020-07-24 LAB
BUN SERPL-MCNC: 14 MG/DL (ref 8–23)
CREAT SERPL-MCNC: 1 MG/DL (ref 0.5–1.4)
EST. GFR  (AFRICAN AMERICAN): >60 ML/MIN/1.73 M^2
EST. GFR  (NON AFRICAN AMERICAN): 59 ML/MIN/1.73 M^2

## 2020-07-24 PROCEDURE — 82565 ASSAY OF CREATININE: CPT | Mod: PO

## 2020-07-24 PROCEDURE — 36415 COLL VENOUS BLD VENIPUNCTURE: CPT | Mod: PO

## 2020-07-24 PROCEDURE — 84520 ASSAY OF UREA NITROGEN: CPT | Mod: PO

## 2020-07-27 ENCOUNTER — HOSPITAL ENCOUNTER (OUTPATIENT)
Dept: RADIOLOGY | Facility: OTHER | Age: 65
Discharge: HOME | End: 2020-07-27
Attending: SURGERY
Payer: COMMERCIAL

## 2020-07-27 DIAGNOSIS — Z01.818 PREPROCEDURAL EXAMINATION: ICD-10-CM

## 2020-07-27 PROCEDURE — 74174 CTA ABD&PLVS W/CONTRAST: CPT | Mod: TC

## 2020-07-27 PROCEDURE — 25500020 PHARM REV CODE 255: Performed by: SURGERY

## 2020-07-27 PROCEDURE — 74174 CTA ABD&PLVS W/CONTRAST: CPT | Mod: 26,,, | Performed by: RADIOLOGY

## 2020-07-27 PROCEDURE — 74174: ICD-10-PCS | Mod: 26,,, | Performed by: RADIOLOGY

## 2020-07-27 RX ADMIN — IOHEXOL 100 ML: 350 INJECTION, SOLUTION INTRAVENOUS at 08:07

## 2020-08-04 ENCOUNTER — TELEPHONE (OUTPATIENT)
Dept: HEMATOLOGY/ONCOLOGY | Facility: CLINIC | Age: 65
End: 2020-08-04

## 2020-08-04 NOTE — TELEPHONE ENCOUNTER
Attempted to contact pt to remind her that after 8/27/20 Dr Ragsdale will be able to resubmit for her Prolia and to make sure that she was no longer taking her oral medication. Left message with contact number.

## 2020-09-02 ENCOUNTER — TELEPHONE (OUTPATIENT)
Dept: SURGERY | Facility: CLINIC | Age: 65
End: 2020-09-02

## 2020-09-02 NOTE — TELEPHONE ENCOUNTER
Spoke to patient and scheduled appointment with Dr. Rodriguez on 9/9/20 at [facility].

## 2020-09-09 ENCOUNTER — OFFICE VISIT (OUTPATIENT)
Dept: HEMATOLOGY/ONCOLOGY | Facility: CLINIC | Age: 65
End: 2020-09-09
Payer: COMMERCIAL

## 2020-09-09 VITALS
HEIGHT: 67 IN | HEART RATE: 70 BPM | DIASTOLIC BLOOD PRESSURE: 73 MMHG | WEIGHT: 138.44 LBS | BODY MASS INDEX: 21.73 KG/M2 | SYSTOLIC BLOOD PRESSURE: 141 MMHG

## 2020-09-09 DIAGNOSIS — Z12.31 SCREENING MAMMOGRAM, ENCOUNTER FOR: Primary | ICD-10-CM

## 2020-09-09 PROCEDURE — 3008F BODY MASS INDEX DOCD: CPT | Mod: CPTII,S$GLB,, | Performed by: SURGERY

## 2020-09-09 PROCEDURE — 99999 PR PBB SHADOW E&M-EST. PATIENT-LVL III: CPT | Mod: PBBFAC,,, | Performed by: SURGERY

## 2020-09-09 PROCEDURE — 1101F PT FALLS ASSESS-DOCD LE1/YR: CPT | Mod: CPTII,S$GLB,, | Performed by: SURGERY

## 2020-09-09 PROCEDURE — 3008F PR BODY MASS INDEX (BMI) DOCUMENTED: ICD-10-PCS | Mod: CPTII,S$GLB,, | Performed by: SURGERY

## 2020-09-09 PROCEDURE — 1101F PR PT FALLS ASSESS DOC 0-1 FALLS W/OUT INJ PAST YR: ICD-10-PCS | Mod: CPTII,S$GLB,, | Performed by: SURGERY

## 2020-09-09 PROCEDURE — 99999 PR PBB SHADOW E&M-EST. PATIENT-LVL III: ICD-10-PCS | Mod: PBBFAC,,, | Performed by: SURGERY

## 2020-09-09 PROCEDURE — 99214 OFFICE O/P EST MOD 30 MIN: CPT | Mod: S$GLB,,, | Performed by: SURGERY

## 2020-09-09 PROCEDURE — 99214 PR OFFICE/OUTPT VISIT, EST, LEVL IV, 30-39 MIN: ICD-10-PCS | Mod: S$GLB,,, | Performed by: SURGERY

## 2020-09-09 NOTE — PROGRESS NOTES
[facility]  [facility] Breast [place]  10/10/2020      SUBJECTIVE:   Ms. Ashlie Redman is a 65 y.o. female with a history of LEFT breast cancer who presents today to discuss a left completion mastectomy with reconstruction.      History of Present Illness:  Patient had a bilateral breast augmentation in 2013.   She has a history of left lateral breast invasive HER-2 positive invasive breast carcinoma, status post Herceptin and Perjeta based preoperative neoadjuvant chemotherapy with preoperatively known positive left lymph node inthe left axilla. She is s/p partial mastectomy and sentinel node dissection on 11/1/17, positive node noted with negative margins on mastectomy specimen.  She was randomized intraoperatively on the [place] trial to have a completion axillary lymph node dissection, which revealed 2 of 18 additional positive lymph nodes; so she did not receive axillary radiation.  She did have left breast radiation and completed the chemotherapy regimen.    The implant on the left was moved to the pre-pectoral location and covered with an ADM.  An infection resulted and the implant and ADM had to be removed.  She was seen by Dr. Pasquale Bae on 2/11/20 and on exam had very little breast tissue remaining.    He recommended a surgical delayed procedure with advance of transfer of tissue from the lower abdomen, coordinated with Dr. Modi.  She was advised to see Dr. Arce to discuss removal of the the residual breast tissue on the left.    She presents to today to discuss options of management on the left regarding completion mastectomy vs. Observation.    Review of Systems: Denies any chest pain or shortness of breath.  Denies any fever or chills.  See HPI/ Interval History for other systems reviewed.    OBJECTIVE:     Physical Exam   Constitutional: She is well-developed, well-nourished, and in no distress.   Pulmonary/Chest:           ASSESSMENT:  Ms. Ashlie Redman is a 65 y.o. year old female  with a history of LEFT breast cancer who presents today to discuss a left completion mastectomy with reconstruction.      PLAN:     I discussed that she could proceed with removal of the small amount of residual breast tissue.  I also feel comfortable offering observation.  They can mobilize this tissue anteirorly and place the flap behind the breast tissue. This would allow for routine MMG to be perfomred.  She prefers this approach.  She will need annual bilateral mammogram.    F/u 1 year with mmg.

## 2020-09-18 ENCOUNTER — RESEARCH ENCOUNTER (OUTPATIENT)
Dept: HEMATOLOGY/ONCOLOGY | Facility: CLINIC | Age: 65
End: 2020-09-18

## 2020-09-18 ENCOUNTER — HOSPITAL ENCOUNTER (OUTPATIENT)
Dept: RADIOLOGY | Facility: HOSPITAL | Age: 65
Discharge: HOME | End: 2020-09-18
Attending: SURGERY
Payer: COMMERCIAL

## 2020-09-18 DIAGNOSIS — Z12.31 SCREENING MAMMOGRAM, ENCOUNTER FOR: ICD-10-CM

## 2020-09-18 PROCEDURE — 77067 SCR MAMMO BI INCL CAD: CPT | Mod: TC,PO

## 2020-09-18 PROCEDURE — 77067 SCR MAMMO BI INCL CAD: CPT | Mod: 26,,, | Performed by: RADIOLOGY

## 2020-09-18 PROCEDURE — 77063 MAMMO DIGITAL SCREENING RIGHT WITH TOMOSYNTHESIS_CAD: ICD-10-PCS | Mod: 26,,, | Performed by: RADIOLOGY

## 2020-09-18 PROCEDURE — 77067 MAMMO DIGITAL SCREENING RIGHT WITH TOMOSYNTHESIS_CAD: ICD-10-PCS | Mod: 26,,, | Performed by: RADIOLOGY

## 2020-09-18 PROCEDURE — 77063 BREAST TOMOSYNTHESIS BI: CPT | Mod: 26,,, | Performed by: RADIOLOGY

## 2020-09-18 NOTE — PROGRESS NOTES
Friday, September 18, 2020     Protocol: [id number]  Investigator: GRETCHEN Arce M.D. Pt Initials: [id number]  Subject ID: [id number]   IRB #: [id number]     [id number]: A Randomized Phase III Trial Evaluating the Role of Axillary Lymph Node Dissection in Breast Cancer Patients (cT1-3 N1) Who Have Positive [place] Lymph Node Disease After Neoadjuvant Chemotherapy     Re-Consent Note:     Met with patient, who presented alone, in clinic this morning to re-consent to the above mentioned clinical trial. Patient alert and oriented, mood and affect appropriate to the situation. The new information was reviewed with the patient including being followed for up to 8 years and the increase in the number of participants. Patient understands there will be no change in tests or procedures during this period. Patient's questions answered to her satisfaction. Patient has agreed to be followed for up to 8 years. Patient willingly and independently signed consent on 18 SEPT 2020. A signed copy of the consent along with my contact information was given to the patient.

## 2020-09-22 ENCOUNTER — TELEPHONE (OUTPATIENT)
Dept: FAMILY MEDICINE | Facility: CLINIC | Age: 65
End: 2020-09-22

## 2020-10-06 ENCOUNTER — PATIENT MESSAGE (OUTPATIENT)
Dept: HEMATOLOGY/ONCOLOGY | Facility: CLINIC | Age: 65
End: 2020-10-06

## 2020-10-07 ENCOUNTER — PATIENT MESSAGE (OUTPATIENT)
Dept: FAMILY MEDICINE | Facility: CLINIC | Age: 65
End: 2020-10-07

## 2020-10-07 ENCOUNTER — OFFICE VISIT (OUTPATIENT)
Dept: OPHTHALMOLOGY | Facility: CLINIC | Age: 65
End: 2020-10-07
Payer: COMMERCIAL

## 2020-10-07 DIAGNOSIS — H40.053 OHT (OCULAR HYPERTENSION), BILATERAL: Primary | ICD-10-CM

## 2020-10-07 DIAGNOSIS — Z85.3 PERSONAL HISTORY OF MALIGNANT NEOPLASM OF BREAST: Primary | ICD-10-CM

## 2020-10-07 DIAGNOSIS — Z98.890 HISTORY OF VITRECTOMY: ICD-10-CM

## 2020-10-07 DIAGNOSIS — Z96.1 PSEUDOPHAKIA OF BOTH EYES: ICD-10-CM

## 2020-10-07 DIAGNOSIS — H52.7 REFRACTIVE ERROR: ICD-10-CM

## 2020-10-07 PROCEDURE — 92012 PR EYE EXAM, EST PATIENT,INTERMED: ICD-10-PCS | Mod: S$GLB,,, | Performed by: OPHTHALMOLOGY

## 2020-10-07 PROCEDURE — 92012 INTRM OPH EXAM EST PATIENT: CPT | Mod: S$GLB,,, | Performed by: OPHTHALMOLOGY

## 2020-10-07 PROCEDURE — 99999 PR PBB SHADOW E&M-EST. PATIENT-LVL III: CPT | Mod: PBBFAC,,, | Performed by: OPHTHALMOLOGY

## 2020-10-07 PROCEDURE — 99999 PR PBB SHADOW E&M-EST. PATIENT-LVL III: ICD-10-PCS | Mod: PBBFAC,,, | Performed by: OPHTHALMOLOGY

## 2020-10-07 NOTE — PROGRESS NOTES
HPI     Glaucoma      Additional comments: 4 month iop- ch//Dorzolamide BID OU, Laatanoprost   qhs ou, Pt states would need new scrpts under Dr Barrera name//              Comments     4 month iop- ch//Dorzolamide BID OU, Latanoprost qhs ou,   Pt states   doing great on these 2 drops//          Last edited by Tiana Locke on 10/7/2020  8:40 AM. (History)        ROS     Negative for: Constitutional, Gastrointestinal, Neurological, Skin,   Genitourinary, Musculoskeletal, HENT, Endocrine, Cardiovascular, Eyes,   Respiratory, Psychiatric, Allergic/Imm, Heme/Lymph    Last edited by Tyrone Jimenez Jr., MD on 10/7/2020  8:39 AM. (History)        Assessment /Plan     For exam results, see Encounter Report.    OHT (ocular hypertension), bilateral    Pseudophakia of both eyes    History of vitrectomy    Refractive error    OHT (ocular hypertension), bilateral - Switched Timolol to Dorzolamide 2/14/17, but IOP still upper 20's. Finished tamoxifen for breast cancer.   IOP baseline mid 20's with occasional pain OS. IOP was significantly elevated off Latanoprost - Tmax 37/44! Back to ~23 today. Maternal grandmother with glaucoma, no known first degree relatives. CCT thick. Baseline HVF/HRT WNL, small cups on clinical exam OU. Last HRT - within range of priors, no RNFL thinning identified. Re-traced 11/6/18 due to transferring to [place], larger C:D, decrease RNFL, but may just be due to re-trace?  Last HVF - new nasal defects OU, fixation losses OS, pt reported seeing a glare off to the side and also having hot flashes during exam. Clinical exam appears stable - repeat next year, if WNL then can follow with just imaging.  Last Gonio - open to CB OU, few tiny iris root vessels visible, no NV.   OCT nerve 5/16/17 - thin TS and TI OD, borderline TS OS. Repeated 6/26/18, new borderline TI OS. 7/16/19 - new borderline G OD but minimal change in numbers, OS TS changed from borderline to low  Switched T .5 to Dorzolamide BID,  but not as effective.   Tried Brimonidine TID - but had hypotension again, so went back to Dorzolamide TID on 8/9/17. Pt reports she rarely gets mid-day dose - wears contact lens OS for computer.   11/15/19 - switch Dorzolamide back to BID  If goes too high then can consider 0.25% Timolol, as seems to have had a dramatic response in IOP lowering with Timolol. (she is not sure if chemo had something to do with this, may be willing to try a different gtt when she finishes chemo, if Dorzolamide backorder not resolved.)  Last DFE appears stable - continue Latanoprost QHS OU, Dorzolamide TID OU, medical cannibus did not make much difference. At this point, I do not think the small changes in testing warrant the additional potential side effects of adding drops.   6/10/20- HVF OU- WNL OU IOP 16 OU stable  Continue trusopt BID and latanoprost qhs OU  10/7/20- IOP 16/18 today, compliant with drops, no adverse rxn  Continue trusopt BID and latanoprost QHS OU  Follow up in about 4 months (around 2/7/2021) for OCT ON and Dilation.    On Latanoprost since presentation (former pt of Dr. Ordaz).  Dorzolamide added 2/14-7/25/17 - no significant improvement in IOP, may have forgotten to take? Resumed 8/9/17 TID OU  Timolol 0.5% QAM (7/9/16-2/14/17) - worked great but symptomatic hypotension/bradycardia started in Jan '17  Brimonidine - 7/25-8/9 17 - d/c'd due to symptomatic hypotension.    Pseudophakia of both eyes - stable    History of vitrectomy - for floaters/AH; RD precautions.      Addendum: symptomatic hypotension with Brimonidine - switched back to Dorzolamide on 8/9/17 - asked that she try to use it TID.   Patient mentioned been on chemo but has not taken any chemo recently; counseled patient that once off drug, usually low risk of developing an ocular toxicities        Reduce dorzolamide to BID

## 2020-10-16 ENCOUNTER — OFFICE VISIT (OUTPATIENT)
Dept: FAMILY MEDICINE | Facility: CLINIC | Age: 65
End: 2020-10-16
Payer: COMMERCIAL

## 2020-10-16 ENCOUNTER — TELEPHONE (OUTPATIENT)
Dept: FAMILY MEDICINE | Facility: CLINIC | Age: 65
End: 2020-10-16

## 2020-10-16 ENCOUNTER — HOSPITAL ENCOUNTER (OUTPATIENT)
Dept: RADIOLOGY | Facility: HOSPITAL | Age: 65
Discharge: HOME | End: 2020-10-16
Attending: NURSE PRACTITIONER
Payer: COMMERCIAL

## 2020-10-16 VITALS
WEIGHT: 132.06 LBS | BODY MASS INDEX: 20.73 KG/M2 | DIASTOLIC BLOOD PRESSURE: 68 MMHG | SYSTOLIC BLOOD PRESSURE: 138 MMHG | HEART RATE: 70 BPM | TEMPERATURE: 98 F | OXYGEN SATURATION: 97 % | HEIGHT: 67 IN

## 2020-10-16 DIAGNOSIS — Z12.11 SCREEN FOR COLON CANCER: Primary | ICD-10-CM

## 2020-10-16 DIAGNOSIS — Z01.818 PREOPERATIVE EXAMINATION: ICD-10-CM

## 2020-10-16 DIAGNOSIS — Z00.00 PREVENTATIVE HEALTH CARE: Primary | ICD-10-CM

## 2020-10-16 PROCEDURE — 99999 PR PBB SHADOW E&M-EST. PATIENT-LVL IV: CPT | Mod: PBBFAC,,, | Performed by: NURSE PRACTITIONER

## 2020-10-16 PROCEDURE — 99397 PR PREVENTIVE VISIT,EST,65 & OVER: ICD-10-PCS | Mod: 25,S$GLB,, | Performed by: NURSE PRACTITIONER

## 2020-10-16 PROCEDURE — 90471 FLU VACCINE - QUADRIVALENT - ADJUVANTED: ICD-10-PCS | Mod: S$GLB,,, | Performed by: NURSE PRACTITIONER

## 2020-10-16 PROCEDURE — 71046 XR CHEST PA AND LATERAL: ICD-10-PCS | Mod: 26,,, | Performed by: RADIOLOGY

## 2020-10-16 PROCEDURE — 90471 IMMUNIZATION ADMIN: CPT | Mod: S$GLB,,, | Performed by: NURSE PRACTITIONER

## 2020-10-16 PROCEDURE — 90694 VACC AIIV4 NO PRSRV 0.5ML IM: CPT | Mod: S$GLB,,, | Performed by: NURSE PRACTITIONER

## 2020-10-16 PROCEDURE — 93005 EKG 12-LEAD: ICD-10-PCS | Mod: S$GLB,,, | Performed by: NURSE PRACTITIONER

## 2020-10-16 PROCEDURE — 93010 EKG 12-LEAD: ICD-10-PCS | Mod: S$GLB,,, | Performed by: INTERNAL MEDICINE

## 2020-10-16 PROCEDURE — 99397 PER PM REEVAL EST PAT 65+ YR: CPT | Mod: 25,S$GLB,, | Performed by: NURSE PRACTITIONER

## 2020-10-16 PROCEDURE — 71046 X-RAY EXAM CHEST 2 VIEWS: CPT | Mod: TC,FY,PO

## 2020-10-16 PROCEDURE — 99999 PR PBB SHADOW E&M-EST. PATIENT-LVL IV: ICD-10-PCS | Mod: PBBFAC,,, | Performed by: NURSE PRACTITIONER

## 2020-10-16 PROCEDURE — 71046 X-RAY EXAM CHEST 2 VIEWS: CPT | Mod: 26,,, | Performed by: RADIOLOGY

## 2020-10-16 PROCEDURE — 93010 ELECTROCARDIOGRAM REPORT: CPT | Mod: S$GLB,,, | Performed by: INTERNAL MEDICINE

## 2020-10-16 PROCEDURE — 93005 ELECTROCARDIOGRAM TRACING: CPT | Mod: S$GLB,,, | Performed by: NURSE PRACTITIONER

## 2020-10-16 PROCEDURE — 90694 FLU VACCINE - QUADRIVALENT - ADJUVANTED: ICD-10-PCS | Mod: S$GLB,,, | Performed by: NURSE PRACTITIONER

## 2020-10-16 NOTE — TELEPHONE ENCOUNTER
----- Message from Leda Smith sent at 10/16/2020  2:10 PM CDT -----  Regarding: referral for Colonoscopy  This patient was due for her Colonoscopy in May of 2020. She would like to get a referral for this, but she would not be able to schedule it until after the first of the year 2021.  Please call to advise.  Thank you.

## 2020-10-16 NOTE — PROGRESS NOTES
"Subjective:       Patient ID: Ashlie Redman is a 65 y.o. female.    Chief Complaint: Pre-op Exam (breast recon-10/26/20)    HPI   Patient presents for preoperative examination  Scheduled for left breast reconstruction SUSAN flap with Dr. Modi on 10/26/20. She has a history of left breast cancer.       "History of Present Illness:  Patient had a bilateral breast augmentation in 2013.   She has a history of left lateral breast invasive HER-2 positive invasive breast carcinoma, status post Herceptin and Perjeta based preoperative neoadjuvant chemotherapy with preoperatively known positive left lymph node inthe left axilla. She is s/p partial mastectomy and sentinel node dissection on 11/1/17, positive node noted with negative margins on mastectomy specimen.  She was randomized intraoperatively on the [place] trial to have a completion axillary lymph node dissection, which revealed 2 of 18 additional positive lymph nodes; so she did not receive axillary radiation.  She did have left breast radiation and completed the chemotherapy regimen.     The implant on the left was moved to the pre-pectoral location and covered with an ADM.  An infection resulted and the implant and ADM had to be removed.  She was seen by Dr. Pasquale Bae on 2/11/20 and on exam had very little breast tissue remaining.    He recommended a surgical delayed procedure with advance of transfer of tissue from the lower abdomen, coordinated with Dr. Modi."     She is without complaints today. Anxious to have surgery completed. This has been a long road for her. Denies CP, SOB, edema.   Vitals:    10/16/20 1309   BP: 138/68   Pulse: 70   Temp: 97.9 °F (36.6 °C)     Review of Systems   Constitutional: Negative for diaphoresis and fever.   HENT: Negative for facial swelling and trouble swallowing.    Eyes: Negative for discharge and redness.   Respiratory: Negative for cough and shortness of breath.    Cardiovascular: Negative for chest pain and " palpitations.   Gastrointestinal: Negative for abdominal pain and diarrhea.   Genitourinary: Negative for difficulty urinating.   Musculoskeletal: Negative for gait problem.   Skin: Negative for pallor and rash.   Neurological: Negative for facial asymmetry and speech difficulty.   Psychiatric/Behavioral: Negative for confusion. The patient is not nervous/anxious.        Past Medical History:   Diagnosis Date    Allergy     Anemia     Asteroid hyalosis of both eyes     Basal cell carcinoma     L. dorsal forearm     Breast cancer 04/2017    Left    Cataract     Diverticulosis     Encounter for blood transfusion     Glaucoma     High myopia     Osteopenia     PONV (postoperative nausea and vomiting)     S/P dilation and curettage      Objective:      Physical Exam  Vitals signs and nursing note reviewed.   Constitutional:       General: She is not in acute distress.     Appearance: She is not diaphoretic.   HENT:      Head: Normocephalic.      Right Ear: Hearing normal.      Left Ear: Hearing normal.      Nose: Nose normal.   Eyes:      General: Lids are normal.      Conjunctiva/sclera: Conjunctivae normal.   Neck:      Vascular: No JVD.      Trachea: No tracheal deviation.   Cardiovascular:      Rate and Rhythm: Normal rate.      Heart sounds: Normal heart sounds.   Pulmonary:      Effort: Pulmonary effort is normal.      Breath sounds: Normal breath sounds.   Abdominal:      General: There is no distension.   Musculoskeletal:         General: No deformity.      Right lower leg: No edema.      Left lower leg: No edema.   Skin:     Coloration: Skin is not pale.   Neurological:      Mental Status: She is alert and oriented to person, place, and time.   Psychiatric:         Speech: Speech normal.         Behavior: Behavior normal.         Thought Content: Thought content normal.         Judgment: Judgment normal.         Assessment:       1. Preventative health care    2. Preoperative examination         Plan:       Preventative health care  -     CBC auto differential; Future; Expected date: 10/16/2020  -     Comprehensive Metabolic Panel; Future; Expected date: 10/16/2020  -     Protime-INR; Future; Expected date: 10/16/2020  -     EKG 12-lead; Future  -     TSH; Future; Expected date: 10/16/2020  -     Lipid Panel; Future; Expected date: 10/16/2020  -     Vitamin D; Future; Expected date: 10/16/2020    Preoperative examination  -     Protime-INR; Future; Expected date: 10/16/2020  -     EKG 12-lead; Future  -     X-Ray Chest PA And Lateral; Future; Expected date: 10/16/2020    Other orders  -     Influenza - Quadrivalent (Adjuvanted)        without contraindication to scheduled procedure   Follow up annually and as needed        Medication List with Changes/Refills   Current Medications    BUPROPION (WELLBUTRIN XL) 150 MG TB24 TABLET    TAKE 1 TABLET BY MOUTH EVERY DAY    DORZOLAMIDE (TRUSOPT) 2 % OPHTHALMIC SOLUTION    Place 1 drop into both eyes 3 (three) times daily.    LATANOPROST 0.005 % OPHTHALMIC SOLUTION    INSTILL ONE DROP INTO EACH EYE ONCE DAILY IN THE EVENING    RISEDRONATE (ACTONEL) 35 MG TABLET    Take 1 tablet (35 mg total) by mouth every 7 days.    VALACYCLOVIR (VALTREX) 500 MG TABLET    Take 1 tablet (500 mg total) by mouth once daily.    VALACYCLOVIR (VALTREX) 500 MG TABLET    Take 1 tablet (500 mg total) by mouth once daily.

## 2020-10-19 NOTE — TELEPHONE ENCOUNTER
Called and spoke with patient and informed someone will be in touch to get her scheduled. Patient states understanding

## 2020-10-23 ENCOUNTER — ANESTHESIA EVENT (OUTPATIENT)
Dept: SURGERY | Facility: OTHER | Age: 65
End: 2020-10-23

## 2020-10-23 ENCOUNTER — HOSPITAL ENCOUNTER (OUTPATIENT)
Dept: PREADMISSION TESTING | Facility: OTHER | Age: 65
Discharge: HOME | DRG: 585 | End: 2020-10-23
Attending: SURGERY
Payer: COMMERCIAL

## 2020-10-23 VITALS
OXYGEN SATURATION: 100 % | HEIGHT: 67 IN | WEIGHT: 130 LBS | DIASTOLIC BLOOD PRESSURE: 81 MMHG | BODY MASS INDEX: 20.4 KG/M2 | SYSTOLIC BLOOD PRESSURE: 175 MMHG | TEMPERATURE: 98 F | HEART RATE: 63 BPM

## 2020-10-23 DIAGNOSIS — Z01.818 PREOP TESTING: Primary | ICD-10-CM

## 2020-10-23 DIAGNOSIS — Z01.818 PREOP TESTING: ICD-10-CM

## 2020-10-23 LAB
BASOPHILS # BLD AUTO: 0.04 K/UL (ref 0–0.2)
BASOPHILS NFR BLD: 0.6 % (ref 0–1.9)
DIFFERENTIAL METHOD: ABNORMAL
EOSINOPHIL # BLD AUTO: 0.1 K/UL (ref 0–0.5)
EOSINOPHIL NFR BLD: 1.7 % (ref 0–8)
ERYTHROCYTE [DISTWIDTH] IN BLOOD BY AUTOMATED COUNT: 11.6 % (ref 11.5–14.5)
HCT VFR BLD AUTO: 38.9 % (ref 37–48.5)
HGB BLD-MCNC: 13.2 G/DL (ref 12–16)
IMM GRANULOCYTES # BLD AUTO: 0.02 K/UL (ref 0–0.04)
IMM GRANULOCYTES NFR BLD AUTO: 0.3 % (ref 0–0.5)
LYMPHOCYTES # BLD AUTO: 1.8 K/UL (ref 1–4.8)
LYMPHOCYTES NFR BLD: 27.1 % (ref 18–48)
MCH RBC QN AUTO: 33.8 PG (ref 27–31)
MCHC RBC AUTO-ENTMCNC: 33.9 G/DL (ref 32–36)
MCV RBC AUTO: 100 FL (ref 82–98)
MONOCYTES # BLD AUTO: 0.4 K/UL (ref 0.3–1)
MONOCYTES NFR BLD: 6.6 % (ref 4–15)
NEUTROPHILS # BLD AUTO: 4.2 K/UL (ref 1.8–7.7)
NEUTROPHILS NFR BLD: 63.7 % (ref 38–73)
NRBC BLD-RTO: 0 /100 WBC
PLATELET # BLD AUTO: 229 K/UL (ref 150–350)
PMV BLD AUTO: 10.3 FL (ref 9.2–12.9)
RBC # BLD AUTO: 3.91 M/UL (ref 4–5.4)
WBC # BLD AUTO: 6.64 K/UL (ref 3.9–12.7)

## 2020-10-23 PROCEDURE — 85025 COMPLETE CBC W/AUTO DIFF WBC: CPT

## 2020-10-23 PROCEDURE — U0003 INFECTIOUS AGENT DETECTION BY NUCLEIC ACID (DNA OR RNA); SEVERE ACUTE RESPIRATORY SYNDROME CORONAVIRUS 2 (SARS-COV-2) (CORONAVIRUS DISEASE [COVID-19]), AMPLIFIED PROBE TECHNIQUE, MAKING USE OF HIGH THROUGHPUT TECHNOLOGIES AS DESCRIBED BY CMS-2020-01-R: HCPCS

## 2020-10-23 PROCEDURE — 36415 COLL VENOUS BLD VENIPUNCTURE: CPT

## 2020-10-23 RX ORDER — PREGABALIN 50 MG/1
50 CAPSULE ORAL
Status: CANCELLED | OUTPATIENT
Start: 2020-10-23 | End: 2020-10-23

## 2020-10-23 RX ORDER — SODIUM CHLORIDE, SODIUM LACTATE, POTASSIUM CHLORIDE, CALCIUM CHLORIDE 600; 310; 30; 20 MG/100ML; MG/100ML; MG/100ML; MG/100ML
INJECTION, SOLUTION INTRAVENOUS CONTINUOUS
Status: CANCELLED | OUTPATIENT
Start: 2020-10-23

## 2020-10-23 RX ORDER — ACETAMINOPHEN 500 MG
1000 TABLET ORAL
Status: CANCELLED | OUTPATIENT
Start: 2020-10-23 | End: 2020-10-23

## 2020-10-23 RX ORDER — SCOLOPAMINE TRANSDERMAL SYSTEM 1 MG/1
1 PATCH, EXTENDED RELEASE TRANSDERMAL ONCE
Status: CANCELLED | OUTPATIENT
Start: 2020-10-23 | End: 2020-10-23

## 2020-10-23 RX ORDER — LIDOCAINE HYDROCHLORIDE 10 MG/ML
0.5 INJECTION, SOLUTION EPIDURAL; INFILTRATION; INTRACAUDAL; PERINEURAL ONCE
Status: CANCELLED | OUTPATIENT
Start: 2020-10-23 | End: 2020-10-23

## 2020-10-23 NOTE — ANESTHESIA PREPROCEDURE EVALUATION
10/23/2020  Ashlie Redman is a 65 y.o., female.    Anesthesia Evaluation    I have reviewed the Patient Summary Reports.    I have reviewed the Nursing Notes. I have reviewed the NPO Status.   I have reviewed the Medications.     Review of Systems  Anesthesia Hx:  No problems with previous Anesthesia PONV History of prior surgery of interest to airway management or planning: Previous anesthesia: General 2 prev breast surg Aug 2019 [facility] with general anesthesia.  Procedure performed at an [facility]. Denies Family Hx of Anesthesia complications.  Personal Hx of Anesthesia complications, Post-Operative Nausea/Vomiting, in the past, but not with recent anesthetics / prophylaxis   Social:  Non-Smoker, Social Alcohol Use    Hematology/Oncology:         -- Anemia: s/p chemotherapy Breast s/p surgery, s/p radiation therapy and s/p chemotherapy  Surgery: with sentinel nodes dissection, with complete lymph node dissection and axillary   Current/Recent Cancer. Breast left radiation, chemotherapy and surgery    EENT/Dental:EENT/Dental Normal   Cardiovascular:   Exercise tolerance: good Patient is an avid cyclist. No change in functional capacity Functional Capacity good / => 4 METS    Pulmonary:  Pulmonary Normal Some lung damage due to radiation Pulmonary Symptoms: (secondary to radiation treatment)  are shortness of breath with activity and pleuritic chest pain.    Renal/:  Renal/ Normal  Cystocele   Hepatic/GI:  Hepatic/GI Normal    Musculoskeletal:  Musculoskeletal Normal    Neurological:   Peripheral Neuropathy (Chemotherapy induced)    Endocrine:  Endocrine Normal    Dermatological:  Skin Normal    Psych:  Psychiatric Normal           Physical Exam  General:  Well nourished    Airway/Jaw/Neck:  Airway Findings: Mouth Opening: Normal Tongue: Normal  Mallampati: II      Dental:  Dental Findings: Lower  retainer        Mental Status:  Mental Status Findings:  Cooperative, Alert and Oriented         Anesthesia Plan  Type of Anesthesia, risks & benefits discussed:  Anesthesia Type:  general  Patient's Preference:   Intra-op Monitoring Plan: standard ASA monitors  Intra-op Monitoring Plan Comments:   Post Op Pain Control Plan: per primary service following discharge from PACU and multimodal analgesia  Post Op Pain Control Plan Comments:   Induction:   IV  Beta Blocker:         Informed Consent: Patient understands risks and agrees with Anesthesia plan.  Questions answered. Anesthesia consent signed with patient.  ASA Score: 2     Day of Surgery Review of History & Physical:    H&P update referred to the surgeon.     Anesthesia Plan Notes: CBC today,devices on rt side        Ready For Surgery From Anesthesia Perspective.

## 2020-10-24 LAB — SARS-COV-2 RNA RESP QL NAA+PROBE: NOT DETECTED

## 2020-10-26 ENCOUNTER — ANESTHESIA (OUTPATIENT)
Dept: SURGERY | Facility: OTHER | Age: 65
End: 2020-10-26
Payer: COMMERCIAL

## 2020-10-26 ENCOUNTER — HOSPITAL ENCOUNTER (INPATIENT)
Facility: OTHER | Age: 65
LOS: 2 days | Discharge: HOME | DRG: 585 | End: 2020-10-28
Attending: SURGERY | Admitting: SURGERY
Payer: COMMERCIAL

## 2020-10-26 DIAGNOSIS — Z01.818 PREOP TESTING: ICD-10-CM

## 2020-10-26 DIAGNOSIS — Z85.3 HX OF BREAST CANCER: Primary | ICD-10-CM

## 2020-10-26 PROCEDURE — 63600175 PHARM REV CODE 636 W HCPCS: Mod: JG | Performed by: NURSE ANESTHETIST, CERTIFIED REGISTERED

## 2020-10-26 PROCEDURE — 99900035 HC TECH TIME PER 15 MIN (STAT)

## 2020-10-26 PROCEDURE — 63600175 PHARM REV CODE 636 W HCPCS: Performed by: ANESTHESIOLOGY

## 2020-10-26 PROCEDURE — 63600175 PHARM REV CODE 636 W HCPCS: Performed by: NURSE ANESTHETIST, CERTIFIED REGISTERED

## 2020-10-26 PROCEDURE — C1729 CATH, DRAINAGE: HCPCS | Performed by: SURGERY

## 2020-10-26 PROCEDURE — 25000003 PHARM REV CODE 250: Performed by: NURSE ANESTHETIST, CERTIFIED REGISTERED

## 2020-10-26 PROCEDURE — 63600175 PHARM REV CODE 636 W HCPCS: Performed by: SURGERY

## 2020-10-26 PROCEDURE — 25000003 PHARM REV CODE 250: Performed by: ANESTHESIOLOGY

## 2020-10-26 PROCEDURE — 88300 PR  SURG PATH,GROSS,LEVEL I: ICD-10-PCS | Mod: 26,,, | Performed by: PATHOLOGY

## 2020-10-26 PROCEDURE — 36000709 HC OR TIME LEV III EA ADD 15 MIN: Performed by: SURGERY

## 2020-10-26 PROCEDURE — 25000003 PHARM REV CODE 250: Performed by: SURGERY

## 2020-10-26 PROCEDURE — 88300 SURGICAL PATH GROSS: CPT | Mod: 26,,, | Performed by: PATHOLOGY

## 2020-10-26 PROCEDURE — 37000008 HC ANESTHESIA 1ST 15 MINUTES: Performed by: SURGERY

## 2020-10-26 PROCEDURE — 20000000 HC ICU ROOM

## 2020-10-26 PROCEDURE — 27000221 HC OXYGEN, UP TO 24 HOURS

## 2020-10-26 PROCEDURE — 27201423 OPTIME MED/SURG SUP & DEVICES STERILE SUPPLY: Performed by: SURGERY

## 2020-10-26 PROCEDURE — 94799 UNLISTED PULMONARY SVC/PX: CPT

## 2020-10-26 PROCEDURE — 36000708 HC OR TIME LEV III 1ST 15 MIN: Performed by: SURGERY

## 2020-10-26 PROCEDURE — P9045 ALBUMIN (HUMAN), 5%, 250 ML: HCPCS | Mod: JG | Performed by: NURSE ANESTHETIST, CERTIFIED REGISTERED

## 2020-10-26 PROCEDURE — 88300 SURGICAL PATH GROSS: CPT | Performed by: PATHOLOGY

## 2020-10-26 PROCEDURE — C9290 INJ, BUPIVACAINE LIPOSOME: HCPCS | Performed by: SURGERY

## 2020-10-26 PROCEDURE — 37000009 HC ANESTHESIA EA ADD 15 MINS: Performed by: SURGERY

## 2020-10-26 PROCEDURE — 27800903 OPTIME MED/SURG SUP & DEVICES OTHER IMPLANTS: Performed by: SURGERY

## 2020-10-26 PROCEDURE — 94761 N-INVAS EAR/PLS OXIMETRY MLT: CPT

## 2020-10-26 DEVICE — COUPLER FLOW 20MHZ DOPP 2.5MM: Type: IMPLANTABLE DEVICE | Site: BREAST | Status: FUNCTIONAL

## 2020-10-26 DEVICE — CONNECTOR NERVE 2X15MM: Type: IMPLANTABLE DEVICE | Site: BREAST | Status: FUNCTIONAL

## 2020-10-26 DEVICE — GRAFT AVANCE NERVE 1-2MMX70MM: Type: IMPLANTABLE DEVICE | Site: BREAST | Status: FUNCTIONAL

## 2020-10-26 RX ORDER — LIDOCAINE HYDROCHLORIDE 10 MG/ML
INJECTION, SOLUTION INTRAVENOUS
Status: DISCONTINUED | OUTPATIENT
Start: 2020-10-26 | End: 2020-10-26

## 2020-10-26 RX ORDER — HEPARIN SODIUM 5000 [USP'U]/ML
INJECTION, SOLUTION INTRAVENOUS; SUBCUTANEOUS
Status: DISCONTINUED | OUTPATIENT
Start: 2020-10-26 | End: 2020-10-26

## 2020-10-26 RX ORDER — MEPERIDINE HYDROCHLORIDE 25 MG/ML
12.5 INJECTION INTRAMUSCULAR; INTRAVENOUS; SUBCUTANEOUS ONCE AS NEEDED
Status: DISCONTINUED | OUTPATIENT
Start: 2020-10-26 | End: 2020-10-26

## 2020-10-26 RX ORDER — VASOPRESSIN 20 [USP'U]/ML
INJECTION, SOLUTION INTRAMUSCULAR; SUBCUTANEOUS
Status: DISCONTINUED | OUTPATIENT
Start: 2020-10-26 | End: 2020-10-26

## 2020-10-26 RX ORDER — BUPIVACAINE HYDROCHLORIDE 5 MG/ML
INJECTION, SOLUTION EPIDURAL; INTRACAUDAL
Status: DISCONTINUED | OUTPATIENT
Start: 2020-10-26 | End: 2020-10-26 | Stop reason: HOSPADM

## 2020-10-26 RX ORDER — DIPHENHYDRAMINE HCL 25 MG
25 CAPSULE ORAL EVERY 4 HOURS PRN
Status: DISCONTINUED | OUTPATIENT
Start: 2020-10-26 | End: 2020-10-28 | Stop reason: HOSPADM

## 2020-10-26 RX ORDER — OXYCODONE HYDROCHLORIDE 5 MG/1
5 TABLET ORAL
Status: DISCONTINUED | OUTPATIENT
Start: 2020-10-26 | End: 2020-10-26

## 2020-10-26 RX ORDER — EPHEDRINE SULFATE 50 MG/ML
INJECTION, SOLUTION INTRAVENOUS
Status: DISCONTINUED | OUTPATIENT
Start: 2020-10-26 | End: 2020-10-26

## 2020-10-26 RX ORDER — SODIUM CHLORIDE, SODIUM LACTATE, POTASSIUM CHLORIDE, CALCIUM CHLORIDE 600; 310; 30; 20 MG/100ML; MG/100ML; MG/100ML; MG/100ML
INJECTION, SOLUTION INTRAVENOUS CONTINUOUS
Status: DISCONTINUED | OUTPATIENT
Start: 2020-10-26 | End: 2020-10-27

## 2020-10-26 RX ORDER — MIDAZOLAM HYDROCHLORIDE 1 MG/ML
INJECTION INTRAMUSCULAR; INTRAVENOUS
Status: DISCONTINUED | OUTPATIENT
Start: 2020-10-26 | End: 2020-10-26

## 2020-10-26 RX ORDER — SODIUM CHLORIDE 0.9 % (FLUSH) 0.9 %
3 SYRINGE (ML) INJECTION
Status: DISCONTINUED | OUTPATIENT
Start: 2020-10-26 | End: 2020-10-27

## 2020-10-26 RX ORDER — CEFAZOLIN SODIUM 1 G/50ML
2 SOLUTION INTRAVENOUS
Status: COMPLETED | OUTPATIENT
Start: 2020-10-26 | End: 2020-10-26

## 2020-10-26 RX ORDER — SODIUM CHLORIDE, SODIUM LACTATE, POTASSIUM CHLORIDE, CALCIUM CHLORIDE 600; 310; 30; 20 MG/100ML; MG/100ML; MG/100ML; MG/100ML
INJECTION, SOLUTION INTRAVENOUS CONTINUOUS
Status: DISCONTINUED | OUTPATIENT
Start: 2020-10-26 | End: 2020-10-26

## 2020-10-26 RX ORDER — ONDANSETRON 2 MG/ML
4 INJECTION INTRAMUSCULAR; INTRAVENOUS DAILY PRN
Status: DISCONTINUED | OUTPATIENT
Start: 2020-10-26 | End: 2020-10-26

## 2020-10-26 RX ORDER — ROCURONIUM BROMIDE 10 MG/ML
INJECTION, SOLUTION INTRAVENOUS
Status: DISCONTINUED | OUTPATIENT
Start: 2020-10-26 | End: 2020-10-26

## 2020-10-26 RX ORDER — CEFAZOLIN SODIUM 2 G/50ML
2 SOLUTION INTRAVENOUS
Status: COMPLETED | OUTPATIENT
Start: 2020-10-26 | End: 2020-10-27

## 2020-10-26 RX ORDER — KETAMINE HYDROCHLORIDE 100 MG/ML
INJECTION, SOLUTION INTRAMUSCULAR; INTRAVENOUS
Status: DISCONTINUED | OUTPATIENT
Start: 2020-10-26 | End: 2020-10-26

## 2020-10-26 RX ORDER — DOCUSATE SODIUM 100 MG/1
100 CAPSULE, LIQUID FILLED ORAL EVERY 12 HOURS
Status: DISCONTINUED | OUTPATIENT
Start: 2020-10-26 | End: 2020-10-28 | Stop reason: HOSPADM

## 2020-10-26 RX ORDER — DEXAMETHASONE SODIUM PHOSPHATE 4 MG/ML
INJECTION, SOLUTION INTRA-ARTICULAR; INTRALESIONAL; INTRAMUSCULAR; INTRAVENOUS; SOFT TISSUE
Status: DISCONTINUED | OUTPATIENT
Start: 2020-10-26 | End: 2020-10-26

## 2020-10-26 RX ORDER — ONDANSETRON 2 MG/ML
INJECTION INTRAMUSCULAR; INTRAVENOUS
Status: DISCONTINUED | OUTPATIENT
Start: 2020-10-26 | End: 2020-10-26

## 2020-10-26 RX ORDER — CEPHALEXIN 500 MG/1
500 CAPSULE ORAL EVERY 6 HOURS
Status: DISCONTINUED | OUTPATIENT
Start: 2020-10-27 | End: 2020-10-28 | Stop reason: HOSPADM

## 2020-10-26 RX ORDER — PAPAVERINE HYDROCHLORIDE 30 MG/ML
INJECTION INTRAMUSCULAR; INTRAVENOUS
Status: DISCONTINUED | OUTPATIENT
Start: 2020-10-26 | End: 2020-10-26 | Stop reason: HOSPADM

## 2020-10-26 RX ORDER — SCOLOPAMINE TRANSDERMAL SYSTEM 1 MG/1
1 PATCH, EXTENDED RELEASE TRANSDERMAL ONCE
Status: COMPLETED | OUTPATIENT
Start: 2020-10-26 | End: 2020-10-26

## 2020-10-26 RX ORDER — PROPOFOL 10 MG/ML
VIAL (ML) INTRAVENOUS
Status: DISCONTINUED | OUTPATIENT
Start: 2020-10-26 | End: 2020-10-26

## 2020-10-26 RX ORDER — MUPIROCIN 20 MG/G
OINTMENT TOPICAL 2 TIMES DAILY
Status: DISCONTINUED | OUTPATIENT
Start: 2020-10-26 | End: 2020-10-27

## 2020-10-26 RX ORDER — NEOSTIGMINE METHYLSULFATE 1 MG/ML
INJECTION, SOLUTION INTRAVENOUS
Status: DISCONTINUED | OUTPATIENT
Start: 2020-10-26 | End: 2020-10-26

## 2020-10-26 RX ORDER — PREGABALIN 50 MG/1
50 CAPSULE ORAL
Status: COMPLETED | OUTPATIENT
Start: 2020-10-26 | End: 2020-10-26

## 2020-10-26 RX ORDER — ACETAMINOPHEN 500 MG
1000 TABLET ORAL
Status: COMPLETED | OUTPATIENT
Start: 2020-10-26 | End: 2020-10-26

## 2020-10-26 RX ORDER — ONDANSETRON 8 MG/1
8 TABLET, ORALLY DISINTEGRATING ORAL EVERY 6 HOURS PRN
Status: DISCONTINUED | OUTPATIENT
Start: 2020-10-26 | End: 2020-10-28 | Stop reason: HOSPADM

## 2020-10-26 RX ORDER — HYDROMORPHONE HYDROCHLORIDE 2 MG/ML
0.4 INJECTION, SOLUTION INTRAMUSCULAR; INTRAVENOUS; SUBCUTANEOUS EVERY 5 MIN PRN
Status: DISCONTINUED | OUTPATIENT
Start: 2020-10-26 | End: 2020-10-26

## 2020-10-26 RX ORDER — INDOCYANINE GREEN AND WATER 25 MG
KIT INJECTION
Status: DISCONTINUED | OUTPATIENT
Start: 2020-10-26 | End: 2020-10-26

## 2020-10-26 RX ORDER — FENTANYL CITRATE 50 UG/ML
INJECTION, SOLUTION INTRAMUSCULAR; INTRAVENOUS
Status: DISCONTINUED | OUTPATIENT
Start: 2020-10-26 | End: 2020-10-26

## 2020-10-26 RX ORDER — HEPARIN SODIUM 10000 [USP'U]/ML
INJECTION, SOLUTION INTRAVENOUS; SUBCUTANEOUS
Status: DISCONTINUED | OUTPATIENT
Start: 2020-10-26 | End: 2020-10-26 | Stop reason: HOSPADM

## 2020-10-26 RX ORDER — LIDOCAINE HYDROCHLORIDE 10 MG/ML
0.5 INJECTION, SOLUTION EPIDURAL; INFILTRATION; INTRACAUDAL; PERINEURAL ONCE
Status: DISCONTINUED | OUTPATIENT
Start: 2020-10-26 | End: 2020-10-26 | Stop reason: HOSPADM

## 2020-10-26 RX ORDER — HYDROMORPHONE HYDROCHLORIDE 1 MG/ML
1 INJECTION, SOLUTION INTRAMUSCULAR; INTRAVENOUS; SUBCUTANEOUS EVERY 4 HOURS PRN
Status: DISCONTINUED | OUTPATIENT
Start: 2020-10-26 | End: 2020-10-28 | Stop reason: HOSPADM

## 2020-10-26 RX ORDER — ONDANSETRON 2 MG/ML
4 INJECTION INTRAMUSCULAR; INTRAVENOUS EVERY 6 HOURS PRN
Status: DISCONTINUED | OUTPATIENT
Start: 2020-10-26 | End: 2020-10-28 | Stop reason: HOSPADM

## 2020-10-26 RX ORDER — ALBUMIN HUMAN 50 G/1000ML
SOLUTION INTRAVENOUS CONTINUOUS PRN
Status: DISCONTINUED | OUTPATIENT
Start: 2020-10-26 | End: 2020-10-26

## 2020-10-26 RX ADMIN — INDOCYANINE GREEN AND WATER 5 MG: KIT at 04:10

## 2020-10-26 RX ADMIN — VASOPRESSIN 2 UNITS: 20 INJECTION, SOLUTION INTRAMUSCULAR; SUBCUTANEOUS at 06:10

## 2020-10-26 RX ADMIN — FENTANYL CITRATE 50 MCG: 50 INJECTION, SOLUTION INTRAMUSCULAR; INTRAVENOUS at 06:10

## 2020-10-26 RX ADMIN — EPHEDRINE SULFATE 5 MG: 50 INJECTION INTRAVENOUS at 06:10

## 2020-10-26 RX ADMIN — GLYCOPYRROLATE 0.8 MG: 0.2 INJECTION, SOLUTION INTRAMUSCULAR; INTRAVITREAL at 06:10

## 2020-10-26 RX ADMIN — MIDAZOLAM HYDROCHLORIDE 2 MG: 1 INJECTION, SOLUTION INTRAMUSCULAR; INTRAVENOUS at 10:10

## 2020-10-26 RX ADMIN — SODIUM CHLORIDE, SODIUM LACTATE, POTASSIUM CHLORIDE, AND CALCIUM CHLORIDE: 600; 310; 30; 20 INJECTION, SOLUTION INTRAVENOUS at 04:10

## 2020-10-26 RX ADMIN — SODIUM CHLORIDE, SODIUM LACTATE, POTASSIUM CHLORIDE, AND CALCIUM CHLORIDE: 600; 310; 30; 20 INJECTION, SOLUTION INTRAVENOUS at 10:10

## 2020-10-26 RX ADMIN — ACETAMINOPHEN 1000 MG: 500 TABLET, FILM COATED ORAL at 09:10

## 2020-10-26 RX ADMIN — HEPARIN SODIUM 5000 UNITS: 5000 INJECTION INTRAVENOUS; SUBCUTANEOUS at 03:10

## 2020-10-26 RX ADMIN — ONDANSETRON 8 MG: 8 TABLET, ORALLY DISINTEGRATING ORAL at 07:10

## 2020-10-26 RX ADMIN — ROCURONIUM BROMIDE 40 MG: 10 SOLUTION INTRAVENOUS at 11:10

## 2020-10-26 RX ADMIN — MUPIROCIN: 20 OINTMENT TOPICAL at 09:10

## 2020-10-26 RX ADMIN — EPHEDRINE SULFATE 15 MG: 50 INJECTION INTRAVENOUS at 11:10

## 2020-10-26 RX ADMIN — SODIUM CHLORIDE, SODIUM LACTATE, POTASSIUM CHLORIDE, AND CALCIUM CHLORIDE: .6; .31; .03; .02 INJECTION, SOLUTION INTRAVENOUS at 07:10

## 2020-10-26 RX ADMIN — CEFAZOLIN SODIUM 1 G: 1 SOLUTION INTRAVENOUS at 02:10

## 2020-10-26 RX ADMIN — SCOLOPAMINE TRANSDERMAL SYSTEM 1 PATCH: 1 PATCH, EXTENDED RELEASE TRANSDERMAL at 09:10

## 2020-10-26 RX ADMIN — CEFAZOLIN SODIUM 1 G: 1 SOLUTION INTRAVENOUS at 11:10

## 2020-10-26 RX ADMIN — NEOSTIGMINE METHYLSULFATE 5 MG: 1 INJECTION INTRAVENOUS at 06:10

## 2020-10-26 RX ADMIN — GLYCOPYRROLATE 0.2 MG: 0.2 INJECTION, SOLUTION INTRAMUSCULAR; INTRAVITREAL at 11:10

## 2020-10-26 RX ADMIN — ONDANSETRON HYDROCHLORIDE 4 MG: 2 INJECTION INTRAMUSCULAR; INTRAVENOUS at 06:10

## 2020-10-26 RX ADMIN — SODIUM CHLORIDE, SODIUM LACTATE, POTASSIUM CHLORIDE, AND CALCIUM CHLORIDE: 600; 310; 30; 20 INJECTION, SOLUTION INTRAVENOUS at 01:10

## 2020-10-26 RX ADMIN — ROCURONIUM BROMIDE 10 MG: 10 SOLUTION INTRAVENOUS at 01:10

## 2020-10-26 RX ADMIN — PROPOFOL 30 MG: 10 INJECTION, EMULSION INTRAVENOUS at 05:10

## 2020-10-26 RX ADMIN — FENTANYL CITRATE 100 MCG: 50 INJECTION, SOLUTION INTRAMUSCULAR; INTRAVENOUS at 11:10

## 2020-10-26 RX ADMIN — VASOPRESSIN 2 UNITS: 20 INJECTION, SOLUTION INTRAMUSCULAR; SUBCUTANEOUS at 04:10

## 2020-10-26 RX ADMIN — FENTANYL CITRATE 50 MCG: 50 INJECTION, SOLUTION INTRAMUSCULAR; INTRAVENOUS at 02:10

## 2020-10-26 RX ADMIN — KETAMINE HYDROCHLORIDE 50 MG: 100 INJECTION, SOLUTION, CONCENTRATE INTRAMUSCULAR; INTRAVENOUS at 05:10

## 2020-10-26 RX ADMIN — ALBUMIN (HUMAN): 2.5 SOLUTION INTRAVENOUS at 11:10

## 2020-10-26 RX ADMIN — FENTANYL CITRATE 50 MCG: 50 INJECTION, SOLUTION INTRAMUSCULAR; INTRAVENOUS at 05:10

## 2020-10-26 RX ADMIN — PREGABALIN 50 MG: 50 CAPSULE ORAL at 09:10

## 2020-10-26 RX ADMIN — GLYCOPYRROLATE 0.2 MG: 0.2 INJECTION, SOLUTION INTRAMUSCULAR; INTRAVITREAL at 03:10

## 2020-10-26 RX ADMIN — DOCUSATE SODIUM 100 MG: 100 CAPSULE, LIQUID FILLED ORAL at 09:10

## 2020-10-26 RX ADMIN — ROCURONIUM BROMIDE 10 MG: 10 SOLUTION INTRAVENOUS at 11:10

## 2020-10-26 RX ADMIN — DEXAMETHASONE SODIUM PHOSPHATE 8 MG: 4 INJECTION, SOLUTION INTRAMUSCULAR; INTRAVENOUS at 05:10

## 2020-10-26 RX ADMIN — ALBUMIN (HUMAN): 2.5 SOLUTION INTRAVENOUS at 12:10

## 2020-10-26 RX ADMIN — PROPOFOL 200 MG: 10 INJECTION, EMULSION INTRAVENOUS at 11:10

## 2020-10-26 RX ADMIN — ROCURONIUM BROMIDE 10 MG: 10 SOLUTION INTRAVENOUS at 12:10

## 2020-10-26 RX ADMIN — EPHEDRINE SULFATE 10 MG: 50 INJECTION INTRAVENOUS at 02:10

## 2020-10-26 RX ADMIN — EPHEDRINE SULFATE 10 MG: 50 INJECTION INTRAVENOUS at 11:10

## 2020-10-26 RX ADMIN — HYDROMORPHONE HYDROCHLORIDE 1 MG: 1 INJECTION, SOLUTION INTRAMUSCULAR; INTRAVENOUS; SUBCUTANEOUS at 07:10

## 2020-10-26 RX ADMIN — LIDOCAINE HYDROCHLORIDE 100 MG: 10 INJECTION, SOLUTION INTRAVENOUS at 11:10

## 2020-10-26 RX ADMIN — ROCURONIUM BROMIDE 20 MG: 10 SOLUTION INTRAVENOUS at 01:10

## 2020-10-26 RX ADMIN — CARBOXYMETHYLCELLULOSE SODIUM 2 DROP: 2.5 SOLUTION/ DROPS OPHTHALMIC at 11:10

## 2020-10-26 RX ADMIN — VASOPRESSIN 2 UNITS: 20 INJECTION, SOLUTION INTRAMUSCULAR; SUBCUTANEOUS at 03:10

## 2020-10-26 RX ADMIN — CEFAZOLIN SODIUM 2 G: 2 SOLUTION INTRAVENOUS at 11:10

## 2020-10-26 RX ADMIN — FENTANYL CITRATE 50 MCG: 50 INJECTION, SOLUTION INTRAMUSCULAR; INTRAVENOUS at 04:10

## 2020-10-26 NOTE — TRANSFER OF CARE
"Anesthesia Transfer of Care Note    Patient: Ashlie Redman    Procedure(s) Performed: Procedure(s) (LRB):  RECONSTRUCTION, BREAST, USING SUSAN SKIN FLAP - LEFT BREAST RECONSTRUCTION WITH STACKED SUSAN FREE FLAPS. (Left)  REMOVAL, IMPLANT, BREAST -  POSSIBLE RIGHT BREAST IMPLANT REMOVAL (Right)    Patient location: ICU    Anesthesia Type: general    Transport from OR: Transported from OR on 2-3 L/min O2 by NC with adequate spontaneous ventilation    Post pain: adequate analgesia    Post assessment: no apparent anesthetic complications    Post vital signs: stable    Level of consciousness: awake and responds to stimulation    Nausea/Vomiting: no nausea/vomiting    Complications: none    Transfer of care protocol was followedComments: Verbal report to Marilu SORTO      Last vitals:   Visit Vitals  BP (!) 145/72 (BP Location: Right arm, Patient Position: Lying)   Pulse (!) 59   Temp 36.6 °C (97.9 °F) (Oral)   Resp 18   Ht 5' 7" (1.702 m)   Wt 59 kg (130 lb)   SpO2 100%   Breastfeeding No   BMI 20.36 kg/m²     "

## 2020-10-26 NOTE — ANESTHESIA POSTPROCEDURE EVALUATION
Anesthesia Post Evaluation    Patient: Ashlie Redman    Procedure(s) Performed: Procedure(s) (LRB):  RECONSTRUCTION, BREAST, USING SUSAN SKIN FLAP - LEFT BREAST RECONSTRUCTION WITH STACKED SUSAN FREE FLAPS. (Left)  REMOVAL, IMPLANT, BREAST -  POSSIBLE RIGHT BREAST IMPLANT REMOVAL (Right)    Final Anesthesia Type: general    Patient location during evaluation: ICU  Patient participation: Yes- Able to Participate  Level of consciousness: awake and alert  Post-procedure vital signs: reviewed and stable  Pain management: adequate  Airway patency: patent    PONV status at discharge: No PONV  Anesthetic complications: no      Cardiovascular status: blood pressure returned to baseline  Respiratory status: unassisted and nasal cannula  Hydration status: euvolemic  Follow-up not needed.          Vitals Value Taken Time   /71 10/26/20 1851   Temp  10/26/20 1857   Pulse 78 10/26/20 1857   Resp 16 10/26/20 1857   SpO2 97 % 10/26/20 1857   Vitals shown include unvalidated device data.      No case tracking events are documented in the log.      Pain/Lillian Score: Pain Rating Prior to Med Admin: 4 (10/26/2020  9:54 AM)

## 2020-10-26 NOTE — TRANSFER OF CARE
"Anesthesia Transfer of Care Note    Patient: Ashlie Redman    Procedure(s) Performed: Procedure(s) (LRB):  RECONSTRUCTION, BREAST, USING SUSAN SKIN FLAP - LEFT BREAST RECONSTRUCTION WITH STACKED SUSAN FREE FLAPS. (Left)  REMOVAL, IMPLANT, BREAST -  POSSIBLE RIGHT BREAST IMPLANT REMOVAL (Right)    Patient location: ICU    Anesthesia Type: general    Transport from OR: Transported from OR on 2-3 L/min O2 by NC with adequate spontaneous ventilation    Post pain: adequate analgesia    Post assessment: no apparent anesthetic complications    Post vital signs: stable    Level of consciousness: awake and responds to stimulation    Complications: none    Transfer of care protocol was followedComments: Verbal report to Marilu SORTO      Last vitals:   Visit Vitals  BP (!) 145/72 (BP Location: Right arm, Patient Position: Lying)   Pulse (!) 59   Temp 36.6 °C (97.9 °F) (Oral)   Resp 18   Ht 5' 7" (1.702 m)   Wt 59 kg (130 lb)   SpO2 100%   Breastfeeding No   BMI 20.36 kg/m²     "

## 2020-10-27 LAB
BASOPHILS # BLD AUTO: 0.01 K/UL (ref 0–0.2)
BASOPHILS NFR BLD: 0.1 % (ref 0–1.9)
DIFFERENTIAL METHOD: ABNORMAL
EOSINOPHIL # BLD AUTO: 0 K/UL (ref 0–0.5)
EOSINOPHIL NFR BLD: 0.2 % (ref 0–8)
ERYTHROCYTE [DISTWIDTH] IN BLOOD BY AUTOMATED COUNT: 11.4 % (ref 11.5–14.5)
FINAL PATHOLOGIC DIAGNOSIS: NORMAL
GROSS: NORMAL
HCT VFR BLD AUTO: 33 % (ref 37–48.5)
HGB BLD-MCNC: 11.1 G/DL (ref 12–16)
IMM GRANULOCYTES # BLD AUTO: 0.03 K/UL (ref 0–0.04)
IMM GRANULOCYTES NFR BLD AUTO: 0.3 % (ref 0–0.5)
LYMPHOCYTES # BLD AUTO: 0.6 K/UL (ref 1–4.8)
LYMPHOCYTES NFR BLD: 6.6 % (ref 18–48)
Lab: NORMAL
MCH RBC QN AUTO: 34.3 PG (ref 27–31)
MCHC RBC AUTO-ENTMCNC: 33.6 G/DL (ref 32–36)
MCV RBC AUTO: 102 FL (ref 82–98)
MONOCYTES # BLD AUTO: 0.6 K/UL (ref 0.3–1)
MONOCYTES NFR BLD: 6.4 % (ref 4–15)
NEUTROPHILS # BLD AUTO: 8.3 K/UL (ref 1.8–7.7)
NEUTROPHILS NFR BLD: 86.4 % (ref 38–73)
NRBC BLD-RTO: 0 /100 WBC
PLATELET # BLD AUTO: 151 K/UL (ref 150–350)
PMV BLD AUTO: 10 FL (ref 9.2–12.9)
RBC # BLD AUTO: 3.24 M/UL (ref 4–5.4)
WBC # BLD AUTO: 9.56 K/UL (ref 3.9–12.7)

## 2020-10-27 PROCEDURE — 25000003 PHARM REV CODE 250: Performed by: SURGERY

## 2020-10-27 PROCEDURE — 63600175 PHARM REV CODE 636 W HCPCS: Performed by: SURGERY

## 2020-10-27 PROCEDURE — 85025 COMPLETE CBC W/AUTO DIFF WBC: CPT

## 2020-10-27 PROCEDURE — 36415 COLL VENOUS BLD VENIPUNCTURE: CPT

## 2020-10-27 PROCEDURE — 11000001 HC ACUTE MED/SURG PRIVATE ROOM

## 2020-10-27 PROCEDURE — 94761 N-INVAS EAR/PLS OXIMETRY MLT: CPT

## 2020-10-27 RX ORDER — HEPARIN SODIUM 5000 [USP'U]/ML
5000 INJECTION, SOLUTION INTRAVENOUS; SUBCUTANEOUS EVERY 8 HOURS
Status: DISCONTINUED | OUTPATIENT
Start: 2020-10-27 | End: 2020-10-28 | Stop reason: HOSPADM

## 2020-10-27 RX ORDER — HYDROCODONE BITARTRATE AND ACETAMINOPHEN 10; 325 MG/1; MG/1
1 TABLET ORAL EVERY 4 HOURS PRN
Status: DISCONTINUED | OUTPATIENT
Start: 2020-10-27 | End: 2020-10-28 | Stop reason: HOSPADM

## 2020-10-27 RX ADMIN — ONDANSETRON 8 MG: 8 TABLET, ORALLY DISINTEGRATING ORAL at 04:10

## 2020-10-27 RX ADMIN — HYDROCODONE BITARTRATE AND ACETAMINOPHEN 1 TABLET: 10; 325 TABLET ORAL at 10:10

## 2020-10-27 RX ADMIN — HYDROMORPHONE HYDROCHLORIDE 1 MG: 1 INJECTION, SOLUTION INTRAMUSCULAR; INTRAVENOUS; SUBCUTANEOUS at 04:10

## 2020-10-27 RX ADMIN — HYDROMORPHONE HYDROCHLORIDE 1 MG: 1 INJECTION, SOLUTION INTRAMUSCULAR; INTRAVENOUS; SUBCUTANEOUS at 10:10

## 2020-10-27 RX ADMIN — PROMETHAZINE HYDROCHLORIDE 12.5 MG: 25 INJECTION INTRAMUSCULAR; INTRAVENOUS at 08:10

## 2020-10-27 RX ADMIN — HEPARIN SODIUM 5000 UNITS: 5000 INJECTION INTRAVENOUS; SUBCUTANEOUS at 02:10

## 2020-10-27 RX ADMIN — CEFAZOLIN SODIUM 2 G: 2 SOLUTION INTRAVENOUS at 06:10

## 2020-10-27 RX ADMIN — ONDANSETRON 4 MG: 2 INJECTION INTRAMUSCULAR; INTRAVENOUS at 07:10

## 2020-10-27 RX ADMIN — MUPIROCIN: 20 OINTMENT TOPICAL at 11:10

## 2020-10-27 RX ADMIN — CEPHALEXIN 500 MG: 500 CAPSULE ORAL at 06:10

## 2020-10-27 RX ADMIN — HEPARIN SODIUM 5000 UNITS: 5000 INJECTION INTRAVENOUS; SUBCUTANEOUS at 10:10

## 2020-10-27 RX ADMIN — DOCUSATE SODIUM 100 MG: 100 CAPSULE, LIQUID FILLED ORAL at 08:10

## 2020-10-27 RX ADMIN — DOCUSATE SODIUM 100 MG: 100 CAPSULE, LIQUID FILLED ORAL at 10:10

## 2020-10-27 RX ADMIN — HYDROCODONE BITARTRATE AND ACETAMINOPHEN 1 TABLET: 10; 325 TABLET ORAL at 02:10

## 2020-10-27 RX ADMIN — ONDANSETRON 8 MG: 8 TABLET, ORALLY DISINTEGRATING ORAL at 10:10

## 2020-10-27 RX ADMIN — CEPHALEXIN 500 MG: 500 CAPSULE ORAL at 11:10

## 2020-10-27 RX ADMIN — HYDROCODONE BITARTRATE AND ACETAMINOPHEN 1 TABLET: 10; 325 TABLET ORAL at 06:10

## 2020-10-27 RX ADMIN — HYDROMORPHONE HYDROCHLORIDE 1 MG: 1 INJECTION, SOLUTION INTRAMUSCULAR; INTRAVENOUS; SUBCUTANEOUS at 12:10

## 2020-10-27 NOTE — PLAN OF CARE
Pt remains free from falls during shift. Awake, alert, and oriented x4. Vital signs stable. Bradycardic during HS, but returns to NL upon waking. Flap checks preformed per Dr. Modi' order. No changes noted during shift. PRN medication given for pain and nausea. No signs of acute distress noted at this time. Bed in lowest position, wheels locked, and bed alarm on. Call light in reach.

## 2020-10-27 NOTE — PLAN OF CARE
CM met with pt for initial discharge planning assessment. Pt is alert and oriented at time of assessment.    Pt confirms her PCP is correct in Epic, pharmacy of choice is    Pt lives alone but daughter will be staying with pt after discharge.    Pt with no DME, no HH and pt is not on HD. Pt with no LW or POA at this time.    Pt states she will have no CM needs for discharge.    CM to follow for plans and arrangemetns.   10/27/20 1131   Discharge Assessment   Assessment Type Discharge Planning Assessment   Confirmed/corrected address and phone number on facesheet? Yes   Assessment information obtained from? Patient;Medical Record   Expected Length of Stay (days) 4   Communicated expected length of stay with patient/caregiver yes   Prior to hospitilization cognitive status: Alert/Oriented   Prior to hospitalization functional status: Independent   Current cognitive status: Alert/Oriented   Current Functional Status: Independent   Lives With alone   Able to Return to Prior Arrangements yes   Is patient able to care for self after discharge? Yes   Who are your caregiver(s) and their phone number(s)? Francia Spencer, daughter, [phone]   Patient's perception of discharge disposition home or selfcare   Readmission Within the Last 30 Days no previous admission in last 30 days   Patient currently being followed by outpatient case management? No   Patient currently receives any other outside agency services? No   Equipment Currently Used at Home none   Do you have any problems affording any of your prescribed medications? No   Is the patient taking medications as prescribed? yes   Does the patient have transportation home? Yes   Transportation Anticipated family or friend will provide   Does the patient receive services at the [facility]? No   Discharge Plan A Home   Discharge Plan B Home   DME Needed Upon Discharge  none   Patient/Family in Agreement with Plan yes

## 2020-10-27 NOTE — NURSING
Pt AAOx4. VSS on RA. Flap checks performed per orders, strong sounds on both internal and extertal doppler. Incisions CDI, flap soft and pink.  MELY drains patent, skin protected from tubing. Girdle and bra in place. SCDs in place. Pain medication given. Nausea medication given. Bed low, locked, call light in reach. Will continue to monitor.

## 2020-10-27 NOTE — PROGRESS NOTES
0720 Dr. Modi at bedside to assess patient. Will dc ivf, dc van, and perform breat flap checks Q4h per Dr. Modi.

## 2020-10-27 NOTE — NURSING TRANSFER
Nursing Transfer Note      10/27/2020     Transfer to [id number] 3CS    Transfer via bed from room    Transfer with chart (given to Mae SORTO)    Transported by Elizabet SORTO and transport    Medicines sent:ointment given to receiving RN    Chart send with patient: yes given to receiving RN    Notified: Mae SORTO    Patient reassessed at:10/27/20 1400    Upon arrival to floor: Flap check performed with receiving RN, call light in reach, bed in lowest position, bed alarm set, cell phone given to patient, patient's belongings at bedside.

## 2020-10-27 NOTE — OP NOTE
[facility]  Operative Note    SUMMARY     Surgery Date: 10/26/2020     Surgeons:     Jules Walters, MD Cosurgeon Kamran Khoobehi, MD Cosurgeon        Pre-op Diagnosis:  HX: breast cancer [Z85.3]    Post-op Diagnosis:  Post-Op Diagnosis Codes:     * HX: breast cancer [Z85.3]    Procedure:    1.) Left delayed breast reconstruction with stacked SUSAN flaps  2.) Removal right breast implant with capsulorrhaphy  3.) Recreation of left mastectomy defect  4.) Left breast advanced nerve reconstruction with cadaveric nerve graft   5.) Left breast advanced nerve reconstruction with cadaveric nerve graft  6.) Spy visualization of left breast    Anesthesia: General    Drains:  1 15 round drain to left breast, 2 15 round drains to the abdomen    Complications:  None    Condition:  Stable    Operative procedure in detail:  After risks and benefits were thoroughly discussed with the patient the patient expressed verbal understanding, informed consent was obtained.  The patient was subsequently taken to the operating room placed supine on the operating room table.  After appropriate general anesthesia was provided, the patient's breasts and abdomen were prepped and draped in the standard surgical fashion.    Attention was 1st directed towards the left breast.  The patient's previous inframammary fold incision was then incised with a skin knife and Bovie cautery.  Recreation of the left mastectomy defect was then performed.  Next internal mammary artery and vein exposure was then performed.  The pectoralis muscle was mobilized laterally and costal cartilage was then scored.  Bonsall elevator was used to dissect around a rib and a rongeur was then used to remove the entire rib.  Next internal mammary artery and vein exposure was then performed by dissecting free both artery and vein.  An intercostal nerve superiorly as well as inferiorly was preserved and dissected out.    Attention was then directed towards the  abdomen.  Superior and inferior incisions were then made with a skin knife and Bovie cautery.  Superficial veins were then dissected free on both sides and saved for potential venous drainage.  Next the right conrad abdominal flap was then mobilized from lateral to medial.  On this side we decided to base the flap off of 1 medial row .  Next the left conrad abdominal flap was then mobilized from lateral to medial and this flap was then based off of 3 medial row perforators.   dissection was then performed bilaterally with the bipolar cautery.  Perforators were dissected down through the muscle to the underlying pedicles bilaterally.  Due to the nature of stacking the flaps we elected to keep the flaps together in the midline as a bipedicle flap.  The pedicle was dissected until we had good length on both pedicles.  Sensory nerves were preserved on both flaps.  Next both pedicles were then transected and the flap was then weighed.  The flap weighed 391 g. the flap was then brought into the left mastectomy defect and 1 flap pedicle was connected antegrade and the other flap pedicle was connected retrograde.  Venous anastomosis was performed with a 2.5 mm flow  for both flaps.  Arterial anastomosis was performed with a 9 0 running nylon suture for both flaps.  The sensory nerve was then connected to 1 of the intercostal nerves with cadaveric nerve graft utilizing 9 0 nylon suture.  An additional sensory nerve to the other flap was then connected to the inferior intercostal nerve with a cadavericnerve graft utilizing 9 0 nylon suture.  Next the flaps were then de-epithelialized and the flap was secured with 2-0 Vicryl suture.  And inferior skin paddle at the IMF was then left to monitor the flap postoperatively.  The wound was then closed with 2-0 Quill suture.  There was a small buttonhole to the superior skin upon recreating the left mastectomy defect due to the previous radiation and scarring.   This was excised and repaired with 4-0 Vicryl suture.  Spy visualization of the breast flaps were performed and were shown to have good perfusion.    Next attention was directed towards the right breast.  The right breast incision was then incised with a skin knife and Bovie cautery and the underlying implant was then removed.  Next a popcorn capsulorrhaphy was then performed to the lateral capsule and the anterior posterior capsule was scored with the Bovie cautery.  Next 0 Quill was then used to tighten up the capsule and close down the space for better shape as well.  This wound was then closed with 3-0 Vicryl 4-0 Vicryl and a 4-0 Prolene suture.  Next attention was directed towards the abdomen.  The fascial incisions were then closed with a 2-0 Quill suture in 2 layers.  The upper abdominal flap was then mobilized towards the xiphoid and costal cartilages.  Next the diastasis was repaired with a 2.  Quill in 2 layers.  Next the upper abdominal flap was then mobilized and secured to the lower incision with a 2-0 Vicryl suture.  Prior to that Exparel was injected into the fascia as well as lower abdominal incision.  Next drains were placed and secured.  The wound was then closed with 0 Quill and 2-0 Quill suture.  The umbilicus was then inset with 3-0 Monocryl suture as well as 2-0 Quill suture.  Dermabond tape was applied to the lower abdominal incision followed by ABD pads to the abdomen and to the breast.  Postoperative bra as well as compression garment was placed.    Description of the findings of the procedure:  As above    Estimated Blood Loss: 200 mL         Specimens:   Specimen (12h ago, onward)    None

## 2020-10-27 NOTE — PROGRESS NOTES
POD 1    No acute changes    Flap with good color and signal  Abd ok    A/P s/p stacked satnam flaps to left breast    1.) Transfer to floor  2.) Flap checks Q4

## 2020-10-27 NOTE — NURSING
Pt transferred from ICU. VSS on RA. Flap check performed, strong sounds on both internal and extertal doppler. Incisions CDI, flap soft and pink.  MELY drains patent, skin protected from tubing. Girdle and bra in place. SCDs in place. Pain medication given. No c/o nausea or other discomfort at this time. Will continue to monitor.

## 2020-10-28 VITALS
HEART RATE: 72 BPM | OXYGEN SATURATION: 95 % | WEIGHT: 145.06 LBS | SYSTOLIC BLOOD PRESSURE: 134 MMHG | RESPIRATION RATE: 18 BRPM | TEMPERATURE: 99 F | DIASTOLIC BLOOD PRESSURE: 74 MMHG | BODY MASS INDEX: 22.77 KG/M2 | HEIGHT: 67 IN

## 2020-10-28 PROCEDURE — 94761 N-INVAS EAR/PLS OXIMETRY MLT: CPT

## 2020-10-28 PROCEDURE — 25000003 PHARM REV CODE 250: Performed by: SURGERY

## 2020-10-28 PROCEDURE — 63600175 PHARM REV CODE 636 W HCPCS: Performed by: SURGERY

## 2020-10-28 RX ADMIN — HYDROCODONE BITARTRATE AND ACETAMINOPHEN 1 TABLET: 10; 325 TABLET ORAL at 03:10

## 2020-10-28 RX ADMIN — DOCUSATE SODIUM 100 MG: 100 CAPSULE, LIQUID FILLED ORAL at 08:10

## 2020-10-28 RX ADMIN — ONDANSETRON 8 MG: 8 TABLET, ORALLY DISINTEGRATING ORAL at 08:10

## 2020-10-28 RX ADMIN — HYDROCODONE BITARTRATE AND ACETAMINOPHEN 1 TABLET: 10; 325 TABLET ORAL at 07:10

## 2020-10-28 RX ADMIN — HEPARIN SODIUM 5000 UNITS: 5000 INJECTION INTRAVENOUS; SUBCUTANEOUS at 06:10

## 2020-10-28 RX ADMIN — CEPHALEXIN 500 MG: 500 CAPSULE ORAL at 06:10

## 2020-10-28 NOTE — DISCHARGE INSTRUCTIONS
Discharge Instructions: Caring for Your Michele-Humphreys Drainage Tube  Your doctor discharges you with a Michele-Humphreys drainage tube. Doctors commonly leave this drain within the abdominal cavity after surgery. It helps drain and collect blood and body fluid after surgery. This can prevent swelling and reduces the risk for infection. The tube is held in place by a few stitches. It is covered with a bandage. Your doctor will remove the drain when he or she determines you no longer need it.  Home care  · Dont sleep on the same side as the tube.  · Secure the tube and bag inside your clothing with a safety pin. This helps keep the tube from being pulled out.  · Empty your drain at least twice a day. Empty it more often if the drain is full. Wash  and dry your hands before emptying the drain.  ¨ Lift the opening on the drain.  ¨ Drain the fluid into a measuring cup.  ¨ Record the amount of fluid each time you empty the drain. Include the date and time it was emptied. Share this information with your doctor on your next visit.  ¨ Squeeze the bulb with your hands until you hear air coming out of the bulb if your doctor has instructed you to do so (sometimes the bulb is used as a reservoir without suction). Check with your doctor about specific drain instructions.  ¨ Close the opening.  · Change the dressing around the tube every day.  ¨ Wash your hands.  ¨ Remove the old bandage.  ¨ Wash your hands again.  ¨ Wet a cotton swab and clean the skin around the incision and tube site. Use normal saline solution (salt and water). Or, you can use warm, soapy water.  ¨ Put a new bandage on the incision and tube site. Make the bandage large enough to cover the whole incision area.  ¨ Tape the bandage in place.  · Keep the bandage and tube site dry when you shower. Ask your healthcare provider about the best way to do this.  · Stripping the tube helps keep blood clots from blocking the tube. Ask your nurse how often you should  strip the tube. Stripping may not be needed, depending on where and why your doctor placed the tube. It may even be dangerous in some cases.   ¨ Hold the tubing where it leaves the skin, with one hand. This keeps it from pulling on the skin.  ¨ Pinch the tubing with the thumb and first finger of your other hand.  ¨ Slowly and firmly pull your thumb and first finger down the tubing. You may find it helpful to hold an alcohol swab between your fingers and the tube to lubricate the tubing.  ¨ If the pulling hurts or feels like its coming out of the skin, stop. Begin again more gently.  Follow-up care  Make a follow-up appointment as directed by our staff.     When to seek medical care  Call your healthcare provider right away if you have any of the following:  · New or increased pain around the tube  · Redness, swelling, or warmth around the incision or tube  · Drainage that is foul-smelling  · Vomiting  · Fever of 100.4°F (38°C)  · Fluid leaking around the tube  · Incision seems not to be healing  · Stitches become loose  · Tube falls out or breaks  · Drainage that changes from light pink to dark red  · Blood clots in the drainage bulb  · A sudden increase or decrease in the amount of drainage (over 30 mL)   Date Last Reviewed: 2/1/2017  © 1123-6420 [employer]. [address] [place] [address], [place] [address]. All rights reserved. This information is not intended as a substitute for professional medical care. Always follow your healthcare professional's instructions.

## 2020-10-28 NOTE — PROGRESS NOTES
D/C instructions reviewed with patient, all questions answered, pt verbalized understanding of all teaching. Pt demonstrated proper technique for incision care, and drain care and emptying. Pt sent home with ample supplies, including extra bra and girdle. Pt already has meds at home. PIV removed, tip intact. Pt to leave with all belongings.

## 2020-10-28 NOTE — PLAN OF CARE
CM met with for final discharge planning assessment. Pt is discharging home today, daughter to provide transportation home.    Pt states he daughter will be staying with her for a while.    Pt confirms no needs for discharge.    Pt ready for discharge from CM perspective.   10/28/20 1026   Final Note   Assessment Type Final Discharge Note   Anticipated Discharge Disposition Home   What phone number can be called within the next 1-3 days to see how you are doing after discharge? [phone]   Hospital Follow Up  Appt(s) scheduled? Yes   Discharge plans and expectations educations in teach back method with documentation complete? Yes   Right Care Referral Info   Post Acute Recommendation No Care

## 2020-10-28 NOTE — PLAN OF CARE
Plan of care reviewed with patient. Pt remains free from fall, injury, and skin breakdown. Positions self independently with stand by assist. Pt voided 300-ml of yellow urine. Pt pain controlled with current pain regimen. Flap checks every 4hrs and skin is warm, good cap refill and no evidence of hematoma, surgical bra and ABD pad in place, Girdle on as order, MELY drains X 3 and intact, Intermittent nausea at the beginning of the shift, medicated with phenergan, and effective, no complaints of N/V at this time, Purposeful hourly rounding done. Safety maintained. Patient lying in bed in no distress. Will continue to monitor.

## 2020-11-12 ENCOUNTER — PATIENT MESSAGE (OUTPATIENT)
Dept: FAMILY MEDICINE | Facility: CLINIC | Age: 65
End: 2020-11-12

## 2020-11-13 ENCOUNTER — PATIENT MESSAGE (OUTPATIENT)
Dept: GASTROENTEROLOGY | Facility: CLINIC | Age: 65
End: 2020-11-13

## 2020-11-13 ENCOUNTER — PATIENT MESSAGE (OUTPATIENT)
Dept: FAMILY MEDICINE | Facility: CLINIC | Age: 65
End: 2020-11-13

## 2020-11-13 DIAGNOSIS — Z01.812 PRE-PROCEDURE LAB EXAM: ICD-10-CM

## 2020-11-13 NOTE — TELEPHONE ENCOUNTER
Patient is reaching out to schedule colonoscopy.     PCP is requested to review recent xray from 10/26 and advise on results. Patient is advised, PCP is out of office until MOnday.

## 2020-11-15 NOTE — TELEPHONE ENCOUNTER
Needs an appointment with  MARKO Leger - next available.  To discuss aortic ectasia questions.    .

## 2020-11-16 ENCOUNTER — PATIENT MESSAGE (OUTPATIENT)
Dept: FAMILY MEDICINE | Facility: CLINIC | Age: 65
End: 2020-11-16

## 2020-11-16 ENCOUNTER — OFFICE VISIT (OUTPATIENT)
Dept: FAMILY MEDICINE | Facility: CLINIC | Age: 65
End: 2020-11-16
Payer: COMMERCIAL

## 2020-11-16 VITALS
WEIGHT: 128.5 LBS | HEART RATE: 79 BPM | HEIGHT: 67 IN | TEMPERATURE: 98 F | SYSTOLIC BLOOD PRESSURE: 136 MMHG | DIASTOLIC BLOOD PRESSURE: 88 MMHG | OXYGEN SATURATION: 99 % | BODY MASS INDEX: 20.17 KG/M2

## 2020-11-16 DIAGNOSIS — I77.819 AORTIC ECTASIA: Primary | ICD-10-CM

## 2020-11-16 PROCEDURE — 99213 OFFICE O/P EST LOW 20 MIN: CPT | Mod: S$GLB,,, | Performed by: NURSE PRACTITIONER

## 2020-11-16 PROCEDURE — 1125F AMNT PAIN NOTED PAIN PRSNT: CPT | Mod: S$GLB,,, | Performed by: NURSE PRACTITIONER

## 2020-11-16 PROCEDURE — 3008F PR BODY MASS INDEX (BMI) DOCUMENTED: ICD-10-PCS | Mod: CPTII,S$GLB,, | Performed by: NURSE PRACTITIONER

## 2020-11-16 PROCEDURE — 99999 PR PBB SHADOW E&M-EST. PATIENT-LVL III: ICD-10-PCS | Mod: PBBFAC,,, | Performed by: NURSE PRACTITIONER

## 2020-11-16 PROCEDURE — 99213 PR OFFICE/OUTPT VISIT, EST, LEVL III, 20-29 MIN: ICD-10-PCS | Mod: S$GLB,,, | Performed by: NURSE PRACTITIONER

## 2020-11-16 PROCEDURE — 1101F PT FALLS ASSESS-DOCD LE1/YR: CPT | Mod: CPTII,S$GLB,, | Performed by: NURSE PRACTITIONER

## 2020-11-16 PROCEDURE — 3288F PR FALLS RISK ASSESSMENT DOCUMENTED: ICD-10-PCS | Mod: CPTII,S$GLB,, | Performed by: NURSE PRACTITIONER

## 2020-11-16 PROCEDURE — 1101F PR PT FALLS ASSESS DOC 0-1 FALLS W/OUT INJ PAST YR: ICD-10-PCS | Mod: CPTII,S$GLB,, | Performed by: NURSE PRACTITIONER

## 2020-11-16 PROCEDURE — 3008F BODY MASS INDEX DOCD: CPT | Mod: CPTII,S$GLB,, | Performed by: NURSE PRACTITIONER

## 2020-11-16 PROCEDURE — 3288F FALL RISK ASSESSMENT DOCD: CPT | Mod: CPTII,S$GLB,, | Performed by: NURSE PRACTITIONER

## 2020-11-16 PROCEDURE — 99999 PR PBB SHADOW E&M-EST. PATIENT-LVL III: CPT | Mod: PBBFAC,,, | Performed by: NURSE PRACTITIONER

## 2020-11-16 PROCEDURE — 1125F PR PAIN SEVERITY QUANTIFIED, PAIN PRESENT: ICD-10-PCS | Mod: S$GLB,,, | Performed by: NURSE PRACTITIONER

## 2020-11-16 RX ORDER — ASPIRIN 81 MG/1
81 TABLET ORAL DAILY
Status: ON HOLD | COMMUNITY
End: 2020-12-22 | Stop reason: HOSPADM

## 2020-11-16 NOTE — PROGRESS NOTES
Subjective:       Patient ID: Ashlie Redman is a 65 y.o. female.    Chief Complaint: discuss chest xray    HPI   Patient presents for questions regarding incidental findings on CXR. Mild aortic ectasia noted on recent preop xray. Previous CXR 07/2019 noted tortuous thoracic aorta. She does not have a history of HTN, HLD or family history of aneurysm.     Recently underwent SUSAN flap with Dr. Mdoi. She is healing without complication   Vitals:    11/16/20 1506   BP: 136/88   Pulse: 79   Temp: 98.4 °F (36.9 °C)     Review of Systems   Constitutional: Negative for diaphoresis and fever.   HENT: Negative for facial swelling and trouble swallowing.    Eyes: Negative for discharge and redness.   Respiratory: Negative for cough and shortness of breath.    Cardiovascular: Negative for chest pain and palpitations.   Gastrointestinal: Negative for abdominal distention and abdominal pain.   Genitourinary: Negative for difficulty urinating.   Musculoskeletal: Negative for gait problem.   Skin: Negative for pallor and rash.   Neurological: Negative for facial asymmetry and speech difficulty.   Psychiatric/Behavioral: Negative for confusion. The patient is not nervous/anxious.        Past Medical History:   Diagnosis Date    Allergy     Anemia     Asteroid hyalosis of both eyes     Basal cell carcinoma     L. dorsal forearm     Breast cancer 04/2017    Left    Cataract     Diverticulosis     Encounter for blood transfusion     Glaucoma     High myopia     Osteopenia     PONV (postoperative nausea and vomiting)     S/P dilation and curettage      Objective:      Physical Exam  Constitutional:       General: She is not in acute distress.     Appearance: She is well-developed. She is not ill-appearing, toxic-appearing or diaphoretic.   HENT:      Right Ear: Hearing normal.      Left Ear: Hearing normal.   Pulmonary:      Effort: No tachypnea or respiratory distress.   Skin:     Coloration: Skin is not pale.    Neurological:      Mental Status: She is alert and oriented to person, place, and time.   Psychiatric:         Speech: Speech normal.         Behavior: Behavior normal.         Thought Content: Thought content normal.         Judgment: Judgment normal.         Assessment:       1. Aortic ectasia        Plan:       Aortic ectasia    we discussed lack of risk factors, incidental finding and no recommendation for further work  Offered US for reassurance--patient declined.       Follow up annually with PCP, with me as needed      Medication List with Changes/Refills   Current Medications    ASPIRIN (ECOTRIN) 81 MG EC TABLET    Take 81 mg by mouth once daily.    BUPROPION (WELLBUTRIN XL) 150 MG TB24 TABLET    TAKE 1 TABLET BY MOUTH EVERY DAY    DORZOLAMIDE (TRUSOPT) 2 % OPHTHALMIC SOLUTION    Place 1 drop into both eyes 3 (three) times daily.    LATANOPROST 0.005 % OPHTHALMIC SOLUTION    INSTILL ONE DROP INTO EACH EYE ONCE DAILY IN THE EVENING    MULTIVITAMIN CAPSULE    Take 1 capsule by mouth once daily.    VALACYCLOVIR (VALTREX) 500 MG TABLET    Take 1 tablet (500 mg total) by mouth once daily.

## 2020-12-07 NOTE — PROGRESS NOTES
HISTORY OF PRESENT ILLNESS:  This is a 65-year-old white female treated for a  diagnosis of Stage IIB left breast carcinoma.ER/VA negative. Her - 2 positive Neoadjuvant A/C ,completed 12 cycles weekly taxol and  herceptin/ perjeta had lumpectomy was on [facility] trial completed the entire year of herceptin /perjeta, but didn't undergo axillary xrt per trial  Here for f/u with pet and labs recnt mammogram showed calcifications , that were biospied and came back negative  radiation induced interstitial findings still having the cough but getting of her upper respiratory infection today   sonme neuropathy post rx still persisits   had a difficult summer 2019. Had removal of her original implant for pain in the area, after removal pt developed pseudomonas infection stayed in hospital  For a week. She sees Dr. Modi, had second reconstruction 10 2020 had a deep flap done. Saw DR Rodriguez rt breast mammo 9/2020  PHYSICAL EXAMINATION:  Wt Readings from Last 3 Encounters:   11/16/20 58.3 kg (128 lb 8.5 oz)   10/26/20 65.8 kg (145 lb 1 oz)   10/23/20 59 kg (130 lb)     Temp Readings from Last 3 Encounters:   11/16/20 98.4 °F (36.9 °C) (Oral)   10/28/20 98.6 °F (37 °C) (Oral)   10/23/20 98.1 °F (36.7 °C) (Tympanic)     BP Readings from Last 3 Encounters:   11/16/20 136/88   10/28/20 134/74   10/23/20 (!) 175/81     Pulse Readings from Last 3 Encounters:   11/16/20 79   10/28/20 72   10/23/20 63     GENERAL:  Well-developed, well-nourished white female in no acute distress.    Alert and oriented x4. Well grommed  moist.  Lips without   lesions.  Tongue midline.  Oropharynx clear.  Nonicteric sclerae.  NECK:  Supple, no adenopathy.  No carotid bruits, thyromegaly or thyroid nodule.  HEART:  Regular rate and rhythm without murmur, gallop.  LUNGS:  Clear to auscultation bilaterally.  Normal respiratory effort.  ABDOMEN:  Soft, nontender, nondistended with positive normoactive bowel sounds,   no hepatosplenomegaly.  EXTREMITIES:   No cyanosis, clubbing or edema.  Distal pulses are intact.  AXILLAE AND GROIN:  No palpable pathologic lymphadenopathy is appreciated.  SKIN:  Intact/turgor normal.  NEUROLOGIC:  Cranial nerves II-XII grossly intact.  Motor:  Good muscle bulk and   tone.  Strength/sensory 5/5 throughout.  Gait stable.  BREAST: left breast reconstruction on going     LABORATORY:    Lab Results   Component Value Date    WBC 9.56 10/27/2020    HGB 11.1 (L) 10/27/2020    HCT 33.0 (L) 10/27/2020     (H) 10/27/2020     10/27/2020         CMP  Sodium   Date Value Ref Range Status   10/16/2020 140 136 - 145 mmol/L Final     Potassium   Date Value Ref Range Status   10/16/2020 3.6 3.5 - 5.1 mmol/L Final     Chloride   Date Value Ref Range Status   10/16/2020 105 95 - 110 mmol/L Final     CO2   Date Value Ref Range Status   10/16/2020 25 23 - 29 mmol/L Final     Glucose   Date Value Ref Range Status   10/16/2020 89 70 - 110 mg/dL Final     BUN   Date Value Ref Range Status   10/16/2020 10 8 - 23 mg/dL Final     Creatinine   Date Value Ref Range Status   10/16/2020 0.9 0.5 - 1.4 mg/dL Final     Calcium   Date Value Ref Range Status   10/16/2020 10.2 8.7 - 10.5 mg/dL Final     Total Protein   Date Value Ref Range Status   10/16/2020 7.6 6.0 - 8.4 g/dL Final     Albumin   Date Value Ref Range Status   10/16/2020 4.7 3.5 - 5.2 g/dL Final     Total Bilirubin   Date Value Ref Range Status   10/16/2020 0.5 0.1 - 1.0 mg/dL Final     Comment:     For infants and newborns, interpretation of results should be based  on gestational age, weight and in agreement with clinical  observations.  Premature Infant recommended reference ranges:  Up to 24 hours.............<8.0 mg/dL  Up to 48 hours............<12.0 mg/dL  3-5 days..................<15.0 mg/dL  6-29 days.................<15.0 mg/dL       Alkaline Phosphatase   Date Value Ref Range Status   10/16/2020 75 55 - 135 U/L Final     AST   Date Value Ref Range Status   10/16/2020 25 10 - 40  U/L Final     ALT   Date Value Ref Range Status   10/16/2020 20 10 - 44 U/L Final     Anion Gap   Date Value Ref Range Status   10/16/2020 10 8 - 16 mmol/L Final     eGFR if    Date Value Ref Range Status   10/16/2020 >60.0 >60 mL/min/1.73 m^2 Final     eGFR if non    Date Value Ref Range Status   10/16/2020 >60.0 >60 mL/min/1.73 m^2 Final     Comment:     Calculation used to obtain the estimated glomerular filtration  rate (eGFR) is the CKD-EPI equation.      39.3 ca 2729     Left breast, lumpectomy:  - Residual invasive ductal carcinoma, moderately differentiated, Jamal Grade 2 (3+2+1=6), 0.2 cm in  greatest linear microscopic dimension.  - Ductal carcinoma in situ (DCIS), high nuclear grade, 2 cm, solid and cribriform types.  - Surgical margins are free of tumor; invasive carcinoma is located 0.1 cm from the closest margin         FINAL PATHOLOGIC DIAGNOSIS  1. BREAST, RIGHT, CENTRAL REGION MIDDLE DEPTH, CALCIFICATIONS, STEREOTACTIC-GUIDED  BIOPSY:  Benign breast tissue with fibrocystic changes, columnar cell changes, and focal features suggestive of duct  rupture.  Microcalcifications: Seen in association with areas of columnar cell change.  Negative for atypia or carcinoma.  Osteopenia 3/2017 dexa  Now 2020 osteoporosis  Chest x-ray negative October 2020 mammogram September 2 1020-    Pet 12/2020 neg    IMPRESSION:  Stage IIB left breast carcinoma 2017 (ER/OH negative, Her-2/clay positive)  Completed neoadjuvant AC and weekly taxol / herceptin  stopped perjeta since she also had xrt to left side due o fear of toxicity   BRCA negative  , s/p  lumpectomy and axillary disection , with 2 residual Ln positive. Per aliance mary did not undergo xrt to axilla    reconstructed left breast MRI of rt breast  negative February 2020, mammo of rt breast in 9/2020     PLAN:  1.   dexa showing osteoporosis, start prolia has delayed this over a year now starting today.  And   possibly  fracture of ribs from coughing per xray.  Fractures on rib toe possibly related to osteoporosis   cont f/u wit pcp  Patient has slightly worse anemia last tumor marker was February 2020  Return to clinic 6 months in time for the next dose of Prolia with CBC CMP and CA 2729. pet    Advance Care planning/ directives /living will/patient's wishes discussed with patient.  Patient has been given guidelines and instructions on completing these directives  COVID social distancing, face mask use, hand washing techniques and personal hygiene routine discussed with patient  Good exercise, nutrition and weight management discussed with patient  Health maintenance activities and follow-up with PCPs recommendations discussed with patient

## 2020-12-08 ENCOUNTER — HOSPITAL ENCOUNTER (OUTPATIENT)
Dept: RADIOLOGY | Facility: HOSPITAL | Age: 65
Discharge: HOME | End: 2020-12-08
Attending: INTERNAL MEDICINE
Payer: COMMERCIAL

## 2020-12-08 ENCOUNTER — INFUSION (OUTPATIENT)
Dept: INFUSION THERAPY | Facility: HOSPITAL | Age: 65
End: 2020-12-08
Attending: INTERNAL MEDICINE
Payer: COMMERCIAL

## 2020-12-08 ENCOUNTER — OFFICE VISIT (OUTPATIENT)
Dept: HEMATOLOGY/ONCOLOGY | Facility: CLINIC | Age: 65
End: 2020-12-08
Payer: COMMERCIAL

## 2020-12-08 VITALS
DIASTOLIC BLOOD PRESSURE: 79 MMHG | BODY MASS INDEX: 19.32 KG/M2 | SYSTOLIC BLOOD PRESSURE: 144 MMHG | OXYGEN SATURATION: 99 % | WEIGHT: 120.25 LBS | HEIGHT: 66 IN | HEART RATE: 77 BPM

## 2020-12-08 VITALS
DIASTOLIC BLOOD PRESSURE: 79 MMHG | RESPIRATION RATE: 20 BRPM | TEMPERATURE: 98 F | SYSTOLIC BLOOD PRESSURE: 144 MMHG | HEART RATE: 77 BPM

## 2020-12-08 DIAGNOSIS — M80.00XA AGE-RELATED OSTEOPOROSIS WITH CURRENT PATHOLOGICAL FRACTURE, INITIAL ENCOUNTER: ICD-10-CM

## 2020-12-08 DIAGNOSIS — Z85.3 HX OF BREAST CANCER: ICD-10-CM

## 2020-12-08 DIAGNOSIS — C50.012 MALIGNANT NEOPLASM OF NIPPLE OF LEFT BREAST IN FEMALE, UNSPECIFIED ESTROGEN RECEPTOR STATUS: Primary | ICD-10-CM

## 2020-12-08 DIAGNOSIS — C50.012 MALIGNANT NEOPLASM OF NIPPLE OF LEFT BREAST IN FEMALE, UNSPECIFIED ESTROGEN RECEPTOR STATUS: ICD-10-CM

## 2020-12-08 DIAGNOSIS — C50.912 HER2-POSITIVE CARCINOMA OF LEFT BREAST: ICD-10-CM

## 2020-12-08 DIAGNOSIS — M80.00XA AGE-RELATED OSTEOPOROSIS WITH CURRENT PATHOLOGICAL FRACTURE, INITIAL ENCOUNTER: Primary | ICD-10-CM

## 2020-12-08 PROCEDURE — 63600175 PHARM REV CODE 636 W HCPCS: Mod: JG,PN | Performed by: INTERNAL MEDICINE

## 2020-12-08 PROCEDURE — 99999 PR PBB SHADOW E&M-EST. PATIENT-LVL III: ICD-10-PCS | Mod: PBBFAC,,, | Performed by: INTERNAL MEDICINE

## 2020-12-08 PROCEDURE — 96372 THER/PROPH/DIAG INJ SC/IM: CPT | Mod: PN

## 2020-12-08 PROCEDURE — A9552 F18 FDG: HCPCS | Mod: PO

## 2020-12-08 PROCEDURE — 3008F BODY MASS INDEX DOCD: CPT | Mod: CPTII,S$GLB,, | Performed by: INTERNAL MEDICINE

## 2020-12-08 PROCEDURE — 3008F PR BODY MASS INDEX (BMI) DOCUMENTED: ICD-10-PCS | Mod: CPTII,S$GLB,, | Performed by: INTERNAL MEDICINE

## 2020-12-08 PROCEDURE — 99214 PR OFFICE/OUTPT VISIT, EST, LEVL IV, 30-39 MIN: ICD-10-PCS | Mod: S$GLB,,, | Performed by: INTERNAL MEDICINE

## 2020-12-08 PROCEDURE — 78815 PET IMAGE W/CT SKULL-THIGH: CPT | Mod: TC,PO

## 2020-12-08 PROCEDURE — 78815 PET IMAGE W/CT SKULL-THIGH: CPT | Mod: 26,PS,, | Performed by: RADIOLOGY

## 2020-12-08 PROCEDURE — 99999 PR PBB SHADOW E&M-EST. PATIENT-LVL III: CPT | Mod: PBBFAC,,, | Performed by: INTERNAL MEDICINE

## 2020-12-08 PROCEDURE — 1125F AMNT PAIN NOTED PAIN PRSNT: CPT | Mod: S$GLB,,, | Performed by: INTERNAL MEDICINE

## 2020-12-08 PROCEDURE — 99214 OFFICE O/P EST MOD 30 MIN: CPT | Mod: S$GLB,,, | Performed by: INTERNAL MEDICINE

## 2020-12-08 PROCEDURE — 1125F PR PAIN SEVERITY QUANTIFIED, PAIN PRESENT: ICD-10-PCS | Mod: S$GLB,,, | Performed by: INTERNAL MEDICINE

## 2020-12-08 PROCEDURE — 78815 NM PET CT ROUTINE: ICD-10-PCS | Mod: 26,PS,, | Performed by: RADIOLOGY

## 2020-12-08 RX ADMIN — DENOSUMAB 60 MG: 60 INJECTION SUBCUTANEOUS at 04:12

## 2020-12-19 ENCOUNTER — LAB VISIT (OUTPATIENT)
Dept: FAMILY MEDICINE | Facility: CLINIC | Age: 65
End: 2020-12-19
Payer: COMMERCIAL

## 2020-12-19 DIAGNOSIS — Z01.812 PRE-PROCEDURE LAB EXAM: ICD-10-CM

## 2020-12-19 PROCEDURE — U0003 INFECTIOUS AGENT DETECTION BY NUCLEIC ACID (DNA OR RNA); SEVERE ACUTE RESPIRATORY SYNDROME CORONAVIRUS 2 (SARS-COV-2) (CORONAVIRUS DISEASE [COVID-19]), AMPLIFIED PROBE TECHNIQUE, MAKING USE OF HIGH THROUGHPUT TECHNOLOGIES AS DESCRIBED BY CMS-2020-01-R: HCPCS

## 2020-12-20 LAB — SARS-COV-2 RNA RESP QL NAA+PROBE: NOT DETECTED

## 2020-12-21 ENCOUNTER — ANESTHESIA EVENT (OUTPATIENT)
Dept: ENDOSCOPY | Facility: HOSPITAL | Age: 65
End: 2020-12-21
Payer: COMMERCIAL

## 2020-12-21 RX ORDER — ASPIRIN 325 MG
325 TABLET ORAL DAILY
COMMUNITY
End: 2021-03-31

## 2020-12-22 ENCOUNTER — ANESTHESIA (OUTPATIENT)
Dept: ENDOSCOPY | Facility: HOSPITAL | Age: 65
End: 2020-12-22
Payer: COMMERCIAL

## 2020-12-22 ENCOUNTER — HOSPITAL ENCOUNTER (OUTPATIENT)
Facility: HOSPITAL | Age: 65
Discharge: HOME | End: 2020-12-22
Attending: INTERNAL MEDICINE | Admitting: INTERNAL MEDICINE
Payer: COMMERCIAL

## 2020-12-22 VITALS
OXYGEN SATURATION: 99 % | SYSTOLIC BLOOD PRESSURE: 169 MMHG | WEIGHT: 125 LBS | RESPIRATION RATE: 14 BRPM | TEMPERATURE: 98 F | DIASTOLIC BLOOD PRESSURE: 76 MMHG | HEART RATE: 72 BPM | HEIGHT: 67 IN | BODY MASS INDEX: 19.62 KG/M2

## 2020-12-22 DIAGNOSIS — Z80.0 FHX: COLON CANCER: ICD-10-CM

## 2020-12-22 PROCEDURE — D9220A PRA ANESTHESIA: Mod: CRNA,,, | Performed by: NURSE ANESTHETIST, CERTIFIED REGISTERED

## 2020-12-22 PROCEDURE — 37000008 HC ANESTHESIA 1ST 15 MINUTES: Mod: PO | Performed by: INTERNAL MEDICINE

## 2020-12-22 PROCEDURE — 25000003 PHARM REV CODE 250: Mod: PO | Performed by: NURSE ANESTHETIST, CERTIFIED REGISTERED

## 2020-12-22 PROCEDURE — D9220A PRA ANESTHESIA: Mod: ANES,,, | Performed by: ANESTHESIOLOGY

## 2020-12-22 PROCEDURE — G0105 COLORECTAL SCRN; HI RISK IND: HCPCS | Mod: ,,, | Performed by: INTERNAL MEDICINE

## 2020-12-22 PROCEDURE — 37000009 HC ANESTHESIA EA ADD 15 MINS: Mod: PO | Performed by: INTERNAL MEDICINE

## 2020-12-22 PROCEDURE — D9220A PRA ANESTHESIA: ICD-10-PCS | Mod: ANES,,, | Performed by: ANESTHESIOLOGY

## 2020-12-22 PROCEDURE — 63600175 PHARM REV CODE 636 W HCPCS: Mod: PO | Performed by: INTERNAL MEDICINE

## 2020-12-22 PROCEDURE — 63600175 PHARM REV CODE 636 W HCPCS: Mod: PO | Performed by: NURSE ANESTHETIST, CERTIFIED REGISTERED

## 2020-12-22 PROCEDURE — G0105 COLORECTAL SCRN; HI RISK IND: HCPCS | Mod: PO | Performed by: INTERNAL MEDICINE

## 2020-12-22 PROCEDURE — D9220A PRA ANESTHESIA: ICD-10-PCS | Mod: CRNA,,, | Performed by: NURSE ANESTHETIST, CERTIFIED REGISTERED

## 2020-12-22 PROCEDURE — G0105 COLORECTAL SCRN; HI RISK IND: ICD-10-PCS | Mod: ,,, | Performed by: INTERNAL MEDICINE

## 2020-12-22 RX ORDER — PROPOFOL 10 MG/ML
VIAL (ML) INTRAVENOUS
Status: DISCONTINUED | OUTPATIENT
Start: 2020-12-22 | End: 2020-12-22

## 2020-12-22 RX ORDER — SODIUM CHLORIDE, SODIUM LACTATE, POTASSIUM CHLORIDE, CALCIUM CHLORIDE 600; 310; 30; 20 MG/100ML; MG/100ML; MG/100ML; MG/100ML
INJECTION, SOLUTION INTRAVENOUS CONTINUOUS
Status: DISCONTINUED | OUTPATIENT
Start: 2020-12-22 | End: 2020-12-22 | Stop reason: HOSPADM

## 2020-12-22 RX ORDER — SODIUM CHLORIDE 0.9 % (FLUSH) 0.9 %
10 SYRINGE (ML) INJECTION
Status: DISCONTINUED | OUTPATIENT
Start: 2020-12-22 | End: 2020-12-22 | Stop reason: HOSPADM

## 2020-12-22 RX ORDER — PROPOFOL 10 MG/ML
VIAL (ML) INTRAVENOUS CONTINUOUS PRN
Status: DISCONTINUED | OUTPATIENT
Start: 2020-12-22 | End: 2020-12-22

## 2020-12-22 RX ORDER — LIDOCAINE HCL/PF 100 MG/5ML
SYRINGE (ML) INTRAVENOUS
Status: DISCONTINUED | OUTPATIENT
Start: 2020-12-22 | End: 2020-12-22

## 2020-12-22 RX ADMIN — PROPOFOL 50 MG: 10 INJECTION, EMULSION INTRAVENOUS at 08:12

## 2020-12-22 RX ADMIN — PROPOFOL 100 MG: 10 INJECTION, EMULSION INTRAVENOUS at 08:12

## 2020-12-22 RX ADMIN — LIDOCAINE HYDROCHLORIDE 100 MG: 20 INJECTION, SOLUTION INTRAVENOUS at 08:12

## 2020-12-22 RX ADMIN — SODIUM CHLORIDE, SODIUM LACTATE, POTASSIUM CHLORIDE, AND CALCIUM CHLORIDE: .6; .31; .03; .02 INJECTION, SOLUTION INTRAVENOUS at 08:12

## 2020-12-22 RX ADMIN — PROPOFOL 150 MCG/KG/MIN: 10 INJECTION, EMULSION INTRAVENOUS at 08:12

## 2020-12-22 NOTE — ANESTHESIA PREPROCEDURE EVALUATION
12/22/2020  Ashlie Redman is a 65 y.o., female.    Anesthesia Evaluation    I have reviewed the Patient Summary Reports.    I have reviewed the Nursing Notes. I have reviewed the NPO Status.   I have reviewed the Medications.     Review of Systems  Anesthesia Hx:  Hx of Anesthetic complications PONV   Social:  Non-Smoker    Hematology/Oncology:         -- Anemia: --  Cancer in past history:  Breast left   EENT/Dental:EENT/Dental Normal   Cardiovascular:  Cardiovascular Normal     Pulmonary:  Pulmonary Normal    Renal/:  Renal/ Normal     Hepatic/GI:  Hepatic/GI Normal    Musculoskeletal:  Musculoskeletal Normal    Neurological:   Neuromuscular Disease,    Endocrine:  Endocrine Normal    Dermatological:  Skin Normal    Psych:  Psychiatric Normal           Physical Exam  General:  Well nourished    Airway/Jaw/Neck:  Airway Findings: Mouth Opening: Normal Tongue: Normal  General Airway Assessment: Adult  Mallampati: I  TM Distance: Normal, at least 6 cm        Eyes/Ears/Nose:  EYES/EARS/NOSE FINDINGS: Normal   Dental:  DENTAL FINDINGS: Normal   Chest/Lungs:  Chest/Lungs Findings: Normal Respiratory Rate, Clear to auscultation     Heart/Vascular:  Heart Findings: Rate: Normal  Rhythm: Regular Rhythm        Mental Status:  Mental Status Findings:  Cooperative, Alert and Oriented         Anesthesia Plan  Type of Anesthesia, risks & benefits discussed:  Anesthesia Type:  general  Patient's Preference:   Intra-op Monitoring Plan: standard ASA monitors  Intra-op Monitoring Plan Comments:   Post Op Pain Control Plan:   Post Op Pain Control Plan Comments:   Induction:   IV  Beta Blocker:  Patient is not currently on a Beta-Blocker (No further documentation required).       Informed Consent: Patient understands risks and agrees with Anesthesia plan.  Questions answered. Anesthesia consent signed with patient.  ASA  Score: 2     Day of Surgery Review of History & Physical: I have interviewed and examined the patient. I have reviewed the patient's H&P dated:    H&P update referred to the provider.         Ready For Surgery From Anesthesia Perspective.

## 2020-12-22 NOTE — H&P
History & Physical - Short Stay  Gastroenterology      SUBJECTIVE:     Procedure: Colonoscopy    Chief Complaint/Indication for Procedure: Family History of Colon Cancer    PTA Medications   Medication Sig    aspirin 325 MG tablet Take 325 mg by mouth once daily.    buPROPion (WELLBUTRIN XL) 150 MG TB24 tablet TAKE 1 TABLET BY MOUTH EVERY DAY    dorzolamide (TRUSOPT) 2 % ophthalmic solution Place 1 drop into both eyes 3 (three) times daily.    latanoprost 0.005 % ophthalmic solution INSTILL ONE DROP INTO EACH EYE ONCE DAILY IN THE EVENING    multivitamin capsule Take 1 capsule by mouth once daily.    valACYclovir (VALTREX) 500 MG tablet Take 1 tablet (500 mg total) by mouth once daily. (Patient taking differently: Take 500 mg by mouth as needed. )    aspirin (ECOTRIN) 81 MG EC tablet Take 81 mg by mouth once daily.       Review of patient's allergies indicates:   Allergen Reactions    Bactrim [sulfamethoxazole-trimethoprim] Rash     Fever, vomiting        Past Medical History:   Diagnosis Date    Allergy     Anemia     Asteroid hyalosis of both eyes     Basal cell carcinoma     L. dorsal forearm     Breast cancer 04/2017    Left    Cataract     Diverticulosis     Encounter for blood transfusion     Glaucoma     High myopia     Osteopenia     PONV (postoperative nausea and vomiting)     S/P dilation and curettage      Past Surgical History:   Procedure Laterality Date    AUGMENTATION OF BREAST      breast augmentation  03/2013    BREAST BIOPSY Left 04/2017    Core bx,+ cancer    BREAST CAPSULECTOMY Left 8/5/2019    Procedure: CAPSULECTOMY, BREAST;  Surgeon: Diego Modi MD;  Location: [facility];  Service: General;  Laterality: Left;    BREAST LUMPECTOMY Left 10/25/2017    BREAST RECONSTRUCTION Bilateral 8/21/2019    Procedure: RECONSTRUCTION, BREAST WITH ALLODERM;  Surgeon: Diego Modi MD;  Location: [facility];  Service: General;  Laterality: Bilateral;    BREAST RECONSTRUCTION Left      BREAST REVISION SURGERY Bilateral 8/5/2019    Procedure: BREAST REVISION SURGERY;  Surgeon: Diego Modi MD;  Location: [facility];  Service: General;  Laterality: Bilateral;    CATARACT EXTRACTION W/  INTRAOCULAR LENS IMPLANT Bilateral 2009    COLONOSCOPY  4/2009, 2015    repeat in 5 years    CYSTOCELE REPAIR N/A 11/20/2018    Procedure: REPAIR, CYSTOCELE;  Surgeon: Rebecca Acosta MD;  Location: [facility] OR 2ND FLR;  Service: Urology;  Laterality: N/A;  1.5 hours    CYSTOSCOPY N/A 11/20/2018    Procedure: CYSTOSCOPY;  Surgeon: Rebecca Acosta MD;  Location: [facility] OR 2ND FLR;  Service: Urology;  Laterality: N/A;    DILATION AND CURETTAGE OF UTERUS      ECTOPIC PREGNANCY SURGERY      EYE SURGERY Bilateral 3/09    Vitrectomy     MASTECTOMY      POLYPECTOMY      x3 2001    RECONSTRUCTION OF BREAST WITH DEEP INFERIOR EPIGASTRIC ARTERY  (SUSAN) FLAP Left 10/26/2020    Procedure: RECONSTRUCTION, BREAST, USING SUSAN SKIN FLAP - LEFT BREAST RECONSTRUCTION WITH STACKED SUSAN FREE FLAPS.;  Surgeon: Diego Modi MD;  Location: [facility];  Service: General;  Laterality: Left;    REMOVAL OF BREAST IMPLANT Bilateral 8/5/2019    Procedure: REMOVAL, IMPLANT, BREAST;  Surgeon: Diego Modi MD;  Location: [facility];  Service: General;  Laterality: Bilateral;    REMOVAL OF BREAST IMPLANT Left 8/30/2019    Procedure: REMOVAL, IMPLANT, BREAST;  Surgeon: Diego Modi MD;  Location: [facility];  Service: General;  Laterality: Left;    REMOVAL OF BREAST IMPLANT Right 10/26/2020    Procedure: REMOVAL, IMPLANT, BREAST -   RIGHT BREAST IMPLANT REMOVAL;  Surgeon: Diego Modi MD;  Location: [facility];  Service: General;  Laterality: Right;    Yag Capsulotomy Bilateral 12/2014     Family History   Problem Relation Age of Onset    Cancer Sister         rectal; passed    Breast cancer Cousin     Cancer Cousin         breast    Heart disease Father         passed    Colon cancer Mother         hospice     Pancreatic cancer Mother         39yo; 89yo    Cataracts Mother     Hypertension Mother     Thyroid disease Mother     Cancer Mother         colon    Glaucoma Maternal Grandmother     Amblyopia Neg Hx     Blindness Neg Hx     Diabetes Neg Hx     Macular degeneration Neg Hx     Retinal detachment Neg Hx     Strabismus Neg Hx     Stroke Neg Hx      Social History     Tobacco Use    Smoking status: Never Smoker    Smokeless tobacco: Never Used   Substance Use Topics    Alcohol use: Yes     Alcohol/week: 0.0 standard drinks     Frequency: 4 or more times a week     Drinks per session: 1 or 2     Binge frequency: Never     Comment: occ    Drug use: No         OBJECTIVE:     Vital Signs (Most Recent)  Temp: 97.3 °F (36.3 °C) (12/22/20 0805)  Pulse: 77 (12/22/20 0805)  Resp: 18 (12/22/20 0805)  BP: 139/76 (12/22/20 0805)  SpO2: 100 % (12/22/20 0805)    Physical Exam:                                                       GENERAL:  Comfortable, in no acute distress.                                 HEENT EXAM:  Nonicteric.  No adenopathy.  Oropharynx is clear.               NECK:  Supple.                                                               LUNGS:  Clear.                                                               CARDIAC:  Regular rate and rhythm.  S1, S2.  No murmur.                      ABDOMEN:  Soft, positive bowel sounds, nontender.  No hepatosplenomegaly or masses.  No rebound or guarding.                                             EXTREMITIES:  No edema.     MENTAL STATUS:  Normal, alert and oriented.      ASSESSMENT/PLAN:     Assessment: Family History of Colon Cancer    Plan: Colonoscopy    Anesthesia Plan: General    ASA Grade: ASA 2 - Patient with mild systemic disease with no functional limitations    MALLAMPATI SCORE:  I (soft palate, uvula, fauces, and tonsillar pillars visible)

## 2020-12-22 NOTE — DISCHARGE SUMMARY
Discharge Note  Short Stay      SUMMARY     Admit Date: 12/22/2020    Attending Physician: Praneeth Leblanc MD     Discharge Physician: Praneeth Leblanc MD    Discharge Date: 12/22/2020 9:07 AM    Final Diagnosis: Screen for colon cancer [Z12.11]    Disposition: HOME OR SELF CARE    Patient Instructions:   Current Discharge Medication List      CONTINUE these medications which have NOT CHANGED    Details   aspirin 325 MG tablet Take 325 mg by mouth once daily.      buPROPion (WELLBUTRIN XL) 150 MG TB24 tablet TAKE 1 TABLET BY MOUTH EVERY DAY  Qty: 90 tablet, Refills: 0    Associated Diagnoses: Malignant neoplasm of axillary tail of right female breast, unspecified estrogen receptor status      dorzolamide (TRUSOPT) 2 % ophthalmic solution Place 1 drop into both eyes 3 (three) times daily.  Qty: 30 mL, Refills: 4    Comments: 90 day supply  Associated Diagnoses: OHT (ocular hypertension), bilateral      latanoprost 0.005 % ophthalmic solution INSTILL ONE DROP INTO EACH EYE ONCE DAILY IN THE EVENING  Qty: 3 Bottle, Refills: 4    Comments: Pharmacy requests 90 day supply.  Associated Diagnoses: OHT (ocular hypertension), bilateral      multivitamin capsule Take 1 capsule by mouth once daily.      valACYclovir (VALTREX) 500 MG tablet Take 1 tablet (500 mg total) by mouth once daily.  Qty: 90 tablet, Refills: 1    Comments: Please delete all prior scripts with same name and strength including on holds.         STOP taking these medications       aspirin (ECOTRIN) 81 MG EC tablet Comments:   Reason for Stopping:               Discharge Procedure Orders (must include Diet, Follow-up, Activity)    Follow Up:  Follow up with PCP as previously scheduled  Resume routine diet.  Activity as tolerated.    No driving day of procedure.

## 2020-12-22 NOTE — ANESTHESIA POSTPROCEDURE EVALUATION
Anesthesia Post Evaluation    Patient: Ashlie Redman    Procedure(s) Performed: Procedure(s) (LRB):  COLONOSCOPY (N/A)    Final Anesthesia Type: general      Patient location during evaluation: PACU  Patient participation: Yes- Able to Participate  Level of consciousness: awake and alert  Post-procedure vital signs: reviewed and stable  Pain management: adequate  Airway patency: patent    PONV status at discharge: No PONV  Anesthetic complications: no      Cardiovascular status: hemodynamically stable  Respiratory status: unassisted and room air  Hydration status: euvolemic  Follow-up not needed.          Vitals Value Taken Time   /76 12/22/20 0930   Temp 36.5 °C (97.7 °F) 12/22/20 0904   Pulse 72 12/22/20 0930   Resp 14 12/22/20 0930   SpO2 99 % 12/22/20 0930         Event Time   Out of Recovery 09:51:00         Pain/Lillian Score: Lillian Score: 10 (12/22/2020  9:30 AM)

## 2020-12-22 NOTE — TRANSFER OF CARE
"Anesthesia Transfer of Care Note    Patient: Ashlie Redman    Procedure(s) Performed: Procedure(s) (LRB):  COLONOSCOPY (N/A)    Patient location: PACU    Anesthesia Type: general    Transport from OR: Transported from OR on room air with adequate spontaneous ventilation    Post pain: adequate analgesia    Post assessment: no apparent anesthetic complications and tolerated procedure well    Post vital signs: stable    Level of consciousness: awake and sedated    Nausea/Vomiting: no nausea/vomiting    Complications: none    Transfer of care protocol was followed      Last vitals:   Visit Vitals  /76 (BP Location: Right arm, Patient Position: Lying)   Pulse 77   Temp 36.3 °C (97.3 °F) (Skin)   Resp 18   Ht 5' 7" (1.702 m)   Wt 56.7 kg (125 lb)   SpO2 100%   Breastfeeding No   BMI 19.58 kg/m²     "

## 2021-01-02 ENCOUNTER — PATIENT MESSAGE (OUTPATIENT)
Dept: OPHTHALMOLOGY | Facility: CLINIC | Age: 66
End: 2021-01-02

## 2021-01-04 DIAGNOSIS — H40.053 OHT (OCULAR HYPERTENSION), BILATERAL: ICD-10-CM

## 2021-01-04 RX ORDER — DORZOLAMIDE HCL 20 MG/ML
1 SOLUTION/ DROPS OPHTHALMIC 3 TIMES DAILY
Qty: 30 ML | Refills: 4 | Status: SHIPPED | OUTPATIENT
Start: 2021-01-04 | End: 2021-03-30 | Stop reason: CLARIF

## 2021-01-04 RX ORDER — LATANOPROST 50 UG/ML
SOLUTION/ DROPS OPHTHALMIC
Qty: 3 BOTTLE | Refills: 4 | Status: SHIPPED | OUTPATIENT
Start: 2021-01-04 | End: 2022-01-07

## 2021-01-29 ENCOUNTER — PATIENT OUTREACH (OUTPATIENT)
Dept: ADMINISTRATIVE | Facility: OTHER | Age: 66
End: 2021-01-29

## 2021-02-02 ENCOUNTER — OFFICE VISIT (OUTPATIENT)
Dept: OPHTHALMOLOGY | Facility: CLINIC | Age: 66
End: 2021-02-02
Payer: COMMERCIAL

## 2021-02-02 ENCOUNTER — CLINICAL SUPPORT (OUTPATIENT)
Dept: OPHTHALMOLOGY | Facility: CLINIC | Age: 66
End: 2021-02-02
Payer: COMMERCIAL

## 2021-02-02 DIAGNOSIS — H00.14 CHALAZION OF LEFT UPPER EYELID: Primary | ICD-10-CM

## 2021-02-02 DIAGNOSIS — Z96.1 PSEUDOPHAKIA OF BOTH EYES: ICD-10-CM

## 2021-02-02 DIAGNOSIS — H40.053 OHT (OCULAR HYPERTENSION), BILATERAL: ICD-10-CM

## 2021-02-02 PROCEDURE — 92083 HUMPHREY VISUAL FIELD - OU - BOTH EYES: ICD-10-PCS | Mod: S$GLB,,, | Performed by: OPHTHALMOLOGY

## 2021-02-02 PROCEDURE — 92083 EXTENDED VISUAL FIELD XM: CPT | Mod: S$GLB,,, | Performed by: OPHTHALMOLOGY

## 2021-02-02 PROCEDURE — 92012 INTRM OPH EXAM EST PATIENT: CPT | Mod: S$GLB,,, | Performed by: OPHTHALMOLOGY

## 2021-02-02 PROCEDURE — 92133 CPTRZD OPH DX IMG PST SGM ON: CPT | Mod: S$GLB,,, | Performed by: OPHTHALMOLOGY

## 2021-02-02 PROCEDURE — 99999 PR PBB SHADOW E&M-EST. PATIENT-LVL III: ICD-10-PCS | Mod: PBBFAC,,, | Performed by: OPHTHALMOLOGY

## 2021-02-02 PROCEDURE — 92012 PR EYE EXAM, EST PATIENT,INTERMED: ICD-10-PCS | Mod: S$GLB,,, | Performed by: OPHTHALMOLOGY

## 2021-02-02 PROCEDURE — 3288F PR FALLS RISK ASSESSMENT DOCUMENTED: ICD-10-PCS | Mod: CPTII,S$GLB,, | Performed by: OPHTHALMOLOGY

## 2021-02-02 PROCEDURE — 1125F AMNT PAIN NOTED PAIN PRSNT: CPT | Mod: S$GLB,,, | Performed by: OPHTHALMOLOGY

## 2021-02-02 PROCEDURE — 3288F FALL RISK ASSESSMENT DOCD: CPT | Mod: CPTII,S$GLB,, | Performed by: OPHTHALMOLOGY

## 2021-02-02 PROCEDURE — 1101F PT FALLS ASSESS-DOCD LE1/YR: CPT | Mod: CPTII,S$GLB,, | Performed by: OPHTHALMOLOGY

## 2021-02-02 PROCEDURE — 99999 PR PBB SHADOW E&M-EST. PATIENT-LVL III: CPT | Mod: PBBFAC,,, | Performed by: OPHTHALMOLOGY

## 2021-02-02 PROCEDURE — 1101F PR PT FALLS ASSESS DOC 0-1 FALLS W/OUT INJ PAST YR: ICD-10-PCS | Mod: CPTII,S$GLB,, | Performed by: OPHTHALMOLOGY

## 2021-02-02 PROCEDURE — 92133 POSTERIOR SEGMENT OCT OPTIC NERVE(OCULAR COHERENCE TOMOGRAPHY) - OU - BOTH EYES: ICD-10-PCS | Mod: S$GLB,,, | Performed by: OPHTHALMOLOGY

## 2021-02-02 PROCEDURE — 1125F PR PAIN SEVERITY QUANTIFIED, PAIN PRESENT: ICD-10-PCS | Mod: S$GLB,,, | Performed by: OPHTHALMOLOGY

## 2021-02-02 RX ORDER — DOXYCYCLINE 50 MG/1
100 CAPSULE ORAL EVERY 12 HOURS
Qty: 56 CAPSULE | Refills: 0 | Status: SHIPPED | OUTPATIENT
Start: 2021-02-02 | End: 2021-02-16

## 2021-02-11 DIAGNOSIS — Z01.818 PREOPERATIVE TESTING: Primary | ICD-10-CM

## 2021-02-11 DIAGNOSIS — Z85.3 PERSONAL HISTORY OF MALIGNANT NEOPLASM OF BREAST: ICD-10-CM

## 2021-02-19 ENCOUNTER — PATIENT MESSAGE (OUTPATIENT)
Dept: FAMILY MEDICINE | Facility: CLINIC | Age: 66
End: 2021-02-19

## 2021-02-22 ENCOUNTER — TELEPHONE (OUTPATIENT)
Dept: OPTOMETRY | Facility: CLINIC | Age: 66
End: 2021-02-22

## 2021-02-22 ENCOUNTER — OFFICE VISIT (OUTPATIENT)
Dept: OPTOMETRY | Facility: CLINIC | Age: 66
End: 2021-02-22
Payer: COMMERCIAL

## 2021-02-22 DIAGNOSIS — H52.12 MYOPIA, LEFT: ICD-10-CM

## 2021-02-22 DIAGNOSIS — Z96.1 BILATERAL PSEUDOPHAKIA: Primary | ICD-10-CM

## 2021-02-22 PROCEDURE — 99999 PR PBB SHADOW E&M-EST. PATIENT-LVL II: ICD-10-PCS | Mod: PBBFAC,,, | Performed by: OPTOMETRIST

## 2021-02-22 PROCEDURE — 3288F PR FALLS RISK ASSESSMENT DOCUMENTED: ICD-10-PCS | Mod: CPTII,S$GLB,, | Performed by: OPTOMETRIST

## 2021-02-22 PROCEDURE — 1126F AMNT PAIN NOTED NONE PRSNT: CPT | Mod: S$GLB,,, | Performed by: OPTOMETRIST

## 2021-02-22 PROCEDURE — 1101F PR PT FALLS ASSESS DOC 0-1 FALLS W/OUT INJ PAST YR: ICD-10-PCS | Mod: CPTII,S$GLB,, | Performed by: OPTOMETRIST

## 2021-02-22 PROCEDURE — 1101F PT FALLS ASSESS-DOCD LE1/YR: CPT | Mod: CPTII,S$GLB,, | Performed by: OPTOMETRIST

## 2021-02-22 PROCEDURE — 99999 PR PBB SHADOW E&M-EST. PATIENT-LVL II: CPT | Mod: PBBFAC,,, | Performed by: OPTOMETRIST

## 2021-02-22 PROCEDURE — 92015 PR REFRACTION: ICD-10-PCS | Mod: S$GLB,,, | Performed by: OPTOMETRIST

## 2021-02-22 PROCEDURE — 92015 DETERMINE REFRACTIVE STATE: CPT | Mod: S$GLB,,, | Performed by: OPTOMETRIST

## 2021-02-22 PROCEDURE — 3288F FALL RISK ASSESSMENT DOCD: CPT | Mod: CPTII,S$GLB,, | Performed by: OPTOMETRIST

## 2021-02-22 PROCEDURE — 1126F PR PAIN SEVERITY QUANTIFIED, NO PAIN PRESENT: ICD-10-PCS | Mod: S$GLB,,, | Performed by: OPTOMETRIST

## 2021-03-16 ENCOUNTER — LAB VISIT (OUTPATIENT)
Dept: LAB | Facility: HOSPITAL | Age: 66
End: 2021-03-16
Attending: SURGERY
Payer: COMMERCIAL

## 2021-03-16 DIAGNOSIS — Z01.818 PREOPERATIVE TESTING: ICD-10-CM

## 2021-03-16 DIAGNOSIS — Z85.3 PERSONAL HISTORY OF MALIGNANT NEOPLASM OF BREAST: ICD-10-CM

## 2021-03-16 LAB
BASOPHILS # BLD AUTO: 0.04 K/UL (ref 0–0.2)
BASOPHILS NFR BLD: 0.7 % (ref 0–1.9)
DIFFERENTIAL METHOD: ABNORMAL
EOSINOPHIL # BLD AUTO: 0.3 K/UL (ref 0–0.5)
EOSINOPHIL NFR BLD: 4.7 % (ref 0–8)
ERYTHROCYTE [DISTWIDTH] IN BLOOD BY AUTOMATED COUNT: 11.9 % (ref 11.5–14.5)
HCT VFR BLD AUTO: 38.7 % (ref 37–48.5)
HGB BLD-MCNC: 12.7 G/DL (ref 12–16)
IMM GRANULOCYTES # BLD AUTO: 0.02 K/UL (ref 0–0.04)
IMM GRANULOCYTES NFR BLD AUTO: 0.4 % (ref 0–0.5)
LYMPHOCYTES # BLD AUTO: 1.8 K/UL (ref 1–4.8)
LYMPHOCYTES NFR BLD: 32.3 % (ref 18–48)
MCH RBC QN AUTO: 34 PG (ref 27–31)
MCHC RBC AUTO-ENTMCNC: 32.8 G/DL (ref 32–36)
MCV RBC AUTO: 104 FL (ref 82–98)
MONOCYTES # BLD AUTO: 0.5 K/UL (ref 0.3–1)
MONOCYTES NFR BLD: 9.7 % (ref 4–15)
NEUTROPHILS # BLD AUTO: 2.9 K/UL (ref 1.8–7.7)
NEUTROPHILS NFR BLD: 52.2 % (ref 38–73)
NRBC BLD-RTO: 0 /100 WBC
PLATELET # BLD AUTO: 235 K/UL (ref 150–350)
PMV BLD AUTO: 11.1 FL (ref 9.2–12.9)
RBC # BLD AUTO: 3.73 M/UL (ref 4–5.4)
WBC # BLD AUTO: 5.58 K/UL (ref 3.9–12.7)

## 2021-03-16 PROCEDURE — 85025 COMPLETE CBC W/AUTO DIFF WBC: CPT | Performed by: SURGERY

## 2021-03-16 PROCEDURE — 36415 COLL VENOUS BLD VENIPUNCTURE: CPT | Mod: PO | Performed by: SURGERY

## 2021-03-16 PROCEDURE — 80048 BASIC METABOLIC PNL TOTAL CA: CPT | Performed by: SURGERY

## 2021-03-17 LAB
ANION GAP SERPL CALC-SCNC: 8 MMOL/L (ref 8–16)
BUN SERPL-MCNC: 14 MG/DL (ref 8–23)
CALCIUM SERPL-MCNC: 9.5 MG/DL (ref 8.7–10.5)
CHLORIDE SERPL-SCNC: 109 MMOL/L (ref 95–110)
CO2 SERPL-SCNC: 23 MMOL/L (ref 23–29)
CREAT SERPL-MCNC: 0.9 MG/DL (ref 0.5–1.4)
EST. GFR  (AFRICAN AMERICAN): >60 ML/MIN/1.73 M^2
EST. GFR  (NON AFRICAN AMERICAN): >60 ML/MIN/1.73 M^2
GLUCOSE SERPL-MCNC: 97 MG/DL (ref 70–110)
POTASSIUM SERPL-SCNC: 4 MMOL/L (ref 3.5–5.1)
SODIUM SERPL-SCNC: 140 MMOL/L (ref 136–145)

## 2021-03-29 DIAGNOSIS — C50.611 MALIGNANT NEOPLASM OF AXILLARY TAIL OF RIGHT FEMALE BREAST, UNSPECIFIED ESTROGEN RECEPTOR STATUS: ICD-10-CM

## 2021-03-29 RX ORDER — BUPROPION HYDROCHLORIDE 150 MG/1
150 TABLET ORAL DAILY
Qty: 90 TABLET | Refills: 0 | Status: SHIPPED | OUTPATIENT
Start: 2021-03-29 | End: 2021-06-11 | Stop reason: SDUPTHER

## 2021-03-30 ENCOUNTER — HOSPITAL ENCOUNTER (OUTPATIENT)
Dept: PREADMISSION TESTING | Facility: OTHER | Age: 66
Discharge: HOME | End: 2021-03-30
Attending: SURGERY
Payer: COMMERCIAL

## 2021-03-30 ENCOUNTER — OFFICE VISIT (OUTPATIENT)
Dept: SURGERY | Facility: CLINIC | Age: 66
End: 2021-03-30
Payer: COMMERCIAL

## 2021-03-30 ENCOUNTER — ANESTHESIA EVENT (OUTPATIENT)
Dept: SURGERY | Facility: OTHER | Age: 66
End: 2021-03-30
Payer: COMMERCIAL

## 2021-03-30 VITALS
HEART RATE: 62 BPM | SYSTOLIC BLOOD PRESSURE: 147 MMHG | BODY MASS INDEX: 20.41 KG/M2 | WEIGHT: 130.06 LBS | DIASTOLIC BLOOD PRESSURE: 91 MMHG | HEIGHT: 67 IN

## 2021-03-30 VITALS
HEART RATE: 52 BPM | DIASTOLIC BLOOD PRESSURE: 82 MMHG | SYSTOLIC BLOOD PRESSURE: 147 MMHG | OXYGEN SATURATION: 100 % | WEIGHT: 130 LBS | HEIGHT: 67 IN | TEMPERATURE: 98 F | BODY MASS INDEX: 20.4 KG/M2

## 2021-03-30 DIAGNOSIS — Z01.818 PRE-OP TESTING: ICD-10-CM

## 2021-03-30 DIAGNOSIS — Z85.3 PERSONAL HISTORY OF BREAST CANCER: ICD-10-CM

## 2021-03-30 DIAGNOSIS — Z12.31 SCREENING MAMMOGRAM, ENCOUNTER FOR: Primary | ICD-10-CM

## 2021-03-30 PROCEDURE — 1159F MED LIST DOCD IN RCRD: CPT | Mod: S$GLB,,, | Performed by: SURGERY

## 2021-03-30 PROCEDURE — 99213 OFFICE O/P EST LOW 20 MIN: CPT | Mod: S$GLB,,, | Performed by: SURGERY

## 2021-03-30 PROCEDURE — 1101F PR PT FALLS ASSESS DOC 0-1 FALLS W/OUT INJ PAST YR: ICD-10-PCS | Mod: CPTII,S$GLB,, | Performed by: SURGERY

## 2021-03-30 PROCEDURE — 1126F PR PAIN SEVERITY QUANTIFIED, NO PAIN PRESENT: ICD-10-PCS | Mod: S$GLB,,, | Performed by: SURGERY

## 2021-03-30 PROCEDURE — 3288F PR FALLS RISK ASSESSMENT DOCUMENTED: ICD-10-PCS | Mod: CPTII,S$GLB,, | Performed by: SURGERY

## 2021-03-30 PROCEDURE — 99213 PR OFFICE/OUTPT VISIT, EST, LEVL III, 20-29 MIN: ICD-10-PCS | Mod: S$GLB,,, | Performed by: SURGERY

## 2021-03-30 PROCEDURE — 3288F FALL RISK ASSESSMENT DOCD: CPT | Mod: CPTII,S$GLB,, | Performed by: SURGERY

## 2021-03-30 PROCEDURE — 3008F BODY MASS INDEX DOCD: CPT | Mod: CPTII,S$GLB,, | Performed by: SURGERY

## 2021-03-30 PROCEDURE — 1101F PT FALLS ASSESS-DOCD LE1/YR: CPT | Mod: CPTII,S$GLB,, | Performed by: SURGERY

## 2021-03-30 PROCEDURE — 3008F PR BODY MASS INDEX (BMI) DOCUMENTED: ICD-10-PCS | Mod: CPTII,S$GLB,, | Performed by: SURGERY

## 2021-03-30 PROCEDURE — 1126F AMNT PAIN NOTED NONE PRSNT: CPT | Mod: S$GLB,,, | Performed by: SURGERY

## 2021-03-30 PROCEDURE — 1159F PR MEDICATION LIST DOCUMENTED IN MEDICAL RECORD: ICD-10-PCS | Mod: S$GLB,,, | Performed by: SURGERY

## 2021-03-30 RX ORDER — SODIUM CHLORIDE, SODIUM LACTATE, POTASSIUM CHLORIDE, CALCIUM CHLORIDE 600; 310; 30; 20 MG/100ML; MG/100ML; MG/100ML; MG/100ML
INJECTION, SOLUTION INTRAVENOUS CONTINUOUS
Status: CANCELLED | OUTPATIENT
Start: 2021-03-30

## 2021-03-30 RX ORDER — SCOLOPAMINE TRANSDERMAL SYSTEM 1 MG/1
1 PATCH, EXTENDED RELEASE TRANSDERMAL ONCE
Status: CANCELLED | OUTPATIENT
Start: 2021-03-30 | End: 2021-03-30

## 2021-03-30 RX ORDER — ACETAMINOPHEN 500 MG
1000 TABLET ORAL
Status: CANCELLED | OUTPATIENT
Start: 2021-03-30 | End: 2021-03-30

## 2021-03-30 RX ORDER — DORZOLAMIDE HCL 20 MG/ML
1 SOLUTION/ DROPS OPHTHALMIC 2 TIMES DAILY
COMMUNITY
End: 2021-07-13

## 2021-03-30 RX ORDER — LIDOCAINE HYDROCHLORIDE 10 MG/ML
0.5 INJECTION, SOLUTION EPIDURAL; INFILTRATION; INTRACAUDAL; PERINEURAL ONCE
Status: CANCELLED | OUTPATIENT
Start: 2021-03-30 | End: 2021-03-30

## 2021-03-30 RX ORDER — PREGABALIN 50 MG/1
50 CAPSULE ORAL ONCE
Status: CANCELLED | OUTPATIENT
Start: 2021-03-30 | End: 2021-03-30

## 2021-04-02 ENCOUNTER — LAB VISIT (OUTPATIENT)
Dept: INTERNAL MEDICINE | Facility: CLINIC | Age: 66
End: 2021-04-02
Payer: COMMERCIAL

## 2021-04-02 DIAGNOSIS — Z01.818 PRE-OP TESTING: ICD-10-CM

## 2021-04-02 PROCEDURE — U0005 INFEC AGEN DETEC AMPLI PROBE: HCPCS | Performed by: ANESTHESIOLOGY

## 2021-04-02 PROCEDURE — U0003 INFECTIOUS AGENT DETECTION BY NUCLEIC ACID (DNA OR RNA); SEVERE ACUTE RESPIRATORY SYNDROME CORONAVIRUS 2 (SARS-COV-2) (CORONAVIRUS DISEASE [COVID-19]), AMPLIFIED PROBE TECHNIQUE, MAKING USE OF HIGH THROUGHPUT TECHNOLOGIES AS DESCRIBED BY CMS-2020-01-R: HCPCS | Mod: 91 | Performed by: ANESTHESIOLOGY

## 2021-04-03 LAB — SARS-COV-2 RNA RESP QL NAA+PROBE: NOT DETECTED

## 2021-04-05 ENCOUNTER — ANESTHESIA (OUTPATIENT)
Dept: SURGERY | Facility: OTHER | Age: 66
End: 2021-04-05
Payer: COMMERCIAL

## 2021-04-05 ENCOUNTER — HOSPITAL ENCOUNTER (OUTPATIENT)
Facility: OTHER | Age: 66
Discharge: HOME | End: 2021-04-05
Attending: SURGERY | Admitting: SURGERY
Payer: COMMERCIAL

## 2021-04-05 VITALS
SYSTOLIC BLOOD PRESSURE: 117 MMHG | HEART RATE: 74 BPM | BODY MASS INDEX: 20.41 KG/M2 | DIASTOLIC BLOOD PRESSURE: 58 MMHG | RESPIRATION RATE: 16 BRPM | HEIGHT: 67 IN | OXYGEN SATURATION: 94 % | TEMPERATURE: 98 F | WEIGHT: 130.06 LBS

## 2021-04-05 DIAGNOSIS — Z85.3 HX: BREAST CANCER: Primary | ICD-10-CM

## 2021-04-05 DIAGNOSIS — Z85.3 HX OF BREAST CANCER: ICD-10-CM

## 2021-04-05 DIAGNOSIS — Z01.818 PRE-OP TESTING: ICD-10-CM

## 2021-04-05 PROCEDURE — 25000003 PHARM REV CODE 250: Performed by: ANESTHESIOLOGY

## 2021-04-05 PROCEDURE — 36000707: Performed by: SURGERY

## 2021-04-05 PROCEDURE — 37000009 HC ANESTHESIA EA ADD 15 MINS: Performed by: SURGERY

## 2021-04-05 PROCEDURE — 71000015 HC POSTOP RECOV 1ST HR: Performed by: SURGERY

## 2021-04-05 PROCEDURE — 36000706: Performed by: SURGERY

## 2021-04-05 PROCEDURE — 71000039 HC RECOVERY, EACH ADD'L HOUR: Performed by: SURGERY

## 2021-04-05 PROCEDURE — 63600175 PHARM REV CODE 636 W HCPCS: Performed by: NURSE ANESTHETIST, CERTIFIED REGISTERED

## 2021-04-05 PROCEDURE — 25000003 PHARM REV CODE 250: Performed by: NURSE ANESTHETIST, CERTIFIED REGISTERED

## 2021-04-05 PROCEDURE — 37000008 HC ANESTHESIA 1ST 15 MINUTES: Performed by: SURGERY

## 2021-04-05 PROCEDURE — 63600175 PHARM REV CODE 636 W HCPCS: Performed by: ANESTHESIOLOGY

## 2021-04-05 PROCEDURE — 63600175 PHARM REV CODE 636 W HCPCS: Performed by: SURGERY

## 2021-04-05 PROCEDURE — P9045 ALBUMIN (HUMAN), 5%, 250 ML: HCPCS | Mod: JG | Performed by: NURSE ANESTHETIST, CERTIFIED REGISTERED

## 2021-04-05 PROCEDURE — 71000033 HC RECOVERY, INTIAL HOUR: Performed by: SURGERY

## 2021-04-05 PROCEDURE — 71000016 HC POSTOP RECOV ADDL HR: Performed by: SURGERY

## 2021-04-05 PROCEDURE — 25000003 PHARM REV CODE 250: Performed by: SURGERY

## 2021-04-05 RX ORDER — ACETAMINOPHEN 500 MG
1000 TABLET ORAL
Status: COMPLETED | OUTPATIENT
Start: 2021-04-05 | End: 2021-04-05

## 2021-04-05 RX ORDER — ONDANSETRON 2 MG/ML
INJECTION INTRAMUSCULAR; INTRAVENOUS
Status: DISCONTINUED | OUTPATIENT
Start: 2021-04-05 | End: 2021-04-05

## 2021-04-05 RX ORDER — SCOLOPAMINE TRANSDERMAL SYSTEM 1 MG/1
1 PATCH, EXTENDED RELEASE TRANSDERMAL ONCE
Status: COMPLETED | OUTPATIENT
Start: 2021-04-05 | End: 2021-04-05

## 2021-04-05 RX ORDER — DEXAMETHASONE SODIUM PHOSPHATE 4 MG/ML
INJECTION, SOLUTION INTRA-ARTICULAR; INTRALESIONAL; INTRAMUSCULAR; INTRAVENOUS; SOFT TISSUE
Status: DISCONTINUED | OUTPATIENT
Start: 2021-04-05 | End: 2021-04-05

## 2021-04-05 RX ORDER — LIDOCAINE HYDROCHLORIDE 20 MG/ML
INJECTION INTRAVENOUS
Status: DISCONTINUED | OUTPATIENT
Start: 2021-04-05 | End: 2021-04-05

## 2021-04-05 RX ORDER — ROCURONIUM BROMIDE 10 MG/ML
INJECTION, SOLUTION INTRAVENOUS
Status: DISCONTINUED | OUTPATIENT
Start: 2021-04-05 | End: 2021-04-05

## 2021-04-05 RX ORDER — EPHEDRINE SULFATE 50 MG/ML
INJECTION, SOLUTION INTRAVENOUS
Status: DISCONTINUED | OUTPATIENT
Start: 2021-04-05 | End: 2021-04-05

## 2021-04-05 RX ORDER — ALBUMIN HUMAN 50 G/1000ML
SOLUTION INTRAVENOUS CONTINUOUS PRN
Status: DISCONTINUED | OUTPATIENT
Start: 2021-04-05 | End: 2021-04-05

## 2021-04-05 RX ORDER — PROPOFOL 10 MG/ML
VIAL (ML) INTRAVENOUS CONTINUOUS PRN
Status: DISCONTINUED | OUTPATIENT
Start: 2021-04-05 | End: 2021-04-05

## 2021-04-05 RX ORDER — SODIUM CHLORIDE 0.9 % (FLUSH) 0.9 %
3 SYRINGE (ML) INJECTION
Status: DISCONTINUED | OUTPATIENT
Start: 2021-04-05 | End: 2021-04-05 | Stop reason: HOSPADM

## 2021-04-05 RX ORDER — OXYCODONE HYDROCHLORIDE 5 MG/1
5 TABLET ORAL
Status: DISCONTINUED | OUTPATIENT
Start: 2021-04-05 | End: 2021-04-05 | Stop reason: HOSPADM

## 2021-04-05 RX ORDER — FENTANYL CITRATE 50 UG/ML
INJECTION, SOLUTION INTRAMUSCULAR; INTRAVENOUS
Status: DISCONTINUED | OUTPATIENT
Start: 2021-04-05 | End: 2021-04-05

## 2021-04-05 RX ORDER — HYDROMORPHONE HYDROCHLORIDE 2 MG/ML
0.4 INJECTION, SOLUTION INTRAMUSCULAR; INTRAVENOUS; SUBCUTANEOUS EVERY 5 MIN PRN
Status: DISCONTINUED | OUTPATIENT
Start: 2021-04-05 | End: 2021-04-05 | Stop reason: HOSPADM

## 2021-04-05 RX ORDER — PROPOFOL 10 MG/ML
VIAL (ML) INTRAVENOUS
Status: DISCONTINUED | OUTPATIENT
Start: 2021-04-05 | End: 2021-04-05

## 2021-04-05 RX ORDER — MEPERIDINE HYDROCHLORIDE 25 MG/ML
12.5 INJECTION INTRAMUSCULAR; INTRAVENOUS; SUBCUTANEOUS ONCE AS NEEDED
Status: DISCONTINUED | OUTPATIENT
Start: 2021-04-05 | End: 2021-04-05 | Stop reason: HOSPADM

## 2021-04-05 RX ORDER — PREGABALIN 50 MG/1
50 CAPSULE ORAL ONCE
Status: COMPLETED | OUTPATIENT
Start: 2021-04-05 | End: 2021-04-05

## 2021-04-05 RX ORDER — MIDAZOLAM HYDROCHLORIDE 1 MG/ML
INJECTION INTRAMUSCULAR; INTRAVENOUS
Status: DISCONTINUED | OUTPATIENT
Start: 2021-04-05 | End: 2021-04-05

## 2021-04-05 RX ORDER — SODIUM CHLORIDE, SODIUM LACTATE, POTASSIUM CHLORIDE, CALCIUM CHLORIDE 600; 310; 30; 20 MG/100ML; MG/100ML; MG/100ML; MG/100ML
INJECTION, SOLUTION INTRAVENOUS CONTINUOUS
Status: DISCONTINUED | OUTPATIENT
Start: 2021-04-05 | End: 2021-04-05 | Stop reason: HOSPADM

## 2021-04-05 RX ORDER — CEFAZOLIN SODIUM 1 G/3ML
2 INJECTION, POWDER, FOR SOLUTION INTRAMUSCULAR; INTRAVENOUS
Status: COMPLETED | OUTPATIENT
Start: 2021-04-05 | End: 2021-04-05

## 2021-04-05 RX ORDER — ONDANSETRON 2 MG/ML
4 INJECTION INTRAMUSCULAR; INTRAVENOUS DAILY PRN
Status: DISCONTINUED | OUTPATIENT
Start: 2021-04-05 | End: 2021-04-05 | Stop reason: HOSPADM

## 2021-04-05 RX ORDER — HYDROMORPHONE HYDROCHLORIDE 2 MG/ML
INJECTION, SOLUTION INTRAMUSCULAR; INTRAVENOUS; SUBCUTANEOUS
Status: DISCONTINUED | OUTPATIENT
Start: 2021-04-05 | End: 2021-04-05

## 2021-04-05 RX ORDER — LIDOCAINE HYDROCHLORIDE 10 MG/ML
0.5 INJECTION, SOLUTION EPIDURAL; INFILTRATION; INTRACAUDAL; PERINEURAL ONCE
Status: DISCONTINUED | OUTPATIENT
Start: 2021-04-05 | End: 2021-04-05 | Stop reason: HOSPADM

## 2021-04-05 RX ADMIN — PREGABALIN 50 MG: 50 CAPSULE ORAL at 07:04

## 2021-04-05 RX ADMIN — ONDANSETRON 4 MG: 2 INJECTION INTRAMUSCULAR; INTRAVENOUS at 12:04

## 2021-04-05 RX ADMIN — MIDAZOLAM HYDROCHLORIDE 2 MG: 1 INJECTION, SOLUTION INTRAMUSCULAR; INTRAVENOUS at 08:04

## 2021-04-05 RX ADMIN — ALBUMIN (HUMAN): 12.5 SOLUTION INTRAVENOUS at 10:04

## 2021-04-05 RX ADMIN — PROMETHAZINE HYDROCHLORIDE 6.25 MG: 25 INJECTION INTRAMUSCULAR; INTRAVENOUS at 01:04

## 2021-04-05 RX ADMIN — ONDANSETRON HYDROCHLORIDE 4 MG: 2 INJECTION INTRAMUSCULAR; INTRAVENOUS at 09:04

## 2021-04-05 RX ADMIN — SODIUM CHLORIDE, SODIUM LACTATE, POTASSIUM CHLORIDE, AND CALCIUM CHLORIDE: 600; 310; 30; 20 INJECTION, SOLUTION INTRAVENOUS at 09:04

## 2021-04-05 RX ADMIN — PROPOFOL 50 MG: 10 INJECTION, EMULSION INTRAVENOUS at 09:04

## 2021-04-05 RX ADMIN — EPHEDRINE SULFATE 10 MG: 50 INJECTION INTRAVENOUS at 09:04

## 2021-04-05 RX ADMIN — FENTANYL CITRATE 100 MCG: 50 INJECTION, SOLUTION INTRAMUSCULAR; INTRAVENOUS at 08:04

## 2021-04-05 RX ADMIN — ROCURONIUM BROMIDE 40 MG: 10 SOLUTION INTRAVENOUS at 08:04

## 2021-04-05 RX ADMIN — ROCURONIUM BROMIDE 10 MG: 10 SOLUTION INTRAVENOUS at 09:04

## 2021-04-05 RX ADMIN — CEFAZOLIN 2 G: 330 INJECTION, POWDER, FOR SOLUTION INTRAMUSCULAR; INTRAVENOUS at 08:04

## 2021-04-05 RX ADMIN — PROPOFOL 125 MCG/KG/MIN: 10 INJECTION, EMULSION INTRAVENOUS at 09:04

## 2021-04-05 RX ADMIN — ACETAMINOPHEN 1000 MG: 500 TABLET, FILM COATED ORAL at 07:04

## 2021-04-05 RX ADMIN — PROPOFOL 150 MG: 10 INJECTION, EMULSION INTRAVENOUS at 08:04

## 2021-04-05 RX ADMIN — HYDROMORPHONE HYDROCHLORIDE 0.4 MG: 2 INJECTION INTRAMUSCULAR; INTRAVENOUS; SUBCUTANEOUS at 12:04

## 2021-04-05 RX ADMIN — HYDROMORPHONE HYDROCHLORIDE 1 MG: 2 INJECTION, SOLUTION INTRAMUSCULAR; INTRAVENOUS; SUBCUTANEOUS at 11:04

## 2021-04-05 RX ADMIN — SODIUM CHLORIDE, SODIUM LACTATE, POTASSIUM CHLORIDE, AND CALCIUM CHLORIDE: 600; 310; 30; 20 INJECTION, SOLUTION INTRAVENOUS at 07:04

## 2021-04-05 RX ADMIN — DEXAMETHASONE SODIUM PHOSPHATE 8 MG: 4 INJECTION, SOLUTION INTRAMUSCULAR; INTRAVENOUS at 09:04

## 2021-04-05 RX ADMIN — SCOLOPAMINE TRANSDERMAL SYSTEM 1 PATCH: 1 PATCH, EXTENDED RELEASE TRANSDERMAL at 07:04

## 2021-04-05 RX ADMIN — OXYCODONE 5 MG: 5 TABLET ORAL at 04:04

## 2021-04-05 RX ADMIN — LIDOCAINE HYDROCHLORIDE 75 MG: 20 INJECTION, SOLUTION INTRAVENOUS at 08:04

## 2021-04-05 RX ADMIN — SUGAMMADEX 200 MG: 100 INJECTION, SOLUTION INTRAVENOUS at 11:04

## 2021-04-05 RX ADMIN — CARBOXYMETHYLCELLULOSE SODIUM 2 DROP: 2.5 SOLUTION/ DROPS OPHTHALMIC at 08:04

## 2021-04-05 RX ADMIN — ROCURONIUM BROMIDE 10 MG: 10 SOLUTION INTRAVENOUS at 10:04

## 2021-04-19 ENCOUNTER — PATIENT OUTREACH (OUTPATIENT)
Dept: ADMINISTRATIVE | Facility: OTHER | Age: 66
End: 2021-04-19

## 2021-04-19 ENCOUNTER — OFFICE VISIT (OUTPATIENT)
Dept: DERMATOLOGY | Facility: CLINIC | Age: 66
End: 2021-04-19
Payer: COMMERCIAL

## 2021-04-19 VITALS — WEIGHT: 130.06 LBS | RESPIRATION RATE: 18 BRPM | HEIGHT: 67 IN | BODY MASS INDEX: 20.41 KG/M2

## 2021-04-19 DIAGNOSIS — L82.1 SEBORRHEIC KERATOSES: Primary | ICD-10-CM

## 2021-04-19 DIAGNOSIS — Z85.828 PERSONAL HISTORY OF MALIGNANT NEOPLASM OF SKIN: ICD-10-CM

## 2021-04-19 DIAGNOSIS — L60.3 NAIL DYSTROPHY: ICD-10-CM

## 2021-04-19 PROCEDURE — 1125F AMNT PAIN NOTED PAIN PRSNT: CPT | Mod: S$GLB,,, | Performed by: DERMATOLOGY

## 2021-04-19 PROCEDURE — 88312 PR  SPECIAL STAINS,GROUP I: ICD-10-PCS | Mod: 26,,, | Performed by: PATHOLOGY

## 2021-04-19 PROCEDURE — 87101 SKIN FUNGI CULTURE: CPT | Performed by: DERMATOLOGY

## 2021-04-19 PROCEDURE — 3288F PR FALLS RISK ASSESSMENT DOCUMENTED: ICD-10-PCS | Mod: CPTII,S$GLB,, | Performed by: DERMATOLOGY

## 2021-04-19 PROCEDURE — 99999 PR PBB SHADOW E&M-EST. PATIENT-LVL III: CPT | Mod: PBBFAC,,, | Performed by: DERMATOLOGY

## 2021-04-19 PROCEDURE — 1101F PT FALLS ASSESS-DOCD LE1/YR: CPT | Mod: CPTII,S$GLB,, | Performed by: DERMATOLOGY

## 2021-04-19 PROCEDURE — 88312 SPECIAL STAINS GROUP 1: CPT | Mod: 59 | Performed by: PATHOLOGY

## 2021-04-19 PROCEDURE — 3008F BODY MASS INDEX DOCD: CPT | Mod: CPTII,S$GLB,, | Performed by: DERMATOLOGY

## 2021-04-19 PROCEDURE — 88304 TISSUE EXAM BY PATHOLOGIST: CPT | Performed by: PATHOLOGY

## 2021-04-19 PROCEDURE — 88312 SPECIAL STAINS GROUP 1: CPT | Mod: 26,,, | Performed by: PATHOLOGY

## 2021-04-19 PROCEDURE — 3008F PR BODY MASS INDEX (BMI) DOCUMENTED: ICD-10-PCS | Mod: CPTII,S$GLB,, | Performed by: DERMATOLOGY

## 2021-04-19 PROCEDURE — 3288F FALL RISK ASSESSMENT DOCD: CPT | Mod: CPTII,S$GLB,, | Performed by: DERMATOLOGY

## 2021-04-19 PROCEDURE — 1159F MED LIST DOCD IN RCRD: CPT | Mod: S$GLB,,, | Performed by: DERMATOLOGY

## 2021-04-19 PROCEDURE — 1159F PR MEDICATION LIST DOCUMENTED IN MEDICAL RECORD: ICD-10-PCS | Mod: S$GLB,,, | Performed by: DERMATOLOGY

## 2021-04-19 PROCEDURE — 99213 PR OFFICE/OUTPT VISIT, EST, LEVL III, 20-29 MIN: ICD-10-PCS | Mod: S$GLB,,, | Performed by: DERMATOLOGY

## 2021-04-19 PROCEDURE — 1101F PR PT FALLS ASSESS DOC 0-1 FALLS W/OUT INJ PAST YR: ICD-10-PCS | Mod: CPTII,S$GLB,, | Performed by: DERMATOLOGY

## 2021-04-19 PROCEDURE — 88304 PR  SURG PATH,LEVEL III: ICD-10-PCS | Mod: 26,,, | Performed by: PATHOLOGY

## 2021-04-19 PROCEDURE — 99999 PR PBB SHADOW E&M-EST. PATIENT-LVL III: ICD-10-PCS | Mod: PBBFAC,,, | Performed by: DERMATOLOGY

## 2021-04-19 PROCEDURE — 88304 TISSUE EXAM BY PATHOLOGIST: CPT | Mod: 26,,, | Performed by: PATHOLOGY

## 2021-04-19 PROCEDURE — 1125F PR PAIN SEVERITY QUANTIFIED, PAIN PRESENT: ICD-10-PCS | Mod: S$GLB,,, | Performed by: DERMATOLOGY

## 2021-04-19 PROCEDURE — 99213 OFFICE O/P EST LOW 20 MIN: CPT | Mod: S$GLB,,, | Performed by: DERMATOLOGY

## 2021-04-19 RX ORDER — TOBRAMYCIN 3 MG/ML
SOLUTION/ DROPS OPHTHALMIC
Qty: 5 ML | Refills: 0 | Status: SHIPPED | OUTPATIENT
Start: 2021-04-19 | End: 2021-08-26

## 2021-04-29 ENCOUNTER — PATIENT MESSAGE (OUTPATIENT)
Dept: DERMATOLOGY | Facility: CLINIC | Age: 66
End: 2021-04-29

## 2021-05-04 NOTE — PATIENT INSTRUCTIONS
Repeat mammogram as planned.   Return to see me in 6 months.    Double O-Z Flap Text: The defect edges were debeveled with a #15 scalpel blade.  Given the location of the defect, shape of the defect and the proximity to free margins a Double O-Z flap was deemed most appropriate.  Using a sterile surgical marker, an appropriate transposition flap was drawn incorporating the defect and placing the expected incisions within the relaxed skin tension lines where possible. The area thus outlined was incised deep to adipose tissue with a #15 scalpel blade.  The skin margins were undermined to an appropriate distance in all directions utilizing iris scissors.

## 2021-05-10 LAB
FINAL PATHOLOGIC DIAGNOSIS: NORMAL
GROSS: NORMAL
Lab: NORMAL
MICROSCOPIC EXAM: NORMAL

## 2021-05-13 ENCOUNTER — PATIENT MESSAGE (OUTPATIENT)
Dept: DERMATOLOGY | Facility: CLINIC | Age: 66
End: 2021-05-13

## 2021-05-25 LAB — FUNGUS BLD CULT: ABNORMAL

## 2021-06-07 ENCOUNTER — PATIENT MESSAGE (OUTPATIENT)
Dept: DERMATOLOGY | Facility: CLINIC | Age: 66
End: 2021-06-07

## 2021-06-08 ENCOUNTER — PATIENT MESSAGE (OUTPATIENT)
Dept: HEMATOLOGY/ONCOLOGY | Facility: CLINIC | Age: 66
End: 2021-06-08

## 2021-06-08 ENCOUNTER — OFFICE VISIT (OUTPATIENT)
Dept: HEMATOLOGY/ONCOLOGY | Facility: CLINIC | Age: 66
End: 2021-06-08
Payer: COMMERCIAL

## 2021-06-08 ENCOUNTER — HOSPITAL ENCOUNTER (OUTPATIENT)
Dept: RADIOLOGY | Facility: HOSPITAL | Age: 66
Discharge: HOME | End: 2021-06-08
Attending: INTERNAL MEDICINE
Payer: COMMERCIAL

## 2021-06-08 ENCOUNTER — INFUSION (OUTPATIENT)
Dept: INFUSION THERAPY | Facility: HOSPITAL | Age: 66
End: 2021-06-08
Attending: INTERNAL MEDICINE
Payer: COMMERCIAL

## 2021-06-08 VITALS
SYSTOLIC BLOOD PRESSURE: 126 MMHG | DIASTOLIC BLOOD PRESSURE: 82 MMHG | RESPIRATION RATE: 16 BRPM | HEART RATE: 78 BPM | TEMPERATURE: 98 F

## 2021-06-08 VITALS
WEIGHT: 134.38 LBS | BODY MASS INDEX: 21.09 KG/M2 | DIASTOLIC BLOOD PRESSURE: 82 MMHG | HEIGHT: 67 IN | HEART RATE: 78 BPM | TEMPERATURE: 98 F | SYSTOLIC BLOOD PRESSURE: 126 MMHG | OXYGEN SATURATION: 98 %

## 2021-06-08 DIAGNOSIS — G62.0 CHEMOTHERAPY-INDUCED PERIPHERAL NEUROPATHY: ICD-10-CM

## 2021-06-08 DIAGNOSIS — C50.912 HER2-POSITIVE CARCINOMA OF LEFT BREAST: ICD-10-CM

## 2021-06-08 DIAGNOSIS — C50.012 MALIGNANT NEOPLASM OF NIPPLE OF LEFT BREAST IN FEMALE, UNSPECIFIED ESTROGEN RECEPTOR STATUS: Primary | ICD-10-CM

## 2021-06-08 DIAGNOSIS — M80.00XA AGE-RELATED OSTEOPOROSIS WITH CURRENT PATHOLOGICAL FRACTURE, INITIAL ENCOUNTER: Primary | ICD-10-CM

## 2021-06-08 DIAGNOSIS — Z85.3 HX OF BREAST CANCER: ICD-10-CM

## 2021-06-08 DIAGNOSIS — C50.012 MALIGNANT NEOPLASM OF NIPPLE OF LEFT BREAST IN FEMALE, UNSPECIFIED ESTROGEN RECEPTOR STATUS: ICD-10-CM

## 2021-06-08 DIAGNOSIS — T45.1X5A CHEMOTHERAPY-INDUCED PERIPHERAL NEUROPATHY: ICD-10-CM

## 2021-06-08 DIAGNOSIS — C50.512 PRIMARY CANCER OF LOWER OUTER QUADRANT OF LEFT FEMALE BREAST: ICD-10-CM

## 2021-06-08 DIAGNOSIS — C50.611 MALIGNANT NEOPLASM OF AXILLARY TAIL OF RIGHT FEMALE BREAST, UNSPECIFIED ESTROGEN RECEPTOR STATUS: ICD-10-CM

## 2021-06-08 PROCEDURE — 3288F FALL RISK ASSESSMENT DOCD: CPT | Mod: CPTII,S$GLB,, | Performed by: INTERNAL MEDICINE

## 2021-06-08 PROCEDURE — 99214 PR OFFICE/OUTPT VISIT, EST, LEVL IV, 30-39 MIN: ICD-10-PCS | Mod: S$GLB,,, | Performed by: INTERNAL MEDICINE

## 2021-06-08 PROCEDURE — 78815 NM PET CT ROUTINE: ICD-10-PCS | Mod: 26,PS,, | Performed by: RADIOLOGY

## 2021-06-08 PROCEDURE — 3008F PR BODY MASS INDEX (BMI) DOCUMENTED: ICD-10-PCS | Mod: CPTII,S$GLB,, | Performed by: INTERNAL MEDICINE

## 2021-06-08 PROCEDURE — 99214 OFFICE O/P EST MOD 30 MIN: CPT | Mod: S$GLB,,, | Performed by: INTERNAL MEDICINE

## 2021-06-08 PROCEDURE — 99999 PR PBB SHADOW E&M-EST. PATIENT-LVL III: ICD-10-PCS | Mod: PBBFAC,,, | Performed by: INTERNAL MEDICINE

## 2021-06-08 PROCEDURE — 3288F PR FALLS RISK ASSESSMENT DOCUMENTED: ICD-10-PCS | Mod: CPTII,S$GLB,, | Performed by: INTERNAL MEDICINE

## 2021-06-08 PROCEDURE — 78815 PET IMAGE W/CT SKULL-THIGH: CPT | Mod: TC,PO

## 2021-06-08 PROCEDURE — 96372 THER/PROPH/DIAG INJ SC/IM: CPT | Mod: PN

## 2021-06-08 PROCEDURE — 1126F AMNT PAIN NOTED NONE PRSNT: CPT | Mod: S$GLB,,, | Performed by: INTERNAL MEDICINE

## 2021-06-08 PROCEDURE — 1159F MED LIST DOCD IN RCRD: CPT | Mod: S$GLB,,, | Performed by: INTERNAL MEDICINE

## 2021-06-08 PROCEDURE — 1126F PR PAIN SEVERITY QUANTIFIED, NO PAIN PRESENT: ICD-10-PCS | Mod: S$GLB,,, | Performed by: INTERNAL MEDICINE

## 2021-06-08 PROCEDURE — 63600175 PHARM REV CODE 636 W HCPCS: Mod: JG,PN | Performed by: INTERNAL MEDICINE

## 2021-06-08 PROCEDURE — 1159F PR MEDICATION LIST DOCUMENTED IN MEDICAL RECORD: ICD-10-PCS | Mod: S$GLB,,, | Performed by: INTERNAL MEDICINE

## 2021-06-08 PROCEDURE — 99999 PR PBB SHADOW E&M-EST. PATIENT-LVL III: CPT | Mod: PBBFAC,,, | Performed by: INTERNAL MEDICINE

## 2021-06-08 PROCEDURE — 1101F PR PT FALLS ASSESS DOC 0-1 FALLS W/OUT INJ PAST YR: ICD-10-PCS | Mod: CPTII,S$GLB,, | Performed by: INTERNAL MEDICINE

## 2021-06-08 PROCEDURE — 78815 PET IMAGE W/CT SKULL-THIGH: CPT | Mod: 26,PS,, | Performed by: RADIOLOGY

## 2021-06-08 PROCEDURE — 3008F BODY MASS INDEX DOCD: CPT | Mod: CPTII,S$GLB,, | Performed by: INTERNAL MEDICINE

## 2021-06-08 PROCEDURE — 1101F PT FALLS ASSESS-DOCD LE1/YR: CPT | Mod: CPTII,S$GLB,, | Performed by: INTERNAL MEDICINE

## 2021-06-08 PROCEDURE — A9552 F18 FDG: HCPCS | Mod: PO

## 2021-06-08 RX ADMIN — DENOSUMAB 60 MG: 60 INJECTION SUBCUTANEOUS at 04:06

## 2021-06-09 ENCOUNTER — TELEPHONE (OUTPATIENT)
Dept: RESEARCH | Facility: HOSPITAL | Age: 66
End: 2021-06-09

## 2021-06-10 DIAGNOSIS — C50.012 MALIGNANT NEOPLASM OF NIPPLE OF LEFT BREAST IN FEMALE, UNSPECIFIED ESTROGEN RECEPTOR STATUS: Primary | ICD-10-CM

## 2021-06-10 RX ORDER — VENLAFAXINE HYDROCHLORIDE 75 MG/1
75 CAPSULE, EXTENDED RELEASE ORAL DAILY
Qty: 30 CAPSULE | Refills: 2 | Status: SHIPPED | OUTPATIENT
Start: 2021-06-10 | End: 2021-08-02

## 2021-06-11 ENCOUNTER — PATIENT MESSAGE (OUTPATIENT)
Dept: HEMATOLOGY/ONCOLOGY | Facility: CLINIC | Age: 66
End: 2021-06-11

## 2021-06-11 RX ORDER — BUPROPION HYDROCHLORIDE 150 MG/1
150 TABLET ORAL DAILY
Qty: 90 TABLET | Refills: 0 | Status: SHIPPED | OUTPATIENT
Start: 2021-06-11 | End: 2021-08-31 | Stop reason: CLARIF

## 2021-06-14 DIAGNOSIS — Z51.81 ENCOUNTER FOR MEDICATION MONITORING: Primary | ICD-10-CM

## 2021-06-14 RX ORDER — ITRACONAZOLE 100 MG/1
200 CAPSULE ORAL DAILY
Qty: 84 CAPSULE | Refills: 1 | Status: SHIPPED | OUTPATIENT
Start: 2021-06-14 | End: 2021-07-26

## 2021-06-22 ENCOUNTER — TELEPHONE (OUTPATIENT)
Dept: RESEARCH | Facility: HOSPITAL | Age: 66
End: 2021-06-22

## 2021-07-10 ENCOUNTER — PATIENT MESSAGE (OUTPATIENT)
Dept: FAMILY MEDICINE | Facility: CLINIC | Age: 66
End: 2021-07-10

## 2021-07-12 ENCOUNTER — PATIENT MESSAGE (OUTPATIENT)
Dept: OPHTHALMOLOGY | Facility: CLINIC | Age: 66
End: 2021-07-12

## 2021-07-13 ENCOUNTER — LAB VISIT (OUTPATIENT)
Dept: LAB | Facility: HOSPITAL | Age: 66
End: 2021-07-13
Attending: DERMATOLOGY
Payer: COMMERCIAL

## 2021-07-13 DIAGNOSIS — Z51.81 ENCOUNTER FOR MEDICATION MONITORING: ICD-10-CM

## 2021-07-13 LAB
ALBUMIN SERPL BCP-MCNC: 4 G/DL (ref 3.5–5.2)
ALP SERPL-CCNC: 59 U/L (ref 55–135)
ALT SERPL W/O P-5'-P-CCNC: 17 U/L (ref 10–44)
AST SERPL-CCNC: 24 U/L (ref 10–40)
BILIRUB DIRECT SERPL-MCNC: 0.2 MG/DL (ref 0.1–0.3)
BILIRUB SERPL-MCNC: 0.4 MG/DL (ref 0.1–1)
PROT SERPL-MCNC: 6.8 G/DL (ref 6–8.4)

## 2021-07-13 PROCEDURE — 36415 COLL VENOUS BLD VENIPUNCTURE: CPT | Mod: PO | Performed by: DERMATOLOGY

## 2021-07-13 PROCEDURE — 80076 HEPATIC FUNCTION PANEL: CPT | Performed by: DERMATOLOGY

## 2021-07-13 RX ORDER — VALACYCLOVIR HYDROCHLORIDE 500 MG/1
500 TABLET, FILM COATED ORAL DAILY
Qty: 90 TABLET | Refills: 1 | Status: SHIPPED | OUTPATIENT
Start: 2021-07-13 | End: 2022-02-10 | Stop reason: SDUPTHER

## 2021-07-14 DIAGNOSIS — H40.053 OCULAR HYPERTENSION, BILATERAL: ICD-10-CM

## 2021-07-14 RX ORDER — DORZOLAMIDE HCL 20 MG/ML
SOLUTION/ DROPS OPHTHALMIC
Qty: 30 ML | Refills: 4 | Status: CANCELLED | OUTPATIENT
Start: 2021-07-14

## 2021-07-15 ENCOUNTER — OFFICE VISIT (OUTPATIENT)
Dept: OPHTHALMOLOGY | Facility: CLINIC | Age: 66
End: 2021-07-15
Payer: COMMERCIAL

## 2021-07-15 DIAGNOSIS — Z98.890 HISTORY OF VITRECTOMY: ICD-10-CM

## 2021-07-15 DIAGNOSIS — H52.7 REFRACTIVE ERROR: ICD-10-CM

## 2021-07-15 DIAGNOSIS — H40.053 OHT (OCULAR HYPERTENSION), BILATERAL: Primary | ICD-10-CM

## 2021-07-15 DIAGNOSIS — Z96.1 PSEUDOPHAKIA OF BOTH EYES: ICD-10-CM

## 2021-07-15 PROCEDURE — 99214 PR OFFICE/OUTPT VISIT, EST, LEVL IV, 30-39 MIN: ICD-10-PCS | Mod: S$GLB,,, | Performed by: OPHTHALMOLOGY

## 2021-07-15 PROCEDURE — 1101F PT FALLS ASSESS-DOCD LE1/YR: CPT | Mod: CPTII,S$GLB,, | Performed by: OPHTHALMOLOGY

## 2021-07-15 PROCEDURE — 1126F AMNT PAIN NOTED NONE PRSNT: CPT | Mod: S$GLB,,, | Performed by: OPHTHALMOLOGY

## 2021-07-15 PROCEDURE — 1101F PR PT FALLS ASSESS DOC 0-1 FALLS W/OUT INJ PAST YR: ICD-10-PCS | Mod: CPTII,S$GLB,, | Performed by: OPHTHALMOLOGY

## 2021-07-15 PROCEDURE — 99999 PR PBB SHADOW E&M-EST. PATIENT-LVL III: ICD-10-PCS | Mod: PBBFAC,,, | Performed by: OPHTHALMOLOGY

## 2021-07-15 PROCEDURE — 99999 PR PBB SHADOW E&M-EST. PATIENT-LVL III: CPT | Mod: PBBFAC,,, | Performed by: OPHTHALMOLOGY

## 2021-07-15 PROCEDURE — 1126F PR PAIN SEVERITY QUANTIFIED, NO PAIN PRESENT: ICD-10-PCS | Mod: S$GLB,,, | Performed by: OPHTHALMOLOGY

## 2021-07-15 PROCEDURE — 1159F PR MEDICATION LIST DOCUMENTED IN MEDICAL RECORD: ICD-10-PCS | Mod: S$GLB,,, | Performed by: OPHTHALMOLOGY

## 2021-07-15 PROCEDURE — 92020 GONIOSCOPY: CPT | Mod: S$GLB,,, | Performed by: OPHTHALMOLOGY

## 2021-07-15 PROCEDURE — 3288F PR FALLS RISK ASSESSMENT DOCUMENTED: ICD-10-PCS | Mod: CPTII,S$GLB,, | Performed by: OPHTHALMOLOGY

## 2021-07-15 PROCEDURE — 3288F FALL RISK ASSESSMENT DOCD: CPT | Mod: CPTII,S$GLB,, | Performed by: OPHTHALMOLOGY

## 2021-07-15 PROCEDURE — 1159F MED LIST DOCD IN RCRD: CPT | Mod: S$GLB,,, | Performed by: OPHTHALMOLOGY

## 2021-07-15 PROCEDURE — 92020 PR SPECIAL EYE EVAL,GONIOSCOPY: ICD-10-PCS | Mod: S$GLB,,, | Performed by: OPHTHALMOLOGY

## 2021-07-15 PROCEDURE — 99214 OFFICE O/P EST MOD 30 MIN: CPT | Mod: S$GLB,,, | Performed by: OPHTHALMOLOGY

## 2021-07-28 ENCOUNTER — PATIENT MESSAGE (OUTPATIENT)
Dept: FAMILY MEDICINE | Facility: CLINIC | Age: 66
End: 2021-07-28

## 2021-07-28 ENCOUNTER — TELEPHONE (OUTPATIENT)
Dept: FAMILY MEDICINE | Facility: CLINIC | Age: 66
End: 2021-07-28

## 2021-07-28 DIAGNOSIS — R07.9 CHEST PAIN, UNSPECIFIED TYPE: Primary | ICD-10-CM

## 2021-07-29 ENCOUNTER — TELEPHONE (OUTPATIENT)
Dept: FAMILY MEDICINE | Facility: CLINIC | Age: 66
End: 2021-07-29

## 2021-08-02 ENCOUNTER — OFFICE VISIT (OUTPATIENT)
Dept: FAMILY MEDICINE | Facility: CLINIC | Age: 66
End: 2021-08-02
Payer: COMMERCIAL

## 2021-08-02 VITALS
TEMPERATURE: 98 F | DIASTOLIC BLOOD PRESSURE: 66 MMHG | WEIGHT: 134.56 LBS | OXYGEN SATURATION: 99 % | HEART RATE: 60 BPM | SYSTOLIC BLOOD PRESSURE: 102 MMHG | HEIGHT: 67 IN | BODY MASS INDEX: 21.12 KG/M2

## 2021-08-02 DIAGNOSIS — R07.89 INTERMITTENT LEFT-SIDED CHEST PAIN: Primary | ICD-10-CM

## 2021-08-02 DIAGNOSIS — R06.02 SHORTNESS OF BREATH: Chronic | ICD-10-CM

## 2021-08-02 DIAGNOSIS — Z85.3 HX OF BREAST CANCER: ICD-10-CM

## 2021-08-02 PROBLEM — Z01.818 PREOP TESTING: Status: RESOLVED | Noted: 2020-10-26 | Resolved: 2021-08-02

## 2021-08-02 PROBLEM — Z98.890 POST-OPERATIVE STATE: Status: RESOLVED | Noted: 2019-01-04 | Resolved: 2021-08-02

## 2021-08-02 PROCEDURE — 99999 PR PBB SHADOW E&M-EST. PATIENT-LVL IV: CPT | Mod: PBBFAC,,, | Performed by: PHYSICIAN ASSISTANT

## 2021-08-02 PROCEDURE — 3074F SYST BP LT 130 MM HG: CPT | Mod: CPTII,S$GLB,, | Performed by: PHYSICIAN ASSISTANT

## 2021-08-02 PROCEDURE — 93010 ELECTROCARDIOGRAM REPORT: CPT | Mod: S$GLB,,, | Performed by: INTERNAL MEDICINE

## 2021-08-02 PROCEDURE — 93005 ELECTROCARDIOGRAM TRACING: CPT | Mod: S$GLB,,, | Performed by: PHYSICIAN ASSISTANT

## 2021-08-02 PROCEDURE — 99999 PR PBB SHADOW E&M-EST. PATIENT-LVL IV: ICD-10-PCS | Mod: PBBFAC,,, | Performed by: PHYSICIAN ASSISTANT

## 2021-08-02 PROCEDURE — 3008F BODY MASS INDEX DOCD: CPT | Mod: CPTII,S$GLB,, | Performed by: PHYSICIAN ASSISTANT

## 2021-08-02 PROCEDURE — 1159F MED LIST DOCD IN RCRD: CPT | Mod: CPTII,S$GLB,, | Performed by: PHYSICIAN ASSISTANT

## 2021-08-02 PROCEDURE — 1125F PR PAIN SEVERITY QUANTIFIED, PAIN PRESENT: ICD-10-PCS | Mod: CPTII,S$GLB,, | Performed by: PHYSICIAN ASSISTANT

## 2021-08-02 PROCEDURE — 99214 OFFICE O/P EST MOD 30 MIN: CPT | Mod: S$GLB,,, | Performed by: PHYSICIAN ASSISTANT

## 2021-08-02 PROCEDURE — 3008F PR BODY MASS INDEX (BMI) DOCUMENTED: ICD-10-PCS | Mod: CPTII,S$GLB,, | Performed by: PHYSICIAN ASSISTANT

## 2021-08-02 PROCEDURE — 3078F DIAST BP <80 MM HG: CPT | Mod: CPTII,S$GLB,, | Performed by: PHYSICIAN ASSISTANT

## 2021-08-02 PROCEDURE — 3074F PR MOST RECENT SYSTOLIC BLOOD PRESSURE < 130 MM HG: ICD-10-PCS | Mod: CPTII,S$GLB,, | Performed by: PHYSICIAN ASSISTANT

## 2021-08-02 PROCEDURE — 3078F PR MOST RECENT DIASTOLIC BLOOD PRESSURE < 80 MM HG: ICD-10-PCS | Mod: CPTII,S$GLB,, | Performed by: PHYSICIAN ASSISTANT

## 2021-08-02 PROCEDURE — 1101F PT FALLS ASSESS-DOCD LE1/YR: CPT | Mod: CPTII,S$GLB,, | Performed by: PHYSICIAN ASSISTANT

## 2021-08-02 PROCEDURE — 1125F AMNT PAIN NOTED PAIN PRSNT: CPT | Mod: CPTII,S$GLB,, | Performed by: PHYSICIAN ASSISTANT

## 2021-08-02 PROCEDURE — 3288F FALL RISK ASSESSMENT DOCD: CPT | Mod: CPTII,S$GLB,, | Performed by: PHYSICIAN ASSISTANT

## 2021-08-02 PROCEDURE — 99214 PR OFFICE/OUTPT VISIT, EST, LEVL IV, 30-39 MIN: ICD-10-PCS | Mod: S$GLB,,, | Performed by: PHYSICIAN ASSISTANT

## 2021-08-02 PROCEDURE — 3288F PR FALLS RISK ASSESSMENT DOCUMENTED: ICD-10-PCS | Mod: CPTII,S$GLB,, | Performed by: PHYSICIAN ASSISTANT

## 2021-08-02 PROCEDURE — 1101F PR PT FALLS ASSESS DOC 0-1 FALLS W/OUT INJ PAST YR: ICD-10-PCS | Mod: CPTII,S$GLB,, | Performed by: PHYSICIAN ASSISTANT

## 2021-08-02 PROCEDURE — 93005 EKG 12-LEAD: ICD-10-PCS | Mod: S$GLB,,, | Performed by: PHYSICIAN ASSISTANT

## 2021-08-02 PROCEDURE — 1159F PR MEDICATION LIST DOCUMENTED IN MEDICAL RECORD: ICD-10-PCS | Mod: CPTII,S$GLB,, | Performed by: PHYSICIAN ASSISTANT

## 2021-08-02 PROCEDURE — 93010 EKG 12-LEAD: ICD-10-PCS | Mod: S$GLB,,, | Performed by: INTERNAL MEDICINE

## 2021-08-11 ENCOUNTER — PATIENT MESSAGE (OUTPATIENT)
Dept: FAMILY MEDICINE | Facility: CLINIC | Age: 66
End: 2021-08-11

## 2021-08-11 DIAGNOSIS — R07.9 CHEST PAIN ON EXERTION: Primary | ICD-10-CM

## 2021-08-17 ENCOUNTER — PATIENT MESSAGE (OUTPATIENT)
Dept: HEMATOLOGY/ONCOLOGY | Facility: CLINIC | Age: 66
End: 2021-08-17

## 2021-08-20 ENCOUNTER — HOSPITAL ENCOUNTER (OUTPATIENT)
Dept: CARDIOLOGY | Facility: HOSPITAL | Age: 66
Discharge: HOME | End: 2021-08-20
Attending: PHYSICIAN ASSISTANT
Payer: COMMERCIAL

## 2021-08-20 VITALS — WEIGHT: 134 LBS | HEIGHT: 67 IN | BODY MASS INDEX: 21.03 KG/M2

## 2021-08-20 DIAGNOSIS — R07.9 CHEST PAIN ON EXERTION: ICD-10-CM

## 2021-08-20 LAB
AORTIC ROOT ANNULUS: 2.91 CM
ASCENDING AORTA: 2.89 CM
AV INDEX (PROSTH): 0.66
AV MEAN GRADIENT: 4 MMHG
AV PEAK GRADIENT: 7 MMHG
AV VALVE AREA: 2.5 CM2
AV VELOCITY RATIO: 0.73
BSA FOR ECHO PROCEDURE: 1.69 M2
CV ECHO LV RWT: 0.2 CM
CV STRESS BASE HR: 80 BPM
DIASTOLIC BLOOD PRESSURE: 99 MMHG
DOP CALC AO PEAK VEL: 1.35 M/S
DOP CALC AO VTI: 23.02 CM
DOP CALC LVOT AREA: 3.8 CM2
DOP CALC LVOT DIAMETER: 2.2 CM
DOP CALC LVOT PEAK VEL: 0.99 M/S
DOP CALC LVOT STROKE VOLUME: 57.52 CM3
DOP CALCLVOT PEAK VEL VTI: 15.14 CM
E WAVE DECELERATION TIME: 164.03 MSEC
E/A RATIO: 0.72
E/E' RATIO: 15.71 M/S
ECHO LV POSTERIOR WALL: 0.57 CM (ref 0.6–1.1)
EJECTION FRACTION: 50 %
FRACTIONAL SHORTENING: 28 % (ref 28–44)
INTERVENTRICULAR SEPTUM: 0.6 CM (ref 0.6–1.1)
IVRT: 88.49 MSEC
LA MAJOR: 4.25 CM
LA MINOR: 4.84 CM
LA WIDTH: 3.58 CM
LEFT ATRIUM SIZE: 3.28 CM
LEFT ATRIUM VOLUME INDEX MOD: 23.2 ML/M2
LEFT ATRIUM VOLUME INDEX: 26.4 ML/M2
LEFT ATRIUM VOLUME MOD: 39.59 CM3
LEFT ATRIUM VOLUME: 45.17 CM3
LEFT INTERNAL DIMENSION IN SYSTOLE: 4.1 CM (ref 2.1–4)
LEFT VENTRICLE DIASTOLIC VOLUME INDEX: 93.78 ML/M2
LEFT VENTRICLE DIASTOLIC VOLUME: 160.37 ML
LEFT VENTRICLE MASS INDEX: 68 G/M2
LEFT VENTRICLE SYSTOLIC VOLUME INDEX: 43.4 ML/M2
LEFT VENTRICLE SYSTOLIC VOLUME: 74.13 ML
LEFT VENTRICULAR INTERNAL DIMENSION IN DIASTOLE: 5.7 CM (ref 3.5–6)
LEFT VENTRICULAR MASS: 116.29 G
LV LATERAL E/E' RATIO: 13.75 M/S
LV SEPTAL E/E' RATIO: 18.33 M/S
MV PEAK A VEL: 0.76 M/S
MV PEAK E VEL: 0.55 M/S
MV PEAK GRADIENT: 3 MMHG
MV STENOSIS PRESSURE HALF TIME: 47.57 MS
MV VALVE AREA P 1/2 METHOD: 4.62 CM2
OHS CV CPX 1 MINUTE RECOVERY HEART RATE: 127 BPM
OHS CV CPX 85 PERCENT MAX PREDICTED HEART RATE MALE: 126
OHS CV CPX ESTIMATED METS: 12
OHS CV CPX MAX PREDICTED HEART RATE: 148
OHS CV CPX PATIENT IS FEMALE: 1
OHS CV CPX PATIENT IS MALE: 0
OHS CV CPX PEAK DIASTOLIC BLOOD PRESSURE: 94 MMHG
OHS CV CPX PEAK HEAR RATE: 160 BPM
OHS CV CPX PEAK RATE PRESSURE PRODUCT: ABNORMAL
OHS CV CPX PEAK SYSTOLIC BLOOD PRESSURE: 181 MMHG
OHS CV CPX PERCENT MAX PREDICTED HEART RATE ACHIEVED: 108
OHS CV CPX RATE PRESSURE PRODUCT PRESENTING: ABNORMAL
PISA MRMAX VEL: 0.06 M/S
PISA TR MAX VEL: 2 M/S
PULM VEIN S/D RATIO: 1.48
PV PEAK D VEL: 0.31 M/S
PV PEAK S VEL: 0.46 M/S
PV PEAK VELOCITY: 0.85 CM/S
RA MAJOR: 4.37 CM
RA PRESSURE: 3 MMHG
RA WIDTH: 2.6 CM
RIGHT VENTRICULAR END-DIASTOLIC DIMENSION: 2.86 CM
RV TISSUE DOPPLER FREE WALL SYSTOLIC VELOCITY 1 (APICAL 4 CHAMBER VIEW): 13.93 CM/S
STJ: 2.75 CM
STRESS ANGINA INDEX: 0
STRESS ECHO POST EXERCISE DUR MIN: 9 MINUTES
STRESS ECHO POST EXERCISE DUR SEC: 57 SECONDS
SYSTOLIC BLOOD PRESSURE: 147 MMHG
TDI LATERAL: 0.04 M/S
TDI SEPTAL: 0.03 M/S
TDI: 0.04 M/S
TR MAX PG: 16 MMHG
TV REST PULMONARY ARTERY PRESSURE: 19 MMHG

## 2021-08-20 PROCEDURE — 93325 DOPPLER ECHO COLOR FLOW MAPG: CPT | Mod: 26,,, | Performed by: INTERNAL MEDICINE

## 2021-08-20 PROCEDURE — 93351 STRESS TTE COMPLETE: CPT | Mod: 26,,, | Performed by: INTERNAL MEDICINE

## 2021-08-20 PROCEDURE — 93325 STRESS ECHO (CUPID ONLY): ICD-10-PCS | Mod: 26,,, | Performed by: INTERNAL MEDICINE

## 2021-08-20 PROCEDURE — 93320 DOPPLER ECHO COMPLETE: CPT | Mod: 26,,, | Performed by: INTERNAL MEDICINE

## 2021-08-20 PROCEDURE — 93351 STRESS TTE COMPLETE: CPT

## 2021-08-20 PROCEDURE — 93320 STRESS ECHO (CUPID ONLY): ICD-10-PCS | Mod: 26,,, | Performed by: INTERNAL MEDICINE

## 2021-08-20 PROCEDURE — 93351 STRESS ECHO (CUPID ONLY): ICD-10-PCS | Mod: 26,,, | Performed by: INTERNAL MEDICINE

## 2021-08-21 ENCOUNTER — PATIENT MESSAGE (OUTPATIENT)
Dept: FAMILY MEDICINE | Facility: CLINIC | Age: 66
End: 2021-08-21

## 2021-08-26 ENCOUNTER — OFFICE VISIT (OUTPATIENT)
Dept: CARDIOLOGY | Facility: CLINIC | Age: 66
End: 2021-08-26
Payer: COMMERCIAL

## 2021-08-26 VITALS
SYSTOLIC BLOOD PRESSURE: 130 MMHG | HEART RATE: 91 BPM | RESPIRATION RATE: 16 BRPM | DIASTOLIC BLOOD PRESSURE: 100 MMHG | HEIGHT: 67 IN | OXYGEN SATURATION: 97 % | WEIGHT: 129 LBS | BODY MASS INDEX: 20.25 KG/M2

## 2021-08-26 DIAGNOSIS — R06.02 SHORTNESS OF BREATH: Chronic | ICD-10-CM

## 2021-08-26 DIAGNOSIS — R07.89 INTERMITTENT LEFT-SIDED CHEST PAIN: ICD-10-CM

## 2021-08-26 DIAGNOSIS — C50.512 PRIMARY CANCER OF LOWER OUTER QUADRANT OF LEFT FEMALE BREAST: ICD-10-CM

## 2021-08-26 DIAGNOSIS — G62.0 CHEMOTHERAPY-INDUCED PERIPHERAL NEUROPATHY: Primary | ICD-10-CM

## 2021-08-26 DIAGNOSIS — Z01.818 PRE-OP TESTING: ICD-10-CM

## 2021-08-26 DIAGNOSIS — T45.1X5A CHEMOTHERAPY-INDUCED PERIPHERAL NEUROPATHY: Primary | ICD-10-CM

## 2021-08-26 DIAGNOSIS — J70.0 RADIATION PNEUMONITIS: ICD-10-CM

## 2021-08-26 PROBLEM — I25.110 ATHEROSCLEROSIS OF NATIVE CORONARY ARTERY OF NATIVE HEART WITH UNSTABLE ANGINA PECTORIS: Status: ACTIVE | Noted: 2021-08-26

## 2021-08-26 PROCEDURE — 3080F PR MOST RECENT DIASTOLIC BLOOD PRESSURE >= 90 MM HG: ICD-10-PCS | Mod: CPTII,S$GLB,, | Performed by: INTERNAL MEDICINE

## 2021-08-26 PROCEDURE — 3288F PR FALLS RISK ASSESSMENT DOCUMENTED: ICD-10-PCS | Mod: CPTII,S$GLB,, | Performed by: INTERNAL MEDICINE

## 2021-08-26 PROCEDURE — 3075F PR MOST RECENT SYSTOLIC BLOOD PRESS GE 130-139MM HG: ICD-10-PCS | Mod: CPTII,S$GLB,, | Performed by: INTERNAL MEDICINE

## 2021-08-26 PROCEDURE — 1125F PR PAIN SEVERITY QUANTIFIED, PAIN PRESENT: ICD-10-PCS | Mod: CPTII,S$GLB,, | Performed by: INTERNAL MEDICINE

## 2021-08-26 PROCEDURE — 3008F BODY MASS INDEX DOCD: CPT | Mod: CPTII,S$GLB,, | Performed by: INTERNAL MEDICINE

## 2021-08-26 PROCEDURE — 1125F AMNT PAIN NOTED PAIN PRSNT: CPT | Mod: CPTII,S$GLB,, | Performed by: INTERNAL MEDICINE

## 2021-08-26 PROCEDURE — 1160F PR REVIEW ALL MEDS BY PRESCRIBER/CLIN PHARMACIST DOCUMENTED: ICD-10-PCS | Mod: CPTII,S$GLB,, | Performed by: INTERNAL MEDICINE

## 2021-08-26 PROCEDURE — 3288F FALL RISK ASSESSMENT DOCD: CPT | Mod: CPTII,S$GLB,, | Performed by: INTERNAL MEDICINE

## 2021-08-26 PROCEDURE — 99204 OFFICE O/P NEW MOD 45 MIN: CPT | Mod: S$GLB,,, | Performed by: INTERNAL MEDICINE

## 2021-08-26 PROCEDURE — 3080F DIAST BP >= 90 MM HG: CPT | Mod: CPTII,S$GLB,, | Performed by: INTERNAL MEDICINE

## 2021-08-26 PROCEDURE — 3008F PR BODY MASS INDEX (BMI) DOCUMENTED: ICD-10-PCS | Mod: CPTII,S$GLB,, | Performed by: INTERNAL MEDICINE

## 2021-08-26 PROCEDURE — 99204 PR OFFICE/OUTPT VISIT, NEW, LEVL IV, 45-59 MIN: ICD-10-PCS | Mod: S$GLB,,, | Performed by: INTERNAL MEDICINE

## 2021-08-26 PROCEDURE — 1159F MED LIST DOCD IN RCRD: CPT | Mod: CPTII,S$GLB,, | Performed by: INTERNAL MEDICINE

## 2021-08-26 PROCEDURE — 3075F SYST BP GE 130 - 139MM HG: CPT | Mod: CPTII,S$GLB,, | Performed by: INTERNAL MEDICINE

## 2021-08-26 PROCEDURE — 1159F PR MEDICATION LIST DOCUMENTED IN MEDICAL RECORD: ICD-10-PCS | Mod: CPTII,S$GLB,, | Performed by: INTERNAL MEDICINE

## 2021-08-26 PROCEDURE — 1101F PT FALLS ASSESS-DOCD LE1/YR: CPT | Mod: CPTII,S$GLB,, | Performed by: INTERNAL MEDICINE

## 2021-08-26 PROCEDURE — 1101F PR PT FALLS ASSESS DOC 0-1 FALLS W/OUT INJ PAST YR: ICD-10-PCS | Mod: CPTII,S$GLB,, | Performed by: INTERNAL MEDICINE

## 2021-08-26 PROCEDURE — 1160F RVW MEDS BY RX/DR IN RCRD: CPT | Mod: CPTII,S$GLB,, | Performed by: INTERNAL MEDICINE

## 2021-08-26 RX ORDER — DIPHENHYDRAMINE HCL 25 MG
50 CAPSULE ORAL
Status: CANCELLED | OUTPATIENT
Start: 2021-08-26

## 2021-08-30 ENCOUNTER — PATIENT MESSAGE (OUTPATIENT)
Dept: INFUSION THERAPY | Facility: HOSPITAL | Age: 66
End: 2021-08-30

## 2021-08-31 ENCOUNTER — HOSPITAL ENCOUNTER (OUTPATIENT)
Dept: PREADMISSION TESTING | Facility: HOSPITAL | Age: 66
Discharge: HOME | End: 2021-08-31
Attending: INTERNAL MEDICINE
Payer: COMMERCIAL

## 2021-08-31 VITALS — BODY MASS INDEX: 20.4 KG/M2 | HEIGHT: 67 IN | WEIGHT: 130 LBS

## 2021-08-31 DIAGNOSIS — G62.0 CHEMOTHERAPY-INDUCED PERIPHERAL NEUROPATHY: ICD-10-CM

## 2021-08-31 DIAGNOSIS — R07.89 INTERMITTENT LEFT-SIDED CHEST PAIN: ICD-10-CM

## 2021-08-31 DIAGNOSIS — T45.1X5A CHEMOTHERAPY-INDUCED PERIPHERAL NEUROPATHY: ICD-10-CM

## 2021-08-31 DIAGNOSIS — Z01.818 PRE-OP TESTING: ICD-10-CM

## 2021-08-31 DIAGNOSIS — J70.0 RADIATION PNEUMONITIS: ICD-10-CM

## 2021-08-31 LAB
ALBUMIN SERPL BCP-MCNC: 4.4 G/DL (ref 3.5–5.2)
ALP SERPL-CCNC: 48 U/L (ref 55–135)
ALT SERPL W/O P-5'-P-CCNC: 19 U/L (ref 10–44)
ANION GAP SERPL CALC-SCNC: 10 MMOL/L (ref 8–16)
APTT PPP: 31.4 SEC (ref 25.6–35.8)
AST SERPL-CCNC: 23 U/L (ref 10–40)
BASOPHILS # BLD AUTO: 0.04 K/UL (ref 0–0.2)
BASOPHILS NFR BLD: 0.5 % (ref 0–1.9)
BILIRUB SERPL-MCNC: 1 MG/DL (ref 0.1–1)
BUN SERPL-MCNC: 9 MG/DL (ref 8–23)
CALCIUM SERPL-MCNC: 9.8 MG/DL (ref 8.7–10.5)
CHLORIDE SERPL-SCNC: 107 MMOL/L (ref 95–110)
CO2 SERPL-SCNC: 25 MMOL/L (ref 23–29)
CREAT SERPL-MCNC: 0.8 MG/DL (ref 0.5–1.4)
DIFFERENTIAL METHOD: ABNORMAL
EOSINOPHIL # BLD AUTO: 0.1 K/UL (ref 0–0.5)
EOSINOPHIL NFR BLD: 1.7 % (ref 0–8)
ERYTHROCYTE [DISTWIDTH] IN BLOOD BY AUTOMATED COUNT: 12 % (ref 11.5–14.5)
EST. GFR  (AFRICAN AMERICAN): >60 ML/MIN/1.73 M^2
EST. GFR  (NON AFRICAN AMERICAN): >60 ML/MIN/1.73 M^2
GLUCOSE SERPL-MCNC: 85 MG/DL (ref 70–110)
HCT VFR BLD AUTO: 38.4 % (ref 37–48.5)
HGB BLD-MCNC: 13 G/DL (ref 12–16)
IMM GRANULOCYTES # BLD AUTO: 0.03 K/UL (ref 0–0.04)
IMM GRANULOCYTES NFR BLD AUTO: 0.4 % (ref 0–0.5)
INR PPP: 1.1
LYMPHOCYTES # BLD AUTO: 2.1 K/UL (ref 1–4.8)
LYMPHOCYTES NFR BLD: 25.3 % (ref 18–48)
MCH RBC QN AUTO: 34.5 PG (ref 27–31)
MCHC RBC AUTO-ENTMCNC: 33.9 G/DL (ref 32–36)
MCV RBC AUTO: 102 FL (ref 82–98)
MONOCYTES # BLD AUTO: 0.5 K/UL (ref 0.3–1)
MONOCYTES NFR BLD: 6.3 % (ref 4–15)
NEUTROPHILS # BLD AUTO: 5.5 K/UL (ref 1.8–7.7)
NEUTROPHILS NFR BLD: 65.8 % (ref 38–73)
NRBC BLD-RTO: 0 /100 WBC
PLATELET # BLD AUTO: 236 K/UL (ref 150–450)
PMV BLD AUTO: 10.9 FL (ref 9.2–12.9)
POTASSIUM SERPL-SCNC: 3.4 MMOL/L (ref 3.5–5.1)
PROT SERPL-MCNC: 7 G/DL (ref 6–8.4)
PROTHROMBIN TIME: 13.8 SEC (ref 11.8–14.3)
RBC # BLD AUTO: 3.77 M/UL (ref 4–5.4)
SODIUM SERPL-SCNC: 142 MMOL/L (ref 136–145)
WBC # BLD AUTO: 8.37 K/UL (ref 3.9–12.7)

## 2021-08-31 PROCEDURE — 85730 THROMBOPLASTIN TIME PARTIAL: CPT | Performed by: INTERNAL MEDICINE

## 2021-08-31 PROCEDURE — 80053 COMPREHEN METABOLIC PANEL: CPT | Performed by: INTERNAL MEDICINE

## 2021-08-31 PROCEDURE — 85025 COMPLETE CBC W/AUTO DIFF WBC: CPT | Performed by: INTERNAL MEDICINE

## 2021-08-31 PROCEDURE — 85610 PROTHROMBIN TIME: CPT | Performed by: INTERNAL MEDICINE

## 2021-08-31 PROCEDURE — 36415 COLL VENOUS BLD VENIPUNCTURE: CPT | Performed by: INTERNAL MEDICINE

## 2021-09-02 ENCOUNTER — HOSPITAL ENCOUNTER (OUTPATIENT)
Facility: HOSPITAL | Age: 66
Discharge: HOME | End: 2021-09-02
Attending: INTERNAL MEDICINE | Admitting: INTERNAL MEDICINE
Payer: COMMERCIAL

## 2021-09-02 VITALS
SYSTOLIC BLOOD PRESSURE: 127 MMHG | TEMPERATURE: 98 F | OXYGEN SATURATION: 99 % | RESPIRATION RATE: 20 BRPM | DIASTOLIC BLOOD PRESSURE: 68 MMHG | HEART RATE: 70 BPM

## 2021-09-02 DIAGNOSIS — J70.0 RADIATION PNEUMONITIS: ICD-10-CM

## 2021-09-02 DIAGNOSIS — T45.1X5A CHEMOTHERAPY-INDUCED PERIPHERAL NEUROPATHY: ICD-10-CM

## 2021-09-02 DIAGNOSIS — G62.0 CHEMOTHERAPY-INDUCED PERIPHERAL NEUROPATHY: ICD-10-CM

## 2021-09-02 DIAGNOSIS — R07.89 INTERMITTENT LEFT-SIDED CHEST PAIN: ICD-10-CM

## 2021-09-02 LAB
CATH EF QUANTITATIVE: 55 %
SARS-COV-2 RDRP RESP QL NAA+PROBE: NEGATIVE

## 2021-09-02 PROCEDURE — C1769 GUIDE WIRE: HCPCS | Performed by: INTERNAL MEDICINE

## 2021-09-02 PROCEDURE — 25500020 PHARM REV CODE 255: Performed by: INTERNAL MEDICINE

## 2021-09-02 PROCEDURE — 93458 L HRT ARTERY/VENTRICLE ANGIO: CPT | Mod: 26,,, | Performed by: INTERNAL MEDICINE

## 2021-09-02 PROCEDURE — 99153 MOD SED SAME PHYS/QHP EA: CPT | Performed by: INTERNAL MEDICINE

## 2021-09-02 PROCEDURE — 99152 PR MOD CONSCIOUS SEDATION, SAME PHYS, 5+ YRS, FIRST 15 MIN: ICD-10-PCS | Mod: ,,, | Performed by: INTERNAL MEDICINE

## 2021-09-02 PROCEDURE — 99152 MOD SED SAME PHYS/QHP 5/>YRS: CPT | Mod: ,,, | Performed by: INTERNAL MEDICINE

## 2021-09-02 PROCEDURE — U0002 COVID-19 LAB TEST NON-CDC: HCPCS | Performed by: INTERNAL MEDICINE

## 2021-09-02 PROCEDURE — 63600175 PHARM REV CODE 636 W HCPCS: Performed by: INTERNAL MEDICINE

## 2021-09-02 PROCEDURE — C1887 CATHETER, GUIDING: HCPCS | Performed by: INTERNAL MEDICINE

## 2021-09-02 PROCEDURE — C1894 INTRO/SHEATH, NON-LASER: HCPCS | Performed by: INTERNAL MEDICINE

## 2021-09-02 PROCEDURE — 99152 MOD SED SAME PHYS/QHP 5/>YRS: CPT | Performed by: INTERNAL MEDICINE

## 2021-09-02 PROCEDURE — 25000003 PHARM REV CODE 250: Performed by: INTERNAL MEDICINE

## 2021-09-02 PROCEDURE — 93458 L HRT ARTERY/VENTRICLE ANGIO: CPT | Performed by: INTERNAL MEDICINE

## 2021-09-02 PROCEDURE — 93458 PR CATH PLACE/CORON ANGIO, IMG SUPER/INTERP,W LEFT HEART VENTRICULOGRAPHY: ICD-10-PCS | Mod: 26,,, | Performed by: INTERNAL MEDICINE

## 2021-09-02 RX ORDER — HEPARIN SODIUM 10000 [USP'U]/ML
INJECTION, SOLUTION INTRAVENOUS; SUBCUTANEOUS
Status: DISCONTINUED | OUTPATIENT
Start: 2021-09-02 | End: 2021-09-02 | Stop reason: HOSPADM

## 2021-09-02 RX ORDER — POTASSIUM CHLORIDE 20 MEQ/1
40 TABLET, EXTENDED RELEASE ORAL
Status: DISCONTINUED | OUTPATIENT
Start: 2021-09-02 | End: 2021-09-02 | Stop reason: HOSPADM

## 2021-09-02 RX ORDER — ONDANSETRON 4 MG/1
8 TABLET, ORALLY DISINTEGRATING ORAL EVERY 8 HOURS PRN
Status: DISCONTINUED | OUTPATIENT
Start: 2021-09-02 | End: 2021-09-02 | Stop reason: HOSPADM

## 2021-09-02 RX ORDER — FENTANYL CITRATE 50 UG/ML
INJECTION, SOLUTION INTRAMUSCULAR; INTRAVENOUS
Status: DISCONTINUED | OUTPATIENT
Start: 2021-09-02 | End: 2021-09-02 | Stop reason: HOSPADM

## 2021-09-02 RX ORDER — LIDOCAINE HYDROCHLORIDE 10 MG/ML
INJECTION, SOLUTION EPIDURAL; INFILTRATION; INTRACAUDAL; PERINEURAL
Status: DISCONTINUED | OUTPATIENT
Start: 2021-09-02 | End: 2021-09-02 | Stop reason: HOSPADM

## 2021-09-02 RX ORDER — MIDAZOLAM HYDROCHLORIDE 1 MG/ML
INJECTION INTRAMUSCULAR; INTRAVENOUS
Status: DISCONTINUED | OUTPATIENT
Start: 2021-09-02 | End: 2021-09-02 | Stop reason: HOSPADM

## 2021-09-02 RX ORDER — DIPHENHYDRAMINE HCL 25 MG
50 CAPSULE ORAL
Status: DISCONTINUED | OUTPATIENT
Start: 2021-09-02 | End: 2021-09-02 | Stop reason: HOSPADM

## 2021-09-02 RX ORDER — ACETAMINOPHEN 325 MG/1
650 TABLET ORAL EVERY 4 HOURS PRN
Status: DISCONTINUED | OUTPATIENT
Start: 2021-09-02 | End: 2021-09-02 | Stop reason: HOSPADM

## 2021-09-02 RX ORDER — SODIUM CHLORIDE 450 MG/100ML
INJECTION, SOLUTION INTRAVENOUS CONTINUOUS
Status: DISCONTINUED | OUTPATIENT
Start: 2021-09-02 | End: 2021-09-02 | Stop reason: HOSPADM

## 2021-09-02 RX ADMIN — SODIUM CHLORIDE: 0.45 INJECTION, SOLUTION INTRAVENOUS at 11:09

## 2021-09-02 RX ADMIN — POTASSIUM CHLORIDE 40 MEQ: 1500 TABLET, EXTENDED RELEASE ORAL at 08:09

## 2021-09-02 RX ADMIN — DIPHENHYDRAMINE HYDROCHLORIDE 50 MG: 25 CAPSULE ORAL at 08:09

## 2021-09-03 ENCOUNTER — PATIENT MESSAGE (OUTPATIENT)
Dept: CARDIOLOGY | Facility: CLINIC | Age: 66
End: 2021-09-03

## 2021-09-07 ENCOUNTER — PATIENT MESSAGE (OUTPATIENT)
Dept: HEMATOLOGY/ONCOLOGY | Facility: CLINIC | Age: 66
End: 2021-09-07

## 2021-09-08 ENCOUNTER — OFFICE VISIT (OUTPATIENT)
Dept: CARDIOLOGY | Facility: CLINIC | Age: 66
End: 2021-09-08
Payer: COMMERCIAL

## 2021-09-08 VITALS
DIASTOLIC BLOOD PRESSURE: 86 MMHG | TEMPERATURE: 97 F | OXYGEN SATURATION: 100 % | HEIGHT: 67 IN | WEIGHT: 133 LBS | SYSTOLIC BLOOD PRESSURE: 138 MMHG | BODY MASS INDEX: 20.88 KG/M2 | HEART RATE: 60 BPM

## 2021-09-08 DIAGNOSIS — T45.1X5A CHEMOTHERAPY-INDUCED PERIPHERAL NEUROPATHY: ICD-10-CM

## 2021-09-08 DIAGNOSIS — I25.110 ATHEROSCLEROSIS OF NATIVE CORONARY ARTERY OF NATIVE HEART WITH UNSTABLE ANGINA PECTORIS: Primary | ICD-10-CM

## 2021-09-08 DIAGNOSIS — J70.0 RADIATION PNEUMONITIS: ICD-10-CM

## 2021-09-08 DIAGNOSIS — R06.02 SHORTNESS OF BREATH: Chronic | ICD-10-CM

## 2021-09-08 DIAGNOSIS — G62.0 CHEMOTHERAPY-INDUCED PERIPHERAL NEUROPATHY: ICD-10-CM

## 2021-09-08 DIAGNOSIS — C50.512 PRIMARY CANCER OF LOWER OUTER QUADRANT OF LEFT FEMALE BREAST: ICD-10-CM

## 2021-09-08 PROCEDURE — 1160F PR REVIEW ALL MEDS BY PRESCRIBER/CLIN PHARMACIST DOCUMENTED: ICD-10-PCS | Mod: CPTII,S$GLB,, | Performed by: INTERNAL MEDICINE

## 2021-09-08 PROCEDURE — 3079F PR MOST RECENT DIASTOLIC BLOOD PRESSURE 80-89 MM HG: ICD-10-PCS | Mod: CPTII,S$GLB,, | Performed by: INTERNAL MEDICINE

## 2021-09-08 PROCEDURE — 3288F PR FALLS RISK ASSESSMENT DOCUMENTED: ICD-10-PCS | Mod: CPTII,S$GLB,, | Performed by: INTERNAL MEDICINE

## 2021-09-08 PROCEDURE — 3079F DIAST BP 80-89 MM HG: CPT | Mod: CPTII,S$GLB,, | Performed by: INTERNAL MEDICINE

## 2021-09-08 PROCEDURE — 1126F PR PAIN SEVERITY QUANTIFIED, NO PAIN PRESENT: ICD-10-PCS | Mod: CPTII,S$GLB,, | Performed by: INTERNAL MEDICINE

## 2021-09-08 PROCEDURE — 3008F BODY MASS INDEX DOCD: CPT | Mod: CPTII,S$GLB,, | Performed by: INTERNAL MEDICINE

## 2021-09-08 PROCEDURE — 1159F PR MEDICATION LIST DOCUMENTED IN MEDICAL RECORD: ICD-10-PCS | Mod: CPTII,S$GLB,, | Performed by: INTERNAL MEDICINE

## 2021-09-08 PROCEDURE — 3075F SYST BP GE 130 - 139MM HG: CPT | Mod: CPTII,S$GLB,, | Performed by: INTERNAL MEDICINE

## 2021-09-08 PROCEDURE — 3075F PR MOST RECENT SYSTOLIC BLOOD PRESS GE 130-139MM HG: ICD-10-PCS | Mod: CPTII,S$GLB,, | Performed by: INTERNAL MEDICINE

## 2021-09-08 PROCEDURE — 1160F RVW MEDS BY RX/DR IN RCRD: CPT | Mod: CPTII,S$GLB,, | Performed by: INTERNAL MEDICINE

## 2021-09-08 PROCEDURE — 3288F FALL RISK ASSESSMENT DOCD: CPT | Mod: CPTII,S$GLB,, | Performed by: INTERNAL MEDICINE

## 2021-09-08 PROCEDURE — 99213 PR OFFICE/OUTPT VISIT, EST, LEVL III, 20-29 MIN: ICD-10-PCS | Mod: S$GLB,,, | Performed by: INTERNAL MEDICINE

## 2021-09-08 PROCEDURE — 1101F PT FALLS ASSESS-DOCD LE1/YR: CPT | Mod: CPTII,S$GLB,, | Performed by: INTERNAL MEDICINE

## 2021-09-08 PROCEDURE — 99213 OFFICE O/P EST LOW 20 MIN: CPT | Mod: S$GLB,,, | Performed by: INTERNAL MEDICINE

## 2021-09-08 PROCEDURE — 1126F AMNT PAIN NOTED NONE PRSNT: CPT | Mod: CPTII,S$GLB,, | Performed by: INTERNAL MEDICINE

## 2021-09-08 PROCEDURE — 1101F PR PT FALLS ASSESS DOC 0-1 FALLS W/OUT INJ PAST YR: ICD-10-PCS | Mod: CPTII,S$GLB,, | Performed by: INTERNAL MEDICINE

## 2021-09-08 PROCEDURE — 1159F MED LIST DOCD IN RCRD: CPT | Mod: CPTII,S$GLB,, | Performed by: INTERNAL MEDICINE

## 2021-09-08 PROCEDURE — 3008F PR BODY MASS INDEX (BMI) DOCUMENTED: ICD-10-PCS | Mod: CPTII,S$GLB,, | Performed by: INTERNAL MEDICINE

## 2021-09-13 DIAGNOSIS — H40.053 OHT (OCULAR HYPERTENSION), BILATERAL: ICD-10-CM

## 2021-09-13 RX ORDER — LATANOPROST 50 UG/ML
SOLUTION/ DROPS OPHTHALMIC
Status: CANCELLED | OUTPATIENT
Start: 2021-09-13

## 2021-09-14 DIAGNOSIS — H40.053 OHT (OCULAR HYPERTENSION), BILATERAL: ICD-10-CM

## 2021-09-14 RX ORDER — LATANOPROST 50 UG/ML
SOLUTION/ DROPS OPHTHALMIC
Status: CANCELLED | OUTPATIENT
Start: 2021-09-14

## 2021-09-24 ENCOUNTER — HOSPITAL ENCOUNTER (OUTPATIENT)
Dept: RADIOLOGY | Facility: HOSPITAL | Age: 66
Discharge: HOME | End: 2021-09-24
Attending: SURGERY
Payer: COMMERCIAL

## 2021-09-24 DIAGNOSIS — Z12.31 SCREENING MAMMOGRAM, ENCOUNTER FOR: ICD-10-CM

## 2021-09-24 PROCEDURE — 77067 MAMMO DIGITAL SCREENING BILAT WITH TOMO: ICD-10-PCS | Mod: 26,,, | Performed by: RADIOLOGY

## 2021-09-24 PROCEDURE — 77067 SCR MAMMO BI INCL CAD: CPT | Mod: 26,,, | Performed by: RADIOLOGY

## 2021-09-24 PROCEDURE — 77067 SCR MAMMO BI INCL CAD: CPT | Mod: TC,PO

## 2021-09-24 PROCEDURE — 77063 BREAST TOMOSYNTHESIS BI: CPT | Mod: 26,,, | Performed by: RADIOLOGY

## 2021-09-24 PROCEDURE — 77063 MAMMO DIGITAL SCREENING BILAT WITH TOMO: ICD-10-PCS | Mod: 26,,, | Performed by: RADIOLOGY

## 2021-10-12 ENCOUNTER — PATIENT OUTREACH (OUTPATIENT)
Dept: ADMINISTRATIVE | Facility: OTHER | Age: 66
End: 2021-10-12

## 2021-10-19 ENCOUNTER — OFFICE VISIT (OUTPATIENT)
Dept: OPHTHALMOLOGY | Facility: CLINIC | Age: 66
End: 2021-10-19
Payer: COMMERCIAL

## 2021-10-19 DIAGNOSIS — Z96.1 PSEUDOPHAKIA OF BOTH EYES: ICD-10-CM

## 2021-10-19 DIAGNOSIS — H40.053 OHT (OCULAR HYPERTENSION), BILATERAL: Primary | ICD-10-CM

## 2021-10-19 DIAGNOSIS — H52.7 REFRACTIVE ERROR: ICD-10-CM

## 2021-10-19 DIAGNOSIS — Z98.890 HISTORY OF VITRECTOMY: ICD-10-CM

## 2021-10-19 PROCEDURE — 99999 PR PBB SHADOW E&M-EST. PATIENT-LVL II: ICD-10-PCS | Mod: PBBFAC,,, | Performed by: OPHTHALMOLOGY

## 2021-10-19 PROCEDURE — 1159F PR MEDICATION LIST DOCUMENTED IN MEDICAL RECORD: ICD-10-PCS | Mod: CPTII,S$GLB,, | Performed by: OPHTHALMOLOGY

## 2021-10-19 PROCEDURE — 1126F AMNT PAIN NOTED NONE PRSNT: CPT | Mod: CPTII,S$GLB,, | Performed by: OPHTHALMOLOGY

## 2021-10-19 PROCEDURE — 1126F PR PAIN SEVERITY QUANTIFIED, NO PAIN PRESENT: ICD-10-PCS | Mod: CPTII,S$GLB,, | Performed by: OPHTHALMOLOGY

## 2021-10-19 PROCEDURE — 1160F RVW MEDS BY RX/DR IN RCRD: CPT | Mod: CPTII,S$GLB,, | Performed by: OPHTHALMOLOGY

## 2021-10-19 PROCEDURE — 1159F MED LIST DOCD IN RCRD: CPT | Mod: CPTII,S$GLB,, | Performed by: OPHTHALMOLOGY

## 2021-10-19 PROCEDURE — 99213 PR OFFICE/OUTPT VISIT, EST, LEVL III, 20-29 MIN: ICD-10-PCS | Mod: S$GLB,,, | Performed by: OPHTHALMOLOGY

## 2021-10-19 PROCEDURE — 1160F PR REVIEW ALL MEDS BY PRESCRIBER/CLIN PHARMACIST DOCUMENTED: ICD-10-PCS | Mod: CPTII,S$GLB,, | Performed by: OPHTHALMOLOGY

## 2021-10-19 PROCEDURE — 99213 OFFICE O/P EST LOW 20 MIN: CPT | Mod: S$GLB,,, | Performed by: OPHTHALMOLOGY

## 2021-10-19 PROCEDURE — 1101F PT FALLS ASSESS-DOCD LE1/YR: CPT | Mod: CPTII,S$GLB,, | Performed by: OPHTHALMOLOGY

## 2021-10-19 PROCEDURE — 3288F PR FALLS RISK ASSESSMENT DOCUMENTED: ICD-10-PCS | Mod: CPTII,S$GLB,, | Performed by: OPHTHALMOLOGY

## 2021-10-19 PROCEDURE — 3288F FALL RISK ASSESSMENT DOCD: CPT | Mod: CPTII,S$GLB,, | Performed by: OPHTHALMOLOGY

## 2021-10-19 PROCEDURE — 1101F PR PT FALLS ASSESS DOC 0-1 FALLS W/OUT INJ PAST YR: ICD-10-PCS | Mod: CPTII,S$GLB,, | Performed by: OPHTHALMOLOGY

## 2021-10-19 PROCEDURE — 99999 PR PBB SHADOW E&M-EST. PATIENT-LVL II: CPT | Mod: PBBFAC,,, | Performed by: OPHTHALMOLOGY

## 2021-10-26 ENCOUNTER — OFFICE VISIT (OUTPATIENT)
Dept: FAMILY MEDICINE | Facility: CLINIC | Age: 66
End: 2021-10-26
Payer: COMMERCIAL

## 2021-10-26 VITALS
BODY MASS INDEX: 21.45 KG/M2 | SYSTOLIC BLOOD PRESSURE: 116 MMHG | HEIGHT: 67 IN | DIASTOLIC BLOOD PRESSURE: 84 MMHG | OXYGEN SATURATION: 99 % | HEART RATE: 84 BPM | WEIGHT: 136.69 LBS

## 2021-10-26 DIAGNOSIS — Z00.00 ROUTINE PHYSICAL EXAMINATION: Primary | ICD-10-CM

## 2021-10-26 DIAGNOSIS — Z71.85 VACCINE COUNSELING: ICD-10-CM

## 2021-10-26 PROCEDURE — 3074F PR MOST RECENT SYSTOLIC BLOOD PRESSURE < 130 MM HG: ICD-10-PCS | Mod: CPTII,S$GLB,, | Performed by: INTERNAL MEDICINE

## 2021-10-26 PROCEDURE — 3288F FALL RISK ASSESSMENT DOCD: CPT | Mod: CPTII,S$GLB,, | Performed by: INTERNAL MEDICINE

## 2021-10-26 PROCEDURE — 1159F MED LIST DOCD IN RCRD: CPT | Mod: CPTII,S$GLB,, | Performed by: INTERNAL MEDICINE

## 2021-10-26 PROCEDURE — 3288F PR FALLS RISK ASSESSMENT DOCUMENTED: ICD-10-PCS | Mod: CPTII,S$GLB,, | Performed by: INTERNAL MEDICINE

## 2021-10-26 PROCEDURE — 3008F PR BODY MASS INDEX (BMI) DOCUMENTED: ICD-10-PCS | Mod: CPTII,S$GLB,, | Performed by: INTERNAL MEDICINE

## 2021-10-26 PROCEDURE — 1126F AMNT PAIN NOTED NONE PRSNT: CPT | Mod: CPTII,S$GLB,, | Performed by: INTERNAL MEDICINE

## 2021-10-26 PROCEDURE — 1126F PR PAIN SEVERITY QUANTIFIED, NO PAIN PRESENT: ICD-10-PCS | Mod: CPTII,S$GLB,, | Performed by: INTERNAL MEDICINE

## 2021-10-26 PROCEDURE — 99397 PER PM REEVAL EST PAT 65+ YR: CPT | Mod: 25,S$GLB,, | Performed by: INTERNAL MEDICINE

## 2021-10-26 PROCEDURE — 1101F PR PT FALLS ASSESS DOC 0-1 FALLS W/OUT INJ PAST YR: ICD-10-PCS | Mod: CPTII,S$GLB,, | Performed by: INTERNAL MEDICINE

## 2021-10-26 PROCEDURE — 3079F PR MOST RECENT DIASTOLIC BLOOD PRESSURE 80-89 MM HG: ICD-10-PCS | Mod: CPTII,S$GLB,, | Performed by: INTERNAL MEDICINE

## 2021-10-26 PROCEDURE — 99999 PR PBB SHADOW E&M-EST. PATIENT-LVL III: CPT | Mod: PBBFAC,,, | Performed by: INTERNAL MEDICINE

## 2021-10-26 PROCEDURE — 90732 PNEUMOCOCCAL POLYSACCHARIDE VACCINE 23-VALENT =>2YO SQ IM: ICD-10-PCS | Mod: S$GLB,,, | Performed by: INTERNAL MEDICINE

## 2021-10-26 PROCEDURE — 99999 PR PBB SHADOW E&M-EST. PATIENT-LVL III: ICD-10-PCS | Mod: PBBFAC,,, | Performed by: INTERNAL MEDICINE

## 2021-10-26 PROCEDURE — 90732 PPSV23 VACC 2 YRS+ SUBQ/IM: CPT | Mod: S$GLB,,, | Performed by: INTERNAL MEDICINE

## 2021-10-26 PROCEDURE — 90471 PNEUMOCOCCAL POLYSACCHARIDE VACCINE 23-VALENT =>2YO SQ IM: ICD-10-PCS | Mod: S$GLB,,, | Performed by: INTERNAL MEDICINE

## 2021-10-26 PROCEDURE — 1159F PR MEDICATION LIST DOCUMENTED IN MEDICAL RECORD: ICD-10-PCS | Mod: CPTII,S$GLB,, | Performed by: INTERNAL MEDICINE

## 2021-10-26 PROCEDURE — 3008F BODY MASS INDEX DOCD: CPT | Mod: CPTII,S$GLB,, | Performed by: INTERNAL MEDICINE

## 2021-10-26 PROCEDURE — 3079F DIAST BP 80-89 MM HG: CPT | Mod: CPTII,S$GLB,, | Performed by: INTERNAL MEDICINE

## 2021-10-26 PROCEDURE — 1101F PT FALLS ASSESS-DOCD LE1/YR: CPT | Mod: CPTII,S$GLB,, | Performed by: INTERNAL MEDICINE

## 2021-10-26 PROCEDURE — 99397 PR PREVENTIVE VISIT,EST,65 & OVER: ICD-10-PCS | Mod: 25,S$GLB,, | Performed by: INTERNAL MEDICINE

## 2021-10-26 PROCEDURE — 90471 IMMUNIZATION ADMIN: CPT | Mod: S$GLB,,, | Performed by: INTERNAL MEDICINE

## 2021-10-26 PROCEDURE — 3074F SYST BP LT 130 MM HG: CPT | Mod: CPTII,S$GLB,, | Performed by: INTERNAL MEDICINE

## 2021-10-29 ENCOUNTER — IMMUNIZATION (OUTPATIENT)
Dept: FAMILY MEDICINE | Facility: CLINIC | Age: 66
End: 2021-10-29
Payer: COMMERCIAL

## 2021-10-29 DIAGNOSIS — Z23 NEED FOR VACCINATION: Primary | ICD-10-CM

## 2021-10-29 PROCEDURE — 0001A COVID-19, MRNA, LNP-S, PF, 30 MCG/0.3 ML DOSE VACCINE: CPT | Mod: PBBFAC | Performed by: FAMILY MEDICINE

## 2021-12-02 ENCOUNTER — PATIENT MESSAGE (OUTPATIENT)
Dept: HEMATOLOGY/ONCOLOGY | Facility: CLINIC | Age: 66
End: 2021-12-02
Payer: COMMERCIAL

## 2021-12-03 ENCOUNTER — TELEPHONE (OUTPATIENT)
Dept: RESEARCH | Facility: HOSPITAL | Age: 66
End: 2021-12-03
Payer: COMMERCIAL

## 2021-12-03 ENCOUNTER — LAB VISIT (OUTPATIENT)
Dept: LAB | Facility: HOSPITAL | Age: 66
End: 2021-12-03
Attending: INTERNAL MEDICINE
Payer: COMMERCIAL

## 2021-12-03 DIAGNOSIS — Z00.00 ROUTINE PHYSICAL EXAMINATION: ICD-10-CM

## 2021-12-03 DIAGNOSIS — G62.0 CHEMOTHERAPY-INDUCED PERIPHERAL NEUROPATHY: ICD-10-CM

## 2021-12-03 DIAGNOSIS — C50.512 PRIMARY CANCER OF LOWER OUTER QUADRANT OF LEFT FEMALE BREAST: ICD-10-CM

## 2021-12-03 DIAGNOSIS — C50.912 HER2-POSITIVE CARCINOMA OF LEFT BREAST: ICD-10-CM

## 2021-12-03 DIAGNOSIS — T45.1X5A CHEMOTHERAPY-INDUCED PERIPHERAL NEUROPATHY: ICD-10-CM

## 2021-12-03 DIAGNOSIS — C50.012 MALIGNANT NEOPLASM OF NIPPLE OF LEFT BREAST IN FEMALE, UNSPECIFIED ESTROGEN RECEPTOR STATUS: ICD-10-CM

## 2021-12-03 LAB
ALBUMIN SERPL BCP-MCNC: 4.3 G/DL (ref 3.5–5.2)
ALP SERPL-CCNC: 61 U/L (ref 55–135)
ALT SERPL W/O P-5'-P-CCNC: 21 U/L (ref 10–44)
ANION GAP SERPL CALC-SCNC: 9 MMOL/L (ref 8–16)
AST SERPL-CCNC: 24 U/L (ref 10–40)
BASOPHILS # BLD AUTO: 0.05 K/UL (ref 0–0.2)
BASOPHILS NFR BLD: 1 % (ref 0–1.9)
BILIRUB SERPL-MCNC: 0.6 MG/DL (ref 0.1–1)
BUN SERPL-MCNC: 13 MG/DL (ref 8–23)
CALCIUM SERPL-MCNC: 10.1 MG/DL (ref 8.7–10.5)
CHLORIDE SERPL-SCNC: 108 MMOL/L (ref 95–110)
CHOLEST SERPL-MCNC: 195 MG/DL (ref 120–199)
CHOLEST/HDLC SERPL: 2.3 {RATIO} (ref 2–5)
CO2 SERPL-SCNC: 24 MMOL/L (ref 23–29)
CREAT SERPL-MCNC: 0.8 MG/DL (ref 0.5–1.4)
DIFFERENTIAL METHOD: ABNORMAL
EOSINOPHIL # BLD AUTO: 0.1 K/UL (ref 0–0.5)
EOSINOPHIL NFR BLD: 2.7 % (ref 0–8)
ERYTHROCYTE [DISTWIDTH] IN BLOOD BY AUTOMATED COUNT: 11.7 % (ref 11.5–14.5)
EST. GFR  (AFRICAN AMERICAN): >60 ML/MIN/1.73 M^2
EST. GFR  (NON AFRICAN AMERICAN): >60 ML/MIN/1.73 M^2
GLUCOSE SERPL-MCNC: 90 MG/DL (ref 70–110)
HCT VFR BLD AUTO: 41.7 % (ref 37–48.5)
HDLC SERPL-MCNC: 86 MG/DL (ref 40–75)
HDLC SERPL: 44.1 % (ref 20–50)
HGB BLD-MCNC: 13.5 G/DL (ref 12–16)
IMM GRANULOCYTES # BLD AUTO: 0.02 K/UL (ref 0–0.04)
IMM GRANULOCYTES NFR BLD AUTO: 0.4 % (ref 0–0.5)
LDLC SERPL CALC-MCNC: 99 MG/DL (ref 63–159)
LYMPHOCYTES # BLD AUTO: 1.4 K/UL (ref 1–4.8)
LYMPHOCYTES NFR BLD: 28.7 % (ref 18–48)
MCH RBC QN AUTO: 33.7 PG (ref 27–31)
MCHC RBC AUTO-ENTMCNC: 32.4 G/DL (ref 32–36)
MCV RBC AUTO: 104 FL (ref 82–98)
MONOCYTES # BLD AUTO: 0.4 K/UL (ref 0.3–1)
MONOCYTES NFR BLD: 8.8 % (ref 4–15)
NEUTROPHILS # BLD AUTO: 2.9 K/UL (ref 1.8–7.7)
NEUTROPHILS NFR BLD: 58.4 % (ref 38–73)
NONHDLC SERPL-MCNC: 109 MG/DL
NRBC BLD-RTO: 0 /100 WBC
PLATELET # BLD AUTO: 225 K/UL (ref 150–450)
PMV BLD AUTO: 9.6 FL (ref 9.2–12.9)
POTASSIUM SERPL-SCNC: 4.4 MMOL/L (ref 3.5–5.1)
PROT SERPL-MCNC: 7.3 G/DL (ref 6–8.4)
RBC # BLD AUTO: 4.01 M/UL (ref 4–5.4)
SODIUM SERPL-SCNC: 141 MMOL/L (ref 136–145)
TRIGL SERPL-MCNC: 50 MG/DL (ref 30–150)
WBC # BLD AUTO: 4.88 K/UL (ref 3.9–12.7)

## 2021-12-03 PROCEDURE — 36415 COLL VENOUS BLD VENIPUNCTURE: CPT | Mod: PN | Performed by: INTERNAL MEDICINE

## 2021-12-03 PROCEDURE — 86300 IMMUNOASSAY TUMOR CA 15-3: CPT | Performed by: INTERNAL MEDICINE

## 2021-12-03 PROCEDURE — 80053 COMPREHEN METABOLIC PANEL: CPT | Mod: PN | Performed by: INTERNAL MEDICINE

## 2021-12-03 PROCEDURE — 85025 COMPLETE CBC W/AUTO DIFF WBC: CPT | Mod: PN | Performed by: INTERNAL MEDICINE

## 2021-12-03 PROCEDURE — 80061 LIPID PANEL: CPT | Performed by: INTERNAL MEDICINE

## 2021-12-06 DIAGNOSIS — C50.012 MALIGNANT NEOPLASM OF NIPPLE OF LEFT BREAST IN FEMALE, UNSPECIFIED ESTROGEN RECEPTOR STATUS: ICD-10-CM

## 2021-12-06 DIAGNOSIS — Z00.6 EXAMINATION OF PARTICIPANT IN CLINICAL TRIAL: Primary | ICD-10-CM

## 2021-12-07 ENCOUNTER — TELEPHONE (OUTPATIENT)
Dept: HEMATOLOGY/ONCOLOGY | Facility: CLINIC | Age: 66
End: 2021-12-07
Payer: COMMERCIAL

## 2021-12-07 ENCOUNTER — HOSPITAL ENCOUNTER (OUTPATIENT)
Dept: RADIOLOGY | Facility: HOSPITAL | Age: 66
Discharge: HOME | End: 2021-12-07
Attending: INTERNAL MEDICINE
Payer: COMMERCIAL

## 2021-12-07 ENCOUNTER — RESEARCH ENCOUNTER (OUTPATIENT)
Dept: HEMATOLOGY/ONCOLOGY | Facility: CLINIC | Age: 66
End: 2021-12-07
Payer: COMMERCIAL

## 2021-12-07 ENCOUNTER — INFUSION (OUTPATIENT)
Dept: INFUSION THERAPY | Facility: HOSPITAL | Age: 66
End: 2021-12-07
Attending: INTERNAL MEDICINE
Payer: COMMERCIAL

## 2021-12-07 ENCOUNTER — OFFICE VISIT (OUTPATIENT)
Dept: HEMATOLOGY/ONCOLOGY | Facility: CLINIC | Age: 66
End: 2021-12-07
Payer: COMMERCIAL

## 2021-12-07 VITALS
DIASTOLIC BLOOD PRESSURE: 92 MMHG | TEMPERATURE: 98 F | RESPIRATION RATE: 18 BRPM | WEIGHT: 141.31 LBS | RESPIRATION RATE: 18 BRPM | HEART RATE: 72 BPM | BODY MASS INDEX: 22.18 KG/M2 | DIASTOLIC BLOOD PRESSURE: 92 MMHG | TEMPERATURE: 98 F | BODY MASS INDEX: 22.18 KG/M2 | HEIGHT: 67 IN | OXYGEN SATURATION: 99 % | SYSTOLIC BLOOD PRESSURE: 148 MMHG | HEART RATE: 72 BPM | HEIGHT: 67 IN | SYSTOLIC BLOOD PRESSURE: 148 MMHG | WEIGHT: 141.31 LBS | OXYGEN SATURATION: 99 %

## 2021-12-07 DIAGNOSIS — T45.1X5A CHEMOTHERAPY-INDUCED PERIPHERAL NEUROPATHY: ICD-10-CM

## 2021-12-07 DIAGNOSIS — C50.012 MALIGNANT NEOPLASM OF NIPPLE OF LEFT BREAST IN FEMALE, UNSPECIFIED ESTROGEN RECEPTOR STATUS: ICD-10-CM

## 2021-12-07 DIAGNOSIS — D53.9 MACROCYTIC ANEMIA: ICD-10-CM

## 2021-12-07 DIAGNOSIS — C50.912 HER2-POSITIVE CARCINOMA OF LEFT BREAST: ICD-10-CM

## 2021-12-07 DIAGNOSIS — G62.0 CHEMOTHERAPY-INDUCED PERIPHERAL NEUROPATHY: ICD-10-CM

## 2021-12-07 DIAGNOSIS — M80.00XA AGE-RELATED OSTEOPOROSIS WITH CURRENT PATHOLOGICAL FRACTURE, INITIAL ENCOUNTER: Primary | ICD-10-CM

## 2021-12-07 DIAGNOSIS — C50.512 PRIMARY CANCER OF LOWER OUTER QUADRANT OF LEFT FEMALE BREAST: ICD-10-CM

## 2021-12-07 DIAGNOSIS — Z85.3 HX OF BREAST CANCER: ICD-10-CM

## 2021-12-07 DIAGNOSIS — C50.012 MALIGNANT NEOPLASM OF NIPPLE OF LEFT BREAST IN FEMALE, UNSPECIFIED ESTROGEN RECEPTOR STATUS: Primary | ICD-10-CM

## 2021-12-07 DIAGNOSIS — Z74.09 IMPAIRED MOBILITY: ICD-10-CM

## 2021-12-07 LAB — CANCER AG27-29 SERPL-ACNC: 30.7 U/ML

## 2021-12-07 PROCEDURE — 96372 THER/PROPH/DIAG INJ SC/IM: CPT | Mod: PN

## 2021-12-07 PROCEDURE — 78815 PET IMAGE W/CT SKULL-THIGH: CPT | Mod: TC,PN

## 2021-12-07 PROCEDURE — 99214 OFFICE O/P EST MOD 30 MIN: CPT | Mod: S$GLB,,, | Performed by: INTERNAL MEDICINE

## 2021-12-07 PROCEDURE — 78815 NM PET CT ROUTINE: ICD-10-PCS | Mod: 26,PS,, | Performed by: RADIOLOGY

## 2021-12-07 PROCEDURE — 99999 PR PBB SHADOW E&M-EST. PATIENT-LVL III: CPT | Mod: PBBFAC,,, | Performed by: INTERNAL MEDICINE

## 2021-12-07 PROCEDURE — 99999 PR PBB SHADOW E&M-EST. PATIENT-LVL III: ICD-10-PCS | Mod: PBBFAC,,, | Performed by: INTERNAL MEDICINE

## 2021-12-07 PROCEDURE — 78815 PET IMAGE W/CT SKULL-THIGH: CPT | Mod: 26,PS,, | Performed by: RADIOLOGY

## 2021-12-07 PROCEDURE — 63600175 PHARM REV CODE 636 W HCPCS: Mod: JG,PN | Performed by: INTERNAL MEDICINE

## 2021-12-07 PROCEDURE — 99214 PR OFFICE/OUTPT VISIT, EST, LEVL IV, 30-39 MIN: ICD-10-PCS | Mod: S$GLB,,, | Performed by: INTERNAL MEDICINE

## 2021-12-07 RX ADMIN — DENOSUMAB 60 MG: 60 INJECTION SUBCUTANEOUS at 04:12

## 2021-12-08 ENCOUNTER — PATIENT MESSAGE (OUTPATIENT)
Dept: REHABILITATION | Facility: HOSPITAL | Age: 66
End: 2021-12-08
Payer: COMMERCIAL

## 2021-12-08 ENCOUNTER — TELEPHONE (OUTPATIENT)
Dept: REHABILITATION | Facility: HOSPITAL | Age: 66
End: 2021-12-08
Payer: COMMERCIAL

## 2021-12-09 ENCOUNTER — PATIENT MESSAGE (OUTPATIENT)
Dept: HEMATOLOGY/ONCOLOGY | Facility: CLINIC | Age: 66
End: 2021-12-09
Payer: COMMERCIAL

## 2021-12-14 ENCOUNTER — OFFICE VISIT (OUTPATIENT)
Dept: HEMATOLOGY/ONCOLOGY | Facility: CLINIC | Age: 66
End: 2021-12-14
Payer: COMMERCIAL

## 2021-12-14 VITALS
OXYGEN SATURATION: 99 % | HEART RATE: 65 BPM | TEMPERATURE: 98 F | SYSTOLIC BLOOD PRESSURE: 139 MMHG | BODY MASS INDEX: 22.13 KG/M2 | WEIGHT: 141 LBS | HEIGHT: 67 IN | DIASTOLIC BLOOD PRESSURE: 75 MMHG

## 2021-12-14 DIAGNOSIS — Z85.3 HX OF BREAST CANCER: ICD-10-CM

## 2021-12-14 DIAGNOSIS — I89.0 LYMPHEDEMA: Primary | ICD-10-CM

## 2021-12-14 DIAGNOSIS — C50.912 HER2-POSITIVE CARCINOMA OF LEFT BREAST: ICD-10-CM

## 2021-12-14 DIAGNOSIS — C50.912 HER2-POSITIVE CARCINOMA OF LEFT BREAST: Primary | ICD-10-CM

## 2021-12-14 DIAGNOSIS — C50.012 MALIGNANT NEOPLASM OF NIPPLE OF LEFT BREAST IN FEMALE, UNSPECIFIED ESTROGEN RECEPTOR STATUS: ICD-10-CM

## 2021-12-14 PROCEDURE — 99999 PR PBB SHADOW E&M-EST. PATIENT-LVL III: CPT | Mod: PBBFAC,,, | Performed by: NURSE PRACTITIONER

## 2021-12-14 PROCEDURE — 99999 PR PBB SHADOW E&M-EST. PATIENT-LVL III: ICD-10-PCS | Mod: PBBFAC,,, | Performed by: NURSE PRACTITIONER

## 2021-12-14 PROCEDURE — 99215 OFFICE O/P EST HI 40 MIN: CPT | Mod: S$GLB,,, | Performed by: NURSE PRACTITIONER

## 2021-12-14 PROCEDURE — 99215 PR OFFICE/OUTPT VISIT, EST, LEVL V, 40-54 MIN: ICD-10-PCS | Mod: S$GLB,,, | Performed by: NURSE PRACTITIONER

## 2021-12-24 ENCOUNTER — PATIENT MESSAGE (OUTPATIENT)
Dept: DERMATOLOGY | Facility: CLINIC | Age: 66
End: 2021-12-24
Payer: COMMERCIAL

## 2021-12-27 RX ORDER — ITRACONAZOLE 100 MG/1
200 CAPSULE ORAL DAILY
COMMUNITY
End: 2022-01-06 | Stop reason: SDUPTHER

## 2021-12-27 RX ORDER — ITRACONAZOLE 100 MG/1
200 CAPSULE ORAL DAILY
OUTPATIENT
Start: 2021-12-27

## 2022-01-06 ENCOUNTER — PATIENT MESSAGE (OUTPATIENT)
Dept: DERMATOLOGY | Facility: CLINIC | Age: 67
End: 2022-01-06
Payer: COMMERCIAL

## 2022-01-06 RX ORDER — ITRACONAZOLE 100 MG/1
200 CAPSULE ORAL DAILY
Qty: 60 CAPSULE | Refills: 1 | Status: SHIPPED | OUTPATIENT
Start: 2022-01-06 | End: 2022-04-22

## 2022-02-08 ENCOUNTER — OFFICE VISIT (OUTPATIENT)
Dept: FAMILY MEDICINE | Facility: CLINIC | Age: 67
End: 2022-02-08
Payer: COMMERCIAL

## 2022-02-08 ENCOUNTER — HOSPITAL ENCOUNTER (OUTPATIENT)
Dept: RADIOLOGY | Facility: HOSPITAL | Age: 67
Discharge: HOME | End: 2022-02-08
Attending: INTERNAL MEDICINE
Payer: COMMERCIAL

## 2022-02-08 VITALS
SYSTOLIC BLOOD PRESSURE: 134 MMHG | HEIGHT: 67 IN | WEIGHT: 141.75 LBS | HEART RATE: 81 BPM | OXYGEN SATURATION: 98 % | BODY MASS INDEX: 22.25 KG/M2 | TEMPERATURE: 98 F | DIASTOLIC BLOOD PRESSURE: 84 MMHG

## 2022-02-08 DIAGNOSIS — R07.81 PLEURITIC CHEST PAIN: Primary | ICD-10-CM

## 2022-02-08 DIAGNOSIS — R07.81 PLEURITIC CHEST PAIN: ICD-10-CM

## 2022-02-08 DIAGNOSIS — T45.1X5A CHEMOTHERAPY-INDUCED PERIPHERAL NEUROPATHY: ICD-10-CM

## 2022-02-08 DIAGNOSIS — G62.0 CHEMOTHERAPY-INDUCED PERIPHERAL NEUROPATHY: ICD-10-CM

## 2022-02-08 DIAGNOSIS — Z85.3 HX OF BREAST CANCER: ICD-10-CM

## 2022-02-08 DIAGNOSIS — J70.0 RADIATION PNEUMONITIS: ICD-10-CM

## 2022-02-08 DIAGNOSIS — I77.819 AORTIC ECTASIA: ICD-10-CM

## 2022-02-08 PROBLEM — C50.912 BREAST CANCER, LEFT BREAST: Status: RESOLVED | Noted: 2017-11-01 | Resolved: 2022-02-08

## 2022-02-08 PROBLEM — C50.912 BREAST CANCER, LEFT: Status: RESOLVED | Noted: 2017-04-24 | Resolved: 2022-02-08

## 2022-02-08 PROBLEM — C50.512: Status: RESOLVED | Noted: 2017-04-23 | Resolved: 2022-02-08

## 2022-02-08 PROBLEM — C50.912 HER2-POSITIVE CARCINOMA OF LEFT BREAST: Status: RESOLVED | Noted: 2017-08-04 | Resolved: 2022-02-08

## 2022-02-08 PROBLEM — Z17.31 HER2-POSITIVE CARCINOMA OF LEFT BREAST: Status: RESOLVED | Noted: 2017-08-04 | Resolved: 2022-02-08

## 2022-02-08 PROCEDURE — 71046 X-RAY EXAM CHEST 2 VIEWS: CPT | Mod: TC,FY,PO

## 2022-02-08 PROCEDURE — 3288F FALL RISK ASSESSMENT DOCD: CPT | Mod: CPTII,S$GLB,, | Performed by: INTERNAL MEDICINE

## 2022-02-08 PROCEDURE — 99999 PR PBB SHADOW E&M-EST. PATIENT-LVL III: CPT | Mod: PBBFAC,,, | Performed by: INTERNAL MEDICINE

## 2022-02-08 PROCEDURE — 1159F MED LIST DOCD IN RCRD: CPT | Mod: CPTII,S$GLB,, | Performed by: INTERNAL MEDICINE

## 2022-02-08 PROCEDURE — 3079F PR MOST RECENT DIASTOLIC BLOOD PRESSURE 80-89 MM HG: ICD-10-PCS | Mod: CPTII,S$GLB,, | Performed by: INTERNAL MEDICINE

## 2022-02-08 PROCEDURE — 1101F PT FALLS ASSESS-DOCD LE1/YR: CPT | Mod: CPTII,S$GLB,, | Performed by: INTERNAL MEDICINE

## 2022-02-08 PROCEDURE — 3075F SYST BP GE 130 - 139MM HG: CPT | Mod: CPTII,S$GLB,, | Performed by: INTERNAL MEDICINE

## 2022-02-08 PROCEDURE — 1125F AMNT PAIN NOTED PAIN PRSNT: CPT | Mod: CPTII,S$GLB,, | Performed by: INTERNAL MEDICINE

## 2022-02-08 PROCEDURE — 99214 PR OFFICE/OUTPT VISIT, EST, LEVL IV, 30-39 MIN: ICD-10-PCS | Mod: S$GLB,,, | Performed by: INTERNAL MEDICINE

## 2022-02-08 PROCEDURE — 1125F PR PAIN SEVERITY QUANTIFIED, PAIN PRESENT: ICD-10-PCS | Mod: CPTII,S$GLB,, | Performed by: INTERNAL MEDICINE

## 2022-02-08 PROCEDURE — 1101F PR PT FALLS ASSESS DOC 0-1 FALLS W/OUT INJ PAST YR: ICD-10-PCS | Mod: CPTII,S$GLB,, | Performed by: INTERNAL MEDICINE

## 2022-02-08 PROCEDURE — 99999 PR PBB SHADOW E&M-EST. PATIENT-LVL III: ICD-10-PCS | Mod: PBBFAC,,, | Performed by: INTERNAL MEDICINE

## 2022-02-08 PROCEDURE — 71046 X-RAY EXAM CHEST 2 VIEWS: CPT | Mod: 26,,, | Performed by: RADIOLOGY

## 2022-02-08 PROCEDURE — 71046 XR CHEST PA AND LATERAL: ICD-10-PCS | Mod: 26,,, | Performed by: RADIOLOGY

## 2022-02-08 PROCEDURE — 3008F PR BODY MASS INDEX (BMI) DOCUMENTED: ICD-10-PCS | Mod: CPTII,S$GLB,, | Performed by: INTERNAL MEDICINE

## 2022-02-08 PROCEDURE — 3079F DIAST BP 80-89 MM HG: CPT | Mod: CPTII,S$GLB,, | Performed by: INTERNAL MEDICINE

## 2022-02-08 PROCEDURE — 99214 OFFICE O/P EST MOD 30 MIN: CPT | Mod: S$GLB,,, | Performed by: INTERNAL MEDICINE

## 2022-02-08 PROCEDURE — 1159F PR MEDICATION LIST DOCUMENTED IN MEDICAL RECORD: ICD-10-PCS | Mod: CPTII,S$GLB,, | Performed by: INTERNAL MEDICINE

## 2022-02-08 PROCEDURE — 3288F PR FALLS RISK ASSESSMENT DOCUMENTED: ICD-10-PCS | Mod: CPTII,S$GLB,, | Performed by: INTERNAL MEDICINE

## 2022-02-08 PROCEDURE — 3075F PR MOST RECENT SYSTOLIC BLOOD PRESS GE 130-139MM HG: ICD-10-PCS | Mod: CPTII,S$GLB,, | Performed by: INTERNAL MEDICINE

## 2022-02-08 PROCEDURE — 3008F BODY MASS INDEX DOCD: CPT | Mod: CPTII,S$GLB,, | Performed by: INTERNAL MEDICINE

## 2022-02-08 RX ORDER — IBUPROFEN 800 MG/1
800 TABLET ORAL 3 TIMES DAILY
Qty: 45 TABLET | Refills: 0 | Status: SHIPPED | OUTPATIENT
Start: 2022-02-08 | End: 2022-02-18

## 2022-02-08 NOTE — PROGRESS NOTES
Subjective:       Patient ID: Ashlie Redman is a 66 y.o. female.    Chief Complaint: Chest Pain    History of breast cancer and chest pneumonitis.  COVID - mild 1 month ago.  Complains of central chest pain for 1 week.  Worse with breathing.  No pain during exercise, but then worse after.  Best 4/10; worst 6/10.  Similar to pleuritis 1 year ago.  Extensive cardiac w/u neg then. No f/c     Shortness of Breath  This is a recurrent problem. The current episode started yesterday. The problem occurs daily. The problem has been waxing and waning. The average episode lasts 8 hours. Associated symptoms include chest pain and sputum production. Pertinent negatives include no abdominal pain, fever, rash or vomiting. Nothing aggravates the symptoms. The patient has no known risk factors for DVT/PE. She has tried rest for the symptoms. The treatment provided no relief. There is no history of allergies, aspirin allergies, asthma, bronchiolitis, CAD, chronic lung disease, COPD, DVT, a heart failure, PE, pneumonia or a recent surgery.     Review of Systems   Constitutional: Negative for appetite change and fever.   HENT: Negative for nosebleeds and trouble swallowing.    Eyes: Negative for discharge and visual disturbance.   Respiratory: Positive for sputum production and shortness of breath. Negative for choking.    Cardiovascular: Positive for chest pain. Negative for palpitations.   Gastrointestinal: Negative for abdominal pain, nausea and vomiting.   Musculoskeletal: Negative for arthralgias and joint swelling.   Skin: Negative for rash and wound.   Neurological: Negative for dizziness and syncope.   Psychiatric/Behavioral: Negative for confusion and dysphoric mood.       Objective:      Vitals:    02/08/22 0756   BP: 134/84   Pulse: 81     Physical Exam  Vitals reviewed.   Constitutional:       Appearance: She is well-nourished.   Eyes:      Extraocular Movements: EOM normal.      Conjunctiva/sclera: Conjunctivae normal.    Cardiovascular:      Rate and Rhythm: Normal rate and regular rhythm.   Pulmonary:      Effort: Pulmonary effort is normal.      Breath sounds: Normal breath sounds.   Musculoskeletal:      Cervical back: Normal range of motion.      Comments: Normal ROM bilateral    Skin:     General: Skin is warm and dry.   Neurological:      Cranial Nerves: No cranial nerve deficit (grossly intact).   Psychiatric:         Mood and Affect: Mood and affect normal.      Comments: Alert and orientated           Assessment:       1. Pleuritic chest pain    2. Aortic ectasia    3. Chemotherapy-induced peripheral neuropathy    4. Radiation pneumonitis    5. Hx of breast cancer        Plan:       Pleuritic chest pain  -     X-Ray Chest PA And Lateral; Future; Expected date: 02/08/2022  -     ibuprofen (ADVIL,MOTRIN) 800 MG tablet; Take 1 tablet (800 mg total) by mouth 3 (three) times daily. for 10 days  Dispense: 45 tablet; Refill: 0    Aortic ectasia    Chemotherapy-induced peripheral neuropathy    Radiation pneumonitis  -     X-Ray Chest PA And Lateral; Future; Expected date: 02/08/2022  -     ibuprofen (ADVIL,MOTRIN) 800 MG tablet; Take 1 tablet (800 mg total) by mouth 3 (three) times daily. for 10 days  Dispense: 45 tablet; Refill: 0    Hx of breast cancer            Medication List with Changes/Refills   New Medications    IBUPROFEN (ADVIL,MOTRIN) 800 MG TABLET    Take 1 tablet (800 mg total) by mouth 3 (three) times daily. for 10 days   Current Medications    DORZOLAMIDE (TRUSOPT) 2 % OPHTHALMIC SOLUTION    INSTILL 1 DROP INTO BOTH EYES 3 TIMES A DAY    ITRACONAZOLE (SPORANOX) 100 MG CAP    Take 2 capsules (200 mg total) by mouth once daily.    LATANOPROST 0.005 % OPHTHALMIC SOLUTION    INSTILL 1 DROP INTO BOTH EYES IN THE EVENING    MULTIVITAMIN CAPSULE    Take 1 capsule by mouth once daily.    VALACYCLOVIR (VALTREX) 500 MG TABLET    Take 1 tablet (500 mg total) by mouth once daily.     1 week nw, send in prednisone 0.2 - 0.5  mg/kg per day for 4 weeks, then taper.  25 - 30 max, then decrease.  Consider pulmonary referral.   Continue current management and monitor.    Counseled on regular exercise, maintenance of a healthy weight, balanced diet rich in fruits/vegetables and lean protein, and avoidance of unhealthy habits like smoking and excessive alcohol intake.   Also, counseled on importance of being compliant with medication, health appointments, diet and exercise.     Follow up if symptoms worsen or fail to improve.

## 2022-02-09 ENCOUNTER — PATIENT MESSAGE (OUTPATIENT)
Dept: FAMILY MEDICINE | Facility: CLINIC | Age: 67
End: 2022-02-09
Payer: COMMERCIAL

## 2022-02-10 RX ORDER — VALACYCLOVIR HYDROCHLORIDE 500 MG/1
500 TABLET, FILM COATED ORAL DAILY
Qty: 90 TABLET | Refills: 1 | Status: SHIPPED | OUTPATIENT
Start: 2022-02-10 | End: 2023-09-01 | Stop reason: SDUPTHER

## 2022-02-10 NOTE — TELEPHONE ENCOUNTER
No new care gaps identified.  Powered by [employer]. Reference number: [id number].   2/10/2022 8:39:06 AM CST

## 2022-02-28 ENCOUNTER — TELEPHONE (OUTPATIENT)
Dept: HEMATOLOGY/ONCOLOGY | Facility: CLINIC | Age: 67
End: 2022-02-28
Payer: COMMERCIAL

## 2022-02-28 NOTE — TELEPHONE ENCOUNTER
LVM for patient and informed them we are now offering in-person support classes and have a sign up list. Informed them if they are interested we can get their name down for their class of choice. I did let them know we have a limit of 10 people per class, must be fully vaccinated (with proof) and a waiver must be signed. If they wanted to join they should call [phone] or send a message through the portal to participate.

## 2022-04-01 ENCOUNTER — PATIENT OUTREACH (OUTPATIENT)
Dept: ADMINISTRATIVE | Facility: OTHER | Age: 67
End: 2022-04-01
Payer: COMMERCIAL

## 2022-04-01 NOTE — PROGRESS NOTES
LINKS immunization registry updated  Care Everywhere updated  Health Maintenance updated  Chart reviewed for overdue Proactive [facility] Encounters (MARTHA) health maintenance testing (CRS, Breast Ca, Diabetic Eye Exam)   Orders entered:N/A

## 2022-04-05 ENCOUNTER — OFFICE VISIT (OUTPATIENT)
Dept: OPHTHALMOLOGY | Facility: CLINIC | Age: 67
End: 2022-04-05
Payer: COMMERCIAL

## 2022-04-05 DIAGNOSIS — H52.7 REFRACTIVE ERROR: ICD-10-CM

## 2022-04-05 DIAGNOSIS — H40.053 OHT (OCULAR HYPERTENSION), BILATERAL: Primary | ICD-10-CM

## 2022-04-05 DIAGNOSIS — Z96.1 PSEUDOPHAKIA OF BOTH EYES: ICD-10-CM

## 2022-04-05 DIAGNOSIS — Z98.890 HISTORY OF VITRECTOMY: ICD-10-CM

## 2022-04-05 PROCEDURE — 1126F PR PAIN SEVERITY QUANTIFIED, NO PAIN PRESENT: ICD-10-PCS | Mod: CPTII,S$GLB,, | Performed by: OPHTHALMOLOGY

## 2022-04-05 PROCEDURE — 1160F PR REVIEW ALL MEDS BY PRESCRIBER/CLIN PHARMACIST DOCUMENTED: ICD-10-PCS | Mod: CPTII,S$GLB,, | Performed by: OPHTHALMOLOGY

## 2022-04-05 PROCEDURE — 1101F PR PT FALLS ASSESS DOC 0-1 FALLS W/OUT INJ PAST YR: ICD-10-PCS | Mod: CPTII,S$GLB,, | Performed by: OPHTHALMOLOGY

## 2022-04-05 PROCEDURE — 3288F FALL RISK ASSESSMENT DOCD: CPT | Mod: CPTII,S$GLB,, | Performed by: OPHTHALMOLOGY

## 2022-04-05 PROCEDURE — 99999 PR PBB SHADOW E&M-EST. PATIENT-LVL III: CPT | Mod: PBBFAC,,, | Performed by: OPHTHALMOLOGY

## 2022-04-05 PROCEDURE — 1159F PR MEDICATION LIST DOCUMENTED IN MEDICAL RECORD: ICD-10-PCS | Mod: CPTII,S$GLB,, | Performed by: OPHTHALMOLOGY

## 2022-04-05 PROCEDURE — 3288F PR FALLS RISK ASSESSMENT DOCUMENTED: ICD-10-PCS | Mod: CPTII,S$GLB,, | Performed by: OPHTHALMOLOGY

## 2022-04-05 PROCEDURE — 99214 OFFICE O/P EST MOD 30 MIN: CPT | Mod: S$GLB,,, | Performed by: OPHTHALMOLOGY

## 2022-04-05 PROCEDURE — 99999 PR PBB SHADOW E&M-EST. PATIENT-LVL III: ICD-10-PCS | Mod: PBBFAC,,, | Performed by: OPHTHALMOLOGY

## 2022-04-05 PROCEDURE — 99214 PR OFFICE/OUTPT VISIT, EST, LEVL IV, 30-39 MIN: ICD-10-PCS | Mod: S$GLB,,, | Performed by: OPHTHALMOLOGY

## 2022-04-05 PROCEDURE — 1160F RVW MEDS BY RX/DR IN RCRD: CPT | Mod: CPTII,S$GLB,, | Performed by: OPHTHALMOLOGY

## 2022-04-05 PROCEDURE — 1101F PT FALLS ASSESS-DOCD LE1/YR: CPT | Mod: CPTII,S$GLB,, | Performed by: OPHTHALMOLOGY

## 2022-04-05 PROCEDURE — 1159F MED LIST DOCD IN RCRD: CPT | Mod: CPTII,S$GLB,, | Performed by: OPHTHALMOLOGY

## 2022-04-05 PROCEDURE — 1126F AMNT PAIN NOTED NONE PRSNT: CPT | Mod: CPTII,S$GLB,, | Performed by: OPHTHALMOLOGY

## 2022-04-05 RX ORDER — INFLUENZA VIRUS VACCINE 15; 15; 15; 15 UG/.5ML; UG/.5ML; UG/.5ML; UG/.5ML
SUSPENSION INTRAMUSCULAR
COMMUNITY
Start: 2021-12-03 | End: 2022-04-22

## 2022-04-05 NOTE — PROGRESS NOTES
HPI     Glaucoma      Additional comments: 6 month iop ck with MRx and DFE              Comments     DLS: 10/19/21    Pt states vision doing well with present gls. Does not want a refraction   for gls.     Gtts: Cosopt BID, Latanoprost QHS OU          Last edited by Cheryle Quintana on 4/5/2022  8:31 AM. (History)        ROS     Negative for: Constitutional, Gastrointestinal, Neurological, Skin,   Genitourinary, Musculoskeletal, HENT, Endocrine, Cardiovascular, Eyes,   Respiratory, Psychiatric, Allergic/Imm, Heme/Lymph    Last edited by Tyrone Jimenez Jr., MD on 4/5/2022  9:02 AM. (History)        Assessment /Plan     For exam results, see Encounter Report.    OHT (ocular hypertension), bilateral    Pseudophakia of both eyes    History of vitrectomy    Refractive error      IOP OD 18 OS 20 with thick corneas OU CCT    Continue latanoprost QHS OU and dorzolamide BID OU  HVF WNL OU OS non-specific defects  OCT ON  OD ST, IT thinning OS IT thinning no change  7/15/21  IOP OD 17 OS 22, mildly elevated in left eye; patient does have thicker corneas OS>OD  Gonio CBB x 360 OU  Continue latanoprost 1 drop QHS OU and dorzolamide BID OU  If stays up will consider increasing dorzolamide or adding new drop  10/19/21 IOP OD 17 OS 20, stable, compliant with drops  Continue current regimen; latanoprost qhs and dorzolamide BID OU  4/5/2022 IOP OD 17 OS 20, stable  Continue latanoprost qhs and dorzolamide BID OU  Follow up in about 6 months (around 10/5/2022) for HVF, OCT ON.

## 2022-04-22 ENCOUNTER — OFFICE VISIT (OUTPATIENT)
Dept: DERMATOLOGY | Facility: CLINIC | Age: 67
End: 2022-04-22
Payer: COMMERCIAL

## 2022-04-22 VITALS — HEIGHT: 67 IN | WEIGHT: 141 LBS | BODY MASS INDEX: 22.13 KG/M2

## 2022-04-22 DIAGNOSIS — A49.8 PSEUDOMONAS INFECTION: ICD-10-CM

## 2022-04-22 DIAGNOSIS — L60.3 NAIL DYSTROPHY: Primary | ICD-10-CM

## 2022-04-22 PROCEDURE — 1159F MED LIST DOCD IN RCRD: CPT | Mod: CPTII,S$GLB,, | Performed by: DERMATOLOGY

## 2022-04-22 PROCEDURE — 99999 PR PBB SHADOW E&M-EST. PATIENT-LVL III: ICD-10-PCS | Mod: PBBFAC,,, | Performed by: DERMATOLOGY

## 2022-04-22 PROCEDURE — 3288F FALL RISK ASSESSMENT DOCD: CPT | Mod: CPTII,S$GLB,, | Performed by: DERMATOLOGY

## 2022-04-22 PROCEDURE — 3008F BODY MASS INDEX DOCD: CPT | Mod: CPTII,S$GLB,, | Performed by: DERMATOLOGY

## 2022-04-22 PROCEDURE — 1160F PR REVIEW ALL MEDS BY PRESCRIBER/CLIN PHARMACIST DOCUMENTED: ICD-10-PCS | Mod: CPTII,S$GLB,, | Performed by: DERMATOLOGY

## 2022-04-22 PROCEDURE — 99214 OFFICE O/P EST MOD 30 MIN: CPT | Mod: S$GLB,,, | Performed by: DERMATOLOGY

## 2022-04-22 PROCEDURE — 3288F PR FALLS RISK ASSESSMENT DOCUMENTED: ICD-10-PCS | Mod: CPTII,S$GLB,, | Performed by: DERMATOLOGY

## 2022-04-22 PROCEDURE — 1126F PR PAIN SEVERITY QUANTIFIED, NO PAIN PRESENT: ICD-10-PCS | Mod: CPTII,S$GLB,, | Performed by: DERMATOLOGY

## 2022-04-22 PROCEDURE — 1126F AMNT PAIN NOTED NONE PRSNT: CPT | Mod: CPTII,S$GLB,, | Performed by: DERMATOLOGY

## 2022-04-22 PROCEDURE — 1160F RVW MEDS BY RX/DR IN RCRD: CPT | Mod: CPTII,S$GLB,, | Performed by: DERMATOLOGY

## 2022-04-22 PROCEDURE — 99214 PR OFFICE/OUTPT VISIT, EST, LEVL IV, 30-39 MIN: ICD-10-PCS | Mod: S$GLB,,, | Performed by: DERMATOLOGY

## 2022-04-22 PROCEDURE — 1101F PR PT FALLS ASSESS DOC 0-1 FALLS W/OUT INJ PAST YR: ICD-10-PCS | Mod: CPTII,S$GLB,, | Performed by: DERMATOLOGY

## 2022-04-22 PROCEDURE — 1101F PT FALLS ASSESS-DOCD LE1/YR: CPT | Mod: CPTII,S$GLB,, | Performed by: DERMATOLOGY

## 2022-04-22 PROCEDURE — 1159F PR MEDICATION LIST DOCUMENTED IN MEDICAL RECORD: ICD-10-PCS | Mod: CPTII,S$GLB,, | Performed by: DERMATOLOGY

## 2022-04-22 PROCEDURE — 99999 PR PBB SHADOW E&M-EST. PATIENT-LVL III: CPT | Mod: PBBFAC,,, | Performed by: DERMATOLOGY

## 2022-04-22 PROCEDURE — 3008F PR BODY MASS INDEX (BMI) DOCUMENTED: ICD-10-PCS | Mod: CPTII,S$GLB,, | Performed by: DERMATOLOGY

## 2022-04-22 RX ORDER — CIPROFLOXACIN HYDROCHLORIDE 3 MG/ML
SOLUTION/ DROPS OPHTHALMIC
Qty: 10 ML | Refills: 2 | Status: SHIPPED | OUTPATIENT
Start: 2022-04-22 | End: 2022-10-31

## 2022-04-22 NOTE — PROGRESS NOTES
Subjective:       Patient ID:  Ashlie Redman is a 67 y.o. female who presents for   Chief Complaint   Patient presents with    Fungus     Right pinky finger     LOV- 4/19/21-Dr. Alves, nail dystrophy    Here today for a nail fungus on her right pinky finger since 2017  Various treatments tried without success  01/2021 fusarium + culture  Took itra 400 daily for 2 months  No change noted    Has a hx of NMSC  Skin, left dorsal forearm, DATED 1-30-20  -BASAL CELL CARCINOMA, SUPERFICIAL, MULTIFOCAL, EXTENDING TO THE BASE OF THE SPECIMEN  Has no fhx of MM      Current Outpatient Medications:     dorzolamide (TRUSOPT) 2 % ophthalmic solution, INSTILL 1 DROP INTO BOTH EYES 3 TIMES A DAY, Disp: 30 mL, Rfl: 4    latanoprost 0.005 % ophthalmic solution, INSTILL 1 DROP INTO BOTH EYES IN THE EVENING, Disp: 7.5 mL, Rfl: 3    multivitamin capsule, Take 1 capsule by mouth once daily., Disp: , Rfl:     valACYclovir (VALTREX) 500 MG tablet, Take 1 tablet (500 mg total) by mouth once daily., Disp: 90 tablet, Rfl: 1    FLUARIX QUAD [id number], PF, 60 mcg (15 mcg x 4)/0.5 mL Syrg, , Disp: , Rfl:     itraconazole (SPORANOX) 100 mg Cap, Take 2 capsules (200 mg total) by mouth once daily. (Patient not taking: No sig reported), Disp: 60 capsule, Rfl: 1        Review of Systems   Constitutional: Negative for fever, chills and fatigue.   Respiratory: Negative for cough and shortness of breath.    Skin: Positive for daily sunscreen use, activity-related sunscreen use and wears hat. Negative for itching, rash and dry skin.   Hematologic/Lymphatic: Bruises/bleeds easily.        Objective:    Physical Exam   Constitutional: She appears well-developed and well-nourished. No distress.   HENT:   Mouth/Throat: Lips normal.    Eyes: Lids are normal.    Neurological: She is alert and oriented to person, place, and time. She is not disoriented.   Psychiatric: She has a normal mood and affect. She is not agitated.   Skin:   Areas Examined  (abnormalities noted in diagram):   Head / Face Inspection Performed  Neck Inspection Performed  Chest / Axilla Inspection Performed  Nails and Digits Inspection Performed                  Diagram Legend     Erythematous scaling macule/papule c/w actinic keratosis       Vascular papule c/w angioma      Pigmented verrucoid papule/plaque c/w seborrheic keratosis      Yellow umbilicated papule c/w sebaceous hyperplasia      Irregularly shaped tan macule c/w lentigo     1-2 mm smooth white papules consistent with Milia      Movable subcutaneous cyst with punctum c/w epidermal inclusion cyst      Subcutaneous movable cyst c/w pilar cyst      Firm pink to brown papule c/w dermatofibroma      Pedunculated fleshy papule(s) c/w skin tag(s)      Evenly pigmented macule c/w junctional nevus     Mildly variegated pigmented, slightly irregular-bordered macule c/w mildly atypical nevus      Flesh colored to evenly pigmented papule c/w intradermal nevus       Pink pearly papule/plaque c/w basal cell carcinoma      Erythematous hyperkeratotic cursted plaque c/w SCC      Surgical scar with no sign of skin cancer recurrence      Open and closed comedones      Inflammatory papules and pustules      Verrucoid papule consistent consistent with wart     Erythematous eczematous patches and plaques     Dystrophic onycholytic nail with subungual debris c/w onychomycosis     Umbilicated papule    Erythematous-base heme-crusted tan verrucoid plaque consistent with inflamed seborrheic keratosis     Erythematous Silvery Scaling Plaque c/w Psoriasis     See annotation          Assessment / Plan:        Nail dystrophy    Pseudomonas infection  -     ciprofloxacin HCl (CILOXAN) 0.3 % ophthalmic solution; Apply 3 drops to diseased nail twice daily  Dispense: 10 mL; Refill: 2    past cipro drops, nail was black at the time  Then itra which cleared fusarium component  Hoping to celar residual pseudomonas with cipro and vinegar soak    Vinegar soaks  nightly 5-10 min, apply drops after rinsing and drying  (1 vol vinegar 2 vol water)  Send picture in one month  Will add jublia if necessary (sample provided)             No follow-ups on file.

## 2022-05-09 ENCOUNTER — PATIENT MESSAGE (OUTPATIENT)
Dept: SMOKING CESSATION | Facility: CLINIC | Age: 67
End: 2022-05-09

## 2022-05-24 ENCOUNTER — PATIENT MESSAGE (OUTPATIENT)
Dept: DERMATOLOGY | Facility: CLINIC | Age: 67
End: 2022-05-24

## 2022-05-24 DIAGNOSIS — B35.1 ONYCHOMYCOSIS: Primary | ICD-10-CM

## 2022-05-27 ENCOUNTER — TELEPHONE (OUTPATIENT)
Dept: HEMATOLOGY/ONCOLOGY | Facility: CLINIC | Age: 67
End: 2022-05-27

## 2022-05-27 NOTE — TELEPHONE ENCOUNTER
This nurse called pt. Notified her that PET scan was denied. She was already aware. Gave pt the option to schedule for CT scan at later date or see MD now w/o scan. Pt states that she is getting new insurance in July and is not sure of benefits. Pt will see MD now and discuss scans at that visit.

## 2022-05-31 RX ORDER — FLUCONAZOLE 200 MG/1
TABLET ORAL
Qty: 12 TABLET | Refills: 0 | Status: SHIPPED | OUTPATIENT
Start: 2022-05-31 | End: 2022-10-31

## 2022-05-31 NOTE — TELEPHONE ENCOUNTER
The bacterial component (green, pseudomonas) cleared with abx drops and vinegar, continue  Will add fluconazole ONCE PER WEEK for 3 months then reevaluate.

## 2022-06-01 ENCOUNTER — LAB VISIT (OUTPATIENT)
Dept: LAB | Facility: HOSPITAL | Age: 67
End: 2022-06-01
Attending: INTERNAL MEDICINE
Payer: COMMERCIAL

## 2022-06-01 DIAGNOSIS — D53.9 MACROCYTIC ANEMIA: ICD-10-CM

## 2022-06-01 LAB
ALBUMIN SERPL BCP-MCNC: 4.1 G/DL (ref 3.5–5.2)
ALP SERPL-CCNC: 51 U/L (ref 55–135)
ALT SERPL W/O P-5'-P-CCNC: 20 U/L (ref 10–44)
ANION GAP SERPL CALC-SCNC: 8 MMOL/L (ref 8–16)
AST SERPL-CCNC: 23 U/L (ref 10–40)
BASOPHILS # BLD AUTO: 0.04 K/UL (ref 0–0.2)
BASOPHILS NFR BLD: 0.8 % (ref 0–1.9)
BILIRUB SERPL-MCNC: 0.5 MG/DL (ref 0.1–1)
BUN SERPL-MCNC: 11 MG/DL (ref 8–23)
CALCIUM SERPL-MCNC: 9.8 MG/DL (ref 8.7–10.5)
CHLORIDE SERPL-SCNC: 112 MMOL/L (ref 95–110)
CO2 SERPL-SCNC: 23 MMOL/L (ref 23–29)
CREAT SERPL-MCNC: 0.9 MG/DL (ref 0.5–1.4)
DAT IGG-SP REAG RBC-IMP: NORMAL
DIFFERENTIAL METHOD: ABNORMAL
EOSINOPHIL # BLD AUTO: 0.2 K/UL (ref 0–0.5)
EOSINOPHIL NFR BLD: 3.8 % (ref 0–8)
ERYTHROCYTE [DISTWIDTH] IN BLOOD BY AUTOMATED COUNT: 11.6 % (ref 11.5–14.5)
EST. GFR  (AFRICAN AMERICAN): >60 ML/MIN/1.73 M^2
EST. GFR  (NON AFRICAN AMERICAN): >60 ML/MIN/1.73 M^2
FOLATE SERPL-MCNC: 16.3 NG/ML (ref 4–24)
GLUCOSE SERPL-MCNC: 106 MG/DL (ref 70–110)
HAPTOGLOB SERPL-MCNC: 76 MG/DL (ref 30–250)
HCT VFR BLD AUTO: 39.9 % (ref 37–48.5)
HGB BLD-MCNC: 13.2 G/DL (ref 12–16)
IMM GRANULOCYTES # BLD AUTO: 0.01 K/UL (ref 0–0.04)
IMM GRANULOCYTES NFR BLD AUTO: 0.2 % (ref 0–0.5)
LYMPHOCYTES # BLD AUTO: 1.4 K/UL (ref 1–4.8)
LYMPHOCYTES NFR BLD: 29.3 % (ref 18–48)
MCH RBC QN AUTO: 32.7 PG (ref 27–31)
MCHC RBC AUTO-ENTMCNC: 33.1 G/DL (ref 32–36)
MCV RBC AUTO: 99 FL (ref 82–98)
MONOCYTES # BLD AUTO: 0.4 K/UL (ref 0.3–1)
MONOCYTES NFR BLD: 8.4 % (ref 4–15)
NEUTROPHILS # BLD AUTO: 2.7 K/UL (ref 1.8–7.7)
NEUTROPHILS NFR BLD: 57.5 % (ref 38–73)
NRBC BLD-RTO: 0 /100 WBC
PLATELET # BLD AUTO: 206 K/UL (ref 150–450)
PMV BLD AUTO: 10.1 FL (ref 9.2–12.9)
POTASSIUM SERPL-SCNC: 4.3 MMOL/L (ref 3.5–5.1)
PROT SERPL-MCNC: 6.8 G/DL (ref 6–8.4)
RBC # BLD AUTO: 4.04 M/UL (ref 4–5.4)
RETICS/RBC NFR AUTO: 1.2 % (ref 0.5–2.5)
SODIUM SERPL-SCNC: 143 MMOL/L (ref 136–145)
VIT B12 SERPL-MCNC: 524 PG/ML (ref 210–950)
WBC # BLD AUTO: 4.74 K/UL (ref 3.9–12.7)

## 2022-06-01 PROCEDURE — 36415 COLL VENOUS BLD VENIPUNCTURE: CPT | Mod: PN | Performed by: INTERNAL MEDICINE

## 2022-06-01 PROCEDURE — 84165 PROTEIN E-PHORESIS SERUM: CPT | Performed by: INTERNAL MEDICINE

## 2022-06-01 PROCEDURE — 86880 COOMBS TEST DIRECT: CPT | Performed by: INTERNAL MEDICINE

## 2022-06-01 PROCEDURE — 84165 PATHOLOGIST INTERPRETATION SPE: ICD-10-PCS | Mod: 26,,, | Performed by: PATHOLOGY

## 2022-06-01 PROCEDURE — 82607 VITAMIN B-12: CPT | Performed by: INTERNAL MEDICINE

## 2022-06-01 PROCEDURE — 86334 PATHOLOGIST INTERPRETATION IFE: ICD-10-PCS | Mod: 26,,, | Performed by: PATHOLOGY

## 2022-06-01 PROCEDURE — 86880 COOMBS TEST DIRECT: CPT | Mod: PN | Performed by: INTERNAL MEDICINE

## 2022-06-01 PROCEDURE — 82746 ASSAY OF FOLIC ACID SERUM: CPT | Performed by: INTERNAL MEDICINE

## 2022-06-01 PROCEDURE — 86300 IMMUNOASSAY TUMOR CA 15-3: CPT | Performed by: INTERNAL MEDICINE

## 2022-06-01 PROCEDURE — 86334 IMMUNOFIX E-PHORESIS SERUM: CPT | Mod: 26,,, | Performed by: PATHOLOGY

## 2022-06-01 PROCEDURE — 85045 AUTOMATED RETICULOCYTE COUNT: CPT | Mod: PN | Performed by: INTERNAL MEDICINE

## 2022-06-01 PROCEDURE — 80053 COMPREHEN METABOLIC PANEL: CPT | Mod: PN | Performed by: INTERNAL MEDICINE

## 2022-06-01 PROCEDURE — 83010 ASSAY OF HAPTOGLOBIN QUANT: CPT | Performed by: INTERNAL MEDICINE

## 2022-06-01 PROCEDURE — 85025 COMPLETE CBC W/AUTO DIFF WBC: CPT | Mod: PN | Performed by: INTERNAL MEDICINE

## 2022-06-01 PROCEDURE — 84165 PROTEIN E-PHORESIS SERUM: CPT | Mod: 26,,, | Performed by: PATHOLOGY

## 2022-06-01 PROCEDURE — 86334 IMMUNOFIX E-PHORESIS SERUM: CPT | Performed by: INTERNAL MEDICINE

## 2022-06-02 LAB
ALBUMIN SERPL ELPH-MCNC: 4.22 G/DL (ref 3.35–5.55)
ALPHA1 GLOB SERPL ELPH-MCNC: 0.25 G/DL (ref 0.17–0.41)
ALPHA2 GLOB SERPL ELPH-MCNC: 0.7 G/DL (ref 0.43–0.99)
B-GLOBULIN SERPL ELPH-MCNC: 0.7 G/DL (ref 0.5–1.1)
GAMMA GLOB SERPL ELPH-MCNC: 0.82 G/DL (ref 0.67–1.58)
INTERPRETATION SERPL IFE-IMP: NORMAL
PATHOLOGIST INTERPRETATION IFE: NORMAL
PATHOLOGIST INTERPRETATION SPE: NORMAL
PROT SERPL-MCNC: 6.7 G/DL (ref 6–8.4)

## 2022-06-06 LAB — CANCER AG27-29 SERPL-ACNC: 29.8 U/ML

## 2022-06-08 ENCOUNTER — RESEARCH ENCOUNTER (OUTPATIENT)
Dept: RESEARCH | Facility: HOSPITAL | Age: 67
End: 2022-06-08

## 2022-06-08 NOTE — PROGRESS NOTES
Protocol: [id number]  Investigator: GRETCHEN Arce MD  Pt Initials: KATRINA HERNDON  Study ID: [id number]  IRB #: [id number]     [id number]: A Randomized Phase III Trial Evaluating the Role of Axillary Lymph Node Dissection in Breast Cancer Patients (CT1-3 N1) Who Have Positive [place] Lymph Node Disease After Neoadjuvant Chemotherapy    Arm 1    June 8th, 2022- 1 year f/u     Met with the patient today for 1 year follow-up per protocol. Patient states she is doing well. The patient reported having difficulties with arm lymphedema within the last year. She states she has practiced exercises and lymphatic massages as intervention for lymphedema. During this visit, lymphedema (LMF) measurements were collected and documented per protocol and lymphedema QOLs were completed.    The patient will be seeing her treating oncologist, Dr. Ragsdale, for her H&P and physical exam of the axilla on 6/15/22. Dr. Ragsdale has transferred to [facility] within the last year, so the patient now sees this CRC on a separate date as her treating physician. This CRC will review the 6/15/22 note from the treating oncologist for [id number] data reporting.    The patient was agreeable to continue 1 year follow ups and lymphedema measurements. All questions were answered per patient satisfaction and the patient confirmed that she would meet with CRC again in June of next year.     [facility]a Wills [facility]

## 2022-06-15 ENCOUNTER — INFUSION (OUTPATIENT)
Dept: INFUSION THERAPY | Facility: HOSPITAL | Age: 67
End: 2022-06-15
Attending: INTERNAL MEDICINE
Payer: COMMERCIAL

## 2022-06-15 ENCOUNTER — TELEPHONE (OUTPATIENT)
Dept: HEMATOLOGY/ONCOLOGY | Facility: CLINIC | Age: 67
End: 2022-06-15

## 2022-06-15 ENCOUNTER — OFFICE VISIT (OUTPATIENT)
Dept: HEMATOLOGY/ONCOLOGY | Facility: CLINIC | Age: 67
End: 2022-06-15
Payer: COMMERCIAL

## 2022-06-15 VITALS
TEMPERATURE: 98 F | SYSTOLIC BLOOD PRESSURE: 125 MMHG | OXYGEN SATURATION: 99 % | RESPIRATION RATE: 16 BRPM | BODY MASS INDEX: 21.93 KG/M2 | WEIGHT: 139.75 LBS | HEART RATE: 70 BPM | HEIGHT: 67 IN | DIASTOLIC BLOOD PRESSURE: 78 MMHG

## 2022-06-15 VITALS
DIASTOLIC BLOOD PRESSURE: 78 MMHG | RESPIRATION RATE: 16 BRPM | HEART RATE: 70 BPM | WEIGHT: 139.75 LBS | BODY MASS INDEX: 21.89 KG/M2 | OXYGEN SATURATION: 99 % | SYSTOLIC BLOOD PRESSURE: 125 MMHG | TEMPERATURE: 98 F

## 2022-06-15 DIAGNOSIS — C50.012 MALIGNANT NEOPLASM OF NIPPLE OF LEFT BREAST IN FEMALE, UNSPECIFIED ESTROGEN RECEPTOR STATUS: ICD-10-CM

## 2022-06-15 DIAGNOSIS — Z85.3 HX OF BREAST CANCER: ICD-10-CM

## 2022-06-15 DIAGNOSIS — M80.00XA AGE-RELATED OSTEOPOROSIS WITH CURRENT PATHOLOGICAL FRACTURE, INITIAL ENCOUNTER: Primary | ICD-10-CM

## 2022-06-15 DIAGNOSIS — G62.0 CHEMOTHERAPY-INDUCED PERIPHERAL NEUROPATHY: ICD-10-CM

## 2022-06-15 DIAGNOSIS — T45.1X5A CHEMOTHERAPY-INDUCED PERIPHERAL NEUROPATHY: ICD-10-CM

## 2022-06-15 DIAGNOSIS — J70.0 RADIATION PNEUMONITIS: ICD-10-CM

## 2022-06-15 DIAGNOSIS — C50.912 HER2-POSITIVE CARCINOMA OF LEFT BREAST: Primary | ICD-10-CM

## 2022-06-15 DIAGNOSIS — M80.00XA AGE-RELATED OSTEOPOROSIS WITH CURRENT PATHOLOGICAL FRACTURE, INITIAL ENCOUNTER: ICD-10-CM

## 2022-06-15 PROCEDURE — 99999 PR PBB SHADOW E&M-EST. PATIENT-LVL IV: ICD-10-PCS | Mod: PBBFAC,,, | Performed by: INTERNAL MEDICINE

## 2022-06-15 PROCEDURE — 96372 THER/PROPH/DIAG INJ SC/IM: CPT | Mod: PN

## 2022-06-15 PROCEDURE — 3078F DIAST BP <80 MM HG: CPT | Mod: CPTII,S$GLB,, | Performed by: INTERNAL MEDICINE

## 2022-06-15 PROCEDURE — 99214 PR OFFICE/OUTPT VISIT, EST, LEVL IV, 30-39 MIN: ICD-10-PCS | Mod: S$GLB,,, | Performed by: INTERNAL MEDICINE

## 2022-06-15 PROCEDURE — 1126F PR PAIN SEVERITY QUANTIFIED, NO PAIN PRESENT: ICD-10-PCS | Mod: CPTII,S$GLB,, | Performed by: INTERNAL MEDICINE

## 2022-06-15 PROCEDURE — 1159F MED LIST DOCD IN RCRD: CPT | Mod: CPTII,S$GLB,, | Performed by: INTERNAL MEDICINE

## 2022-06-15 PROCEDURE — 3008F PR BODY MASS INDEX (BMI) DOCUMENTED: ICD-10-PCS | Mod: CPTII,S$GLB,, | Performed by: INTERNAL MEDICINE

## 2022-06-15 PROCEDURE — 1126F AMNT PAIN NOTED NONE PRSNT: CPT | Mod: CPTII,S$GLB,, | Performed by: INTERNAL MEDICINE

## 2022-06-15 PROCEDURE — 1101F PR PT FALLS ASSESS DOC 0-1 FALLS W/OUT INJ PAST YR: ICD-10-PCS | Mod: CPTII,S$GLB,, | Performed by: INTERNAL MEDICINE

## 2022-06-15 PROCEDURE — 3074F SYST BP LT 130 MM HG: CPT | Mod: CPTII,S$GLB,, | Performed by: INTERNAL MEDICINE

## 2022-06-15 PROCEDURE — 99999 PR PBB SHADOW E&M-EST. PATIENT-LVL IV: CPT | Mod: PBBFAC,,, | Performed by: INTERNAL MEDICINE

## 2022-06-15 PROCEDURE — 99214 OFFICE O/P EST MOD 30 MIN: CPT | Mod: S$GLB,,, | Performed by: INTERNAL MEDICINE

## 2022-06-15 PROCEDURE — 1159F PR MEDICATION LIST DOCUMENTED IN MEDICAL RECORD: ICD-10-PCS | Mod: CPTII,S$GLB,, | Performed by: INTERNAL MEDICINE

## 2022-06-15 PROCEDURE — 63600175 PHARM REV CODE 636 W HCPCS: Mod: JG,PN | Performed by: INTERNAL MEDICINE

## 2022-06-15 PROCEDURE — 3288F FALL RISK ASSESSMENT DOCD: CPT | Mod: CPTII,S$GLB,, | Performed by: INTERNAL MEDICINE

## 2022-06-15 PROCEDURE — 3074F PR MOST RECENT SYSTOLIC BLOOD PRESSURE < 130 MM HG: ICD-10-PCS | Mod: CPTII,S$GLB,, | Performed by: INTERNAL MEDICINE

## 2022-06-15 PROCEDURE — 1101F PT FALLS ASSESS-DOCD LE1/YR: CPT | Mod: CPTII,S$GLB,, | Performed by: INTERNAL MEDICINE

## 2022-06-15 PROCEDURE — 3008F BODY MASS INDEX DOCD: CPT | Mod: CPTII,S$GLB,, | Performed by: INTERNAL MEDICINE

## 2022-06-15 PROCEDURE — 3288F PR FALLS RISK ASSESSMENT DOCUMENTED: ICD-10-PCS | Mod: CPTII,S$GLB,, | Performed by: INTERNAL MEDICINE

## 2022-06-15 PROCEDURE — 3078F PR MOST RECENT DIASTOLIC BLOOD PRESSURE < 80 MM HG: ICD-10-PCS | Mod: CPTII,S$GLB,, | Performed by: INTERNAL MEDICINE

## 2022-06-15 RX ADMIN — DENOSUMAB 60 MG: 60 INJECTION SUBCUTANEOUS at 01:06

## 2022-06-15 NOTE — PROGRESS NOTES
HISTORY OF PRESENT ILLNESS:    This is a 66-year-old white female here for f/u of breast ca hx    Oncology history   treated for a  4/12/2017 diagnosis of Stage IIB left breast carcinoma.ER/OH negative. Her - 2 positive  Recd Neoadjuvant A/C ,completed 12 cycles weekly taxol and  herceptin/ perjeta had lumpectomy was on [facility] trial completed the entire year of herceptin /perjeta, but didn't undergo axillary xrt per trial  Here for f/u with pet and labs recnt mammogram showed calcifications , that were biospied and came back negative  radiation induced interstitial findings still having the cough but getting of her upper respiratory infection today   sonme neuropathy post rx still persisits   had a difficult summer 2019. Had removal of her original implant for pain in the area, after removal pt developed pseudomonas infection stayed in hospital  For a week. She sees Dr. Modi, had second reconstruction 10 2020 had a deep flap done. Saw DR Rodriguez rt breast mammo 9/2020      Wt Readings from Last 3 Encounters:   06/15/22 63.4 kg (139 lb 12.4 oz)   04/22/22 64 kg (141 lb)   02/08/22 64.3 kg (141 lb 12.1 oz)     Temp Readings from Last 3 Encounters:   06/15/22 97.5 °F (36.4 °C) (Temporal)   02/08/22 98.2 °F (36.8 °C) (Oral)   12/14/21 97.6 °F (36.4 °C) (Temporal)     BP Readings from Last 3 Encounters:   06/15/22 125/78   02/08/22 134/84   12/14/21 139/75     Pulse Readings from Last 3 Encounters:   06/15/22 70   02/08/22 81   12/14/21 65   Review of Systems   Constitutional: Negative for chills, diaphoresis, fever, malaise/fatigue and weight loss.   HENT: Negative for congestion, hearing loss, sinus pain, sore throat and tinnitus.    Eyes: Positive for photophobia. Negative for blurred vision.   Respiratory: Negative for cough and shortness of breath.    Cardiovascular: Negative for chest pain.   Gastrointestinal: Negative for abdominal pain and diarrhea.   Genitourinary: Negative for frequency.   Musculoskeletal:  Negative for back pain.   Skin: Negative for rash.   Neurological: Negative for headaches.   Psychiatric/Behavioral: The patient is not nervous/anxious.    feeling lumpy area to left axilla has apt with Dr Ly Arvizu  Impaired mobility of left shoulder post reconstruction and breast sx        GENERAL:   Wt Readings from Last 3 Encounters:   06/15/22 63.4 kg (139 lb 12.4 oz)   04/22/22 64 kg (141 lb)   02/08/22 64.3 kg (141 lb 12.1 oz)     Temp Readings from Last 3 Encounters:   06/15/22 97.5 °F (36.4 °C) (Temporal)   02/08/22 98.2 °F (36.8 °C) (Oral)   12/14/21 97.6 °F (36.4 °C) (Temporal)     BP Readings from Last 3 Encounters:   06/15/22 125/78   02/08/22 134/84   12/14/21 139/75     Pulse Readings from Last 3 Encounters:   06/15/22 70   02/08/22 81   12/14/21 65    VITAL SIGNS:  as above   GENERAL: appears well-built, well-nourished.  No anxiety, no agitation, and in no distress.  Patient is awake, alert, oriented and cooperative.  HEENT:  Showed no congestion. Trachea is central no obvious icterus or pallor noted no hoarseness. no obvious JVD   NECK:  Supple.  No JVD. No obvious cervical submental or supraclavicular adenopathy.  RS:the visualized portion of  Chest expands well. chest appears symmetric, no audible wheezes.  No dyspnea recognized  ABDOMEN:  abdomen appears undistended.  EXTREMITIES:  Without edema.  NEUROLOGICAL:  The patient is appropriate, higher functions are normal.  No  obvious neurological deficits.  normal judgement normal thought content  No confusion, no speech impediment. Cranial nerves are intact and show no deficit. No gross motor deficits noted   SKIN MUSCULOSKELETAL: no joint or skeletal deformity, no clubbing of nails.  No visible rash ecchymosis or petechiae  BREAST: left breast  Flap done, some puckering that is not satisfactory to pt, she has left axilla fullness/ swelling. With cord like tighetning around axilla that limits her arm raising. Its a lump like area to axilla,  the arm itself is not swollen, pt reports that the working dx is that the lymphatic drainage was blocked due to sx fom scarring   rt breast implant has been removed and the breast is natural  LABORATORY:    Lab Results   Component Value Date    WBC 4.74 06/01/2022    HGB 13.2 06/01/2022    HCT 39.9 06/01/2022    MCV 99 (H) 06/01/2022     06/01/2022         CMP  Sodium   Date Value Ref Range Status   06/01/2022 143 136 - 145 mmol/L Final     Potassium   Date Value Ref Range Status   06/01/2022 4.3 3.5 - 5.1 mmol/L Final     Chloride   Date Value Ref Range Status   06/01/2022 112 (H) 95 - 110 mmol/L Final     CO2   Date Value Ref Range Status   06/01/2022 23 23 - 29 mmol/L Final     Glucose   Date Value Ref Range Status   06/01/2022 106 70 - 110 mg/dL Final     BUN   Date Value Ref Range Status   06/01/2022 11 8 - 23 mg/dL Final     Creatinine   Date Value Ref Range Status   06/01/2022 0.9 0.5 - 1.4 mg/dL Final     Calcium   Date Value Ref Range Status   06/01/2022 9.8 8.7 - 10.5 mg/dL Final     Total Protein   Date Value Ref Range Status   06/01/2022 6.8 6.0 - 8.4 g/dL Final     Albumin   Date Value Ref Range Status   06/01/2022 4.1 3.5 - 5.2 g/dL Final     Total Bilirubin   Date Value Ref Range Status   06/01/2022 0.5 0.1 - 1.0 mg/dL Final     Comment:     For infants and newborns, interpretation of results should be based  on gestational age, weight and in agreement with clinical  observations.    Premature Infant recommended reference ranges:  Up to 24 hours.............<8.0 mg/dL  Up to 48 hours............<12.0 mg/dL  3-5 days..................<15.0 mg/dL  6-29 days.................<15.0 mg/dL       Alkaline Phosphatase   Date Value Ref Range Status   06/01/2022 51 (L) 55 - 135 U/L Final     AST   Date Value Ref Range Status   06/01/2022 23 10 - 40 U/L Final     ALT   Date Value Ref Range Status   06/01/2022 20 10 - 44 U/L Final     Anion Gap   Date Value Ref Range Status   06/01/2022 8 8 - 16 mmol/L  Final     eGFR if    Date Value Ref Range Status   06/01/2022 >60 >60 mL/min/1.73 m^2 Final     eGFR if non    Date Value Ref Range Status   06/01/2022 >60 >60 mL/min/1.73 m^2 Final     Comment:     Calculation used to obtain the estimated glomerular filtration  rate (eGFR) is the CKD-EPI equation.      30.7 ca 2729 on 12/3/21     Left breast, lumpectomy:  - Residual invasive ductal carcinoma, moderately differentiated, Jamal Grade 2 (3+2+1=6), 0.2 cm in  greatest linear microscopic dimension.  - Ductal carcinoma in situ (DCIS), high nuclear grade, 2 cm, solid and cribriform types.  - Surgical margins are free of tumor; invasive carcinoma is located 0.1 cm from the closest margin         FINAL PATHOLOGIC DIAGNOSIS  1. BREAST, RIGHT, CENTRAL REGION MIDDLE DEPTH, CALCIFICATIONS, STEREOTACTIC-GUIDED  BIOPSY:  Benign breast tissue with fibrocystic changes, columnar cell changes, and focal features suggestive of duct  rupture.  Microcalcifications: Seen in association with areas of columnar cell change.  Negative for atypia or carcinoma.  Osteopenia 3/2017 dexa  1/2020 osteoporosis  Chest x-ray negative October 2020 mammogram September 2 1020-    Pet 12/2021 neg    IMPRESSION:    Stage IIB left breast carcinoma 2017 (ER/HI negative, Her-2/clay positive)  Completed neoadjuvant AC and weekly taxol / herceptin  stopped perjeta since she also had xrt to left side due to fear of toxicity   BRCA negative  , s/p  lumpectomy and axillary disection , with 2 residual Ln positive. Per [facility] trail did not undergo xrt to axilla    reconstructed left breast MRI of rt breast  negative February 2020, mammo of  Both breast( has natural breast tissue in left still) in 9/2021 next 9/22   pet was denied by insurance will get annual atleast   cont observation and surveillance   left axillary  fullness, has seen   DR flowers  She has restricted movement to arm raising, and cord like tightening to the left  axilla, she sees Angelina Sorensen from the [facility] which is a lymphedema specialist ( fascia management, ) she sees Angelina twice a month  Now, swelling started about a year ago  1.   dexa showing osteoporosis, continue with prolia due today   has delayed this over a year in the past.  And   possibly fracture of ribs from coughing per xray.  Fractures on rib toe possibly related to osteoporosis   cont f/u wit pcp   anemia has resolved  Return to clinic 6 months in time for the next dose of Prolia with CBC CMP and CA 2729. pet  Effexor sent for hot flashes but pt is not taking them and feels better

## 2022-06-15 NOTE — TELEPHONE ENCOUNTER
"----- Message from Amanda Wills sent at 6/15/2022  2:42 PM CDT -----  Regarding: Ashlie weiss lymphedema grading  Good afternoon Dr. Ragsdale,    I'm currently reporting Mrs. Weiss's 1 year f/u data for the [id number] trial. Would you be able to advise on the following concerning lymphedema? The study protocol requires reporting of lymphedema along with a CTCAE grading and attribution to study therapy. I know Mrs. Weiss reported having difficulties with lymphedema within this last year. I've pasted the study's protocol definition of lymphedema below.    "A patient will be considered to have developed arm lymphedema if there is a 10% increase  in the volume of her ipsilateral arm from its pre-surgery volume"    CTCAE defines grade 1 lymphedema as "Trace thickening or faint discoloration"    The patient's measurements from June 8th last week did not represent the 10% increase from her pre-surgery volume to meet the protocol's requirements of lymphedema.     With this in mind and from your physical exam of the patient today, would you grade this as a grade 0 or grade 1 lymphedema? If grade 1, what attribution would you give to the study's therapy?    Please feel free to call me at [phone] if you have any questions. Thank you for your time.     Sincerely,  Amanda Wills [facility]      "

## 2022-06-15 NOTE — Clinical Note
Cbc, cmp, [id number] pet see me in 12/22 for next proliaiand test results  Ok for prolia today  Mammo in 9/22

## 2022-06-15 NOTE — PLAN OF CARE
Problem: Adult Inpatient Plan of Care  Goal: Plan of Care Review  Outcome: Ongoing, Progressing  Goal: Patient-Specific Goal (Individualized)  Outcome: Ongoing, Progressing     Problem: Electrolyte Imbalance  Goal: Electrolyte Balance  Outcome: Ongoing, Progressing   Pt tolerated prolia injection well.   No adverse reaction noted.  All questions answered.  Pt left clinic in no acute distress.

## 2022-06-16 NOTE — TELEPHONE ENCOUNTER
This nurse spoke with Ms Wills at the lymphedema clinic. Notified her that per MD notes, this pt is currently grade 1, trace thickening.

## 2022-06-17 ENCOUNTER — TELEPHONE (OUTPATIENT)
Dept: HEMATOLOGY/ONCOLOGY | Facility: CLINIC | Age: 67
End: 2022-06-17

## 2022-06-17 NOTE — TELEPHONE ENCOUNTER
----- Message from Celestina Ragsdale MD sent at 6/15/2022 10:37 AM CDT -----  Cbc, cmp, [id number] pet see me in 12/22 for next proliaiand test results   Ok for prolia today   Mammo in 9/22

## 2022-06-20 ENCOUNTER — PATIENT MESSAGE (OUTPATIENT)
Dept: HEMATOLOGY/ONCOLOGY | Facility: CLINIC | Age: 67
End: 2022-06-20

## 2022-06-22 ENCOUNTER — PATIENT MESSAGE (OUTPATIENT)
Dept: HEMATOLOGY/ONCOLOGY | Facility: CLINIC | Age: 67
End: 2022-06-22

## 2022-06-22 ENCOUNTER — TELEPHONE (OUTPATIENT)
Dept: HEMATOLOGY/ONCOLOGY | Facility: CLINIC | Age: 67
End: 2022-06-22

## 2022-06-24 ENCOUNTER — PATIENT MESSAGE (OUTPATIENT)
Dept: HEMATOLOGY/ONCOLOGY | Facility: CLINIC | Age: 67
End: 2022-06-24

## 2022-06-24 DIAGNOSIS — M80.00XA AGE-RELATED OSTEOPOROSIS WITH CURRENT PATHOLOGICAL FRACTURE, INITIAL ENCOUNTER: Primary | ICD-10-CM

## 2022-07-26 NOTE — DISCHARGE SUMMARY
[facility]  Discharge Summary     Patient ID:  Ashlie Redman  [id number]  65 y.o.  1955    Admit date: 10/26/2020    Discharge Date and Time:  10/28/2020 10:17 AM    Admitting Physician: Diego Modi MD     Discharge Provider: Diego Modi    Reason for Admission: HX: breast cancer [Z85.3]  Preop testing [Z01.818]  Hx of breast cancer [Z85.3]  Hx of breast cancer [Z85.3]  Hx of breast cancer [Z85.3]    Admission Condition: good    Procedures Performed: Procedure(s) (LRB):  RECONSTRUCTION, BREAST, USING SUSAN SKIN FLAP - LEFT BREAST RECONSTRUCTION WITH STACKED SUSAN FREE FLAPS. (Left)  REMOVAL, IMPLANT, BREAST -   RIGHT BREAST IMPLANT REMOVAL (Right)    Hospital Course patient underwent the above procedures without complications.  Patient was transferred to the floor on postoperative day 1.  Patient is ready for discharge on postoperative day 2.  Patient's flaps remained with good color and good signal throughout her stay.     Consults: None    Significant Diagnostic Studies:  None    Final Diagnoses:    Principal Problem: Hx of breast cancer   Secondary Diagnoses:  Acquired absence of left breast    Discharged Condition: good    Discharge Exam:    Left breast flaps with good signal and color.  Abdomen clean dry and intact.  Right breast incision intact.    Disposition: Home or Self Care    Follow Up/Patient Instructions:     Patient is to follow up with me in 1 week.  Patient was instructed no heavy lifting.  Patient was instructed that she may shower and the wounds may get wet.  Patient was instructed no pressure to the left breast.  Patient was instructed to wear postoperative bra and compression garment and a showering.    Medications:  Reconciled Home Medications:      Medication List      CHANGE how you take these medications    valACYclovir 500 MG tablet  Commonly known as: VALTREX  Take 1 tablet (500 mg total) by mouth once daily.  What changed:   · when to take this  · reasons to  PT Daily Note-Current


Subjective


Patient agrees to PT.





Mental Status


Patient Orientation:  Normal For Age


Attachments:  Oxygen





Transfers


SCALE: Activities may be completed with or without assistive devices.





6-Indepedent-patient completes the activity by him/herself with no assistance 

from a helper.


5-Set-up or Clean-up Assistance-helper sets up or cleans up; patient completes 

activity. [place] assists only prior to or  


    following the activity.


4-Supervision or Touching Assistance-helper provides verbal cues and/or 

touching/steadying and/or contact guard assistance as patient completes 

activity. Assistance may be provided   


    throughout the activity or intermittently.


3-Partial/Moderate Assistance-helper does LESS THAN HALF the effort. [place] 

lifts, holds or supports trunk or limbs, but provides less than half the effort.


2-Substantial/Maximal Assistance-helper does MORE THAN HALF the effort. [place] 

lifts or holds trunk or limbs and provides more than half the effort.


3-Gmjznfxyg-rhsuau does ALL the effort. Patient does none of the effort to 

complete the activity. Or, the assistance of 2 or more helpers is required for 

the patient to complete the  


    activity.


If activity was not attempted, code reason:


7-Patient Refused.


9-Not Applicable-not attempted and the patient did not perform the activity 

before the current illness, exacerbation or injury.


10-Not Attempted due to Environmental Limitations-(lack of equipment, weather 

restraints, etc.).


88-Not Attempted due to Medical Conditions or Safety Concerns.


Sit to Stand (QC):  3 (x 3 sets in //bars)





Weight Bearing


Full Weight Bearing





Wheelchair Training


Does the Pt Use a Wheelchair?:  Yes


Wheel 50 ft with 2 turns (QC):  4


Wheel 150 ft (QC):  4


Type of Wheelchair:  Manual





Exercises


Seated Therapy Exercises:  Sit to stand (3 sets standing 1.5 min each), Long arc

quads (15 x 2 sets right LE), Glut set (15)





Assessment


Patient fatigues with activity and remains in w/c.  RN in to redress left AKA 

dressing.  Patient improving with sit to stand transfers.





PT Short Term Goals


Short Term Goals


Time Frame:  Jul 15, 2022


Roll Left & Right:  4


Sit to lying:  3 (Kacie)


Lying to sitting on side of be:  3 (Kacie)


Sit to stand:  3 (modA)


Chair/bed-to-chair transfer:  3 (modA)





PT Long Term Goals


Long Term Goals


PT Long Term Goals Time Frame:  Jul 29, 2022


Roll Left & Right (QC):  6


Sit to Lying (QC):  4 (SBA)


Lying-Sitting on Side/Bed(QC):  4 (SBA)


Sit to Stand (QC):  3 (Kacie)


Chair/Bed-to-Chair Xfer(QC):  3 (Kacie)


Toilet Transfer (QC):  3 (Kacie)


Car Transfer (QC):  3 (Kacie)


Does the Patient Walk:  No and Walking Goal NOT indicated


Walk 10 feet (QC):  88


Walk 50ft with 2 Turns (QC):  88


Walk 150 ft (QC):  88


Walking 10ft on Uneven Surface:  88


1 Step (curb) (QC):  88


4 Steps (QC):  88


12 Steps (QC):  88


Picking up an Object (QC):  88


Wheel 50 feet with 2 turns (QC:  6


Wheel 150 feet:  6





PT Plan


Treatment/Plan


Treatment Plan:  Discontinue PT, goals met


Treatment Plan:  Bed Mobility, Education, Functional Activity [place], Functional 

Strength, Group Therapy, Safety, Therapeutic Exercise, Transfers


Treatment Duration:  Jul 29, 2022


Frequency:  At least 5 of 7 days/Wk (IRF)


Estimated Hrs Per Day:  1.5 hours per day


Patient and/or Family Agrees t:  Yes





Time/GCodes


Time In:  1345


Time Out:  1415


Total Billed Treatment Time:  30


Total Billed Treatment


1 visit


FA 12 min


WC 18 min











DOYLE MOORE PT              Jul 26, 2022 14:17 take this        CONTINUE taking these medications    buPROPion 150 MG TB24 tablet  Commonly known as: WELLBUTRIN XL  TAKE 1 TABLET BY MOUTH EVERY DAY     dorzolamide 2 % ophthalmic solution  Commonly known as: TRUSOPT  Place 1 drop into both eyes 3 (three) times daily.     latanoprost 0.005 % ophthalmic solution  INSTILL ONE DROP INTO EACH EYE ONCE DAILY IN THE EVENING     multivitamin capsule  Take 1 capsule by mouth once daily.          Discharge Procedure Orders   COVID-19 Routine Screening   Standing Status: Future Number of Occurrences: 1 Standing Exp. Date: 12/18/21   Order Comments: Stat for surgery 10.26     Order Specific Question Answer Comments   Is the patient symptomatic? No    Is this needed for pre-procedure or pre-op testing? Yes    Diagnosis: Preop testing [880513]      Diet Adult Regular     Lifting restrictions     Notify your health care provider if you experience any of the following:  temperature >100.4     Notify your health care provider if you experience any of the following:  persistent nausea and vomiting or diarrhea     Notify your health care provider if you experience any of the following:  severe uncontrolled pain     Notify your health care provider if you experience any of the following:  redness, tenderness, or signs of infection (pain, swelling, redness, odor or green/yellow discharge around incision site)     Notify your health care provider if you experience any of the following:  difficulty breathing or increased cough     Notify your health care provider if you experience any of the following:  severe persistent headache     Notify your health care provider if you experience any of the following:  worsening rash     Notify your health care provider if you experience any of the following:  persistent dizziness, light-headedness, or visual disturbances     Notify your health care provider if you experience any of the following:  increased confusion or weakness     Notify your  health care provider if you experience any of the following:     No dressing needed     Follow-up Information     Diego Modi MD In 1 week.    Specialty: Plastic Surgery  Why: For post-op visit  Contact information:  [address] [facility]  [address] [place], [place] [address] [phone]                 Activity: no heavy lifting for 4 weeks  Diet: regular diet  Wound Care: keep wound clean and dry        Signed:  Diego Modi  10/28/2020  10:16 AM

## 2022-08-02 ENCOUNTER — OFFICE VISIT (OUTPATIENT)
Dept: URGENT CARE | Facility: CLINIC | Age: 67
End: 2022-08-02
Payer: MEDICARE

## 2022-08-02 VITALS
DIASTOLIC BLOOD PRESSURE: 83 MMHG | SYSTOLIC BLOOD PRESSURE: 166 MMHG | OXYGEN SATURATION: 99 % | HEART RATE: 64 BPM | TEMPERATURE: 98 F | RESPIRATION RATE: 16 BRPM | WEIGHT: 135 LBS | BODY MASS INDEX: 21.19 KG/M2 | HEIGHT: 67 IN

## 2022-08-02 DIAGNOSIS — R07.81 RIB PAIN ON LEFT SIDE: Primary | ICD-10-CM

## 2022-08-02 PROCEDURE — 3008F BODY MASS INDEX DOCD: CPT | Mod: CPTII,S$GLB,, | Performed by: NURSE PRACTITIONER

## 2022-08-02 PROCEDURE — 3077F SYST BP >= 140 MM HG: CPT | Mod: CPTII,S$GLB,, | Performed by: NURSE PRACTITIONER

## 2022-08-02 PROCEDURE — 3079F DIAST BP 80-89 MM HG: CPT | Mod: CPTII,S$GLB,, | Performed by: NURSE PRACTITIONER

## 2022-08-02 PROCEDURE — 1125F AMNT PAIN NOTED PAIN PRSNT: CPT | Mod: CPTII,S$GLB,, | Performed by: NURSE PRACTITIONER

## 2022-08-02 PROCEDURE — 3008F PR BODY MASS INDEX (BMI) DOCUMENTED: ICD-10-PCS | Mod: CPTII,S$GLB,, | Performed by: NURSE PRACTITIONER

## 2022-08-02 PROCEDURE — 3079F PR MOST RECENT DIASTOLIC BLOOD PRESSURE 80-89 MM HG: ICD-10-PCS | Mod: CPTII,S$GLB,, | Performed by: NURSE PRACTITIONER

## 2022-08-02 PROCEDURE — 99213 OFFICE O/P EST LOW 20 MIN: CPT | Mod: S$GLB,,, | Performed by: NURSE PRACTITIONER

## 2022-08-02 PROCEDURE — 1159F MED LIST DOCD IN RCRD: CPT | Mod: CPTII,S$GLB,, | Performed by: NURSE PRACTITIONER

## 2022-08-02 PROCEDURE — 1125F PR PAIN SEVERITY QUANTIFIED, PAIN PRESENT: ICD-10-PCS | Mod: CPTII,S$GLB,, | Performed by: NURSE PRACTITIONER

## 2022-08-02 PROCEDURE — 3077F PR MOST RECENT SYSTOLIC BLOOD PRESSURE >= 140 MM HG: ICD-10-PCS | Mod: CPTII,S$GLB,, | Performed by: NURSE PRACTITIONER

## 2022-08-02 PROCEDURE — 99213 PR OFFICE/OUTPT VISIT, EST, LEVL III, 20-29 MIN: ICD-10-PCS | Mod: S$GLB,,, | Performed by: NURSE PRACTITIONER

## 2022-08-02 PROCEDURE — 1160F PR REVIEW ALL MEDS BY PRESCRIBER/CLIN PHARMACIST DOCUMENTED: ICD-10-PCS | Mod: CPTII,S$GLB,, | Performed by: NURSE PRACTITIONER

## 2022-08-02 PROCEDURE — 71100 XR RIBS 2 VIEW LEFT: ICD-10-PCS | Mod: LT,S$GLB,, | Performed by: RADIOLOGY

## 2022-08-02 PROCEDURE — 1160F RVW MEDS BY RX/DR IN RCRD: CPT | Mod: CPTII,S$GLB,, | Performed by: NURSE PRACTITIONER

## 2022-08-02 PROCEDURE — 1159F PR MEDICATION LIST DOCUMENTED IN MEDICAL RECORD: ICD-10-PCS | Mod: CPTII,S$GLB,, | Performed by: NURSE PRACTITIONER

## 2022-08-02 PROCEDURE — 71100 X-RAY EXAM RIBS UNI 2 VIEWS: CPT | Mod: LT,S$GLB,, | Performed by: RADIOLOGY

## 2022-08-02 RX ORDER — DENOSUMAB 60 MG/ML
60 INJECTION SUBCUTANEOUS
COMMUNITY

## 2022-08-02 NOTE — PROGRESS NOTES
"Subjective:       Patient ID: Ashlie Redman is a 67 y.o. female.    Vitals:  height is 5' 7" (1.702 m) and weight is 61.2 kg (135 lb). Her oral temperature is 98 °F (36.7 °C). Her blood pressure is 166/83 (abnormal) and her pulse is 64. Her respiration is 16 and oxygen saturation is 99%.     Chief Complaint: Rib Injury    Pt presents to clinic today for L side rib area pain/injury. Pt states she was doing bunji exercises and has a bruise on the L side of chest and pain in the upper back. Symptoms started Friday morning 5-6 days ago. Pain scale 8/10, hurts with deep breathing, coughing. Reports history of osteoporosis.    Chest Pain   This is a new problem. The current episode started in the past 7 days. The problem occurs constantly. The problem has been gradually worsening. The pain is at a severity of 8/10. The pain is moderate. The quality of the pain is described as stabbing and squeezing. The pain radiates to the lower back. Associated symptoms include back pain (left posterior ribs). Pertinent negatives include no abdominal pain, claudication, cough, diaphoresis, dizziness, exertional chest pressure, fever, headaches, hemoptysis, irregular heartbeat, leg pain, lower extremity edema, malaise/fatigue, nausea, near-syncope, numbness, orthopnea, palpitations, PND, shortness of breath, sputum production, syncope, vomiting or weakness. The pain is aggravated by movement, coughing, breathing and walking. She has tried acetaminophen and NSAIDs for the symptoms. The treatment provided mild relief.   Her past medical history is significant for cancer. Past medical history comments: Breast Cancer       Constitution: Negative for sweating and fever.   Cardiovascular: Positive for chest pain (rib pain to back). Negative for palpitations and passing out.   Respiratory: Negative for cough, sputum production, bloody sputum and shortness of breath.    Gastrointestinal: Negative for abdominal pain, nausea and vomiting. "   Musculoskeletal: Positive for pain, trauma and back pain (left posterior ribs).   Neurological: Negative for dizziness, headaches and numbness.       Objective:      Physical Exam   Constitutional: She is oriented to person, place, and time. She appears well-developed. She is cooperative. No distress.   HENT:   Head: Normocephalic and atraumatic.   Nose: Nose normal.   Mouth/Throat: Oropharynx is clear and moist and mucous membranes are normal.   Eyes: Conjunctivae and lids are normal.   Neck: Trachea normal and phonation normal. Neck supple.   Cardiovascular: Normal rate, regular rhythm, normal heart sounds and normal pulses.   Pulmonary/Chest: Effort normal and breath sounds normal. No stridor. She has no decreased breath sounds. She has no wheezes. She has no rhonchi. She has no rales.   Abdominal: Normal appearance and bowel sounds are normal. She exhibits no abdominal bruit, no pulsatile midline mass and no mass. Soft.   Musculoskeletal:         General: No deformity.        Arms:       Comments: No spinal tenderness, full ROM   Neurological: She is alert and oriented to person, place, and time. She has normal strength and normal reflexes. No sensory deficit.   Skin: Skin is warm, dry, intact and not diaphoretic.   Psychiatric: Her speech is normal and behavior is normal. Judgment and thought content normal.   Nursing note and vitals reviewed.  X-Ray Ribs 2 View Left    Result Date: 8/2/2022  EXAMINATION: XR RIBS 2 VIEW LEFT CLINICAL HISTORY: Pleurodynia TECHNIQUE: Two views of the left ribs were performed. COMPARISON: None. FINDINGS: There is aortic ectasia.  The cardiac size and contours within normal limits.  No intrapulmonary masses or infiltrates are seen.  There is no pneumothorax or pleural effusion.  There are surgical clips identified in the left anterior and lateral chest wall.  On this study a fracture or other osseous abnormality of the left ribs is not seen.     Prior left chest wall surgery with  clips noted.  Aortic ectasia otherwise negative left rib detail views and negative chest x-ray. Electronically signed by: Ry Madrigal MD Date:    08/02/2022 Time:    12:04  Advised on importance of deep breathing, coughing, using a splint to help clear lungs. Over the counter ibuprofen may be used as indicated on the container for pain and discomfort.       Assessment:       1. Rib pain on left side          Plan:         Rib pain on left side  -     X-Ray Ribs 2 View Left; Future; Expected date: 08/02/2022

## 2022-08-02 NOTE — PATIENT INSTRUCTIONS
Impression:     Prior left chest wall surgery with clips noted.  Aortic ectasia otherwise negative left rib detail views and negative chest x-ray.

## 2022-09-01 NOTE — PROGRESS NOTES
Subjective:       Patient ID: Ashlie Redman is a 62 y.o. female.    Chief Complaint: Consult (mastitis left breast)    HPI Comments: Referred by Dr. Romeo with pain and swelling in the left breast.    Patient states that 3 weeks ago she noticed some pain in the left breast.  She saw Dr. Romeo on 3/21 who placed her on Bactrim and ordered diagnostic mammogram and ultrasound:       MAMMO DIGITAL DIAGNOSTIC BILAT WITH TOMOSYNTHESIS  The breast tissue is heterogeneously dense. This may lower the sensitivity of  mammography.     Finding 1: There is a focal asymmetry measuring 6 millimeters seen in the  posterior of the left breast at 3 o'clock and at 4 o'clock located 8 centimeters  from the nipple.     The patient states that she has a 2 week history of a small, palpable left  breast abnormality at 3 o'clock in the left breast. She also states that she has  suffered with worsening tenderness/pain in the left breast in this area. No  definite skin changes. Within the last few days, she has palpated a new,  enlarging left axillary mass as well.     Finding 2: There is an oval mass measuring 12 millimeters seen in the posterior  sub-areolar region of the right breast.     Digital tomosynthesis was performed and used in the interpretation of the images.     These images were processed to produce digital images analyzed for potential  abnormalities.     BILATERAL LIMITED ULTRASOUND FINDINGS:  High-resolution real-time ultrasound scanning was performed.     Finding 1: Ultrasound is suggestive of an irregular elongated Complex mass  measuring 6 millimeters seen in the posterior of the left breast at 3 o'clock  and at 4 o'clock located 8 centimeters from the nipple. Mass is markedly  hypervascular.     This mass measures at least 34mm in conglomeration (with the 6 mm palpable  abnormality being a small component of this large seemingly contiguous mass).  There is also a markedly enlarged 20 x 17 x 11 mm left axillary lymph  node with  cortical thickening and prominent hypervascularity present which correlates with  the patient's palpable abnormality in the left axilla.     Finding 2: Ultrasound is suggestive of a simple cyst measuring 12 millimeters  seen in the posterior sub-areolar region of the right breast.        Finding 1: Complex mass in the left breast requires additional evaluation.  Clinical correlation of finding is recommended. An ultrasound exam is  recommended.     The rapid worsening tenderness, rapid development of a left axillary node, the  relative lack of skin changes and mammographic findings favor an acute  inflammatory/infectious process such as mastitis. A trial course of antibiotics  and pain management (cold compresses and anti-inflammatories, etc.) should be  strongly considered given a working diagnosis of mastitis. The patient should  return for a follow-up examination in 4-6 weeks after a full course of  antibiotics (assuming her symptoms improve and do not worsen). Close clinical  monitoring will be necessary. I have discussed the case and suggested management  with Dr. Romeo.     Finding 2: Simple cyst in the right breast is benign-negative.     ACR BI-RADS Category 0: Incomplete: Need Additional Imaging Evaluation        03/23/2017 Addendum:        Finding 1: Focal asymmetry in the left breast requires additional evaluation.  Clinical correlation of finding is recommended. An ultrasound exam is  recommended.     If the patient's symptoms do not rapidly improve with antibiotics (or if  symptoms worsen) , a repeat ultrasound and ultrasound guided biopsy may be  necessary before the 4-6 week scheduled follow-up ultrasound examination.     ACR BI-RADS Category 0: Incomplete: Need Additional Imaging Evaluation    She is s/p bilateral augmentation implants 2013.          Review of Systems   Constitutional: Positive for chills and fatigue. Negative for appetite change, diaphoresis, fever and unexpected weight  change.   HENT: Negative for hearing loss, sore throat, trouble swallowing and voice change.    Eyes: Negative for visual disturbance.   Respiratory: Negative for cough, shortness of breath and wheezing.    Cardiovascular: Negative for chest pain, palpitations and leg swelling.   Gastrointestinal: Negative for abdominal distention, abdominal pain, anal bleeding, blood in stool, constipation, diarrhea, nausea, rectal pain and vomiting.   Genitourinary: Negative for difficulty urinating, dysuria, flank pain, frequency, hematuria, menstrual problem and urgency.   Musculoskeletal: Negative for arthralgias, back pain, joint swelling, myalgias and neck pain.   Skin: Negative for pallor and rash.   Neurological: Negative for dizziness, syncope, weakness and headaches.   Hematological: Negative for adenopathy. Does not bruise/bleed easily.   Psychiatric/Behavioral: Negative for suicidal ideas. The patient is not nervous/anxious.        Objective:      Physical Exam   Constitutional: She is oriented to person, place, and time. She appears well-developed and well-nourished. No distress.   HENT:   Head: Normocephalic and atraumatic.   Right Ear: External ear normal.   Left Ear: External ear normal.   Eyes: Conjunctivae are normal. Pupils are equal, round, and reactive to light. Right eye exhibits no discharge. Left eye exhibits no discharge.   Neck: No tracheal deviation present. No thyromegaly present.   Cardiovascular: Normal rate and regular rhythm.    Pulmonary/Chest: Effort normal. No respiratory distress. Right breast exhibits no inverted nipple, no mass, no nipple discharge, no skin change and no tenderness. Left breast exhibits mass (3 o'clock, non tender) and tenderness. Left breast exhibits no inverted nipple, no nipple discharge and no skin change. Breasts are asymmetrical.       Musculoskeletal: She exhibits no edema or tenderness.   Lymphadenopathy:     She has no cervical adenopathy.     She has axillary  adenopathy.        Left axillary: Pectoral adenopathy present.   Neurological: She is alert and oriented to person, place, and time. No cranial nerve deficit.   Skin: Skin is warm and dry. No rash noted. She is not diaphoretic. No pallor.   Psychiatric: She has a normal mood and affect. Her behavior is normal. Judgment and thought content normal.       Assessment:       1. Mass of breast, left    2. Mass of axilla, left        Plan:       While this may be inflammatory, I am concerned about neoplasm, especially in view of palpable non tender node.  Will switch to clindamycin today, and set up for breast biopsy/node biopsy this week.  Discussed with patient who would like to proceed as soon as possible.       Adbry Pregnancy And Lactation Text: It is unknown if this medication will adversely affect pregnancy or breast feeding.

## 2022-09-26 ENCOUNTER — HOSPITAL ENCOUNTER (OUTPATIENT)
Dept: RADIOLOGY | Facility: HOSPITAL | Age: 67
Discharge: HOME | End: 2022-09-26
Attending: INTERNAL MEDICINE
Payer: MEDICARE

## 2022-09-26 DIAGNOSIS — G62.0 CHEMOTHERAPY-INDUCED PERIPHERAL NEUROPATHY: ICD-10-CM

## 2022-09-26 DIAGNOSIS — C50.912 HER2-POSITIVE CARCINOMA OF LEFT BREAST: ICD-10-CM

## 2022-09-26 DIAGNOSIS — C50.012 MALIGNANT NEOPLASM OF NIPPLE OF LEFT BREAST IN FEMALE, UNSPECIFIED ESTROGEN RECEPTOR STATUS: ICD-10-CM

## 2022-09-26 DIAGNOSIS — T45.1X5A CHEMOTHERAPY-INDUCED PERIPHERAL NEUROPATHY: ICD-10-CM

## 2022-09-26 DIAGNOSIS — M80.00XA AGE-RELATED OSTEOPOROSIS WITH CURRENT PATHOLOGICAL FRACTURE, INITIAL ENCOUNTER: ICD-10-CM

## 2022-09-26 PROCEDURE — 76642 US BREAST RIGHT LIMITED: ICD-10-PCS | Mod: 26,RT,, | Performed by: RADIOLOGY

## 2022-09-26 PROCEDURE — 77062 MAMMO DIGITAL DIAGNOSTIC BILAT WITH TOMO: ICD-10-PCS | Mod: 26,,, | Performed by: RADIOLOGY

## 2022-09-26 PROCEDURE — 76642 ULTRASOUND BREAST LIMITED: CPT | Mod: 26,RT,, | Performed by: RADIOLOGY

## 2022-09-26 PROCEDURE — 77066 DX MAMMO INCL CAD BI: CPT | Mod: TC,PO

## 2022-09-26 PROCEDURE — 77066 MAMMO DIGITAL DIAGNOSTIC BILAT WITH TOMO: ICD-10-PCS | Mod: 26,,, | Performed by: RADIOLOGY

## 2022-09-26 PROCEDURE — 77062 BREAST TOMOSYNTHESIS BI: CPT | Mod: 26,,, | Performed by: RADIOLOGY

## 2022-09-26 PROCEDURE — 76642 ULTRASOUND BREAST LIMITED: CPT | Mod: TC,PO,RT

## 2022-09-26 PROCEDURE — 77066 DX MAMMO INCL CAD BI: CPT | Mod: 26,,, | Performed by: RADIOLOGY

## 2022-09-28 ENCOUNTER — HOSPITAL ENCOUNTER (OUTPATIENT)
Dept: RADIOLOGY | Facility: HOSPITAL | Age: 67
Discharge: HOME | End: 2022-09-28
Attending: NURSE PRACTITIONER
Payer: MEDICARE

## 2022-09-28 ENCOUNTER — OFFICE VISIT (OUTPATIENT)
Dept: FAMILY MEDICINE | Facility: CLINIC | Age: 67
End: 2022-09-28
Payer: MEDICARE

## 2022-09-28 DIAGNOSIS — M54.42 ACUTE LEFT-SIDED LOW BACK PAIN WITH LEFT-SIDED SCIATICA: ICD-10-CM

## 2022-09-28 DIAGNOSIS — M54.42 ACUTE LEFT-SIDED LOW BACK PAIN WITH LEFT-SIDED SCIATICA: Primary | ICD-10-CM

## 2022-09-28 PROCEDURE — 1160F RVW MEDS BY RX/DR IN RCRD: CPT | Mod: CPTII,95,, | Performed by: NURSE PRACTITIONER

## 2022-09-28 PROCEDURE — 1160F PR REVIEW ALL MEDS BY PRESCRIBER/CLIN PHARMACIST DOCUMENTED: ICD-10-PCS | Mod: CPTII,95,, | Performed by: NURSE PRACTITIONER

## 2022-09-28 PROCEDURE — 1159F PR MEDICATION LIST DOCUMENTED IN MEDICAL RECORD: ICD-10-PCS | Mod: CPTII,95,, | Performed by: NURSE PRACTITIONER

## 2022-09-28 PROCEDURE — 72100 XR LUMBAR SPINE AP AND LATERAL: ICD-10-PCS | Mod: 26,,, | Performed by: RADIOLOGY

## 2022-09-28 PROCEDURE — 72100 X-RAY EXAM L-S SPINE 2/3 VWS: CPT | Mod: TC,FY,PO

## 2022-09-28 PROCEDURE — 99213 OFFICE O/P EST LOW 20 MIN: CPT | Mod: 95,,, | Performed by: NURSE PRACTITIONER

## 2022-09-28 PROCEDURE — 72100 X-RAY EXAM L-S SPINE 2/3 VWS: CPT | Mod: 26,,, | Performed by: RADIOLOGY

## 2022-09-28 PROCEDURE — 1159F MED LIST DOCD IN RCRD: CPT | Mod: CPTII,95,, | Performed by: NURSE PRACTITIONER

## 2022-09-28 PROCEDURE — 99213 PR OFFICE/OUTPT VISIT, EST, LEVL III, 20-29 MIN: ICD-10-PCS | Mod: 95,,, | Performed by: NURSE PRACTITIONER

## 2022-09-28 RX ORDER — METHYLPREDNISOLONE 4 MG/1
TABLET ORAL
Qty: 21 EACH | Refills: 0 | Status: SHIPPED | OUTPATIENT
Start: 2022-09-28 | End: 2022-10-19

## 2022-09-28 NOTE — PROGRESS NOTES
Subjective:       Patient ID: Ashlie Redman is a 67 y.o. female.    Chief Complaint: No chief complaint on file.    The patient location is: [place], [place]  The chief complaint leading to consultation is: back pain    Visit type: audiovisual    Face to Face time with patient: 12  15 minutes of total time spent on the encounter, which includes face to face time and non-face to face time preparing to see the patient (eg, review of tests), Obtaining and/or reviewing separately obtained history, Documenting clinical information in the electronic or other health record, Independently interpreting results (not separately reported) and communicating results to the patient/family/caregiver, or Care coordination (not separately reported).         Each patient to whom he or she provides medical services by telemedicine is:  (1) informed of the relationship between the physician and patient and the respective role of any other health care provider with respect to management of the patient; and (2) notified that he or she may decline to receive medical services by telemedicine and may withdraw from such care at any time.    Notes:        Left sided lower back pain since a work out last Tuesday, no injury. Pain has been contsant on the left since then, has continued to walk and exercise, pain is worse with movement.  Yesterday pain was radiating to the right and midline. Pain is very severe over the last few days. Taking ibuprofen 800 mg twice daily with little relief.  Today hurts to walk. Felt some numbness on the left side today. Radiating into upper left leg today. Very different than her normal back pain which is generally on the right side. She has a history of breast cancer and osteoporosis on prolia.     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 dorsal forearm   04/2017: Breast cancer      Comment:  Left  No date:  Cataract  No date: Diverticulosis  No date: Encounter for blood transfusion  No date: Glaucoma  No date: High myopia  No date: Osteopenia  No date: Osteoporosis  No date: PONV (postoperative nausea and vomiting)  No date: S/P dilation and curettage    Past Surgical History:  9/2/2021: ANGIOGRAM, CORONARY, WITH LEFT HEART CATHETERIZATION; Left      Comment:  Procedure: ANGIOGRAM,CORONARY,WITH LEFT HEART                CATHETERIZATION;  Surgeon: Rubin Hui MD;                 Location: [facility] CATH/EP LAB;  Service: Cardiology;                 Laterality: Left;  03/2013: breast augmentation  04/2017: BREAST BIOPSY; Left      Comment:  Core bx,+ cancer  8/5/2019: BREAST CAPSULECTOMY; Left      Comment:  Procedure: CAPSULECTOMY, BREAST;  Surgeon: Diego Modi MD;  Location: [facility];  Service: General;                 Laterality: Left;  10/25/2017: BREAST LUMPECTOMY; Left  8/21/2019: BREAST RECONSTRUCTION; Bilateral      Comment:  Procedure: RECONSTRUCTION, BREAST WITH ALLODERM;                 Surgeon: Diego Modi MD;  Location: [facility];                 Service: General;  Laterality: Bilateral;  No date: BREAST RECONSTRUCTION; Left  8/5/2019: BREAST REVISION SURGERY; Bilateral      Comment:  Procedure: BREAST REVISION SURGERY;  Surgeon: Diego Modi MD;  Location: [facility];  Service: General;                 Laterality: Bilateral;  4/5/2021: BREAST REVISION SURGERY; Left      Comment:  Procedure: BREAST REVISION SURGERY - LEFT BREAST                RECONSTRUCTION REVISION;  Surgeon: Diego Modi MD;                Location: [facility];  Service: General;  Laterality: Left;  2009: CATARACT EXTRACTION W/  INTRAOCULAR LENS IMPLANT; Bilateral  4/2009, 2015: COLONOSCOPY      Comment:  repeat in 5 years  12/22/2020: COLONOSCOPY; N/A      Comment:  Procedure: COLONOSCOPY;  Surgeon: Praneeth Leblanc MD;  Location: [facility] ENDO;  Service: Endoscopy;                  Laterality: N/A;  11/20/2018: CYSTOCELE REPAIR; N/A      Comment:  Procedure: REPAIR, CYSTOCELE;  Surgeon: Rebecca Acosta MD;  Location: [facility] OR 2ND FLR;  Service:                Urology;  Laterality: N/A;  1.5 hours  11/20/2018: CYSTOSCOPY; N/A      Comment:  Procedure: CYSTOSCOPY;  Surgeon: Rebecca Acosta MD;                 Location: [facility] OR 2ND FLR;  Service: Urology;                 Laterality: N/A;  No date: DILATION AND CURETTAGE OF UTERUS  No date: ECTOPIC PREGNANCY SURGERY  3/09: EYE SURGERY; Bilateral      Comment:  Vitrectomy   No date: MASTECTOMY; Left  4/5/2021: MASTOPEXY; Right      Comment:  Procedure: MASTOPEXY - RIGHT MASTOPEXY LIPOSUCTION WAIST               AND THIGHS, FAT TRANSFER TO BREAST;  Surgeon: Diego Modi MD;  Location: [facility];  Service: General;                 Laterality: Right;  No date: POLYPECTOMY      Comment:  x3 2001  10/26/2020: RECONSTRUCTION OF BREAST WITH DEEP INFERIOR EPIGASTRIC   ARTERY  (SUSAN) FLAP; Left      Comment:  Procedure: RECONSTRUCTION, BREAST, USING SUSAN SKIN FLAP                - LEFT BREAST RECONSTRUCTION WITH STACKED SUSAN FREE                FLAPS.;  Surgeon: Diego Modi MD;  Location: [facility];  Service: General;  Laterality: Left;  8/5/2019: REMOVAL OF BREAST IMPLANT; Bilateral      Comment:  Procedure: REMOVAL, IMPLANT, BREAST;  Surgeon: Diego Modi MD;  Location: [facility];  Service: General;                 Laterality: Bilateral;  8/30/2019: REMOVAL OF BREAST IMPLANT; Left      Comment:  Procedure: REMOVAL, IMPLANT, BREAST;  Surgeon: Diego Modi MD;  Location: [facility];  Service: General;                 Laterality: Left;  10/26/2020: REMOVAL OF BREAST IMPLANT; Right      Comment:  Procedure: REMOVAL, IMPLANT, BREAST -   RIGHT BREAST                IMPLANT REMOVAL;  Surgeon: Diego Modi MD;                 Location: [facility];  Service:  General;  Laterality: Right;  12/2014: Yag Capsulotomy; Bilateral    Review of patient's family history indicates:      Social History    Socioeconomic History      Marital status: Single      Number of children: 2    Tobacco Use      Smoking status: Never      Smokeless tobacco: Never    Substance and Sexual Activity      Alcohol use: Yes        Alcohol/week: 1.0 standard drink        Types: 1 Glasses of wine per week        Comment: daily-kayden      Drug use: No      Sexual activity: Not Currently        Partners: Male        Birth control/protection: Post-menopausal    Other Topics      Concerns:        Are you pregnant or think you may be?: No    Social Determinants of Health  Financial Resource Strain: Low Risk       Difficulty of Paying Living Expenses: Not very hard  Food Insecurity: No Food Insecurity      Worried About Running Out of Food in the Last Year: Never true      Ran Out of Food in the Last Year: Never true  Transportation Needs: No Transportation Needs      Lack of Transportation (Medical): No      Lack of Transportation (Non-Medical): No  Physical Activity: Sufficiently Active      Days of Exercise per Week: 7 days      Minutes of Exercise per Session: 30 min  Stress: No Stress Concern Present      Feeling of Stress : Not at all  Social Connections: Unknown      Frequency of Communication with Friends and Family: More than three times a week      Frequency of Social Gatherings with Friends and Family: Three times a week      Active Member of Clubs or Organizations: Yes      Attends Club or Organization Meetings: More than 4 times per year      Marital Status:   Housing Stability: Low Risk       Unable to Pay for Housing in the Last Year: No      Number of Places Lived in the Last Year: 1      Unstable Housing in the Last Year: No    Current Outpatient Medications:  ciprofloxacin HCl (CILOXAN) 0.3 % ophthalmic solution, Apply 3 drops to diseased nail twice daily (Patient not taking: Reported  on 8/2/2022), Disp: 10 mL, Rfl: 2  denosumab (PROLIA) 60 mg/mL Syrg, Inject 60 mg into the skin., Disp: , Rfl:    dorzolamide (TRUSOPT) 2 % ophthalmic solution, INSTILL 1 DROP INTO BOTH EYES 3 TIMES A DAY, Disp: 30 mL, Rfl: 4  fluconazole (DIFLUCAN) 200 MG Tab, Take 1 tab PO qweek. (Patient not taking: Reported on 8/2/2022), Disp: 12 tablet, Rfl: 0  latanoprost 0.005 % ophthalmic solution, INSTILL 1 DROP INTO BOTH EYES IN THE EVENING, Disp: 7.5 mL, Rfl: 3  multivitamin capsule, Take 1 capsule by mouth once daily., Disp: , Rfl:   valACYclovir (VALTREX) 500 MG tablet, Take 1 tablet (500 mg total) by mouth once daily., Disp: 90 tablet, Rfl: 1    No current facility-administered medications for this visit.  Facility-Administered Medications Ordered in Other Visits:  LIDOcaine-EPINEPHrine 1%-1:100,000 30 mL, EPINEPHrine 2 mg in lactated Ringers 1,000 mL irrigation, , Irrigation, On Call Procedure, Diego Modi MD  LIDOcaine-EPINEPHrine 1%-1:100,000 30 mL, EPINEPHrine 2 mg in lactated Ringers 1,000 mL irrigation, , Irrigation, On Call Procedure, Diego Modi MD        Review of patient's allergies indicates:   -- Bactrim [sulfamethoxazole-trimethoprim] -- Rash    --  Fever, vomiting     Back Pain  This is a recurrent problem. The current episode started in the past 7 days. The problem occurs constantly. The problem has been waxing and waning since onset. The pain is present in the gluteal. The quality of the pain is described as stabbing. The pain does not radiate. The pain is at a severity of 7/10. The pain is severe. The pain is The same all the time. The symptoms are aggravated by sitting and standing. Stiffness is present All day. Associated symptoms include leg pain and numbness. Pertinent negatives include no abdominal pain, bladder incontinence, bowel incontinence, chest pain, dysuria, fever, headaches, paresis, paresthesias, pelvic pain, perianal numbness, tingling, weakness or weight loss. Risk factors  include history of cancer, history of osteoporosis and menopause. The treatment provided no relief.   Review of Systems   Constitutional:  Negative for fever and weight loss.   Cardiovascular:  Negative for chest pain.   Gastrointestinal:  Negative for abdominal pain and bowel incontinence.   Genitourinary:  Negative for bladder incontinence, dysuria, hematuria and pelvic pain.   Musculoskeletal:  Positive for back pain and leg pain.   Neurological:  Positive for numbness. Negative for tingling, weakness, headaches and paresthesias.       Objective:      Physical Exam  Constitutional:       Appearance: Normal appearance.   HENT:      Head: Normocephalic and atraumatic.   Pulmonary:      Effort: Pulmonary effort is normal. No respiratory distress.   Neurological:      Mental Status: She is alert and oriented to person, place, and time.   Psychiatric:         Mood and Affect: Mood normal.         Behavior: Behavior normal.       Assessment:       Problem List Items Addressed This Visit    None  Visit Diagnoses       Acute left-sided low back pain with left-sided sciatica    -  Primary    Relevant Medications    methylPREDNISolone (MEDROL DOSEPACK) 4 mg tablet    Other Relevant Orders    X-Ray Lumbar Spine AP And Lateral            Plan:       1. Acute left-sided low back pain with left-sided sciatica  Xray today, medrol dose pack, follow up if not resolving, immediately for new or worsening symptoms.   - X-Ray Lumbar Spine AP And Lateral; Future  - methylPREDNISolone (MEDROL DOSEPACK) 4 mg tablet; use as directed  Dispense: 21 each; Refill: 0

## 2022-09-28 NOTE — PROGRESS NOTES
Answers submitted by the patient for this visit:  Back Pain Questionnaire (Submitted on 9/28/2022)  Chief Complaint: Back pain  Chronicity: recurrent  Onset: in the past 7 days  Frequency: constantly  Progression since onset: waxing and waning  Pain location: gluteal  Pain quality: stabbing  Radiates to: does not radiate  Pain - numeric: 7/10  Pain is: the same all the time  Aggravated by: sitting, standing  Stiffness is present: all day  abdominal pain: No  bladder incontinence: No  bowel incontinence: No  chest pain: No  dysuria: No  fever: No  headaches: No  leg pain: No  numbness: No  paresis: No  paresthesias: No  pelvic pain: No  perianal numbness: No  tingling: No  weakness: No  weight loss: No  genital pain: No  hematuria: No  Risk factors: history of cancer, history of osteoporosis, menopause  Pain severity: severe  Improvement on treatment: no relief

## 2022-10-04 ENCOUNTER — CLINICAL SUPPORT (OUTPATIENT)
Dept: OPHTHALMOLOGY | Facility: CLINIC | Age: 67
End: 2022-10-04
Payer: MEDICARE

## 2022-10-04 ENCOUNTER — OFFICE VISIT (OUTPATIENT)
Dept: OPHTHALMOLOGY | Facility: CLINIC | Age: 67
End: 2022-10-04
Payer: MEDICARE

## 2022-10-04 DIAGNOSIS — H40.053 OHT (OCULAR HYPERTENSION), BILATERAL: Primary | ICD-10-CM

## 2022-10-04 DIAGNOSIS — H40.053 OHT (OCULAR HYPERTENSION), BILATERAL: ICD-10-CM

## 2022-10-04 DIAGNOSIS — Z98.890 HISTORY OF VITRECTOMY: ICD-10-CM

## 2022-10-04 DIAGNOSIS — H52.7 REFRACTIVE ERROR: ICD-10-CM

## 2022-10-04 DIAGNOSIS — Z96.1 PSEUDOPHAKIA OF BOTH EYES: ICD-10-CM

## 2022-10-04 PROCEDURE — 1101F PR PT FALLS ASSESS DOC 0-1 FALLS W/OUT INJ PAST YR: ICD-10-PCS | Mod: CPTII,S$GLB,, | Performed by: OPHTHALMOLOGY

## 2022-10-04 PROCEDURE — 1159F MED LIST DOCD IN RCRD: CPT | Mod: CPTII,S$GLB,, | Performed by: OPHTHALMOLOGY

## 2022-10-04 PROCEDURE — 92133 POSTERIOR SEGMENT OCT OPTIC NERVE(OCULAR COHERENCE TOMOGRAPHY) - OU - BOTH EYES: ICD-10-PCS | Mod: S$GLB,,, | Performed by: OPHTHALMOLOGY

## 2022-10-04 PROCEDURE — 99214 PR OFFICE/OUTPT VISIT, EST, LEVL IV, 30-39 MIN: ICD-10-PCS | Mod: S$GLB,,, | Performed by: OPHTHALMOLOGY

## 2022-10-04 PROCEDURE — 99999 PR PBB SHADOW E&M-EST. PATIENT-LVL III: ICD-10-PCS | Mod: PBBFAC,,, | Performed by: OPHTHALMOLOGY

## 2022-10-04 PROCEDURE — 92133 CPTRZD OPH DX IMG PST SGM ON: CPT | Mod: S$GLB,,, | Performed by: OPHTHALMOLOGY

## 2022-10-04 PROCEDURE — 3288F PR FALLS RISK ASSESSMENT DOCUMENTED: ICD-10-PCS | Mod: CPTII,S$GLB,, | Performed by: OPHTHALMOLOGY

## 2022-10-04 PROCEDURE — 1160F PR REVIEW ALL MEDS BY PRESCRIBER/CLIN PHARMACIST DOCUMENTED: ICD-10-PCS | Mod: CPTII,S$GLB,, | Performed by: OPHTHALMOLOGY

## 2022-10-04 PROCEDURE — 1159F PR MEDICATION LIST DOCUMENTED IN MEDICAL RECORD: ICD-10-PCS | Mod: CPTII,S$GLB,, | Performed by: OPHTHALMOLOGY

## 2022-10-04 PROCEDURE — 1126F AMNT PAIN NOTED NONE PRSNT: CPT | Mod: CPTII,S$GLB,, | Performed by: OPHTHALMOLOGY

## 2022-10-04 PROCEDURE — 1160F RVW MEDS BY RX/DR IN RCRD: CPT | Mod: CPTII,S$GLB,, | Performed by: OPHTHALMOLOGY

## 2022-10-04 PROCEDURE — 1126F PR PAIN SEVERITY QUANTIFIED, NO PAIN PRESENT: ICD-10-PCS | Mod: CPTII,S$GLB,, | Performed by: OPHTHALMOLOGY

## 2022-10-04 PROCEDURE — 3288F FALL RISK ASSESSMENT DOCD: CPT | Mod: CPTII,S$GLB,, | Performed by: OPHTHALMOLOGY

## 2022-10-04 PROCEDURE — 99999 PR PBB SHADOW E&M-EST. PATIENT-LVL III: CPT | Mod: PBBFAC,,, | Performed by: OPHTHALMOLOGY

## 2022-10-04 PROCEDURE — 1101F PT FALLS ASSESS-DOCD LE1/YR: CPT | Mod: CPTII,S$GLB,, | Performed by: OPHTHALMOLOGY

## 2022-10-04 PROCEDURE — 99214 OFFICE O/P EST MOD 30 MIN: CPT | Mod: S$GLB,,, | Performed by: OPHTHALMOLOGY

## 2022-10-04 NOTE — PROGRESS NOTES
HPI    Dle- 04/05/2022    Pt here for HVF and OCT review with IOP check. Pt sts va stable. Denies   flashes/floaters/eye pain. Gtts: dorzolmide BID OU, latanoprost QHS OU; pt   forgot to us drop this morning  Last edited by Melissa Carlin MA on 10/4/2022 10:31 AM.        ROS    Negative for: Constitutional, Gastrointestinal, Neurological, Skin,   Genitourinary, Musculoskeletal, HENT, Endocrine, Cardiovascular, Eyes,   Respiratory, Psychiatric, Allergic/Imm, Heme/Lymph  Last edited by Tyrone Jimenez Jr., MD on 10/4/2022 10:45 AM.        Assessment /Plan     For exam results, see Encounter Report.    OHT (ocular hypertension), bilateral  -     Posterior Segment OCT Optic Nerve- Both eyes    Pseudophakia of both eyes    History of vitrectomy    Refractive error  IOP OD 18 OS 20 with thick corneas OU CCT    Continue latanoprost QHS OU and dorzolamide BID OU  HVF WNL OU OS non-specific defects  OCT ON  OD ST, IT thinning OS IT thinning no change  7/15/21  IOP OD 17 OS 22, mildly elevated in left eye; patient does have thicker corneas OS>OD  Gonio CBB x 360 OU  Continue latanoprost 1 drop QHS OU and dorzolamide BID OU  If stays up will consider increasing dorzolamide or adding new drop  10/19/21 IOP OD 17 OS 20, stable, compliant with drops  Continue current regimen; latanoprost qhs and dorzolamide BID OU  4/5/2022 IOP OD 17 OS 20, stable  Continue latanoprost qhs and dorzolamide BID OU  10/4/2022  HVF 24-2 WNL OU no change  OCT ON OD thinning ST IT and OS IT thinning  Continue latanoprost QHS OU  Follow up in about 6 months (around 4/4/2023) for Dilation and refraction.

## 2022-10-04 NOTE — PROGRESS NOTES
HPI    24-2 sf HVF and OCT nerve done today  Pt. Denied latex or adhesive allergies  Pt. Sat well for test  Last edited by Liane Ramsay on 10/4/2022 10:03 AM.            Assessment /Plan     For exam results, see Encounter Report.    OHT (ocular hypertension), bilateral  -     Hayden Visual Field - OU - Extended - Both Eyes

## 2022-10-27 ENCOUNTER — PES CALL (OUTPATIENT)
Dept: ADMINISTRATIVE | Facility: CLINIC | Age: 67
End: 2022-10-27
Payer: MEDICARE

## 2022-10-31 ENCOUNTER — OFFICE VISIT (OUTPATIENT)
Dept: FAMILY MEDICINE | Facility: CLINIC | Age: 67
End: 2022-10-31
Payer: MEDICARE

## 2022-10-31 VITALS
TEMPERATURE: 98 F | HEIGHT: 67 IN | DIASTOLIC BLOOD PRESSURE: 72 MMHG | SYSTOLIC BLOOD PRESSURE: 132 MMHG | WEIGHT: 131.63 LBS | OXYGEN SATURATION: 98 % | BODY MASS INDEX: 20.66 KG/M2 | HEART RATE: 64 BPM

## 2022-10-31 DIAGNOSIS — Z12.4 PAP SMEAR FOR CERVICAL CANCER SCREENING: ICD-10-CM

## 2022-10-31 DIAGNOSIS — Z00.00 ROUTINE PHYSICAL EXAMINATION: Primary | ICD-10-CM

## 2022-10-31 DIAGNOSIS — M54.50 ACUTE MIDLINE LOW BACK PAIN WITHOUT SCIATICA: ICD-10-CM

## 2022-10-31 DIAGNOSIS — Z13.6 SCREENING FOR CARDIOVASCULAR CONDITION: ICD-10-CM

## 2022-10-31 PROCEDURE — 1160F PR REVIEW ALL MEDS BY PRESCRIBER/CLIN PHARMACIST DOCUMENTED: ICD-10-PCS | Mod: CPTII,S$GLB,, | Performed by: INTERNAL MEDICINE

## 2022-10-31 PROCEDURE — 1101F PR PT FALLS ASSESS DOC 0-1 FALLS W/OUT INJ PAST YR: ICD-10-PCS | Mod: CPTII,S$GLB,, | Performed by: INTERNAL MEDICINE

## 2022-10-31 PROCEDURE — 1159F MED LIST DOCD IN RCRD: CPT | Mod: CPTII,S$GLB,, | Performed by: INTERNAL MEDICINE

## 2022-10-31 PROCEDURE — 1160F RVW MEDS BY RX/DR IN RCRD: CPT | Mod: CPTII,S$GLB,, | Performed by: INTERNAL MEDICINE

## 2022-10-31 PROCEDURE — G0439 PPPS, SUBSEQ VISIT: HCPCS | Mod: S$GLB,,, | Performed by: INTERNAL MEDICINE

## 2022-10-31 PROCEDURE — 99999 PR PBB SHADOW E&M-EST. PATIENT-LVL III: ICD-10-PCS | Mod: PBBFAC,,, | Performed by: INTERNAL MEDICINE

## 2022-10-31 PROCEDURE — 3075F PR MOST RECENT SYSTOLIC BLOOD PRESS GE 130-139MM HG: ICD-10-PCS | Mod: CPTII,S$GLB,, | Performed by: INTERNAL MEDICINE

## 2022-10-31 PROCEDURE — 1125F PR PAIN SEVERITY QUANTIFIED, PAIN PRESENT: ICD-10-PCS | Mod: CPTII,S$GLB,, | Performed by: INTERNAL MEDICINE

## 2022-10-31 PROCEDURE — G0008 ADMIN INFLUENZA VIRUS VAC: HCPCS | Mod: S$GLB,,, | Performed by: INTERNAL MEDICINE

## 2022-10-31 PROCEDURE — 1125F AMNT PAIN NOTED PAIN PRSNT: CPT | Mod: CPTII,S$GLB,, | Performed by: INTERNAL MEDICINE

## 2022-10-31 PROCEDURE — 1101F PT FALLS ASSESS-DOCD LE1/YR: CPT | Mod: CPTII,S$GLB,, | Performed by: INTERNAL MEDICINE

## 2022-10-31 PROCEDURE — G0439 PR MEDICARE ANNUAL WELLNESS SUBSEQUENT VISIT: ICD-10-PCS | Mod: S$GLB,,, | Performed by: INTERNAL MEDICINE

## 2022-10-31 PROCEDURE — 3075F SYST BP GE 130 - 139MM HG: CPT | Mod: CPTII,S$GLB,, | Performed by: INTERNAL MEDICINE

## 2022-10-31 PROCEDURE — 90694 FLU VACCINE - QUADRIVALENT - ADJUVANTED: ICD-10-PCS | Mod: S$GLB,,, | Performed by: INTERNAL MEDICINE

## 2022-10-31 PROCEDURE — 3078F PR MOST RECENT DIASTOLIC BLOOD PRESSURE < 80 MM HG: ICD-10-PCS | Mod: CPTII,S$GLB,, | Performed by: INTERNAL MEDICINE

## 2022-10-31 PROCEDURE — 3288F FALL RISK ASSESSMENT DOCD: CPT | Mod: CPTII,S$GLB,, | Performed by: INTERNAL MEDICINE

## 2022-10-31 PROCEDURE — 3078F DIAST BP <80 MM HG: CPT | Mod: CPTII,S$GLB,, | Performed by: INTERNAL MEDICINE

## 2022-10-31 PROCEDURE — 99999 PR PBB SHADOW E&M-EST. PATIENT-LVL III: CPT | Mod: PBBFAC,,, | Performed by: INTERNAL MEDICINE

## 2022-10-31 PROCEDURE — 3288F PR FALLS RISK ASSESSMENT DOCUMENTED: ICD-10-PCS | Mod: CPTII,S$GLB,, | Performed by: INTERNAL MEDICINE

## 2022-10-31 PROCEDURE — G0008 FLU VACCINE - QUADRIVALENT - ADJUVANTED: ICD-10-PCS | Mod: S$GLB,,, | Performed by: INTERNAL MEDICINE

## 2022-10-31 PROCEDURE — 1159F PR MEDICATION LIST DOCUMENTED IN MEDICAL RECORD: ICD-10-PCS | Mod: CPTII,S$GLB,, | Performed by: INTERNAL MEDICINE

## 2022-10-31 PROCEDURE — 90694 VACC AIIV4 NO PRSRV 0.5ML IM: CPT | Mod: S$GLB,,, | Performed by: INTERNAL MEDICINE

## 2022-10-31 NOTE — PROGRESS NOTES
HDF  given into right deltoid.  Vaccine verified with Lavonne HERNDON LPN.  2 patient identifier used. well tolerated. Instructed to wait in lobby 15 minutes post injection for safety and what s/s to notify registration with.  See Immunization or MAR for NDC, Lot, and Expiration date.

## 2022-10-31 NOTE — PROGRESS NOTES
Subjective:       Patient ID: Ashlie Redman is a 67 y.o. female.    Chief Complaint: Annual Exam and Back Pain    HRA: patient feels overall is healthy.  Psychosocial and behavioral risks discussed.  BMI - 20  Diet - well balanced.   ADL: self sufficient in all  Instrumental ADL: patient is able to manage things like their medications and finances.    Memory or cognitive function - Patient has no issues with either   Ambulates normal. No recent falls.  Exercises regularly   Depression screening is negative.  Hearing--no deficits.  Vision - no deficits.   Incontinence - none    Preventative health needs discussed and patient was given a printed list of what they have received and what they will need with in the next 5-10 years.  Screening schedule reviewed with patient  Colonoscopy- 2020  Pneumovax - complete  Prevnar -complete  Shingrix - pending   Flu vaccine - complete  COVID - complete  Mammogram - 2022  PAP - pending   DEXA - 2020    Advanced Care directive: Patient will address advanced care directives on their own at a later time.   I have reviewed and updated the patient's current list of providers.       The patient is not currently on opioids                In addition to the patient's preventative review and discussion today, the patient also has other issues to discuss today with a separate summary of plan below:     History of breast cancer and chest pneumonitis.  Acute low back pain almost completely resolved s/p steroid pack  Osteoporosis - stable on Prolia     Review of Systems   Constitutional:  Negative for appetite change and fever.   HENT:  Negative for nosebleeds and trouble swallowing.    Eyes:  Negative for discharge and visual disturbance.   Respiratory:  Negative for choking and shortness of breath.    Cardiovascular:  Negative for chest pain and palpitations.   Gastrointestinal:  Negative for abdominal pain, nausea and vomiting.   Musculoskeletal:  Positive for back pain. Negative for  arthralgias and joint swelling.   Skin:  Negative for rash and wound.   Neurological:  Negative for dizziness and syncope.   Psychiatric/Behavioral:  Negative for confusion and dysphoric mood.      Objective:      Vitals:    10/31/22 0927   BP: 132/72   Pulse: 64   Temp: 97.8 °F (36.6 °C)     Physical Exam  Vitals reviewed.   Eyes:      Conjunctiva/sclera: Conjunctivae normal.   Neck:      Thyroid: No thyromegaly.      Trachea: Trachea normal.   Cardiovascular:      Heart sounds: Normal heart sounds.      Comments: Edema negative  Pulmonary:      Effort: Pulmonary effort is normal.      Breath sounds: Normal breath sounds.   Abdominal:      Palpations: Abdomen is soft. There is no hepatomegaly.   Musculoskeletal:      Cervical back: Normal range of motion.   Skin:     General: Skin is warm and dry.   Neurological:      Cranial Nerves: No cranial nerve deficit.      Comments: DTR decreased bilateral   Psychiatric:      Comments: Alert and Oriented          Assessment:       1. Routine physical examination    2. Screening for cardiovascular condition    3. Pap smear for cervical cancer screening    4. Acute midline low back pain without sciatica          Plan:       Routine physical examination    Screening for cardiovascular condition  -     Lipid Panel; Future; Expected date: 10/31/2022    Pap smear for cervical cancer screening  -     Ambulatory referral/consult to Gynecology; Future; Expected date: 11/07/2022    Acute midline low back pain without sciatica          Medication List with Changes/Refills   Current Medications    CALCIUM CARB/VIT D3/MINERALS (CALCIUM-VITAMIN D ORAL)        DENOSUMAB (PROLIA) 60 MG/ML SYRG    Inject 60 mg into the skin.    DORZOLAMIDE (TRUSOPT) 2 % OPHTHALMIC SOLUTION    INSTILL 1 DROP INTO BOTH EYES 3 TIMES A DAY    LATANOPROST 0.005 % OPHTHALMIC SOLUTION    INSTILL 1 DROP INTO BOTH EYES IN THE EVENING    VALACYCLOVIR (VALTREX) 500 MG TABLET    Take 1 tablet (500 mg total) by mouth  once daily.   Discontinued Medications    CIPROFLOXACIN HCL (CILOXAN) 0.3 % OPHTHALMIC SOLUTION    Apply 3 drops to diseased nail twice daily    FLUCONAZOLE (DIFLUCAN) 200 MG TAB    Take 1 tab PO qweek.    MULTIVITAMIN CAPSULE    Take 1 capsule by mouth once daily.     Wellness reviewed          Continue current management and monitor.    Counseled on regular exercise, maintenance of a healthy weight, balanced diet rich in fruits/vegetables and lean protein, and avoidance of unhealthy habits like smoking and excessive alcohol intake.   Also, counseled on importance of being compliant with medication, health appointments, diet and exercise.     Follow up in about 1 year (around 10/31/2023).

## 2022-11-16 ENCOUNTER — TELEPHONE (OUTPATIENT)
Dept: HEMATOLOGY/ONCOLOGY | Facility: CLINIC | Age: 67
End: 2022-11-16
Payer: MEDICARE

## 2022-11-16 NOTE — TELEPHONE ENCOUNTER
Spoke to pt and notified Dr out 12/19/22 and will need to reschedule, appt 12/27/22. Notified  for injection will reach out to reschedule inj after Dr Ragsdale appt.

## 2022-12-01 ENCOUNTER — TELEPHONE (OUTPATIENT)
Dept: HEMATOLOGY/ONCOLOGY | Facility: CLINIC | Age: 67
End: 2022-12-01
Payer: MEDICARE

## 2022-12-12 ENCOUNTER — OFFICE VISIT (OUTPATIENT)
Dept: URGENT CARE | Facility: CLINIC | Age: 67
End: 2022-12-12
Payer: MEDICARE

## 2022-12-12 VITALS
RESPIRATION RATE: 16 BRPM | HEART RATE: 71 BPM | TEMPERATURE: 98 F | WEIGHT: 131 LBS | DIASTOLIC BLOOD PRESSURE: 89 MMHG | HEIGHT: 67 IN | OXYGEN SATURATION: 97 % | BODY MASS INDEX: 20.56 KG/M2 | SYSTOLIC BLOOD PRESSURE: 148 MMHG

## 2022-12-12 DIAGNOSIS — R30.0 DYSURIA: Primary | ICD-10-CM

## 2022-12-12 LAB
BILIRUB UR QL STRIP: POSITIVE
GLUCOSE UR QL STRIP: POSITIVE
KETONES UR QL STRIP: NEGATIVE
LEUKOCYTE ESTERASE UR QL STRIP: POSITIVE
PH, POC UA: 6.5 (ref 5–8)
POC BLOOD, URINE: POSITIVE
POC NITRATES, URINE: POSITIVE
PROT UR QL STRIP: POSITIVE
SP GR UR STRIP: 1.01 (ref 1–1.03)
UROBILINOGEN UR STRIP-ACNC: POSITIVE (ref 0.1–1.1)

## 2022-12-12 PROCEDURE — 1126F PR PAIN SEVERITY QUANTIFIED, NO PAIN PRESENT: ICD-10-PCS | Mod: CPTII,S$GLB,, | Performed by: EMERGENCY MEDICINE

## 2022-12-12 PROCEDURE — 3079F DIAST BP 80-89 MM HG: CPT | Mod: CPTII,S$GLB,, | Performed by: EMERGENCY MEDICINE

## 2022-12-12 PROCEDURE — 1160F PR REVIEW ALL MEDS BY PRESCRIBER/CLIN PHARMACIST DOCUMENTED: ICD-10-PCS | Mod: CPTII,S$GLB,, | Performed by: EMERGENCY MEDICINE

## 2022-12-12 PROCEDURE — 3077F SYST BP >= 140 MM HG: CPT | Mod: CPTII,S$GLB,, | Performed by: EMERGENCY MEDICINE

## 2022-12-12 PROCEDURE — 99213 OFFICE O/P EST LOW 20 MIN: CPT | Mod: S$GLB,,, | Performed by: EMERGENCY MEDICINE

## 2022-12-12 PROCEDURE — 1126F AMNT PAIN NOTED NONE PRSNT: CPT | Mod: CPTII,S$GLB,, | Performed by: EMERGENCY MEDICINE

## 2022-12-12 PROCEDURE — 3079F PR MOST RECENT DIASTOLIC BLOOD PRESSURE 80-89 MM HG: ICD-10-PCS | Mod: CPTII,S$GLB,, | Performed by: EMERGENCY MEDICINE

## 2022-12-12 PROCEDURE — 87086 URINE CULTURE/COLONY COUNT: CPT | Performed by: EMERGENCY MEDICINE

## 2022-12-12 PROCEDURE — 87186 SC STD MICRODIL/AGAR DIL: CPT | Performed by: EMERGENCY MEDICINE

## 2022-12-12 PROCEDURE — 99213 PR OFFICE/OUTPT VISIT, EST, LEVL III, 20-29 MIN: ICD-10-PCS | Mod: S$GLB,,, | Performed by: EMERGENCY MEDICINE

## 2022-12-12 PROCEDURE — 87077 CULTURE AEROBIC IDENTIFY: CPT | Performed by: EMERGENCY MEDICINE

## 2022-12-12 PROCEDURE — 1159F MED LIST DOCD IN RCRD: CPT | Mod: CPTII,S$GLB,, | Performed by: EMERGENCY MEDICINE

## 2022-12-12 PROCEDURE — 1160F RVW MEDS BY RX/DR IN RCRD: CPT | Mod: CPTII,S$GLB,, | Performed by: EMERGENCY MEDICINE

## 2022-12-12 PROCEDURE — 1159F PR MEDICATION LIST DOCUMENTED IN MEDICAL RECORD: ICD-10-PCS | Mod: CPTII,S$GLB,, | Performed by: EMERGENCY MEDICINE

## 2022-12-12 PROCEDURE — 87088 URINE BACTERIA CULTURE: CPT | Performed by: EMERGENCY MEDICINE

## 2022-12-12 PROCEDURE — 3008F BODY MASS INDEX DOCD: CPT | Mod: CPTII,S$GLB,, | Performed by: EMERGENCY MEDICINE

## 2022-12-12 PROCEDURE — 81003 POCT URINALYSIS, DIPSTICK, AUTOMATED, W/O SCOPE: ICD-10-PCS | Mod: QW,S$GLB,, | Performed by: EMERGENCY MEDICINE

## 2022-12-12 PROCEDURE — 81003 URINALYSIS AUTO W/O SCOPE: CPT | Mod: QW,S$GLB,, | Performed by: EMERGENCY MEDICINE

## 2022-12-12 PROCEDURE — 3077F PR MOST RECENT SYSTOLIC BLOOD PRESSURE >= 140 MM HG: ICD-10-PCS | Mod: CPTII,S$GLB,, | Performed by: EMERGENCY MEDICINE

## 2022-12-12 PROCEDURE — 3008F PR BODY MASS INDEX (BMI) DOCUMENTED: ICD-10-PCS | Mod: CPTII,S$GLB,, | Performed by: EMERGENCY MEDICINE

## 2022-12-12 RX ORDER — PHENAZOPYRIDINE HYDROCHLORIDE 200 MG/1
200 TABLET, FILM COATED ORAL 3 TIMES DAILY PRN
Qty: 6 TABLET | Refills: 0 | Status: SHIPPED | OUTPATIENT
Start: 2022-12-12 | End: 2023-03-03

## 2022-12-12 RX ORDER — CEFDINIR 300 MG/1
300 CAPSULE ORAL 2 TIMES DAILY
Qty: 20 CAPSULE | Refills: 0 | Status: SHIPPED | OUTPATIENT
Start: 2022-12-12 | End: 2022-12-22

## 2022-12-12 NOTE — PROGRESS NOTES
"Subjective:       Patient ID: Ashlie Redman is a 67 y.o. female.    Vitals:  height is 5' 7" (1.702 m) and weight is 59.4 kg (131 lb). Her temperature is 98 °F (36.7 °C). Her blood pressure is 148/89 (abnormal) and her pulse is 71. Her respiration is 16 and oxygen saturation is 97%.     Chief Complaint: Urinary Tract Infection    Patient reports UTI symptoms x's 3 days. Patient reports a burning sensation when urinating, abdominal cramps and vaginal pain. Taking Pyridium and Acetaminophen for symptoms with no relief.     Urinary Tract Infection   This is a new problem. The current episode started in the past 7 days. The problem occurs every urination. The problem has been gradually worsening. The quality of the pain is described as burning. The pain is at a severity of 4/10. The pain is mild. There has been no fever. Associated symptoms include frequency. She has tried acetaminophen for the symptoms.     Gastrointestinal:  Positive for abdominal pain.   Genitourinary:  Positive for dysuria, frequency and vaginal pain.   Musculoskeletal:  Positive for back pain.     Objective:      Physical Exam   Constitutional: She is oriented to person, place, and time. She appears well-developed.   HENT:   Head: Normocephalic and atraumatic.   Ears:   Right Ear: External ear normal.   Left Ear: External ear normal.   Nose: Nose normal. No nasal deformity. No epistaxis.   Mouth/Throat: Oropharynx is clear and moist and mucous membranes are normal.   Eyes: Lids are normal.   Neck: Trachea normal and phonation normal. Neck supple.   Cardiovascular: Normal pulses.   Pulmonary/Chest: Effort normal.   Abdominal: Normal appearance and bowel sounds are normal. She exhibits no distension. Soft. There is no abdominal tenderness.   Neurological: She is alert and oriented to person, place, and time.   Skin: Skin is warm, dry and intact.   Psychiatric: Her speech is normal and behavior is normal.   Nursing note and vitals reviewed.    "     Results for orders placed or performed in visit on 12/12/22   POCT Urinalysis, Dipstick, Automated, W/O Scope   Result Value Ref Range    POC Blood, Urine Positive (A) Negative    POC Bilirubin, Urine Positive (A) Negative    POC Urobilinogen, Urine Positive 0.1 - 1.1    POC Ketones, Urine Negative Negative    POC Protein, Urine Positive (A) Negative    POC Nitrates, Urine Positive (A) Negative    POC Glucose, Urine Positive (A) Negative    pH, UA 6.5 5 - 8    POC Specific Gravity, Urine 1.015 1.003 - 1.029    POC Leukocytes, Urine Positive (A) Negative     *Note: Due to a large number of results and/or encounters for the requested time period, some results have not been displayed. A complete set of results can be found in Results Review.     Patient with UTI symptoms and mild right flank discomfort without tenderness for couple of days.  Took Pyridium with some relief.  Has dysuria and frequency.  Will send culture and do 3rd generation cephalosporin.  Patient is 4 and half years out from diagnosis of breast cancer and has follow-up in the next week or so with routine labs etc..  Assessment:       1. Dysuria            Plan:         Dysuria  -     POCT Urinalysis, Dipstick, Automated, W/O Scope  -     CULTURE, URINE  -     cefdinir (OMNICEF) 300 MG capsule; Take 1 capsule (300 mg total) by mouth 2 (two) times daily. for 10 days  Dispense: 20 capsule; Refill: 0  -     phenazopyridine (PYRIDIUM) 200 MG tablet; Take 1 tablet (200 mg total) by mouth 3 (three) times daily as needed for Pain.  Dispense: 6 tablet; Refill: 0

## 2022-12-15 ENCOUNTER — TELEPHONE (OUTPATIENT)
Dept: URGENT CARE | Facility: CLINIC | Age: 67
End: 2022-12-15
Payer: MEDICARE

## 2022-12-15 LAB — BACTERIA UR CULT: ABNORMAL

## 2022-12-16 ENCOUNTER — HOSPITAL ENCOUNTER (OUTPATIENT)
Dept: RADIOLOGY | Facility: HOSPITAL | Age: 67
Discharge: HOME | End: 2022-12-16
Attending: INTERNAL MEDICINE
Payer: MEDICARE

## 2022-12-16 DIAGNOSIS — C50.912 HER2-POSITIVE CARCINOMA OF LEFT BREAST: ICD-10-CM

## 2022-12-16 DIAGNOSIS — C50.012 MALIGNANT NEOPLASM OF NIPPLE OF LEFT BREAST IN FEMALE, UNSPECIFIED ESTROGEN RECEPTOR STATUS: ICD-10-CM

## 2022-12-16 LAB — GLUCOSE SERPL-MCNC: 103 MG/DL (ref 70–110)

## 2022-12-16 PROCEDURE — 78815 PET IMAGE W/CT SKULL-THIGH: CPT | Mod: TC,PS,PN

## 2022-12-16 PROCEDURE — 78815 PET IMAGE W/CT SKULL-THIGH: CPT | Mod: 26,PS,, | Performed by: STUDENT IN AN ORGANIZED HEALTH CARE EDUCATION/TRAINING PROGRAM

## 2022-12-16 PROCEDURE — A9552 F18 FDG: HCPCS | Mod: PN

## 2022-12-16 PROCEDURE — 78815 NM PET CT ROUTINE: ICD-10-PCS | Mod: 26,PS,, | Performed by: STUDENT IN AN ORGANIZED HEALTH CARE EDUCATION/TRAINING PROGRAM

## 2022-12-16 NOTE — PROGRESS NOTES
PET Imaging Questionnaire    Are you a Diabetic? Recent Blood Sugar level? No    Are you anemic? Bone Marrow Stimulation Meds? Yes    Have you had a CT Scan, if so when & where was your last one? Yes -     Have you had a PET Scan, if so when & where was your last one? Yes -     Chemotherapy or currently on Chemotherapy? Yes    Radiation therapy? Yes    Surgical History:   Past Surgical History:   Procedure Laterality Date    ANGIOGRAM, CORONARY, WITH LEFT HEART CATHETERIZATION Left 9/2/2021    Procedure: ANGIOGRAM,CORONARY,WITH LEFT HEART CATHETERIZATION;  Surgeon: Rubin Hui MD;  Location: [facility] CATH/EP LAB;  Service: Cardiology;  Laterality: Left;    breast augmentation  03/2013    BREAST BIOPSY Left 04/2017    Core bx,+ cancer    BREAST CAPSULECTOMY Left 8/5/2019    Procedure: CAPSULECTOMY, BREAST;  Surgeon: Diego Modi MD;  Location: [facility];  Service: General;  Laterality: Left;    BREAST LUMPECTOMY Left 10/25/2017    BREAST RECONSTRUCTION Bilateral 8/21/2019    Procedure: RECONSTRUCTION, BREAST WITH ALLODERM;  Surgeon: Diego Modi MD;  Location: [facility];  Service: General;  Laterality: Bilateral;    BREAST RECONSTRUCTION Left     BREAST REVISION SURGERY Bilateral 8/5/2019    Procedure: BREAST REVISION SURGERY;  Surgeon: Diego Modi MD;  Location: [facility];  Service: General;  Laterality: Bilateral;    BREAST REVISION SURGERY Left 4/5/2021    Procedure: BREAST REVISION SURGERY - LEFT BREAST RECONSTRUCTION REVISION;  Surgeon: Diego Modi MD;  Location: [facility];  Service: General;  Laterality: Left;    CATARACT EXTRACTION W/  INTRAOCULAR LENS IMPLANT Bilateral 2009    COLONOSCOPY  4/2009, 2015    repeat in 5 years    COLONOSCOPY N/A 12/22/2020    Procedure: COLONOSCOPY;  Surgeon: Praneeth Leblanc MD;  Location: [facility];  Service: Endoscopy;  Laterality: N/A;    CYSTOCELE REPAIR N/A 11/20/2018    Procedure: REPAIR, CYSTOCELE;  Surgeon: Rebecca Acosta MD;  Location: [address]  FLR;  Service: Urology;  Laterality: N/A;  1.5 hours    CYSTOSCOPY N/A 11/20/2018    Procedure: CYSTOSCOPY;  Surgeon: Rebecca Acosta MD;  Location: [facility] 2ND FLR;  Service: Urology;  Laterality: N/A;    DILATION AND CURETTAGE OF UTERUS      ECTOPIC PREGNANCY SURGERY      EYE SURGERY Bilateral 3/09    Vitrectomy     MASTECTOMY Left     MASTOPEXY Right 4/5/2021    Procedure: MASTOPEXY - RIGHT MASTOPEXY LIPOSUCTION WAIST AND THIGHS, FAT TRANSFER TO BREAST;  Surgeon: Diego Modi MD;  Location: [facility];  Service: General;  Laterality: Right;    POLYPECTOMY      x3 2001    RECONSTRUCTION OF BREAST WITH DEEP INFERIOR EPIGASTRIC ARTERY  (SUSAN) FLAP Left 10/26/2020    Procedure: RECONSTRUCTION, BREAST, USING SUSAN SKIN FLAP - LEFT BREAST RECONSTRUCTION WITH STACKED SUSAN FREE FLAPS.;  Surgeon: Diego Modi MD;  Location: [facility];  Service: General;  Laterality: Left;    REMOVAL OF BREAST IMPLANT Bilateral 8/5/2019    Procedure: REMOVAL, IMPLANT, BREAST;  Surgeon: Diego Modi MD;  Location: [facility];  Service: General;  Laterality: Bilateral;    REMOVAL OF BREAST IMPLANT Left 8/30/2019    Procedure: REMOVAL, IMPLANT, BREAST;  Surgeon: Diego Modi MD;  Location: [facility];  Service: General;  Laterality: Left;    REMOVAL OF BREAST IMPLANT Right 10/26/2020    Procedure: REMOVAL, IMPLANT, BREAST -   RIGHT BREAST IMPLANT REMOVAL;  Surgeon: Diego Modi MD;  Location: [facility];  Service: General;  Laterality: Right;    Yag Capsulotomy Bilateral 12/2014        Have you been fasting for at least 6 hours? Yes    Is there any chance you may be pregnant or breastfeeding? No    Assay: 12.56 MCi@:8:17   Injection Site:RT Arm    Residual: .837 mCi@: 8:19   Technologist: Jase Parekh Injected:11.72mCi

## 2022-12-18 ENCOUNTER — PATIENT MESSAGE (OUTPATIENT)
Dept: HEMATOLOGY/ONCOLOGY | Facility: CLINIC | Age: 67
End: 2022-12-18
Payer: MEDICARE

## 2022-12-19 DIAGNOSIS — M80.00XA AGE-RELATED OSTEOPOROSIS WITH CURRENT PATHOLOGICAL FRACTURE, INITIAL ENCOUNTER: Primary | ICD-10-CM

## 2022-12-20 ENCOUNTER — OFFICE VISIT (OUTPATIENT)
Dept: HEMATOLOGY/ONCOLOGY | Facility: CLINIC | Age: 67
End: 2022-12-20
Payer: MEDICARE

## 2022-12-20 VITALS
WEIGHT: 130.06 LBS | SYSTOLIC BLOOD PRESSURE: 132 MMHG | RESPIRATION RATE: 16 BRPM | TEMPERATURE: 97 F | BODY MASS INDEX: 20.41 KG/M2 | HEART RATE: 66 BPM | DIASTOLIC BLOOD PRESSURE: 70 MMHG | HEIGHT: 67 IN | OXYGEN SATURATION: 100 %

## 2022-12-20 DIAGNOSIS — J70.0 RADIATION PNEUMONITIS: ICD-10-CM

## 2022-12-20 DIAGNOSIS — I25.110 ATHEROSCLEROSIS OF NATIVE CORONARY ARTERY OF NATIVE HEART WITH UNSTABLE ANGINA PECTORIS: Primary | ICD-10-CM

## 2022-12-20 DIAGNOSIS — G62.9 NEUROPATHY: ICD-10-CM

## 2022-12-20 DIAGNOSIS — R33.9 INCOMPLETE EMPTYING OF BLADDER: ICD-10-CM

## 2022-12-20 DIAGNOSIS — Z85.3 HX OF BREAST CANCER: ICD-10-CM

## 2022-12-20 DIAGNOSIS — Z82.62 FAMILY HISTORY OF OSTEOPOROSIS: ICD-10-CM

## 2022-12-20 DIAGNOSIS — Z90.10 STATUS POST PARTIAL MASTECTOMY, UNSPECIFIED LATERALITY: ICD-10-CM

## 2022-12-20 PROCEDURE — 99999 PR PBB SHADOW E&M-EST. PATIENT-LVL III: CPT | Mod: PBBFAC,,, | Performed by: INTERNAL MEDICINE

## 2022-12-20 PROCEDURE — 99214 OFFICE O/P EST MOD 30 MIN: CPT | Mod: S$GLB,,, | Performed by: INTERNAL MEDICINE

## 2022-12-20 PROCEDURE — 99999 PR PBB SHADOW E&M-EST. PATIENT-LVL III: ICD-10-PCS | Mod: PBBFAC,,, | Performed by: INTERNAL MEDICINE

## 2022-12-20 PROCEDURE — 3288F PR FALLS RISK ASSESSMENT DOCUMENTED: ICD-10-PCS | Mod: CPTII,S$GLB,, | Performed by: INTERNAL MEDICINE

## 2022-12-20 PROCEDURE — 1159F MED LIST DOCD IN RCRD: CPT | Mod: CPTII,S$GLB,, | Performed by: INTERNAL MEDICINE

## 2022-12-20 PROCEDURE — 3288F FALL RISK ASSESSMENT DOCD: CPT | Mod: CPTII,S$GLB,, | Performed by: INTERNAL MEDICINE

## 2022-12-20 PROCEDURE — 1159F PR MEDICATION LIST DOCUMENTED IN MEDICAL RECORD: ICD-10-PCS | Mod: CPTII,S$GLB,, | Performed by: INTERNAL MEDICINE

## 2022-12-20 PROCEDURE — 1101F PT FALLS ASSESS-DOCD LE1/YR: CPT | Mod: CPTII,S$GLB,, | Performed by: INTERNAL MEDICINE

## 2022-12-20 PROCEDURE — 3075F PR MOST RECENT SYSTOLIC BLOOD PRESS GE 130-139MM HG: ICD-10-PCS | Mod: CPTII,S$GLB,, | Performed by: INTERNAL MEDICINE

## 2022-12-20 PROCEDURE — 99214 PR OFFICE/OUTPT VISIT, EST, LEVL IV, 30-39 MIN: ICD-10-PCS | Mod: S$GLB,,, | Performed by: INTERNAL MEDICINE

## 2022-12-20 PROCEDURE — 3008F PR BODY MASS INDEX (BMI) DOCUMENTED: ICD-10-PCS | Mod: CPTII,S$GLB,, | Performed by: INTERNAL MEDICINE

## 2022-12-20 PROCEDURE — 3078F DIAST BP <80 MM HG: CPT | Mod: CPTII,S$GLB,, | Performed by: INTERNAL MEDICINE

## 2022-12-20 PROCEDURE — 1126F PR PAIN SEVERITY QUANTIFIED, NO PAIN PRESENT: ICD-10-PCS | Mod: CPTII,S$GLB,, | Performed by: INTERNAL MEDICINE

## 2022-12-20 PROCEDURE — 3078F PR MOST RECENT DIASTOLIC BLOOD PRESSURE < 80 MM HG: ICD-10-PCS | Mod: CPTII,S$GLB,, | Performed by: INTERNAL MEDICINE

## 2022-12-20 PROCEDURE — 3075F SYST BP GE 130 - 139MM HG: CPT | Mod: CPTII,S$GLB,, | Performed by: INTERNAL MEDICINE

## 2022-12-20 PROCEDURE — 3008F BODY MASS INDEX DOCD: CPT | Mod: CPTII,S$GLB,, | Performed by: INTERNAL MEDICINE

## 2022-12-20 PROCEDURE — 1101F PR PT FALLS ASSESS DOC 0-1 FALLS W/OUT INJ PAST YR: ICD-10-PCS | Mod: CPTII,S$GLB,, | Performed by: INTERNAL MEDICINE

## 2022-12-20 PROCEDURE — 1126F AMNT PAIN NOTED NONE PRSNT: CPT | Mod: CPTII,S$GLB,, | Performed by: INTERNAL MEDICINE

## 2022-12-20 NOTE — PROGRESS NOTES
HISTORY OF PRESENT ILLNESS:    This is a 66-year-old white female here for f/u of breast ca hx    Oncology history   treated for a  4/12/2017 diagnosis of Stage IIB left breast carcinoma.ER/VT negative. Her - 2 positive  Recd Neoadjuvant A/C ,completed 12 cycles weekly taxol and  herceptin/ perjeta had lumpectomy was on [employer] trial completed the entire year of herceptin /perjeta, but didn't undergo axillary xrt per trial  Here for f/u with pet and labs recnt mammogram showed calcifications , that were biospied and came back negative  radiation induced interstitial findings still having the cough but getting of her upper respiratory infection today   sonme neuropathy post rx still persisits   had a difficult summer 2019. Had removal of her original implant for pain in the area, after removal pt developed pseudomonas infection stayed in hospital  For a week. She sees Dr. Modi, had second reconstruction 10 2020 had a deep flap done. Saw DR Rodriguez rt breast mammo 9/2020      Wt Readings from Last 3 Encounters:   12/12/22 59.4 kg (131 lb)   10/31/22 59.7 kg (131 lb 9.8 oz)   08/02/22 61.2 kg (135 lb)     Temp Readings from Last 3 Encounters:   12/12/22 98 °F (36.7 °C)   10/31/22 97.8 °F (36.6 °C) (Oral)   08/02/22 98 °F (36.7 °C) (Oral)     BP Readings from Last 3 Encounters:   12/12/22 (!) 148/89   10/31/22 132/72   08/02/22 (!) 166/83     Pulse Readings from Last 3 Encounters:   12/12/22 71   10/31/22 64   08/02/22 64   Review of Systems   Constitutional:  Negative for chills, diaphoresis, fever, malaise/fatigue and weight loss.   HENT:  Negative for congestion, hearing loss, sinus pain, sore throat and tinnitus.    Eyes:  Positive for photophobia. Negative for blurred vision.   Respiratory:  Negative for cough and shortness of breath.    Cardiovascular:  Negative for chest pain.   Gastrointestinal:  Negative for abdominal pain and diarrhea.   Genitourinary:  Negative for frequency.   Musculoskeletal:  Negative  for back pain.   Skin:  Negative for rash.   Neurological:  Negative for headaches.   Psychiatric/Behavioral:  The patient is not nervous/anxious.  feeling lumpy area to left axilla has apt with Dr Ly Arvizu  Impaired mobility of left shoulder post reconstruction and breast sx        GENERAL:   Wt Readings from Last 3 Encounters:   12/12/22 59.4 kg (131 lb)   10/31/22 59.7 kg (131 lb 9.8 oz)   08/02/22 61.2 kg (135 lb)     Temp Readings from Last 3 Encounters:   12/12/22 98 °F (36.7 °C)   10/31/22 97.8 °F (36.6 °C) (Oral)   08/02/22 98 °F (36.7 °C) (Oral)     BP Readings from Last 3 Encounters:   12/12/22 (!) 148/89   10/31/22 132/72   08/02/22 (!) 166/83     Pulse Readings from Last 3 Encounters:   12/12/22 71   10/31/22 64   08/02/22 64    VITAL SIGNS:  as above   GENERAL: appears well-built, well-nourished.  No anxiety, no agitation, and in no distress.  Patient is awake, alert, oriented and cooperative.  HEENT:  Showed no congestion. Trachea is central no obvious icterus or pallor noted no hoarseness. no obvious JVD   NECK:  Supple.  No JVD. No obvious cervical submental or supraclavicular adenopathy.  RS:the visualized portion of  Chest expands well. chest appears symmetric, no audible wheezes.  No dyspnea recognized  ABDOMEN:  abdomen appears undistended.  EXTREMITIES:  Without edema.  NEUROLOGICAL:  The patient is appropriate, higher functions are normal.  No  obvious neurological deficits.  normal judgement normal thought content  No confusion, no speech impediment. Cranial nerves are intact and show no deficit. No gross motor deficits noted   SKIN MUSCULOSKELETAL: no joint or skeletal deformity, no clubbing of nails.  No visible rash ecchymosis or petechiae  BREAST: left breast  Flap done, some puckering that is not satisfactory to pt, she has left axilla fullness/ swelling. With cord like tighetning around axilla that limits her arm raising. Its a lump like area to axilla, the arm itself is not  swollen, pt reports that the working dx is that the lymphatic drainage was blocked due to sx fom scarring   rt breast implant has been removed and the breast is natural  LABORATORY:    Lab Results   Component Value Date    WBC 11.96 12/16/2022    HGB 14.9 12/16/2022    HCT 44.0 12/16/2022     (H) 12/16/2022     12/16/2022         CMP  Sodium   Date Value Ref Range Status   12/16/2022 142 136 - 145 mmol/L Final     Potassium   Date Value Ref Range Status   12/16/2022 3.9 3.5 - 5.1 mmol/L Final     Chloride   Date Value Ref Range Status   12/16/2022 107 95 - 110 mmol/L Final     CO2   Date Value Ref Range Status   12/16/2022 24 23 - 29 mmol/L Final     Glucose   Date Value Ref Range Status   12/16/2022 97 70 - 110 mg/dL Final     BUN   Date Value Ref Range Status   12/16/2022 12 8 - 23 mg/dL Final     Creatinine   Date Value Ref Range Status   12/16/2022 0.9 0.5 - 1.4 mg/dL Final     Calcium   Date Value Ref Range Status   12/16/2022 10.2 8.7 - 10.5 mg/dL Final     Total Protein   Date Value Ref Range Status   12/16/2022 7.5 6.0 - 8.4 g/dL Final     Albumin   Date Value Ref Range Status   12/16/2022 4.6 3.5 - 5.2 g/dL Final     Total Bilirubin   Date Value Ref Range Status   12/16/2022 0.7 0.1 - 1.0 mg/dL Final     Comment:     For infants and newborns, interpretation of results should be based  on gestational age, weight and in agreement with clinical  observations.    Premature Infant recommended reference ranges:  Up to 24 hours.............<8.0 mg/dL  Up to 48 hours............<12.0 mg/dL  3-5 days..................<15.0 mg/dL  6-29 days.................<15.0 mg/dL       Alkaline Phosphatase   Date Value Ref Range Status   12/16/2022 58 55 - 135 U/L Final     AST   Date Value Ref Range Status   12/16/2022 22 10 - 40 U/L Final     ALT   Date Value Ref Range Status   12/16/2022 16 10 - 44 U/L Final     Anion Gap   Date Value Ref Range Status   12/16/2022 11 8 - 16 mmol/L Final     eGFR if African  American   Date Value Ref Range Status   06/01/2022 >60 >60 mL/min/1.73 m^2 Final     eGFR if non    Date Value Ref Range Status   06/01/2022 >60 >60 mL/min/1.73 m^2 Final     Comment:     Calculation used to obtain the estimated glomerular filtration  rate (eGFR) is the CKD-EPI equation.      30.7 ca 2729 on 12/3/21     Left breast, lumpectomy:  - Residual invasive ductal carcinoma, moderately differentiated, Bayard Grade 2 (3+2+1=6), 0.2 cm in  greatest linear microscopic dimension.  - Ductal carcinoma in situ (DCIS), high nuclear grade, 2 cm, solid and cribriform types.  - Surgical margins are free of tumor; invasive carcinoma is located 0.1 cm from the closest margin         FINAL PATHOLOGIC DIAGNOSIS  1. BREAST, RIGHT, CENTRAL REGION MIDDLE DEPTH, CALCIFICATIONS, STEREOTACTIC-GUIDED  BIOPSY:  Benign breast tissue with fibrocystic changes, columnar cell changes, and focal features suggestive of duct  rupture.  Microcalcifications: Seen in association with areas of columnar cell change.  Negative for atypia or carcinoma.  Osteopenia 3/2017 dexa  1/2020 osteoporosis  Chest x-ray negative October 2020 mammogram September 2 1020-    Pet 12/2022 neg    IMPRESSION:    Stage IIB left breast carcinoma 2017 (ER/TX negative, Her-2/clay positive)  Completed neoadjuvant AC and weekly taxol / herceptin  stopped perjeta since she also had xrt to left side due to fear of toxicity   BRCA negative  , s/p  lumpectomy and axillary disection , with 2 residual Ln positive. Per [facility] trail did not undergo xrt to axilla    reconstructed left breast MRI of rt breast  negative February 2020, mammo of  Both breast( has natural breast tissue in left still) in 9/2021  and 9/22 next 9/23   pet was denied by insurance will get annual atleast   cont observation and surveillance   left axillary  fullness, has seen   DR flowers  She has restricted movement to arm raising, and cord like tightening to the left axilla, she  sees Angelina Sorensen from the [facility] which is a lymphedema specialist ( fascia management, ) she sees Angelina twice a month  Now, swelling started about a year ago  1.   Bone density for next week   has delayed this over a year in the past.  And   possibly fracture of ribs from coughing per xray.  Fractures on rib toe possibly related to osteoporosis   cont f/u wit pcp   anemia has resolved  Return to clinic 6 months in time for the next dose of Prolia with CBC CMP and CA 2729. pet  Effexor sent for hot flashes but pt is not taking them and feels better

## 2022-12-20 NOTE — Clinical Note
See me for next prolia with cbc, cmp, [id number] and ct cap in 6 months  Please refer to  genetic testing / counseling for merlos syndrome

## 2022-12-21 ENCOUNTER — TELEPHONE (OUTPATIENT)
Dept: GENETICS | Facility: CLINIC | Age: 67
End: 2022-12-21
Payer: MEDICARE

## 2022-12-21 ENCOUNTER — TELEPHONE (OUTPATIENT)
Dept: HEMATOLOGY/ONCOLOGY | Facility: CLINIC | Age: 67
End: 2022-12-21
Payer: MEDICARE

## 2022-12-21 DIAGNOSIS — C50.611 MALIGNANT NEOPLASM OF AXILLARY TAIL OF RIGHT FEMALE BREAST, UNSPECIFIED ESTROGEN RECEPTOR STATUS: Primary | ICD-10-CM

## 2022-12-22 ENCOUNTER — TELEPHONE (OUTPATIENT)
Dept: GENETICS | Facility: CLINIC | Age: 67
End: 2022-12-22
Payer: MEDICARE

## 2022-12-22 NOTE — TELEPHONE ENCOUNTER
Spoke with pt and scheduled an appt on 9/7/23 at 930am with Dr. Quiles. Pt verbalized understanding.

## 2022-12-28 ENCOUNTER — HOSPITAL ENCOUNTER (OUTPATIENT)
Dept: RADIOLOGY | Facility: HOSPITAL | Age: 67
Discharge: HOME | End: 2022-12-28
Attending: NURSE PRACTITIONER
Payer: MEDICARE

## 2022-12-28 DIAGNOSIS — M80.00XA AGE-RELATED OSTEOPOROSIS WITH CURRENT PATHOLOGICAL FRACTURE, INITIAL ENCOUNTER: ICD-10-CM

## 2022-12-28 PROCEDURE — 77080 DXA BONE DENSITY AXIAL: CPT | Mod: TC,PO

## 2022-12-28 PROCEDURE — 77080 DEXA BONE DENSITY SPINE HIP: ICD-10-PCS | Mod: 26,,, | Performed by: RADIOLOGY

## 2022-12-28 PROCEDURE — 77080 DXA BONE DENSITY AXIAL: CPT | Mod: 26,,, | Performed by: RADIOLOGY

## 2022-12-29 ENCOUNTER — INFUSION (OUTPATIENT)
Dept: INFUSION THERAPY | Facility: HOSPITAL | Age: 67
End: 2022-12-29
Attending: INTERNAL MEDICINE
Payer: MEDICARE

## 2022-12-29 ENCOUNTER — PATIENT MESSAGE (OUTPATIENT)
Dept: HEMATOLOGY/ONCOLOGY | Facility: CLINIC | Age: 67
End: 2022-12-29
Payer: MEDICARE

## 2022-12-29 VITALS
WEIGHT: 129.19 LBS | TEMPERATURE: 98 F | SYSTOLIC BLOOD PRESSURE: 150 MMHG | OXYGEN SATURATION: 99 % | HEART RATE: 67 BPM | RESPIRATION RATE: 17 BRPM | BODY MASS INDEX: 20.24 KG/M2 | DIASTOLIC BLOOD PRESSURE: 82 MMHG

## 2022-12-29 DIAGNOSIS — M80.00XA AGE-RELATED OSTEOPOROSIS WITH CURRENT PATHOLOGICAL FRACTURE, INITIAL ENCOUNTER: Primary | ICD-10-CM

## 2022-12-29 DIAGNOSIS — Z85.3 HX OF BREAST CANCER: ICD-10-CM

## 2022-12-29 DIAGNOSIS — C50.012 MALIGNANT NEOPLASM OF NIPPLE OF LEFT BREAST IN FEMALE, UNSPECIFIED ESTROGEN RECEPTOR STATUS: ICD-10-CM

## 2022-12-29 PROCEDURE — 63600175 PHARM REV CODE 636 W HCPCS: Mod: JG | Performed by: INTERNAL MEDICINE

## 2022-12-29 PROCEDURE — 96372 THER/PROPH/DIAG INJ SC/IM: CPT

## 2022-12-29 RX ADMIN — DENOSUMAB 60 MG: 60 INJECTION SUBCUTANEOUS at 01:12

## 2022-12-29 NOTE — PLAN OF CARE
Problem: Fatigue  Goal: Improved Activity Tolerance  Outcome: Ongoing, Progressing  Intervention: Promote Improved Energy  Flowsheets (Taken 12/29/2022 1318)  Fatigue Management:   activity schedule adjusted   frequent rest breaks encouraged   paced activity encouraged  Activity Management: Ambulated -L4

## 2023-01-04 ENCOUNTER — PATIENT MESSAGE (OUTPATIENT)
Dept: OBSTETRICS AND GYNECOLOGY | Facility: CLINIC | Age: 68
End: 2023-01-04

## 2023-01-04 ENCOUNTER — OFFICE VISIT (OUTPATIENT)
Dept: OBSTETRICS AND GYNECOLOGY | Facility: CLINIC | Age: 68
End: 2023-01-04
Payer: MEDICARE

## 2023-01-04 VITALS
WEIGHT: 127.19 LBS | DIASTOLIC BLOOD PRESSURE: 60 MMHG | SYSTOLIC BLOOD PRESSURE: 122 MMHG | BODY MASS INDEX: 19.96 KG/M2 | HEIGHT: 67 IN

## 2023-01-04 DIAGNOSIS — Z12.4 CERVICAL CANCER SCREENING: ICD-10-CM

## 2023-01-04 DIAGNOSIS — Z01.419 ENCOUNTER FOR ANNUAL ROUTINE GYNECOLOGICAL EXAMINATION: Primary | ICD-10-CM

## 2023-01-04 DIAGNOSIS — N81.11 CYSTOCELE, MIDLINE: ICD-10-CM

## 2023-01-04 PROCEDURE — G0101 CA SCREEN;PELVIC/BREAST EXAM: HCPCS | Mod: S$GLB,,, | Performed by: OBSTETRICS & GYNECOLOGY

## 2023-01-04 PROCEDURE — 3074F SYST BP LT 130 MM HG: CPT | Mod: CPTII,S$GLB,, | Performed by: OBSTETRICS & GYNECOLOGY

## 2023-01-04 PROCEDURE — 99212 PR OFFICE/OUTPT VISIT, EST, LEVL II, 10-19 MIN: ICD-10-PCS | Mod: 25,S$GLB,, | Performed by: OBSTETRICS & GYNECOLOGY

## 2023-01-04 PROCEDURE — 3288F FALL RISK ASSESSMENT DOCD: CPT | Mod: CPTII,S$GLB,, | Performed by: OBSTETRICS & GYNECOLOGY

## 2023-01-04 PROCEDURE — 3288F PR FALLS RISK ASSESSMENT DOCUMENTED: ICD-10-PCS | Mod: CPTII,S$GLB,, | Performed by: OBSTETRICS & GYNECOLOGY

## 2023-01-04 PROCEDURE — 1125F AMNT PAIN NOTED PAIN PRSNT: CPT | Mod: CPTII,S$GLB,, | Performed by: OBSTETRICS & GYNECOLOGY

## 2023-01-04 PROCEDURE — 88142 CYTOPATH C/V THIN LAYER: CPT | Performed by: OBSTETRICS & GYNECOLOGY

## 2023-01-04 PROCEDURE — 3074F PR MOST RECENT SYSTOLIC BLOOD PRESSURE < 130 MM HG: ICD-10-PCS | Mod: CPTII,S$GLB,, | Performed by: OBSTETRICS & GYNECOLOGY

## 2023-01-04 PROCEDURE — G0101 PR CA SCREEN;PELVIC/BREAST EXAM: ICD-10-PCS | Mod: S$GLB,,, | Performed by: OBSTETRICS & GYNECOLOGY

## 2023-01-04 PROCEDURE — 3078F DIAST BP <80 MM HG: CPT | Mod: CPTII,S$GLB,, | Performed by: OBSTETRICS & GYNECOLOGY

## 2023-01-04 PROCEDURE — 1101F PR PT FALLS ASSESS DOC 0-1 FALLS W/OUT INJ PAST YR: ICD-10-PCS | Mod: CPTII,S$GLB,, | Performed by: OBSTETRICS & GYNECOLOGY

## 2023-01-04 PROCEDURE — 1125F PR PAIN SEVERITY QUANTIFIED, PAIN PRESENT: ICD-10-PCS | Mod: CPTII,S$GLB,, | Performed by: OBSTETRICS & GYNECOLOGY

## 2023-01-04 PROCEDURE — 1159F MED LIST DOCD IN RCRD: CPT | Mod: CPTII,S$GLB,, | Performed by: OBSTETRICS & GYNECOLOGY

## 2023-01-04 PROCEDURE — 99999 PR PBB SHADOW E&M-EST. PATIENT-LVL III: ICD-10-PCS | Mod: PBBFAC,,, | Performed by: OBSTETRICS & GYNECOLOGY

## 2023-01-04 PROCEDURE — 1101F PT FALLS ASSESS-DOCD LE1/YR: CPT | Mod: CPTII,S$GLB,, | Performed by: OBSTETRICS & GYNECOLOGY

## 2023-01-04 PROCEDURE — 3008F BODY MASS INDEX DOCD: CPT | Mod: CPTII,S$GLB,, | Performed by: OBSTETRICS & GYNECOLOGY

## 2023-01-04 PROCEDURE — 99212 OFFICE O/P EST SF 10 MIN: CPT | Mod: 25,S$GLB,, | Performed by: OBSTETRICS & GYNECOLOGY

## 2023-01-04 PROCEDURE — 3008F PR BODY MASS INDEX (BMI) DOCUMENTED: ICD-10-PCS | Mod: CPTII,S$GLB,, | Performed by: OBSTETRICS & GYNECOLOGY

## 2023-01-04 PROCEDURE — 99999 PR PBB SHADOW E&M-EST. PATIENT-LVL III: CPT | Mod: PBBFAC,,, | Performed by: OBSTETRICS & GYNECOLOGY

## 2023-01-04 PROCEDURE — 3078F PR MOST RECENT DIASTOLIC BLOOD PRESSURE < 80 MM HG: ICD-10-PCS | Mod: CPTII,S$GLB,, | Performed by: OBSTETRICS & GYNECOLOGY

## 2023-01-04 PROCEDURE — 1159F PR MEDICATION LIST DOCUMENTED IN MEDICAL RECORD: ICD-10-PCS | Mod: CPTII,S$GLB,, | Performed by: OBSTETRICS & GYNECOLOGY

## 2023-01-04 PROCEDURE — 87624 HPV HI-RISK TYP POOLED RSLT: CPT | Performed by: OBSTETRICS & GYNECOLOGY

## 2023-01-04 NOTE — PROGRESS NOTES
"Chief Complaint   Patient presents with    Well Woman     Has not been seen in awhile--has been dealing with breast cancer.  Feels pressure of possible prolapse--had surgery several years ago for prolapse       History and Physical:  Ashlie Redman is a 67 y.o. F who presents today for her routine annual GYN exam. The patient has no Gynecology complaints.    H/o prior "bladder lift"  C/o pressure  C/o weight loss & muscle loss    Annual:   Last Pap: 2017 NILM/HPVneg  History of abnormal pap: never  Last Mammogram: h/o Breast cancer; 9/2022 BRIADS 2; Followed by Oncology, scan q6 months  Colonosocpy: 12/2020, repeat 5 years 2025,Family Hx colon cancer  DEXA: 12/2022 Osteopenia; history of Osteoporosis. She is on Prolina.   PCP: Kevin Gong MD orders routine labs 12/2022    Allergies:   Review of patient's allergies indicates:   Allergen Reactions    Bactrim [sulfamethoxazole-trimethoprim] Rash     Fever, vomiting     Past Medical History:   Diagnosis Date    Allergy     Anemia     Asteroid hyalosis of both eyes     Basal cell carcinoma     L. dorsal forearm     Breast cancer 04/2017    Left    Cataract     Diverticulosis     Encounter for blood transfusion     Glaucoma     High myopia     Osteopenia     Osteoporosis     PONV (postoperative nausea and vomiting)     S/P dilation and curettage      Past Surgical History:   Procedure Laterality Date    ANGIOGRAM, CORONARY, WITH LEFT HEART CATHETERIZATION Left 9/2/2021    Procedure: ANGIOGRAM,CORONARY,WITH LEFT HEART CATHETERIZATION;  Surgeon: Rubin Hui MD;  Location: [facility] CATH/EP LAB;  Service: Cardiology;  Laterality: Left;    breast augmentation  03/2013    BREAST BIOPSY Left 04/2017    Core bx,+ cancer    BREAST CAPSULECTOMY Left 8/5/2019    Procedure: CAPSULECTOMY, BREAST;  Surgeon: Diego Modi MD;  Location: [facility] OR;  Service: General;  Laterality: Left;    BREAST LUMPECTOMY Left 10/25/2017    BREAST RECONSTRUCTION Bilateral 8/21/2019    Procedure: " RECONSTRUCTION, BREAST WITH ALLODERM;  Surgeon: Diego Modi MD;  Location: [facility];  Service: General;  Laterality: Bilateral;    BREAST RECONSTRUCTION Left     BREAST REVISION SURGERY Bilateral 8/5/2019    Procedure: BREAST REVISION SURGERY;  Surgeon: Diego Modi MD;  Location: [facility];  Service: General;  Laterality: Bilateral;    BREAST REVISION SURGERY Left 4/5/2021    Procedure: BREAST REVISION SURGERY - LEFT BREAST RECONSTRUCTION REVISION;  Surgeon: Diego Modi MD;  Location: [facility];  Service: General;  Laterality: Left;    CATARACT EXTRACTION W/  INTRAOCULAR LENS IMPLANT Bilateral 2009    COLONOSCOPY  4/2009, 2015    repeat in 5 years    COLONOSCOPY N/A 12/22/2020    Procedure: COLONOSCOPY;  Surgeon: Praneeth Leblanc MD;  Location: [facility];  Service: Endoscopy;  Laterality: N/A;    CYSTOCELE REPAIR N/A 11/20/2018    Procedure: REPAIR, CYSTOCELE;  Surgeon: Rebecca Acosta MD;  Location: [facility] OR 2ND FLR;  Service: Urology;  Laterality: N/A;  1.5 hours    CYSTOSCOPY N/A 11/20/2018    Procedure: CYSTOSCOPY;  Surgeon: Rebecca Acosta MD;  Location: [facility] OR 2ND FLR;  Service: Urology;  Laterality: N/A;    DILATION AND CURETTAGE OF UTERUS      ECTOPIC PREGNANCY SURGERY      EYE SURGERY Bilateral 3/09    Vitrectomy     MASTECTOMY Left     MASTOPEXY Right 4/5/2021    Procedure: MASTOPEXY - RIGHT MASTOPEXY LIPOSUCTION WAIST AND THIGHS, FAT TRANSFER TO BREAST;  Surgeon: Diego Modi MD;  Location: [facility];  Service: General;  Laterality: Right;    POLYPECTOMY      x3 2001    RECONSTRUCTION OF BREAST WITH DEEP INFERIOR EPIGASTRIC ARTERY  (SUSAN) FLAP Left 10/26/2020    Procedure: RECONSTRUCTION, BREAST, USING SUSAN SKIN FLAP - LEFT BREAST RECONSTRUCTION WITH STACKED SUSAN FREE FLAPS.;  Surgeon: Diego Modi MD;  Location: [facility];  Service: General;  Laterality: Left;    REMOVAL OF BREAST IMPLANT Bilateral 8/5/2019    Procedure: REMOVAL, IMPLANT, BREAST;  Surgeon: Diego ARROYO  MD Ly;  Location: [facility] OR;  Service: General;  Laterality: Bilateral;    REMOVAL OF BREAST IMPLANT Left 2019    Procedure: REMOVAL, IMPLANT, BREAST;  Surgeon: Diego Modi MD;  Location: [facility] OR;  Service: General;  Laterality: Left;    REMOVAL OF BREAST IMPLANT Right 10/26/2020    Procedure: REMOVAL, IMPLANT, BREAST -   RIGHT BREAST IMPLANT REMOVAL;  Surgeon: Diego Modi MD;  Location: [facility] OR;  Service: General;  Laterality: Right;    Yag Capsulotomy Bilateral 2014     MEDS:   Current Outpatient Medications on File Prior to Visit   Medication Sig Dispense Refill    calcium carb/vit D3/minerals (CALCIUM-VITAMIN D ORAL)       denosumab (PROLIA) 60 mg/mL Syrg Inject 60 mg into the skin.      dorzolamide (TRUSOPT) 2 % ophthalmic solution INSTILL 1 DROP INTO BOTH EYES 3 TIMES A DAY 30 mL 4    latanoprost 0.005 % ophthalmic solution INSTILL 1 DROP INTO BOTH EYES IN THE EVENING 7.5 mL 3    valACYclovir (VALTREX) 500 MG tablet Take 1 tablet (500 mg total) by mouth once daily. 90 tablet 1    phenazopyridine (PYRIDIUM) 200 MG tablet Take 1 tablet (200 mg total) by mouth 3 (three) times daily as needed for Pain. (Patient not taking: Reported on 2023) 6 tablet 0     Current Facility-Administered Medications on File Prior to Visit   Medication Dose Route Frequency Provider Last Rate Last Admin    LIDOcaine-EPINEPHrine 1%-1:100,000 30 mL, EPINEPHrine 2 mg in lactated Ringers 1,000 mL irrigation   Irrigation On Call Procedure Diego Modi MD        LIDOcaine-EPINEPHrine 1%-1:100,000 30 mL, EPINEPHrine 2 mg in lactated Ringers 1,000 mL irrigation   Irrigation On Call Procedure Diego Modi MD         OB History          5    Para   4    Term   2       2    AB   1    Living   2         SAB        IAB        Ectopic   1    Multiple        Live Births   2               Social History     Socioeconomic History    Marital status: Single    Number of children: 2   Tobacco Use     Smoking status: Never    Smokeless tobacco: Never   Substance and Sexual Activity    Alcohol use: Yes     Alcohol/week: 1.0 standard drink     Types: 1 Glasses of wine per week     Comment: daily-kayden    Drug use: No    Sexual activity: Not Currently     Partners: Male     Birth control/protection: Post-menopausal   Other Topics Concern    Are you pregnant or think you may be? No     Social Determinants of Health     Financial Resource Strain: Low Risk     Difficulty of Paying Living Expenses: Not very hard   Food Insecurity: Food Insecurity Present    Worried About Running Out of Food in the Last Year: Sometimes true    Ran Out of Food in the Last Year: Sometimes true   Transportation Needs: No Transportation Needs    Lack of Transportation (Medical): No    Lack of Transportation (Non-Medical): No   Physical Activity: Sufficiently Active    Days of Exercise per Week: 6 days    Minutes of Exercise per Session: 40 min   Stress: No Stress Concern Present    Feeling of Stress : Only a little   Social Connections: Unknown    Frequency of Communication with Friends and Family: Once a week    Frequency of Social Gatherings with Friends and Family: Once a week    Active Member of Clubs or Organizations: Yes    Attends Club or Organization Meetings: More than 4 times per year    Marital Status:    Housing Stability: Low Risk     Unable to Pay for Housing in the Last Year: No    Number of Places Lived in the Last Year: 1    Unstable Housing in the Last Year: No     Family History   Problem Relation Age of Onset    Cancer Sister         rectal; passed    Breast cancer Cousin     Cancer Cousin         breast    Heart disease Father         passed    Colon cancer Mother         hospice    Pancreatic cancer Mother         39yo; 91yo    Cataracts Mother     Hypertension Mother     Thyroid disease Mother     Cancer Mother         colon    Glaucoma Maternal Grandmother     Amblyopia Neg Hx     Blindness Neg Hx     Diabetes  "Neg Hx     Macular degeneration Neg Hx     Retinal detachment Neg Hx     Strabismus Neg Hx     Stroke Neg Hx        Past medical and surgical history reviewed.   I have reviewed the patient's medical history in detail and updated the computerized patient record.    Review of System:   General: no chills, fever, night sweats, weight gain or weight loss  Breasts: no new or changing breast lumps, nipple discharge or masses.  Gastrointestinal: no abdominal pain, change in bowel habits, or black or bloody stools  Genito-Urinary: no incontinence, urinary frequency/urgency or vulvar/vaginal symptoms, pelvic pain or abnormal vaginal bleeding.    Physical Exam:   /60   Ht 5' 7" (1.702 m)   Wt 57.7 kg (127 lb 3.3 oz)   BMI 19.92 kg/m²   Body mass index is 19.92 kg/m².  Constitutional: She appears alert and responsive. She appears well-developed, well-groomed, and well-nourished. No distress.  Breasts: are symmetrical.  Right breast exhibits no inverted nipple, no mass, no nipple discharge, no skin change and no tenderness.  Left breast exhibits no inverted nipple, no mass, no nipple discharge, no skin change and no tenderness.  Abdominal: Soft. She exhibits no distension, hernias or masses. There is no tenderness. No enlargement of liver edge or spleen.  There is no rebound and no guarding.   Genitourinary:    External rectal exam shows no thrombosed external hemorrhoids, no lesions.     Pelvic exam was performed with patient supine.   No labial fusion, and symmetrical.    There is no rash, lesion or injury on the right labia.   There is no rash, lesion or injury on the left labia.   No bleeding and no signs of injury around the vaginal introitus, urethral meatus is normal size and without prolapse or lesions, urethra well supported. The cervix is visualized with no discharge, lesions or friability.   No vaginal discharge found.    No significant Enterocele or rectocele, and cervix and uterus well " supported.  Cystocele noted   Bimanual exam:   The urethra is normal to palpation and there are no palpable vaginal wall masses.   Uterus is not deviated, not enlarged, not fixed, normal shape and not tender.   Cervix exhibits no motion tenderness.    Right adnexum displays no mass or nodularity and no tenderness.   Left adnexum displays no mass or nodularity and no tenderness.    Assessment/Plan:   Encounter for annual routine gynecological examination    Cervical cancer screening  -     Liquid-Based Pap Smear, Screening  -     HPV High Risk Genotypes, PCR    Cystocele, midline  -     Ambulatory referral/consult to Urogynecology; Future; Expected date: 01/11/2023    Discussed pessary vs surgery. Referral sent to UroGyn for consult.   Follow up in 1 year.

## 2023-01-10 LAB
FINAL PATHOLOGIC DIAGNOSIS: NORMAL
HPV HR 12 DNA SPEC QL NAA+PROBE: NEGATIVE
HPV16 AG SPEC QL: NEGATIVE
HPV18 DNA SPEC QL NAA+PROBE: NEGATIVE
Lab: NORMAL

## 2023-01-11 ENCOUNTER — PATIENT MESSAGE (OUTPATIENT)
Dept: HEMATOLOGY/ONCOLOGY | Facility: CLINIC | Age: 68
End: 2023-01-11
Payer: MEDICARE

## 2023-01-24 ENCOUNTER — TELEPHONE (OUTPATIENT)
Dept: GENETICS | Facility: CLINIC | Age: 68
End: 2023-01-24
Payer: MEDICARE

## 2023-01-24 NOTE — TELEPHONE ENCOUNTER
Contacted patient in regards to today's genetics appointment. Discused that she previously had genetic testing that consisted of genes related to the common hereditary cancers (including breast and colon cancer). She was unaware that the test included other genes besides the BRCA1 and BRCA2 genes. She was concerned about Severino syndrome given her family history of colon cancer. Explained that her results were negative and the meaning of a negative result. Also explained that her breast cancer is not considered hereditary at this time. It is likely due to due to other factors.    Informed patient that her results from 2019 are in her chart. Encouraged her to reach out to the genetics department if she has any questions.

## 2023-02-24 ENCOUNTER — PES CALL (OUTPATIENT)
Dept: ADMINISTRATIVE | Facility: CLINIC | Age: 68
End: 2023-02-24
Payer: MEDICARE

## 2023-03-03 ENCOUNTER — OFFICE VISIT (OUTPATIENT)
Dept: PRIMARY CARE CLINIC | Facility: CLINIC | Age: 68
End: 2023-03-03
Payer: MEDICARE

## 2023-03-03 VITALS
HEART RATE: 74 BPM | TEMPERATURE: 98 F | DIASTOLIC BLOOD PRESSURE: 90 MMHG | RESPIRATION RATE: 20 BRPM | WEIGHT: 127.19 LBS | OXYGEN SATURATION: 99 % | BODY MASS INDEX: 19.96 KG/M2 | HEIGHT: 67 IN | SYSTOLIC BLOOD PRESSURE: 150 MMHG

## 2023-03-03 DIAGNOSIS — I89.0 LYMPHEDEMA: ICD-10-CM

## 2023-03-03 DIAGNOSIS — G62.0 CHEMOTHERAPY-INDUCED PERIPHERAL NEUROPATHY: ICD-10-CM

## 2023-03-03 DIAGNOSIS — Z76.89 ENCOUNTER TO ESTABLISH CARE: Primary | ICD-10-CM

## 2023-03-03 DIAGNOSIS — I25.10 CORONARY ARTERY DISEASE, UNSPECIFIED VESSEL OR LESION TYPE, UNSPECIFIED WHETHER ANGINA PRESENT, UNSPECIFIED WHETHER NATIVE OR TRANSPLANTED HEART: ICD-10-CM

## 2023-03-03 DIAGNOSIS — H40.003 GLAUCOMA SUSPECT OF BOTH EYES: ICD-10-CM

## 2023-03-03 DIAGNOSIS — Z85.3 PERSONAL HISTORY OF MALIGNANT NEOPLASM OF BREAST: ICD-10-CM

## 2023-03-03 DIAGNOSIS — R63.4 WEIGHT LOSS: ICD-10-CM

## 2023-03-03 DIAGNOSIS — G62.9 NEUROPATHY: ICD-10-CM

## 2023-03-03 DIAGNOSIS — D72.810 LYMPHOCYTOPENIA: ICD-10-CM

## 2023-03-03 DIAGNOSIS — M80.00XA AGE-RELATED OSTEOPOROSIS WITH CURRENT PATHOLOGICAL FRACTURE, INITIAL ENCOUNTER: ICD-10-CM

## 2023-03-03 DIAGNOSIS — E53.8 VITAMIN B12 DEFICIENCY: ICD-10-CM

## 2023-03-03 DIAGNOSIS — I25.110 ATHEROSCLEROSIS OF NATIVE CORONARY ARTERY OF NATIVE HEART WITH UNSTABLE ANGINA PECTORIS: ICD-10-CM

## 2023-03-03 DIAGNOSIS — T45.1X5A CHEMOTHERAPY-INDUCED PERIPHERAL NEUROPATHY: ICD-10-CM

## 2023-03-03 DIAGNOSIS — R33.9 INCOMPLETE EMPTYING OF BLADDER: ICD-10-CM

## 2023-03-03 DIAGNOSIS — D69.2 SENILE PURPURA: ICD-10-CM

## 2023-03-03 DIAGNOSIS — Z78.9 VEGETARIAN: ICD-10-CM

## 2023-03-03 PROCEDURE — 3080F PR MOST RECENT DIASTOLIC BLOOD PRESSURE >= 90 MM HG: ICD-10-PCS | Mod: CPTII,S$GLB,, | Performed by: INTERNAL MEDICINE

## 2023-03-03 PROCEDURE — 3008F PR BODY MASS INDEX (BMI) DOCUMENTED: ICD-10-PCS | Mod: CPTII,S$GLB,, | Performed by: INTERNAL MEDICINE

## 2023-03-03 PROCEDURE — 3077F PR MOST RECENT SYSTOLIC BLOOD PRESSURE >= 140 MM HG: ICD-10-PCS | Mod: CPTII,S$GLB,, | Performed by: INTERNAL MEDICINE

## 2023-03-03 PROCEDURE — 99215 OFFICE O/P EST HI 40 MIN: CPT | Mod: S$GLB,,, | Performed by: INTERNAL MEDICINE

## 2023-03-03 PROCEDURE — 1126F PR PAIN SEVERITY QUANTIFIED, NO PAIN PRESENT: ICD-10-PCS | Mod: CPTII,S$GLB,, | Performed by: INTERNAL MEDICINE

## 2023-03-03 PROCEDURE — 99999 PR PBB SHADOW E&M-EST. PATIENT-LVL IV: CPT | Mod: PBBFAC,,, | Performed by: INTERNAL MEDICINE

## 2023-03-03 PROCEDURE — 1158F ADVNC CARE PLAN TLK DOCD: CPT | Mod: CPTII,S$GLB,, | Performed by: INTERNAL MEDICINE

## 2023-03-03 PROCEDURE — 1101F PT FALLS ASSESS-DOCD LE1/YR: CPT | Mod: CPTII,S$GLB,, | Performed by: INTERNAL MEDICINE

## 2023-03-03 PROCEDURE — 3288F FALL RISK ASSESSMENT DOCD: CPT | Mod: CPTII,S$GLB,, | Performed by: INTERNAL MEDICINE

## 2023-03-03 PROCEDURE — 1160F RVW MEDS BY RX/DR IN RCRD: CPT | Mod: CPTII,S$GLB,, | Performed by: INTERNAL MEDICINE

## 2023-03-03 PROCEDURE — 3080F DIAST BP >= 90 MM HG: CPT | Mod: CPTII,S$GLB,, | Performed by: INTERNAL MEDICINE

## 2023-03-03 PROCEDURE — 1126F AMNT PAIN NOTED NONE PRSNT: CPT | Mod: CPTII,S$GLB,, | Performed by: INTERNAL MEDICINE

## 2023-03-03 PROCEDURE — 1158F PR ADVANCE CARE PLANNING DISCUSS DOCUMENTED IN MEDICAL RECORD: ICD-10-PCS | Mod: CPTII,S$GLB,, | Performed by: INTERNAL MEDICINE

## 2023-03-03 PROCEDURE — 1160F PR REVIEW ALL MEDS BY PRESCRIBER/CLIN PHARMACIST DOCUMENTED: ICD-10-PCS | Mod: CPTII,S$GLB,, | Performed by: INTERNAL MEDICINE

## 2023-03-03 PROCEDURE — 1159F MED LIST DOCD IN RCRD: CPT | Mod: CPTII,S$GLB,, | Performed by: INTERNAL MEDICINE

## 2023-03-03 PROCEDURE — 99999 PR PBB SHADOW E&M-EST. PATIENT-LVL IV: ICD-10-PCS | Mod: PBBFAC,,, | Performed by: INTERNAL MEDICINE

## 2023-03-03 PROCEDURE — 1159F PR MEDICATION LIST DOCUMENTED IN MEDICAL RECORD: ICD-10-PCS | Mod: CPTII,S$GLB,, | Performed by: INTERNAL MEDICINE

## 2023-03-03 PROCEDURE — 3008F BODY MASS INDEX DOCD: CPT | Mod: CPTII,S$GLB,, | Performed by: INTERNAL MEDICINE

## 2023-03-03 PROCEDURE — 99215 PR OFFICE/OUTPT VISIT, EST, LEVL V, 40-54 MIN: ICD-10-PCS | Mod: S$GLB,,, | Performed by: INTERNAL MEDICINE

## 2023-03-03 PROCEDURE — 3288F PR FALLS RISK ASSESSMENT DOCUMENTED: ICD-10-PCS | Mod: CPTII,S$GLB,, | Performed by: INTERNAL MEDICINE

## 2023-03-03 PROCEDURE — 1101F PR PT FALLS ASSESS DOC 0-1 FALLS W/OUT INJ PAST YR: ICD-10-PCS | Mod: CPTII,S$GLB,, | Performed by: INTERNAL MEDICINE

## 2023-03-03 PROCEDURE — 3077F SYST BP >= 140 MM HG: CPT | Mod: CPTII,S$GLB,, | Performed by: INTERNAL MEDICINE

## 2023-03-03 NOTE — PROGRESS NOTES
[facility]      CHIEF COMPLAINT     Chief Complaint   Patient presents with    Establish Care     Pt has lost weight 10#, in past 6 months and worried about this, pt sees oncologist regularly, wondering about thyroid.  Also, arthritis in between joints of spine.        HPI     Ashlie Redman is a 67 y.o.  female who presents with a PMHx of  allergy, anemia, history of breast cancer, glaucoma, osteopenia, who presents today to establish care.     Her main concerns and complaints are: recent 10 pound weight loss in 6 months. Does admit that she was walking her dogs a bit  more last June which is when she began to lose wieght. But will check TSH anyway as this has not been done in some time    History of breast cancer:  Treated with radiation, mastectomy and chemotehrapy. Still follows with oncologist. Had a PET CT done which was normal and reassuring.     Osteoporosis: last BMD done in 2022 showed osteopenia with extremely high FRAX score . On prolia. Which has significantly imrpoved bone health.     CAD: went to the hospital a while ago and had a cardiac cath which showed nononstructing CAD. Recommended statin and ASA, patient declines both. Her LDL is at goal. Note FH of early cardiac death. Discussed this as well as a reason to reconsider statin and ASA. She does eat very well as a vegetarian and she is a lifelong excercisier and stays very very healthy so this is to her benefit.     Neuropathy with macrocytosis: likely B12 deficiency. Will check but have advised her to start taking vitamin B12.         CHRONIC HEALTH ISSUES:   Past Medical History:  Past Medical History:   Diagnosis Date    Allergy     Anemia     Arthritis     back    Asteroid hyalosis of both eyes     Basal cell carcinoma     L. dorsal forearm     Breast cancer 04/2017    Left    Cataract     Diverticulosis     Encounter for blood transfusion     Glaucoma     High myopia     Osteopenia     Osteoporosis     PONV  "(postoperative nausea and vomiting)     S/P dilation and curettage          Geriatric Assessment    ADLs : independent  iADLs: independent     Polypharmacy:    Visual impairments: no changes. But with glaucoma     Hearing impairment: no changes.     Urinary incontinence: no     Malnutrition: no     Gait/balance/falls: no falls, no balance or gait problems.     Depression: PHQ2. No depression. No anxiety.     Sleep: sleeps very well. No trouble falling asleep or staying asleep.     Cognitive Problems: minicog.5/5. "chemo fog" . Aunt with alzheimers.     Environmental/social problems: lives alone with dogs. Safe at home. Guns, locked away. Rugs at home. +      Functional status:     Support system:     Advanced care planning:  does not have on file. But discussed at length today and she will fill out the paperwork and return it to clinic to be scanned in.    Date: 03/03/2023    Power of   I initiated the process of advance care planning today and explained the importance of this process to the patient.  I introduced the concept of advance directives to the patient, as well. Then the patient received detailed information about the importance of designating a Health Care Power of  (HCPOA). She was also instructed to communicate with this person about their wishes for future healthcare, should she become sick and lose decision-making capacity. The patient has not previously appointed a HCPOA. After our discussion, the patient has decided to complete a HCPOA and has appointed her daughter, health care agent:  name on file  & health care agent number:  on file  I spent a total time of 10 minutes discussing this issue with the patient.              Worry score 1,    Past Surgical History:  Past Surgical History:   Procedure Laterality Date    ANGIOGRAM, CORONARY, WITH LEFT HEART CATHETERIZATION Left 09/02/2021    Procedure: ANGIOGRAM,CORONARY,WITH LEFT HEART CATHETERIZATION;  Surgeon: Rubin Hui MD;  " Location: [facility] CATH/EP LAB;  Service: Cardiology;  Laterality: Left;    bladder lift  2018    [facility]    breast augmentation  03/2013    BREAST BIOPSY Left 04/2017    Core bx,+ cancer    BREAST CAPSULECTOMY Left 08/05/2019    Procedure: CAPSULECTOMY, BREAST;  Surgeon: Diego Modi MD;  [facility];  Service: General;  Laterality: Left;    BREAST LUMPECTOMY Left 10/25/2017    BREAST RECONSTRUCTION Bilateral 08/21/2019    Procedure: RECONSTRUCTION, BREAST WITH ALLODERM;  Surgeon: Diego Modi MD;  Location: [facility];  Service: General;  Laterality: Bilateral;    BREAST RECONSTRUCTION Left     BREAST REVISION SURGERY Bilateral 08/05/2019    Procedure: BREAST REVISION SURGERY;  Surgeon: Diego Modi MD;  Location: [facility];  Service: General;  Laterality: Bilateral;    BREAST REVISION SURGERY Left 04/05/2021    Procedure: BREAST REVISION SURGERY - LEFT BREAST RECONSTRUCTION REVISION;  Surgeon: Diego Modi MD;  Location: [facility];  Service: General;  Laterality: Left;    CATARACT EXTRACTION W/  INTRAOCULAR LENS IMPLANT Bilateral 2009    COLONOSCOPY  4/2009, 2015    repeat in 5 years    COLONOSCOPY N/A 12/22/2020    Procedure: COLONOSCOPY;  Surgeon: Praneeth Leblanc MD;  Location: [facility];  Service: Endoscopy;  Laterality: N/A;    CYSTOCELE REPAIR N/A 11/20/2018    Procedure: REPAIR, CYSTOCELE;  Surgeon: Rebecca Acosta MD;  [address]  Service: Urology;  Laterality: N/A;  1.5 hours    CYSTOSCOPY N/A 11/20/2018    Procedure: CYSTOSCOPY;  Surgeon: Rebecca Acosta MD;  [address];  Service: Urology;  Laterality: N/A;    DILATION AND CURETTAGE OF UTERUS      ECTOPIC PREGNANCY SURGERY      EYE SURGERY Bilateral 03/2009    Vitrectomy     MASTECTOMY Left     MASTOPEXY Right 04/05/2021    Procedure: MASTOPEXY - RIGHT MASTOPEXY LIPOSUCTION WAIST AND THIGHS, FAT TRANSFER TO BREAST;  Surgeon: Diego Modi MD;  Location: [facility];  Service: General;   Laterality: Right;    POLYPECTOMY      x3 2001    RECONSTRUCTION OF BREAST WITH DEEP INFERIOR EPIGASTRIC ARTERY  (SUSAN) FLAP Left 10/26/2020    Procedure: RECONSTRUCTION, BREAST, USING SUSAN SKIN FLAP - LEFT BREAST RECONSTRUCTION WITH STACKED SUSAN FREE FLAPS.;  Surgeon: Diego Modi MD;  Location: [facility];  Service: General;  Laterality: Left;    REMOVAL OF BREAST IMPLANT Bilateral 08/05/2019    Procedure: REMOVAL, IMPLANT, BREAST;  Surgeon: Diego Modi MD;  Location: [facility] OR;  Service: General;  Laterality: Bilateral;    REMOVAL OF BREAST IMPLANT Left 08/30/2019    Procedure: REMOVAL, IMPLANT, BREAST;  Surgeon: Diego Modi MD;  Location: [facility] OR;  Service: General;  Laterality: Left;    REMOVAL OF BREAST IMPLANT Right 10/26/2020    Procedure: REMOVAL, IMPLANT, BREAST -   RIGHT BREAST IMPLANT REMOVAL;  Surgeon: Diego Modi MD;  Location: [facility];  Service: General;  Laterality: Right;    Yag Capsulotomy Bilateral 12/2014       Allergies:  Review of patient's allergies indicates:   Allergen Reactions    Bactrim [sulfamethoxazole-trimethoprim] Rash     Fever, vomiting       Home Medications:  Prior to Admission medications    Medication Sig Start Date End Date Taking? Authorizing Provider   calcium carb/vit D3/minerals (CALCIUM-VITAMIN D ORAL)  1/1/20   Historical Provider   denosumab (PROLIA) 60 mg/mL Syrg Inject 60 mg into the skin.    Historical Provider   dorzolamide (TRUSOPT) 2 % ophthalmic solution INSTILL 1 DROP INTO BOTH EYES 3 TIMES A DAY 11/28/22   Tyrone Jimenez Jr., MD   latanoprost 0.005 % ophthalmic solution INSTILL 1 DROP INTO BOTH EYES IN THE EVENING 1/7/22   Tyrone Jimenez Jr., MD   phenazopyridine (PYRIDIUM) 200 MG tablet Take 1 tablet (200 mg total) by mouth 3 (three) times daily as needed for Pain.  Patient not taking: Reported on 1/4/2023 12/12/22   Denton Armijo MD   valACYclovir (VALTREX) 500 MG tablet Take 1 tablet (500 mg total) by mouth once daily.  "2/10/22   Kevin Gong MD       Family History:  Family History   Problem Relation Age of Onset    Miscarriages / Stillbirths Mother     Hyperlipidemia Mother     Hearing loss Mother     Arthritis Mother     Colon cancer Mother         hospice    Cataracts Mother     Hypertension Mother     Thyroid disease Mother     Cancer Mother         colon    Hypertension Father     Hyperlipidemia Father     Heart disease Father         passed    Colon cancer Sister     Early death Sister     Cancer Sister         rectal; passed    Arthritis Brother     Mental retardation Brother     Learning disabilities Brother     Early death Brother     Birth defects Brother     Pancreatic cancer Maternal Grandmother     Glaucoma Maternal Grandmother     Breast cancer Cousin     Cancer Cousin         breast    Amblyopia Neg Hx     Blindness Neg Hx     Diabetes Neg Hx     Macular degeneration Neg Hx     Retinal detachment Neg Hx     Strabismus Neg Hx     Stroke Neg Hx        Social History:  Social History     Tobacco Use    Smoking status: Never    Smokeless tobacco: Never   Substance Use Topics    Alcohol use: Yes     Alcohol/week: 1.0 standard drink     Types: 1 Glasses of wine per week     Comment: daily-kayden    Drug use: No       Review of Systems:  ROS    Health Maintainence:   TDap is up to date, Influenza is up to date   Pneumovax is up to date.   Zostavax is not UTD.       Covid is not utd  Cancer Screening:  PAP: is up to date.   Mammogram: is up to date.   Colonoscopy: is up to date.   DEXA:  is up to date.   Screening:  Hepatitis C is up to date in pts born between 5201-9865. **    PHYSICAL EXAM     BP (!) 150/90 (BP Location: Right arm, Patient Position: Sitting, BP Method: Medium (Manual))   Pulse 74   Temp 97.8 °F (36.6 °C) (Oral)   Resp 20   Ht 5' 7" (1.702 m)   Wt 57.7 kg (127 lb 3.3 oz)   SpO2 99%   BMI 19.92 kg/m²     GEN - A+OX4, NAD   HEENT - PERRL, EOMI, OP clear. MMM.   Neck - No thyromegaly or cervical LAD. No " thyroid masses felt.  CV - RRR, no m/r   Chest - CTAB, no wheezing or rhonchi  Abd - S/NT/ND/+BS.   Ext - 2+BDP and radial pulses. No LE edema.   Neuro - PERRL, EOMI, no nystagmus, eyebrow raise, facial sensation, hearing, m of mastication, smile, palatal raise, shoulder shrug, tongue protrusion symmetric and intact. 5/5 BUE and BLE strength. Sensation to light touch intact throughout. 2+ DTRs. Normal gait.   MSK - No spinal tenderness to palpation. Normal gait.   Skin - No rash.    LABS     Previous labs reviewed.      ASSESSMENT/PLAN     Ashlie Redman is a 67 y.o. female with  1. Encounter to establish care  -medications reviewed and consolidated  -reviewed PMH, medications, HCM including vaccinations.   -reviewed most recent lab work. Reordered as needed. Discussed abnormalities with patient with time allowed for questions.   -reviewed clinic policy with patient including to arrive 15 mins before appointments, labs and results including expected turn around for results.   -outside records and consultants notes reviewed as time allowed. Updated treatment team with name of other providers.   -reviewed and confirmed patients contact information, preferred pharmacy etc.   -AVS provided after visit to summarized things discussed.   -The following assessments were completed:  Living Situation  CAGE  Depression Screening  Timed Get Up and Go  Cognitive Function Screening-Minicog   Nutrition Screening  ADL Screening      2. Lymphedema  Overview:  2/2 breast cancer treatment. Improved      3. Age-related osteoporosis with current pathological fracture, initial encounter  Overview:  Improved significantly with prolia. Continue  Continue with calcium and vitamin d  wieght bearing excercises continue.       4. Personal history of malignant neoplasm of breast  Overview:  Resolved. Follows with oncologist. Routine surveillance      5. Incomplete emptying of bladder  Overview:  Bladder prolapse. To see urogyn soon for lift  again.       6. Atherosclerosis of native coronary artery of native heart with unstable angina pectoris  Overview:  Declines statin and ASA.  LDL at goal  shes a vegetarian who already excercises a great deal. Continue  Note FH of early cardiac death      7. Glaucoma suspect of both eyes  Overview:  Follows very closely with ophthalmologist. continue      8. Neuropathy  Overview:  Chemotherapy induced neuropathy. Advised a b12 supplement also shes a vegetarian, likely b12 deficient      9. Chemotherapy-induced peripheral neuropathy  Overview:  Recommend a vitamin b complex       10. Senile purpura  Overview:    -benign skin condition. Often caused by the thin skin associated with aging.   -counseled patient on proper skin protection and moisturization and avoidance of skin trauma         11. Weight loss  Overview:  Likely due to increased activity last year   Will rule out medical with a thorough thyroid function work up. Though no other thyroid symptom    Orders:  -     TSH; Future; Expected date: 03/03/2023  -     T4, FREE; Future; Expected date: 03/03/2023  -     T3; Future; Expected date: 03/03/2023    12. Coronary artery disease, unspecified vessel or lesion type, unspecified whether angina present, unspecified whether native or transplanted heart    Declines statin and ASA.   Vegetarian who excercises a lot    13. Vegetarian  B12 supplement    14. Vitamin B12 deficiency  -     Vitamin B12; Future; Expected date: 03/03/2023    15. Lymphocytopenia  -     CBC Auto Differential; Future; Expected date: 03/03/2023         ACP: discussion had about ACP to include definition of ACP, HCP, CODE STATUS. Paperwork handed to patient, to review, discuss with family and return it to clinic to be scanned into the chart.   Advance Care Planning             My total time spent on this encounter was at least 60 minutes which included  the following activities: preparing to see the patient, performing a medically appropriate  and/or evaluation, counseling and educating the patient and family/caregiver, ordering medications, tests, or procedures, referring and communicating with other healthcare providers, documenting clinical information in the electronic or other health record. This time is independent and non-overlapping.         RTC in 6 months, sooner if needed and depending on labs.    Salma Pendleton MD  Board Certified Internist/Geriatrician  [facility] [place] [address] ([place])

## 2023-03-08 ENCOUNTER — PATIENT MESSAGE (OUTPATIENT)
Dept: PRIMARY CARE CLINIC | Facility: CLINIC | Age: 68
End: 2023-03-08
Payer: MEDICARE

## 2023-03-10 ENCOUNTER — PATIENT MESSAGE (OUTPATIENT)
Dept: PRIMARY CARE CLINIC | Facility: CLINIC | Age: 68
End: 2023-03-10
Payer: MEDICARE

## 2023-03-10 ENCOUNTER — LAB VISIT (OUTPATIENT)
Dept: LAB | Facility: HOSPITAL | Age: 68
End: 2023-03-10
Payer: MEDICARE

## 2023-03-10 DIAGNOSIS — R63.4 WEIGHT LOSS: ICD-10-CM

## 2023-03-10 DIAGNOSIS — D72.810 LYMPHOCYTOPENIA: ICD-10-CM

## 2023-03-10 DIAGNOSIS — E53.8 VITAMIN B12 DEFICIENCY: ICD-10-CM

## 2023-03-10 LAB
BASOPHILS # BLD AUTO: 0.04 K/UL (ref 0–0.2)
BASOPHILS NFR BLD: 0.9 % (ref 0–1.9)
DIFFERENTIAL METHOD: ABNORMAL
EOSINOPHIL # BLD AUTO: 0.1 K/UL (ref 0–0.5)
EOSINOPHIL NFR BLD: 3.1 % (ref 0–8)
ERYTHROCYTE [DISTWIDTH] IN BLOOD BY AUTOMATED COUNT: 11.7 % (ref 11.5–14.5)
HCT VFR BLD AUTO: 45.2 % (ref 37–48.5)
HGB BLD-MCNC: 14.3 G/DL (ref 12–16)
IMM GRANULOCYTES # BLD AUTO: 0.01 K/UL (ref 0–0.04)
IMM GRANULOCYTES NFR BLD AUTO: 0.2 % (ref 0–0.5)
LYMPHOCYTES # BLD AUTO: 1.3 K/UL (ref 1–4.8)
LYMPHOCYTES NFR BLD: 28.8 % (ref 18–48)
MCH RBC QN AUTO: 32.4 PG (ref 27–31)
MCHC RBC AUTO-ENTMCNC: 31.6 G/DL (ref 32–36)
MCV RBC AUTO: 102 FL (ref 82–98)
MONOCYTES # BLD AUTO: 0.4 K/UL (ref 0.3–1)
MONOCYTES NFR BLD: 9.5 % (ref 4–15)
NEUTROPHILS # BLD AUTO: 2.6 K/UL (ref 1.8–7.7)
NEUTROPHILS NFR BLD: 57.5 % (ref 38–73)
NRBC BLD-RTO: 0 /100 WBC
PLATELET # BLD AUTO: 232 K/UL (ref 150–450)
PMV BLD AUTO: 10.8 FL (ref 9.2–12.9)
RBC # BLD AUTO: 4.42 M/UL (ref 4–5.4)
T3 SERPL-MCNC: 98 NG/DL (ref 60–180)
T4 FREE SERPL-MCNC: 0.92 NG/DL (ref 0.71–1.51)
TSH SERPL DL<=0.005 MIU/L-ACNC: 1.54 UIU/ML (ref 0.4–4)
VIT B12 SERPL-MCNC: 776 PG/ML (ref 210–950)
WBC # BLD AUTO: 4.51 K/UL (ref 3.9–12.7)

## 2023-03-10 PROCEDURE — 36415 COLL VENOUS BLD VENIPUNCTURE: CPT | Mod: PO | Performed by: INTERNAL MEDICINE

## 2023-03-10 PROCEDURE — 82607 VITAMIN B-12: CPT | Performed by: INTERNAL MEDICINE

## 2023-03-10 PROCEDURE — 85025 COMPLETE CBC W/AUTO DIFF WBC: CPT | Performed by: INTERNAL MEDICINE

## 2023-03-10 PROCEDURE — 84439 ASSAY OF FREE THYROXINE: CPT | Performed by: INTERNAL MEDICINE

## 2023-03-10 PROCEDURE — 84480 ASSAY TRIIODOTHYRONINE (T3): CPT | Performed by: INTERNAL MEDICINE

## 2023-03-10 PROCEDURE — 84443 ASSAY THYROID STIM HORMONE: CPT | Performed by: INTERNAL MEDICINE

## 2023-03-13 ENCOUNTER — TELEPHONE (OUTPATIENT)
Dept: PRIMARY CARE CLINIC | Facility: CLINIC | Age: 68
End: 2023-03-13
Payer: MEDICARE

## 2023-03-13 ENCOUNTER — VITALS (OUTPATIENT)
Dept: PRIMARY CARE CLINIC | Facility: CLINIC | Age: 68
End: 2023-03-13
Payer: MEDICARE

## 2023-03-13 VITALS — DIASTOLIC BLOOD PRESSURE: 64 MMHG | SYSTOLIC BLOOD PRESSURE: 123 MMHG

## 2023-03-13 NOTE — PROGRESS NOTES
CBC shows no anemia One of the cell counts low lymphocytes. Again can be due to a vitamin deficiency. Surprisingly, the vitiamin b12 level is normal so maybe consider taking a folic acid supplement OTC.   Thyroid function is normal.

## 2023-03-13 NOTE — PROGRESS NOTES
BP elevated on recent visit. She presented a home BP log, send via epic and shows completely controlled Bps. In the 120s-130s. Changed BP in her chart to reflect this.

## 2023-03-14 ENCOUNTER — TELEPHONE (OUTPATIENT)
Dept: PRIMARY CARE CLINIC | Facility: CLINIC | Age: 68
End: 2023-03-14
Payer: MEDICARE

## 2023-04-10 ENCOUNTER — OFFICE VISIT (OUTPATIENT)
Dept: UROGYNECOLOGY | Facility: CLINIC | Age: 68
End: 2023-04-10
Payer: MEDICARE

## 2023-04-10 VITALS
WEIGHT: 128.31 LBS | DIASTOLIC BLOOD PRESSURE: 92 MMHG | HEIGHT: 67 IN | SYSTOLIC BLOOD PRESSURE: 146 MMHG | BODY MASS INDEX: 20.14 KG/M2

## 2023-04-10 DIAGNOSIS — N81.4 UTEROVAGINAL PROLAPSE: ICD-10-CM

## 2023-04-10 DIAGNOSIS — N81.6 RECTOCELE, FEMALE: ICD-10-CM

## 2023-04-10 DIAGNOSIS — N39.41 URGE URINARY INCONTINENCE: ICD-10-CM

## 2023-04-10 DIAGNOSIS — N95.2 VAGINAL ATROPHY: ICD-10-CM

## 2023-04-10 DIAGNOSIS — R35.1 NOCTURIA MORE THAN TWICE PER NIGHT: ICD-10-CM

## 2023-04-10 DIAGNOSIS — N81.11 CYSTOCELE, MIDLINE: ICD-10-CM

## 2023-04-10 DIAGNOSIS — R21 PERIANAL RASH: Primary | ICD-10-CM

## 2023-04-10 PROCEDURE — 3008F PR BODY MASS INDEX (BMI) DOCUMENTED: ICD-10-PCS | Mod: CPTII,S$GLB,, | Performed by: OBSTETRICS & GYNECOLOGY

## 2023-04-10 PROCEDURE — 99215 PR OFFICE/OUTPT VISIT, EST, LEVL V, 40-54 MIN: ICD-10-PCS | Mod: 25,S$GLB,, | Performed by: OBSTETRICS & GYNECOLOGY

## 2023-04-10 PROCEDURE — 3080F PR MOST RECENT DIASTOLIC BLOOD PRESSURE >= 90 MM HG: ICD-10-PCS | Mod: CPTII,S$GLB,, | Performed by: OBSTETRICS & GYNECOLOGY

## 2023-04-10 PROCEDURE — 1160F PR REVIEW ALL MEDS BY PRESCRIBER/CLIN PHARMACIST DOCUMENTED: ICD-10-PCS | Mod: CPTII,S$GLB,, | Performed by: OBSTETRICS & GYNECOLOGY

## 2023-04-10 PROCEDURE — 3080F DIAST BP >= 90 MM HG: CPT | Mod: CPTII,S$GLB,, | Performed by: OBSTETRICS & GYNECOLOGY

## 2023-04-10 PROCEDURE — 3077F PR MOST RECENT SYSTOLIC BLOOD PRESSURE >= 140 MM HG: ICD-10-PCS | Mod: CPTII,S$GLB,, | Performed by: OBSTETRICS & GYNECOLOGY

## 2023-04-10 PROCEDURE — 1101F PT FALLS ASSESS-DOCD LE1/YR: CPT | Mod: CPTII,S$GLB,, | Performed by: OBSTETRICS & GYNECOLOGY

## 2023-04-10 PROCEDURE — 87086 URINE CULTURE/COLONY COUNT: CPT | Performed by: OBSTETRICS & GYNECOLOGY

## 2023-04-10 PROCEDURE — 51701 PR INSERTION OF NON-INDWELLING BLADDER CATHETERIZATION FOR RESIDUAL UR: ICD-10-PCS | Mod: S$GLB,,, | Performed by: OBSTETRICS & GYNECOLOGY

## 2023-04-10 PROCEDURE — 1157F ADVNC CARE PLAN IN RCRD: CPT | Mod: CPTII,S$GLB,, | Performed by: OBSTETRICS & GYNECOLOGY

## 2023-04-10 PROCEDURE — 51701 INSERT BLADDER CATHETER: CPT | Mod: S$GLB,,, | Performed by: OBSTETRICS & GYNECOLOGY

## 2023-04-10 PROCEDURE — 99999 PR PBB SHADOW E&M-EST. PATIENT-LVL IV: CPT | Mod: PBBFAC,,, | Performed by: OBSTETRICS & GYNECOLOGY

## 2023-04-10 PROCEDURE — 1126F AMNT PAIN NOTED NONE PRSNT: CPT | Mod: CPTII,S$GLB,, | Performed by: OBSTETRICS & GYNECOLOGY

## 2023-04-10 PROCEDURE — 1101F PR PT FALLS ASSESS DOC 0-1 FALLS W/OUT INJ PAST YR: ICD-10-PCS | Mod: CPTII,S$GLB,, | Performed by: OBSTETRICS & GYNECOLOGY

## 2023-04-10 PROCEDURE — 1157F PR ADVANCE CARE PLAN OR EQUIV PRESENT IN MEDICAL RECORD: ICD-10-PCS | Mod: CPTII,S$GLB,, | Performed by: OBSTETRICS & GYNECOLOGY

## 2023-04-10 PROCEDURE — 99999 PR PBB SHADOW E&M-EST. PATIENT-LVL IV: ICD-10-PCS | Mod: PBBFAC,,, | Performed by: OBSTETRICS & GYNECOLOGY

## 2023-04-10 PROCEDURE — 1126F PR PAIN SEVERITY QUANTIFIED, NO PAIN PRESENT: ICD-10-PCS | Mod: CPTII,S$GLB,, | Performed by: OBSTETRICS & GYNECOLOGY

## 2023-04-10 PROCEDURE — 1160F RVW MEDS BY RX/DR IN RCRD: CPT | Mod: CPTII,S$GLB,, | Performed by: OBSTETRICS & GYNECOLOGY

## 2023-04-10 PROCEDURE — 1159F MED LIST DOCD IN RCRD: CPT | Mod: CPTII,S$GLB,, | Performed by: OBSTETRICS & GYNECOLOGY

## 2023-04-10 PROCEDURE — 3288F PR FALLS RISK ASSESSMENT DOCUMENTED: ICD-10-PCS | Mod: CPTII,S$GLB,, | Performed by: OBSTETRICS & GYNECOLOGY

## 2023-04-10 PROCEDURE — 99215 OFFICE O/P EST HI 40 MIN: CPT | Mod: 25,S$GLB,, | Performed by: OBSTETRICS & GYNECOLOGY

## 2023-04-10 PROCEDURE — 3077F SYST BP >= 140 MM HG: CPT | Mod: CPTII,S$GLB,, | Performed by: OBSTETRICS & GYNECOLOGY

## 2023-04-10 PROCEDURE — 3288F FALL RISK ASSESSMENT DOCD: CPT | Mod: CPTII,S$GLB,, | Performed by: OBSTETRICS & GYNECOLOGY

## 2023-04-10 PROCEDURE — 1159F PR MEDICATION LIST DOCUMENTED IN MEDICAL RECORD: ICD-10-PCS | Mod: CPTII,S$GLB,, | Performed by: OBSTETRICS & GYNECOLOGY

## 2023-04-10 PROCEDURE — 3008F BODY MASS INDEX DOCD: CPT | Mod: CPTII,S$GLB,, | Performed by: OBSTETRICS & GYNECOLOGY

## 2023-04-10 RX ORDER — CLOBETASOL PROPIONATE 0.5 MG/G
OINTMENT TOPICAL NIGHTLY
Qty: 15 G | Refills: 2 | Status: SHIPPED | OUTPATIENT
Start: 2023-04-10 | End: 2023-05-17

## 2023-04-10 RX ORDER — SILVER SULFADIAZINE 10 G/1000G
CREAM TOPICAL NIGHTLY
Qty: 25 G | Refills: 11 | Status: SHIPPED | OUTPATIENT
Start: 2023-04-10 | End: 2023-05-17

## 2023-04-10 NOTE — PATIENT INSTRUCTIONS
Bladder Irritants  Certain foods and drinks have been associated with worsening symptoms of urinary frequency, urgency, urge incontinence, or bladder pain. If you suffer from any of these conditions, you may wish to try eliminating one or more of these foods from your diet and see if your symptoms improve. If bladder symptoms are related to dietary factors, strict adherence to a diet thateliminates the food should bring marked relief in 10 days. Once you are feeling better, you can begin to add foods back into your diet, one at a time. If symptoms return, you will be able to identify the irritant. As you add foods back to your diet it is very important that you drink significant amounts of water.    -----------------------------------------------------------------------------------------------  List of Common Bladder Irritants*  Alcoholic beverages  Apples and apple juice  Cantaloupe  Carbonated beverages  Chili and spicy foods  Chocolate  Citrus fruit  Coffee (including decaffeinated)  Cranberries and cranberry juice  Grapes  Guava  Milk Products: milk, cheese, cottage cheese, yogurt, ice cream  Peaches  Pineapple  Plums  Strawberries  Sugar especially artificial sweeteners, saccharin, aspartame, corn sweeteners, honey, fructose, sucrose, lactose  Tea  Tomatoes and tomato juice  Vitamin B complex  Vinegar  *Most people are not sensitive to ALL of these products; your goal is to find the foods that make YOUR symptoms worse.  ---------------------------------------------------------------------------------------------------    Low-acid fruit substitutions include apricots, papaya, pears and watermelon. Coffee drinkers can drink Kava or other lowacid instant drinks. Tea drinkers can substitute non-citrus herbal and sun brewed teas. Calcium carbonate co-buffered with calcium ascorbate can be substituted for Vitamin C. Prelief is a dietary supplement that works as an acid blocker for the bladder.    Where to get more  information:        Overcoming Bladder Disorders by Talia Cordova and Shady Sawyer, 1990        You Dont Have to Live with Cystitis! By Krystle Hartman, 1988  [email or web address]://[email or web address].org/oab  -------------------------------------------------------------------------------------    1)  Stage 2 cystocele/rectocele/uterovaginal prolapse:  --discussed  -- mL  --pessary vs surgery  --if surgery: laparoscopic-assisted vaginal hysterectomy, uterosacral ligament suspension vs sacral colpopexy, possible anterior/posterior repair    --does work out alot  --if surgery: bladder testing to make sure you empty well and if you leak with prolapse reduced  --if surgery: pelvic US  --if surgery: clearance per PCP (Dr. Pendleton) + labs (CBC, CMP, T&S)/EKG/CXR    2)  Urge incontinence, mild:  --urine C&S  --work on bladder emptying  --Empty bladder every 3 hours.  Empty well: wait a minute, lean forward on toilet.    --Avoid dietary irritants (see sheet).  Keep diary x 3-5 days to determine your irritants.  --KEGELS: do 10 in AM and 10 in PM, holding each x 10 seconds.  When you feel urge to go, STOP, KEGEL, and when urge has passed, then go to bathroom.  Consider PT in future.    --URGE: consider medication in future. takes 2-4 weeks to see if will have effect.  For dry mouth: get sour, sugar free lozenge or gum.    --STRESS:  Pessary vs. Sling.     3)  Vaginal atrophy (dryness): Can use dime-sized amount of coconut oil with finger inside vagina as far as possible, then around opening/inner lips.  Do this at least 3x/week.     4)  Nocturia (nighttime urination):   --stop fluids 2 hours before bed.    --no water by bed  --If have leg swelling:  Elevate feet above chest x 1 hour before bed to get excess fluid off.  Can also use support hose (knee highs).    --work on bladder emptying    5)  Irritated perianal tissue:   --FOR TWO WEEKS: use dime-sized amount of clobetasol ointment with finger  around anus before bed.   --AFTER THAT x 2 WEEKS: use dime-sized amount of silvadene cream with finger around anus before bed at least 3x/WEEK.     6)  Will have someone call you to pick OR date and set up preop appts/testing.

## 2023-04-10 NOTE — PROGRESS NOTES
[facility] - UROGYNECOLOGY  [address] [address]    Ashlie Redman  [id number]  1955  April 10, 2023    Consulting Physician: Bessy Spencer MD   GYN: MD Tj  Primary M.D.: Salma Pendleton MD    Chief Complaint   Patient presents with    Vaginal Prolapse       HPI:     1)  UI:  (--) JESUS.  (+) UUI.  (--) pads. +rare underwear changes. Daytime frequency: Q 2 hours.  Nocturia: Yes: 3-4/night.   (--) dysuria,  (--) hematuria,  (--) frequent UTIs. Thinks had 2 in last 6 months. Had freq UTIs before last cystocele repair.  (--) complete bladder emptying. PV to help.     2)  POP:  Present x several months. To introitus.  Symptoms:(+)  pressure, worse with exercise/prolonged standing.  (--) vaginal bleeding. (--) vaginal discharge. (--) sexually active.  (--) h/o dyspareunia.  (+)  Vaginal dryness.  (--) vaginal estrogen use.     3)  BM:  (--) constipation/straining.  (--) chronic diarrhea. (--) hematochezia.  (--) fecal incontinence.  (--) fecal smearing/urgency.  (+) complete evacuation.     Past Medical History  Past Medical History:   Diagnosis Date    Allergy     Anemia     Arthritis     back    Asteroid hyalosis of both eyes     Basal cell carcinoma     L. dorsal forearm     Breast cancer 04/2017    Left    Cataract     Diverticulosis     Encounter for blood transfusion     Glaucoma     High myopia     Osteopenia     Osteoporosis     PONV (postoperative nausea and vomiting)     S/P dilation and curettage    --breast cancer (followed by Dr. Ragsdale in heme/onc, last visit 12/2022):   treated for a  4/12/2017 diagnosis of Stage IIB left breast carcinoma.ER/[place] negative. Her - 2 positive. Recd Neoadjuvant A/C ,completed 12 cycles weekly taxol and  herceptin/ perjeta had lumpectomy was on ALLIANCE trial completed the entire year of herceptin /perjeta, but didn't undergo axillary xrt per trial  Here for f/u with pet and labs recnt mammogram showed calcifications , that were biospied  and came back negative radiation induced interstitial findings still having the cough but getting of her upper respiratory infection today some neuropathy post rx still persisits had a difficult summer 2019. Had removal of her original implant for pain in the area, after removal pt developed pseudomonas infection stayed in hospital  For a week. She sees Dr. Modi, had second reconstruction 10 2020 had a deep flap done. Saw Dr Rodriguez rt breast mammo 9/2020  --chemo + rad SE: Follow-up cardiac catheterization.  Nonobstructive coronary artery disease.  Right left heart pressures normal.  No evidence of restriction, constriction.  Ejection fraction normal.  History of breast cancer status post chemo radiation therapy.  Gabriela treatment radiation pneumonitis.     Past Surgical History  Past Surgical History:   Procedure Laterality Date    ANGIOGRAM, CORONARY, WITH LEFT HEART CATHETERIZATION Left 09/02/2021    Procedure: ANGIOGRAM,CORONARY,WITH LEFT HEART CATHETERIZATION;  Surgeon: Rubin Hui MD;  Location: [facility] CATH/EP LAB;  Service: Cardiology;  Laterality: Left;    bladder lift  2018    [facility]    breast augmentation  03/2013    BREAST BIOPSY Left 04/2017    Core bx,+ cancer    BREAST CAPSULECTOMY Left 08/05/2019    Procedure: CAPSULECTOMY, BREAST;  Surgeon: Diego Modi MD;  Location: [facility];  Service: General;  Laterality: Left;    BREAST LUMPECTOMY Left 10/25/2017    BREAST RECONSTRUCTION Bilateral 08/21/2019    Procedure: RECONSTRUCTION, BREAST WITH ALLODERM;  Surgeon: Diego Modi MD;  Location: [facility];  Service: General;  Laterality: Bilateral;    BREAST RECONSTRUCTION Left     BREAST REVISION SURGERY Bilateral 08/05/2019    Procedure: BREAST REVISION SURGERY;  Surgeon: Diego Modi MD;  Location: [facility];  Service: General;  Laterality: Bilateral;    BREAST REVISION SURGERY Left 04/05/2021    Procedure: BREAST REVISION SURGERY - LEFT BREAST RECONSTRUCTION REVISION;  Surgeon:  Diego Modi MD;  Location: [facility];  Service: General;  Laterality: Left;    CATARACT EXTRACTION W/  INTRAOCULAR LENS IMPLANT Bilateral 2009    COLONOSCOPY  4/2009, 2015    repeat in 5 years    COLONOSCOPY N/A 12/22/2020    Procedure: COLONOSCOPY;  Surgeon: Praneeth Leblanc MD;  Location: [facility] ENDO;  Service: Endoscopy;  Laterality: N/A;    CYSTOCELE REPAIR N/A 11/20/2018    Procedure: REPAIR, CYSTOCELE;  Surgeon: Rebecca Acosta MD;  Location: [facility] OR 2ND FLR;  Service: Urology;  Laterality: N/A;  1.5 hours    CYSTOSCOPY N/A 11/20/2018    Procedure: CYSTOSCOPY;  Surgeon: Rebecca Acosta MD;  Location: [facility] OR 2ND FLR;  Service: Urology;  Laterality: N/A;    DILATION AND CURETTAGE OF UTERUS      ECTOPIC PREGNANCY SURGERY      EYE SURGERY Bilateral 03/2009    Vitrectomy     MASTECTOMY Left     MASTOPEXY Right 04/05/2021    Procedure: MASTOPEXY - RIGHT MASTOPEXY LIPOSUCTION WAIST AND THIGHS, FAT TRANSFER TO BREAST;  Surgeon: Diego Modi MD;  Location: [facility];  Service: General;  Laterality: Right;    POLYPECTOMY      x3 2001    RECONSTRUCTION OF BREAST WITH DEEP INFERIOR EPIGASTRIC ARTERY  (SUSAN) FLAP Left 10/26/2020    Procedure: RECONSTRUCTION, BREAST, USING SUSAN SKIN FLAP - LEFT BREAST RECONSTRUCTION WITH STACKED SUSAN FREE FLAPS.;  Surgeon: Diego Modi MD;  Location: [facility];  Service: General;  Laterality: Left;    REMOVAL OF BREAST IMPLANT Bilateral 08/05/2019    Procedure: REMOVAL, IMPLANT, BREAST;  Surgeon: Diego Modi MD;  Location: [facility];  Service: General;  Laterality: Bilateral;    REMOVAL OF BREAST IMPLANT Left 08/30/2019    Procedure: REMOVAL, IMPLANT, BREAST;  Surgeon: Diego Modi MD;  Location: [facility];  Service: General;  Laterality: Left;    REMOVAL OF BREAST IMPLANT Right 10/26/2020    Procedure: REMOVAL, IMPLANT, BREAST -   RIGHT BREAST IMPLANT REMOVAL;  Surgeon: Diego Modi MD;  Location: [facility];  Service: General;  Laterality: Right;    Yag  Capsulotomy Bilateral 2014   --route of ectopic surgery: Pfannenstiel; had salpingectomy due to rupture-unknown side; c/b blood transfusion  --2018: anterior repair ([place])    Hysterectomy: No    Past Ob History     x 2.  C/s x 0.    Largest infant weight: 5#6oz.   unknown FAVD. yes episiotomy.      Gynecologic History  LMP: No LMP recorded. Patient is postmenopausal.  Age of menarche: 12 yo  Age of menopause:   Menstrual history: h/o normal  Last Pap:  NILM/HPVneg  History of abnormal pap: never  Last Mammogram: h/o Breast cancer; 2022 BRIADS 2; Followed by Oncology, scan q6 months  Colonosocpy: 2020, repeat 5 years ,Family Hx colon cancer  DEXA: 2022 Osteopenia; history of Osteoporosis. She is on Prolia.     Family History  Family History   Problem Relation Age of Onset    Miscarriages / Stillbirths Mother     Hyperlipidemia Mother     Hearing loss Mother     Arthritis Mother     Colon cancer Mother         hospice    Cataracts Mother     Hypertension Mother     Thyroid disease Mother     Cancer Mother         colon    Hypertension Father     Hyperlipidemia Father     Heart disease Father         passed    Colon cancer Sister     Early death Sister     Cancer Sister         rectal; passed    Arthritis Brother     Mental retardation Brother     Learning disabilities Brother     Early death Brother     Birth defects Brother     Pancreatic cancer Maternal Grandmother     Glaucoma Maternal Grandmother     Breast cancer Cousin     Cancer Cousin         breast    Amblyopia Neg Hx     Blindness Neg Hx     Diabetes Neg Hx     Macular degeneration Neg Hx     Retinal detachment Neg Hx     Strabismus Neg Hx     Stroke Neg Hx       Colon CA: Yes - mother (90s), sister (47 yo)  Breast CA: Yes - mat cousin  GYN CA: No   CA: No    Social History  Social History     Tobacco Use   Smoking Status Never   Smokeless Tobacco Never     Social History     Substance and Sexual Activity   Alcohol Use  Yes    Alcohol/week: 1.0 standard drink    Types: 1 Glasses of wine per week    Comment: daily-kayden   .    Social History     Substance and Sexual Activity   Drug Use No     The patient is .  Resides in [place] [address].  Employment status: currently employed  for [employer]. 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     Allergies  Review of patient's allergies indicates:   Allergen Reactions    Bactrim [sulfamethoxazole-trimethoprim] Rash     Fever, vomiting       Medications  Current Outpatient Medications on File Prior to Visit   Medication Sig Dispense Refill    calcium carb/vit D3/minerals (CALCIUM-VITAMIN D ORAL)       denosumab (PROLIA) 60 mg/mL Syrg Inject 60 mg into the skin.      dorzolamide (TRUSOPT) 2 % ophthalmic solution INSTILL 1 DROP INTO BOTH EYES 3 TIMES A DAY 30 mL 4    latanoprost 0.005 % ophthalmic solution INSTILL 1 DROP INTO BOTH EYES IN THE EVENING 7.5 mL 3    valACYclovir (VALTREX) 500 MG tablet Take 1 tablet (500 mg total) by mouth once daily. 90 tablet 1     Current Facility-Administered Medications on File Prior to Visit   Medication Dose Route Frequency Provider Last Rate Last Admin    LIDOcaine-EPINEPHrine 1%-1:100,000 30 mL, EPINEPHrine 2 mg in lactated Ringers 1,000 mL irrigation   Irrigation On Call Procedure Diego Modi MD        LIDOcaine-EPINEPHrine 1%-1:100,000 30 mL, EPINEPHrine 2 mg in lactated Ringers 1,000 mL irrigation   Irrigation On Call Procedure Diego Modi MD           Review of Systems A 14 point ROS was reviewed with pertinent positives as noted above in the history of present illness.      Constitutional: negative  Eyes: negative  Endocrine: negative  Gastrointestinal: negative  Cardiovascular: negative  Respiratory: negative  Allergic/Immunologic: negative  Integumentary: negative  Psychiatric: negative  Musculoskeletal: negative   Ear/Nose/Throat: negative  Neurologic: negative  Genitourinary: SEE HPI  Hematologic/Lymphatic: negative  "  Breast: negative    Urogynecologic Exam  BP (!) 146/92 (BP Location: Left arm, Patient Position: Sitting, BP Method: Medium (Manual))   Ht 5' 7" (1.702 m)   Wt 58.2 kg (128 lb 4.9 oz)   BMI 20.10 kg/m²     GENERAL APPEARANCE:  The patient is well-developed, well-nourished.   Neck:  Supple with no thyromegaly, no carotid bruits.  Heart:  Regular rate and rhythm, no murmurs, rubs or gallops.  Lungs:  Clear.  No CVA tenderness.  Abdomen:  Soft, nontender, nondistended, no hepatosplenomegaly.  Incisions:  Pfannenstiel/SUSAN flap incision well-healed    PELVIC:    External genitalia:  Normal Bartholins, Skenes and labia bilaterally.    Urethra:  No caruncle, diverticulum or masses.  (+) hypermobility.    Vagina:  Atrophy (+) , no bladder masses or tender, no discharge.    Cervix:  normal appearance  Uterus: normal size, contour, position, consistency, mobility, non-tender  Adnexa: Not palpable.    POP-Q:  Aa +1; Ba +1; C -2; Ap -1; Bp -1; D -6 to -7.  Genital hiatus 3, perineal body 2, total vaginal length 10-11.    NEUROLOGIC:  Cranial nerves 2 through 12 intact.  Strength 5/5.  DTRs 2+ lower extremities.  S2 through 4 normal.  Sacral reflexes intact.    EXT: MOSQUERA, 2+ pulses bilaterally, no C/C/E    COUGH STRESS TEST:  negative  KEGEL: 1 /5    RECTAL:    External:  Normal, (--) hemorrhoids, (--) dovetailing. +erythema at perianal area--looks like from wiping or lichen.   Internal:   (--) tenderness, (--) masses, Abnormal - decreased resting tone, Abnormal - decreased active tone.    PVR: 100 mL    Impression    1. Perianal rash    2. Cystocele, midline    3. Uterovaginal prolapse    4. Rectocele, female    5. Urge urinary incontinence    6. Vaginal atrophy    7. Nocturia more than twice per night        Initial Plan  The patient was counseled regarding these issues. The patient was given a summary sheet containing each of these issues with possible options for evaluation and management. When appropriate, we also " reviewed computer-generated diagrams specific to their diagnoses..  All questions were addressed to the patient's satisfaction.    1)  Stage 2 cystocele/rectocele/uterovaginal prolapse:  --discussed  -- mL  --pessary vs surgery  --if surgery: robotic-assisted vaginal hysterectomy, uterosacral ligament suspension vs sacral colpopexy, possible anterior/posterior repair    --does work out alot  --if surgery: bladder testing to make sure you empty well and if you leak with prolapse reduced  --if surgery: pelvic US  --if surgery: clearance per PCP (Dr. Pendleton) + labs (CBC, CMP, T&S)/EKG/CXR    2)  Urge incontinence, mild:  --urine C&S  --work on bladder emptying  --Empty bladder every 3 hours.  Empty well: wait a minute, lean forward on toilet.    --Avoid dietary irritants (see sheet).  Keep diary x 3-5 days to determine your irritants.  --KEGELS: do 10 in AM and 10 in PM, holding each x 10 seconds.  When you feel urge to go, STOP, KEGEL, and when urge has passed, then go to bathroom.  Consider PT in future.    --URGE: consider medication in future. takes 2-4 weeks to see if will have effect.  For dry mouth: get sour, sugar free lozenge or gum.    --STRESS:  Pessary vs. Sling.     3)  Vaginal atrophy (dryness): Can use dime-sized amount of coconut oil with finger inside vagina as far as possible, then around opening/inner lips.  Do this at least 3x/week.     4)  Nocturia (nighttime urination):   --stop fluids 2 hours before bed.    --no water by bed  --If have leg swelling:  Elevate feet above chest x 1 hour before bed to get excess fluid off.  Can also use support hose (knee highs).    --work on bladder emptying    5)  Irritated perianal tissue:   --FOR TWO WEEKS: use dime-sized amount of clobetasol ointment with finger around anus before bed.   --AFTER THAT x 2 WEEKS: use dime-sized amount of silvadene cream with finger around anus before bed at least 3x/WEEK.     6)  Will have someone call you to pick OR date  and set up preop appts/testing.     Approximately 60 min were spent in consult, 90 % in discussion.     Thank you for requesting consultation of your patient.  I look forward to participating in their care.    Nury Wright  Female Pelvic Medicine and Reconstructive Surgery  [facility] [place], [place]

## 2023-04-11 ENCOUNTER — TELEPHONE (OUTPATIENT)
Dept: PRIMARY CARE CLINIC | Facility: CLINIC | Age: 68
End: 2023-04-11
Payer: MEDICARE

## 2023-04-11 LAB — BACTERIA UR CULT: NO GROWTH

## 2023-04-11 NOTE — TELEPHONE ENCOUNTER
----- Message from Salma Pendleton MD sent at 3/13/2023  8:33 AM CDT -----  CBC shows no anemia One of the cell counts low lymphocytes. Again can be due to a vitamin deficiency. Surprisingly, the vitiamin b12 level is normal so maybe consider taking a folic acid supplement OTC.   Thyroid function is normal.

## 2023-04-11 NOTE — TELEPHONE ENCOUNTER
Pt was notified of her lab results. I apologized that we were not able to get in touch with her sooner. She verbalized understanding.

## 2023-04-13 ENCOUNTER — PATIENT MESSAGE (OUTPATIENT)
Dept: HEMATOLOGY/ONCOLOGY | Facility: CLINIC | Age: 68
End: 2023-04-13
Payer: MEDICARE

## 2023-04-13 ENCOUNTER — PATIENT MESSAGE (OUTPATIENT)
Dept: UROGYNECOLOGY | Facility: CLINIC | Age: 68
End: 2023-04-13
Payer: MEDICARE

## 2023-04-14 DIAGNOSIS — N39.3 STRESS INCONTINENCE: ICD-10-CM

## 2023-04-14 DIAGNOSIS — N81.4 UTEROVAGINAL PROLAPSE: Primary | ICD-10-CM

## 2023-04-14 DIAGNOSIS — N81.11 CYSTOCELE, MIDLINE: ICD-10-CM

## 2023-04-14 DIAGNOSIS — Z01.818 PRE-OP TESTING: ICD-10-CM

## 2023-04-14 DIAGNOSIS — N81.6 RECTOCELE, FEMALE: ICD-10-CM

## 2023-04-17 ENCOUNTER — TELEPHONE (OUTPATIENT)
Dept: PRIMARY CARE CLINIC | Facility: CLINIC | Age: 68
End: 2023-04-17

## 2023-04-17 ENCOUNTER — PATIENT MESSAGE (OUTPATIENT)
Dept: UROGYNECOLOGY | Facility: CLINIC | Age: 68
End: 2023-04-17
Payer: MEDICARE

## 2023-04-17 DIAGNOSIS — Z01.818 PREOP TESTING: ICD-10-CM

## 2023-04-17 DIAGNOSIS — Z01.818 PREOPERATIVE TESTING: Primary | ICD-10-CM

## 2023-04-17 NOTE — TELEPHONE ENCOUNTER
----- Message from Salma Pendleton MD sent at 4/17/2023  7:34 AM CDT -----  Regarding: FW: Pre-Op Clearance  Please schedule patient for preop appointment. Please pend an order for the listed labs and procedures. Thank  ----- Message -----  From: Anastasiia Romero MA  Sent: 4/14/2023   2:27 PM CDT  To: Kiana Camarillo NP, Nury Wright MD, #  Subject: Pre-Op Clearance                                 Dear Dr. Pendleton      Ashlie Redman is scheduled to have a robotic hysterectomy, possible bilateral salpingo-oophorectomy, possible uterosacral ligament suspension, possible abdominal sacrocolpopexy, possible anterior/posterior repair, possible midurethral sling, and cystoscopy with Dr. Wright on 7/13/23.  Please assist with surgical clearance. Please perform a CBC, CMP, EKG, chest x-ray and any other testing you deem necessary.  When completed, please fax results and a note stating whether the patient is cleared for surgery to [phone] or by staff message. If you have any questions our office number is [phone], feel free to contact us.     Sincerely,     Anastasiia Romero MA  Female Pelvic Medicine& Reconstructive surgery  [facility], Department of Urogynecology

## 2023-04-18 ENCOUNTER — PATIENT MESSAGE (OUTPATIENT)
Dept: PRIMARY CARE CLINIC | Facility: CLINIC | Age: 68
End: 2023-04-18
Payer: MEDICARE

## 2023-04-18 ENCOUNTER — PATIENT MESSAGE (OUTPATIENT)
Dept: UROGYNECOLOGY | Facility: CLINIC | Age: 68
End: 2023-04-18
Payer: MEDICARE

## 2023-04-18 DIAGNOSIS — N81.4 UTEROVAGINAL PROLAPSE: Primary | ICD-10-CM

## 2023-04-18 DIAGNOSIS — N81.6 RECTOCELE, FEMALE: ICD-10-CM

## 2023-04-18 DIAGNOSIS — N81.11 CYSTOCELE, MIDLINE: ICD-10-CM

## 2023-04-18 DIAGNOSIS — N39.3 STRESS INCONTINENCE: ICD-10-CM

## 2023-04-18 NOTE — TELEPHONE ENCOUNTER
----- Message from Anastasiia Romero MA sent at 4/14/2023  2:25 PM CDT -----  Regarding: Pre-Op Clearance  Dear Rody Landaverdetamara Redman is scheduled to have a robotic hysterectomy, possible bilateral salpingo-oophorectomy, possible uterosacral ligament suspension, possible abdominal sacrocolpopexy, possible anterior/posterior repair, possible midurethral sling, and cystoscopy with Dr. Wright on 7/13/23.  Please assist with surgical clearance. Please perform a CBC, CMP, EKG, chest x-ray and any other testing you deem necessary.  When completed, please fax results and a note stating whether the patient is cleared for surgery to [phone] or by staff message. If you have any questions our office number is [phone], feel free to contact us.     Sincerely,     Anastasiia Romero MA  Female Pelvic Medicine& Reconstructive surgery  [facility], Department of Urogynecology

## 2023-04-19 ENCOUNTER — OFFICE VISIT (OUTPATIENT)
Dept: OPHTHALMOLOGY | Facility: CLINIC | Age: 68
End: 2023-04-19
Payer: MEDICARE

## 2023-04-19 DIAGNOSIS — Z96.1 PSEUDOPHAKIA OF BOTH EYES: ICD-10-CM

## 2023-04-19 DIAGNOSIS — Z98.890 HISTORY OF VITRECTOMY: ICD-10-CM

## 2023-04-19 DIAGNOSIS — H40.053 OHT (OCULAR HYPERTENSION), BILATERAL: Primary | ICD-10-CM

## 2023-04-19 DIAGNOSIS — H52.7 REFRACTIVE ERROR: ICD-10-CM

## 2023-04-19 PROCEDURE — 1160F PR REVIEW ALL MEDS BY PRESCRIBER/CLIN PHARMACIST DOCUMENTED: ICD-10-PCS | Mod: CPTII,S$GLB,, | Performed by: OPHTHALMOLOGY

## 2023-04-19 PROCEDURE — 1160F RVW MEDS BY RX/DR IN RCRD: CPT | Mod: CPTII,S$GLB,, | Performed by: OPHTHALMOLOGY

## 2023-04-19 PROCEDURE — 99214 PR OFFICE/OUTPT VISIT, EST, LEVL IV, 30-39 MIN: ICD-10-PCS | Mod: S$GLB,,, | Performed by: OPHTHALMOLOGY

## 2023-04-19 PROCEDURE — 1126F AMNT PAIN NOTED NONE PRSNT: CPT | Mod: CPTII,S$GLB,, | Performed by: OPHTHALMOLOGY

## 2023-04-19 PROCEDURE — 1159F PR MEDICATION LIST DOCUMENTED IN MEDICAL RECORD: ICD-10-PCS | Mod: CPTII,S$GLB,, | Performed by: OPHTHALMOLOGY

## 2023-04-19 PROCEDURE — 99214 OFFICE O/P EST MOD 30 MIN: CPT | Mod: S$GLB,,, | Performed by: OPHTHALMOLOGY

## 2023-04-19 PROCEDURE — 1157F PR ADVANCE CARE PLAN OR EQUIV PRESENT IN MEDICAL RECORD: ICD-10-PCS | Mod: CPTII,S$GLB,, | Performed by: OPHTHALMOLOGY

## 2023-04-19 PROCEDURE — 99999 PR PBB SHADOW E&M-EST. PATIENT-LVL III: CPT | Mod: PBBFAC,,, | Performed by: OPHTHALMOLOGY

## 2023-04-19 PROCEDURE — 3288F PR FALLS RISK ASSESSMENT DOCUMENTED: ICD-10-PCS | Mod: CPTII,S$GLB,, | Performed by: OPHTHALMOLOGY

## 2023-04-19 PROCEDURE — 1157F ADVNC CARE PLAN IN RCRD: CPT | Mod: CPTII,S$GLB,, | Performed by: OPHTHALMOLOGY

## 2023-04-19 PROCEDURE — 99999 PR PBB SHADOW E&M-EST. PATIENT-LVL III: ICD-10-PCS | Mod: PBBFAC,,, | Performed by: OPHTHALMOLOGY

## 2023-04-19 PROCEDURE — 1101F PT FALLS ASSESS-DOCD LE1/YR: CPT | Mod: CPTII,S$GLB,, | Performed by: OPHTHALMOLOGY

## 2023-04-19 PROCEDURE — 1126F PR PAIN SEVERITY QUANTIFIED, NO PAIN PRESENT: ICD-10-PCS | Mod: CPTII,S$GLB,, | Performed by: OPHTHALMOLOGY

## 2023-04-19 PROCEDURE — 3288F FALL RISK ASSESSMENT DOCD: CPT | Mod: CPTII,S$GLB,, | Performed by: OPHTHALMOLOGY

## 2023-04-19 PROCEDURE — 1101F PR PT FALLS ASSESS DOC 0-1 FALLS W/OUT INJ PAST YR: ICD-10-PCS | Mod: CPTII,S$GLB,, | Performed by: OPHTHALMOLOGY

## 2023-04-19 PROCEDURE — 1159F MED LIST DOCD IN RCRD: CPT | Mod: CPTII,S$GLB,, | Performed by: OPHTHALMOLOGY

## 2023-04-19 NOTE — TELEPHONE ENCOUNTER
Your upcoming appointments:   6/23/23 testing  8:00 chest xray  8:20 labs  8:30 CT abdomen/chest/pelvis  10:30 pelvic ultrasound    6/29/23   Preop visit with Dr. Pendleton @ 1:00. Please arrive 15 minutes early so we can do your preop ekg.

## 2023-04-19 NOTE — PROGRESS NOTES
HPI     Glaucoma     Additional comments: 6 month iop ck with DFE           Comments    DLS: 10/4/22    Pt states does not need rx for gls today. No changes in va since last   visit.     Gtts: Dorzolamide BID, Latanoprost QHS OU          Last edited by Cheryle Quintana on 4/19/2023 10:41 AM.        ROS    Negative for: Constitutional, Gastrointestinal, Neurological, Skin,   Genitourinary, Musculoskeletal, HENT, Endocrine, Cardiovascular, Eyes,   Respiratory, Psychiatric, Allergic/Imm, Heme/Lymph  Last edited by Tyrone Jimenez Jr., MD on 4/19/2023 11:34 AM.        Assessment /Plan     For exam results, see Encounter Report.    OHT (ocular hypertension), bilateral    Pseudophakia of both eyes    History of vitrectomy    Refractive error    IOP OD 18 OS 20 with thick corneas OU CCT    Continue latanoprost QHS OU and dorzolamide BID OU  HVF WNL OU OS non-specific defects  OCT ON  OD ST, IT thinning OS IT thinning no change  7/15/21  IOP OD 17 OS 22, mildly elevated in left eye; patient does have thicker corneas OS>OD  Gonio CBB x 360 OU  Continue latanoprost 1 drop QHS OU and dorzolamide BID OU  If stays up will consider increasing dorzolamide or adding new drop  10/19/21 IOP OD 17 OS 20, stable, compliant with drops  Continue current regimen; latanoprost qhs and dorzolamide BID OU  4/5/2022 IOP OD 17 OS 20, stable  Continue latanoprost qhs and dorzolamide BID OU  10/4/2022  HVF 24-2 WNL OU no change  OCT ON OD thinning ST IT and OS IT thinning  Continue latanoprost QHS OU  4/19/2023  IOP OD 18 OS 21, Stable IOP  Continue latanoprost QHS OU and dorzolamide BID OU  Follow up in about 6 months (around 10/19/2023) for HVF, OCT ON, Gonio.

## 2023-04-21 ENCOUNTER — PATIENT MESSAGE (OUTPATIENT)
Dept: UROGYNECOLOGY | Facility: CLINIC | Age: 68
End: 2023-04-21
Payer: MEDICARE

## 2023-05-01 DIAGNOSIS — H40.053 OHT (OCULAR HYPERTENSION), BILATERAL: ICD-10-CM

## 2023-05-02 RX ORDER — LATANOPROST 50 UG/ML
SOLUTION/ DROPS OPHTHALMIC
Qty: 7.5 ML | Refills: 3 | Status: SHIPPED | OUTPATIENT
Start: 2023-05-02

## 2023-05-15 ENCOUNTER — PATIENT MESSAGE (OUTPATIENT)
Dept: PRIMARY CARE CLINIC | Facility: CLINIC | Age: 68
End: 2023-05-15
Payer: MEDICARE

## 2023-05-16 NOTE — TELEPHONE ENCOUNTER
LOV: 3/3/23  NOV: 6/29/23    Pt c/o her left ear feels blocked, hearing is decreased in that ear, she has occasional sharp 7-8/10 pains especially at HS. She has tried an ear wash but it did not seem to help.     Please advise.

## 2023-05-17 ENCOUNTER — OFFICE VISIT (OUTPATIENT)
Dept: PRIMARY CARE CLINIC | Facility: CLINIC | Age: 68
End: 2023-05-17
Payer: MEDICARE

## 2023-05-17 VITALS
BODY MASS INDEX: 19.84 KG/M2 | WEIGHT: 126.44 LBS | HEART RATE: 73 BPM | DIASTOLIC BLOOD PRESSURE: 66 MMHG | OXYGEN SATURATION: 97 % | HEIGHT: 67 IN | RESPIRATION RATE: 20 BRPM | SYSTOLIC BLOOD PRESSURE: 138 MMHG

## 2023-05-17 DIAGNOSIS — H93.8X2 EAR FULLNESS, LEFT: Primary | ICD-10-CM

## 2023-05-17 PROCEDURE — 1160F PR REVIEW ALL MEDS BY PRESCRIBER/CLIN PHARMACIST DOCUMENTED: ICD-10-PCS | Mod: CPTII,,, | Performed by: INTERNAL MEDICINE

## 2023-05-17 PROCEDURE — 1101F PR PT FALLS ASSESS DOC 0-1 FALLS W/OUT INJ PAST YR: ICD-10-PCS | Mod: CPTII,,, | Performed by: INTERNAL MEDICINE

## 2023-05-17 PROCEDURE — 1157F ADVNC CARE PLAN IN RCRD: CPT | Mod: CPTII,,, | Performed by: INTERNAL MEDICINE

## 2023-05-17 PROCEDURE — 99212 PR OFFICE/OUTPT VISIT, EST, LEVL II, 10-19 MIN: ICD-10-PCS | Mod: ,,, | Performed by: INTERNAL MEDICINE

## 2023-05-17 PROCEDURE — 1159F MED LIST DOCD IN RCRD: CPT | Mod: CPTII,,, | Performed by: INTERNAL MEDICINE

## 2023-05-17 PROCEDURE — 99999 PR PBB SHADOW E&M-EST. PATIENT-LVL IV: ICD-10-PCS | Mod: PBBFAC,,, | Performed by: INTERNAL MEDICINE

## 2023-05-17 PROCEDURE — 1101F PT FALLS ASSESS-DOCD LE1/YR: CPT | Mod: CPTII,,, | Performed by: INTERNAL MEDICINE

## 2023-05-17 PROCEDURE — 99999 PR PBB SHADOW E&M-EST. PATIENT-LVL IV: CPT | Mod: PBBFAC,,, | Performed by: INTERNAL MEDICINE

## 2023-05-17 PROCEDURE — 1159F PR MEDICATION LIST DOCUMENTED IN MEDICAL RECORD: ICD-10-PCS | Mod: CPTII,,, | Performed by: INTERNAL MEDICINE

## 2023-05-17 PROCEDURE — 3288F PR FALLS RISK ASSESSMENT DOCUMENTED: ICD-10-PCS | Mod: CPTII,,, | Performed by: INTERNAL MEDICINE

## 2023-05-17 PROCEDURE — 1125F PR PAIN SEVERITY QUANTIFIED, PAIN PRESENT: ICD-10-PCS | Mod: CPTII,,, | Performed by: INTERNAL MEDICINE

## 2023-05-17 PROCEDURE — 3075F SYST BP GE 130 - 139MM HG: CPT | Mod: CPTII,,, | Performed by: INTERNAL MEDICINE

## 2023-05-17 PROCEDURE — 3288F FALL RISK ASSESSMENT DOCD: CPT | Mod: CPTII,,, | Performed by: INTERNAL MEDICINE

## 2023-05-17 PROCEDURE — 3008F BODY MASS INDEX DOCD: CPT | Mod: CPTII,,, | Performed by: INTERNAL MEDICINE

## 2023-05-17 PROCEDURE — 1157F PR ADVANCE CARE PLAN OR EQUIV PRESENT IN MEDICAL RECORD: ICD-10-PCS | Mod: CPTII,,, | Performed by: INTERNAL MEDICINE

## 2023-05-17 PROCEDURE — 1160F RVW MEDS BY RX/DR IN RCRD: CPT | Mod: CPTII,,, | Performed by: INTERNAL MEDICINE

## 2023-05-17 PROCEDURE — 3075F PR MOST RECENT SYSTOLIC BLOOD PRESS GE 130-139MM HG: ICD-10-PCS | Mod: CPTII,,, | Performed by: INTERNAL MEDICINE

## 2023-05-17 PROCEDURE — 3008F PR BODY MASS INDEX (BMI) DOCUMENTED: ICD-10-PCS | Mod: CPTII,,, | Performed by: INTERNAL MEDICINE

## 2023-05-17 PROCEDURE — 3078F DIAST BP <80 MM HG: CPT | Mod: CPTII,,, | Performed by: INTERNAL MEDICINE

## 2023-05-17 PROCEDURE — 3078F PR MOST RECENT DIASTOLIC BLOOD PRESSURE < 80 MM HG: ICD-10-PCS | Mod: CPTII,,, | Performed by: INTERNAL MEDICINE

## 2023-05-17 PROCEDURE — 1125F AMNT PAIN NOTED PAIN PRSNT: CPT | Mod: CPTII,,, | Performed by: INTERNAL MEDICINE

## 2023-05-17 PROCEDURE — 99212 OFFICE O/P EST SF 10 MIN: CPT | Mod: ,,, | Performed by: INTERNAL MEDICINE

## 2023-05-17 NOTE — PROGRESS NOTES
INTERNAL MEDICINE PROGRESS/URGENT CARE NOTE    CHIEF COMPLAINT     Chief Complaint   Patient presents with    Hearing Problem     Hearing loss starting a month ago, was on airplane and could not hear. Since then, her left ear she hears a muffled sound that is louder and louder, she is not hearing very well our of left ear and has some pain to left ear. Denies any drainage, pt washed her ear out and this did not help and took sinus meds which also did not improve her hearing       HPI     Ashlie Redman is a 68 y.o.  female who presents for an urgent/follow up visit today.  See chief complaint    Recently returned from [place] with 10 hour flight.   Now with left ear fullness, some m uffling of sounds and decreased hearing   Denies discharge.   No pain  Affirms sinus congestion and allergies.   No trauma. Did try diluted peroxide once.     Past Medical History:  Past Medical History:   Diagnosis Date    Allergy     Anemia     Arthritis     back    Asteroid hyalosis of both eyes     Basal cell carcinoma     L. dorsal forearm     Breast cancer 04/2017    Left    Cataract     Diverticulosis     Encounter for blood transfusion     Glaucoma     High myopia     Osteopenia     Osteoporosis     PONV (postoperative nausea and vomiting)     S/P dilation and curettage        Home Medications:  Prior to Admission medications    Medication Sig Start Date End Date Taking? Authorizing Provider   calcium carb/vit D3/minerals (CALCIUM-VITAMIN D ORAL)  1/1/20  Yes Historical Provider   denosumab (PROLIA) 60 mg/mL Syrg Inject 60 mg into the skin.   Yes Historical Provider   dorzolamide (TRUSOPT) 2 % ophthalmic solution INSTILL 1 DROP INTO BOTH EYES 3 TIMES A DAY 11/28/22  Yes Tyrone Jimenez Jr., MD   latanoprost 0.005 % ophthalmic solution INSTILL 1 DROP INTO BOTH EYES IN THE EVENING 5/2/23  Yes Tyrone Jimenez Jr., MD   valACYclovir (VALTREX) 500 MG tablet Take 1 tablet (500 mg total) by mouth once daily. 2/10/22  Yes Kevin Gong MD  "  clobetasol 0.05% (TEMOVATE) 0.05 % Oint Apply topically every evening. 4/10/23   Nury Wright MD   silver sulfADIAZINE 1% (SILVADENE) 1 % cream Apply topically every evening. 4/10/23   Nury Wright MD       Review of Systems:  Review of Systems        PHYSICAL EXAM     BP (!) 140/76 (BP Location: Right arm, Patient Position: Sitting, BP Method: Medium (Manual))   Pulse 73   Resp 20   Ht 5' 7" (1.702 m)   Wt 57.3 kg (126 lb 6.8 oz)   SpO2 97%   BMI 19.80 kg/m²     GEN - A+OX4, NAD   HEENT - both ears clear, no erythema, no drainage nor effusion. Retracted ear drum.  Neck - No thyromegaly or cervical LAD. No thyroid masses felt.      LABS         ASSESSMENT/PLAN     Ashlie Redman is a 68 y.o. female with  1. Ear fullness, left  Possible due to sinus congestion. Advised starting antihistamine and flonase. If no improvement, may consider steroids (pt reluctant for this today) and will refer to ENT and audiology.          RTC as scheduled     Salma Pendleton MD  Board Certified Internist/Geriatrician  [facility]      "

## 2023-05-23 ENCOUNTER — PATIENT MESSAGE (OUTPATIENT)
Dept: RESEARCH | Facility: HOSPITAL | Age: 68
End: 2023-05-23
Payer: MEDICARE

## 2023-05-26 ENCOUNTER — TELEPHONE (OUTPATIENT)
Dept: RESEARCH | Facility: HOSPITAL | Age: 68
End: 2023-05-26
Payer: MEDICARE

## 2023-05-26 NOTE — TELEPHONE ENCOUNTER
5/26/23  9:18 AM    Called the patient to schedule her annual follow up lymphedema assessments and 5/6 year post surgical specimen collection for the [id number] trial. The patient was agreeable to scheduling her annual lymphedema measurements, QOLs, and labs on 6/30/23 before her 9:30 infusion.     A lab appointment was made on 6/30/23 at the [facility] to reflect the above.    Amanda Wills CRC

## 2023-06-05 ENCOUNTER — TELEPHONE (OUTPATIENT)
Dept: DERMATOLOGY | Facility: CLINIC | Age: 68
End: 2023-06-05
Payer: MEDICARE

## 2023-06-19 ENCOUNTER — TELEPHONE (OUTPATIENT)
Dept: ADMINISTRATIVE | Facility: OTHER | Age: 68
End: 2023-06-19
Payer: MEDICARE

## 2023-06-19 NOTE — TELEPHONE ENCOUNTER
Date: Monday, June 19, 2023    Trial: [id number]  ID: [id number]  MRN: [id number]  IRB#: [id number]  Initials: S, S E     Contacted patient via email regarding vital status and inquiring about continued participation in study

## 2023-06-20 ENCOUNTER — TELEPHONE (OUTPATIENT)
Dept: ADMINISTRATIVE | Facility: OTHER | Age: 68
End: 2023-06-20

## 2023-06-20 NOTE — TELEPHONE ENCOUNTER
Date: Tuesday, June 20, 2023    Study: [id number]  ID: [id number]  MRN: [id number]  IRB#: [id number]  Initials: S, S E    Contacted patient regarding vital status. Patient stated she received my email, however, she was not able to reply. Patient stated she is well and consented to future contact regarding trial

## 2023-06-23 ENCOUNTER — HOSPITAL ENCOUNTER (OUTPATIENT)
Dept: RADIOLOGY | Facility: HOSPITAL | Age: 68
Discharge: HOME | End: 2023-06-23
Attending: INTERNAL MEDICINE
Payer: MEDICARE

## 2023-06-23 ENCOUNTER — HOSPITAL ENCOUNTER (OUTPATIENT)
Dept: RADIOLOGY | Facility: HOSPITAL | Age: 68
Discharge: HOME | End: 2023-06-23
Attending: NURSE PRACTITIONER
Payer: MEDICARE

## 2023-06-23 DIAGNOSIS — N81.6 RECTOCELE, FEMALE: ICD-10-CM

## 2023-06-23 DIAGNOSIS — R33.9 INCOMPLETE EMPTYING OF BLADDER: ICD-10-CM

## 2023-06-23 DIAGNOSIS — N81.11 CYSTOCELE, MIDLINE: ICD-10-CM

## 2023-06-23 DIAGNOSIS — J70.0 RADIATION PNEUMONITIS: ICD-10-CM

## 2023-06-23 DIAGNOSIS — Z01.818 PREOP TESTING: ICD-10-CM

## 2023-06-23 DIAGNOSIS — N81.4 UTEROVAGINAL PROLAPSE: ICD-10-CM

## 2023-06-23 PROCEDURE — 25500020 PHARM REV CODE 255: Mod: PO | Performed by: INTERNAL MEDICINE

## 2023-06-23 PROCEDURE — 71260 CT THORAX DX C+: CPT | Mod: 26,,, | Performed by: RADIOLOGY

## 2023-06-23 PROCEDURE — 74177 CT ABD & PELVIS W/CONTRAST: CPT | Mod: TC,PO

## 2023-06-23 PROCEDURE — 71260 CT THORAX DX C+: CPT | Mod: TC,PO

## 2023-06-23 PROCEDURE — 71046 X-RAY EXAM CHEST 2 VIEWS: CPT | Mod: TC,FY,PO

## 2023-06-23 PROCEDURE — 71046 XR CHEST PA AND LATERAL: ICD-10-PCS | Mod: 26,,, | Performed by: RADIOLOGY

## 2023-06-23 PROCEDURE — 71046 X-RAY EXAM CHEST 2 VIEWS: CPT | Mod: 26,,, | Performed by: RADIOLOGY

## 2023-06-23 PROCEDURE — A9698 NON-RAD CONTRAST MATERIALNOC: HCPCS | Mod: PO | Performed by: INTERNAL MEDICINE

## 2023-06-23 PROCEDURE — 74177 CT CHEST ABDOMEN PELVIS WITH CONTRAST (XPD): ICD-10-PCS | Mod: 26,,, | Performed by: RADIOLOGY

## 2023-06-23 PROCEDURE — 74177 CT ABD & PELVIS W/CONTRAST: CPT | Mod: 26,,, | Performed by: RADIOLOGY

## 2023-06-23 PROCEDURE — 71260 CT CHEST ABDOMEN PELVIS WITH CONTRAST (XPD): ICD-10-PCS | Mod: 26,,, | Performed by: RADIOLOGY

## 2023-06-23 PROCEDURE — 76830 US PELVIS COMP WITH TRANSVAG NON-OB (XPD): ICD-10-PCS | Mod: 26,,, | Performed by: RADIOLOGY

## 2023-06-23 PROCEDURE — 76856 US PELVIS COMP WITH TRANSVAG NON-OB (XPD): ICD-10-PCS | Mod: 26,,, | Performed by: RADIOLOGY

## 2023-06-23 PROCEDURE — 76830 TRANSVAGINAL US NON-OB: CPT | Mod: 26,,, | Performed by: RADIOLOGY

## 2023-06-23 PROCEDURE — 76856 US EXAM PELVIC COMPLETE: CPT | Mod: 26,,, | Performed by: RADIOLOGY

## 2023-06-23 PROCEDURE — 76856 US EXAM PELVIC COMPLETE: CPT | Mod: TC,PO

## 2023-06-23 RX ADMIN — IOHEXOL 75 ML: 350 INJECTION, SOLUTION INTRAVENOUS at 08:06

## 2023-06-23 RX ADMIN — IOHEXOL 1000 ML: 9 SOLUTION ORAL at 08:06

## 2023-06-25 NOTE — PROGRESS NOTES
TITLE OF OPERATION:  Complex cystometry.  Electromyography with surface electrodes.  Pressure voiding flow study.  Abdominal pressure measurement.  Leak point pressure measurement.    INDICATIONS:  1)  UI:  (--) JESUS.  (+) UUI.  (--) pads. +rare underwear changes. Daytime frequency: Q 2 hours.  Nocturia: Yes: 3-4/night.   (--) dysuria,  (--) hematuria,  (--) frequent UTIs. Thinks had 2 in last 6 months. Had freq UTIs before last cystocele repair.  (--) complete bladder emptying. PV to help.      2)  POP:  Present x several months. To introitus.  Symptoms:(+)  pressure, worse with exercise/prolonged standing.  (--) vaginal bleeding. (--) vaginal discharge. (--) sexually active.  (--) h/o dyspareunia.  (+)  Vaginal dryness.  (--) vaginal estrogen use.   --POP-Q:  Aa +1; Ba +1; C -2; Ap -1; Bp -1; D -6 to -7.  Genital hiatus 3, perineal body 2, total vaginal length 10-11.  --PVR: 100 mL    3)  BM:  (--) constipation/straining.  (--) chronic diarrhea. (--) hematochezia.  (--) fecal incontinence.  (--) fecal smearing/urgency.  (+) complete evacuation.     PREOPERATIVE DIAGNOSIS:  1. Perianal rash    2. Cystocele, midline    3. Uterovaginal prolapse    4. Rectocele, female    5. Urge urinary incontinence    6. Vaginal atrophy    7. Nocturia more than twice per night      POSTOPERATIVE DIAGNOSIS:  1. Perianal rash    2. Cystocele, midline    3. Uterovaginal prolapse    4. Rectocele, female    5. Urge urinary incontinence    6. Vaginal atrophy    7. Nocturia more than twice per night    8.     Concern for voiding dysfunction  9.     Urodynamic stress incontinence (only with CLPP at max cap and last provocation)    ANESTHESIA:  None.    SPECIMEN (BACTERIOLOGICAL, PATHOLOGICAL OR OTHER):  None.    PROSTHETIC DEVICE/IMPLANT:  None.    SURGEONS NARRATIVE:  A time out was performed in which the patient identity and procedure were confirmed.  Urodynamic evaluation was performed using a computerized system ([employer]  Uroflowmetry was performed on the patient in the sitting position without catheters in place.  Subsequent urodynamic testing was performed with the patient in the lithotomy position at 45 degrees. Air charged catheters were used with sterile water as the infusion medium. Vesical and abdominal (rectal) pressures were measured, and detrusor pressure was calculated. EMG activity was recorded with surface electrodes. During filling, room temperature sterile water was infused at a rate of 30 cubic centimeters per minute. The patient was asked cough after instillation of each 100cc volume. Two Valsalva leak point pressures and two cough leak point pressures were performed with the catheters in place at 300 cubic centimeters and again at maximum capacity. Valsalva leak point pressure was defined as the difference between vesical pressure at which leakage was noted (visualized at the external urethral meatus) and the baseline vesical pressure. Following urodynamic testing, a pressure flow study was performed with the patient in the sitting position. Vesical and abdominal pressures were monitored and detrusor pressures were calculated. After the pressure flow study, the catheters were then removed. The patient tolerated the procedure well.     Urine dipstick: neg.    1.  VOIDING PHASE:      a.  Uroflowmetry:  Unable to perform  PVR:   not measured    The overall configuration of this uroflow study was unable to be interpreted, as patient was unable to void.     b.  Pressure flow:  Prolapse reduction: No  Voided volume:   159 mL  Voiding time:   >9 min  Peak flow:  27 mL/s   Avg flow:  5 mL/s  Max det pressure:  30  cm H20  Det pressure at max flow: negative recording, as pves catheter was dislodged during study  Void initiated by indeterminate mechanism, as pves catheter was dislodged during study.    Urethral relaxation (EMG): with gradual increase.    PVR (calculated):  228 mL.  Patient was able to void additional  400 mL on toilet after removing all catheters.     The overall configuration of this pressure flow study was prolonged with concern for voiding dysfunction.     2.  FILLING PHASE:  1st desire: 275 mL  Normal desire:  321 mL  Strong desire:  375 mL  Urgency:  389 mL  Compliance (calculated)  approx 150 mL/cm H20  EMG activity during filling: with gradual increase  Detrusor contractions observed: No.     3.  URETHRAL FUNCTION/STORAGE PHASE:    a.  WITH prolapse reduction:  CLPP (150 mL): Negative  at  63 cm H20  VLPP (150 mL): Negative  at  42 cm H20   CLPP (335 mL): Negative  at  7 cm H20  VLPP (335 mL): Negative  at  42 cm H20   CLPP (MAX ):    Negative  at  42 cm H20  VLPP (MAX):     Negative  at  45 cm H20  NEG CLPP and VLPP with and without catheter in place on several provocations.   POS CLPP (last provocation) after removal of pves catheter.    These findings are consistent with Positive urodynamic stress incontinence only with CLPP at max cap and last provocation.    Assessment:  UF unable to be interpreted, as patient was unable to void.  PF prolonged with concern for voiding dysfunction (significant as patient could not void for UF and had significant issue starting PF).   Compliance normal.  Max capacity  389 mL.  DO (--).  JESUS (+) only with CLPP at max cap and last provocation.    Plan:  1)  Stage 2 cystocele/rectocele/uterovaginal prolapse:   -- mL  --surgery: scheduled 7-: robotic-assisted vaginal hysterectomy, uterosacral ligament suspension vs sacral colpopexy, possible anterior/posterior repair               --does work out alot  --if surgery: UDS today: +JESUS only at max cap with pves catheter removed; had significant voiding dysfunction during testing; very rare UUI subjectively; would wait for MUS unless really has +JESUS intraop after POP repair; patient understands; she also understands that she may have +JESUS or other types of UI in future, warranting further Tx  --if surgery: pelvic  US 6/2023: EMB 2 mm, Echogenicity within the endometrium, possibly calcification.  --if surgery: clearance per PCP (Dr. Pendleton) + labs (CBC, CMP, T&S)/EKG/CXR     2)  Urge incontinence, mild:  --work on bladder emptying  --Empty bladder every 3 hours.  Empty well: wait a minute, lean forward on toilet.    --Avoid dietary irritants (see sheet).  Keep diary x 3-5 days to determine your irritants.  --KEGELS: do 10 in AM and 10 in PM, holding each x 10 seconds.  When you feel urge to go, STOP, KEGEL, and when urge has passed, then go to bathroom.  Consider PT in future.    --URGE: consider medication in future. takes 2-4 weeks to see if will have effect.  For dry mouth: get sour, sugar free lozenge or gum.    --STRESS:  Pessary vs. Sling.      3)  Vaginal atrophy (dryness): Can use dime-sized amount of coconut oil with finger inside vagina as far as possible, then around opening/inner lips.  Do this at least 3x/week.      4)  Nocturia (nighttime urination):   --stop fluids 2 hours before bed.    --no water by bed  --If have leg swelling:  Elevate feet above chest x 1 hour before bed to get excess fluid off.  Can also use support hose (knee highs).    --work on bladder emptying     5)  Irritated perianal tissue:   --FOR TWO WEEKS: use dime-sized amount of clobetasol ointment with finger around anus before bed.   --AFTER THAT x 2 WEEKS: use dime-sized amount of silvadene cream with finger around anus before bed at least 3x/WEEK.      6)  See preop H&P for full detail   --------------------------------------    Title of Operation:   Cystourethroscopy.     INDICATIONS:  As above    PREOPERATIVE DIAGNOSIS  As above    POSTOPERATIVE DIAGNOSIS:   As above    Anesthesia:   2% Xylocaine gel.    Specimen (Bacteriological, Pathological or other):   None.     Prosthetic Device/Implant:   None.     Surgeons Narrative:     After informed consent was obtained, the patient was placed in the lithotomy position. The urethral meatus was  prepped with Betadine and 10 cubic centimeters of 2% Xylocaine gel were introduced into the urethra. A flexible cystourethroscope was introduced into the bladder. The bladder was distended with approximately 300 cubic centimeters of sterile water. A systematic survey was performed in which the bladder was surveyed using multiple sequential passes in a clockwise fashion from the bladder dome to the bladder base to the urethrovesical junction. The trigone and ureteral orifices were observed. The scope was then flipped back on itself, and the urethrovesical junction was viewed. A vaginal examining finger was then placed with pressure suburethrally at the urethrovesical junction as the telescope was withdrawn in order to perform positive pressure urethroscopy.  Standard maneuvers of cough, squeeze and Valsalva were performed. The telescope was then completely withdrawn.     Findings: Urethroscopy:  Normal.  Cystoscopy:  Normal bladder mucosa, bilateral ureteral flow was noted.     Assessment: Essentially normal cystourethroscopy.      Plan: The patient will follow up with Dr. Wright as scheduled.  See urodynamics note from 6- for further plan details.

## 2023-06-26 ENCOUNTER — OFFICE VISIT (OUTPATIENT)
Dept: UROGYNECOLOGY | Facility: CLINIC | Age: 68
End: 2023-06-26
Payer: MEDICARE

## 2023-06-26 ENCOUNTER — ANESTHESIA EVENT (OUTPATIENT)
Dept: SURGERY | Facility: OTHER | Age: 68
End: 2023-06-26
Payer: MEDICARE

## 2023-06-26 ENCOUNTER — HOSPITAL ENCOUNTER (OUTPATIENT)
Dept: PREADMISSION TESTING | Facility: OTHER | Age: 68
Discharge: HOME | End: 2023-06-26
Attending: OBSTETRICS & GYNECOLOGY
Payer: MEDICARE

## 2023-06-26 ENCOUNTER — PROCEDURE VISIT (OUTPATIENT)
Dept: UROGYNECOLOGY | Facility: CLINIC | Age: 68
End: 2023-06-26
Payer: MEDICARE

## 2023-06-26 VITALS
BODY MASS INDEX: 19.78 KG/M2 | HEART RATE: 72 BPM | DIASTOLIC BLOOD PRESSURE: 72 MMHG | HEIGHT: 67 IN | WEIGHT: 126 LBS | TEMPERATURE: 98 F | OXYGEN SATURATION: 97 % | RESPIRATION RATE: 16 BRPM | SYSTOLIC BLOOD PRESSURE: 139 MMHG

## 2023-06-26 VITALS
SYSTOLIC BLOOD PRESSURE: 104 MMHG | DIASTOLIC BLOOD PRESSURE: 80 MMHG | BODY MASS INDEX: 20.38 KG/M2 | HEIGHT: 67 IN | BODY MASS INDEX: 20.38 KG/M2 | HEIGHT: 67 IN | WEIGHT: 129.88 LBS | WEIGHT: 129.88 LBS

## 2023-06-26 DIAGNOSIS — Z01.818 PRE-OP TESTING: ICD-10-CM

## 2023-06-26 DIAGNOSIS — N39.3 STRESS INCONTINENCE: ICD-10-CM

## 2023-06-26 DIAGNOSIS — N81.11 CYSTOCELE, MIDLINE: ICD-10-CM

## 2023-06-26 DIAGNOSIS — N95.2 VAGINAL ATROPHY: ICD-10-CM

## 2023-06-26 DIAGNOSIS — N81.6 RECTOCELE: ICD-10-CM

## 2023-06-26 DIAGNOSIS — N81.4 UTEROVAGINAL PROLAPSE: ICD-10-CM

## 2023-06-26 DIAGNOSIS — Z29.9 PROPHYLACTIC MEASURE: Primary | ICD-10-CM

## 2023-06-26 DIAGNOSIS — N81.4 UTEROVAGINAL PROLAPSE: Primary | ICD-10-CM

## 2023-06-26 LAB
BILIRUB SERPL-MCNC: NORMAL MG/DL
BLOOD URINE, POC: NORMAL
CLARITY, POC UA: CLEAR
COLOR, POC UA: YELLOW
GLUCOSE UR QL STRIP: NORMAL
KETONES UR QL STRIP: NORMAL
LEUKOCYTE ESTERASE URINE, POC: NORMAL
NITRITE, POC UA: NORMAL
PH, POC UA: 7
PROTEIN, POC: NORMAL
SPECIFIC GRAVITY, POC UA: 1.01
UROBILINOGEN, POC UA: NORMAL

## 2023-06-26 PROCEDURE — 51797 PR VOIDING PRESS STUDY INTRA-ABDOMINAL VOID: ICD-10-PCS | Mod: S$GLB,,, | Performed by: OBSTETRICS & GYNECOLOGY

## 2023-06-26 PROCEDURE — 51728 CYSTOMETROGRAM W/VP: CPT | Mod: S$GLB,,, | Performed by: OBSTETRICS & GYNECOLOGY

## 2023-06-26 PROCEDURE — 51797 INTRAABDOMINAL PRESSURE TEST: CPT | Mod: S$GLB,,, | Performed by: OBSTETRICS & GYNECOLOGY

## 2023-06-26 PROCEDURE — 81002 POCT URINE DIPSTICK WITHOUT MICROSCOPE: ICD-10-PCS | Mod: S$GLB,,, | Performed by: OBSTETRICS & GYNECOLOGY

## 2023-06-26 PROCEDURE — 51784 PR ANAL/URINARY MUSCLE STUDY: ICD-10-PCS | Mod: 51,S$GLB,, | Performed by: OBSTETRICS & GYNECOLOGY

## 2023-06-26 PROCEDURE — 52000 PR CYSTOURETHROSCOPY: ICD-10-PCS | Mod: 59,S$GLB,, | Performed by: OBSTETRICS & GYNECOLOGY

## 2023-06-26 PROCEDURE — 81002 URINALYSIS NONAUTO W/O SCOPE: CPT | Mod: S$GLB,,, | Performed by: OBSTETRICS & GYNECOLOGY

## 2023-06-26 PROCEDURE — 99999 PR PBB SHADOW E&M-EST. PATIENT-LVL III: ICD-10-PCS | Mod: PBBFAC,,, | Performed by: NURSE PRACTITIONER

## 2023-06-26 PROCEDURE — 99999 PR PBB SHADOW E&M-EST. PATIENT-LVL III: CPT | Mod: PBBFAC,,, | Performed by: NURSE PRACTITIONER

## 2023-06-26 PROCEDURE — 99499 UNLISTED E&M SERVICE: CPT | Mod: S$GLB,,, | Performed by: NURSE PRACTITIONER

## 2023-06-26 PROCEDURE — 52000 CYSTOURETHROSCOPY: CPT | Mod: 59,S$GLB,, | Performed by: OBSTETRICS & GYNECOLOGY

## 2023-06-26 PROCEDURE — 99499 NO LOS: ICD-10-PCS | Mod: S$GLB,,, | Performed by: NURSE PRACTITIONER

## 2023-06-26 PROCEDURE — 51728 PR COMPLEX CYSTOMETROGRAM VOIDING PRESSURE STUDIES: ICD-10-PCS | Mod: S$GLB,,, | Performed by: OBSTETRICS & GYNECOLOGY

## 2023-06-26 PROCEDURE — 51784 ANAL/URINARY MUSCLE STUDY: CPT | Mod: 51,S$GLB,, | Performed by: OBSTETRICS & GYNECOLOGY

## 2023-06-26 RX ORDER — FAMOTIDINE 20 MG/1
20 TABLET, FILM COATED ORAL
Status: CANCELLED | OUTPATIENT
Start: 2023-06-26 | End: 2023-06-26

## 2023-06-26 RX ORDER — SODIUM CHLORIDE, SODIUM LACTATE, POTASSIUM CHLORIDE, CALCIUM CHLORIDE 600; 310; 30; 20 MG/100ML; MG/100ML; MG/100ML; MG/100ML
INJECTION, SOLUTION INTRAVENOUS CONTINUOUS
Status: CANCELLED | OUTPATIENT
Start: 2023-06-26

## 2023-06-26 RX ORDER — LIDOCAINE HYDROCHLORIDE 20 MG/ML
JELLY TOPICAL
Status: COMPLETED | OUTPATIENT
Start: 2023-06-26 | End: 2023-06-26

## 2023-06-26 RX ORDER — ACETAMINOPHEN 325 MG/1
975 TABLET ORAL
Status: CANCELLED | OUTPATIENT
Start: 2023-06-26 | End: 2023-06-26

## 2023-06-26 RX ORDER — CIPROFLOXACIN 500 MG/1
500 TABLET ORAL
Status: COMPLETED | OUTPATIENT
Start: 2023-06-26 | End: 2023-06-26

## 2023-06-26 RX ORDER — LIDOCAINE HYDROCHLORIDE 10 MG/ML
0.5 INJECTION, SOLUTION EPIDURAL; INFILTRATION; INTRACAUDAL; PERINEURAL ONCE
Status: CANCELLED | OUTPATIENT
Start: 2023-06-26 | End: 2023-06-26

## 2023-06-26 RX ADMIN — CIPROFLOXACIN 500 MG: 500 TABLET ORAL at 04:06

## 2023-06-26 RX ADMIN — LIDOCAINE HYDROCHLORIDE: 20 JELLY TOPICAL at 04:06

## 2023-06-26 NOTE — DISCHARGE INSTRUCTIONS
Information to Prepare you for your Surgery    PRE-ADMIT TESTING -  [phone]    [address] [facility]          Your surgery has been scheduled at [facility]. We are pleased to have the opportunity to serve you. For Further Information please call [phone].    On the day of surgery please report to the Information Desk on the 1st floor.    CONTACT YOUR PHYSICIAN'S OFFICE THE DAY PRIOR TO YOUR SURGERY TO OBTAIN YOUR ARRIVAL TIME.     The evening before surgery do not eat anything after 9 p.m. ( this includes hard candy, chewing gum and mints).  You may only have GATORADE, POWERADE AND WATER  from 9 p.m. until you leave your home.   DO NOT DRINK ANY LIQUIDS ON THE WAY TO THE HOSPITAL.      Why does your anesthesiologist allow you to drink Gatorade/Powerade before surgery?  Gatorade/Powerade helps to increase your comfort before surgery and to decrease your nausea after surgery. The carbohydrates in Gatorade/Powerade help reduce your body's stress response to surgery.  If you are a diabetic-drink only water prior to surgery.       Patients may have 2 visitors pre and post procedure. Only 2 visitors will be allowed in the Surgical building with the patient. No one under the age of 12 will be allowed into the facility.    SPECIAL MEDICATION INSTRUCTIONS: TAKE medications checked off by the Anesthesiologist on your Medication List.    Angiogram Patients: Take medications as instructed by your physician, including aspirin.     Surgery Patients:    If you take ASPIRIN - Your PHYSICIAN/SURGEON will need to inform you IF/OR when you need to stop taking aspirin prior to your surgery.     The week prior to surgery do not ot take any medications containing IBUPROFEN or NSAIDS ( Advil, Motrin, Goodys, BC, Aleve, Naproxen etc) If you are not sure if you should take a medicine please call your surgeon's office.  Ok to take Tylenol    Do Not Wear any make-up  (especially eye make-up) to surgery. Please remove any false eyelashes or eyelash extensions. If you arrive the day of surgery with makeup/eyelashes on you will be required to remove prior to surgery. (There is a risk of corneal abrasions if eye makeup/eyelash extensions are not removed)      Leave all valuables at home.   Do Not wear any jewelry or watches, including any metal in body piercings. Jewelry must be removed prior to coming to the hospital.  There is a possibility that rings that are unable to be removed may be cut off if they are on the surgical extremity.    Please remove all hair extensions, wigs, clips and any other metal accessories/ ornaments from your hair.  These items may pose a flammable/fire risk in Surgery and must be removed.    Do not shave your surgical area at least 5 days prior to your surgery. The surgical prep will be performed at the hospital according to Infection Control regulations.    Contact Lens must be removed before surgery. Either do not wear the contact lens or bring a case and solution for storage.  Please bring a container for eyeglasses or dentures as required.  Bring any paperwork your physician has provided, such as consent forms,  history and physicals, doctor's orders, etc.   Bring comfortable clothes that are loose fitting to wear upon discharge. Take into consideration the type of surgery being performed.  Maintain your diet as advised per your physician the day prior to surgery.      Adequate rest the night before surgery is advised.   Park in the Parking lot behind the hospital or in the [facility] across the street from the parking lot. Parking is complimentary.  If you will be discharged the same day as your procedure, please arrange for a responsible adult to drive you home or to accompany you if traveling by taxi.   YOU WILL NOT BE PERMITTED TO DRIVE OR TO LEAVE THE HOSPITAL ALONE AFTER SURGERY.   If you are being discharged the same day, it is  strongly recommended that you arrange for someone to remain with you for the first 24 hrs following your surgery.    The Surgeon will speak to your family/visitor after your surgery regarding the outcome of your surgery and post op care.  The Surgeon may speak to you after your surgery, but there is a possibility you may not remember the details.  Please check with your family members regarding the conversation with the Surgeon.    We strongly recommend whoever is bringing you home be present for discharge instructions.  This will ensure a thorough understanding for your post op home care.    ALL CHILDREN MUST ALWAYS BE ACCOMPANIED BY AN ADULT.    Visitors-Refer to current Visitor policy handouts.    Thank you for your cooperation.  The Staff of [facility].            Bathing Instructions with Hibiclens    Shower the evening before and morning of your procedure with Chlorhexidine (Hibiclens)  do not use Chlorhexidine on your face or genitals. Do not get in your eyes.  Wash your face with water and your regular face wash/soap  Use your regular shampoo  Apply Chlorhexidine (Hibiclens) directly on your skin or on a wet washcloth and wash gently. When showering: Move away from the shower stream when applying Chlorhexidine (Hibiclens) to avoid rinsing off too soon.  Rinse thoroughly with warm water  Do not dilute Chlorhexidine (Hibiclens)   Dry off as usual, do not use any deodorant, powder, body lotions, perfume, after shave or cologne.

## 2023-06-26 NOTE — PROGRESS NOTES
Urogyn follow up  06/26/2023  .  [facility] - UROGYNECOLOGY  [address] [address]    Ashlie Redman  [id number]  1955      Ashlie Redman is a 68 y.o.  here for preop visit.    Last HPI from 04/19/2023  1)  UI:  (--) JESUS.  (+) UUI.  (--) pads. +rare underwear changes. Daytime frequency: Q 2 hours.  Nocturia: Yes: 3-4/night.   (--) dysuria,  (--) hematuria,  (--) frequent UTIs. Thinks had 2 in last 6 months. Had freq UTIs before last cystocele repair.  (--) complete bladder emptying. PV to help.      2)  POP:  Present x several months. To introitus.  Symptoms:(+)  pressure, worse with exercise/prolonged standing.  (--) vaginal bleeding. (--) vaginal discharge. (--) sexually active.  (--) h/o dyspareunia.  (+)  Vaginal dryness.  (--) vaginal estrogen use.   POP-Q:  Aa +1; Ba +1; C -2; Ap -1; Bp -1; D -6 to -7.  Genital hiatus 3, perineal body 2, total vaginal length 10-11.   mL     3)  BM:  (--) constipation/.  (--) chronic diarrhea. (--) hematochezia.  (--) fecal incontinence.  (--) fecal smearing/urgency.  (+) complete evacuation.     Changes since last visit:  Ready to proceed with surgery       06/26/2023  Suds  Urine dipstick: neg.     1.  VOIDING PHASE:       a.  Uroflowmetry:  Unable to perform  PVR:   not measured     The overall configuration of this uroflow study was unable to be interpreted, as patient was unable to void.      b.  Pressure flow:  Prolapse reduction: No  Voided volume:   159 mL  Voiding time:   >9 min  Peak flow:  27 mL/s   Avg flow:  5 mL/s  Max det pressure:  30  cm H20  Det pressure at max flow: negative recording, as pves catheter was dislodged during study  Void initiated by indeterminate mechanism, as pves catheter was dislodged during study.    Urethral relaxation (EMG): with gradual increase.    PVR (calculated):  228 mL.  Patient was able to void additional 400 mL on toilet after removing all catheters.      The overall configuration of this  pressure flow study was prolonged with concern for voiding dysfunction.      2.  FILLING PHASE:  1st desire: 275 mL  Normal desire:  321 mL  Strong desire:  375 mL  Urgency:  389 mL  Compliance (calculated)  approx 150 mL/cm H20  EMG activity during filling: with gradual increase  Detrusor contractions observed: No.      3.  URETHRAL FUNCTION/STORAGE PHASE:     a.  WITH prolapse reduction:  CLPP (150 mL): Negative  at  63 cm H20  VLPP (150 mL): Negative  at  42 cm H20   CLPP (335 mL): Negative  at  7 cm H20  VLPP (335 mL): Negative  at  42 cm H20   CLPP (MAX ):    Negative  at  42 cm H20  VLPP (MAX):     Negative  at  45 cm H20  NEG CLPP and VLPP with and without catheter in place on several provocations.   POS CLPP (last provocation) after removal of pves catheter.     These findings are consistent with Positive urodynamic stress incontinence only with CLPP at max cap and last provocation.    Cysto  Findings: Urethroscopy:  Normal.  Cystoscopy:  Normal bladder mucosa, bilateral ureteral flow was noted.     Assessment: Essentially normal cystourethroscopy.       06/23/2023  Pelvic ultrasound  Uterus:Size: 5.5 x 3.4 x 1.7 cm   Masses: None   Endometrium: Normal thickness in this post menopausal patient, measuring 2 mm.  Echogenicity within the endometrium, possibly calcification.   The ovaries are not identified.   Free Fluid: None.   Impression:   Probable endometrial calcification.  Correlate for history of endometrial procedure such as ablation.  Normal endometrial thickness.   The ovaries are not identified.                                                    Past Medical History:   Diagnosis Date    Allergy     Anemia     Arthritis     back    Asteroid hyalosis of both eyes     Basal cell carcinoma     L. dorsal forearm     Breast cancer 04/2017    Left    Cataract     Chemotherapy adverse reaction     Lung Damage    Diverticulosis     Encounter for blood transfusion     Glaucoma     High myopia     Osteopenia      Osteoporosis     PONV (postoperative nausea and vomiting)     S/P dilation and curettage      --breast cancer (followed by Dr. Ragsdale in heme/onc, last visit 12/2022):   treated for a  4/12/2017 diagnosis of Stage IIB left breast carcinoma.ER/OK negative. Her - 2 positive. Recd Neoadjuvant A/C ,completed 12 cycles weekly taxol and  herceptin/ perjeta had lumpectomy was on [employer] trial completed the entire year of herceptin /perjeta, but didn't undergo axillary xrt per trial  Here for f/u with pet and labs recnt mammogram showed calcifications , that were biospied and came back negative radiation induced interstitial findings still having the cough but getting of her upper respiratory infection today some neuropathy post rx still persisits had a difficult summer 2019. Had removal of her original implant for pain in the area, after removal pt developed pseudomonas infection stayed in hospital  For a week. She sees Dr. Modi, had second reconstruction 10 2020 had a deep flap done. Saw Dr Rodriguez rt breast mammo 9/2020  --chemo + rad SE: Follow-up cardiac catheterization.  Nonobstructive coronary artery disease.  Right left heart pressures normal.  No evidence of restriction, constriction.  Ejection fraction normal.  History of breast cancer status post chemo radiation therapy.  Gabriela treatment radiation pneumonitis.   Past Surgical History:   Procedure Laterality Date    ANGIOGRAM, CORONARY, WITH LEFT HEART CATHETERIZATION Left 09/02/2021    Procedure: ANGIOGRAM,CORONARY,WITH LEFT HEART CATHETERIZATION;  Surgeon: Rubin Hui MD;  Location: [facility] CATH/EP LAB;  Service: Cardiology;  Laterality: Left;    bladder lift  2018    [facility]    breast augmentation  03/2013    BREAST BIOPSY Left 04/2017    Core bx,+ cancer    BREAST CAPSULECTOMY Left 08/05/2019    Procedure: CAPSULECTOMY, BREAST;  Surgeon: Diego Modi MD;  Location: [facility] [place] OR;  Service: General;  Laterality: Left;    BREAST LUMPECTOMY Left  10/25/2017    BREAST RECONSTRUCTION Bilateral 08/21/2019    Procedure: RECONSTRUCTION, BREAST WITH ALLODERM;  Surgeon: Diego Modi MD;  Location: [facility];  Service: General;  Laterality: Bilateral;    BREAST RECONSTRUCTION Left     BREAST REVISION SURGERY Bilateral 08/05/2019    Procedure: BREAST REVISION SURGERY;  Surgeon: Diego Modi MD;  Location: [facility];  Service: General;  Laterality: Bilateral;    BREAST REVISION SURGERY Left 04/05/2021    Procedure: BREAST REVISION SURGERY - LEFT BREAST RECONSTRUCTION REVISION;  Surgeon: Diego Modi MD;  Location: [facility];  Service: General;  Laterality: Left;    CATARACT EXTRACTION W/  INTRAOCULAR LENS IMPLANT Bilateral 2009    COLONOSCOPY  4/2009, 2015    repeat in 5 years    COLONOSCOPY N/A 12/22/2020    Procedure: COLONOSCOPY;  Surgeon: Praneeth Leblanc MD;  Location: [facility];  Service: Endoscopy;  Laterality: N/A;    COSMETIC SURGERY      CYSTOCELE REPAIR N/A 11/20/2018    Procedure: REPAIR, CYSTOCELE;  Surgeon: Rebecca Acosta MD;  [facility] 2[facility] F[facility];  Service: Urology;  Laterality: N/A;  1.5 hours    CYSTOSCOPY N/A 11/20/2018    Procedure: CYSTOSCOPY;  Surgeon: Rebecca Acosta MD;  Location: [facility] 2ND FLR;  Service: Urology;  Laterality: N/A;    DILATION AND CURETTAGE OF UTERUS      ECTOPIC PREGNANCY SURGERY      EYE SURGERY Bilateral 03/2009    Vitrectomy     MASTECTOMY Left     MASTOPEXY Right 04/05/2021    Procedure: MASTOPEXY - RIGHT MASTOPEXY LIPOSUCTION WAIST AND THIGHS, FAT TRANSFER TO BREAST;  Surgeon: Diego Modi MD;  Location: [facility];  Service: General;  Laterality: Right;    POLYPECTOMY      x3 2001    RECONSTRUCTION OF BREAST WITH DEEP INFERIOR EPIGASTRIC ARTERY  (SUSAN) FLAP Left 10/26/2020    Procedure: RECONSTRUCTION, BREAST, USING SUSAN SKIN FLAP - LEFT BREAST RECONSTRUCTION WITH STACKED SUSAN FREE FLAPS.;  Surgeon: Diego Modi MD;  Location: [facility];  Service: General;  Laterality: Left;     REMOVAL OF BREAST IMPLANT Bilateral 08/05/2019    Procedure: REMOVAL, IMPLANT, BREAST;  Surgeon: Diego Modi MD;  Location: [facility];  Service: General;  Laterality: Bilateral;    REMOVAL OF BREAST IMPLANT Left 08/30/2019    Procedure: REMOVAL, IMPLANT, BREAST;  Surgeon: Diego Modi MD;  Location: [facility] OR;  Service: General;  Laterality: Left;    REMOVAL OF BREAST IMPLANT Right 10/26/2020    Procedure: REMOVAL, IMPLANT, BREAST -   RIGHT BREAST IMPLANT REMOVAL;  Surgeon: Diego Modi MD;  Location: [facility] OR;  Service: General;  Laterality: Right;    Yag Capsulotomy Bilateral 12/2014     --route of ectopic surgery: Pfannenstiel; had salpingectomy due to rupture-unknown side; c/b blood transfusion  --2018: anterior repair ([place]  Family History   Problem Relation Age of Onset    Miscarriages / Stillbirths Mother     Hyperlipidemia Mother     Hearing loss Mother     Arthritis Mother     Colon cancer Mother         hospice    Cataracts Mother     Hypertension Mother     Thyroid disease Mother     Cancer Mother         colon    Hypertension Father     Hyperlipidemia Father     Heart disease Father         passed    Colon cancer Sister     Early death Sister     Cancer Sister         rectal; passed    Arthritis Brother     Mental retardation Brother     Learning disabilities Brother     Early death Brother     Birth defects Brother     Pancreatic cancer Maternal Grandmother     Glaucoma Maternal Grandmother     Breast cancer Cousin     Cancer Cousin         breast    Amblyopia Neg Hx     Blindness Neg Hx     Diabetes Neg Hx     Macular degeneration Neg Hx     Retinal detachment Neg Hx     Strabismus Neg Hx     Stroke Neg Hx        Social History     Socioeconomic History    Marital status:     Number of children: 2   Tobacco Use    Smoking status: Never    Smokeless tobacco: Never   Substance and Sexual Activity    Alcohol use: Yes     Alcohol/week: 1.0 standard drink     Types: 1 Glasses of wine  per week     Comment: social    Drug use: No    Sexual activity: Not Currently     Partners: Male     Birth control/protection: Post-menopausal   Other Topics Concern    Are you pregnant or think you may be? No   Social History Narrative    Pt resides alone, has 2 children, 1 grandchild. Hobbies exercise, cycling, gardening, concerts.     Social Determinants of Health     Financial Resource Strain: Low Risk     Difficulty of Paying Living Expenses: Not hard at all   Food Insecurity: No Food Insecurity    Worried About Running Out of Food in the Last Year: Never true    Ran Out of Food in the Last Year: Never true   Transportation Needs: No Transportation Needs    Lack of Transportation (Medical): No    Lack of Transportation (Non-Medical): No   Physical Activity: Sufficiently Active    Days of Exercise per Week: 6 days    Minutes of Exercise per Session: 30 min   Stress: No Stress Concern Present    Feeling of Stress : Only a little   Social Connections: Unknown    Frequency of Communication with Friends and Family: Twice a week    Frequency of Social Gatherings with Friends and Family: Once a week    Active Member of Clubs or Organizations: Yes    Attends Club or Organization Meetings: More than 4 times per year    Marital Status:    Housing Stability: Low Risk     Unable to Pay for Housing in the Last Year: No    Number of Places Lived in the Last Year: 1    Unstable Housing in the Last Year: No       Current Outpatient Medications   Medication Sig Dispense Refill    calcium carb/vit D3/minerals (CALCIUM-VITAMIN D ORAL)       denosumab (PROLIA) 60 mg/mL Syrg Inject 60 mg into the skin.      dorzolamide (TRUSOPT) 2 % ophthalmic solution INSTILL 1 DROP INTO BOTH EYES 3 TIMES A DAY 30 mL 4    latanoprost 0.005 % ophthalmic solution INSTILL 1 DROP INTO BOTH EYES IN THE EVENING 7.5 mL 3    valACYclovir (VALTREX) 500 MG tablet Take 1 tablet (500 mg total) by mouth once daily. 90 tablet 1     No current  "facility-administered medications for this visit.     Facility-Administered Medications Ordered in Other Visits   Medication Dose Route Frequency Provider Last Rate Last Admin    LIDOcaine-EPINEPHrine 1%-1:100,000 30 mL, EPINEPHrine 2 mg in lactated Ringers 1,000 mL irrigation   Irrigation On Call Procedure Diego Modi MD        LIDOcaine-EPINEPHrine 1%-1:100,000 30 mL, EPINEPHrine 2 mg in lactated Ringers 1,000 mL irrigation   Irrigation On Call Procedure Diego Modi MD           Review of patient's allergies indicates:   Allergen Reactions    Bactrim [sulfamethoxazole-trimethoprim] Rash     Fever, vomiting       Well woman:  Pap:  Mammo:  Colonoscopy:  Dexa:    ROS:  As per HPI.      Exam  /80 (BP Location: Right arm, Patient Position: Sitting, BP Method: Medium (Manual))   Ht 5' 7" (1.702 m)   Wt 58.9 kg (129 lb 13.6 oz)   BMI 20.34 kg/m²   General: alert and oriented, no acute distress  Respiratory: normal respiratory effort  Abd: soft, non-tender, non-distended    Pelvic--deferred    Impression  1. Uterovaginal prolapse        2. Rectocele        3. Cystocele, midline        4. Vaginal atrophy          We reviewed the above issues and discussed options for short-term versus long-term management of her problems.   Plan:     Patient consented with Dr. Wright for robotic assisted laparoscopic hysterectomy and sacrocolpopexy with synthetic mesh, possible anterior/posterior repair with perineorrhaphy, possible uterosacral suspension, possible sacrospinous fixation, possible laparotomy, possible placement of synthetic midurethral sling, (will check for occult leakage in OR after repairs), and cystourethroscopy.   R/B/A reviewed. Specific risks reviewed include:  infection, bleeding, need for blood transfusion, damage to surrounding structures, anesthesia risks, death, heart attack, stroke, mesh erosion/extrusion, pain, dyspareunia, urinary retention, voiding dysfunction, urinary incontinence, " exacerbation of urinary urge incontinence, and need for further surgeries.  We reviewed potential for failure of POP defect repair and need for future surgery, with no way of predicting risk.  She understands success rate of ASC approaches 85%.  Success rate of midurethral sling for JESUS was reviewed as 80-85%, and she understands that this will not necessarily impact other types of urinary incontinence.  Alternatives reviewed include: pessary/PT for POP and pessary/periurethral injections/PT/medication for JESUS.    UDS today: +JESUS only at max cap with pves catheter removed; had significant voiding dysfunction during testing; very rare UUI subjectively; would wait for MUS unless really has +JESUS intraop after POP repair; patient understands; she also understands that she may have +JESUS or other types of UI in future, warranting further Tx  T&S, urine HCG on DOS  Preoperative appointment with PCP or cardiology: Yes -scheduled for 06/29/23  VTE Prophylaxis:  heparin 5000 u SQ TID (1st dose 2hrs preop) + SCDs  Patient instructed on bowel prep and chlorahexadine/dial soap prep to perform day before & AM of surgery.   Proceed to OR for above-mentioned procedure.    ---ADDENDUM:  7/01/23---Low risk for low-moderate risk procedure. May proceed with surgery     20 minutes were spent in face to face time with this patient  90 % of this time was spent in counseling and/or coordination of care      MARGE Vanessa-BC [facility]  Division of Female Pelvic Medicine and Reconstructive Surgery  Department of Obstetrics & Gynecology

## 2023-06-26 NOTE — ANESTHESIA PREPROCEDURE EVALUATION
06/26/2023  Ashlie Redman is a 68 y.o., female.      Pre-op Assessment    I have reviewed the Patient Summary Reports.     I have reviewed the Nursing Notes. I have reviewed the NPO Status.   I have reviewed the Medications.     Review of Systems  Anesthesia Hx:  No problems with previous Anesthesia    Social:  Non-Smoker    Hematology/Oncology:         -- Anemia: --  Cancer in past history:  Breast left axillary node dissection no lymphedema chemotherapy, radiation and surgery    Cardiovascular:   CAD    Denies Angina.    Pulmonary:  Pulmonary Normal  Denies Shortness of breath.    Renal/:  Renal/ Normal     Hepatic/GI:  Hepatic/GI Normal    Neurological:   Neuromuscular Disease,    Endocrine:  Endocrine Normal    Psych:  Psychiatric Normal           Physical Exam  General: Well nourished, Alert and Cooperative    Airway:  Mallampati: II   Mouth Opening: Normal  TM Distance: Normal  Tongue: Normal  Neck ROM: Normal ROM    Dental:  Intact        Anesthesia Plan  Type of Anesthesia, risks & benefits discussed:    Anesthesia Type: Gen ETT  Intra-op Monitoring Plan: Standard ASA Monitors  Post Op Pain Control Plan: multimodal analgesia  Induction:  IV  Airway Plan: Video  Informed Consent: Informed consent signed with the Patient and all parties understand the risks and agree with anesthesia plan.  All questions answered.   ASA Score: 2  Anesthesia Plan Notes: Very active lady , does jazzercise 5 days/week..  Has recent labs, T&S ordered.  Had recent wellness visit, march 2023, with PCP, all issues addressed.  Notes in epic.    Devices on the right.    Ready For Surgery From Anesthesia Perspective.     .

## 2023-06-26 NOTE — H&P (VIEW-ONLY)
Urogyn follow up  06/26/2023  .  [facility] - UROGYNECOLOGY  [address] [address]    Ashlie Redman  [id number]  1955      Ashlie Redman is a 68 y.o.  here for preop visit.    Last HPI from 04/19/2023  1)  UI:  (--) JESUS.  (+) UUI.  (--) pads. +rare underwear changes. Daytime frequency: Q 2 hours.  Nocturia: Yes: 3-4/night.   (--) dysuria,  (--) hematuria,  (--) frequent UTIs. Thinks had 2 in last 6 months. Had freq UTIs before last cystocele repair.  (--) complete bladder emptying. PV to help.      2)  POP:  Present x several months. To introitus.  Symptoms:(+)  pressure, worse with exercise/prolonged standing.  (--) vaginal bleeding. (--) vaginal discharge. (--) sexually active.  (--) h/o dyspareunia.  (+)  Vaginal dryness.  (--) vaginal estrogen use.   POP-Q:  Aa +1; Ba +1; C -2; Ap -1; Bp -1; D -6 to -7.  Genital hiatus 3, perineal body 2, total vaginal length 10-11.   mL     3)  BM:  (--) constipation/.  (--) chronic diarrhea. (--) hematochezia.  (--) fecal incontinence.  (--) fecal smearing/urgency.  (+) complete evacuation.     Changes since last visit:  Ready to proceed with surgery       06/26/2023  Suds  Urine dipstick: neg.     1.  VOIDING PHASE:       a.  Uroflowmetry:  Unable to perform  PVR:   not measured     The overall configuration of this uroflow study was unable to be interpreted, as patient was unable to void.      b.  Pressure flow:  Prolapse reduction: No  Voided volume:   159 mL  Voiding time:   >9 min  Peak flow:  27 mL/s   Avg flow:  5 mL/s  Max det pressure:  30  cm H20  Det pressure at max flow: negative recording, as pves catheter was dislodged during study  Void initiated by indeterminate mechanism, as pves catheter was dislodged during study.    Urethral relaxation (EMG): with gradual increase.    PVR (calculated):  228 mL.  Patient was able to void additional 400 mL on toilet after removing all catheters.      The overall configuration of this  Patient called and stated she has 2 pills left and is requesting this to be sent ASAP.     Please call patient once sent.    pressure flow study was prolonged with concern for voiding dysfunction.      2.  FILLING PHASE:  1st desire: 275 mL  Normal desire:  321 mL  Strong desire:  375 mL  Urgency:  389 mL  Compliance (calculated)  approx 150 mL/cm H20  EMG activity during filling: with gradual increase  Detrusor contractions observed: No.      3.  URETHRAL FUNCTION/STORAGE PHASE:     a.  WITH prolapse reduction:  CLPP (150 mL): Negative  at  63 cm H20  VLPP (150 mL): Negative  at  42 cm H20   CLPP (335 mL): Negative  at  7 cm H20  VLPP (335 mL): Negative  at  42 cm H20   CLPP (MAX ):    Negative  at  42 cm H20  VLPP (MAX):     Negative  at  45 cm H20  NEG CLPP and VLPP with and without catheter in place on several provocations.   POS CLPP (last provocation) after removal of pves catheter.     These findings are consistent with Positive urodynamic stress incontinence only with CLPP at max cap and last provocation.    Cysto  Findings: Urethroscopy:  Normal.  Cystoscopy:  Normal bladder mucosa, bilateral ureteral flow was noted.     Assessment: Essentially normal cystourethroscopy.       06/23/2023  Pelvic ultrasound  Uterus:Size: 5.5 x 3.4 x 1.7 cm   Masses: None   Endometrium: Normal thickness in this post menopausal patient, measuring 2 mm.  Echogenicity within the endometrium, possibly calcification.   The ovaries are not identified.   Free Fluid: None.   Impression:   Probable endometrial calcification.  Correlate for history of endometrial procedure such as ablation.  Normal endometrial thickness.   The ovaries are not identified.                                                    Past Medical History:   Diagnosis Date    Allergy     Anemia     Arthritis     back    Asteroid hyalosis of both eyes     Basal cell carcinoma     L. dorsal forearm     Breast cancer 04/2017    Left    Cataract     Chemotherapy adverse reaction     Lung Damage    Diverticulosis     Encounter for blood transfusion     Glaucoma     High myopia     Osteopenia      Osteoporosis     PONV (postoperative nausea and vomiting)     S/P dilation and curettage      --breast cancer (followed by Dr. Ragsdale in heme/onc, last visit 12/2022):   treated for a  4/12/2017 diagnosis of Stage IIB left breast carcinoma.ER/ID negative. Her - 2 positive. Recd Neoadjuvant A/C ,completed 12 cycles weekly taxol and  herceptin/ perjeta had lumpectomy was on [employer] trial completed the entire year of herceptin /perjeta, but didn't undergo axillary xrt per trial  Here for f/u with pet and labs recnt mammogram showed calcifications , that were biospied and came back negative radiation induced interstitial findings still having the cough but getting of her upper respiratory infection today some neuropathy post rx still persisits had a difficult summer 2019. Had removal of her original implant for pain in the area, after removal pt developed pseudomonas infection stayed in hospital  For a week. She sees Dr. Modi, had second reconstruction 10 2020 had a deep flap done. Saw Dr Rodriguez rt breast mammo 9/2020  --chemo + rad SE: Follow-up cardiac catheterization.  Nonobstructive coronary artery disease.  Right left heart pressures normal.  No evidence of restriction, constriction.  Ejection fraction normal.  History of breast cancer status post chemo radiation therapy.  Gabriela treatment radiation pneumonitis.   Past Surgical History:   Procedure Laterality Date    ANGIOGRAM, CORONARY, WITH LEFT HEART CATHETERIZATION Left 09/02/2021    Procedure: ANGIOGRAM,CORONARY,WITH LEFT HEART CATHETERIZATION;  Surgeon: Rubin Hui MD;  Location: [facility] CATH[facility]EP LAB;  Service: Cardiology;  Laterality: Left;    bladder lift  2018    [facility]    breast augmentation  03/2013    BREAST BIOPSY Left 04/2017    Core bx,+ cancer    BREAST CAPSULECTOMY Left 08/05/2019    Procedure: CAPSULECTOMY, BREAST;  Surgeon: Diego Modi MD;  Location: [facility] OR;  Service: General;  Laterality: Left;    BREAST LUMPECTOMY Left  10/25/2017    BREAST RECONSTRUCTION Bilateral 08/21/2019    Procedure: RECONSTRUCTION, BREAST WITH ALLODERM;  Surgeon: Diego Modi MD;  Location: [facility];  Service: General;  Laterality: Bilateral;    BREAST RECONSTRUCTION Left     BREAST REVISION SURGERY Bilateral 08/05/2019    Procedure: BREAST REVISION SURGERY;  Surgeon: Diego Modi MD;  Location: [facility];  Service: General;  Laterality: Bilateral;    BREAST REVISION SURGERY Left 04/05/2021    Procedure: BREAST REVISION SURGERY - LEFT BREAST RECONSTRUCTION REVISION;  Surgeon: Diego Modi MD;  Location: [facility];  Service: General;  Laterality: Left;    CATARACT EXTRACTION W/  INTRAOCULAR LENS IMPLANT Bilateral 2009    COLONOSCOPY  4/2009, 2015    repeat in 5 years    COLONOSCOPY N/A 12/22/2020    Procedure: COLONOSCOPY;  Surgeon: Praneeth Leblanc MD;  Location: [facility];  Service: Endoscopy;  Laterality: N/A;    COSMETIC SURGERY      CYSTOCELE REPAIR N/A 11/20/2018    Procedure: REPAIR, CYSTOCELE;  Surgeon: Rebecca Acosta MD;  Location: [facility] OR 2ND FLR;  Service: Urology;  Laterality: N/A;  1.5 hours    CYSTOSCOPY N/A 11/20/2018    Procedure: CYSTOSCOPY;  Surgeon: Rebecca Acosta MD;  Location: [facility] OR 2ND FLR;  Service: Urology;  Laterality: N/A;    DILATION AND CURETTAGE OF UTERUS      ECTOPIC PREGNANCY SURGERY      EYE SURGERY Bilateral 03/2009    Vitrectomy     MASTECTOMY Left     MASTOPEXY Right 04/05/2021    Procedure: MASTOPEXY - RIGHT MASTOPEXY LIPOSUCTION WAIST AND THIGHS, FAT TRANSFER TO BREAST;  Surgeon: Diego Modi MD;  Location: [facility];  Service: General;  Laterality: Right;    POLYPECTOMY      x3 2001    RECONSTRUCTION OF BREAST WITH DEEP INFERIOR EPIGASTRIC ARTERY  (SUSAN) FLAP Left 10/26/2020    Procedure: RECONSTRUCTION, BREAST, USING SUSAN SKIN FLAP - LEFT BREAST RECONSTRUCTION WITH STACKED SUSAN FREE FLAPS.;  Surgeon: Diego Modi MD;  Location: [facility];  Service: General;  Laterality: Left;     REMOVAL OF BREAST IMPLANT Bilateral 08/05/2019    Procedure: REMOVAL, IMPLANT, BREAST;  Surgeon: Diego Modi MD;  [facility];  Service: General;  Laterality: Bilateral;    REMOVAL OF BREAST IMPLANT Left 08/30/2019    Procedure: REMOVAL, IMPLANT, BREAST;  Surgeon: Diego Modi MD;  [facility]: [facility];  Service: General;  Laterality: Left;    REMOVAL OF BREAST IMPLANT Right 10/26/2020    Procedure: REMOVAL, IMPLANT, BREAST -   RIGHT BREAST IMPLANT REMOVAL;  Surgeon: Diego Modi MD;  Location: [facility] OR;  Service: General;  Laterality: Right;    Yag Capsulotomy Bilateral 12/2014     --route of ectopic surgery: Pfannenstiel; had salpingectomy due to rupture-unknown side; c/b blood transfusion  --2018: anterior repair ([place]  Family History   Problem Relation Age of Onset    Miscarriages / Stillbirths Mother     Hyperlipidemia Mother     Hearing loss Mother     Arthritis Mother     Colon cancer Mother         hospice    Cataracts Mother     Hypertension Mother     Thyroid disease Mother     Cancer Mother         colon    Hypertension Father     Hyperlipidemia Father     Heart disease Father         passed    Colon cancer Sister     Early death Sister     Cancer Sister         rectal; passed    Arthritis Brother     Mental retardation Brother     Learning disabilities Brother     Early death Brother     Birth defects Brother     Pancreatic cancer Maternal Grandmother     Glaucoma Maternal Grandmother     Breast cancer Cousin     Cancer Cousin         breast    Amblyopia Neg Hx     Blindness Neg Hx     Diabetes Neg Hx     Macular degeneration Neg Hx     Retinal detachment Neg Hx     Strabismus Neg Hx     Stroke Neg Hx        Social History     Socioeconomic History    Marital status:     Number of children: 2   Tobacco Use    Smoking status: Never    Smokeless tobacco: Never   Substance and Sexual Activity    Alcohol use: Yes     Alcohol/week: 1.0 standard drink     Types: 1 Glasses of wine  per week     Comment: social    Drug use: No    Sexual activity: Not Currently     Partners: Male     Birth control/protection: Post-menopausal   Other Topics Concern    Are you pregnant or think you may be? No   Social History Narrative    Pt resides alone, has 2 children, 1 grandchild. Hobbies exercise, cycling, gardening, concerts.     Social Determinants of Health     Financial Resource Strain: Low Risk     Difficulty of Paying Living Expenses: Not hard at all   Food Insecurity: No Food Insecurity    Worried About Running Out of Food in the Last Year: Never true    Ran Out of Food in the Last Year: Never true   Transportation Needs: No Transportation Needs    Lack of Transportation (Medical): No    Lack of Transportation (Non-Medical): No   Physical Activity: Sufficiently Active    Days of Exercise per Week: 6 days    Minutes of Exercise per Session: 30 min   Stress: No Stress Concern Present    Feeling of Stress : Only a little   Social Connections: Unknown    Frequency of Communication with Friends and Family: Twice a week    Frequency of Social Gatherings with Friends and Family: Once a week    Active Member of Clubs or Organizations: Yes    Attends Club or Organization Meetings: More than 4 times per year    Marital Status:    Housing Stability: Low Risk     Unable to Pay for Housing in the Last Year: No    Number of Places Lived in the Last Year: 1    Unstable Housing in the Last Year: No       Current Outpatient Medications   Medication Sig Dispense Refill    calcium carb/vit D3/minerals (CALCIUM-VITAMIN D ORAL)       denosumab (PROLIA) 60 mg/mL Syrg Inject 60 mg into the skin.      dorzolamide (TRUSOPT) 2 % ophthalmic solution INSTILL 1 DROP INTO BOTH EYES 3 TIMES A DAY 30 mL 4    latanoprost 0.005 % ophthalmic solution INSTILL 1 DROP INTO BOTH EYES IN THE EVENING 7.5 mL 3    valACYclovir (VALTREX) 500 MG tablet Take 1 tablet (500 mg total) by mouth once daily. 90 tablet 1     No current  "facility-administered medications for this visit.     Facility-Administered Medications Ordered in Other Visits   Medication Dose Route Frequency Provider Last Rate Last Admin    LIDOcaine-EPINEPHrine 1%-1:100,000 30 mL, EPINEPHrine 2 mg in lactated Ringers 1,000 mL irrigation   Irrigation On Call Procedure Diego Modi MD        LIDOcaine-EPINEPHrine 1%-1:100,000 30 mL, EPINEPHrine 2 mg in lactated Ringers 1,000 mL irrigation   Irrigation On Call Procedure Diego Modi MD           Review of patient's allergies indicates:   Allergen Reactions    Bactrim [sulfamethoxazole-trimethoprim] Rash     Fever, vomiting       Well woman:  Pap:  Mammo:  Colonoscopy:  Dexa:    ROS:  As per HPI.      Exam  /80 (BP Location: Right arm, Patient Position: Sitting, BP Method: Medium (Manual))   Ht 5' 7" (1.702 m)   Wt 58.9 kg (129 lb 13.6 oz)   BMI 20.34 kg/m²   General: alert and oriented, no acute distress  Respiratory: normal respiratory effort  Abd: soft, non-tender, non-distended    Pelvic--deferred    Impression  1. Uterovaginal prolapse        2. Rectocele        3. Cystocele, midline        4. Vaginal atrophy          We reviewed the above issues and discussed options for short-term versus long-term management of her problems.   Plan:     Patient consented with Dr. Wright for robotic assisted laparoscopic hysterectomy and sacrocolpopexy with synthetic mesh, possible anterior/posterior repair with perineorrhaphy, possible uterosacral suspension, possible sacrospinous fixation, possible laparotomy, possible placement of synthetic midurethral sling, (will check for occult leakage in OR after repairs), and cystourethroscopy.   R/B/A reviewed. Specific risks reviewed include:  infection, bleeding, need for blood transfusion, damage to surrounding structures, anesthesia risks, death, heart attack, stroke, mesh erosion/extrusion, pain, dyspareunia, urinary retention, voiding dysfunction, urinary incontinence, " exacerbation of urinary urge incontinence, and need for further surgeries.  We reviewed potential for failure of POP defect repair and need for future surgery, with no way of predicting risk.  She understands success rate of ASC approaches 85%.  Success rate of midurethral sling for JESUS was reviewed as 80-85%, and she understands that this will not necessarily impact other types of urinary incontinence.  Alternatives reviewed include: pessary/PT for POP and pessary/periurethral injections/PT/medication for JESUS.    UDS today: +JESUS only at max cap with pves catheter removed; had significant voiding dysfunction during testing; very rare UUI subjectively; would wait for MUS unless really has +JESUS intraop after POP repair; patient understands; she also understands that she may have +JESUS or other types of UI in future, warranting further Tx  T&S, urine HCG on DOS  Preoperative appointment with PCP or cardiology: Yes -scheduled for 06/29/23  VTE Prophylaxis:  heparin 5000 u SQ TID (1st dose 2hrs preop) + SCDs  Patient instructed on bowel prep and chlorahexadine/dial soap prep to perform day before & AM of surgery.   Proceed to OR for above-mentioned procedure.    ---ADDENDUM:  7/01/23---Low risk for low-moderate risk procedure. May proceed with surgery     20 minutes were spent in face to face time with this patient  90 % of this time was spent in counseling and/or coordination of care      MARGE Vanessa-BC [facility]  Division of Female Pelvic Medicine and Reconstructive Surgery  Department of Obstetrics & Gynecology

## 2023-06-27 ENCOUNTER — PATIENT MESSAGE (OUTPATIENT)
Dept: UROGYNECOLOGY | Facility: CLINIC | Age: 68
End: 2023-06-27
Payer: MEDICARE

## 2023-06-28 ENCOUNTER — TELEPHONE (OUTPATIENT)
Dept: RESEARCH | Facility: HOSPITAL | Age: 68
End: 2023-06-28
Payer: MEDICARE

## 2023-06-28 DIAGNOSIS — Z00.6 EXAMINATION OF PARTICIPANT IN CLINICAL TRIAL: ICD-10-CM

## 2023-06-28 DIAGNOSIS — C50.611 MALIGNANT NEOPLASM OF AXILLARY TAIL OF RIGHT FEMALE BREAST, UNSPECIFIED ESTROGEN RECEPTOR STATUS: Primary | ICD-10-CM

## 2023-06-28 NOTE — TELEPHONE ENCOUNTER
"6/28/23  15:45    Called the patient to see if she could come in tomorrow instead of 6/30/23. Per the [id number] protocol, the EDTA whole blood tubes  "should be collected Monday--Thursday only. Due to required processing, the tubes MUST be received at the [facility] within 24 hours of collection". Therefore, Friday, 6/30/23, lab appointment would not suffice for protocol acceptable specimen collection.     All of the above was explained to the patient over the phone. This CRC acknowledged that this is very last minute, and expressed that it would be completely acceptable to reschedule this to another day if tomorrow does not work for her. The patient agreed to coming in tomorrow and requested an appointment around 3pm, after her 1pm appointment.     This CRC thanked the patient for her accomodation and scheduled the appointment. Encouraged the patient to call back if she decides to reschedule between now and then.     Amanda Wills, CRC  "

## 2023-06-29 ENCOUNTER — OFFICE VISIT (OUTPATIENT)
Dept: HEMATOLOGY/ONCOLOGY | Facility: CLINIC | Age: 68
End: 2023-06-29
Payer: MEDICARE

## 2023-06-29 ENCOUNTER — LAB VISIT (OUTPATIENT)
Dept: LAB | Facility: HOSPITAL | Age: 68
End: 2023-06-29
Attending: INTERNAL MEDICINE
Payer: MEDICARE

## 2023-06-29 ENCOUNTER — OFFICE VISIT (OUTPATIENT)
Dept: PRIMARY CARE CLINIC | Facility: CLINIC | Age: 68
End: 2023-06-29
Payer: MEDICARE

## 2023-06-29 ENCOUNTER — RESEARCH ENCOUNTER (OUTPATIENT)
Dept: RESEARCH | Facility: HOSPITAL | Age: 68
End: 2023-06-29
Payer: MEDICARE

## 2023-06-29 VITALS
RESPIRATION RATE: 18 BRPM | TEMPERATURE: 98 F | OXYGEN SATURATION: 99 % | HEART RATE: 60 BPM | DIASTOLIC BLOOD PRESSURE: 82 MMHG | BODY MASS INDEX: 20.87 KG/M2 | HEIGHT: 66 IN | WEIGHT: 129.88 LBS | SYSTOLIC BLOOD PRESSURE: 110 MMHG

## 2023-06-29 DIAGNOSIS — Z79.01 ANTICOAGULATED: ICD-10-CM

## 2023-06-29 DIAGNOSIS — C50.611 MALIGNANT NEOPLASM OF AXILLARY TAIL OF RIGHT FEMALE BREAST, UNSPECIFIED ESTROGEN RECEPTOR STATUS: ICD-10-CM

## 2023-06-29 DIAGNOSIS — Z00.6 EXAMINATION OF PARTICIPANT IN CLINICAL TRIAL: ICD-10-CM

## 2023-06-29 DIAGNOSIS — Z85.3 HX OF BREAST CANCER: ICD-10-CM

## 2023-06-29 DIAGNOSIS — M80.00XD AGE-RELATED OSTEOPOROSIS WITH CURRENT PATHOLOGICAL FRACTURE WITH ROUTINE HEALING, SUBSEQUENT ENCOUNTER: Primary | ICD-10-CM

## 2023-06-29 DIAGNOSIS — N81.4 UTEROVAGINAL PROLAPSE: ICD-10-CM

## 2023-06-29 DIAGNOSIS — Z01.818 PREOP TESTING: Primary | ICD-10-CM

## 2023-06-29 LAB
INR PPP: 1 (ref 0.8–1.2)
PROTHROMBIN TIME: 10.7 SEC (ref 9–12.5)

## 2023-06-29 PROCEDURE — 3008F PR BODY MASS INDEX (BMI) DOCUMENTED: ICD-10-PCS | Mod: CPTII,S$GLB,, | Performed by: INTERNAL MEDICINE

## 2023-06-29 PROCEDURE — 1159F MED LIST DOCD IN RCRD: CPT | Mod: CPTII,S$GLB,, | Performed by: INTERNAL MEDICINE

## 2023-06-29 PROCEDURE — 3079F PR MOST RECENT DIASTOLIC BLOOD PRESSURE 80-89 MM HG: ICD-10-PCS | Mod: CPTII,S$GLB,, | Performed by: INTERNAL MEDICINE

## 2023-06-29 PROCEDURE — 1101F PT FALLS ASSESS-DOCD LE1/YR: CPT | Mod: CPTII,S$GLB,, | Performed by: INTERNAL MEDICINE

## 2023-06-29 PROCEDURE — 3008F BODY MASS INDEX DOCD: CPT | Mod: CPTII,S$GLB,, | Performed by: INTERNAL MEDICINE

## 2023-06-29 PROCEDURE — 85610 PROTHROMBIN TIME: CPT | Performed by: INTERNAL MEDICINE

## 2023-06-29 PROCEDURE — 1160F RVW MEDS BY RX/DR IN RCRD: CPT | Mod: CPTII,S$GLB,, | Performed by: INTERNAL MEDICINE

## 2023-06-29 PROCEDURE — 1123F ACP DISCUSS/DSCN MKR DOCD: CPT | Mod: CPTII,S$GLB,, | Performed by: INTERNAL MEDICINE

## 2023-06-29 PROCEDURE — 3288F PR FALLS RISK ASSESSMENT DOCUMENTED: ICD-10-PCS | Mod: CPTII,S$GLB,, | Performed by: INTERNAL MEDICINE

## 2023-06-29 PROCEDURE — 1126F AMNT PAIN NOTED NONE PRSNT: CPT | Mod: CPTII,S$GLB,, | Performed by: INTERNAL MEDICINE

## 2023-06-29 PROCEDURE — 1159F PR MEDICATION LIST DOCUMENTED IN MEDICAL RECORD: ICD-10-PCS | Mod: CPTII,S$GLB,, | Performed by: INTERNAL MEDICINE

## 2023-06-29 PROCEDURE — 1101F PR PT FALLS ASSESS DOC 0-1 FALLS W/OUT INJ PAST YR: ICD-10-PCS | Mod: CPTII,S$GLB,, | Performed by: INTERNAL MEDICINE

## 2023-06-29 PROCEDURE — 36415 COLL VENOUS BLD VENIPUNCTURE: CPT | Mod: PN | Performed by: INTERNAL MEDICINE

## 2023-06-29 PROCEDURE — 1123F ACP DISCUSS/DSCN MKR DOCD: CPT | Mod: CPTII,95,, | Performed by: INTERNAL MEDICINE

## 2023-06-29 PROCEDURE — 3079F DIAST BP 80-89 MM HG: CPT | Mod: CPTII,S$GLB,, | Performed by: INTERNAL MEDICINE

## 2023-06-29 PROCEDURE — 99999 PR PBB SHADOW E&M-EST. PATIENT-LVL IV: CPT | Mod: PBBFAC,,, | Performed by: INTERNAL MEDICINE

## 2023-06-29 PROCEDURE — 99999 PR PBB SHADOW E&M-EST. PATIENT-LVL IV: ICD-10-PCS | Mod: PBBFAC,,, | Performed by: INTERNAL MEDICINE

## 2023-06-29 PROCEDURE — 99214 PR OFFICE/OUTPT VISIT, EST, LEVL IV, 30-39 MIN: ICD-10-PCS | Mod: 95,,, | Performed by: INTERNAL MEDICINE

## 2023-06-29 PROCEDURE — 1126F PR PAIN SEVERITY QUANTIFIED, NO PAIN PRESENT: ICD-10-PCS | Mod: CPTII,S$GLB,, | Performed by: INTERNAL MEDICINE

## 2023-06-29 PROCEDURE — 1123F PR ADV CARE PLAN DISCUSSED, PLAN OR SURROGATE DOCUMENTED: ICD-10-PCS | Mod: CPTII,S$GLB,, | Performed by: INTERNAL MEDICINE

## 2023-06-29 PROCEDURE — 3074F PR MOST RECENT SYSTOLIC BLOOD PRESSURE < 130 MM HG: ICD-10-PCS | Mod: CPTII,S$GLB,, | Performed by: INTERNAL MEDICINE

## 2023-06-29 PROCEDURE — 99213 OFFICE O/P EST LOW 20 MIN: CPT | Mod: S$GLB,,, | Performed by: INTERNAL MEDICINE

## 2023-06-29 PROCEDURE — 3288F FALL RISK ASSESSMENT DOCD: CPT | Mod: CPTII,S$GLB,, | Performed by: INTERNAL MEDICINE

## 2023-06-29 PROCEDURE — 3074F SYST BP LT 130 MM HG: CPT | Mod: CPTII,S$GLB,, | Performed by: INTERNAL MEDICINE

## 2023-06-29 PROCEDURE — 1160F PR REVIEW ALL MEDS BY PRESCRIBER/CLIN PHARMACIST DOCUMENTED: ICD-10-PCS | Mod: CPTII,S$GLB,, | Performed by: INTERNAL MEDICINE

## 2023-06-29 PROCEDURE — 99213 PR OFFICE/OUTPT VISIT, EST, LEVL III, 20-29 MIN: ICD-10-PCS | Mod: S$GLB,,, | Performed by: INTERNAL MEDICINE

## 2023-06-29 PROCEDURE — 1123F PR ADV CARE PLAN DISCUSSED, PLAN OR SURROGATE DOCUMENTED: ICD-10-PCS | Mod: CPTII,95,, | Performed by: INTERNAL MEDICINE

## 2023-06-29 PROCEDURE — 99214 OFFICE O/P EST MOD 30 MIN: CPT | Mod: 95,,, | Performed by: INTERNAL MEDICINE

## 2023-06-29 NOTE — PROGRESS NOTES
INTERNAL MEDICINE PROGRESS/URGENT CARE NOTE    CHIEF COMPLAINT     Chief Complaint   Patient presents with    Pre-op Exam       HPI     Ashlie Redman is a 68 y.o.  female who presents for an urgent/follow up visit today.  Patient is here today for preop evaluation for scheduled hysterectomy, b/l oopherectomy, salpingo and lift  Her labs have been completed and reviewed. All wnl  Reviewed her EKG with nromal variant findings. Compared to old EKG no change. CXR with no acute abnormality.   Normal exercise tolerance, able to do housework with no difficulty.   No SOB, no chest pain, no coughing. No dysuria and urinalysis is normal.   Shes a very healthy patient with normal vitals and labs.     Low risk for low-moderate risk procedure. May proceed with no major concerns  Takes no a/c. Instructed to stop tumeric supplement.     Past Medical History:  Past Medical History:   Diagnosis Date    Allergy     Anemia     Arthritis     back    Asteroid hyalosis of both eyes     Basal cell carcinoma     L. dorsal forearm     Breast cancer 04/2017    Left    Cataract     Chemotherapy adverse reaction     Lung Damage    Diverticulosis     Encounter for blood transfusion     Glaucoma     High myopia     Osteopenia     Osteoporosis     PONV (postoperative nausea and vomiting)     S/P dilation and curettage        Home Medications:  Prior to Admission medications    Medication Sig Start Date End Date Taking? Authorizing Provider   calcium carb/vit D3/minerals (CALCIUM-VITAMIN D ORAL)  1/1/20  Yes Historical Provider   denosumab (PROLIA) 60 mg/mL Syrg Inject 60 mg into the skin.   Yes Historical Provider   dorzolamide (TRUSOPT) 2 % ophthalmic solution INSTILL 1 DROP INTO BOTH EYES 3 TIMES A DAY 11/28/22  Yes Tyrone Jimenez Jr., MD   latanoprost 0.005 % ophthalmic solution INSTILL 1 DROP INTO BOTH EYES IN THE EVENING 5/2/23  Yes Tyrone Jimenez Jr., MD   valACYclovir (VALTREX) 500 MG tablet Take 1 tablet (500 mg total) by mouth once  "daily. 2/10/22  Yes Kevin Gong MD       Review of Systems:  Review of Systems        Advance Care Planning     Date: 06/29/2023    Power of   I initiated the process of voluntary advance care planning today and explained the importance of this process to the patient.  I introduced the concept of advance directives to the patient, as well. Then the patient received detailed information about the importance of designating a Health Care Power of  (HCPOA). She was also instructed to communicate with this person about their wishes for future healthcare, should she become sick and lose decision-making capacity. The patient has previously appointed a HCPOA. After our discussion, the patient has decided to complete a HCPOA           .AWV scheduled for 9/1/23       PHYSICAL EXAM     /82 (BP Location: Right arm, Patient Position: Sitting, BP Method: Medium (Manual))   Pulse 60   Temp 97.9 °F (36.6 °C) (Oral)   Resp 18   Ht 5' 6" (1.676 m)   Wt 58.9 kg (129 lb 13.6 oz)   SpO2 99%   BMI 20.96 kg/m²     GEN - A+OX4, NAD   HEENT - PERRL, EOMI, OP clear  Neck - No thyromegaly or cervical LAD. No thyroid masses felt.  CV - RRR, no m/r   Chest - CTAB, no wheezing or rhonchi  Abd - S/NT/ND/+BS.   Ext - 2+BDP and radial pulses. No C/C/E.  Skin - No rash.    LABS     Labs reviewed    ASSESSMENT/PLAN     Ashlie Redman is a 68 y.o. female with  1. Preop testing  Normal vitals  Labs reveiwed and all wnl  EKG reviewed. Normal variant and unchanged when compared to previous one.   Negative ROS.   On no a/c.  Todl to stop her tumeric. No vitamins etc  INR an type and screen to be done on day of surgery per patient  Already had anesthesia clearnace.   Low risk for low-moderate risk procedure. May proceed with surgery     2. Anticoagulated  -     PROTIME-INR; Future; Expected date: 06/29/2023    3. Uterovaginal prolapse  Scheduled for surgery            WORRY SCORE 2    RTC as scheduled     Salma Pendleton, " MD  Board Certified Internist/Geriatrician  [facility])

## 2023-06-29 NOTE — PROGRESS NOTES
"Protocol: [id number]  Investigator: GRETCHEN Arce MD  Pt Initials: KATRINA HERNDON  Study ID: [id number]  IRB #: [id number]  Arm 1     [id number]: A Randomized Phase III Trial Evaluating the Role of Axillary Lymph Node Dissection in Breast Cancer Patients (CT1-3 N1) Who Have Positive [place] Lymph Node Disease After Neoadjuvant Chemotherapy     June 29th, 2023- 1 year f/u    The patient presented at the [facility] lab for her 1 year follow-up per [id number] protocol. The patient presents A&O, without noted distress, and with appropriate mood and affect to the situation. The patient continues to freely agree to participate in the [id number] trial.     Weight: 58.9 kg (129 lb 13.6 oz)  Height: 5' 6" (1.676 m)  BSA (Calculated - sq m): 1.66 sq meters  BMI (Calculated): 21    Ms. Redman states she is doing well. She states that she saw her treating oncologist, Dr. Ragsdale, this morning via a virtual appointment and plans to follow up with her again in 3 months. She reports that she did not have any problems with wound infections, seromas, lymphedema, or dermatitis radiation within the last year. She reports experiencing hot flashes. She states that she has not taken any hormonal or anti Her2 therapy since her last study visit.    Lymphedema Measurements, QOLs, and Blood Samples:    The last set of lymphedema QOLs (5 years after completion of radiation therapy or 60 month follow up visit) were administered to and completed by the patient.   Last lymphedema (LMF) measurements were collected by this CRC.   5-6 Years post surgery lab samples were collected by the phlebotomist with CRC present using two patient identifiers. Blood specimen was packaged according to IATA guidelines and shipped via [facility] to [facility].     Next study procedures:    Annual mammogram - due 9/2023  Annual post treatment follow up H&P and AE assessment (Clinical follow up) -  due 6/2024  8 years from completion of radiation blood samples - due " 2/2/2026    The patient denied having any questions for this CRC. Mrs. Redman was encouraged to call with any questions or concerns in the future.    Amanda Wills, CRC

## 2023-06-30 ENCOUNTER — INFUSION (OUTPATIENT)
Dept: INFUSION THERAPY | Facility: HOSPITAL | Age: 68
End: 2023-06-30
Attending: INTERNAL MEDICINE
Payer: MEDICARE

## 2023-06-30 VITALS
RESPIRATION RATE: 18 BRPM | SYSTOLIC BLOOD PRESSURE: 114 MMHG | DIASTOLIC BLOOD PRESSURE: 68 MMHG | HEART RATE: 67 BPM | TEMPERATURE: 98 F

## 2023-06-30 DIAGNOSIS — M80.00XA AGE-RELATED OSTEOPOROSIS WITH CURRENT PATHOLOGICAL FRACTURE, INITIAL ENCOUNTER: Primary | ICD-10-CM

## 2023-06-30 PROCEDURE — 63600175 PHARM REV CODE 636 W HCPCS: Mod: JZ,JG,PN | Performed by: INTERNAL MEDICINE

## 2023-06-30 PROCEDURE — 96372 THER/PROPH/DIAG INJ SC/IM: CPT | Mod: PN

## 2023-06-30 RX ADMIN — DENOSUMAB 60 MG: 60 INJECTION SUBCUTANEOUS at 09:06

## 2023-06-30 NOTE — PROGRESS NOTES
The patient location is: home  Visit type: Virtual visit with synchronous audio and video  Face-to-face or time spent with patient on the encounter:25 min  Total time spent on and for  this encounter which includes non face-to-face time preparing to see patient, review of tests, obtaining and or reviewing separately obtained records documenting clinical information in the electronic or other health records, independently interpreting results which is not separately reported ,and communicating results to the patient/family/caregiver and in care coordination and treatment planning/communicating with pharmacy for prescriptions/addressing social needs/arranging follow-up and or referrals :25  min    Each patient I provide medical services by telemedicine is:  (1) informed of the relationship between the physician and patient and the respective role of any other health care provider with respect to management of the patient; and (2) notified that he or she may decline to receive medical services by telemedicine and may withdraw from such care at any time.  This is a video visit therefore some elements of the physical exam such as vital signs, heart sounds are breath sounds are not included and may be included if found in recent clinic notes of other providers assessing same patient. Any symptoms or signs that were visualized were stated by the patient may be included in this note.     HISTORY OF PRESENT ILLNESS:    This is a 68-year-old white female here for f/u of breast ca hx    Oncology history   treated for a  4/12/2017 diagnosis of Stage IIB left breast carcinoma.ER/[place] negative. Her - 2 positive  Recd Neoadjuvant A/C ,completed 12 cycles weekly taxol and  herceptin/ perjeta had lumpectomy was on [employer] trial completed the entire year of herceptin /perjeta, but didn't undergo axillary xrt per trial  Here for f/u with pet and labs recnt mammogram showed calcifications , that were biospied and came back  negative  radiation induced interstitial findings still having the cough but getting of her upper respiratory infection today   sonme neuropathy post rx still persisits   had a difficult summer 2019. Had removal of her original implant for pain in the area, after removal pt developed pseudomonas infection stayed in hospital  For a week. She sees Dr. Modi, had second reconstruction 10 2020 had a deep flap done. Saw DR Rodriguez rt breast mammo 9/2020    Patient denies issues related to appetite or recent weight change.  Feels well overall.  Denies issues with generalized weakness .  Denies fatigue over above what is normally experienced with day-to-day activities  Denies fever, chills, rigors  Denies issues with ambulation  Denies generalized swelling or new lumps and bumps felt in any part  of body  Denies visual or hearing loss  Denies issues with congestion, sinus issues, cough, sputum production runny nose or itching eyes  Denies chest pain or palpitations, or passing out  Denies abdominal pain, reflux symptoms, nausea vomiting loose stools or constipation  Denies seizure activity or focal weaknesses or symptoms related to TIA, no head aches or blurred vision reported  Denies issues with skin rash or bruising  Denies issues with swelling of feet, tingling or numbness   No issues with sleep,   No recent foreign travel   Good family support reported       GENERAL:   Wt Readings from Last 3 Encounters:   06/29/23 58.9 kg (129 lb 13.6 oz)   06/26/23 57.2 kg (126 lb)   06/26/23 58.9 kg (129 lb 13.6 oz)     Temp Readings from Last 3 Encounters:   06/29/23 97.9 °F (36.6 °C) (Oral)   06/26/23 98.1 °F (36.7 °C)   03/03/23 97.8 °F (36.6 °C) (Oral)     BP Readings from Last 3 Encounters:   06/29/23 110/82   06/26/23 139/72   06/26/23 104/80     Pulse Readings from Last 3 Encounters:   06/29/23 60   06/26/23 72   05/17/23 73    VITAL SIGNS:  as above   GENERAL: appears well-built, well-nourished.  No anxiety, no agitation,  and in no distress.  Patient is awake, alert, oriented and cooperative.  HEENT:  Showed no congestion. Trachea is central no obvious icterus or pallor noted no hoarseness. no obvious JVD   NECK:  Supple.  No JVD. No obvious cervical submental or supraclavicular adenopathy.  RS:the visualized portion of  Chest expands well. chest appears symmetric, no audible wheezes.  No dyspnea recognized  ABDOMEN:  abdomen appears undistended.  EXTREMITIES:  Without edema.  NEUROLOGICAL:  The patient is appropriate, higher functions are normal.  No  obvious neurological deficits.  normal judgement normal thought content  No confusion, no speech impediment. Cranial nerves are intact and show no deficit. No gross motor deficits noted   SKIN MUSCULOSKELETAL: no joint or skeletal deformity, no clubbing of nails.  No visible rash ecchymosis or petechiae  BREAST: left breast  Flap done, some puckering that is not satisfactory to pt, she has left axilla fullness/ swelling. With cord like tighetning around axilla that limits her arm raising. Its a lump like area to axilla, the arm itself is not swollen, pt reports that the working dx is that the lymphatic drainage was blocked due to sx fom scarring   rt breast implant has been removed and the breast is natural  LABORATORY:    Lab Results   Component Value Date    WBC 4.92 06/23/2023    HGB 13.6 06/23/2023    HCT 39.1 06/23/2023    MCV 97 06/23/2023     06/23/2023         CMP  Sodium   Date Value Ref Range Status   06/23/2023 142 136 - 145 mmol/L Final     Potassium   Date Value Ref Range Status   06/23/2023 4.1 3.5 - 5.1 mmol/L Final     Chloride   Date Value Ref Range Status   06/23/2023 111 (H) 95 - 110 mmol/L Final     CO2   Date Value Ref Range Status   06/23/2023 21 (L) 23 - 29 mmol/L Final     Glucose   Date Value Ref Range Status   06/23/2023 100 70 - 110 mg/dL Final     BUN   Date Value Ref Range Status   06/23/2023 16 8 - 23 mg/dL Final     Creatinine   Date Value Ref  Range Status   06/23/2023 0.8 0.5 - 1.4 mg/dL Final     Calcium   Date Value Ref Range Status   06/23/2023 9.8 8.7 - 10.5 mg/dL Final     Total Protein   Date Value Ref Range Status   06/23/2023 7.2 6.0 - 8.4 g/dL Final     Albumin   Date Value Ref Range Status   06/23/2023 4.1 3.5 - 5.2 g/dL Final     Total Bilirubin   Date Value Ref Range Status   06/23/2023 0.4 0.1 - 1.0 mg/dL Final     Comment:     For infants and newborns, interpretation of results should be based  on gestational age, weight and in agreement with clinical  observations.    Premature Infant recommended reference ranges:  Up to 24 hours.............<8.0 mg/dL  Up to 48 hours............<12.0 mg/dL  3-5 days..................<15.0 mg/dL  6-29 days.................<15.0 mg/dL       Alkaline Phosphatase   Date Value Ref Range Status   06/23/2023 54 (L) 55 - 135 U/L Final     AST   Date Value Ref Range Status   06/23/2023 21 10 - 40 U/L Final     ALT   Date Value Ref Range Status   06/23/2023 18 10 - 44 U/L Final     Anion Gap   Date Value Ref Range Status   06/23/2023 10 8 - 16 mmol/L Final     eGFR if    Date Value Ref Range Status   06/01/2022 >60 >60 mL/min/1.73 m^2 Final     eGFR if non    Date Value Ref Range Status   06/01/2022 >60 >60 mL/min/1.73 m^2 Final     Comment:     Calculation used to obtain the estimated glomerular filtration  rate (eGFR) is the CKD-EPI equation.      30.7 ca 2729 on 12/3/21     Left breast, lumpectomy:  - Residual invasive ductal carcinoma, moderately differentiated, [place] Grade 2 (3+2+1=6), 0.2 cm in  greatest linear microscopic dimension.  - Ductal carcinoma in situ (DCIS), high nuclear grade, 2 cm, solid and cribriform types.  - Surgical margins are free of tumor; invasive carcinoma is located 0.1 cm from the closest margin         FINAL PATHOLOGIC DIAGNOSIS  1. BREAST, RIGHT, CENTRAL REGION MIDDLE DEPTH, CALCIFICATIONS, STEREOTACTIC-GUIDED  BIOPSY:  Benign breast tissue with  fibrocystic changes, columnar cell changes, and focal features suggestive of duct  rupture.  Microcalcifications: Seen in association with areas of columnar cell change.  Negative for atypia or carcinoma.  Osteopenia 3/2017 dexa  1/2020 osteoporosis  Chest x-ray negative October 2020 mammogram September 2 1020-    Pet 12/2022 neg    IMPRESSION:    Stage IIB left breast carcinoma 2017 ([place] negative, Her-2/[place] positive)  Completed neoadjuvant AC and weekly taxol / herceptin  stopped perjeta since she also had xrt to left side due to fear of toxicity   BRCA negative  , s/p  lumpectomy and axillary disection , with 2 residual Ln positive. Per [place] did not undergo xrt to axilla    reconstructed left breast MRI of rt breast  negative February 2020, mammo of  Both breast( has natural breast tissue in left still) in 9/2021  and 9/22 next 9/23   pet was denied by insurance will get annual atleast   cont observation and surveillance  [id number] slight elevation recheck in 3 months and see me in 9/23 with mammo and [id number]  1.   Bone density  next due 12/24 improved since she had osteoporosis prior to start of prolia  In 12/23 she will get pet and rest of labs   cont f/u wit pcp   anemia has resolved    Effexor sent for hot flashes but pt is not taking them and feels better    Advance Care Planning     Date: 06/29/2023    Power of   I verified acp docs

## 2023-07-06 ENCOUNTER — PATIENT MESSAGE (OUTPATIENT)
Dept: HEMATOLOGY/ONCOLOGY | Facility: CLINIC | Age: 68
End: 2023-07-06
Payer: MEDICARE

## 2023-07-06 ENCOUNTER — TELEPHONE (OUTPATIENT)
Dept: RADIOLOGY | Facility: HOSPITAL | Age: 68
End: 2023-07-06
Payer: MEDICARE

## 2023-07-10 ENCOUNTER — PATIENT MESSAGE (OUTPATIENT)
Dept: SURGERY | Facility: OTHER | Age: 68
End: 2023-07-10
Payer: MEDICARE

## 2023-07-10 ENCOUNTER — LAB VISIT (OUTPATIENT)
Dept: LAB | Facility: HOSPITAL | Age: 68
End: 2023-07-10
Attending: PHYSICIAN ASSISTANT
Payer: MEDICARE

## 2023-07-10 DIAGNOSIS — R39.9 UTI SYMPTOMS: ICD-10-CM

## 2023-07-10 DIAGNOSIS — R39.9 UTI SYMPTOMS: Primary | ICD-10-CM

## 2023-07-10 LAB
BACTERIA #/AREA URNS HPF: ABNORMAL /HPF
BILIRUB UR QL STRIP: NEGATIVE
CLARITY UR: CLEAR
COLOR UR: YELLOW
GLUCOSE UR QL STRIP: NEGATIVE
HGB UR QL STRIP: ABNORMAL
HYALINE CASTS #/AREA URNS LPF: 0 /LPF
KETONES UR QL STRIP: NEGATIVE
LEUKOCYTE ESTERASE UR QL STRIP: ABNORMAL
MICROSCOPIC COMMENT: ABNORMAL
NITRITE UR QL STRIP: NEGATIVE
PH UR STRIP: 8 [PH] (ref 5–8)
PROT UR QL STRIP: NEGATIVE
RBC #/AREA URNS HPF: 1 /HPF (ref 0–4)
SP GR UR STRIP: 1.02 (ref 1–1.03)
SQUAMOUS #/AREA URNS HPF: 2 /HPF
URN SPEC COLLECT METH UR: ABNORMAL
WBC #/AREA URNS HPF: 35 /HPF (ref 0–5)

## 2023-07-10 PROCEDURE — 81000 URINALYSIS NONAUTO W/SCOPE: CPT | Mod: PO | Performed by: PHYSICIAN ASSISTANT

## 2023-07-10 PROCEDURE — 87086 URINE CULTURE/COLONY COUNT: CPT | Performed by: PHYSICIAN ASSISTANT

## 2023-07-10 RX ORDER — NITROFURANTOIN 25; 75 MG/1; MG/1
100 CAPSULE ORAL 2 TIMES DAILY
Qty: 10 CAPSULE | Refills: 0 | Status: SHIPPED | OUTPATIENT
Start: 2023-07-10 | End: 2023-07-15

## 2023-07-11 LAB — BACTERIA UR CULT: NO GROWTH

## 2023-07-13 ENCOUNTER — ANESTHESIA (OUTPATIENT)
Dept: SURGERY | Facility: OTHER | Age: 68
End: 2023-07-13
Payer: MEDICARE

## 2023-07-13 ENCOUNTER — HOSPITAL ENCOUNTER (OUTPATIENT)
Facility: OTHER | Age: 68
Discharge: HOME | End: 2023-07-13
Attending: OBSTETRICS & GYNECOLOGY | Admitting: OBSTETRICS & GYNECOLOGY
Payer: MEDICARE

## 2023-07-13 DIAGNOSIS — N81.4 UTEROVAGINAL PROLAPSE: ICD-10-CM

## 2023-07-13 DIAGNOSIS — Z90.710 STATUS POST HYSTERECTOMY: Primary | ICD-10-CM

## 2023-07-13 LAB
ABO + RH BLD: NORMAL
BLD GP AB SCN CELLS X3 SERPL QL: NORMAL
POCT GLUCOSE: 81 MG/DL (ref 70–110)
SPECIMEN OUTDATE: NORMAL

## 2023-07-13 PROCEDURE — 63600175 PHARM REV CODE 636 W HCPCS: Performed by: ANESTHESIOLOGY

## 2023-07-13 PROCEDURE — D9220A PRA ANESTHESIA: Mod: CRNA,,, | Performed by: NURSE ANESTHETIST, CERTIFIED REGISTERED

## 2023-07-13 PROCEDURE — 82962 GLUCOSE BLOOD TEST: CPT | Performed by: OBSTETRICS & GYNECOLOGY

## 2023-07-13 PROCEDURE — C2628 CATHETER, OCCLUSION: HCPCS | Performed by: OBSTETRICS & GYNECOLOGY

## 2023-07-13 PROCEDURE — D9220A PRA ANESTHESIA: Mod: ANES,,, | Performed by: ANESTHESIOLOGY

## 2023-07-13 PROCEDURE — 57240 ANTERIOR COLPORRHAPHY: CPT | Mod: AS,51,, | Performed by: PHYSICIAN ASSISTANT

## 2023-07-13 PROCEDURE — 25000003 PHARM REV CODE 250: Performed by: OBSTETRICS & GYNECOLOGY

## 2023-07-13 PROCEDURE — 63600175 PHARM REV CODE 636 W HCPCS: Performed by: NURSE ANESTHETIST, CERTIFIED REGISTERED

## 2023-07-13 PROCEDURE — 57425 PR LAPAROSCOPY, SURG, COLPOPEXY: ICD-10-PCS | Mod: ,,, | Performed by: OBSTETRICS & GYNECOLOGY

## 2023-07-13 PROCEDURE — 63600175 PHARM REV CODE 636 W HCPCS: Performed by: OBSTETRICS & GYNECOLOGY

## 2023-07-13 PROCEDURE — 25000003 PHARM REV CODE 250: Performed by: ANESTHESIOLOGY

## 2023-07-13 PROCEDURE — 88305 TISSUE EXAM BY PATHOLOGIST: ICD-10-PCS | Mod: 26,,, | Performed by: PATHOLOGY

## 2023-07-13 PROCEDURE — 71000015 HC POSTOP RECOV 1ST HR: Performed by: OBSTETRICS & GYNECOLOGY

## 2023-07-13 PROCEDURE — D9220A PRA ANESTHESIA: ICD-10-PCS | Mod: CRNA,,, | Performed by: NURSE ANESTHETIST, CERTIFIED REGISTERED

## 2023-07-13 PROCEDURE — 25000003 PHARM REV CODE 250: Performed by: NURSE ANESTHETIST, CERTIFIED REGISTERED

## 2023-07-13 PROCEDURE — 58571 PR LAPAROSCOPY W TOT HYSTERECTUTERUS <=250 GRAM  W TUBE/OVARY: ICD-10-PCS | Mod: 51,,, | Performed by: OBSTETRICS & GYNECOLOGY

## 2023-07-13 PROCEDURE — 36415 COLL VENOUS BLD VENIPUNCTURE: CPT | Performed by: OBSTETRICS & GYNECOLOGY

## 2023-07-13 PROCEDURE — 37000009 HC ANESTHESIA EA ADD 15 MINS: Performed by: OBSTETRICS & GYNECOLOGY

## 2023-07-13 PROCEDURE — 57288 REPAIR BLADDER DEFECT: CPT | Mod: 51,,, | Performed by: OBSTETRICS & GYNECOLOGY

## 2023-07-13 PROCEDURE — D9220A PRA ANESTHESIA: ICD-10-PCS | Mod: ANES,,, | Performed by: ANESTHESIOLOGY

## 2023-07-13 PROCEDURE — P9045 ALBUMIN (HUMAN), 5%, 250 ML: HCPCS | Mod: JZ,JG | Performed by: NURSE ANESTHETIST, CERTIFIED REGISTERED

## 2023-07-13 PROCEDURE — 57240: ICD-10-PCS | Mod: 51,,, | Performed by: OBSTETRICS & GYNECOLOGY

## 2023-07-13 PROCEDURE — 71000033 HC RECOVERY, INTIAL HOUR: Performed by: OBSTETRICS & GYNECOLOGY

## 2023-07-13 PROCEDURE — 71000039 HC RECOVERY, EACH ADD'L HOUR: Performed by: OBSTETRICS & GYNECOLOGY

## 2023-07-13 PROCEDURE — 36000712 HC OR TIME LEV V 1ST 15 MIN: Performed by: OBSTETRICS & GYNECOLOGY

## 2023-07-13 PROCEDURE — 57288 PR SLING OPER STRES INCONTINENCE: ICD-10-PCS | Mod: AS,51,, | Performed by: PHYSICIAN ASSISTANT

## 2023-07-13 PROCEDURE — 88305 TISSUE EXAM BY PATHOLOGIST: CPT | Mod: 26,,, | Performed by: PATHOLOGY

## 2023-07-13 PROCEDURE — 57288 REPAIR BLADDER DEFECT: CPT | Mod: AS,51,, | Performed by: PHYSICIAN ASSISTANT

## 2023-07-13 PROCEDURE — 58571 TLH W/T/O 250 G OR LESS: CPT | Mod: AS,51,, | Performed by: PHYSICIAN ASSISTANT

## 2023-07-13 PROCEDURE — 58571 PR LAPAROSCOPY W TOT HYSTERECTUTERUS <=250 GRAM  W TUBE/OVARY: ICD-10-PCS | Mod: AS,51,, | Performed by: PHYSICIAN ASSISTANT

## 2023-07-13 PROCEDURE — 57240 ANTERIOR COLPORRHAPHY: CPT | Mod: 51,,, | Performed by: OBSTETRICS & GYNECOLOGY

## 2023-07-13 PROCEDURE — 57425 PR LAPAROSCOPY, SURG, COLPOPEXY: ICD-10-PCS | Mod: AS,,, | Performed by: PHYSICIAN ASSISTANT

## 2023-07-13 PROCEDURE — 71000016 HC POSTOP RECOV ADDL HR: Performed by: OBSTETRICS & GYNECOLOGY

## 2023-07-13 PROCEDURE — 36000713 HC OR TIME LEV V EA ADD 15 MIN: Performed by: OBSTETRICS & GYNECOLOGY

## 2023-07-13 PROCEDURE — 57240: ICD-10-PCS | Mod: AS,51,, | Performed by: PHYSICIAN ASSISTANT

## 2023-07-13 PROCEDURE — 57288 PR SLING OPER STRES INCONTINENCE: ICD-10-PCS | Mod: 51,,, | Performed by: OBSTETRICS & GYNECOLOGY

## 2023-07-13 PROCEDURE — 58571 TLH W/T/O 250 G OR LESS: CPT | Mod: 51,,, | Performed by: OBSTETRICS & GYNECOLOGY

## 2023-07-13 PROCEDURE — 37000008 HC ANESTHESIA 1ST 15 MINUTES: Performed by: OBSTETRICS & GYNECOLOGY

## 2023-07-13 PROCEDURE — 86900 BLOOD TYPING SEROLOGIC ABO: CPT | Performed by: OBSTETRICS & GYNECOLOGY

## 2023-07-13 PROCEDURE — C1771 REP DEV, URINARY, W/SLING: HCPCS | Performed by: OBSTETRICS & GYNECOLOGY

## 2023-07-13 PROCEDURE — 88305 TISSUE EXAM BY PATHOLOGIST: CPT | Performed by: PATHOLOGY

## 2023-07-13 PROCEDURE — 27201423 OPTIME MED/SURG SUP & DEVICES STERILE SUPPLY: Performed by: OBSTETRICS & GYNECOLOGY

## 2023-07-13 PROCEDURE — 57425 LAPAROSCOPY SURG COLPOPEXY: CPT | Mod: AS,,, | Performed by: PHYSICIAN ASSISTANT

## 2023-07-13 PROCEDURE — C1765 ADHESION BARRIER: HCPCS | Performed by: OBSTETRICS & GYNECOLOGY

## 2023-07-13 PROCEDURE — 57425 LAPAROSCOPY SURG COLPOPEXY: CPT | Mod: ,,, | Performed by: OBSTETRICS & GYNECOLOGY

## 2023-07-13 DEVICE — BARRIER INTERCEED 3X4 ST DIS: Type: IMPLANTABLE DEVICE | Site: ABDOMEN | Status: FUNCTIONAL

## 2023-07-13 DEVICE — SLING FIT ADVANTAGE: Type: IMPLANTABLE DEVICE | Site: VAGINA | Status: FUNCTIONAL

## 2023-07-13 RX ORDER — HEPARIN SODIUM 5000 [USP'U]/ML
5000 INJECTION, SOLUTION INTRAVENOUS; SUBCUTANEOUS
Status: DISCONTINUED | OUTPATIENT
Start: 2023-07-13 | End: 2023-07-13 | Stop reason: HOSPADM

## 2023-07-13 RX ORDER — VECURONIUM BROMIDE FOR INJECTION 1 MG/ML
INJECTION, POWDER, LYOPHILIZED, FOR SOLUTION INTRAVENOUS
Status: DISCONTINUED | OUTPATIENT
Start: 2023-07-13 | End: 2023-07-13

## 2023-07-13 RX ORDER — FENTANYL CITRATE 50 UG/ML
INJECTION, SOLUTION INTRAMUSCULAR; INTRAVENOUS
Status: DISCONTINUED | OUTPATIENT
Start: 2023-07-13 | End: 2023-07-13

## 2023-07-13 RX ORDER — DOCUSATE SODIUM 100 MG/1
100 CAPSULE, LIQUID FILLED ORAL 2 TIMES DAILY
Qty: 60 CAPSULE | Refills: 0 | Status: SHIPPED | OUTPATIENT
Start: 2023-07-13 | End: 2023-07-27

## 2023-07-13 RX ORDER — CEFAZOLIN SODIUM 1 G/3ML
2 INJECTION, POWDER, FOR SOLUTION INTRAMUSCULAR; INTRAVENOUS
Status: DISCONTINUED | OUTPATIENT
Start: 2023-07-13 | End: 2023-07-13 | Stop reason: HOSPADM

## 2023-07-13 RX ORDER — LIDOCAINE HYDROCHLORIDE 20 MG/ML
INJECTION INTRAVENOUS
Status: DISCONTINUED | OUTPATIENT
Start: 2023-07-13 | End: 2023-07-13

## 2023-07-13 RX ORDER — DEXAMETHASONE SODIUM PHOSPHATE 4 MG/ML
INJECTION, SOLUTION INTRA-ARTICULAR; INTRALESIONAL; INTRAMUSCULAR; INTRAVENOUS; SOFT TISSUE
Status: DISCONTINUED | OUTPATIENT
Start: 2023-07-13 | End: 2023-07-13

## 2023-07-13 RX ORDER — PROPOFOL 10 MG/ML
VIAL (ML) INTRAVENOUS
Status: DISCONTINUED | OUTPATIENT
Start: 2023-07-13 | End: 2023-07-13

## 2023-07-13 RX ORDER — SODIUM CHLORIDE 0.9 % (FLUSH) 0.9 %
3 SYRINGE (ML) INJECTION
Status: DISCONTINUED | OUTPATIENT
Start: 2023-07-13 | End: 2023-07-13 | Stop reason: HOSPADM

## 2023-07-13 RX ORDER — FAMOTIDINE 20 MG/1
20 TABLET, FILM COATED ORAL
Status: COMPLETED | OUTPATIENT
Start: 2023-07-13 | End: 2023-07-13

## 2023-07-13 RX ORDER — IBUPROFEN 600 MG/1
600 TABLET ORAL EVERY 6 HOURS PRN
Qty: 60 TABLET | Refills: 0 | Status: SHIPPED | OUTPATIENT
Start: 2023-07-13 | End: 2023-07-27

## 2023-07-13 RX ORDER — ACETAMINOPHEN 325 MG/1
975 TABLET ORAL
Status: COMPLETED | OUTPATIENT
Start: 2023-07-13 | End: 2023-07-13

## 2023-07-13 RX ORDER — FAMOTIDINE 20 MG/1
20 TABLET, FILM COATED ORAL
Status: DISCONTINUED | OUTPATIENT
Start: 2023-07-13 | End: 2023-07-13 | Stop reason: HOSPADM

## 2023-07-13 RX ORDER — HYDROMORPHONE HYDROCHLORIDE 2 MG/ML
INJECTION, SOLUTION INTRAMUSCULAR; INTRAVENOUS; SUBCUTANEOUS
Status: DISCONTINUED | OUTPATIENT
Start: 2023-07-13 | End: 2023-07-13

## 2023-07-13 RX ORDER — EPHEDRINE SULFATE 50 MG/ML
INJECTION, SOLUTION INTRAVENOUS
Status: DISCONTINUED | OUTPATIENT
Start: 2023-07-13 | End: 2023-07-13

## 2023-07-13 RX ORDER — PROCHLORPERAZINE EDISYLATE 5 MG/ML
5 INJECTION INTRAMUSCULAR; INTRAVENOUS EVERY 30 MIN PRN
Status: DISCONTINUED | OUTPATIENT
Start: 2023-07-13 | End: 2023-07-13 | Stop reason: HOSPADM

## 2023-07-13 RX ORDER — MUPIROCIN 20 MG/G
OINTMENT TOPICAL
Status: DISCONTINUED | OUTPATIENT
Start: 2023-07-13 | End: 2023-07-13 | Stop reason: HOSPADM

## 2023-07-13 RX ORDER — OXYCODONE HYDROCHLORIDE 5 MG/1
5 TABLET ORAL
Status: DISCONTINUED | OUTPATIENT
Start: 2023-07-13 | End: 2023-07-13 | Stop reason: HOSPADM

## 2023-07-13 RX ORDER — DIPHENHYDRAMINE HYDROCHLORIDE 50 MG/ML
12.5 INJECTION INTRAMUSCULAR; INTRAVENOUS EVERY 30 MIN PRN
Status: DISCONTINUED | OUTPATIENT
Start: 2023-07-13 | End: 2023-07-13 | Stop reason: HOSPADM

## 2023-07-13 RX ORDER — HYDROMORPHONE HYDROCHLORIDE 2 MG/ML
0.4 INJECTION, SOLUTION INTRAMUSCULAR; INTRAVENOUS; SUBCUTANEOUS EVERY 5 MIN PRN
Status: DISCONTINUED | OUTPATIENT
Start: 2023-07-13 | End: 2023-07-13 | Stop reason: HOSPADM

## 2023-07-13 RX ORDER — SODIUM CHLORIDE, SODIUM LACTATE, POTASSIUM CHLORIDE, CALCIUM CHLORIDE 600; 310; 30; 20 MG/100ML; MG/100ML; MG/100ML; MG/100ML
INJECTION, SOLUTION INTRAVENOUS CONTINUOUS
Status: DISCONTINUED | OUTPATIENT
Start: 2023-07-13 | End: 2023-07-13 | Stop reason: HOSPADM

## 2023-07-13 RX ORDER — BUPIVACAINE HYDROCHLORIDE 5 MG/ML
INJECTION, SOLUTION EPIDURAL; INTRACAUDAL
Status: DISCONTINUED | OUTPATIENT
Start: 2023-07-13 | End: 2023-07-13 | Stop reason: HOSPADM

## 2023-07-13 RX ORDER — ROCURONIUM BROMIDE 10 MG/ML
INJECTION, SOLUTION INTRAVENOUS
Status: DISCONTINUED | OUTPATIENT
Start: 2023-07-13 | End: 2023-07-13

## 2023-07-13 RX ORDER — MIDAZOLAM HYDROCHLORIDE 1 MG/ML
INJECTION INTRAMUSCULAR; INTRAVENOUS
Status: DISCONTINUED | OUTPATIENT
Start: 2023-07-13 | End: 2023-07-13

## 2023-07-13 RX ORDER — ALBUMIN HUMAN 50 G/1000ML
SOLUTION INTRAVENOUS CONTINUOUS PRN
Status: DISCONTINUED | OUTPATIENT
Start: 2023-07-13 | End: 2023-07-13

## 2023-07-13 RX ORDER — OXYCODONE HYDROCHLORIDE 5 MG/1
5 TABLET ORAL EVERY 4 HOURS PRN
Qty: 30 TABLET | Refills: 0 | Status: SHIPPED | OUTPATIENT
Start: 2023-07-13 | End: 2023-07-27

## 2023-07-13 RX ORDER — DIPHENHYDRAMINE HYDROCHLORIDE 50 MG/ML
INJECTION INTRAMUSCULAR; INTRAVENOUS
Status: DISCONTINUED | OUTPATIENT
Start: 2023-07-13 | End: 2023-07-13

## 2023-07-13 RX ORDER — LIDOCAINE HYDROCHLORIDE 10 MG/ML
0.5 INJECTION, SOLUTION EPIDURAL; INFILTRATION; INTRACAUDAL; PERINEURAL ONCE
Status: DISCONTINUED | OUTPATIENT
Start: 2023-07-13 | End: 2023-07-13 | Stop reason: HOSPADM

## 2023-07-13 RX ORDER — ONDANSETRON 2 MG/ML
INJECTION INTRAMUSCULAR; INTRAVENOUS
Status: DISCONTINUED | OUTPATIENT
Start: 2023-07-13 | End: 2023-07-13

## 2023-07-13 RX ADMIN — ALBUMIN (HUMAN): 2.5 SOLUTION INTRAVENOUS at 07:07

## 2023-07-13 RX ADMIN — HEPARIN SODIUM 5000 UNITS: 5000 INJECTION INTRAVENOUS; SUBCUTANEOUS at 06:07

## 2023-07-13 RX ADMIN — PROPOFOL 150 MG: 10 INJECTION, EMULSION INTRAVENOUS at 07:07

## 2023-07-13 RX ADMIN — VECURONIUM BROMIDE FOR INJECTION 3 MG: 1 INJECTION, POWDER, LYOPHILIZED, FOR SOLUTION INTRAVENOUS at 09:07

## 2023-07-13 RX ADMIN — ROCURONIUM BROMIDE 50 MG: 10 INJECTION, SOLUTION INTRAVENOUS at 07:07

## 2023-07-13 RX ADMIN — FENTANYL CITRATE 100 MCG: 50 INJECTION, SOLUTION INTRAMUSCULAR; INTRAVENOUS at 07:07

## 2023-07-13 RX ADMIN — EPHEDRINE SULFATE 10 MG: 50 INJECTION INTRAVENOUS at 07:07

## 2023-07-13 RX ADMIN — OXYCODONE HYDROCHLORIDE 5 MG: 5 TABLET ORAL at 02:07

## 2023-07-13 RX ADMIN — VECURONIUM BROMIDE FOR INJECTION 3 MG: 1 INJECTION, POWDER, LYOPHILIZED, FOR SOLUTION INTRAVENOUS at 08:07

## 2023-07-13 RX ADMIN — HYDROMORPHONE HYDROCHLORIDE 0.6 MG: 2 INJECTION INTRAMUSCULAR; INTRAVENOUS; SUBCUTANEOUS at 10:07

## 2023-07-13 RX ADMIN — HYDROMORPHONE HYDROCHLORIDE 0.4 MG: 2 INJECTION INTRAMUSCULAR; INTRAVENOUS; SUBCUTANEOUS at 10:07

## 2023-07-13 RX ADMIN — CARBOXYMETHYLCELLULOSE SODIUM 2 DROP: 2.5 SOLUTION/ DROPS OPHTHALMIC at 07:07

## 2023-07-13 RX ADMIN — GLYCOPYRROLATE 0.2 MG: 0.2 INJECTION, SOLUTION INTRAMUSCULAR; INTRAVITREAL at 09:07

## 2023-07-13 RX ADMIN — ACETAMINOPHEN 975 MG: 325 TABLET, FILM COATED ORAL at 06:07

## 2023-07-13 RX ADMIN — LIDOCAINE HYDROCHLORIDE 50 MG: 20 INJECTION, SOLUTION INTRAVENOUS at 07:07

## 2023-07-13 RX ADMIN — FAMOTIDINE 20 MG: 20 TABLET, FILM COATED ORAL at 06:07

## 2023-07-13 RX ADMIN — DIPHENHYDRAMINE HYDROCHLORIDE 12.5 MG: 50 INJECTION, SOLUTION INTRAMUSCULAR; INTRAVENOUS at 10:07

## 2023-07-13 RX ADMIN — PROCHLORPERAZINE EDISYLATE 5 MG: 5 INJECTION, SOLUTION INTRAMUSCULAR; INTRAVENOUS at 02:07

## 2023-07-13 RX ADMIN — ONDANSETRON HYDROCHLORIDE 4 MG: 2 INJECTION INTRAMUSCULAR; INTRAVENOUS at 11:07

## 2023-07-13 RX ADMIN — CEFAZOLIN 2 G: 330 INJECTION, POWDER, FOR SOLUTION INTRAMUSCULAR; INTRAVENOUS at 07:07

## 2023-07-13 RX ADMIN — SODIUM CHLORIDE, SODIUM LACTATE, POTASSIUM CHLORIDE, AND CALCIUM CHLORIDE: 600; 310; 30; 20 INJECTION, SOLUTION INTRAVENOUS at 06:07

## 2023-07-13 RX ADMIN — MIDAZOLAM HYDROCHLORIDE 2 MG: 1 INJECTION, SOLUTION INTRAMUSCULAR; INTRAVENOUS at 07:07

## 2023-07-13 RX ADMIN — SODIUM CHLORIDE, SODIUM LACTATE, POTASSIUM CHLORIDE, AND CALCIUM CHLORIDE: 600; 310; 30; 20 INJECTION, SOLUTION INTRAVENOUS at 10:07

## 2023-07-13 RX ADMIN — HYDROMORPHONE HYDROCHLORIDE 0.4 MG: 2 INJECTION INTRAMUSCULAR; INTRAVENOUS; SUBCUTANEOUS at 12:07

## 2023-07-13 RX ADMIN — MUPIROCIN: 20 OINTMENT TOPICAL at 06:07

## 2023-07-13 RX ADMIN — DEXAMETHASONE SODIUM PHOSPHATE 8 MG: 4 INJECTION, SOLUTION INTRAMUSCULAR; INTRAVENOUS at 10:07

## 2023-07-13 RX ADMIN — HYDROMORPHONE HYDROCHLORIDE 0.4 MG: 2 INJECTION INTRAMUSCULAR; INTRAVENOUS; SUBCUTANEOUS at 01:07

## 2023-07-13 NOTE — OPERATIVE NOTE ADDENDUM
Certification of Assistant at Surgery       Surgery Date: 7/13/2023     Participating Surgeons:  Surgeon(s) and Role:     * Nury Wright MD - Primary     * Richelle Harris MD - Resident - Assisting    Procedures:  Procedure(s) (LRB):  XI ROBOTIC HYSTERECTOMY (N/A)  SALPINGO-OOPHORECTOMY, BILATERAL (N/A)  SUSPENSION, LIGAMENT, UTEROSACRAL, LAPAROSCOPIC (N/A)  COLPORRHAPHY, ANTERIOR (N/A)  SLING, MIDURETHRAL (N/A)  CYSTOSCOPY (N/A)    Assistant Surgeon's Certification of Necessity:  I understand that section 1842 (b) (6) (d) of the Social Security Act generally prohibits Medicare Part B reasonable charge payment for the services of assistants at surgery in teaching hospitals when qualified residents are available to furnish such services. I certify that the services for which payment is claimed were medically necessary, and that no qualified resident was available to perform the services. I further understand that these services are subject to post-payment review by the Medicare carrier.      Francesco Cifuentes PA-C    07/13/2023  2:31 PM

## 2023-07-13 NOTE — INTERVAL H&P NOTE
The patient has been examined and the H&P has been reviewed:    I concur with the findings and no changes have occurred since H&P was written.    Surgery risks, benefits and alternative options discussed and understood by patient/family.      Richelle Harris MD  OBGYN, PGY-3      There are no hospital problems to display for this patient.

## 2023-07-13 NOTE — OR NURSING
Only able to void approx 50 ml following instillation of 300 ml sterile water.  Bladder scan shows 219 ml remaining in bladder. Dr. Grady notified.  Order to replace Davis received.  Davis placed without difficulty with return of clear yellow urine.  Dr. Wright's office to contact pt to schedule removal on Monday or Tuesday.

## 2023-07-13 NOTE — ANESTHESIA PROCEDURE NOTES
Intubation    Date/Time: 7/13/2023 7:21 AM  Performed by: Bessy Rabago CRNA  Authorized by: Elfego Almendarez MD     Intubation:     Induction:  Inhalational - mask    Intubated:  Postinduction    Mask Ventilation:  Easy mask    Attempts:  1    Attempted By:  CRNA    Method of Intubation:  Video laryngoscopy    Blade:  Lazara 3    Laryngeal View Grade: Grade I - full view of cords      Difficult Airway Encountered?: No      Complications:  None    Airway Device:  Oral endotracheal tube    Airway Device Size:  7.5    Style/Cuff Inflation:  Cuffed    Inflation Amount (mL):  5    Tube secured:  22    Secured at:  The lips    Placement Verified By:  Capnometry    Complicating Factors:  None    Findings Post-Intubation:  BS equal bilateral

## 2023-07-13 NOTE — ANESTHESIA POSTPROCEDURE EVALUATION
Anesthesia Post Evaluation    Patient: Ashlie Redman    Procedure(s) Performed: Procedure(s) (LRB):  XI ROBOTIC HYSTERECTOMY (N/A)  SALPINGO-OOPHORECTOMY, BILATERAL (N/A)  SUSPENSION, LIGAMENT, UTEROSACRAL, LAPAROSCOPIC (N/A)  COLPORRHAPHY, ANTERIOR (N/A)  SLING, MIDURETHRAL (N/A)  CYSTOSCOPY (N/A)    Final Anesthesia Type: general      Patient location during evaluation: PACU  Patient participation: Yes- Able to Participate  Level of consciousness: awake and alert  Post-procedure vital signs: reviewed and stable  Pain management: adequate  Airway patency: patent    PONV status at discharge: No PONV  Anesthetic complications: no      Cardiovascular status: blood pressure returned to baseline and stable  Respiratory status: room air  Hydration status: euvolemic  Follow-up not needed.          Vitals Value Taken Time   /65 07/13/23 1246   Temp 36.6 °C (97.8 °F) 07/13/23 1124   Pulse 89 07/13/23 1256   Resp 16 07/13/23 1220   SpO2 93 % 07/13/23 1256   Vitals shown include unvalidated device data.      No case tracking events are documented in the log.      Pain/Lillian Score: Pain Rating Prior to Med Admin: 8 (7/13/2023 12:20 PM)  Lillian Score: 9 (7/13/2023 12:25 PM)

## 2023-07-13 NOTE — OP NOTE
Date of Operation: 07/13/2023    Title of Operation:  1)  Robotic-assisted total laparoscopic hysterectomy with bilateral salpingo-oophorectomy  2)  Robotic-assisted laparoscopic bilateral uterosacral suspension  3)  Anterior repair  4)  Placement of retropubic tension-free midurethral sling, Advantage Fit (Ceros)  5)  Cystourethroscopy    Indications for Surgery:  Last HPI from 04/19/2023  1)  UI:  (--) JESUS.  (+) UUI.  (--) pads. +rare underwear changes. Daytime frequency: Q 2 hours.  Nocturia: Yes: 3-4/night.   (--) dysuria,  (--) hematuria,  (--) frequent UTIs. Thinks had 2 in last 6 months. Had freq UTIs before last cystocele repair.  (--) complete bladder emptying. PV to help.      2)  POP:  Present x several months. To introitus.  Symptoms:(+)  pressure, worse with exercise/prolonged standing.  (--) vaginal bleeding. (--) vaginal discharge. (--) sexually active.  (--) h/o dyspareunia.  (+)  Vaginal dryness.  (--) vaginal estrogen use.   POP-Q:  Aa +1; Ba +1; C -2; Ap -1; Bp -1; D -6 to -7.  Genital hiatus 3, perineal body 2, total vaginal length 10-11.   mL     3)  BM:  (--) constipation/.  (--) chronic diarrhea. (--) hematochezia.  (--) fecal incontinence.  (--) fecal smearing/urgency.  (+) complete evacuation.      Changes since last visit:  Ready to proceed with surgery        06/26/2023  Suds  Urine dipstick: neg.     1.  VOIDING PHASE:       a.  Uroflowmetry:  Unable to perform  PVR:   not measured     The overall configuration of this uroflow study was unable to be interpreted, as patient was unable to void.      b.  Pressure flow:  Prolapse reduction: No  Voided volume:   159 mL  Voiding time:   >9 min  Peak flow:  27 mL/s   Avg flow:  5 mL/s  Max det pressure:  30  cm H20  Det pressure at max flow: negative recording, as pves catheter was dislodged during study  Void initiated by indeterminate mechanism, as pves catheter was dislodged during study.    Urethral relaxation (EMG): with  gradual increase.    PVR (calculated):  228 mL.  Patient was able to void additional 400 mL on toilet after removing all catheters.      The overall configuration of this pressure flow study was prolonged with concern for voiding dysfunction.      2.  FILLING PHASE:  1st desire: 275 mL  Normal desire:  321 mL  Strong desire:  375 mL  Urgency:  389 mL  Compliance (calculated)  approx 150 mL/cm H20  EMG activity during filling: with gradual increase  Detrusor contractions observed: No.      3.  URETHRAL FUNCTION/STORAGE PHASE:     a.  WITH prolapse reduction:  CLPP (150 mL): Negative  at  63 cm H20  VLPP (150 mL): Negative  at  42 cm H20   CLPP (335 mL): Negative  at  7 cm H20  VLPP (335 mL): Negative  at  42 cm H20   CLPP (MAX ):    Negative  at  42 cm H20  VLPP (MAX):     Negative  at  45 cm H20  NEG CLPP and VLPP with and without catheter in place on several provocations.   POS CLPP (last provocation) after removal of pves catheter.     These findings are consistent with Positive urodynamic stress incontinence only with CLPP at max cap and last provocation.     Cysto  Findings: Urethroscopy:  Normal.  Cystoscopy:  Normal bladder mucosa, bilateral ureteral flow was noted.     Assessment: Essentially normal cystourethroscopy.        06/23/2023  Pelvic ultrasound  Uterus:Size: 5.5 x 3.4 x 1.7 cm   Masses: None   Endometrium: Normal thickness in this post menopausal patient, measuring 2 mm.  Echogenicity within the endometrium, possibly calcification.   The ovaries are not identified.   Free Fluid: None.   Impression:   Probable endometrial calcification.  Correlate for history of endometrial procedure such as ablation.  Normal endometrial thickness.   The ovaries are not identified.    Preoperative Diagnosis:  Cystocele, midline    Uterovaginal prolapse    Rectocele, female    Urge urinary incontinence    Vaginal atrophy    Nocturia more than twice per night    Concern for voiding dysfunction  Urodynamic stress  incontinence (only with CLPP at max cap and last provocation)    Postoperative Diagnosis:  Cystocele, midline    Uterovaginal prolapse    Rectocele, female    Urge urinary incontinence    Vaginal atrophy    Nocturia more than twice per night    Concern for voiding dysfunction  Urodynamic stress incontinence (only with CLPP at max cap and last provocation)    Anesthesia:  General endotracheal anesthesia.  Additionally, 0.5% marcaine was injected prior to placement of laparoscopic trocars, and a dilute solution of 1% lidocaine with epinephrine was injected vaginally for local anesthesia.    Specimen (Bacteriological, Pathological or other):  Uterus, cervix, bilateral fallopian tubes/ovaries    Prosthetic Device/Implant:  1)  Advantage Fit sling.  LOT# [id number].      Surgeons Narrative:    Surgeon: Nury Wright MD    Assistants:  Richelle Harris MD (PGY3).  SAUNDRA Negron (insufficient resident assistance).      Intravenous Fluids:  1700 mL     Estimated Blood Loss:  100 mL     Urine Output:  300 mL     Counts:  Sponge, lap, needle counts correct x 2.     Drains: Davis catheter.     Disposition:  The patient was sent to the PACU in stable condition.     Findings:     1.  On exam under anesthesia,  normal external female genitalia. There was prolapse as previously noted in clinic.  Uterus and cervix: Normal size  Adnexa: non palpable.     2.  On laparoscopic survey:    --The bowel was grossly Normal.    --The appendix was not visualized.   --The uterus and cervix, bilateral fallopian tubes and ovaries were present and Normal.   --The liver margin Normal.   --The gall bladder was present and Normal.   --Bilateral ureters were visualized and noted to vermiculate at the start and close of the case.    --Adhesions were present from the colon to the right abdominal wall at the level of the pelvic brim. These were carefully taken down laparoscopically using sharp dissection before the start of the robotic portion of the case.    --Other findings included:  The right common iliac was located anomalously across the sacral promontory, making completion of the sacral colpopexy impossible.  Therefore, we proceeded with bilateral uterosacral suspension.      3.  On cystoscopy, the bladder mucosa was Normal.  After RATLH/BSO/USS/anterior repair/sling, there was no suture or mesh within the bladder mucosa.  The ureteral orifices were visualized bilaterally with (+) noted good efflux x 2. On a systematic survey of the bladder dome to the base of the urethrovesical junction, there were not other abnormalities noted. The urethra was normal on retraction of the scope.   After completing the prolapse repairs, the bladder was filled, and there was +leakage noted with gentle Crede.  Therefore, we also placed a midurethral sling.     4.  Rectal exam:  At the close of the case, rectal exam was performed.  There was no suture, mesh, or other injury noted on exam.  No obvious masses were palpated.     Description of procedure:    The patient was identified in the preoperative area where informed consent was confirmed, and she was taken to the operating room where an adequate level of general anesthesia was obtained.  The patient was positioned in lithotomy position with legs in Catalino stirrups. Care was taken to avoid joint hyperflexion or hyperextension, and all extremity surfaces were carefully padded so as to minimize risk of neurologic injury. Intravenous antibiotics were administered preoperatively. Sequential compression devices were applied to the patient's lower extremities preoperatively and heparin was administered subcutaneously for VTE prophylaxis.  Surgical time-out was performed, where the patient was identified and procedures confirmed.  An examination under anesthesia was performed with findings described as above.  The patient's abdomen, perineum, and vagina were sterilely prepped and draped. A van catheter was placed in the bladder for  drainage.      First, we placed laparoscopic ports. Before placement of each laparoscopic port, several mL of 0.25% Marcaine were injected subcutaneously as well as deeply into the tissue of each site.  First, a supraumbilical incision of 5-mm was made, and the 5 mm trocar was inserted into the incision until peritoneal entry was gained and confirmed. Carbon dioxide was insufflated through the port until an adequate pneumoperitoneum of no greater than 15 mm Hg was obtained.  The laparoscopic camera was inserted through this port for visualization of all subsequent port placement.  Two 8 mm ports were place on bilaterally to the supraumbilical port, each approximately 8-1-0 cm lateral, along the mid clavicular line at the level of the umbilicus, at least 2 cm cephalad and medial of the anterior superior iliac spine. Each 8 mm trocar was inserted under direct visualization without significant trauma.   An 8 mm airseal port was placed in the right upper quadrant, superior to the umbilicus and midway between the right lower quadrant port and the umbilicus in a triangulated fashion. A third 8 mm port was placed at the left side, midway between the left lower quadrant port and the umbilicus.  The 5 mm umbilical camera port was then exchanged for a 8 mm robotic camera port.  There were no complications with these port placements. A total of 5 ports had been placed, including the supraumbilical port for the camera. Adhesions were present from the colon to the right abdominal wall at the level of the pelvic brim. These were carefully taken down laparoscopically using sharp dissection before the start of the robotic portion of the case.  The robot was then docked.      We began the case with total laparoscopic hysterectomy.  The ureters were visualized bilaterally.  Decision had previously been made to remove bilateral tubes and ovaries.  Sequentially, the right infundibulopelvic, round, and broad ligaments were  electrodessicated with bipolar cautery.  The anterior and posterior sheaths of the round ligament were gently , the uterine vessels were exposed, and electrodessicated.  Using sharp dissection, the vesicouterine fold was created, and the bladder was carefully dissected off the anterior vagina.   Next, these same steps were repeated along the patient's left side.  Excellent hemostasis was noted.  Using the monopolar darren, the anterior colpotomy was created along the KOH device and considered circumferentially until the entire uterine specimen was freed from the pelvis.  The specimen was then handed to pathology for further evaluation.  The vaginal cuff was closed with several figure-of-eight stitches of 0-vicryl.  Excellent hemostasis was noted.     Next, we assessed the surgical field to determine appropriateness for sacral colpopexy. The right common iliac was located anomalously across the sacral promontory, making completion of the sacral colpopexy impossible.  Therefore, we proceeded with bilateral uterosacral suspension.     Next, we proceeded with laparoscopic uterosacral suspension.  Bilateral uterosacral ligaments were previously tagged with 0-vicryl sutures bilaterally while the uterus was in place.  These tags were identified.  Bilateral ureters were re-identified, and care was taken to avoid damage to them during this process.  A 0-Prolene suture was placed through the right uterosacral ligament at the level of the ischial spine and then passed through the posterior and anterior vaginal cuff angle, making sure not to pass the suture through full thickness of the vaginal epithelium.  Next, a 2 x 0-PDS sutures were placed through the right uterosacral ligament about 1 cm distal and medial to the prolene stitch and passed completely through the posterior and anterior vaginal cuff at the right cuff angle.  All sutures were tied down, resulting in excellent elevation of the right vaginal apex.  The  same procedure was performed on the patient's left side, passing a 0-Prolene stitch more cephalad and lateral to the 2 x 0-PDS stitches.  All sutures were tied down, resulting in excellent elevation of the right vaginal apex.   At this point, bilateral ureters were noted to vermiculate well.  The vaginal cuff was check manually and noted to be intact, without defect, and well-elevated at the apex after the uterosacral suspension.  The laparoscopic instruments were placed on standby, and cystourethroscopy was performed.  There were no abnormalities noted, and excellent efflux from bilateral ureteral orifices was seen.     At this point, the robotic/laparoscopic portion of the procedure was completed. The pelvis was irrigated, and all irrigants were removed. Interceed was placed along all suture lines and planes of dissection to prevent future adhesions.  All abdominal incisions were <1 cm, and fascial closure was unnecessary. The abdomen was deflated and all laparoscopic sleeves and other instruments were removed from the abdomen. All laparoscopic skin incisions were closed with subcuticular stitches of 4-0 Monocryl and secured with steri-strips and band-aids.  Excellent cosmesis and hemostasis was noted.             Attention was then turned vaginally.  As there was still a small cystocele, we commenced with anterior repair.  Allis clamps were used to delineate the proximal-most  (approximately 1 cm distal to the cuff) and distal-most (1 cm cephalad to the urethrovesical junction) aspects of the defect, approximately 1 cm distal to the cuff.  This was then injected with 1% lidocaine with epinephrine, allowing for hydrodistention of the tissue.  A vertical incision was made between the 2 Allis clamps.  Allis clamps were then placed along the margins of the epithelial incision and the Metzenbaum scissors were used to separate the underlying fibromuscular tissue from the epithelium bilaterally to the lateral-most  aspects of the defect.  Several stitches of 2-0 PDS suture were then used in   mattress fashion to reapproximate the cystocele defect.  This resulted in excellent reduction of the defect.  Excellent hemostasis was noted at the end of this part of the procedure.  The bed of the repair was irrigated with sterile water.  The epithelial defect was then trimmed and closed with a running-locking stitch of 2-0 Vicryl.         After completing the prolapse repairs, the bladder was filled, and there was +leakage noted with gentle Crede.  Therefore, we also placed a midurethral sling. We then proceeded with placement of the Advantage Fit midurethral sling. The skin was marked just above the symphysis pubis, 2 cm laterally on either side of the midline indicating the exit sites for the trocars.  The Davis catheter was palpated at the urethrovesical junction, and 2 Allis clamps were placed at the anterior vaginal wall along the midurethra. The Davis catheter was removed from the patient's bladder. The vaginal epithelium between the two Allis clamps was injected with the 1% lidocaine with epinephrine.  Metzenbaum scissors were used to dissect the vaginal epithelium off the underlying pubocervical muscular tissue in the direction of the angle formed between the ischiopubic ramus and the pubic bone bilaterally. The rigid catheter guide was used to deviate the bladder neck and urethra to the patient's left while the right trocar was advanced to the dissected tract in the patient's right side.  Once the urogenital membrane was perforated, the trocar and handle were reoriented in the sagittal plane and the tip of the trocar was advanced through the retropubic space in intimate proximity to the pubic bone.  The trocar was then advanced through the skin approximately 2 cm to the right of the midline just above the pubic symphysis. The passage of the Advantage Fit trocar was repeated on the patient's left hand side in a similar fashion,  using the catheter guide to deviate the bladder neck to the right.  At this point, cystourethroscopy was performed. Cystoscopy was negative for injury after infusion of at least 300 mL in the bladder.  The ureteral orifices were noted to have good efflux bilaterally.  The bladder was then drained. With a #10 Hegar dilator placed between the sling mesh and the urethra, the arms of the sling were elevated above the pubic symphysis and the plastic sheaths were removed from the mesh arms.  Excess mesh was trimmed beneath the suprapubic skin.  The Hegar dilator ensured that the mesh sling was placed in a tension-free manner.  The vaginal mucosa overlying the sling mesh was closed with a several mattress stitches of 2-0 Vicryl with excellent hemostasis noted.  The suprapubic incisions were closed with dermabond.    At the close of the case, all counts were correct x2.  The vagina was irrigated, and all irrigants were removed.  The vagina was packed with a role of Kerlix, coated with Premarin cream for assurance of immediate postop hemostasis.  The Davis was in place and draining well.  The patient was awakened from general endotracheal anesthesia and was taken to the Recovery Room in stable condition.  She tolerated the procedure well.    I was present and scrubbed for the entire procedure.

## 2023-07-13 NOTE — DISCHARGE SUMMARY
[facility]  Brief Op Note/Discharge Note  Short Stay    Admit Date: 7/13/2023    Discharge Date: 07/13/2023    Attending Physician: Sascha Santiago MD     Surgery Date: 7/13/2023     Surgeon(s) and Role:     * Sascha Santiago MD - Primary     * Richelle Harris MD - Resident - Assisting    Pre-op Diagnosis:  Uterovaginal prolapse [N81.4]  Cystocele, midline [N81.11]  Rectocele, female [N81.6]  Stress incontinence [N39.3]    Post-op Diagnosis:  Post-Op Diagnosis Codes:     * Uterovaginal prolapse [N81.4]     * Cystocele, midline [N81.11]     * Rectocele, female [N81.6]     * Stress incontinence [N39.3]    Procedure(s) (LRB):  XI ROBOTIC HYSTERECTOMY (N/A)  SALPINGO-OOPHORECTOMY, BILATERAL (N/A)  SUSPENSION, LIGAMENT, UTEROSACRAL, LAPAROSCOPIC (N/A)  COLPORRHAPHY, ANTERIOR (N/A)  SLING, MIDURETHRAL (N/A)  CYSTOSCOPY (N/A)    Anesthesia: General    Findings/Key Components: Please see operative report from Dr. Santiago dated 7/13/23 for full details.    Estimated Blood Loss: 100 mL         Specimens:   Specimen (24h ago, onward)       Start     Ordered    07/13/23 1110  Specimen to Pathology, Surgery Gynecology and Obstetrics  Once        Comments: Pre-op Diagnosis: Uterovaginal prolapse [N81.4]Cystocele, midline [N81.11]Rectocele, female [N81.6]Stress incontinence [N39.3]Procedure(s):XI ROBOTIC HYSTERECTOMYSALPINGO-OOPHORECTOMY, BILATERALSUSPENSION, LIGAMENT, UTEROSACRAL, LAPAROSCOPICSACROCOLPOPEXY, ABDOMINALCOLPORRHAPHY, COMBINED ANTEROPOSTERIORSLING, MIDURETHRALCYSTOSCOPY Number of specimens: 1.Name of specimens: 1. UTERUS, CERVIX, BILATERAL FALLOPIAN TUBES, AND BILATERAL OVARIES.     References:    Click here for ordering Quick Tip   Question Answer Comment   Procedure Type: Gynecology and Obstetrics    Specimen Class: Routine/Screening    Which provider would you like to cc? SASCHA SANTIAGO    Release to patient Immediate        07/13/23 1110                    Discharge Provider: Richelle  Kimberly    Diagnoses:  Active Hospital Problems    Diagnosis  POA    *S/p RA-TLH/BSO/USLS/MUS/Anterior repair [Z90.710]  Yes      Resolved Hospital Problems   No resolved problems to display.       Discharged Condition: good    Hospital Course:   Patient was admitted for outpatient procedure as above, and tolerated the procedure well with no complications. Please see operative report for further details. Following the procedure, the patient was awakened from anesthesia and transferred to the recovery area in stable condition. Patient failed AVT and van replaced. She was discharged to home once ambulating, tolerating PO intake, and pain was well-controlled. Patient was given routine post-op instructions and prescriptions for pain medication to take as needed. Patient instructed to follow up with Dr. Wright early next week for repeat voiding trial, as well as in 2 and 6 weeks.    Final Diagnoses: Same as principal problem.    Disposition: Home or Self Care    Follow up/Patient Instructions:    Medications:  Reconciled Home Medications:      Medication List        START taking these medications      docusate sodium 100 MG capsule  Commonly known as: COLACE  Take 1 capsule (100 mg total) by mouth 2 (two) times daily.     ibuprofen 600 MG tablet  Commonly known as: ADVIL,MOTRIN  Take 1 tablet (600 mg total) by mouth every 6 (six) hours as needed for Pain.     oxyCODONE 5 MG immediate release tablet  Commonly known as: ROXICODONE  Take 1 tablet (5 mg total) by mouth every 4 (four) hours as needed for Pain.            CONTINUE taking these medications      CALCIUM-VITAMIN D ORAL     dorzolamide 2 % ophthalmic solution  Commonly known as: TRUSOPT  INSTILL 1 DROP INTO BOTH EYES 3 TIMES A DAY     latanoprost 0.005 % ophthalmic solution  INSTILL 1 DROP INTO BOTH EYES IN THE EVENING     nitrofurantoin (macrocrystal-monohydrate) 100 MG capsule  Commonly known as: MACROBID  Take 1 capsule (100 mg total) by mouth 2 (two) times  daily. for 5 days     PROLIA 60 mg/mL Syrg  Generic drug: denosumab  Inject 60 mg into the skin.     valACYclovir 500 MG tablet  Commonly known as: VALTREX  Take 1 tablet (500 mg total) by mouth once daily.            Discharge Procedure Orders   Diet Adult Regular     Pelvic Rest   Order Comments: Pelvic rest until follow up visit. Nothing in vagina -no sex, tampons, douching, etc.     Lifting restrictions   Order Comments: No lifting more than 10 pounds for 6 weeks     Notify your health care provider if you experience any of the following:   Order Comments: Heavy vaginal bleeding saturating more than 1 pad per hr for at least consecutive 2 hrs.     Notify your health care provider if you experience any of the following:  increased confusion or weakness     Notify your health care provider if you experience any of the following:  persistent dizziness, light-headedness, or visual disturbances     Notify your health care provider if you experience any of the following:  severe persistent headache     Notify your health care provider if you experience any of the following:  difficulty breathing or increased cough     Notify your health care provider if you experience any of the following:  severe uncontrolled pain     Notify your health care provider if you experience any of the following:  persistent nausea and vomiting or diarrhea     Notify your health care provider if you experience any of the following:  temperature >100.4     Notify your health care provider if you experience any of the following:  redness, tenderness, or signs of infection (pain, swelling, redness, odor or green/yellow discharge around incision site)     Activity as tolerated      Follow-up Information       Nury Wright MD Follow up in 2 week(s).    Specialty: UroGynecology   Why: Post-op Visit  Contact information:  [address] [place] [address] [id number] [phone]               Nury Wright MD Follow up in 6 week(s).     Specialty: UroGynecology   Why: Post-op Visit  Contact information:  [address] [facility] [address]  [phone]                           Richelle Harris MD  OBGYN, PGY-3

## 2023-07-13 NOTE — TRANSFER OF CARE
Anesthesia Transfer of Care Note    Patient: Ashlie Redman    Procedure(s) Performed: Procedure(s) (LRB):  XI ROBOTIC HYSTERECTOMY (N/A)  SALPINGO-OOPHORECTOMY, BILATERAL (N/A)  SUSPENSION, LIGAMENT, UTEROSACRAL, LAPAROSCOPIC (N/A)  COLPORRHAPHY, ANTERIOR (N/A)  SLING, MIDURETHRAL (N/A)  CYSTOSCOPY (N/A)    Patient location: PACU    Anesthesia Type: general    Transport from OR: Transported from OR on 6-10 L/min O2 by face mask with adequate spontaneous ventilation    Post pain: adequate analgesia    Post assessment: no apparent anesthetic complications    Post vital signs: stable    Level of consciousness: awake, alert and oriented    Nausea/Vomiting: no nausea/vomiting    Transfer of care protocol was followed      Last vitals:   Visit Vitals  /89 (BP Location: Right arm, Patient Position: Lying)   Pulse 65   Temp 36.5 °C (97.7 °F) (Oral)   Resp 18   Wt 58.9 kg (129 lb 13.6 oz)   SpO2 98%   Breastfeeding No   BMI 20.96 kg/m²

## 2023-07-13 NOTE — DISCHARGE INSTRUCTIONS
Disharge Instructions for Laparoscopic Hysterectomy  You had a procedure called laparoscopic hysterectomy. A surgeon removed your uterus using instruments inserted through small incisions in your abdomen. These incisions may be tender or sore. You may also have pain in your upper back or shoulders. This is from the gas used to enlarge your abdomen to allow your doctor to see inside your pelvis and perform the procedure. This pain usually goes away in a day or two. It usually takes from 1 to 4 weeks to recover from laparoscopic hysterectomy. Remember, though, that recovery time varies from woman to woman. Here's what you can do to speed your recovery following surgery.  Home care   Continue the coughing and deep breathing exercises that you learned in the hospital.  Take your medications exactly as directed by your doctor.  Avoid constipation.  Eat fruits, vegetables, and whole grains.  Drink 6 to 8 glasses of water a day, unless told to do otherwise.  Use a laxative or a mild stool softener if your doctor says it's OK.  Shower as usual. Wash your incisions with mild soap and water. Pat dry.  Don't use oils, powders, or lotions on your incisions.  Don't put anything in your vagina until your doctor says it's safe to do so. Don't use tampons or douches. Don't have sex.  If you had both ovaries removed, report hot flashes, mood swings, and irritability to your doctor. There may be medications that can help you.  Activity  Ask your doctor when you can start driving again. It's usually okay to drive as soon as you are free of pain and able to move comfortably from side to side. Don't drive while you are still taking opioid pain medications.  Ask others to help with chores and errands while you recover.  Dont lift anything heavier than 10 pounds for 6 weeks.  Dont vacuum or do other strenuous activities until the doctor says it's OK.  Walk as often as you feel able.  Don't drive for a few days after the surgery. You may  drive as soon as you are able to move comfortably from side to side and when you are no longer taking narcotics.  Climb stairs slowly and pause after every few steps.  Follow-up care  Make a follow-up appointment as directed by our staff.     When to call your doctor  Call your doctor right away if you have any of the following:  Fever above 100.4°F (38°C) or chills  Bright red vaginal bleeding or vaginal bleeding that soaks more than one sanitary pad per hour  A foul smelling discharge from the vagina  Trouble urinating or burning when you urinate  Severe pain or bloating in your abdomen  Redness, swelling, or drainage at your incision sites  Shortness of breath or chest pain  Nausea and vomiting   Date Last Reviewed: 5/19/2015 © 2000-2017 [employer]. [address] [place] [address], [place] [address]. All rights reserved. This information is not intended as a substitute for professional medical care. Always follow your healthcare professional's instructions.    HUTCHINS CATHETER DISCHARGE INSTRUCTIONS   An indwelling urinary catheter is a flexible plastic tube that is inserted through the opening that carries urine from the  bladder to the outside of the body (urethra), to drain urine. The tube is kept in place by a small balloon that is inflated  once the tube is securely in the bladder. Urine drains into a bag that is attached to the thigh through this catheter.      To care for your urinary catheter at home:     Make sure that urine is flowing out of the catheter into the drainage bag.   Check the area around the insertion site for signs of infections (such as inflammation and irritated/swollen/  red/tender skin), and/or leakage of urine around the catheter   Keep the urinary drainage bag below the level of the bladder (to prevent backflow into the bladder).   Make sure that the urinary drainage bag does not drag and pull on the catheter.    Caring for your catheter:     Clean the area around the drainage  tube twice each day.   Use a mild soap and water around the drainage tube.   Rinse well and dry with a clean towel.      Draining the urine collection bag:    The bag that collects urine may be strapped to your thigh. You will need to empty the bag at regular intervals,  typically every 2 to 4 hours, or whenever the catheter bag is half-full, and at bedtime.   Wash your hands with soap and water. If you are emptying another persons catheter bag, you may wish to wear  disposable gloves. Wash your hands before you put the gloves on and after removing them.

## 2023-07-14 ENCOUNTER — TELEPHONE (OUTPATIENT)
Dept: UROLOGY | Facility: CLINIC | Age: 68
End: 2023-07-14
Payer: MEDICARE

## 2023-07-14 VITALS
WEIGHT: 129.88 LBS | RESPIRATION RATE: 16 BRPM | HEART RATE: 100 BPM | DIASTOLIC BLOOD PRESSURE: 59 MMHG | BODY MASS INDEX: 20.96 KG/M2 | TEMPERATURE: 98 F | OXYGEN SATURATION: 99 % | SYSTOLIC BLOOD PRESSURE: 109 MMHG

## 2023-07-14 NOTE — TELEPHONE ENCOUNTER
Talked to Dr. Wright- will reach out to [facility].    ----- Message from Shanelle Aldana sent at 7/14/2023  2:46 PM CDT -----  Regarding: advice  Contact: Nury  Type: Needs Medical Advice  Who Called:  Nury with [facility]   Symptoms (please be specific):    How long has patient had these symptoms:    Pharmacy name and phone #:    Best Call Back Number: [phone]  Additional Information: Nury stated that she would like to speak with the nurse or doctor concerning patient. Please contact patient to advise. Thanks!

## 2023-07-17 ENCOUNTER — TELEPHONE (OUTPATIENT)
Dept: UROLOGY | Facility: CLINIC | Age: 68
End: 2023-07-17
Payer: MEDICARE

## 2023-07-17 NOTE — TELEPHONE ENCOUNTER
Spoke with patient , she will come in tomorrow for a voiding trial, patient expressed understanding.

## 2023-07-18 ENCOUNTER — CLINICAL SUPPORT (OUTPATIENT)
Dept: UROLOGY | Facility: CLINIC | Age: 68
End: 2023-07-18
Payer: MEDICARE

## 2023-07-18 DIAGNOSIS — R33.9 URINARY RETENTION: Primary | ICD-10-CM

## 2023-07-18 NOTE — PROGRESS NOTES
Pt coming in for removal of van catheter. Withdrew 10   Cc from balloon and removed 16 Fr van catheter intact. Provided voiding trial instruction. Pt expressed understanding and tolerated well.

## 2023-07-19 LAB
FINAL PATHOLOGIC DIAGNOSIS: NORMAL
Lab: NORMAL

## 2023-07-27 ENCOUNTER — OFFICE VISIT (OUTPATIENT)
Dept: DERMATOLOGY | Facility: CLINIC | Age: 68
End: 2023-07-27
Payer: MEDICARE

## 2023-07-27 VITALS — BODY MASS INDEX: 20.73 KG/M2 | WEIGHT: 129 LBS | HEIGHT: 66 IN

## 2023-07-27 DIAGNOSIS — Z08 ENCOUNTER FOR FOLLOW-UP SURVEILLANCE OF SKIN CANCER: ICD-10-CM

## 2023-07-27 DIAGNOSIS — D48.5 NEOPLASM OF UNCERTAIN BEHAVIOR OF SKIN: Primary | ICD-10-CM

## 2023-07-27 DIAGNOSIS — Z85.828 ENCOUNTER FOR FOLLOW-UP SURVEILLANCE OF SKIN CANCER: ICD-10-CM

## 2023-07-27 DIAGNOSIS — L57.8 OTHER SKIN CHANGES DUE TO CHRONIC EXPOSURE TO NONIONIZING RADIATION: ICD-10-CM

## 2023-07-27 DIAGNOSIS — L81.4 SOLAR LENTIGO: ICD-10-CM

## 2023-07-27 DIAGNOSIS — B07.8 COMMON WART: ICD-10-CM

## 2023-07-27 DIAGNOSIS — L82.1 SEBORRHEIC KERATOSES: ICD-10-CM

## 2023-07-27 PROCEDURE — 1157F ADVNC CARE PLAN IN RCRD: CPT | Mod: CPTII,S$GLB,, | Performed by: DERMATOLOGY

## 2023-07-27 PROCEDURE — 1101F PR PT FALLS ASSESS DOC 0-1 FALLS W/OUT INJ PAST YR: ICD-10-PCS | Mod: CPTII,S$GLB,, | Performed by: DERMATOLOGY

## 2023-07-27 PROCEDURE — 11102 PR TANGENTIAL BIOPSY, SKIN, SINGLE LESION: ICD-10-PCS | Mod: XS,S$GLB,, | Performed by: DERMATOLOGY

## 2023-07-27 PROCEDURE — 3288F PR FALLS RISK ASSESSMENT DOCUMENTED: ICD-10-PCS | Mod: CPTII,S$GLB,, | Performed by: DERMATOLOGY

## 2023-07-27 PROCEDURE — 1159F PR MEDICATION LIST DOCUMENTED IN MEDICAL RECORD: ICD-10-PCS | Mod: CPTII,S$GLB,, | Performed by: DERMATOLOGY

## 2023-07-27 PROCEDURE — 1160F PR REVIEW ALL MEDS BY PRESCRIBER/CLIN PHARMACIST DOCUMENTED: ICD-10-PCS | Mod: CPTII,S$GLB,, | Performed by: DERMATOLOGY

## 2023-07-27 PROCEDURE — 3008F BODY MASS INDEX DOCD: CPT | Mod: CPTII,S$GLB,, | Performed by: DERMATOLOGY

## 2023-07-27 PROCEDURE — 88305 TISSUE EXAM BY PATHOLOGIST: CPT | Mod: 26,,, | Performed by: PATHOLOGY

## 2023-07-27 PROCEDURE — 1160F RVW MEDS BY RX/DR IN RCRD: CPT | Mod: CPTII,S$GLB,, | Performed by: DERMATOLOGY

## 2023-07-27 PROCEDURE — 99213 OFFICE O/P EST LOW 20 MIN: CPT | Mod: 25,S$GLB,, | Performed by: DERMATOLOGY

## 2023-07-27 PROCEDURE — 3008F PR BODY MASS INDEX (BMI) DOCUMENTED: ICD-10-PCS | Mod: CPTII,S$GLB,, | Performed by: DERMATOLOGY

## 2023-07-27 PROCEDURE — 3288F FALL RISK ASSESSMENT DOCD: CPT | Mod: CPTII,S$GLB,, | Performed by: DERMATOLOGY

## 2023-07-27 PROCEDURE — 88305 TISSUE EXAM BY PATHOLOGIST: ICD-10-PCS | Mod: 26,,, | Performed by: PATHOLOGY

## 2023-07-27 PROCEDURE — 1159F MED LIST DOCD IN RCRD: CPT | Mod: CPTII,S$GLB,, | Performed by: DERMATOLOGY

## 2023-07-27 PROCEDURE — 17110 DESTRUCTION B9 LES UP TO 14: CPT | Mod: S$GLB,,, | Performed by: DERMATOLOGY

## 2023-07-27 PROCEDURE — 1101F PT FALLS ASSESS-DOCD LE1/YR: CPT | Mod: CPTII,S$GLB,, | Performed by: DERMATOLOGY

## 2023-07-27 PROCEDURE — 11102 TANGNTL BX SKIN SINGLE LES: CPT | Mod: XS,S$GLB,, | Performed by: DERMATOLOGY

## 2023-07-27 PROCEDURE — 1157F PR ADVANCE CARE PLAN OR EQUIV PRESENT IN MEDICAL RECORD: ICD-10-PCS | Mod: CPTII,S$GLB,, | Performed by: DERMATOLOGY

## 2023-07-27 PROCEDURE — 88305 TISSUE EXAM BY PATHOLOGIST: CPT | Performed by: PATHOLOGY

## 2023-07-27 PROCEDURE — 99213 PR OFFICE/OUTPT VISIT, EST, LEVL III, 20-29 MIN: ICD-10-PCS | Mod: 25,S$GLB,, | Performed by: DERMATOLOGY

## 2023-07-27 PROCEDURE — 17110 PR DESTRUCTION BENIGN LESIONS UP TO 14: ICD-10-PCS | Mod: S$GLB,,, | Performed by: DERMATOLOGY

## 2023-07-27 NOTE — PROGRESS NOTES
"  Subjective:      Patient ID:  Ashlie Redman is a 68 y.o. female who presents for   Chief Complaint   Patient presents with    Skin Check     UBSC     LOV 4/22/22- Nail Dystrophy    Patient here today for UBSC  C/O spots to both arms, itchy.  C/O spot to chest x "recently noticed" No symptoms.     Derm Hx:  Superficial BCC left dorsal forearm- 1-30-20  Has no fhx of MM    Current Outpatient Medications:   ·  calcium carb/vit D3/minerals (CALCIUM-VITAMIN D ORAL), , Disp: , Rfl:   ·  denosumab (PROLIA) 60 mg/mL Syrg, Inject 60 mg into the skin., Disp: , Rfl:   ·  dorzolamide (TRUSOPT) 2 % ophthalmic solution, INSTILL 1 DROP INTO BOTH EYES 3 TIMES A DAY, Disp: 30 mL, Rfl: 4  ·  latanoprost 0.005 % ophthalmic solution, INSTILL 1 DROP INTO BOTH EYES IN THE EVENING, Disp: 7.5 mL, Rfl: 3  ·  valACYclovir (VALTREX) 500 MG tablet, Take 1 tablet (500 mg total) by mouth once daily., Disp: 90 tablet, Rfl: 1  ·  docusate sodium (COLACE) 100 MG capsule, Take 1 capsule (100 mg total) by mouth 2 (two) times daily., Disp: 60 capsule, Rfl: 0  ·  ibuprofen (ADVIL,MOTRIN) 600 MG tablet, Take 1 tablet (600 mg total) by mouth every 6 (six) hours as needed for Pain., Disp: 60 tablet, Rfl: 0  ·  oxyCODONE (ROXICODONE) 5 MG immediate release tablet, Take 1 tablet (5 mg total) by mouth every 4 (four) hours as needed for Pain., Disp: 30 tablet, Rfl: 0      Review of Systems   Constitutional:  Negative for fever, chills and fatigue.   Respiratory:  Negative for cough and shortness of breath.    Skin:  Positive for activity-related sunscreen use and wears hat. Negative for itching, rash, dry skin and daily sunscreen use.   Hematologic/Lymphatic: Bruises/bleeds easily.     Objective:   Physical Exam   Constitutional: She appears well-developed and well-nourished. No distress.   HENT:   Mouth/Throat: Lips normal.    Eyes: Lids are normal.    Neurological: She is alert and oriented to person, place, and time. She is not disoriented. "   Psychiatric: She has a normal mood and affect. She is not agitated.   Skin:   Areas Examined (abnormalities noted in diagram):   Scalp / Hair Palpated and Inspected  Head / Face Inspection Performed  Neck Inspection Performed  Chest / Axilla Inspection Performed  Abdomen Inspection Performed  Back Inspection Performed  RUE Inspected  LUE Inspection Performed  Nails and Digits Inspection Performed               Diagram Legend     Erythematous scaling macule/papule c/w actinic keratosis       Vascular papule c/w angioma      Pigmented verrucoid papule/plaque c/w seborrheic keratosis      Yellow umbilicated papule c/w sebaceous hyperplasia      Irregularly shaped tan macule c/w lentigo     1-2 mm smooth white papules consistent with Milia      Movable subcutaneous cyst with punctum c/w epidermal inclusion cyst      Subcutaneous movable cyst c/w pilar cyst      Firm pink to brown papule c/w dermatofibroma      Pedunculated fleshy papule(s) c/w skin tag(s)      Evenly pigmented macule c/w junctional nevus     Mildly variegated pigmented, slightly irregular-bordered macule c/w mildly atypical nevus      Flesh colored to evenly pigmented papule c/w intradermal nevus       Pink pearly papule/plaque c/w basal cell carcinoma      Erythematous hyperkeratotic cursted plaque c/w SCC      Surgical scar with no sign of skin cancer recurrence      Open and closed comedones      Inflammatory papules and pustules      Verrucoid papule consistent consistent with wart     Erythematous eczematous patches and plaques     Dystrophic onycholytic nail with subungual debris c/w onychomycosis     Umbilicated papule    Erythematous-base heme-crusted tan verrucoid plaque consistent with inflamed seborrheic keratosis     Erythematous Silvery Scaling Plaque c/w Psoriasis     See annotation        Assessment / Plan:      Pathology Orders:       Normal Orders This Visit    Specimen to Pathology, Dermatology     Comments:    Number of  Specimens:->1  ------------------------->-------------------------  Spec 1 Procedure:->Biopsy  Spec 1 Clinical Impression:->bcc vs other  Spec 1 Source:->left upper arm    Questions:    Procedure Type: Dermatology and skin neoplasms    Number of Specimens: 1    ------------------------: -------------------------    Spec 1 Procedure: Biopsy    Spec 1 Clinical Impression: bcc vs other    Spec 1 Source: left upper arm    Release to patient:           Neoplasm of uncertain behavior of skin  -     Specimen to Pathology, Dermatology  Shave biopsy procedure note:    Shave biopsy performed after verbal consent including risk of infection, scar, recurrence, need for additional treatment of site. Area prepped with alcohol, anesthetized with approximately 1.0cc of 1% lidocaine with epinephrine. Lesional tissue shaved with razor blade. Hemostasis achieved with application of aluminum chloride followed by hyfrecation. No complications. Dressing applied. Wound care explained.    Common wart  Cryosurgery procedure note:    Verbal consent from the patient is obtained. Liquid nitrogen cryosurgery is applied to 2 lesions to produce a freeze injury. The patient is aware that blisters may form and is instructed on wound care with gentle cleansing and use of vaseline ointment to keep moist until healed. The patient is supplied a handout on cryosurgery and is instructed to call if lesions do not completely resolve.    Seborrheic keratoses  These are benign inherited growths without a malignant potential. Reassurance given to patient. No treatment is necessary.     Solar lentigo  This is a benign hyperpigmented sun induced lesion. Daily sun protection will reduce the number of new lesions. Treatment of these benign lesions are considered cosmetic.    Encounter for follow-up surveillance of skin cancer  Area of previous NMSC (left forearm) examined. Site well healed with no signs of recurrence.  Upper body skin examination performed today  including at least 9 points as noted in physical examination. 1  lesion suspicious for malignancy noted.    Other skin changes due to chronic exposure to nonionizing radiation  Patient instructed in importance in daily broad spectrum sun protection of at least spf 30. Mineral sunscreen ingredients preferred (Zinc +/- Titanium) and can be found OTC.   Recommend Elta MD for daily use on face and neck.  Patient encouraged to wear hat for all outdoor exposure.   Also discussed sun avoidance and use of protective clothing.             Follow up in about 1 year (around 7/27/2024), or if symptoms worsen or fail to improve.

## 2023-07-27 NOTE — PATIENT INSTRUCTIONS
Shave Biopsy Wound Care    Your doctor has performed a shave biopsy today.  A band aid and vaseline ointment has been placed over the site.  This should remain in place for 24 hours.  It is recommended that you keep the area dry for the first 24 hours.  After 24 hours, you may remove the band aid and wash the area with warm soap and water and apply Vaseline jelly.  Many patients prefer to use Neosporin or Bacitracin ointment.  This is acceptable; however, know that you can develop an allergy to this medication even if you have used it safely for years.  It is important to keep the area moist.  Letting it dry out and get air slows healing time, and will worsen the scar.  Band aid is optional after first 24 hours.      If you notice increasing redness, tenderness, pain, or yellow drainage at the biopsy site, please notify your doctor.  These are signs of an infection.    If your biopsy site is bleeding, apply firm pressure for 15 minutes straight.  Repeat for another 15 minutes, if it is still bleeding.   If the surgical site continues to bleed, then please contact your doctor.       [facility] - DERMATOLOGY  [address], [address]  [facility] [address]  Dept: [phone]  Dept Fax: [phone]      CRYOSURGERY      Your doctor has used a method called cryosurgery to treat your skin condition. Cryosurgery refers to the use of very cold substances to treat a variety of skin conditions such as warts, pre-skin cancers, molluscum contagiosum, sun spots, and several benign growths. The substance we use in cryosurgery is liquid nitrogen and is so cold (-195 degrees Celsius) that is burns when administered.     Following treatment in the office, the skin may immediately burn and become red. You may find the area around the lesion is affected as well. It is sometimes necessary to treat not only the lesion, but a small area of the surrounding normal skin to achieve a good response.     A  blister, and even a blood filled blister, may form after treatment.   This is a normal response. If the blister is painful, it is acceptable to sterilize a needle and with rubbing alcohol and gently pop the blister. It is important that you gently wash the area with soap and warm water as the blister fluid may contain wart virus if a wart was treated. Do no remove the roof of the blister.     The area treated can take anywhere from 1-3 weeks to heal. Healing time depends on the kind skin lesion treated, the location, and how aggressively the lesion was treated. It is recommended that the areas treated are covered with Vaseline or bacitracin ointment and a band-aid. If a band-aid is not practical, just ointment applied several times per day will do. Keeping these areas moist will speed the healing time.    Treatment with liquid nitrogen can leave a scar. In dark skin, it may be a light or dark scar, in light skin it may be a white or pink scar. These will generally fade with time, but may never go away completely.     If you have any concerns after your treatment, please feel free to call the office.         [facility] - DERMATOLOGY  [address], [address]  [facility] [place] [address]  Dept: [phone]  Dept Fax: [phone]

## 2023-08-02 LAB
FINAL PATHOLOGIC DIAGNOSIS: NORMAL
Lab: NORMAL

## 2023-08-03 ENCOUNTER — PATIENT MESSAGE (OUTPATIENT)
Dept: UROGYNECOLOGY | Facility: CLINIC | Age: 68
End: 2023-08-03
Payer: MEDICARE

## 2023-08-11 ENCOUNTER — TELEPHONE (OUTPATIENT)
Dept: FAMILY MEDICINE | Facility: CLINIC | Age: 68
End: 2023-08-11

## 2023-08-14 NOTE — PRE-PROCEDURE INSTRUCTIONS
Preop instructions given and reviewed.  Patient verbalized understanding.  Patient instructed to call POC with any questions or changes.    
100

## 2023-08-18 ENCOUNTER — PROCEDURE VISIT (OUTPATIENT)
Dept: DERMATOLOGY | Facility: CLINIC | Age: 68
End: 2023-08-18
Payer: MEDICARE

## 2023-08-18 VITALS — WEIGHT: 129 LBS | HEIGHT: 66 IN | BODY MASS INDEX: 20.73 KG/M2

## 2023-08-18 DIAGNOSIS — C44.619 BASAL CELL CARCINOMA (BCC) OF LEFT UPPER ARM: Primary | ICD-10-CM

## 2023-08-18 PROCEDURE — 12032 INTMD RPR S/A/T/EXT 2.6-7.5: CPT | Mod: 51,S$GLB,, | Performed by: DERMATOLOGY

## 2023-08-18 PROCEDURE — 88305 TISSUE EXAM BY PATHOLOGIST: ICD-10-PCS | Mod: 26,,, | Performed by: PATHOLOGY

## 2023-08-18 PROCEDURE — 88305 TISSUE EXAM BY PATHOLOGIST: CPT | Mod: 26,,, | Performed by: PATHOLOGY

## 2023-08-18 PROCEDURE — 99499 NO LOS: ICD-10-PCS | Mod: S$GLB,,, | Performed by: DERMATOLOGY

## 2023-08-18 PROCEDURE — 12032 PR LAYR CLOS WND TRUNK,ARM,LEG 2.6-7.5 CM: ICD-10-PCS | Mod: 51,S$GLB,, | Performed by: DERMATOLOGY

## 2023-08-18 PROCEDURE — 11602 EXC TR-EXT MAL+MARG 1.1-2 CM: CPT | Mod: S$GLB,,, | Performed by: DERMATOLOGY

## 2023-08-18 PROCEDURE — 88305 TISSUE EXAM BY PATHOLOGIST: CPT | Performed by: PATHOLOGY

## 2023-08-18 PROCEDURE — 11602 PR EXC SKIN MALIG 1.1-2 CM TRUNK,ARM,LEG: ICD-10-PCS | Mod: S$GLB,,, | Performed by: DERMATOLOGY

## 2023-08-18 PROCEDURE — 99499 UNLISTED E&M SERVICE: CPT | Mod: S$GLB,,, | Performed by: DERMATOLOGY

## 2023-08-18 NOTE — PATIENT INSTRUCTIONS
Surgery Wound Care    Your doctor has performed an excision today.  A bandage and vaseline ointment has been placed over the site.  This should remain in place for 24 hours.  It is recommended that you keep the area dry for the first 24 hours.  After 24 hours, you may remove the band aid and wash the area with warm soap and water and apply Vaseline jelly or aquaphore.  Many patients prefer to use Neosporin or Bacitracin ointment.  This is acceptable; however know that you can develop an allergy to this medication even if you have used it safely for years.  It is important to keep the area moist.  Letting it dry out and get air slows healing time, will worsen the scar, and make it more difficult to remove the stitches if they were placed.        If you notice increasing redness, tenderness, pain, or yellow drainage at the biopsy or surgical site, please notify your doctor.  These are signs of an infection.    If your biopsy/surgical site is bleeding, apply firm pressure for 15 minutes straight.  Repeat for another 15 minutes, if it is still bleeding.   If the surgical site continues to bleed, then please contact your doctor.    [facility] DERMATOLOGY  [address] [place] [address] [address]  [facility] [address]  Dept: [phone]      Surgery Wound Care    Your doctor has performed an excision today.  A bandage and vaseline ointment has been placed over the site.  This should remain in place for 24 hours.  It is recommended that you keep the area dry for the first 24 hours.  After 24 hours, you may remove the band aid and wash the area with warm soap and water and apply Vaseline jelly or aquaphore.  Many patients prefer to use Neosporin or Bacitracin ointment.  This is acceptable; however know that you can develop an allergy to this medication even if you have used it safely for years.  It is important to keep the area moist.  Letting it dry out and get air slows healing time, will worsen the  scar, and make it more difficult to remove the stitches if they were placed.        If you notice increasing redness, tenderness, pain, or yellow drainage at the biopsy or surgical site, please notify your doctor.  These are signs of an infection.    If your biopsy/surgical site is bleeding, apply firm pressure for 15 minutes straight.  Repeat for another 15 minutes, if it is still bleeding.   If the surgical site continues to bleed, then please contact your doctor.    [facility] - DERMATOLOGY  [address] [place] [address] [address]  [facility] [address]  Dept: [phone]

## 2023-08-18 NOTE — PROGRESS NOTES
Subjective:      Patient ID:  Ashlie Redman is a 68 y.o. female who presents for   Chief Complaint   Patient presents with    Basal Cell Carcinoma     Left upper arm      Pt here for definitive excision and suture of left upper arm.  Feeling well today.   Denies pacemaker, defibrillator.  No blood thinners.   No additional cutaneous complaints.       RELIAPATH DIAGNOSIS:     SKIN, LEFT UPPER ARM LESION, SHAVE BIOPSY:   - Basal cell carcinoma, superficial and nodular-type with focal micronodular and infiltrative patterns, transected.                 Review of Systems   Constitutional:  Negative for fever, chills and fatigue.   Respiratory:  Negative for cough and shortness of breath.    Skin:  Positive for activity-related sunscreen use and wears hat. Negative for itching, rash, dry skin and daily sunscreen use.   Hematologic/Lymphatic: Bruises/bleeds easily.       Objective:   Physical Exam   Constitutional: She appears well-developed and well-nourished.   Neurological: She is alert and oriented to person, place, and time.   Psychiatric: She has a normal mood and affect.   Skin:   Areas Examined (abnormalities noted in diagram):   LUE Inspection Performed            Diagram Legend     Erythematous scaling macule/papule c/w actinic keratosis       Vascular papule c/w angioma      Pigmented verrucoid papule/plaque c/w seborrheic keratosis      Yellow umbilicated papule c/w sebaceous hyperplasia      Irregularly shaped tan macule c/w lentigo     1-2 mm smooth white papules consistent with Milia      Movable subcutaneous cyst with punctum c/w epidermal inclusion cyst      Subcutaneous movable cyst c/w pilar cyst      Firm pink to brown papule c/w dermatofibroma      Pedunculated fleshy papule(s) c/w skin tag(s)      Evenly pigmented macule c/w junctional nevus     Mildly variegated pigmented, slightly irregular-bordered macule c/w mildly atypical nevus      Flesh colored to evenly pigmented papule c/w intradermal  nevus       Pink pearly papule/plaque c/w basal cell carcinoma      Erythematous hyperkeratotic cursted plaque c/w SCC      Surgical scar with no sign of skin cancer recurrence      Open and closed comedones      Inflammatory papules and pustules      Verrucoid papule consistent consistent with wart     Erythematous eczematous patches and plaques     Dystrophic onycholytic nail with subungual debris c/w onychomycosis     Umbilicated papule    Erythematous-base heme-crusted tan verrucoid plaque consistent with inflamed seborrheic keratosis     Erythematous Silvery Scaling Plaque c/w Psoriasis     See annotation          Assessment / Plan:      Pathology Orders:       Normal Orders This Visit    Specimen to Pathology, Dermatology     Questions:    Procedure Type: Dermatology and skin neoplasms    Number of Specimens: 1    ------------------------: -------------------------    Spec 1 Procedure: Excision >2cm    Spec 1 Clinical Impression: bx proven BCC check margins    Spec 1 Source: left upper arm    Release to patient:           Basal cell carcinoma (BCC) of left upper arm  -     Specimen to Pathology, Dermatology    PROCEDURE: Elliptical excision with intermediate layered repair in order to decrease dead space, decrease tension, and close large gap.    ANESTHETIC: 6 cc 1% Xylocaine with Epinephrine 1:100,000, buffered    SURGEON: Lissa Orr MD  ASSISTANTS: Kim Mitchell RN,  Janae Marcum MA, Surendra Landis MA    PREOPERATIVE DIAGNOSIS:  Biopsy-proven Basal Cell Carcinoma    POSTOPERATIVE DIAGNOSIS:  Same as preoperative diagnosis    PATHOLOGIC DIAGNOSIS: Pending    LOCATION: left upper arm    INITIAL LESION SIZE: 1 cm    EXCISED DIAMETER: 1.84 cm    PREPARATION: The diagnosis, procedure, alternatives, benefits and risks, including but not limited to: infection, bleeding/bruising, drug reactions, pain, scar or cosmetic defect, local sensation disturbances, wound dehiscence (separation of wound edges  after sutures removed) and/or recurrence of present condition were explained to the patient. The patient elected to proceed.  Patient's identity was verified using 2 patient identifiers and the side and site was verified.  Time out period with surgeon, assistant and patient in surgical suite was taken.    PROCEDURE: The location noted above was prepped and draped in the usual sterile fashion. The area was anesthetized with intradermal buffered xylocaine. Lesional tissue was carefully marked with at least 4 mm margins of clinically normal skin in all directions. A fusiform elliptical excision was done with #15 blade carried down completely through the dermis into the subcutaneous tissues to the level of the subcutaneous fat, and dissection was carried out in that plane.  Electrocoagulation was used to obtain hemostasis. Blood loss was minimal. The wound was then approximated in a layered fashion with subcutaneous and intradermal sutures of 4.0 Monocryl, approximately 5 in number, and the wound was then superficially closed with simple interrupted sutures of 4.0 Prolene.    The patient tolerated the procedure well.    The area was cleaned and dressed appropriately and the patient was given wound care instructions, as well as an appointment for follow-up evaluation.    LENGTH OF REPAIR: 4 cm            Follow up in about 2 weeks (around 9/1/2023) for suture removal.

## 2023-08-21 ENCOUNTER — OFFICE VISIT (OUTPATIENT)
Dept: UROGYNECOLOGY | Facility: CLINIC | Age: 68
End: 2023-08-21
Payer: MEDICARE

## 2023-08-21 ENCOUNTER — PATIENT MESSAGE (OUTPATIENT)
Dept: UROGYNECOLOGY | Facility: CLINIC | Age: 68
End: 2023-08-21

## 2023-08-21 DIAGNOSIS — N95.2 VAGINAL ATROPHY: ICD-10-CM

## 2023-08-21 DIAGNOSIS — Z90.710 STATUS POST HYSTERECTOMY: ICD-10-CM

## 2023-08-21 DIAGNOSIS — N89.8 VAGINAL DISCHARGE: Primary | ICD-10-CM

## 2023-08-21 PROCEDURE — 99024 POSTOP FOLLOW-UP VISIT: CPT | Mod: S$GLB,,, | Performed by: OBSTETRICS & GYNECOLOGY

## 2023-08-21 PROCEDURE — 1160F RVW MEDS BY RX/DR IN RCRD: CPT | Mod: CPTII,S$GLB,, | Performed by: OBSTETRICS & GYNECOLOGY

## 2023-08-21 PROCEDURE — 99024 PR POST-OP FOLLOW-UP VISIT: ICD-10-PCS | Mod: S$GLB,,, | Performed by: OBSTETRICS & GYNECOLOGY

## 2023-08-21 PROCEDURE — 99999 PR PBB SHADOW E&M-EST. PATIENT-LVL I: ICD-10-PCS | Mod: PBBFAC,,, | Performed by: OBSTETRICS & GYNECOLOGY

## 2023-08-21 PROCEDURE — 1157F ADVNC CARE PLAN IN RCRD: CPT | Mod: CPTII,S$GLB,, | Performed by: OBSTETRICS & GYNECOLOGY

## 2023-08-21 PROCEDURE — 1160F PR REVIEW ALL MEDS BY PRESCRIBER/CLIN PHARMACIST DOCUMENTED: ICD-10-PCS | Mod: CPTII,S$GLB,, | Performed by: OBSTETRICS & GYNECOLOGY

## 2023-08-21 PROCEDURE — 1157F PR ADVANCE CARE PLAN OR EQUIV PRESENT IN MEDICAL RECORD: ICD-10-PCS | Mod: CPTII,S$GLB,, | Performed by: OBSTETRICS & GYNECOLOGY

## 2023-08-21 PROCEDURE — 1159F MED LIST DOCD IN RCRD: CPT | Mod: CPTII,S$GLB,, | Performed by: OBSTETRICS & GYNECOLOGY

## 2023-08-21 PROCEDURE — 99999 PR PBB SHADOW E&M-EST. PATIENT-LVL I: CPT | Mod: PBBFAC,,, | Performed by: OBSTETRICS & GYNECOLOGY

## 2023-08-21 PROCEDURE — 1159F PR MEDICATION LIST DOCUMENTED IN MEDICAL RECORD: ICD-10-PCS | Mod: CPTII,S$GLB,, | Performed by: OBSTETRICS & GYNECOLOGY

## 2023-08-21 RX ORDER — METRONIDAZOLE 7.5 MG/G
1 GEL VAGINAL NIGHTLY
Qty: 70 G | Refills: 0 | Status: SHIPPED | OUTPATIENT
Start: 2023-08-21 | End: 2023-08-26

## 2023-08-21 NOTE — PROGRESS NOTES
Urogyn follow up  08/21/2023    [facility] - UROGYNECOLOGY  [address] [address]    Ashlie Redman  [id number]  1955      Ashlie Redman is a 68 y.o. here for postop.     Date of Operation: 07/13/2023     Title of Operation:  1)  Robotic-assisted total laparoscopic hysterectomy with bilateral salpingo-oophorectomy  2)  Robotic-assisted laparoscopic bilateral uterosacral suspension  3)  Anterior repair  4)  Placement of retropubic tension-free midurethral sling, Advantage Fit ([employer])  5)  Cystourethroscopy     Indications for Surgery:  Last HPI from 04/19/2023  1)  UI:  (--) JESUS.  (+) UUI.  (--) pads. +rare underwear changes. Daytime frequency: Q 2 hours.  Nocturia: Yes: 3-4/night.   (--) dysuria,  (--) hematuria,  (--) frequent UTIs. Thinks had 2 in last 6 months. Had freq UTIs before last cystocele repair.  (--) complete bladder emptying. PV to help.      2)  POP:  Present x several months. To introitus.  Symptoms:(+)  pressure, worse with exercise/prolonged standing.  (--) vaginal bleeding. (--) vaginal discharge. (--) sexually active.  (--) h/o dyspareunia.  (+)  Vaginal dryness.  (--) vaginal estrogen use.   POP-Q:  Aa +1; Ba +1; C -2; Ap -1; Bp -1; D -6 to -7.  Genital hiatus 3, perineal body 2, total vaginal length 10-11.   mL     3)  BM:  (--) constipation/.  (--) chronic diarrhea. (--) hematochezia.  (--) fecal incontinence.  (--) fecal smearing/urgency.  (+) complete evacuation.      Changes since last visit:  Ready to proceed with surgery     06/26/2023  Suds  Urine dipstick: neg.     1.  VOIDING PHASE:       a.  Uroflowmetry:  Unable to perform  PVR:   not measured     The overall configuration of this uroflow study was unable to be interpreted, as patient was unable to void.      b.  Pressure flow:  Prolapse reduction: No  Voided volume:   159 mL  Voiding time:   >9 min  Peak flow:  27 mL/s   Avg flow:  5 mL/s  Max det pressure:  30  cm H20  Det pressure at  max flow: negative recording, as pves catheter was dislodged during study  Void initiated by indeterminate mechanism, as pves catheter was dislodged during study.    Urethral relaxation (EMG): with gradual increase.    PVR (calculated):  228 mL.  Patient was able to void additional 400 mL on toilet after removing all catheters.      The overall configuration of this pressure flow study was prolonged with concern for voiding dysfunction.      2.  FILLING PHASE:  1st desire: 275 mL  Normal desire:  321 mL  Strong desire:  375 mL  Urgency:  389 mL  Compliance (calculated)  approx 150 mL/cm H20  EMG activity during filling: with gradual increase  Detrusor contractions observed: No.      3.  URETHRAL FUNCTION/STORAGE PHASE:     a.  WITH prolapse reduction:  CLPP (150 mL): Negative  at  63 cm H20  VLPP (150 mL): Negative  at  42 cm H20   CLPP (335 mL): Negative  at  7 cm H20  VLPP (335 mL): Negative  at  42 cm H20   CLPP (MAX ):    Negative  at  42 cm H20  VLPP (MAX):     Negative  at  45 cm H20  NEG CLPP and VLPP with and without catheter in place on several provocations.   POS CLPP (last provocation) after removal of pves catheter.     These findings are consistent with Positive urodynamic stress incontinence only with CLPP at max cap and last provocation.     Cysto  Findings: Urethroscopy:  Normal.  Cystoscopy:  Normal bladder mucosa, bilateral ureteral flow was noted.     Assessment: Essentially normal cystourethroscopy.        06/23/2023  Pelvic ultrasound  Uterus:Size: 5.5 x 3.4 x 1.7 cm   Masses: None   Endometrium: Normal thickness in this post menopausal patient, measuring 2 mm.  Echogenicity within the endometrium, possibly calcification.   The ovaries are not identified.   Free Fluid: None.   Impression:   Probable endometrial calcification.  Correlate for history of endometrial procedure such as ablation.  Normal endometrial thickness.   The ovaries are not identified.    Issues include:  Patient Active  Problem List   Diagnosis    Pelvic pain in female    Urinary tract infection    Screen for STD (sexually transmitted disease)    Osteopenia    Family history of osteoporosis    Family history of rectal cancer    Sensation of pressure in bladder area    Uterovaginal prolapse    High myopia    Glaucoma suspect of both eyes    Status post partial mastectomy    Decreased range of motion of left shoulder    Lymphedema    Pain in axilla, left    Neuropathy    Chemotherapy-induced peripheral neuropathy    Midline cystocele    Incomplete emptying of bladder    Radiation pneumonitis    Cystocele, midline    Personal history of malignant neoplasm of breast    Hx of breast cancer    Cellulitis of left breast    Osteoporosis with current pathological fracture    FHx: colon cancer    Shortness of breath    Atherosclerosis of native coronary artery of native heart with unstable angina pectoris    Aortic ectasia    Encounter to establish care    Coronary artery disease    Weight loss    Senile purpura    Vegetarian    Perianal rash    Rectocele, female    Urge urinary incontinence    Vaginal atrophy    Nocturia more than twice per night    Ear fullness, left    S/p RA-TLH/BSO/USLS/MUS/Anterior repair       History since last visit:   GYN: no VB.  Discharge stopped last week. No s/sx POP. No major pain--cramping stopped last week.  Bladder issues: no UI, U/F, dysuria. +complete emptying.  Bowel issues: none. No straining.     Medications:    Current Outpatient Medications:     calcium carb/vit D3/minerals (CALCIUM-VITAMIN D ORAL), , Disp: , Rfl:     denosumab (PROLIA) 60 mg/mL Syrg, Inject 60 mg into the skin., Disp: , Rfl:     dorzolamide (TRUSOPT) 2 % ophthalmic solution, INSTILL 1 DROP INTO BOTH EYES 3 TIMES A DAY, Disp: 30 mL, Rfl: 4    latanoprost 0.005 % ophthalmic solution, INSTILL 1 DROP INTO BOTH EYES IN THE EVENING, Disp: 7.5 mL, Rfl: 3    metroNIDAZOLE (METROGEL) 0.75 % (37.5mg/5 gram) vaginal gel, Place 1 applicator  vaginally every evening. for 5 days, Disp: 70 g, Rfl: 0    valACYclovir (VALTREX) 500 MG tablet, Take 1 tablet (500 mg total) by mouth once daily., Disp: 90 tablet, Rfl: 1  No current facility-administered medications for this visit.    Facility-Administered Medications Ordered in Other Visits:     LIDOcaine-EPINEPHrine 1%-1:100,000 30 mL, EPINEPHrine 2 mg in lactated Ringers 1,000 mL irrigation, , Irrigation, On Call Procedure, Diego Modi MD    LIDOcaine-EPINEPHrine 1%-1:100,000 30 mL, EPINEPHrine 2 mg in lactated Ringers 1,000 mL irrigation, , Irrigation, On Call Procedure, Diego Modi MD    ROS:  As per HPI.      Exam  There were no vitals taken for this visit.  General: alert and oriented, no acute distress  Respiratory: normal respiratory effort  Abd: soft, non-tender, non-distended    Pelvic  Ext. Genitalia: normal external genitalia. Normal bartholins and skenes glands  Vagina: + atrophy. Normal vaginal mucosa without lesions. +mild, yellow discharge.    Non-tender bladder base without palpable mass. Anterior, cuff incisions healing well--sutures visible. Sling path NT, no mesh visible/palpable.   Cervix: absent  Uterus:  surgically absent, vaginal cuff well healing  Urethra: no masses or tenderness  Urethral meatus: no lesions, caruncle or prolapse.    POP-Q:  No POP with valsalva.     Impression  1. Vaginal discharge  metroNIDAZOLE (METROGEL) 0.75 % (37.5mg/5 gram) vaginal gel      2. S/p RA-TLH/BSO/USLS/MUS/Anterior repair        3. Vaginal atrophy          We reviewed the above issues and discussed options for short-term versus long-term management of her problems.   Plan:   Postop state: well-healing. Continue postop restrictions & no intercourse x 1 month until you see us again.  Can take baths/swim and do light walking.   Continue to avoid straining with bowel movements.   Use metrogel x 5 days to help with vaginal healing.   She will follow up with us in 1 months.  20 minutes were spent  in face to face time with this patient  90 % of this time was spent in counseling and/or coordination of care     Nury Wright MD  [facility]  Division of Female Pelvic Medicine and Reconstructive Surgery  Department of Obstetrics & Gynecology

## 2023-08-22 LAB
FINAL PATHOLOGIC DIAGNOSIS: NORMAL
GROSS: NORMAL
Lab: NORMAL
MICROSCOPIC EXAM: NORMAL

## 2023-09-01 ENCOUNTER — OFFICE VISIT (OUTPATIENT)
Dept: PRIMARY CARE CLINIC | Facility: CLINIC | Age: 68
End: 2023-09-01
Payer: MEDICARE

## 2023-09-01 VITALS
DIASTOLIC BLOOD PRESSURE: 66 MMHG | HEIGHT: 66 IN | HEART RATE: 60 BPM | TEMPERATURE: 98 F | WEIGHT: 128.63 LBS | OXYGEN SATURATION: 100 % | SYSTOLIC BLOOD PRESSURE: 138 MMHG | BODY MASS INDEX: 20.67 KG/M2

## 2023-09-01 DIAGNOSIS — J70.0 RADIATION PNEUMONITIS: ICD-10-CM

## 2023-09-01 DIAGNOSIS — I25.110 ATHEROSCLEROSIS OF NATIVE CORONARY ARTERY OF NATIVE HEART WITH UNSTABLE ANGINA PECTORIS: ICD-10-CM

## 2023-09-01 DIAGNOSIS — R21 PERIANAL RASH: ICD-10-CM

## 2023-09-01 DIAGNOSIS — M80.00XA AGE-RELATED OSTEOPOROSIS WITH CURRENT PATHOLOGICAL FRACTURE, INITIAL ENCOUNTER: Primary | ICD-10-CM

## 2023-09-01 DIAGNOSIS — I77.819 AORTIC ECTASIA: ICD-10-CM

## 2023-09-01 DIAGNOSIS — G72.0 STATIN MYOPATHY: ICD-10-CM

## 2023-09-01 DIAGNOSIS — T46.6X5A STATIN MYOPATHY: ICD-10-CM

## 2023-09-01 DIAGNOSIS — G62.0 CHEMOTHERAPY-INDUCED PERIPHERAL NEUROPATHY: ICD-10-CM

## 2023-09-01 DIAGNOSIS — T45.1X5A CHEMOTHERAPY-INDUCED PERIPHERAL NEUROPATHY: ICD-10-CM

## 2023-09-01 DIAGNOSIS — D69.2 SENILE PURPURA: ICD-10-CM

## 2023-09-01 DIAGNOSIS — E53.8 VITAMIN B12 DEFICIENCY: ICD-10-CM

## 2023-09-01 DIAGNOSIS — I25.10 CORONARY ARTERY DISEASE, UNSPECIFIED VESSEL OR LESION TYPE, UNSPECIFIED WHETHER ANGINA PRESENT, UNSPECIFIED WHETHER NATIVE OR TRANSPLANTED HEART: ICD-10-CM

## 2023-09-01 PROCEDURE — 1101F PT FALLS ASSESS-DOCD LE1/YR: CPT | Mod: CPTII,S$GLB,, | Performed by: INTERNAL MEDICINE

## 2023-09-01 PROCEDURE — 99999 PR PBB SHADOW E&M-EST. PATIENT-LVL IV: ICD-10-PCS | Mod: PBBFAC,,, | Performed by: INTERNAL MEDICINE

## 2023-09-01 PROCEDURE — 1126F AMNT PAIN NOTED NONE PRSNT: CPT | Mod: CPTII,S$GLB,, | Performed by: INTERNAL MEDICINE

## 2023-09-01 PROCEDURE — 1159F PR MEDICATION LIST DOCUMENTED IN MEDICAL RECORD: ICD-10-PCS | Mod: CPTII,S$GLB,, | Performed by: INTERNAL MEDICINE

## 2023-09-01 PROCEDURE — 1160F RVW MEDS BY RX/DR IN RCRD: CPT | Mod: CPTII,S$GLB,, | Performed by: INTERNAL MEDICINE

## 2023-09-01 PROCEDURE — 3075F PR MOST RECENT SYSTOLIC BLOOD PRESS GE 130-139MM HG: ICD-10-PCS | Mod: CPTII,S$GLB,, | Performed by: INTERNAL MEDICINE

## 2023-09-01 PROCEDURE — 1101F PR PT FALLS ASSESS DOC 0-1 FALLS W/OUT INJ PAST YR: ICD-10-PCS | Mod: CPTII,S$GLB,, | Performed by: INTERNAL MEDICINE

## 2023-09-01 PROCEDURE — 1160F PR REVIEW ALL MEDS BY PRESCRIBER/CLIN PHARMACIST DOCUMENTED: ICD-10-PCS | Mod: CPTII,S$GLB,, | Performed by: INTERNAL MEDICINE

## 2023-09-01 PROCEDURE — 3075F SYST BP GE 130 - 139MM HG: CPT | Mod: CPTII,S$GLB,, | Performed by: INTERNAL MEDICINE

## 2023-09-01 PROCEDURE — 3008F BODY MASS INDEX DOCD: CPT | Mod: CPTII,S$GLB,, | Performed by: INTERNAL MEDICINE

## 2023-09-01 PROCEDURE — 99214 OFFICE O/P EST MOD 30 MIN: CPT | Mod: S$GLB,,, | Performed by: INTERNAL MEDICINE

## 2023-09-01 PROCEDURE — 3008F PR BODY MASS INDEX (BMI) DOCUMENTED: ICD-10-PCS | Mod: CPTII,S$GLB,, | Performed by: INTERNAL MEDICINE

## 2023-09-01 PROCEDURE — 1123F ACP DISCUSS/DSCN MKR DOCD: CPT | Mod: CPTII,S$GLB,, | Performed by: INTERNAL MEDICINE

## 2023-09-01 PROCEDURE — 3078F DIAST BP <80 MM HG: CPT | Mod: CPTII,S$GLB,, | Performed by: INTERNAL MEDICINE

## 2023-09-01 PROCEDURE — 3288F FALL RISK ASSESSMENT DOCD: CPT | Mod: CPTII,S$GLB,, | Performed by: INTERNAL MEDICINE

## 2023-09-01 PROCEDURE — 99999 PR PBB SHADOW E&M-EST. PATIENT-LVL IV: CPT | Mod: PBBFAC,,, | Performed by: INTERNAL MEDICINE

## 2023-09-01 PROCEDURE — 3078F PR MOST RECENT DIASTOLIC BLOOD PRESSURE < 80 MM HG: ICD-10-PCS | Mod: CPTII,S$GLB,, | Performed by: INTERNAL MEDICINE

## 2023-09-01 PROCEDURE — 1126F PR PAIN SEVERITY QUANTIFIED, NO PAIN PRESENT: ICD-10-PCS | Mod: CPTII,S$GLB,, | Performed by: INTERNAL MEDICINE

## 2023-09-01 PROCEDURE — 99214 PR OFFICE/OUTPT VISIT, EST, LEVL IV, 30-39 MIN: ICD-10-PCS | Mod: S$GLB,,, | Performed by: INTERNAL MEDICINE

## 2023-09-01 PROCEDURE — 1123F PR ADV CARE PLAN DISCUSSED, PLAN OR SURROGATE DOCUMENTED: ICD-10-PCS | Mod: CPTII,S$GLB,, | Performed by: INTERNAL MEDICINE

## 2023-09-01 PROCEDURE — 1159F MED LIST DOCD IN RCRD: CPT | Mod: CPTII,S$GLB,, | Performed by: INTERNAL MEDICINE

## 2023-09-01 PROCEDURE — 3288F PR FALLS RISK ASSESSMENT DOCUMENTED: ICD-10-PCS | Mod: CPTII,S$GLB,, | Performed by: INTERNAL MEDICINE

## 2023-09-01 RX ORDER — VALACYCLOVIR HYDROCHLORIDE 500 MG/1
500 TABLET, FILM COATED ORAL DAILY
Qty: 90 TABLET | Refills: 1 | Status: SHIPPED | OUTPATIENT
Start: 2023-09-01

## 2023-09-01 NOTE — PROGRESS NOTES
INTERNAL MEDICINE PROGRESS/URGENT CARE NOTE    CHIEF COMPLAINT     Chief Complaint   Patient presents with    Follow-up     Patient is here for a 6 month follow up. She denies any current complaints.        SALOMON Redman is a 67 y.o.  female who presents with a PMHx of  allergy, anemia, history of breast cancer, glaucoma, osteopenia, who presents today for her routine follow up visit.      Her main concerns and complaints are: none. Her gyn surgery was done in July c/b small infection. Now doing well     At her last visit, she complained of recent 10 pound weight loss in 6 months. Does admit that she was walking her dogs a bit  more last June which is when she began to lose wieght. But will check TSH anyway as this has not been done in some time     History of breast cancer:  Treated with radiation, mastectomy and chemotehrapy. Still follows with oncologist. Had a PET CT done which was normal and reassuring.      Osteoporosis: last BMD done in 2022 showed osteopenia with extremely high FRAX score . On prolia. Which has significantly imrpoved bone health.      CAD: went to the hospital a while ago and had a cardiac cath which showed nononstructing CAD. Recommended statin and ASA, patient declines both. Her LDL is at goal. Note FH of early cardiac death. Discussed this as well as a reason to reconsider statin and ASA. She does eat very well as a vegetarian and she is a lifelong excercisier and stays very very healthy so this is to her benefit.      Neuropathy with macrocytosis: likely B12 deficiency. Will check but have advised her to start taking vitamin B12.        Home Medications:  Prior to Admission medications    Medication Sig Start Date End Date Taking? Authorizing Provider   calcium carb/vit D3/minerals (CALCIUM-VITAMIN D ORAL)  1/1/20   Provider, Historical   denosumab (PROLIA) 60 mg/mL Syrg Inject 60 mg into the skin.    Provider, Historical   dorzolamide (TRUSOPT) 2 % ophthalmic solution INSTILL 1  "DROP INTO BOTH EYES 3 TIMES A DAY 11/28/22   Tyrone Jimenez Jr., MD   latanoprost 0.005 % ophthalmic solution INSTILL 1 DROP INTO BOTH EYES IN THE EVENING 5/2/23   Tyrone Jimenez Jr., MD   valACYclovir (VALTREX) 500 MG tablet Take 1 tablet (500 mg total) by mouth once daily. 2/10/22   Kvein Gong MD       Review of Systems:  Review of Systems      Advance Care Planning     Date: 09/01/2023    Power of   I initiated the process of voluntary advance care planning today and explained the importance of this process to the patient.  I introduced the concept of advance directives to the patient, as well. Then the patient received detailed information about the importance of designating a Health Care Power of  (HCPOA). She was also instructed to communicate with this person about their wishes for future healthcare, should she become sick and lose decision-making capacity. The patient has previously appointed a HCPOA. After our discussion, the patient has decided to complete a HCPOA and has appointed her significant other, health care agent:  name ondereje adams  & health care agent number:  name on file  I spent a total time of 10 minutes discussing this issue with the patient.            .AWV scheduled      PHYSICAL EXAM     /66 Comment: did a bp study and this was her norm  Pulse 60   Temp 98 °F (36.7 °C) (Oral)   Ht 5' 6" (1.676 m)   Wt 58.3 kg (128 lb 10.2 oz)   SpO2 100%   BMI 20.76 kg/m²     GEN - A+OX4, NAD   HEENT - PERRL, EOMI, OP clear  Neck - No thyromegaly or cervical LAD. No thyroid masses felt.  CV - RRR, no m/r   Chest - CTAB, no wheezing or rhonchi  Abd - S/NT/ND/+BS.   Ext - 2+BDP and radial pulses. No C/C/E.  Skin - No rash.    LABS       ASSESSMENT/PLAN     Ashlie Redman is a 68 y.o. female with  1. Age-related osteoporosis with current pathological fracture, initial encounter  Overview:  Improved significantly with prolia. Continue  Continue with calcium and vitamin " logan ramos bearing excercises continue.       2. Atherosclerosis of native coronary artery of native heart with unstable angina pectoris  Overview:  Declines statin and ASA.  LDL at goal  shes a vegetarian who already excercises a great deal. Continue  Note FH of early cardiac death      3. Chemotherapy-induced peripheral neuropathy  Overview:  Recommend a vitamin b complex       4. Senile purpura  Overview:    -benign skin condition. Often caused by the thin skin associated with aging.   -counseled patient on proper skin protection and moisturization and avoidance of skin trauma         5. Coronary artery disease, unspecified vessel or lesion type, unspecified whether angina present, unspecified whether native or transplanted heart      6. Vitamin B12 deficiency      7. Perianal rash  -     valACYclovir (VALTREX) 500 MG tablet; Take 1 tablet (500 mg total) by mouth once daily.  Dispense: 90 tablet; Refill: 1     Statin myopathy.   Previous experienced muscle pain with statin therapy      WORRY SCORE 1  RTC in 6 months, sooner if needed and depending on labs.    Salma Pendleton MD  Board Certified Internist/Geriatrician  [facility] ([place])

## 2023-09-19 ENCOUNTER — OFFICE VISIT (OUTPATIENT)
Dept: UROGYNECOLOGY | Facility: CLINIC | Age: 68
End: 2023-09-19
Payer: MEDICARE

## 2023-09-19 ENCOUNTER — PATIENT MESSAGE (OUTPATIENT)
Dept: PRIMARY CARE CLINIC | Facility: CLINIC | Age: 68
End: 2023-09-19
Payer: MEDICARE

## 2023-09-19 VITALS
HEIGHT: 66 IN | WEIGHT: 129 LBS | SYSTOLIC BLOOD PRESSURE: 140 MMHG | BODY MASS INDEX: 20.73 KG/M2 | DIASTOLIC BLOOD PRESSURE: 80 MMHG

## 2023-09-19 DIAGNOSIS — N95.2 VAGINAL ATROPHY: ICD-10-CM

## 2023-09-19 DIAGNOSIS — T14.8XXD DELAYED WOUND HEALING: ICD-10-CM

## 2023-09-19 DIAGNOSIS — Z90.710 STATUS POST HYSTERECTOMY: Primary | ICD-10-CM

## 2023-09-19 PROCEDURE — 1101F PR PT FALLS ASSESS DOC 0-1 FALLS W/OUT INJ PAST YR: ICD-10-PCS | Mod: CPTII,S$GLB,, | Performed by: NURSE PRACTITIONER

## 2023-09-19 PROCEDURE — 1126F AMNT PAIN NOTED NONE PRSNT: CPT | Mod: CPTII,S$GLB,, | Performed by: NURSE PRACTITIONER

## 2023-09-19 PROCEDURE — 3288F FALL RISK ASSESSMENT DOCD: CPT | Mod: CPTII,S$GLB,, | Performed by: NURSE PRACTITIONER

## 2023-09-19 PROCEDURE — 99213 OFFICE O/P EST LOW 20 MIN: CPT | Mod: 24,S$GLB,, | Performed by: NURSE PRACTITIONER

## 2023-09-19 PROCEDURE — 3079F PR MOST RECENT DIASTOLIC BLOOD PRESSURE 80-89 MM HG: ICD-10-PCS | Mod: CPTII,S$GLB,, | Performed by: NURSE PRACTITIONER

## 2023-09-19 PROCEDURE — 3008F BODY MASS INDEX DOCD: CPT | Mod: CPTII,S$GLB,, | Performed by: NURSE PRACTITIONER

## 2023-09-19 PROCEDURE — 1126F PR PAIN SEVERITY QUANTIFIED, NO PAIN PRESENT: ICD-10-PCS | Mod: CPTII,S$GLB,, | Performed by: NURSE PRACTITIONER

## 2023-09-19 PROCEDURE — 99999 PR PBB SHADOW E&M-EST. PATIENT-LVL III: ICD-10-PCS | Mod: PBBFAC,,, | Performed by: NURSE PRACTITIONER

## 2023-09-19 PROCEDURE — 1160F RVW MEDS BY RX/DR IN RCRD: CPT | Mod: CPTII,S$GLB,, | Performed by: NURSE PRACTITIONER

## 2023-09-19 PROCEDURE — 1159F MED LIST DOCD IN RCRD: CPT | Mod: CPTII,S$GLB,, | Performed by: NURSE PRACTITIONER

## 2023-09-19 PROCEDURE — 1157F PR ADVANCE CARE PLAN OR EQUIV PRESENT IN MEDICAL RECORD: ICD-10-PCS | Mod: CPTII,S$GLB,, | Performed by: NURSE PRACTITIONER

## 2023-09-19 PROCEDURE — 1159F PR MEDICATION LIST DOCUMENTED IN MEDICAL RECORD: ICD-10-PCS | Mod: CPTII,S$GLB,, | Performed by: NURSE PRACTITIONER

## 2023-09-19 PROCEDURE — 1157F ADVNC CARE PLAN IN RCRD: CPT | Mod: CPTII,S$GLB,, | Performed by: NURSE PRACTITIONER

## 2023-09-19 PROCEDURE — 99999 PR PBB SHADOW E&M-EST. PATIENT-LVL III: CPT | Mod: PBBFAC,,, | Performed by: NURSE PRACTITIONER

## 2023-09-19 PROCEDURE — 1101F PT FALLS ASSESS-DOCD LE1/YR: CPT | Mod: CPTII,S$GLB,, | Performed by: NURSE PRACTITIONER

## 2023-09-19 PROCEDURE — 3077F SYST BP >= 140 MM HG: CPT | Mod: CPTII,S$GLB,, | Performed by: NURSE PRACTITIONER

## 2023-09-19 PROCEDURE — 3077F PR MOST RECENT SYSTOLIC BLOOD PRESSURE >= 140 MM HG: ICD-10-PCS | Mod: CPTII,S$GLB,, | Performed by: NURSE PRACTITIONER

## 2023-09-19 PROCEDURE — 3008F PR BODY MASS INDEX (BMI) DOCUMENTED: ICD-10-PCS | Mod: CPTII,S$GLB,, | Performed by: NURSE PRACTITIONER

## 2023-09-19 PROCEDURE — 99213 PR OFFICE/OUTPT VISIT, EST, LEVL III, 20-29 MIN: ICD-10-PCS | Mod: 24,S$GLB,, | Performed by: NURSE PRACTITIONER

## 2023-09-19 PROCEDURE — 3079F DIAST BP 80-89 MM HG: CPT | Mod: CPTII,S$GLB,, | Performed by: NURSE PRACTITIONER

## 2023-09-19 PROCEDURE — 3288F PR FALLS RISK ASSESSMENT DOCUMENTED: ICD-10-PCS | Mod: CPTII,S$GLB,, | Performed by: NURSE PRACTITIONER

## 2023-09-19 PROCEDURE — 1160F PR REVIEW ALL MEDS BY PRESCRIBER/CLIN PHARMACIST DOCUMENTED: ICD-10-PCS | Mod: CPTII,S$GLB,, | Performed by: NURSE PRACTITIONER

## 2023-09-19 NOTE — PROGRESS NOTES
Urogyn follow up  09/19/2023    [facility] - UROGYNECOLOGY  [address] [address]    Ashlie Redman  [id number]  1955      Ashlie Redman is a 68 y.o. here for postop.     Date of Operation: 07/13/2023     Title of Operation:  1)  Robotic-assisted total laparoscopic hysterectomy with bilateral salpingo-oophorectomy  2)  Robotic-assisted laparoscopic bilateral uterosacral suspension  3)  Anterior repair  4)  Placement of retropubic tension-free midurethral sling, Advantage Fit ([employer])  5)  Cystourethroscopy     Indications for Surgery:  Last HPI from 04/19/2023  1)  UI:  (--) JESUS.  (+) UUI.  (--) pads. +rare underwear changes. Daytime frequency: Q 2 hours.  Nocturia: Yes: 3-4/night.   (--) dysuria,  (--) hematuria,  (--) frequent UTIs. Thinks had 2 in last 6 months. Had freq UTIs before last cystocele repair.  (--) complete bladder emptying. PV to help.      2)  POP:  Present x several months. To introitus.  Symptoms:(+)  pressure, worse with exercise/prolonged standing.  (--) vaginal bleeding. (--) vaginal discharge. (--) sexually active.  (--) h/o dyspareunia.  (+)  Vaginal dryness.  (--) vaginal estrogen use.   POP-Q:  Aa +1; Ba +1; C -2; Ap -1; Bp -1; D -6 to -7.  Genital hiatus 3, perineal body 2, total vaginal length 10-11.   mL     3)  BM:  (--) constipation/.  (--) chronic diarrhea. (--) hematochezia.  (--) fecal incontinence.  (--) fecal smearing/urgency.  (+) complete evacuation.      Changes since last visit:  Ready to proceed with surgery     06/26/2023  Suds  Urine dipstick: neg.     1.  VOIDING PHASE:       a.  Uroflowmetry:  Unable to perform  PVR:   not measured     The overall configuration of this uroflow study was unable to be interpreted, as patient was unable to void.      b.  Pressure flow:  Prolapse reduction: No  Voided volume:   159 mL  Voiding time:   >9 min  Peak flow:  27 mL/s   Avg flow:  5 mL/s  Max det pressure:  30  cm H20  Det pressure  at max flow: negative recording, as pves catheter was dislodged during study  Void initiated by indeterminate mechanism, as pves catheter was dislodged during study.    Urethral relaxation (EMG): with gradual increase.    PVR (calculated):  228 mL.  Patient was able to void additional 400 mL on toilet after removing all catheters.      The overall configuration of this pressure flow study was prolonged with concern for voiding dysfunction.      2.  FILLING PHASE:  1st desire: 275 mL  Normal desire:  321 mL  Strong desire:  375 mL  Urgency:  389 mL  Compliance (calculated)  approx 150 mL/cm H20  EMG activity during filling: with gradual increase  Detrusor contractions observed: No.      3.  URETHRAL FUNCTION/STORAGE PHASE:     a.  WITH prolapse reduction:  CLPP (150 mL): Negative  at  63 cm H20  VLPP (150 mL): Negative  at  42 cm H20   CLPP (335 mL): Negative  at  7 cm H20  VLPP (335 mL): Negative  at  42 cm H20   CLPP (MAX ):    Negative  at  42 cm H20  VLPP (MAX):     Negative  at  45 cm H20  NEG CLPP and VLPP with and without catheter in place on several provocations.   POS CLPP (last provocation) after removal of pves catheter.     These findings are consistent with Positive urodynamic stress incontinence only with CLPP at max cap and last provocation.     Cysto  Findings: Urethroscopy:  Normal.  Cystoscopy:  Normal bladder mucosa, bilateral ureteral flow was noted.     Assessment: Essentially normal cystourethroscopy.        06/23/2023  Pelvic ultrasound  Uterus:Size: 5.5 x 3.4 x 1.7 cm   Masses: None   Endometrium: Normal thickness in this post menopausal patient, measuring 2 mm.  Echogenicity within the endometrium, possibly calcification.   The ovaries are not identified.   Free Fluid: None.   Impression:   Probable endometrial calcification.  Correlate for history of endometrial procedure such as ablation.  Normal endometrial thickness.   The ovaries are not identified.    Issues include:  Patient Active  Problem List   Diagnosis    Pelvic pain in female    Urinary tract infection    Screen for STD (sexually transmitted disease)    Osteopenia    Family history of osteoporosis    Family history of rectal cancer    Sensation of pressure in bladder area    Uterovaginal prolapse    High myopia    Glaucoma suspect of both eyes    Status post partial mastectomy    Decreased range of motion of left shoulder    Lymphedema    Pain in axilla, left    Neuropathy    Chemotherapy-induced peripheral neuropathy    Midline cystocele    Incomplete emptying of bladder    Radiation pneumonitis    Cystocele, midline    Personal history of malignant neoplasm of breast    Hx of breast cancer    Cellulitis of left breast    Osteoporosis with current pathological fracture    FHx: colon cancer    Shortness of breath    Atherosclerosis of native coronary artery of native heart with unstable angina pectoris    Aortic ectasia    Encounter to establish care    Coronary artery disease    Weight loss    Senile purpura    Vegetarian    Perianal rash    Rectocele, female    Urge urinary incontinence    Vaginal atrophy    Nocturia more than twice per night    Ear fullness, left    S/p RA-TLH/BSO/USLS/MUS/Anterior repair    Vitamin B12 deficiency    Statin myopathy       08/21/2023  GYN: no VB.  Discharge stopped last week. No s/sx POP. No major pain--cramping stopped last week.  Bladder issues: no UI, U/F, dysuria. +complete emptying.  Bowel issues: none. No straining.     Changes since last visit:  Denies bleeding or discharge  Denies UI, urgency, or frequency  Denies constipation or straining.    Medications:    Current Outpatient Medications:     calcium carb/vit D3/minerals (CALCIUM-VITAMIN D ORAL), , Disp: , Rfl:     denosumab (PROLIA) 60 mg/mL Syrg, Inject 60 mg into the skin., Disp: , Rfl:     dorzolamide (TRUSOPT) 2 % ophthalmic solution, INSTILL 1 DROP INTO BOTH EYES 3 TIMES A DAY, Disp: 30 mL, Rfl: 4    latanoprost 0.005 % ophthalmic  "solution, INSTILL 1 DROP INTO BOTH EYES IN THE EVENING, Disp: 7.5 mL, Rfl: 3    valACYclovir (VALTREX) 500 MG tablet, Take 1 tablet (500 mg total) by mouth once daily., Disp: 90 tablet, Rfl: 1  No current facility-administered medications for this visit.    Facility-Administered Medications Ordered in Other Visits:     LIDOcaine-EPINEPHrine 1%-1:100,000 30 mL, EPINEPHrine 2 mg in lactated Ringers 1,000 mL irrigation, , Irrigation, On Call Procedure, Diego Modi MD    LIDOcaine-EPINEPHrine 1%-1:100,000 30 mL, EPINEPHrine 2 mg in lactated Ringers 1,000 mL irrigation, , Irrigation, On Call Procedure, Diego Modi MD    ROS:  As per HPI.      Exam  BP (!) 140/80 (BP Location: Left arm, Patient Position: Sitting, BP Method: Medium (Manual))   Ht 5' 6" (1.676 m)   Wt 58.5 kg (129 lb 0.2 oz)   BMI 20.82 kg/m²   General: alert and oriented, no acute distress  Respiratory: normal respiratory effort  Abd: soft, non-tender, non-distended    Pelvic  Ext. Genitalia: normal external genitalia. Normal bartholins and skenes glands  Vagina: +++  atrophy. Normal vaginal mucosa without lesions. +mild, yellow discharge.    Non-tender bladder base without palpable mass. R cuff not completely epithelialized. . Sling path NT, no mesh visible/palpable.   Cervix: absent  Uterus:  surgically absent, vaginal cuff well healing  Urethra: no masses or tenderness  Urethral meatus: no lesions, caruncle or prolapse.    POP-Q:  No POP with valsalva.     Impression  1. S/p RA-TLH/BSO/USLS/MUS/Anterior repair        2. Vaginal atrophy        3. Delayed wound healing            We reviewed the above issues and discussed options for short-term versus long-term management of her problems.   Plan:   Postop state: well-healing. Still no intercourse.   Continue to avoid straining with bowel movements.   Use coconut oil in vagina and around vaginal opening daily  Will message oncology to see if you can use estrogen cream or intrarosa  She " will follow up with us in 1 months.    I spent a total of 20 minutes on the day of the visit.  This includes face to face time and non-face to face time preparing to see the patient (eg, review of tests), obtaining and/or reviewing separately obtained history, documenting clinical information in the electronic or other health record, independently interpreting results and communicating results to the patient/family/caregiver, or care coordinator.      Kiana Camarillo NP  [facility]  Division of Female Pelvic Medicine and Reconstructive Surgery  Department of Obstetrics & Gynecology

## 2023-09-19 NOTE — PATIENT INSTRUCTIONS
Postop state: well-healing. Still no intercourse.   Continue to avoid straining with bowel movements.   Use coconut oil in vagina and around vaginal opening daily  Will message oncology to see if you can use estrogen cream or intrarosa  She will follow up with us in 1 months.

## 2023-09-19 NOTE — Clinical Note
Dr. Ragsdale,  She is taking a longer time than normal to heal from her hysterectomy due to significant vaginal atrophy. I have her starting to use coconut oil for now. Do you think she could use vaginal estrogen or intrarosa to help her to completely heal?  Best,  Kiana Camarillo, FNP-BC

## 2023-09-22 ENCOUNTER — PATIENT MESSAGE (OUTPATIENT)
Dept: UROGYNECOLOGY | Facility: CLINIC | Age: 68
End: 2023-09-22
Payer: MEDICARE

## 2023-09-22 DIAGNOSIS — N95.2 VAGINAL ATROPHY: Primary | ICD-10-CM

## 2023-09-25 RX ORDER — ESTRADIOL 0.1 MG/G
CREAM VAGINAL
Qty: 42.5 G | Refills: 3 | Status: SHIPPED | OUTPATIENT
Start: 2023-09-25

## 2023-09-27 ENCOUNTER — HOSPITAL ENCOUNTER (OUTPATIENT)
Dept: RADIOLOGY | Facility: HOSPITAL | Age: 68
Discharge: HOME | End: 2023-09-27
Attending: INTERNAL MEDICINE
Payer: MEDICARE

## 2023-09-27 DIAGNOSIS — Z85.3 HX OF BREAST CANCER: ICD-10-CM

## 2023-09-27 DIAGNOSIS — M80.00XD AGE-RELATED OSTEOPOROSIS WITH CURRENT PATHOLOGICAL FRACTURE WITH ROUTINE HEALING, SUBSEQUENT ENCOUNTER: ICD-10-CM

## 2023-09-27 PROCEDURE — 77062 BREAST TOMOSYNTHESIS BI: CPT | Mod: TC,PO

## 2023-09-27 PROCEDURE — 77062 BREAST TOMOSYNTHESIS BI: CPT | Mod: 26,,, | Performed by: RADIOLOGY

## 2023-09-27 PROCEDURE — 77066 MAMMO DIGITAL DIAGNOSTIC BILAT WITH TOMO: ICD-10-PCS | Mod: 26,,, | Performed by: RADIOLOGY

## 2023-09-27 PROCEDURE — 77062 MAMMO DIGITAL DIAGNOSTIC BILAT WITH TOMO: ICD-10-PCS | Mod: 26,,, | Performed by: RADIOLOGY

## 2023-09-27 PROCEDURE — 77066 DX MAMMO INCL CAD BI: CPT | Mod: 26,,, | Performed by: RADIOLOGY

## 2023-10-16 ENCOUNTER — TELEPHONE (OUTPATIENT)
Dept: HEMATOLOGY/ONCOLOGY | Facility: CLINIC | Age: 68
End: 2023-10-16
Payer: MEDICARE

## 2023-10-16 NOTE — TELEPHONE ENCOUNTER
Spoke to pt and notified Dr is virtual afternoons only and all day Thursday and also pet missing, pt notifies PET is not in fact due until 12/2023, notified per covering nurse pet is not due but will need to reschedule virtual appt, pt agrees and wants new appt put on portal, appt rescheduled to 10/20/23 2:40 pm vv.

## 2023-10-17 ENCOUNTER — PATIENT MESSAGE (OUTPATIENT)
Dept: HEMATOLOGY/ONCOLOGY | Facility: CLINIC | Age: 68
End: 2023-10-17
Payer: MEDICARE

## 2023-10-19 ENCOUNTER — OFFICE VISIT (OUTPATIENT)
Dept: HEMATOLOGY/ONCOLOGY | Facility: CLINIC | Age: 68
End: 2023-10-19
Payer: MEDICARE

## 2023-10-19 DIAGNOSIS — Z85.3 HX OF BREAST CANCER: ICD-10-CM

## 2023-10-19 DIAGNOSIS — M80.00XA AGE-RELATED OSTEOPOROSIS WITH CURRENT PATHOLOGICAL FRACTURE, INITIAL ENCOUNTER: ICD-10-CM

## 2023-10-19 DIAGNOSIS — M25.612 DECREASED RANGE OF MOTION OF LEFT SHOULDER: ICD-10-CM

## 2023-10-19 DIAGNOSIS — G62.0 CHEMOTHERAPY-INDUCED PERIPHERAL NEUROPATHY: Primary | ICD-10-CM

## 2023-10-19 DIAGNOSIS — T45.1X5A CHEMOTHERAPY-INDUCED PERIPHERAL NEUROPATHY: Primary | ICD-10-CM

## 2023-10-19 PROCEDURE — 99214 PR OFFICE/OUTPT VISIT, EST, LEVL IV, 30-39 MIN: ICD-10-PCS | Mod: 95,,, | Performed by: INTERNAL MEDICINE

## 2023-10-19 PROCEDURE — 1157F ADVNC CARE PLAN IN RCRD: CPT | Mod: CPTII,95,, | Performed by: INTERNAL MEDICINE

## 2023-10-19 PROCEDURE — 99214 OFFICE O/P EST MOD 30 MIN: CPT | Mod: 95,,, | Performed by: INTERNAL MEDICINE

## 2023-10-19 PROCEDURE — 1157F PR ADVANCE CARE PLAN OR EQUIV PRESENT IN MEDICAL RECORD: ICD-10-PCS | Mod: CPTII,95,, | Performed by: INTERNAL MEDICINE

## 2023-10-19 NOTE — PROGRESS NOTES
The patient location is: home  Visit type: Virtual visit with synchronous audio and video  Face-to-face or time spent with patient on the encounter:25 min  Total time spent on and for  this encounter which includes non face-to-face time preparing to see patient, review of tests, obtaining and or reviewing separately obtained records documenting clinical information in the electronic or other health records, independently interpreting results which is not separately reported ,and communicating results to the patient/family/caregiver and in care coordination and treatment planning/communicating with pharmacy for prescriptions/addressing social needs/arranging follow-up and or referrals :25  min    Each patient I provide medical services by telemedicine is:  (1) informed of the relationship between the physician and patient and the respective role of any other health care provider with respect to management of the patient; and (2) notified that he or she may decline to receive medical services by telemedicine and may withdraw from such care at any time.  This is a video visit therefore some elements of the physical exam such as vital signs, heart sounds are breath sounds are not included and may be included if found in recent clinic notes of other providers assessing same patient. Any symptoms or signs that were visualized were stated by the patient may be included in this note.     HISTORY OF PRESENT ILLNESS:    This is a 68-year-old white female here for f/u of breast ca hx    Oncology history   treated for a  4/12/2017 diagnosis of Stage IIB left breast carcinoma.ER/CA negative. Her - 2 positive  Recd Neoadjuvant A/C ,completed 12 cycles weekly taxol and  herceptin/ perjeta had lumpectomy was on [facility] trial completed the entire year of herceptin /perjeta, but didn't undergo axillary xrt per trial  Here for f/u with pet and labs recnt mammogram showed calcifications , that were biospied and came back  negative  radiation induced interstitial findings still having the cough but getting of her upper respiratory infection today   sonme neuropathy post rx still persisits   had a difficult summer 2019. Had removal of her original implant for pain in the area, after removal pt developed pseudomonas infection stayed in hospital  For a week. She sees Dr. Modi, had second reconstruction 10 2020 had a deep flap done. Saw DR Rodriguez rt breast mammo 9/2020    Patient denies issues related to appetite or recent weight change.  Feels well overall.  Denies issues with generalized weakness .  Denies fatigue over above what is normally experienced with day-to-day activities  Denies fever, chills, rigors  Denies issues with ambulation  Denies generalized swelling or new lumps and bumps felt in any part  of body  Denies visual or hearing loss  Denies issues with congestion, sinus issues, cough, sputum production runny nose or itching eyes  Denies chest pain or palpitations, or passing out  Denies abdominal pain, reflux symptoms, nausea vomiting loose stools or constipation  Denies seizure activity or focal weaknesses or symptoms related to TIA, no head aches or blurred vision reported  Denies issues with skin rash or bruising  Denies issues with swelling of feet, tingling or numbness   No issues with sleep,   No recent foreign travel   Good family support reported       GENERAL:   Wt Readings from Last 3 Encounters:   09/19/23 58.5 kg (129 lb 0.2 oz)   09/01/23 58.3 kg (128 lb 10.2 oz)   08/18/23 58.5 kg (129 lb)     Temp Readings from Last 3 Encounters:   09/01/23 98 °F (36.7 °C) (Oral)   07/13/23 98 °F (36.7 °C) (Oral)   06/30/23 98 °F (36.7 °C)     BP Readings from Last 3 Encounters:   09/19/23 (!) 140/80   09/01/23 138/66   07/13/23 (!) 109/59     Pulse Readings from Last 3 Encounters:   09/01/23 60   07/13/23 100   06/30/23 67    VITAL SIGNS:  as above   GENERAL: appears well-built, well-nourished.  No anxiety, no agitation,  and in no distress.  Patient is awake, alert, oriented and cooperative.  HEENT:  Showed no congestion. Trachea is central no obvious icterus or pallor noted no hoarseness. no obvious JVD   NECK:  Supple.  No JVD. No obvious cervical submental or supraclavicular adenopathy.  RS:the visualized portion of  Chest expands well. chest appears symmetric, no audible wheezes.  No dyspnea recognized  ABDOMEN:  abdomen appears undistended.  EXTREMITIES:  Without edema.  NEUROLOGICAL:  The patient is appropriate, higher functions are normal.  No  obvious neurological deficits.  normal judgement normal thought content  No confusion, no speech impediment. Cranial nerves are intact and show no deficit. No gross motor deficits noted   SKIN MUSCULOSKELETAL: no joint or skeletal deformity, no clubbing of nails.  No visible rash ecchymosis or petechiae  BREAST: left breast  Flap done, some puckering that is not satisfactory to pt, she has left axilla fullness/ swelling. With cord like tighetning around axilla that limits her arm raising. Its a lump like area to axilla, the arm itself is not swollen, pt reports that the working dx is that the lymphatic drainage was blocked due to sx fom scarring   rt breast implant has been removed and the breast is natural  LABORATORY:    Lab Results   Component Value Date    WBC 6.48 09/27/2023    HGB 14.2 09/27/2023    HCT 41.4 09/27/2023    MCV 99 (H) 09/27/2023     09/27/2023         CMP  Sodium   Date Value Ref Range Status   09/27/2023 141 136 - 145 mmol/L Final     Potassium   Date Value Ref Range Status   09/27/2023 4.3 3.5 - 5.1 mmol/L Final     Chloride   Date Value Ref Range Status   09/27/2023 105 95 - 110 mmol/L Final     CO2   Date Value Ref Range Status   09/27/2023 26 23 - 29 mmol/L Final     Glucose   Date Value Ref Range Status   09/27/2023 78 70 - 110 mg/dL Final     BUN   Date Value Ref Range Status   09/27/2023 12 8 - 23 mg/dL Final     Creatinine   Date Value Ref Range  Status   09/27/2023 0.9 0.5 - 1.4 mg/dL Final     Calcium   Date Value Ref Range Status   09/27/2023 10.6 (H) 8.7 - 10.5 mg/dL Final     Total Protein   Date Value Ref Range Status   09/27/2023 7.7 6.0 - 8.4 g/dL Final     Albumin   Date Value Ref Range Status   09/27/2023 4.5 3.5 - 5.2 g/dL Final     Total Bilirubin   Date Value Ref Range Status   09/27/2023 0.5 0.1 - 1.0 mg/dL Final     Comment:     For infants and newborns, interpretation of results should be based  on gestational age, weight and in agreement with clinical  observations.    Premature Infant recommended reference ranges:  Up to 24 hours.............<8.0 mg/dL  Up to 48 hours............<12.0 mg/dL  3-5 days..................<15.0 mg/dL  6-29 days.................<15.0 mg/dL       Alkaline Phosphatase   Date Value Ref Range Status   09/27/2023 63 55 - 135 U/L Final     AST   Date Value Ref Range Status   09/27/2023 17 10 - 40 U/L Final     ALT   Date Value Ref Range Status   09/27/2023 15 10 - 44 U/L Final     Anion Gap   Date Value Ref Range Status   09/27/2023 10 8 - 16 mmol/L Final     eGFR if    Date Value Ref Range Status   06/01/2022 >60 >60 mL/min/1.73 m^2 Final     eGFR if non    Date Value Ref Range Status   06/01/2022 >60 >60 mL/min/1.73 m^2 Final     Comment:     Calculation used to obtain the estimated glomerular filtration  rate (eGFR) is the CKD-EPI equation.      30.7 ca 2729 on 12/3/21     Left breast, lumpectomy:  - Residual invasive ductal carcinoma, moderately differentiated, [place] Grade 2 (3+2+1=6), 0.2 cm in  greatest linear microscopic dimension.  - Ductal carcinoma in situ (DCIS), high nuclear grade, 2 cm, solid and cribriform types.  - Surgical margins are free of tumor; invasive carcinoma is located 0.1 cm from the closest margin         FINAL PATHOLOGIC DIAGNOSIS  1. BREAST, RIGHT, CENTRAL REGION MIDDLE DEPTH, CALCIFICATIONS, STEREOTACTIC-GUIDED  BIOPSY:  Benign breast tissue with  fibrocystic changes, columnar cell changes, and focal features suggestive of duct  rupture.  Microcalcifications: Seen in association with areas of columnar cell change.  Negative for atypia or carcinoma.  Osteopenia 3/2017 dexa  1/2020 osteoporosis  Chest x-ray negative October 2020 mammogram September 2 1020-    Pet 12/2022 neg    IMPRESSION:    Stage IIB left breast carcinoma 2017 (ER/AR negative, Her-2/clay positive)  Completed neoadjuvant AC and weekly taxol / herceptin  stopped perjeta since she also had xrt to left side due to fear of toxicity   BRCA negative  , s/p  lumpectomy and axillary disection , with 2 residual Ln positive. Per [facility] trail did not undergo xrt to axilla    reconstructed left breast MRI of rt breast  negative February 2020, mammo of  Both breast( has natural breast tissue in left still) in 9/2021  and 9/22 . 9/23 next due 9/24   pet was denied by insurance will get annual atleast   cont observation and surveillance  [id number] slight elevation recheck in 3 months and see me in 12/23 with pet [email or web address],p [id number]  mammo and [id number] wnl n3ext due 9/24  1.   Bone density  next due 12/24 improved since she had osteoporosis prior to start of prolia  In 12/23 she will get pet and rest of labs   cont f/u wit pcp   anemia has resolved    Effexor sent for hot flashes but pt is not taking them and feels better    Advance Care Planning     Date: 06/29/2023    Power of   I verified acp docs

## 2023-10-20 ENCOUNTER — OFFICE VISIT (OUTPATIENT)
Dept: OPTOMETRY | Facility: CLINIC | Age: 68
End: 2023-10-20
Payer: MEDICARE

## 2023-10-20 ENCOUNTER — PATIENT MESSAGE (OUTPATIENT)
Dept: HEMATOLOGY/ONCOLOGY | Facility: CLINIC | Age: 68
End: 2023-10-20
Payer: MEDICARE

## 2023-10-20 DIAGNOSIS — H40.053 BILATERAL OCULAR HYPERTENSION: Primary | ICD-10-CM

## 2023-10-20 PROCEDURE — 92133 PR COMPUTERIZED OPHTHALMIC IMAGING OPTIC NERVE: ICD-10-PCS | Mod: S$GLB,,,

## 2023-10-20 PROCEDURE — 1157F PR ADVANCE CARE PLAN OR EQUIV PRESENT IN MEDICAL RECORD: ICD-10-PCS | Mod: CPTII,S$GLB,,

## 2023-10-20 PROCEDURE — 1159F MED LIST DOCD IN RCRD: CPT | Mod: CPTII,S$GLB,,

## 2023-10-20 PROCEDURE — 99999 PR PBB SHADOW E&M-EST. PATIENT-LVL II: CPT | Mod: PBBFAC,,,

## 2023-10-20 PROCEDURE — 99213 OFFICE O/P EST LOW 20 MIN: CPT | Mod: S$GLB,,,

## 2023-10-20 PROCEDURE — 3288F FALL RISK ASSESSMENT DOCD: CPT | Mod: CPTII,S$GLB,,

## 2023-10-20 PROCEDURE — 1126F PR PAIN SEVERITY QUANTIFIED, NO PAIN PRESENT: ICD-10-PCS | Mod: CPTII,S$GLB,,

## 2023-10-20 PROCEDURE — 1101F PR PT FALLS ASSESS DOC 0-1 FALLS W/OUT INJ PAST YR: ICD-10-PCS | Mod: CPTII,S$GLB,,

## 2023-10-20 PROCEDURE — 99999 PR PBB SHADOW E&M-EST. PATIENT-LVL II: ICD-10-PCS | Mod: PBBFAC,,,

## 2023-10-20 PROCEDURE — 1159F PR MEDICATION LIST DOCUMENTED IN MEDICAL RECORD: ICD-10-PCS | Mod: CPTII,S$GLB,,

## 2023-10-20 PROCEDURE — 3288F PR FALLS RISK ASSESSMENT DOCUMENTED: ICD-10-PCS | Mod: CPTII,S$GLB,,

## 2023-10-20 PROCEDURE — 1101F PT FALLS ASSESS-DOCD LE1/YR: CPT | Mod: CPTII,S$GLB,,

## 2023-10-20 PROCEDURE — 1157F ADVNC CARE PLAN IN RCRD: CPT | Mod: CPTII,S$GLB,,

## 2023-10-20 PROCEDURE — 92133 CPTRZD OPH DX IMG PST SGM ON: CPT | Mod: S$GLB,,,

## 2023-10-20 PROCEDURE — 1126F AMNT PAIN NOTED NONE PRSNT: CPT | Mod: CPTII,S$GLB,,

## 2023-10-20 PROCEDURE — 99213 PR OFFICE/OUTPT VISIT, EST, LEVL III, 20-29 MIN: ICD-10-PCS | Mod: S$GLB,,,

## 2023-10-23 NOTE — PROGRESS NOTES
HPI    Pt here for IOP check ULISES 4/19/23     Pt denies any burred vision w contact lens (OS only / monovision). Pt   denies any floaters and flashes. Pt taking Latanoprost QHS and Trusopt BID   OU. Pt reports good compliance.   Last edited by Félix Morrison, OD on 10/22/2023  8:50 PM.            Assessment /Plan     For exam results, see Encounter Report.    Bilateral ocular hypertension  -     [place] Visual Field - OU - Extended - Both Eyes; Future; Expected date: 02/23/2024  -     Posterior Segment OCT Optic Nerve- Both eyes      Longstanding, previously monitored by Dr. Vasquez then Dr. Jimenez. RNFL OCT largely stable to previous with RNFL thinning superior temporal and inferior temporal OD>>OS. IOP stable to previous with mild increase. IOP continues to stay in upper teens/low 20s with thick pachs. Pt to return in ~3-4 months for updated 24-2 HVF. If progression noted on fields, may consider adding another drop. Continue for now with Latanoprost QHS and Trusopt BID OU. Emphasized importance of continued good compliance with drops to prevent visual field loss. Monitor in 3-4 months with repeat 24-2 HVF and IOP check with gonio.     RTC: 3-4 months for 24-2 HVF and IOP check w/gonio

## 2023-10-26 ENCOUNTER — OFFICE VISIT (OUTPATIENT)
Dept: UROGYNECOLOGY | Facility: CLINIC | Age: 68
End: 2023-10-26
Payer: MEDICARE

## 2023-10-26 VITALS
BODY MASS INDEX: 21.01 KG/M2 | SYSTOLIC BLOOD PRESSURE: 114 MMHG | DIASTOLIC BLOOD PRESSURE: 80 MMHG | HEIGHT: 66 IN | WEIGHT: 130.75 LBS

## 2023-10-26 DIAGNOSIS — N95.2 VAGINAL ATROPHY: ICD-10-CM

## 2023-10-26 DIAGNOSIS — L92.9 GRANULATION TISSUE: Primary | ICD-10-CM

## 2023-10-26 DIAGNOSIS — Z85.3 HX OF BREAST CANCER: ICD-10-CM

## 2023-10-26 DIAGNOSIS — Z90.710 STATUS POST HYSTERECTOMY: ICD-10-CM

## 2023-10-26 PROCEDURE — 3288F PR FALLS RISK ASSESSMENT DOCUMENTED: ICD-10-PCS | Mod: CPTII,S$GLB,, | Performed by: NURSE PRACTITIONER

## 2023-10-26 PROCEDURE — 3288F FALL RISK ASSESSMENT DOCD: CPT | Mod: CPTII,S$GLB,, | Performed by: NURSE PRACTITIONER

## 2023-10-26 PROCEDURE — 17250 PR CHEM CAUTERY GRANULATN TISSUE: ICD-10-PCS | Mod: S$GLB,,, | Performed by: NURSE PRACTITIONER

## 2023-10-26 PROCEDURE — 99999 PR PBB SHADOW E&M-EST. PATIENT-LVL III: ICD-10-PCS | Mod: PBBFAC,,, | Performed by: NURSE PRACTITIONER

## 2023-10-26 PROCEDURE — 17250 CHEM CAUT OF GRANLTJ TISSUE: CPT | Mod: S$GLB,,, | Performed by: NURSE PRACTITIONER

## 2023-10-26 PROCEDURE — 1160F RVW MEDS BY RX/DR IN RCRD: CPT | Mod: CPTII,S$GLB,, | Performed by: NURSE PRACTITIONER

## 2023-10-26 PROCEDURE — 3008F BODY MASS INDEX DOCD: CPT | Mod: CPTII,S$GLB,, | Performed by: NURSE PRACTITIONER

## 2023-10-26 PROCEDURE — 99213 PR OFFICE/OUTPT VISIT, EST, LEVL III, 20-29 MIN: ICD-10-PCS | Mod: 25,S$GLB,, | Performed by: NURSE PRACTITIONER

## 2023-10-26 PROCEDURE — 1126F PR PAIN SEVERITY QUANTIFIED, NO PAIN PRESENT: ICD-10-PCS | Mod: CPTII,S$GLB,, | Performed by: NURSE PRACTITIONER

## 2023-10-26 PROCEDURE — 1160F PR REVIEW ALL MEDS BY PRESCRIBER/CLIN PHARMACIST DOCUMENTED: ICD-10-PCS | Mod: CPTII,S$GLB,, | Performed by: NURSE PRACTITIONER

## 2023-10-26 PROCEDURE — 1126F AMNT PAIN NOTED NONE PRSNT: CPT | Mod: CPTII,S$GLB,, | Performed by: NURSE PRACTITIONER

## 2023-10-26 PROCEDURE — 3074F SYST BP LT 130 MM HG: CPT | Mod: CPTII,S$GLB,, | Performed by: NURSE PRACTITIONER

## 2023-10-26 PROCEDURE — 3074F PR MOST RECENT SYSTOLIC BLOOD PRESSURE < 130 MM HG: ICD-10-PCS | Mod: CPTII,S$GLB,, | Performed by: NURSE PRACTITIONER

## 2023-10-26 PROCEDURE — 1157F PR ADVANCE CARE PLAN OR EQUIV PRESENT IN MEDICAL RECORD: ICD-10-PCS | Mod: CPTII,S$GLB,, | Performed by: NURSE PRACTITIONER

## 2023-10-26 PROCEDURE — 1101F PT FALLS ASSESS-DOCD LE1/YR: CPT | Mod: CPTII,S$GLB,, | Performed by: NURSE PRACTITIONER

## 2023-10-26 PROCEDURE — 1101F PR PT FALLS ASSESS DOC 0-1 FALLS W/OUT INJ PAST YR: ICD-10-PCS | Mod: CPTII,S$GLB,, | Performed by: NURSE PRACTITIONER

## 2023-10-26 PROCEDURE — 1157F ADVNC CARE PLAN IN RCRD: CPT | Mod: CPTII,S$GLB,, | Performed by: NURSE PRACTITIONER

## 2023-10-26 PROCEDURE — 1159F PR MEDICATION LIST DOCUMENTED IN MEDICAL RECORD: ICD-10-PCS | Mod: CPTII,S$GLB,, | Performed by: NURSE PRACTITIONER

## 2023-10-26 PROCEDURE — 99999 PR PBB SHADOW E&M-EST. PATIENT-LVL III: CPT | Mod: PBBFAC,,, | Performed by: NURSE PRACTITIONER

## 2023-10-26 PROCEDURE — 3008F PR BODY MASS INDEX (BMI) DOCUMENTED: ICD-10-PCS | Mod: CPTII,S$GLB,, | Performed by: NURSE PRACTITIONER

## 2023-10-26 PROCEDURE — 3079F DIAST BP 80-89 MM HG: CPT | Mod: CPTII,S$GLB,, | Performed by: NURSE PRACTITIONER

## 2023-10-26 PROCEDURE — 3079F PR MOST RECENT DIASTOLIC BLOOD PRESSURE 80-89 MM HG: ICD-10-PCS | Mod: CPTII,S$GLB,, | Performed by: NURSE PRACTITIONER

## 2023-10-26 PROCEDURE — 99213 OFFICE O/P EST LOW 20 MIN: CPT | Mod: 25,S$GLB,, | Performed by: NURSE PRACTITIONER

## 2023-10-26 PROCEDURE — 1159F MED LIST DOCD IN RCRD: CPT | Mod: CPTII,S$GLB,, | Performed by: NURSE PRACTITIONER

## 2023-10-26 NOTE — PATIENT INSTRUCTIONS
Postop state: well-healing. Still no intercourse x 1 week. Can resume all activities.  No lifting > 50 pounds without assistance   Continue to avoid straining with bowel movements.   Continue vaginal estrogen cream twice weekly  Estradiol level in one month  She will follow up with us in 4 months.

## 2023-10-26 NOTE — PROGRESS NOTES
Urogyn follow up  10/26/2023    [facility], [place], operated by [facility] - UROGYNECOLOGY  [address] [address]    Ashlie Redman  [id number]  1955      Ashlie Redman is a 68 y.o. here for urogyn follow up visit    Date of Operation: 07/13/2023     Title of Operation:  1)  Robotic-assisted total laparoscopic hysterectomy with bilateral salpingo-oophorectomy  2)  Robotic-assisted laparoscopic bilateral uterosacral suspension  3)  Anterior repair  4)  Placement of retropubic tension-free midurethral sling, Ad[employer]age Fit ([employer])  5)  Cystourethroscopy     Indications for Surgery:  Last HPI from 04/19/2023  1)  UI:  (--) JESUS.  (+) UUI.  (--) pads. +rare underwear changes. Daytime frequency: Q 2 hours.  Nocturia: Yes: 3-4/night.   (--) dysuria,  (--) hematuria,  (--) frequent UTIs. Thinks had 2 in last 6 months. Had freq UTIs before last cystocele repair.  (--) complete bladder emptying. PV to help.      2)  POP:  Present x several months. To introitus.  Symptoms:(+)  pressure, worse with exercise/prolonged standing.  (--) vaginal bleeding. (--) vaginal discharge. (--) sexually active.  (--) h/o dyspareunia.  (+)  Vaginal dryness.  (--) vaginal estrogen use.   POP-Q:  Aa +1; Ba +1; C -2; Ap -1; Bp -1; D -6 to -7.  Genital hiatus 3, perineal body 2, total vaginal length 10-11.   mL     3)  BM:  (--) constipation/.  (--) chronic diarrhea. (--) hematochezia.  (--) fecal incontinence.  (--) fecal smearing/urgency.  (+) complete evacuation.      Changes since last visit:  Ready to proceed with surgery     06/26/2023  Suds  Urine dipstick: neg.     1.  VOIDING PHASE:       a.  Uroflowmetry:  Unable to perform  PVR:   not measured     The overall configuration of this uroflow study was unable to be interpreted, as patient was unable to void.      b.  Pressure flow:  Prolapse reduction: No  Voided volume:   159 mL  Voiding time:   >9 min  Peak flow:  27 mL/s   Avg flow:  5 mL/s  Max det pressure:  30  cm  H20  Det pressure at max flow: negative recording, as pves catheter was dislodged during study  Void initiated by indeterminate mechanism, as pves catheter was dislodged during study.    Urethral relaxation (EMG): with gradual increase.    PVR (calculated):  228 mL.  Patient was able to void additional 400 mL on toilet after removing all catheters.      The overall configuration of this pressure flow study was prolonged with concern for voiding dysfunction.      2.  FILLING PHASE:  1st desire: 275 mL  Normal desire:  321 mL  Strong desire:  375 mL  Urgency:  389 mL  Compliance (calculated)  approx 150 mL/cm H20  EMG activity during filling: with gradual increase  Detrusor contractions observed: No.      3.  URETHRAL FUNCTION/STORAGE PHASE:     a.  WITH prolapse reduction:  CLPP (150 mL): Negative  at  63 cm H20  VLPP (150 mL): Negative  at  42 cm H20   CLPP (335 mL): Negative  at  7 cm H20  VLPP (335 mL): Negative  at  42 cm H20   CLPP (MAX ):    Negative  at  42 cm H20  VLPP (MAX):     Negative  at  45 cm H20  NEG CLPP and VLPP with and without catheter in place on several provocations.   POS CLPP (last provocation) after removal of pves catheter.     These findings are consistent with Positive urodynamic stress incontinence only with CLPP at max cap and last provocation.     Cysto  Findings: Urethroscopy:  Normal.  Cystoscopy:  Normal bladder mucosa, bilateral ureteral flow was noted.     Assessment: Essentially normal cystourethroscopy.        06/23/2023  Pelvic ultrasound  Uterus:Size: 5.5 x 3.4 x 1.7 cm   Masses: None   Endometrium: Normal thickness in this post menopausal patient, measuring 2 mm.  Echogenicity within the endometrium, possibly calcification.   The ovaries are not identified.   Free Fluid: None.   Impression:   Probable endometrial calcification.  Correlate for history of endometrial procedure such as ablation.  Normal endometrial thickness.   The ovaries are not identified.    Issues  include:  Patient Active Problem List   Diagnosis    Pelvic pain in female    Urinary tract infection    Screen for STD (sexually transmitted disease)    Osteopenia    Family history of osteoporosis    Family history of rectal cancer    Sensation of pressure in bladder area    Uterovaginal prolapse    High myopia    Glaucoma suspect of both eyes    Status post partial mastectomy    Decreased range of motion of left shoulder    Lymphedema    Pain in axilla, left    Neuropathy    Chemotherapy-induced peripheral neuropathy    Midline cystocele    Incomplete emptying of bladder    Radiation pneumonitis    Cystocele, midline    Personal history of malignant neoplasm of breast    Hx of breast cancer    Cellulitis of left breast    Osteoporosis with current pathological fracture    FHx: colon cancer    Shortness of breath    Atherosclerosis of native coronary artery of native heart with unstable angina pectoris    Aortic ectasia    Encounter to establish care    Coronary artery disease    Weight loss    Senile purpura    Vegetarian    Perianal rash    Rectocele, female    Urge urinary incontinence    Vaginal atrophy    Nocturia more than twice per night    Ear fullness, left    S/p RA-TLH/BSO/USLS/MUS/Anterior repair    Vitamin B12 deficiency    Statin myopathy       08/21/2023  GYN: no VB.  Discharge stopped last week. No s/sx POP. No major pain--cramping stopped last week.  Bladder issues: no UI, U/F, dysuria. +complete emptying.  Bowel issues: none. No straining.     09/19/2023  Denies bleeding or discharge  Denies UI, urgency, or frequency  Denies constipation or straining.    Changes since last visit:  Denies pain, bleeding, or discharge  Using vaginal estrogen cream twice weekly    Medications:    Current Outpatient Medications:     calcium carb/vit D3/minerals (CALCIUM-VITAMIN D ORAL), , Disp: , Rfl:     denosumab (PROLIA) 60 mg/mL Syrg, Inject 60 mg into the skin., Disp: , Rfl:     dorzolamide (TRUSOPT) 2 %  "ophthalmic solution, INSTILL 1 DROP INTO BOTH EYES 3 TIMES A DAY, Disp: 30 mL, Rfl: 4    estradioL (ESTRACE) 0.01 % (0.1 mg/gram) vaginal cream, Use 0.5 grams of estrogen cream in vagina with applicator or dime-sized amount with finger (as far as can reach internally) nightly x 2 weeks, then twice a week thereafter., Disp: 42.5 g, Rfl: 3    latanoprost 0.005 % ophthalmic solution, INSTILL 1 DROP INTO BOTH EYES IN THE EVENING, Disp: 7.5 mL, Rfl: 3    valACYclovir (VALTREX) 500 MG tablet, Take 1 tablet (500 mg total) by mouth once daily., Disp: 90 tablet, Rfl: 1  No current facility-administered medications for this visit.    Facility-Administered Medications Ordered in Other Visits:     LIDOcaine-EPINEPHrine 1%-1:100,000 30 mL, EPINEPHrine 2 mg in lactated Ringers 1,000 mL irrigation, , Irrigation, On Call Procedure, Diego Modi MD    LIDOcaine-EPINEPHrine 1%-1:100,000 30 mL, EPINEPHrine 2 mg in lactated Ringers 1,000 mL irrigation, , Irrigation, On Call Procedure, Diego Modi MD    ROS:  As per HPI.      Exam  /80 (BP Location: Right arm, Patient Position: Sitting, BP Method: Medium (Manual))   Ht 5' 6" (1.676 m)   Wt 59.3 kg (130 lb 11.7 oz)   BMI 21.10 kg/m²   General: alert and oriented, no acute distress  Respiratory: normal respiratory effort  Abd: soft, non-tender, non-distended    Pelvic  Ext. Genitalia: normal external genitalia. Normal bartholins and skenes glands  Vagina: +  atrophy. Normal vaginal mucosa without lesions. No discharge.    Non-tender bladder base without palpable mass. 2 mm area of granulation tissue noted at R cuff.  Silver nitrate applied. Sling path NT, no mesh visible/palpable.   Cervix: absent  Uterus:  surgically absent, vaginal cuff well healing  Urethra: no masses or tenderness  Urethral meatus: no lesions, caruncle or prolapse.    POP-Q:  No POP with valsalva.     Impression  1. Granulation tissue        2. Prophylactic measure  ESTRADIOL    CANCELED: " Estradiol      3. Vaginal atrophy        4. S/p RA-TLH/BSO/USLS/MUS/Anterior repair              We reviewed the above issues and discussed options for short-term versus long-term management of her problems.   Plan:   Postop state: well-healing. Still no intercourse x 1 week. Can resume all activities.  No lifting > 50 pounds without assistance   Continue to avoid straining with bowel movements.   Continue vaginal estrogen cream twice weekly  Estradiol level in one month  She will follow up with us in 4 months.    I spent a total of 20 minutes on the day of the visit.  This includes face to face time and non-face to face time preparing to see the patient (eg, review of tests), obtaining and/or reviewing separately obtained history, documenting clinical information in the electronic or other health record, independently interpreting results and communicating results to the patient/family/caregiver, or care coordinator.      Kiana Camarillo NP  [facility]  Division of Female Pelvic Medicine and Reconstructive Surgery  Department of Obstetrics & Gynecology

## 2023-12-27 ENCOUNTER — HOSPITAL ENCOUNTER (OUTPATIENT)
Dept: RADIOLOGY | Facility: HOSPITAL | Age: 68
Discharge: HOME | End: 2023-12-27
Attending: INTERNAL MEDICINE
Payer: MEDICARE

## 2023-12-27 DIAGNOSIS — M25.612 DECREASED RANGE OF MOTION OF LEFT SHOULDER: ICD-10-CM

## 2023-12-27 DIAGNOSIS — G62.0 CHEMOTHERAPY-INDUCED PERIPHERAL NEUROPATHY: ICD-10-CM

## 2023-12-27 DIAGNOSIS — T45.1X5A CHEMOTHERAPY-INDUCED PERIPHERAL NEUROPATHY: ICD-10-CM

## 2023-12-27 DIAGNOSIS — Z85.3 HX OF BREAST CANCER: ICD-10-CM

## 2023-12-27 DIAGNOSIS — M80.00XA AGE-RELATED OSTEOPOROSIS WITH CURRENT PATHOLOGICAL FRACTURE, INITIAL ENCOUNTER: ICD-10-CM

## 2023-12-27 LAB — GLUCOSE SERPL-MCNC: 85 MG/DL (ref 70–110)

## 2023-12-27 PROCEDURE — 78815 PET IMAGE W/CT SKULL-THIGH: CPT | Mod: 26,PS,, | Performed by: STUDENT IN AN ORGANIZED HEALTH CARE EDUCATION/TRAINING PROGRAM

## 2023-12-27 PROCEDURE — A9552 F18 FDG: HCPCS | Mod: PN

## 2023-12-27 PROCEDURE — 78815 NM PET CT ROUTINE: ICD-10-PCS | Mod: 26,PS,, | Performed by: STUDENT IN AN ORGANIZED HEALTH CARE EDUCATION/TRAINING PROGRAM

## 2023-12-27 NOTE — PROGRESS NOTES
PET Imaging Questionnaire    Are you a Diabetic? Recent Blood Sugar level? No    Are you anemic? Bone Marrow Stimulation Meds? No    Have you had a CT Scan, if so when & where was your last one? Yes -     Have you had a PET Scan, if so when & where was your last one? Yes -     Chemotherapy or currently on Chemotherapy? Yes    Radiation therapy? Yes    Surgical History:   Past Surgical History:   Procedure Laterality Date    ANGIOGRAM, CORONARY, WITH LEFT HEART CATHETERIZATION Left 09/02/2021    Procedure: ANGIOGRAM,CORONARY,WITH LEFT HEART CATHETERIZATION;  Surgeon: Rubin Hui MD;  Location: [facility] CATH/EP LAB;  Service: Cardiology;  Laterality: Left;    ANTERIOR VAGINAL REPAIR N/A 7/13/2023    Procedure: COLPORRHAPHY, ANTERIOR;  Surgeon: Nury Wright MD;  Location: [facility];  Service: OB/GYN;  Laterality: N/A;    BILATERAL SALPINGO-OOPHORECTOMY (BSO) N/A 7/13/2023    Procedure: SALPINGO-OOPHORECTOMY, BILATERAL;  Surgeon: Nury Wright MD;  Location: [facility];  Service: OB/GYN;  Laterality: N/A;    bladder lift  2018    [facility]    breast augmentation  03/2013    BREAST BIOPSY Left 04/2017    Core bx,+ cancer    BREAST CAPSULECTOMY Left 08/05/2019    Procedure: CAPSULECTOMY, BREAST;  Surgeon: Diego Modi MD;  Location: [facility];  Service: General;  Laterality: Left;    BREAST LUMPECTOMY Left 10/25/2017    BREAST RECONSTRUCTION Bilateral 08/21/2019    Procedure: RECONSTRUCTION, BREAST WITH ALLODERM;  Surgeon: Diego Modi MD;  Location: [facility];  Service: General;  Laterality: Bilateral;    BREAST RECONSTRUCTION Left     BREAST REVISION SURGERY Bilateral 08/05/2019    Procedure: BREAST REVISION SURGERY;  Surgeon: Diego Modi MD;  Location: [facility];  Service: General;  Laterality: Bilateral;    BREAST REVISION SURGERY Left 04/05/2021    Procedure: BREAST REVISION SURGERY - LEFT BREAST RECONSTRUCTION REVISION;  Surgeon: Diego Modi MD;  Location: [facility];  Service: General;   Laterality: Left;    CATARACT EXTRACTION W/  INTRAOCULAR LENS IMPLANT Bilateral 2009    COLONOSCOPY  4/2009, 2015    repeat in 5 years    COLONOSCOPY N/A 12/22/2020    Procedure: COLONOSCOPY;  Surgeon: Praneeth Leblanc MD;  Location: [facility];  Service: Endoscopy;  Laterality: N/A;    COSMETIC SURGERY      CYSTOCELE REPAIR N/A 11/20/2018    Procedure: REPAIR, CYSTOCELE;  Surgeon: Rebecca Acosta MD;  Location: [facility] OR [facility] FLR;  Service: Urology;  Laterality: N/A;  1.5 hours    CYSTOSCOPY N/A 11/20/2018    Procedure: CYSTOSCOPY;  Surgeon: Rebecca Acosta MD;  Location: [facility] OR 2ND FLR;  Service: Urology;  Laterality: N/A;    CYSTOSCOPY N/A 7/13/2023    Procedure: CYSTOSCOPY;  Surgeon: Nury Wright MD;  Location: [facility];  Service: OB/GYN;  Laterality: N/A;    DILATION AND CURETTAGE OF UTERUS      ECTOPIC PREGNANCY SURGERY      EYE SURGERY Bilateral 03/2009    Vitrectomy     INSERTION OF MIDURETHRAL SLING N/A 7/13/2023    Procedure: SLING, MIDURETHRAL;  Surgeon: Nury Wright MD;  Location: [facility];  Service: OB/GYN;  Laterality: N/A;    LAPAROSCOPIC SUSPENSION OF UTEROSACRAL LIGAMENT N/A 7/13/2023    Procedure: SUSPENSION, LIGAMENT, UTEROSACRAL, LAPAROSCOPIC;  Surgeon: Nury Wright MD;  Location: [facility];  Service: OB/GYN;  Laterality: N/A;    MASTECTOMY Left     MASTOPEXY Right 04/05/2021    Procedure: MASTOPEXY - RIGHT MASTOPEXY LIPOSUCTION WAIST AND THIGHS, FAT TRANSFER TO BREAST;  Surgeon: Diego Modi MD;  Location: [facility];  Service: General;  Laterality: Right;    POLYPECTOMY      x3 2001    RECONSTRUCTION OF BREAST WITH DEEP INFERIOR EPIGASTRIC ARTERY  (SUSAN) FLAP Left 10/26/2020    Procedure: RECONSTRUCTION, BREAST, USING SUSAN SKIN FLAP - LEFT BREAST RECONSTRUCTION WITH STACKED SUSAN FREE FLAPS.;  Surgeon: Diego Mdoi MD;  Location: [facility];  Service: General;  Laterality: Left;    REMOVAL OF BREAST IMPLANT Bilateral 08/05/2019    Procedure: REMOVAL, IMPLANT, BREAST;   Surgeon: Diego Modi MD;  Location: [facility];  Service: General;  Laterality: Bilateral;    REMOVAL OF BREAST IMPLANT Left 08/30/2019    Procedure: REMOVAL, IMPLANT, BREAST;  Surgeon: Diego Modi MD;  Location: [facility] OR;  Service: General;  Laterality: Left;    REMOVAL OF BREAST IMPLANT Right 10/26/2020    Procedure: REMOVAL, IMPLANT, BREAST -   RIGHT BREAST IMPLANT REMOVAL;  Surgeon: Diego Modi MD;  Location: [facility] OR;  Service: General;  Laterality: Right;    ROBOT-ASSISTED LAPAROSCOPIC ABDOMINAL HYSTERECTOMY USING DA NOÉ XI N/A 7/13/2023    Procedure: XI ROBOTIC HYSTERECTOMY;  Surgeon: Nury Wright MD;  Location: [facility];  Service: OB/GYN;  Laterality: N/A;  robotic assisted vaginal hyst    Yag Capsulotomy Bilateral 12/2014        Have you been fasting for at least 6 hours? Yes    Is there any chance you may be pregnant or breastfeeding? No    Assay: 11.26 MCi@:8.15   Injection Site:rt arm     Residual: 11.26 mCi@: 8.17   Technologist: Lori Parekh Injected:11.26mCi

## 2023-12-28 ENCOUNTER — OFFICE VISIT (OUTPATIENT)
Dept: HEMATOLOGY/ONCOLOGY | Facility: CLINIC | Age: 68
End: 2023-12-28
Payer: MEDICARE

## 2023-12-28 DIAGNOSIS — Z80.0 FAMILY HISTORY OF RECTAL CANCER: ICD-10-CM

## 2023-12-28 DIAGNOSIS — Z82.62 FAMILY HISTORY OF OSTEOPOROSIS: ICD-10-CM

## 2023-12-28 DIAGNOSIS — Z90.10 STATUS POST PARTIAL MASTECTOMY, UNSPECIFIED LATERALITY: ICD-10-CM

## 2023-12-28 DIAGNOSIS — R33.9 INCOMPLETE EMPTYING OF BLADDER: ICD-10-CM

## 2023-12-28 DIAGNOSIS — C50.012 MALIGNANT NEOPLASM OF NIPPLE OF LEFT BREAST IN FEMALE, UNSPECIFIED ESTROGEN RECEPTOR STATUS: ICD-10-CM

## 2023-12-28 DIAGNOSIS — I25.10 CORONARY ARTERY DISEASE INVOLVING NATIVE CORONARY ARTERY OF NATIVE HEART WITHOUT ANGINA PECTORIS: Primary | ICD-10-CM

## 2023-12-28 DIAGNOSIS — C50.611 MALIGNANT NEOPLASM OF AXILLARY TAIL OF RIGHT FEMALE BREAST, UNSPECIFIED ESTROGEN RECEPTOR STATUS: ICD-10-CM

## 2023-12-28 DIAGNOSIS — M80.0B1A AGE-RELATED OSTEOPOROSIS WITH CURRENT PATHOLOGICAL FRACTURE, RIGHT PELVIS, INITIAL ENCOUNTER FOR FRACTURE: ICD-10-CM

## 2023-12-28 DIAGNOSIS — E53.8 VITAMIN B12 DEFICIENCY: ICD-10-CM

## 2023-12-28 PROCEDURE — 1157F PR ADVANCE CARE PLAN OR EQUIV PRESENT IN MEDICAL RECORD: ICD-10-PCS | Mod: CPTII,95,, | Performed by: INTERNAL MEDICINE

## 2023-12-28 PROCEDURE — 99214 OFFICE O/P EST MOD 30 MIN: CPT | Mod: 95,,, | Performed by: INTERNAL MEDICINE

## 2023-12-28 PROCEDURE — 99214 PR OFFICE/OUTPT VISIT, EST, LEVL IV, 30-39 MIN: ICD-10-PCS | Mod: 95,,, | Performed by: INTERNAL MEDICINE

## 2023-12-28 PROCEDURE — 1157F ADVNC CARE PLAN IN RCRD: CPT | Mod: CPTII,95,, | Performed by: INTERNAL MEDICINE

## 2023-12-29 ENCOUNTER — INFUSION (OUTPATIENT)
Dept: INFUSION THERAPY | Facility: HOSPITAL | Age: 68
End: 2023-12-29
Attending: INTERNAL MEDICINE
Payer: MEDICARE

## 2023-12-29 ENCOUNTER — PATIENT MESSAGE (OUTPATIENT)
Dept: HEMATOLOGY/ONCOLOGY | Facility: CLINIC | Age: 68
End: 2023-12-29
Payer: MEDICARE

## 2023-12-29 VITALS
TEMPERATURE: 98 F | HEIGHT: 66 IN | HEART RATE: 87 BPM | RESPIRATION RATE: 18 BRPM | DIASTOLIC BLOOD PRESSURE: 74 MMHG | SYSTOLIC BLOOD PRESSURE: 121 MMHG | WEIGHT: 132.69 LBS | BODY MASS INDEX: 21.33 KG/M2

## 2023-12-29 DIAGNOSIS — M80.00XA AGE-RELATED OSTEOPOROSIS WITH CURRENT PATHOLOGICAL FRACTURE, INITIAL ENCOUNTER: Primary | ICD-10-CM

## 2023-12-29 PROCEDURE — 63600175 PHARM REV CODE 636 W HCPCS: Mod: JZ,JG,PN | Performed by: INTERNAL MEDICINE

## 2023-12-29 PROCEDURE — 96372 THER/PROPH/DIAG INJ SC/IM: CPT | Mod: PN

## 2023-12-29 RX ADMIN — DENOSUMAB 60 MG: 60 INJECTION SUBCUTANEOUS at 09:12

## 2023-12-29 NOTE — PLAN OF CARE
Problem: Electrolyte Imbalance  Goal: Electrolyte Balance  Outcome: Ongoing, Progressing   Discussed taking Calcium and Vit D supplement, recent dental or upcoming dental procedures and any problems with bones   Pt denies any at this time  Problem: Adult Inpatient Plan of Care  Goal: Plan of Care Review  Outcome: Met   Tolerated injection well today  Discharge instructions given and pt d/c to home per w/c  NAD

## 2023-12-29 NOTE — PROGRESS NOTES
The patient location is: home  Visit type: Virtual visit with synchronous audio and video  Face-to-face or time spent with patient on the encounter:25 min  Total time spent on and for  this encounter which includes non face-to-face time preparing to see patient, review of tests, obtaining and or reviewing separately obtained records documenting clinical information in the electronic or other health records, independently interpreting results which is not separately reported ,and communicating results to the patient/family/caregiver and in care coordination and treatment planning/communicating with pharmacy for prescriptions/addressing social needs/arranging follow-up and or referrals :25  min    Each patient I provide medical services by telemedicine is:  (1) informed of the relationship between the physician and patient and the respective role of any other health care provider with respect to management of the patient; and (2) notified that he or she may decline to receive medical services by telemedicine and may withdraw from such care at any time.  This is a video visit therefore some elements of the physical exam such as vital signs, heart sounds are breath sounds are not included and may be included if found in recent clinic notes of other providers assessing same patient. Any symptoms or signs that were visualized were stated by the patient may be included in this note.     HISTORY OF PRESENT ILLNESS:    This is a 68-year-old white female here for f/u of breast ca hx    Oncology history   treated for a  4/12/2017 diagnosis of Stage IIB left breast carcinoma.ER/NE negative. Her - 2 positive  Recd Neoadjuvant A/C ,completed 12 cycles weekly taxol and  herceptin/ perjeta had lumpectomy was on [employer] trial completed the entire year of herceptin /perjeta, but didn't undergo axillary xrt per trial  Here for f/u with pet and labs recnt mammogram showed calcifications , that were biospied and came back  negative  radiation induced interstitial findings still having the cough but getting of her upper respiratory infection today   sonme neuropathy post rx still persisits   had a difficult summer 2019. Had removal of her original implant for pain in the area, after removal pt developed pseudomonas infection stayed in hospital  For a week. She sees Dr. Modi, had second reconstruction 10 2020 had a deep flap done. Saw DR Rodriguez rt breast mammo 9/2020    Patient denies issues related to appetite or recent weight change.  Feels well overall.  Denies issues with generalized weakness .  Denies fatigue over above what is normally experienced with day-to-day activities  Denies fever, chills, rigors  Denies issues with ambulation  Denies generalized swelling or new lumps and bumps felt in any part  of body  Denies visual or hearing loss  Denies issues with congestion, sinus issues, cough, sputum production runny nose or itching eyes  Denies chest pain or palpitations, or passing out  Denies abdominal pain, reflux symptoms, nausea vomiting loose stools or constipation  Denies seizure activity or focal weaknesses or symptoms related to TIA, no head aches or blurred vision reported  Denies issues with skin rash or bruising  Denies issues with swelling of feet, tingling or numbness   No issues with sleep,   No recent foreign travel   Good family support reported       GENERAL:   Wt Readings from Last 3 Encounters:   12/29/23 60.2 kg (132 lb 11.5 oz)   10/26/23 59.3 kg (130 lb 11.7 oz)   09/19/23 58.5 kg (129 lb 0.2 oz)     Temp Readings from Last 3 Encounters:   12/29/23 98.2 °F (36.8 °C)   09/01/23 98 °F (36.7 °C) (Oral)   07/13/23 98 °F (36.7 °C) (Oral)     BP Readings from Last 3 Encounters:   12/29/23 121/74   10/26/23 114/80   09/19/23 (!) 140/80     Pulse Readings from Last 3 Encounters:   12/29/23 87   09/01/23 60   07/13/23 100    VITAL SIGNS:  as above   GENERAL: appears well-built, well-nourished.  No anxiety, no  agitation, and in no distress.  Patient is awake, alert, oriented and cooperative.  HEENT:  Showed no congestion. Trachea is central no obvious icterus or pallor noted no hoarseness. no obvious JVD   NECK:  Supple.  No JVD. No obvious cervical submental or supraclavicular adenopathy.  RS:the visualized portion of  Chest expands well. chest appears symmetric, no audible wheezes.  No dyspnea recognized  ABDOMEN:  abdomen appears undistended.  EXTREMITIES:  Without edema.  NEUROLOGICAL:  The patient is appropriate, higher functions are normal.  No  obvious neurological deficits.  normal judgement normal thought content  No confusion, no speech impediment. Cranial nerves are intact and show no deficit. No gross motor deficits noted   SKIN MUSCULOSKELETAL: no joint or skeletal deformity, no clubbing of nails.  No visible rash ecchymosis or petechiae  BREAST: left breast  Flap done, some puckering that is not satisfactory to pt, she has left axilla fullness/ swelling. With cord like tighetning around axilla that limits her arm raising. Its a lump like area to axilla, the arm itself is not swollen, pt reports that the working dx is that the lymphatic drainage was blocked due to sx fom scarring   rt breast implant has been removed and the breast is natural  LABORATORY:    Lab Results   Component Value Date    WBC 6.23 12/27/2023    HGB 14.3 12/27/2023    HCT 42.0 12/27/2023     (H) 12/27/2023     12/27/2023         CMP  Sodium   Date Value Ref Range Status   12/27/2023 141 136 - 145 mmol/L Final     Potassium   Date Value Ref Range Status   12/27/2023 4.4 3.5 - 5.1 mmol/L Final     Chloride   Date Value Ref Range Status   12/27/2023 107 95 - 110 mmol/L Final     CO2   Date Value Ref Range Status   12/27/2023 24 23 - 29 mmol/L Final     Glucose   Date Value Ref Range Status   12/27/2023 91 70 - 110 mg/dL Final     BUN   Date Value Ref Range Status   12/27/2023 11 8 - 23 mg/dL Final     Creatinine   Date Value  Ref Range Status   12/27/2023 0.9 0.5 - 1.4 mg/dL Final     Calcium   Date Value Ref Range Status   12/27/2023 10.2 8.7 - 10.5 mg/dL Final     Total Protein   Date Value Ref Range Status   12/27/2023 7.3 6.0 - 8.4 g/dL Final     Albumin   Date Value Ref Range Status   12/27/2023 4.3 3.5 - 5.2 g/dL Final     Total Bilirubin   Date Value Ref Range Status   12/27/2023 0.5 0.1 - 1.0 mg/dL Final     Comment:     For infants and newborns, interpretation of results should be based  on gestational age, weight and in agreement with clinical  observations.    Premature Infant recommended reference ranges:  Up to 24 hours.............<8.0 mg/dL  Up to 48 hours............<12.0 mg/dL  3-5 days..................<15.0 mg/dL  6-29 days.................<15.0 mg/dL       Alkaline Phosphatase   Date Value Ref Range Status   12/27/2023 65 55 - 135 U/L Final     AST   Date Value Ref Range Status   12/27/2023 22 10 - 40 U/L Final     ALT   Date Value Ref Range Status   12/27/2023 18 10 - 44 U/L Final     Anion Gap   Date Value Ref Range Status   12/27/2023 10 8 - 16 mmol/L Final     eGFR if    Date Value Ref Range Status   06/01/2022 >60 >60 mL/min/1.73 m^2 Final     eGFR if non    Date Value Ref Range Status   06/01/2022 >60 >60 mL/min/1.73 m^2 Final     Comment:     Calculation used to obtain the estimated glomerular filtration  rate (eGFR) is the CKD-EPI equation.      30.7 ca 2729 on 12/3/21     Left breast, lumpectomy:  - Residual invasive ductal carcinoma, moderately differentiated, Jamal Grade 2 (3+2+1=6), 0.2 cm in  greatest linear microscopic dimension.  - Ductal carcinoma in situ (DCIS), high nuclear grade, 2 cm, solid and cribriform types.  - Surgical margins are free of tumor; invasive carcinoma is located 0.1 cm from the closest margin         FINAL PATHOLOGIC DIAGNOSIS  1. BREAST, RIGHT, CENTRAL REGION MIDDLE DEPTH, CALCIFICATIONS, STEREOTACTIC-GUIDED  BIOPSY:  Benign breast tissue with  fibrocystic changes, columnar cell changes, and focal features suggestive of duct  rupture.  Microcalcifications: Seen in association with areas of columnar cell change.  Negative for atypia or carcinoma.  Osteopenia 3/2017 dexa  1/2020 osteoporosis  Chest x-ray negative October 2020 mammogram September 2 1020-    Pet 12/2022 neg    IMPRESSION:    Stage IIB left breast carcinoma 2017 (ER/TN negative, Her-2/clay positive)  Completed neoadjuvant AC and weekly taxol / herceptin  stopped perjeta since she also had xrt to left side due to fear of toxicity   BRCA negative  , s/p  lumpectomy and axillary disection , with 2 residual Ln positive. Per [facility] trail did not undergo xrt to axilla    reconstructed left breast MRI of rt breast  negative February 2020, mammo of  Both breast( has natural breast tissue in left still) in 9/2021  and 9/22 . 9/23 next due 9/24   pet was denied by insurance will get annual atleast   cont observation and surveillance  [id number] slight elevation recheck in 3 months and see me in 12/23 with pet cbc.c,p [id number]  mammo and [id number] wnl n3ext due 9/24  1.   Bone density  next due 12/24 improved since she had osteoporosis prior to start of prolia  In 12/24 she will get pet and rest of labs pet 12/23 neg for mets   cont f/u wit pcp   anemia has resolved  See me in 6 months for next prolia and labs  Effexor sent for hot flashes but pt is not taking them and feels better    Advance Care Planning     Date: 06/29/2023    Power of   I verified acp docs

## 2024-01-17 ENCOUNTER — TELEPHONE (OUTPATIENT)
Dept: PRIMARY CARE CLINIC | Facility: CLINIC | Age: 69
End: 2024-01-17
Payer: MEDICARE

## 2024-01-17 NOTE — TELEPHONE ENCOUNTER
----- Message from Carmelo Rivera RN sent at 9/1/2023  9:37 AM CDT -----  Regarding: Schedule labs prior to 3/1/24 visit.

## 2024-02-07 NOTE — TELEPHONE ENCOUNTER
Can we please have her scheduled to be seen. May sent to specialist if needed.    [Cervical Lymph Nodes Enlarged Posterior Bilaterally] : posterior cervical [Supraclavicular Lymph Nodes Enlarged Bilaterally] : supraclavicular [Cervical Lymph Nodes Enlarged Anterior Bilaterally] : anterior cervical [Axillary Lymph Nodes Enlarged Bilaterally] : axillary [No focal deficits] : no focal deficits [Gait normal] : gait normal [100: Fully active, normal.] : 100: Fully active, normal. [Normal] : no JVD, no calf tenderness, venous stasis changes, varices and capillary refill < 3 seconds [de-identified] : wearing glasses [de-identified] : Micky IV [de-identified] : mediport scar to RCW

## 2024-02-16 ENCOUNTER — OFFICE VISIT (OUTPATIENT)
Dept: DERMATOLOGY | Facility: CLINIC | Age: 69
End: 2024-02-16
Payer: MEDICARE

## 2024-02-16 VITALS — WEIGHT: 132.69 LBS | BODY MASS INDEX: 21.33 KG/M2 | HEIGHT: 66 IN

## 2024-02-16 DIAGNOSIS — D48.5 NEOPLASM OF UNCERTAIN BEHAVIOR OF SKIN: Primary | ICD-10-CM

## 2024-02-16 DIAGNOSIS — L82.1 SEBORRHEIC KERATOSES: ICD-10-CM

## 2024-02-16 DIAGNOSIS — L81.4 SOLAR LENTIGO: ICD-10-CM

## 2024-02-16 DIAGNOSIS — Z85.828 ENCOUNTER FOR FOLLOW-UP SURVEILLANCE OF SKIN CANCER: ICD-10-CM

## 2024-02-16 DIAGNOSIS — D18.01 CHERRY ANGIOMA: ICD-10-CM

## 2024-02-16 DIAGNOSIS — Z08 ENCOUNTER FOR FOLLOW-UP SURVEILLANCE OF SKIN CANCER: ICD-10-CM

## 2024-02-16 DIAGNOSIS — D22.9 MULTIPLE BENIGN NEVI: ICD-10-CM

## 2024-02-16 PROCEDURE — 99213 OFFICE O/P EST LOW 20 MIN: CPT | Mod: 25,S$GLB,, | Performed by: DERMATOLOGY

## 2024-02-16 PROCEDURE — 1157F ADVNC CARE PLAN IN RCRD: CPT | Mod: CPTII,S$GLB,, | Performed by: DERMATOLOGY

## 2024-02-16 PROCEDURE — 1126F AMNT PAIN NOTED NONE PRSNT: CPT | Mod: CPTII,S$GLB,, | Performed by: DERMATOLOGY

## 2024-02-16 PROCEDURE — 1101F PT FALLS ASSESS-DOCD LE1/YR: CPT | Mod: CPTII,S$GLB,, | Performed by: DERMATOLOGY

## 2024-02-16 PROCEDURE — 3288F FALL RISK ASSESSMENT DOCD: CPT | Mod: CPTII,S$GLB,, | Performed by: DERMATOLOGY

## 2024-02-16 PROCEDURE — 3008F BODY MASS INDEX DOCD: CPT | Mod: CPTII,S$GLB,, | Performed by: DERMATOLOGY

## 2024-02-16 PROCEDURE — 1159F MED LIST DOCD IN RCRD: CPT | Mod: CPTII,S$GLB,, | Performed by: DERMATOLOGY

## 2024-02-16 PROCEDURE — 88305 TISSUE EXAM BY PATHOLOGIST: CPT | Mod: 26,,, | Performed by: PATHOLOGY

## 2024-02-16 PROCEDURE — 1160F RVW MEDS BY RX/DR IN RCRD: CPT | Mod: CPTII,S$GLB,, | Performed by: DERMATOLOGY

## 2024-02-16 PROCEDURE — 88305 TISSUE EXAM BY PATHOLOGIST: CPT | Performed by: PATHOLOGY

## 2024-02-16 PROCEDURE — 11102 TANGNTL BX SKIN SINGLE LES: CPT | Mod: S$GLB,,, | Performed by: DERMATOLOGY

## 2024-02-16 NOTE — PROGRESS NOTES
Subjective:      Patient ID:  Ashlie Redman is a 68 y.o. female who presents for   Chief Complaint   Patient presents with    Skin Check     UBSE    Spot     F/u of BCC of L arm     LOV 8/18/23    UBSE  Recent BCC, L upper arm, E&S 08/2023  Denies pacemaker, defibrillator.    H/o   Superficial BCC left dorsal forearm- 1-30-20    Current Outpatient Medications:   ·  calcium carb/vit D3/minerals (CALCIUM-VITAMIN D ORAL), , Disp: , Rfl:   ·  denosumab (PROLIA) 60 mg/mL Syrg, Inject 60 mg into the skin., Disp: , Rfl:   ·  dorzolamide (TRUSOPT) 2 % ophthalmic solution, INSTILL 1 DROP INTO BOTH EYES 3 TIMES A DAY, Disp: 30 mL, Rfl: 4  ·  estradioL (ESTRACE) 0.01 % (0.1 mg/gram) vaginal cream, Use 0.5 grams of estrogen cream in vagina with applicator or dime-sized amount with finger (as far as can reach internally) nightly x 2 weeks, then twice a week thereafter., Disp: 42.5 g, Rfl: 3  ·  latanoprost 0.005 % ophthalmic solution, INSTILL 1 DROP INTO BOTH EYES IN THE EVENING, Disp: 7.5 mL, Rfl: 3  ·  valACYclovir (VALTREX) 500 MG tablet, Take 1 tablet (500 mg total) by mouth once daily., Disp: 90 tablet, Rfl: 1          Review of Systems   Constitutional:  Negative for fever, chills and fatigue.   Respiratory:  Negative for cough and shortness of breath.    Skin:  Positive for activity-related sunscreen use and wears hat. Negative for itching, rash, dry skin and daily sunscreen use.   Hematologic/Lymphatic: Bruises/bleeds easily.       Objective:   Physical Exam   Constitutional: She appears well-developed and well-nourished. No distress.   HENT:   Mouth/Throat: Lips normal.    Eyes: Lids are normal.    Neurological: She is alert and oriented to person, place, and time. She is not disoriented.   Psychiatric: She has a normal mood and affect. She is not agitated.   Skin:   Areas Examined (abnormalities noted in diagram):   Scalp / Hair Palpated and Inspected  Head / Face Inspection Performed  Neck Inspection  Performed  Chest / Axilla Inspection Performed  Abdomen Inspection Performed  Back Inspection Performed  RUE Inspected  LUE Inspection Performed  Nails and Digits Inspection Performed                 Diagram Legend     Erythematous scaling macule/papule c/w actinic keratosis       Vascular papule c/w angioma      Pigmented verrucoid papule/plaque c/w seborrheic keratosis      Yellow umbilicated papule c/w sebaceous hyperplasia      Irregularly shaped tan macule c/w lentigo     1-2 mm smooth white papules consistent with Milia      Movable subcutaneous cyst with punctum c/w epidermal inclusion cyst      Subcutaneous movable cyst c/w pilar cyst      Firm pink to brown papule c/w dermatofibroma      Pedunculated fleshy papule(s) c/w skin tag(s)      Evenly pigmented macule c/w junctional nevus     Mildly variegated pigmented, slightly irregular-bordered macule c/w mildly atypical nevus      Flesh colored to evenly pigmented papule c/w intradermal nevus       Pink pearly papule/plaque c/w basal cell carcinoma      Erythematous hyperkeratotic cursted plaque c/w SCC      Surgical scar with no sign of skin cancer recurrence      Open and closed comedones      Inflammatory papules and pustules      Verrucoid papule consistent consistent with wart     Erythematous eczematous patches and plaques     Dystrophic onycholytic nail with subungual debris c/w onychomycosis     Umbilicated papule    Erythematous-base heme-crusted tan verrucoid plaque consistent with inflamed seborrheic keratosis     Erythematous Silvery Scaling Plaque c/w Psoriasis     See annotation      Assessment / Plan:      Pathology Orders:       Normal Orders This Visit    Specimen to Pathology, Dermatology     Questions:    Procedure Type: Dermatology and skin neoplasms    Number of Specimens: 1    ------------------------: -------------------------    Spec 1 Procedure: Biopsy    Spec 1 Clinical Impression: SCC vs  VV vs ISK    Spec 1 Source: ANTIONE white     Release to patient:           Neoplasm of uncertain behavior of skin  -     Specimen to Pathology, Dermatology  Shave biopsy procedure note:    Shave biopsy performed after verbal consent including risk of infection, scar, recurrence, need for additional treatment of site. Area prepped with alcohol, anesthetized with approximately 1.0cc of 1% lidocaine with epinephrine. Lesional tissue shaved with razor blade. Hemostasis achieved with application of aluminum chloride followed by hyfrecation. No complications. Dressing applied. Wound care explained.    Encounter for follow-up surveillance of skin cancer  Area of previous NMSC's examined. Sites well healed with no signs of recurrence.  Upper body skin examination performed today including at least 9 points as noted in physical examination. 1 lesion suspicious for malignancy noted.    Seborrheic keratoses  These are benign inherited growths without a malignant potential. Reassurance given to patient. No treatment is necessary.     Cherry angioma  This is a benign vascular lesion. Reassurance given. No treatment required.     Multiple benign nevi  Careful dermoscopy evaluation of nevi performed with none identified as needing biopsy today  Monitor for new mole or moles that are becoming bigger, darker, irritated, or developing irregular borders.     Solar lentigo  This is a benign hyperpigmented sun induced lesion. Daily sun protection will reduce the number of new lesions. Treatment of these benign lesions are considered cosmetic.    Patient instructed in importance in daily broad spectrum sun protection of at least spf 30. Mineral sunscreen ingredients preferred (Zinc +/- Titanium) and can be found OTC.   Recommend Elta MD for daily use on face and neck.  Patient encouraged to wear hat for all outdoor exposure.   Also discussed sun avoidance and use of protective clothing.           Follow up in about 6 months (around 8/16/2024), or if symptoms worsen or fail to  improve.

## 2024-02-16 NOTE — PATIENT INSTRUCTIONS
Shave Biopsy Wound Care    Your doctor has performed a shave biopsy today.  A band aid and vaseline ointment has been placed over the site.  This should remain in place for 24 hours.  It is recommended that you keep the area dry for the first 24 hours.  After 24 hours, you may remove the band aid and wash the area with warm soap and water and apply Vaseline jelly.  Many patients prefer to use Neosporin or Bacitracin ointment.  This is acceptable; however, know that you can develop an allergy to this medication even if you have used it safely for years.  It is important to keep the area moist.  Letting it dry out and get air slows healing time, and will worsen the scar.  Band aid is optional after first 24 hours.      If you notice increasing redness, tenderness, pain, or yellow drainage at the biopsy site, please notify your doctor.  These are signs of an infection.    If your biopsy site is bleeding, apply firm pressure for 15 minutes straight.  Repeat for another 15 minutes, if it is still bleeding.   If the surgical site continues to bleed, then please contact your doctor.       [facility] - DERMATOLOGY  [address], [address]  [place] [address]  Dept: [phone]  Dept Fax: [phone]

## 2024-02-20 LAB
FINAL PATHOLOGIC DIAGNOSIS: NORMAL
GROSS: NORMAL
Lab: NORMAL
MICROSCOPIC EXAM: NORMAL

## 2024-02-23 ENCOUNTER — CLINICAL SUPPORT (OUTPATIENT)
Dept: OPHTHALMOLOGY | Facility: CLINIC | Age: 69
End: 2024-02-23
Payer: MEDICARE

## 2024-02-23 ENCOUNTER — LAB VISIT (OUTPATIENT)
Dept: LAB | Facility: HOSPITAL | Age: 69
End: 2024-02-23
Attending: INTERNAL MEDICINE
Payer: MEDICARE

## 2024-02-23 ENCOUNTER — OFFICE VISIT (OUTPATIENT)
Dept: OPTOMETRY | Facility: CLINIC | Age: 69
End: 2024-02-23
Payer: MEDICARE

## 2024-02-23 DIAGNOSIS — H40.053 BILATERAL OCULAR HYPERTENSION: Primary | ICD-10-CM

## 2024-02-23 DIAGNOSIS — Z01.818 PREOP TESTING: ICD-10-CM

## 2024-02-23 DIAGNOSIS — H40.053 BILATERAL OCULAR HYPERTENSION: ICD-10-CM

## 2024-02-23 LAB
ALBUMIN SERPL BCP-MCNC: 4.3 G/DL (ref 3.5–5.2)
ALP SERPL-CCNC: 55 U/L (ref 55–135)
ALT SERPL W/O P-5'-P-CCNC: 22 U/L (ref 10–44)
ANION GAP SERPL CALC-SCNC: 11 MMOL/L (ref 8–16)
AST SERPL-CCNC: 22 U/L (ref 10–40)
BASOPHILS # BLD AUTO: 0.05 K/UL (ref 0–0.2)
BASOPHILS NFR BLD: 0.6 % (ref 0–1.9)
BILIRUB SERPL-MCNC: 0.3 MG/DL (ref 0.1–1)
BUN SERPL-MCNC: 18 MG/DL (ref 8–23)
CALCIUM SERPL-MCNC: 10 MG/DL (ref 8.7–10.5)
CHLORIDE SERPL-SCNC: 106 MMOL/L (ref 95–110)
CO2 SERPL-SCNC: 23 MMOL/L (ref 23–29)
CREAT SERPL-MCNC: 0.9 MG/DL (ref 0.5–1.4)
DIFFERENTIAL METHOD BLD: ABNORMAL
EOSINOPHIL # BLD AUTO: 0.1 K/UL (ref 0–0.5)
EOSINOPHIL NFR BLD: 1.8 % (ref 0–8)
ERYTHROCYTE [DISTWIDTH] IN BLOOD BY AUTOMATED COUNT: 11.8 % (ref 11.5–14.5)
EST. GFR  (NO RACE VARIABLE): >60 ML/MIN/1.73 M^2
GLUCOSE SERPL-MCNC: 88 MG/DL (ref 70–110)
HCT VFR BLD AUTO: 40 % (ref 37–48.5)
HGB BLD-MCNC: 13.8 G/DL (ref 12–16)
IMM GRANULOCYTES # BLD AUTO: 0.02 K/UL (ref 0–0.04)
IMM GRANULOCYTES NFR BLD AUTO: 0.3 % (ref 0–0.5)
LYMPHOCYTES # BLD AUTO: 2 K/UL (ref 1–4.8)
LYMPHOCYTES NFR BLD: 26.5 % (ref 18–48)
MCH RBC QN AUTO: 34.1 PG (ref 27–31)
MCHC RBC AUTO-ENTMCNC: 34.5 G/DL (ref 32–36)
MCV RBC AUTO: 99 FL (ref 82–98)
MONOCYTES # BLD AUTO: 0.6 K/UL (ref 0.3–1)
MONOCYTES NFR BLD: 7.9 % (ref 4–15)
NEUTROPHILS # BLD AUTO: 4.9 K/UL (ref 1.8–7.7)
NEUTROPHILS NFR BLD: 62.9 % (ref 38–73)
NRBC BLD-RTO: 0 /100 WBC
PLATELET # BLD AUTO: 225 K/UL (ref 150–450)
PMV BLD AUTO: 9.6 FL (ref 9.2–12.9)
POTASSIUM SERPL-SCNC: 4 MMOL/L (ref 3.5–5.1)
PROT SERPL-MCNC: 7.3 G/DL (ref 6–8.4)
RBC # BLD AUTO: 4.05 M/UL (ref 4–5.4)
SODIUM SERPL-SCNC: 140 MMOL/L (ref 136–145)
WBC # BLD AUTO: 7.71 K/UL (ref 3.9–12.7)

## 2024-02-23 PROCEDURE — 3288F FALL RISK ASSESSMENT DOCD: CPT | Mod: CPTII,S$GLB,,

## 2024-02-23 PROCEDURE — 99213 OFFICE O/P EST LOW 20 MIN: CPT | Mod: S$GLB,,,

## 2024-02-23 PROCEDURE — 1157F ADVNC CARE PLAN IN RCRD: CPT | Mod: CPTII,S$GLB,,

## 2024-02-23 PROCEDURE — 1101F PT FALLS ASSESS-DOCD LE1/YR: CPT | Mod: CPTII,S$GLB,,

## 2024-02-23 PROCEDURE — 99999 PR PBB SHADOW E&M-EST. PATIENT-LVL II: CPT | Mod: PBBFAC,,,

## 2024-02-23 PROCEDURE — 1126F AMNT PAIN NOTED NONE PRSNT: CPT | Mod: CPTII,S$GLB,,

## 2024-02-23 PROCEDURE — 1159F MED LIST DOCD IN RCRD: CPT | Mod: CPTII,S$GLB,,

## 2024-02-23 PROCEDURE — 80053 COMPREHEN METABOLIC PANEL: CPT | Mod: PO | Performed by: INTERNAL MEDICINE

## 2024-02-23 PROCEDURE — 85025 COMPLETE CBC W/AUTO DIFF WBC: CPT | Mod: PO | Performed by: INTERNAL MEDICINE

## 2024-02-23 PROCEDURE — 36415 COLL VENOUS BLD VENIPUNCTURE: CPT | Mod: PO | Performed by: INTERNAL MEDICINE

## 2024-02-23 PROCEDURE — 92083 EXTENDED VISUAL FIELD XM: CPT | Mod: S$GLB,,,

## 2024-02-23 PROCEDURE — 92020 GONIOSCOPY: CPT | Mod: S$GLB,,,

## 2024-02-23 NOTE — PROGRESS NOTES
Pt came in today for HVF OU 24-2 and 4 month IOP check. Pt. Had little to no eye movement and was very cooperative. HG

## 2024-02-23 NOTE — PROGRESS NOTES
HPI    Pt here for 4 month HVF, IOP, and gonio    Pt using Latanoprost QHS OU and Dorzolamide BID OU. Pt is compliant and   does not miss. Pt denies changes in vision.   Last edited by Liane Sanchez on 2/23/2024  1:20 PM.            Assessment /Plan     For exam results, see Encounter Report.    Bilateral ocular hypertension      Longstanding. Previously monitored by Dr. Vasquez, then Dr. Jimenez. IOP stable in upper teens/low 20s on Latanoprost QHS OU and Dorzolamide BID OU. Emphasized importance of continued good compliance with drops to prevent worsening visual field loss. Pt to return in 4 months for annual glaucoma exam with IOP and DFE.    IOP  18 // 20 - 02/23/24 19 // 22 - 10/20/23  Tmax: 37 // 44   RNFL OCT  10/20/23   OD: G(79), ST(75), T(61), IT(87), IN(109), N(78), SN(85); ONL   OS: G(82), ST(97), T(73), IT(101), IN(111), N(57), SN(87); Borderline   24-2 HVF  02/23/24  OD: reliable, non-specific defects, GHT: WNL  OS: reliable, early superior and inferior nasal step, GHT: ONL  Pachymetry: 593 // 601  Gonio: Open to  OD, OS  (+)Family history: maternal grandmother      RTC: 4 months for annual glaucoma with IOP check and DFE.

## 2024-02-26 ENCOUNTER — LAB VISIT (OUTPATIENT)
Dept: LAB | Facility: OTHER | Age: 69
End: 2024-02-26
Attending: INTERNAL MEDICINE
Payer: MEDICARE

## 2024-02-26 ENCOUNTER — OFFICE VISIT (OUTPATIENT)
Dept: UROGYNECOLOGY | Facility: CLINIC | Age: 69
End: 2024-02-26
Payer: MEDICARE

## 2024-02-26 VITALS
DIASTOLIC BLOOD PRESSURE: 86 MMHG | WEIGHT: 133.63 LBS | SYSTOLIC BLOOD PRESSURE: 130 MMHG | BODY MASS INDEX: 21.56 KG/M2

## 2024-02-26 DIAGNOSIS — N95.2 VAGINAL ATROPHY: Primary | ICD-10-CM

## 2024-02-26 DIAGNOSIS — Z90.710 STATUS POST HYSTERECTOMY: ICD-10-CM

## 2024-02-26 DIAGNOSIS — Z85.3 HX OF BREAST CANCER: ICD-10-CM

## 2024-02-26 LAB — ESTRADIOL SERPL-MCNC: <10 PG/ML

## 2024-02-26 PROCEDURE — 3008F BODY MASS INDEX DOCD: CPT | Mod: CPTII,S$GLB,, | Performed by: NURSE PRACTITIONER

## 2024-02-26 PROCEDURE — 82670 ASSAY OF TOTAL ESTRADIOL: CPT | Performed by: NURSE PRACTITIONER

## 2024-02-26 PROCEDURE — 99213 OFFICE O/P EST LOW 20 MIN: CPT | Mod: S$GLB,,, | Performed by: NURSE PRACTITIONER

## 2024-02-26 PROCEDURE — 1157F ADVNC CARE PLAN IN RCRD: CPT | Mod: CPTII,S$GLB,, | Performed by: NURSE PRACTITIONER

## 2024-02-26 PROCEDURE — 99999 PR PBB SHADOW E&M-EST. PATIENT-LVL III: CPT | Mod: PBBFAC,,, | Performed by: NURSE PRACTITIONER

## 2024-02-26 PROCEDURE — 36415 COLL VENOUS BLD VENIPUNCTURE: CPT | Performed by: NURSE PRACTITIONER

## 2024-02-26 PROCEDURE — 1159F MED LIST DOCD IN RCRD: CPT | Mod: CPTII,S$GLB,, | Performed by: NURSE PRACTITIONER

## 2024-02-26 PROCEDURE — 3075F SYST BP GE 130 - 139MM HG: CPT | Mod: CPTII,S$GLB,, | Performed by: NURSE PRACTITIONER

## 2024-02-26 PROCEDURE — 1126F AMNT PAIN NOTED NONE PRSNT: CPT | Mod: CPTII,S$GLB,, | Performed by: NURSE PRACTITIONER

## 2024-02-26 PROCEDURE — 1160F RVW MEDS BY RX/DR IN RCRD: CPT | Mod: CPTII,S$GLB,, | Performed by: NURSE PRACTITIONER

## 2024-02-26 PROCEDURE — 3079F DIAST BP 80-89 MM HG: CPT | Mod: CPTII,S$GLB,, | Performed by: NURSE PRACTITIONER

## 2024-02-26 NOTE — PATIENT INSTRUCTIONS
No lifting > 50 pounds without assistance   Continue to avoid straining with bowel movements.   Continue vaginal estrogen cream twice weekly  Estradiol level today  She will follow up with us in 1 year

## 2024-02-26 NOTE — PROGRESS NOTES
Urogyn follow up  02/26/2024    [facility] - UROGYNECOLOGY  [address] [address]    Ashlie Redman  [id number]  1955      Ashlie Redman is a 68 y.o. here for urogyn follow up visit    Date of Operation: 07/13/2023     Title of Operation:  1)  Robotic-assisted total laparoscopic hysterectomy with bilateral salpingo-oophorectomy  2)  Robotic-assisted laparoscopic bilateral uterosacral suspension  3)  Anterior repair  4)  Placement of retropubic tension-free midurethral sling, Advantage Fit (Elevate Digital)  5)  Cystourethroscopy     Indications for Surgery:  Last HPI from 04/19/2023  1)  UI:  (--) JESUS.  (+) UUI.  (--) pads. +rare underwear changes. Daytime frequency: Q 2 hours.  Nocturia: Yes: 3-4/night.   (--) dysuria,  (--) hematuria,  (--) frequent UTIs. Thinks had 2 in last 6 months. Had freq UTIs before last cystocele repair.  (--) complete bladder emptying. PV to help.      2)  POP:  Present x several months. To introitus.  Symptoms:(+)  pressure, worse with exercise/prolonged standing.  (--) vaginal bleeding. (--) vaginal discharge. (--) sexually active.  (--) h/o dyspareunia.  (+)  Vaginal dryness.  (--) vaginal estrogen use.   POP-Q:  Aa +1; Ba +1; C -2; Ap -1; Bp -1; D -6 to -7.  Genital hiatus 3, perineal body 2, total vaginal length 10-11.   mL     3)  BM:  (--) constipation/.  (--) chronic diarrhea. (--) hematochezia.  (--) fecal incontinence.  (--) fecal smearing/urgency.  (+) complete evacuation.      Changes since last visit:  Ready to proceed with surgery     06/26/2023  Suds  Urine dipstick: neg.     1.  VOIDING PHASE:       a.  Uroflowmetry:  Unable to perform  PVR:   not measured     The overall configuration of this uroflow study was unable to be interpreted, as patient was unable to void.      b.  Pressure flow:  Prolapse reduction: No  Voided volume:   159 mL  Voiding time:   >9 min  Peak flow:  27 mL/s   Avg flow:  5 mL/s  Max det pressure:  30  cm  H20  Det pressure at max flow: negative recording, as pves catheter was dislodged during study  Void initiated by indeterminate mechanism, as pves catheter was dislodged during study.    Urethral relaxation (EMG): with gradual increase.    PVR (calculated):  228 mL.  Patient was able to void additional 400 mL on toilet after removing all catheters.      The overall configuration of this pressure flow study was prolonged with concern for voiding dysfunction.      2.  FILLING PHASE:  1st desire: 275 mL  Normal desire:  321 mL  Strong desire:  375 mL  Urgency:  389 mL  Compliance (calculated)  approx 150 mL/cm H20  EMG activity during filling: with gradual increase  Detrusor contractions observed: No.      3.  URETHRAL FUNCTION/STORAGE PHASE:     a.  WITH prolapse reduction:  CLPP (150 mL): Negative  at  63 cm H20  VLPP (150 mL): Negative  at  42 cm H20   CLPP (335 mL): Negative  at  7 cm H20  VLPP (335 mL): Negative  at  42 cm H20   CLPP (MAX ):    Negative  at  42 cm H20  VLPP (MAX):     Negative  at  45 cm H20  NEG CLPP and VLPP with and without catheter in place on several provocations.   POS CLPP (last provocation) after removal of pves catheter.     These findings are consistent with Positive urodynamic stress incontinence only with CLPP at max cap and last provocation.     Cysto  Findings: Urethroscopy:  Normal.  Cystoscopy:  Normal bladder mucosa, bilateral ureteral flow was noted.     Assessment: Essentially normal cystourethroscopy.        06/23/2023  Pelvic ultrasound  Uterus:Size: 5.5 x 3.4 x 1.7 cm   Masses: None   Endometrium: Normal thickness in this post menopausal patient, measuring 2 mm.  Echogenicity within the endometrium, possibly calcification.   The ovaries are not identified.   Free Fluid: None.   Impression:   Probable endometrial calcification.  Correlate for history of endometrial procedure such as ablation.  Normal endometrial thickness.   The ovaries are not identified.    Issues  include:  Patient Active Problem List   Diagnosis    Pelvic pain in female    Urinary tract infection    Screen for STD (sexually transmitted disease)    Osteopenia    Family history of osteoporosis    Family history of rectal cancer    Sensation of pressure in bladder area    Uterovaginal prolapse    High myopia    Glaucoma suspect of both eyes    Status post partial mastectomy    Decreased range of motion of left shoulder    Lymphedema    Pain in axilla, left    Neuropathy    Chemotherapy-induced peripheral neuropathy    Midline cystocele    Incomplete emptying of bladder    Radiation pneumonitis    Cystocele, midline    Personal history of malignant neoplasm of breast    Hx of breast cancer    Cellulitis of left breast    Osteoporosis with current pathological fracture    FHx: colon cancer    Shortness of breath    Atherosclerosis of native coronary artery of native heart with unstable angina pectoris    Aortic ectasia    Encounter to establish care    Coronary artery disease    Weight loss    Senile purpura    Vegetarian    Perianal rash    Rectocele, female    Urge urinary incontinence    Vaginal atrophy    Nocturia more than twice per night    Ear fullness, left    S/p RA-TLH/BSO/USLS/MUS/Anterior repair    Vitamin B12 deficiency    Statin myopathy       08/21/2023  GYN: no VB.  Discharge stopped last week. No s/sx POP. No major pain--cramping stopped last week.  Bladder issues: no UI, U/F, dysuria. +complete emptying.  Bowel issues: none. No straining.     09/19/2023  Denies bleeding or discharge  Denies UI, urgency, or frequency  Denies constipation or straining.    10/26/2023  Denies pain, bleeding, or discharge  Using vaginal estrogen cream twice weekly    Changes since last visit:  Denies pain, bleeding, or discharge  Denies UI, urinary urgency, or frequency  Denies constipation or straining.  Has been using vaginal estrogen cream.     Medications:    Current Outpatient Medications:     calcium carb/vit  D3/minerals (CALCIUM-VITAMIN D ORAL), , Disp: , Rfl:     denosumab (PROLIA) 60 mg/mL Syrg, Inject 60 mg into the skin., Disp: , Rfl:     dorzolamide (TRUSOPT) 2 % ophthalmic solution, INSTILL 1 DROP INTO BOTH EYES 3 TIMES A DAY, Disp: 30 mL, Rfl: 4    estradioL (ESTRACE) 0.01 % (0.1 mg/gram) vaginal cream, Use 0.5 grams of estrogen cream in vagina with applicator or dime-sized amount with finger (as far as can reach internally) nightly x 2 weeks, then twice a week thereafter., Disp: 42.5 g, Rfl: 3    latanoprost 0.005 % ophthalmic solution, INSTILL 1 DROP INTO BOTH EYES IN THE EVENING, Disp: 7.5 mL, Rfl: 3    valACYclovir (VALTREX) 500 MG tablet, Take 1 tablet (500 mg total) by mouth once daily., Disp: 90 tablet, Rfl: 1  No current facility-administered medications for this visit.    Facility-Administered Medications Ordered in Other Visits:     LIDOcaine-EPINEPHrine 1%-1:100,000 30 mL, EPINEPHrine 2 mg in lactated Ringers 1,000 mL irrigation, , Irrigation, On Call Procedure, Diego Modi MD    LIDOcaine-EPINEPHrine 1%-1:100,000 30 mL, EPINEPHrine 2 mg in lactated Ringers 1,000 mL irrigation, , Irrigation, On Call Procedure, Diego Modi MD    ROS:  As per HPI.      Exam  /86 (BP Location: Right arm, Patient Position: Sitting, BP Method: Medium (Automatic))   Wt 60.6 kg (133 lb 9.6 oz)   BMI 21.56 kg/m²   General: alert and oriented, no acute distress  Respiratory: normal respiratory effort  Abd: soft, non-tender, non-distended    Pelvic  Ext. Genitalia: normal external genitalia. Normal bartholins and skenes glands  Vagina: +  atrophy. Normal vaginal mucosa without lesions. No discharge.    Non-tender bladder base without palpable mass.  No lesions.  Sling path NT, no mesh visible/palpable.   Cervix: absent  Uterus:  surgically absent, vaginal cuff well healing  Urethra: no masses or tenderness  Urethral meatus: no lesions, caruncle or prolapse.    POP-Q:  No POP with valsalva.      Impression  1. Vaginal atrophy        2. Hx of breast cancer  Estradiol      3. S/p RA-TLH/BSO/USLS/MUS/Anterior repair                We reviewed the above issues and discussed options for short-term versus long-term management of her problems.   Plan:   No lifting > 50 pounds without assistance   Continue to avoid straining with bowel movements.   Continue vaginal estrogen cream twice weekly  Estradiol level today  She will follow up with us in 1 year    I spent a total of 20 minutes on the day of the visit.  This includes face to face time and non-face to face time preparing to see the patient (eg, review of tests), obtaining and/or reviewing separately obtained history, documenting clinical information in the electronic or other health record, independently interpreting results and communicating results to the patient/family/caregiver, or care coordinator.      Kiana Camarillo NP  [facility]  Division of Female Pelvic Medicine and Reconstructive Surgery  Department of Obstetrics & Gynecology

## 2024-02-27 ENCOUNTER — PATIENT MESSAGE (OUTPATIENT)
Dept: UROGYNECOLOGY | Facility: CLINIC | Age: 69
End: 2024-02-27
Payer: MEDICARE

## 2024-03-01 ENCOUNTER — OFFICE VISIT (OUTPATIENT)
Dept: PRIMARY CARE CLINIC | Facility: CLINIC | Age: 69
End: 2024-03-01
Payer: MEDICARE

## 2024-03-01 VITALS
TEMPERATURE: 98 F | OXYGEN SATURATION: 99 % | DIASTOLIC BLOOD PRESSURE: 84 MMHG | WEIGHT: 135.81 LBS | HEIGHT: 66 IN | SYSTOLIC BLOOD PRESSURE: 136 MMHG | HEART RATE: 59 BPM | BODY MASS INDEX: 21.83 KG/M2

## 2024-03-01 DIAGNOSIS — G62.0 CHEMOTHERAPY-INDUCED PERIPHERAL NEUROPATHY: ICD-10-CM

## 2024-03-01 DIAGNOSIS — T46.6X5A STATIN MYOPATHY: ICD-10-CM

## 2024-03-01 DIAGNOSIS — D69.2 SENILE PURPURA: ICD-10-CM

## 2024-03-01 DIAGNOSIS — Z13.6 SCREENING FOR CARDIOVASCULAR CONDITION: ICD-10-CM

## 2024-03-01 DIAGNOSIS — H91.90 HEARING LOSS, UNSPECIFIED HEARING LOSS TYPE, UNSPECIFIED LATERALITY: ICD-10-CM

## 2024-03-01 DIAGNOSIS — I25.10 CORONARY ARTERY DISEASE, UNSPECIFIED VESSEL OR LESION TYPE, UNSPECIFIED WHETHER ANGINA PRESENT, UNSPECIFIED WHETHER NATIVE OR TRANSPLANTED HEART: ICD-10-CM

## 2024-03-01 DIAGNOSIS — E53.8 VITAMIN B12 DEFICIENCY: ICD-10-CM

## 2024-03-01 DIAGNOSIS — T45.1X5A CHEMOTHERAPY-INDUCED PERIPHERAL NEUROPATHY: ICD-10-CM

## 2024-03-01 DIAGNOSIS — M80.00XA AGE-RELATED OSTEOPOROSIS WITH CURRENT PATHOLOGICAL FRACTURE, INITIAL ENCOUNTER: ICD-10-CM

## 2024-03-01 DIAGNOSIS — I77.819 AORTIC ECTASIA: ICD-10-CM

## 2024-03-01 DIAGNOSIS — I89.0 LYMPHEDEMA: ICD-10-CM

## 2024-03-01 DIAGNOSIS — G72.0 STATIN MYOPATHY: ICD-10-CM

## 2024-03-01 DIAGNOSIS — R63.4 WEIGHT LOSS: ICD-10-CM

## 2024-03-01 DIAGNOSIS — M85.80 OSTEOPENIA, UNSPECIFIED LOCATION: ICD-10-CM

## 2024-03-01 DIAGNOSIS — J70.0 RADIATION PNEUMONITIS: ICD-10-CM

## 2024-03-01 DIAGNOSIS — C50.012 MALIGNANT NEOPLASM OF NIPPLE OF LEFT BREAST IN FEMALE, UNSPECIFIED ESTROGEN RECEPTOR STATUS: ICD-10-CM

## 2024-03-01 DIAGNOSIS — I25.110 ATHEROSCLEROSIS OF NATIVE CORONARY ARTERY OF NATIVE HEART WITH UNSTABLE ANGINA PECTORIS: ICD-10-CM

## 2024-03-01 DIAGNOSIS — H40.003 GLAUCOMA SUSPECT OF BOTH EYES: Primary | ICD-10-CM

## 2024-03-01 PROCEDURE — 1160F RVW MEDS BY RX/DR IN RCRD: CPT | Mod: CPTII,S$GLB,, | Performed by: INTERNAL MEDICINE

## 2024-03-01 PROCEDURE — 3079F DIAST BP 80-89 MM HG: CPT | Mod: CPTII,S$GLB,, | Performed by: INTERNAL MEDICINE

## 2024-03-01 PROCEDURE — 1101F PT FALLS ASSESS-DOCD LE1/YR: CPT | Mod: CPTII,S$GLB,, | Performed by: INTERNAL MEDICINE

## 2024-03-01 PROCEDURE — 1159F MED LIST DOCD IN RCRD: CPT | Mod: CPTII,S$GLB,, | Performed by: INTERNAL MEDICINE

## 2024-03-01 PROCEDURE — 3008F BODY MASS INDEX DOCD: CPT | Mod: CPTII,S$GLB,, | Performed by: INTERNAL MEDICINE

## 2024-03-01 PROCEDURE — 3288F FALL RISK ASSESSMENT DOCD: CPT | Mod: CPTII,S$GLB,, | Performed by: INTERNAL MEDICINE

## 2024-03-01 PROCEDURE — 3075F SYST BP GE 130 - 139MM HG: CPT | Mod: CPTII,S$GLB,, | Performed by: INTERNAL MEDICINE

## 2024-03-01 PROCEDURE — 99215 OFFICE O/P EST HI 40 MIN: CPT | Mod: S$GLB,,, | Performed by: INTERNAL MEDICINE

## 2024-03-01 PROCEDURE — 99999 PR PBB SHADOW E&M-EST. PATIENT-LVL V: CPT | Mod: PBBFAC,,, | Performed by: INTERNAL MEDICINE

## 2024-03-01 PROCEDURE — 1126F AMNT PAIN NOTED NONE PRSNT: CPT | Mod: CPTII,S$GLB,, | Performed by: INTERNAL MEDICINE

## 2024-03-01 PROCEDURE — 1157F ADVNC CARE PLAN IN RCRD: CPT | Mod: CPTII,S$GLB,, | Performed by: INTERNAL MEDICINE

## 2024-03-01 NOTE — PROGRESS NOTES
INTERNAL MEDICINE PROGRESS/URGENT CARE NOTE    CHIEF COMPLAINT     Chief Complaint   Patient presents with    Tinnitus     Patient c/o her tinnitus and feels she may be losing a bit of hearing.     Arthritis     Patient reports her arthritis is bothering her.     Fatigue     Patient reports feeling fatigued.        HPI        Ashlie Redman is a 67 y.o.  female who presents with a PMHx of  allergy, anemia, history of breast cancer, glaucoma, osteopenia, who presents today for routine follow up.      Her main concerns and complaints are: none. BP a bit elevated.   2. Discussed Cath from 2021 which noted 20% blockage of the RCA without significant occlusive disease. Most recent LDL is 120 and I woluld like to be 55. Had a long discussion with patient re wanting her to be on statin and she strongly disagrees. Says she was cardiology that she has nothing to worry about.   Of note, describes a recent event of eating a piece of chocolate and having severe chest pain that night, abated with antacid which does suggest a gastric cause, but one night after that without inciting e vent she again woke up with a chest tightness. Discuss [facility] ardiology if this continues.       History of breast cancer:  Treated with radiation, mastectomy and chemotehrapy. Still follows with oncologist. Had a PET CT done which was normal and reassuring.      Osteoporosis: last BMD done in 2022 showed osteopenia with extremely high FRAX score . On prolia. Which has significantly imrpoved bone health.      CAD: went to the hospital a while ago and had a cardiac cath which showed nononstructing CAD. Recommended statin and ASA, patient declines both. Her LDL is at goal. Note FH of early cardiac death. Discussed this as well as a reason to reconsider statin and ASA. She does eat very well as a vegetarian and she is a lifelong excercisier and stays very very healthy so this is to her benefit.      Neuropathy with macrocytosis: likely B12 deficiency.  "Will check but have advised her to start taking vitamin B12.          Home Medications:  Prior to Admission medications    Medication Sig Start Date End Date Taking? Authorizing Provider   calcium carb/vit D3/minerals (CALCIUM-VITAMIN D ORAL)  1/1/20   Provider, Historical   denosumab (PROLIA) 60 mg/mL Syrg Inject 60 mg into the skin.    Provider, Historical   dorzolamide (TRUSOPT) 2 % ophthalmic solution INSTILL 1 DROP INTO BOTH EYES 3 TIMES A DAY 11/28/22   Tyrone Jimenez Jr., MD   estradioL (ESTRACE) 0.01 % (0.1 mg/gram) vaginal cream Use 0.5 grams of estrogen cream in vagina with applicator or dime-sized amount with finger (as far as can reach internally) nightly x 2 weeks, then twice a week thereafter. 9/25/23   Kiana Camarillo, NP   latanoprost 0.005 % ophthalmic solution INSTILL 1 DROP INTO BOTH EYES IN THE EVENING 5/2/23   Tyrone Jimenez Jr., MD   valACYclovir (VALTREX) 500 MG tablet Take 1 tablet (500 mg total) by mouth once daily. 9/1/23   Salma Pendleton MD       Review of Systems:  Review of Systems          PHYSICAL EXAM     /84   Pulse (!) 59   Temp 98 °F (36.7 °C) (Oral)   Ht 5' 6" (1.676 m)   Wt 61.6 kg (135 lb 12.9 oz)   SpO2 99%   BMI 21.92 kg/m²     GEN - A+OX4, NAD   HEENT - PERRL, EOMI, OP clear  Neck - No thyromegaly or cervical LAD. No thyroid masses felt.  CV - RRR, no m/r   Chest - CTAB, no wheezing or rhonchi  Abd - S/NT/ND/+BS.   Ext - 2+BDP and radial pulses. No C/C/E.  Skin - No rash.    LABS       ASSESSMENT/PLAN     Ashlie Redman is a 68 y.o. female with  1. Glaucoma suspect of both eyes  Overview:  Follows very closely with ophthalmologist. continue      2. Atherosclerosis of native coronary artery of native heart with unstable angina pectoris  Overview:  Declines statin and ASA.  LDL at goal  shes a vegetarian who already excercises a great deal. Continue  Note FH of early cardiac death      3. Coronary artery disease, unspecified vessel or lesion type, " unspecified whether angina present, unspecified whether native or transplanted heart    4. Osteopenia, unspecified location  Continue with calcium, vitamin d and prlia.   She is very active with weight bearing excecrises     5. Lymphedema  Overview:  2/2 breast cancer treatment. Improved    Orders:  -     TSH; Future; Expected date: 03/01/2024    6. Weight loss  Overview:  Likely due to increased activity last year   Will rule out medical with a thorough thyroid function work up. Though no other thyroid symptom    Orders:  -     TSH; Future; Expected date: 03/01/2024    7. Age-related osteoporosis with current pathological fracture, initial encounter  Overview:  Improved significantly with prolia. Continue  Continue with calcium and vitamin d  wieght bearing excercises continue.       8. Senile purpura  Overview:    -benign skin condition. Often caused by the thin skin associated with aging.   -counseled patient on proper skin protection and moisturization and avoidance of skin trauma         9. Statin myopathy  Discussed reconsidering. She decines     10. Malignant neoplasm of nipple of left breast in female, unspecified estrogen receptor status  S/p treatment and routine surveillance with gynecologist     11. Radiation pneumonitis  Stable      12. Aortic ectasia  Declines statin therapy  Eats a very healthy diet and very consistent and compliant with her excercises     13. Chemotherapy-induced peripheral neuropathy  Overview:  Recommend a vitamin b complex       14. Vitamin B12 deficiency  -     Vitamin B12; Future; Expected date: 03/01/2024    15. Screening for cardiovascular condition  -     LIPID PANEL; Future; Expected date: 03/01/2024    16. Hearing loss, unspecified hearing loss type, unspecified laterality  -     Ambulatory referral/consult to Audiology; Future; Expected date: 03/08/2024  -     Ambulatory referral/consult to ENT; Future; Expected date: 03/08/2024           WORRY SCORE 1    RTC in 6 months,  sooner if needed and depending on labs.    Salma Pendleton MD  Board Certified Internist/Geriatrician  [facility] ([place])

## 2024-03-15 ENCOUNTER — PATIENT MESSAGE (OUTPATIENT)
Dept: OPTOMETRY | Facility: CLINIC | Age: 69
End: 2024-03-15
Payer: MEDICARE

## 2024-03-18 DIAGNOSIS — H40.053 BILATERAL OCULAR HYPERTENSION: Primary | ICD-10-CM

## 2024-03-18 RX ORDER — DORZOLAMIDE HCL 20 MG/ML
1 SOLUTION/ DROPS OPHTHALMIC 2 TIMES DAILY
Qty: 10 ML | Refills: 5 | Status: SHIPPED | OUTPATIENT
Start: 2024-03-18 | End: 2025-03-18

## 2024-03-18 NOTE — PROGRESS NOTES
Assessment /Plan     For exam results, see Encounter Report.    Bilateral ocular hypertension  -     dorzolamide (TRUSOPT) 2 % ophthalmic solution; Place 1 drop into both eyes 2 (two) times a day.  Dispense: 10 mL; Refill: 5      Pt seen 02/23/24. Refills sent for Dorzolamide BID OU.

## 2024-03-28 ENCOUNTER — TELEPHONE (OUTPATIENT)
Dept: PULMONOLOGY | Facility: CLINIC | Age: 69
End: 2024-03-28
Payer: MEDICARE

## 2024-03-28 DIAGNOSIS — R06.02 SOB (SHORTNESS OF BREATH): Primary | ICD-10-CM

## 2024-04-12 ENCOUNTER — HOSPITAL ENCOUNTER (OUTPATIENT)
Dept: PULMONOLOGY | Facility: CLINIC | Age: 69
Discharge: HOME | End: 2024-04-12
Payer: MEDICARE

## 2024-04-12 DIAGNOSIS — R06.02 SOB (SHORTNESS OF BREATH): ICD-10-CM

## 2024-04-12 LAB
DLCO ADJ PRE: 16.78 ML/(MIN*MMHG) (ref 16.94–28.4)
DLCO SINGLE BREATH LLN: 16.94
DLCO SINGLE BREATH PRE REF: 74.9 %
DLCO SINGLE BREATH REF: 22.67
DLCOC SBVA LLN: 2.94
DLCOC SBVA PRE REF: 85 %
DLCOC SBVA REF: 4.3
DLCOC SINGLE BREATH LLN: 16.94
DLCOC SINGLE BREATH PRE REF: 74 %
DLCOC SINGLE BREATH REF: 22.67
DLCOCSBVAULN: 5.65
DLCOCSINGLEBREATHULN: 28.4
DLCOSINGLEBREATHULN: 28.4
DLCOVA LLN: 2.94
DLCOVA PRE REF: 86.1 %
DLCOVA PRE: 3.7 ML/(MIN*MMHG*L) (ref 2.94–5.65)
DLCOVA REF: 4.3
DLCOVAULN: 5.65
DLVAADJ PRE: 3.65 ML/(MIN*MMHG*L) (ref 2.94–5.65)
ERV LLN: -16449.31
ERV PRE REF: 130.7 %
ERV REF: 0.69
ERVULN: ABNORMAL
FEF 25 75 LLN: 0.93
FEF 25 75 PRE REF: 162.3 %
FEF 25 75 REF: 1.99
FET100 CHG: 11.4 %
FEV05 LLN: 0.96
FEV05 REF: 1.81
FEV1 CHG: -2.1 %
FEV1 FVC LLN: 65
FEV1 FVC PRE REF: 108 %
FEV1 FVC REF: 78
FEV1 LLN: 1.74
FEV1 PRE REF: 106.6 %
FEV1 REF: 2.39
FEV1 VOL CHG: -0.05
FRCPLETH LLN: 2
FRCPLETH PREREF: 107.2 %
FRCPLETH REF: 2.82
FRCPLETHULN: 3.65
FVC CHG: -4.5 %
FVC LLN: 2.26
FVC PRE REF: 97.9 %
FVC REF: 3.09
FVC VOL CHG: -0.14
IVC PRE: 2.99 L (ref 2.26–3.95)
IVC SINGLE BREATH LLN: 2.26
IVC SINGLE BREATH PRE REF: 96.7 %
IVC SINGLE BREATH REF: 3.09
IVCSINGLEBREATHULN: 3.95
LLN IC: -16447.81
PEF LLN: 4.21
PEF PRE REF: 94.9 %
PEF REF: 6.03
PHYSICIAN COMMENT: ABNORMAL
POST FEF 25 75: 3.57 L/S (ref 0.93–3.49)
POST FET 100: 6.61 SEC
POST FEV1 FVC: 86.23 % (ref 64.88–89.25)
POST FEV1: 2.49 L (ref 1.74–3)
POST FEV5: 2.13 L (ref 0.96–2.67)
POST FVC: 2.89 L (ref 2.26–3.95)
POST PEF: 5.99 L/S (ref 4.21–7.85)
PRE DLCO: 16.98 ML/(MIN*MMHG) (ref 16.94–28.4)
PRE ERV: 0.9 L (ref -16449.31–16450.69)
PRE FEF 25 75: 3.23 L/S (ref 0.93–3.49)
PRE FET 100: 5.93 SEC
PRE FEV05 REF: 117.3 %
PRE FEV1 FVC: 84.19 % (ref 64.88–89.25)
PRE FEV1: 2.54 L (ref 1.74–3)
PRE FEV5: 2.13 L (ref 0.96–2.67)
PRE FRC PL: 3.03 L (ref 2–3.65)
PRE FVC: 3.02 L (ref 2.26–3.95)
PRE IC: 2.13 L (ref -16447.81–16452.19)
PRE PEF: 5.73 L/S (ref 4.21–7.85)
PRE REF IC: 97.1 %
PRE RV: 2.13 L (ref 1.56–2.71)
PRE TLC: 5.15 L (ref 4.29–6.26)
RAW PRE REF: 61.9 %
RAW PRE: 1.89 CMH2O*S/L (ref 3.06–3.06)
RAW REF: 3.06
REF IC: 2.19
RV LLN: 1.56
RV PRE REF: 99.7 %
RV REF: 2.14
RVTLC LLN: 33
RVTLC PRE REF: 97.5 %
RVTLC PRE: 41.35 % (ref 32.83–52.01)
RVTLC REF: 42
RVTLCULN: 52
RVULN: 2.71
SGAW PRE REF: 154.4 %
SGAW PRE: 0.16 1/(CMH2O*S) (ref 0.1–0.1)
SGAW REF: 0.1
TLC LLN: 4.29
TLC PRE REF: 97.7 %
TLC REF: 5.27
TLC ULN: 6.26
ULN IC: ABNORMAL
VA PRE: 4.59 L (ref 5.12–5.12)
VA SINGLE BREATH LLN: 5.12
VA SINGLE BREATH PRE REF: 89.6 %
VA SINGLE BREATH REF: 5.12
VASINGLEBREATHULN: 5.12
VC LLN: 2.26
VC PRE REF: 97.9 %
VC PRE: 3.02 L (ref 2.26–3.95)
VC REF: 3.09
VC ULN: 3.95

## 2024-04-12 PROCEDURE — 94729 DIFFUSING CAPACITY: CPT | Mod: S$GLB,,, | Performed by: INTERNAL MEDICINE

## 2024-04-12 PROCEDURE — 94060 EVALUATION OF WHEEZING: CPT | Mod: S$GLB,,, | Performed by: INTERNAL MEDICINE

## 2024-04-12 PROCEDURE — 94726 PLETHYSMOGRAPHY LUNG VOLUMES: CPT | Mod: S$GLB,,, | Performed by: INTERNAL MEDICINE

## 2024-04-19 ENCOUNTER — OFFICE VISIT (OUTPATIENT)
Dept: PULMONOLOGY | Facility: CLINIC | Age: 69
End: 2024-04-19
Payer: MEDICARE

## 2024-04-19 VITALS
WEIGHT: 134.06 LBS | HEART RATE: 50 BPM | SYSTOLIC BLOOD PRESSURE: 116 MMHG | HEIGHT: 67 IN | BODY MASS INDEX: 21.04 KG/M2 | DIASTOLIC BLOOD PRESSURE: 72 MMHG | OXYGEN SATURATION: 95 %

## 2024-04-19 DIAGNOSIS — R09.1 PLEURITIS: Primary | ICD-10-CM

## 2024-04-19 PROCEDURE — 3074F SYST BP LT 130 MM HG: CPT | Mod: CPTII,GC,S$GLB, | Performed by: SURGERY

## 2024-04-19 PROCEDURE — 99999 PR PBB SHADOW E&M-EST. PATIENT-LVL III: CPT | Mod: PBBFAC,GC,, | Performed by: SURGERY

## 2024-04-19 PROCEDURE — 1159F MED LIST DOCD IN RCRD: CPT | Mod: CPTII,GC,S$GLB, | Performed by: SURGERY

## 2024-04-19 PROCEDURE — 3008F BODY MASS INDEX DOCD: CPT | Mod: CPTII,GC,S$GLB, | Performed by: SURGERY

## 2024-04-19 PROCEDURE — 1157F ADVNC CARE PLAN IN RCRD: CPT | Mod: CPTII,GC,S$GLB, | Performed by: SURGERY

## 2024-04-19 PROCEDURE — 1126F AMNT PAIN NOTED NONE PRSNT: CPT | Mod: CPTII,GC,S$GLB, | Performed by: SURGERY

## 2024-04-19 PROCEDURE — 99204 OFFICE O/P NEW MOD 45 MIN: CPT | Mod: GC,S$GLB,, | Performed by: SURGERY

## 2024-04-19 PROCEDURE — 3078F DIAST BP <80 MM HG: CPT | Mod: CPTII,GC,S$GLB, | Performed by: SURGERY

## 2024-04-19 NOTE — PROGRESS NOTES
Subjective:      Patient ID: Ashlie Redman is a 69 y.o. female.    Chief Complaint: No chief complaint on file.    She has a history of stageIIB left breast carcinoma (s/p April 2017) and is s/p 4 cycles of A/C, completed 12 cycles of weekly taxol and continues q3 week herceptin/perjeta. She also underwent radiation Jan- feb 20, 2018. She has continued to be followed closely with surveillance PET scans.    She did note that she began having some pleuritic chest pain, mostly with URIs, but occasionally at night waking her up on the L mid sternal area. Described as sharp and stabbing, worse with deep inspiration. Worse on her back. It occurs less than weekly and goes away usually in minutes to hours. Sometimes relieved with antacids as well. The last chest pain was 2 months ago. She has been worked up with cardiology for this chest pain as well getting a cath in 2021 that was negative. She went to her PCP who recommended a pulmonary follow-up as she had not been seen her for previous radiation pneumonitis in a few years.       Review of Systems   Constitutional: Negative.    Respiratory:  Positive for shortness of breath and asthma nighttime symptoms.    Cardiovascular:  Positive for chest pain.   Neurological: Negative.    Psychiatric/Behavioral: Negative.     All other systems reviewed and are negative.    Objective:     Physical Exam   Constitutional: She is oriented to person, place, and time. She appears well-developed and well-nourished. No distress. She is not obese.   Pulmonary/Chest: Normal expansion, symmetric chest wall expansion, effort normal and breath sounds normal. No respiratory distress. She has no wheezes. She has no rhonchi. She has no rales. She exhibits no tenderness.   Neurological: She is alert and oriented to person, place, and time.   Skin: Skin is warm and dry.   Psychiatric: She has a normal mood and affect. Her behavior is normal.   Nursing note and vitals reviewed.    Personal Diagnostic  "Review        3/1/2024     2:01 PM 2/26/2024     1:18 PM 2/16/2024     2:50 PM 12/29/2023     9:00 AM 10/26/2023     9:30 AM 9/19/2023     3:55 PM 9/1/2023     8:54 AM   Pulmonary Function Tests   SpO2 99 %      100 %   Height 5' 6" (1.676 m)  5' 6" (1.676 m) 5' 6" (1.676 m) 5' 6" (1.676 m) 5' 6" (1.676 m) 5' 6" (1.676 m)   Weight 61.6 kg (135 lb 12.9 oz) 60.6 kg (133 lb 9.6 oz) 60.2 kg (132 lb 11.5 oz) 60.2 kg (132 lb 11.5 oz) 59.3 kg (130 lb 11.7 oz) 58.5 kg (129 lb 0.2 oz) 58.3 kg (128 lb 10.2 oz)   BMI (Calculated) 21.9  21.4 21.4 21.1 20.8 20.8     PFT 4/12/24: FEV1/FVC 85, TLC 98%, DLCO 75%    PET CT 12/27/23:  FINDINGS:  Head and neck: There is symmetric and physiologic distribution of radiotracer throughout the included brain.  There is no hemorrhage, hydrocephalus, or midline shift.  There is no FDG avid cervical lymphadenopathy.     Chest: There is no FDG avid mediastinal or hilar lymphadenopathy.  There are postsurgical changes in the left breast.  There is no FDG avid pulmonary nodule or mass.  There is no pleural effusion.     Abdomen and pelvis: There is physiologic radiotracer throughout the liver, spleen, and collecting system.  There is no FDG avid pelvic or retroperitoneal adenopathy.     Musculoskeletal: There is no FDG avid lytic or blastic osseous lesion.    CXR 6/23/23:  No acute findings noted on my review.      Cardiac cath 9/2/21:  The pre-procedure left ventricular end diastolic pressure was 12.  The post-procedure left ventricular end diastolic pressure was 12.  The ejection fraction was calculated to be 55%.  The estimated blood loss was none.  There was non-obstructive coronary artery disease..    Assessment:     No diagnosis found.     Outpatient Encounter Medications as of 4/19/2024   Medication Sig Dispense Refill    calcium carb/vit D3/minerals (CALCIUM-VITAMIN D ORAL)       denosumab (PROLIA) 60 mg/mL Syrg Inject 60 mg into the skin.      dorzolamide (TRUSOPT) 2 % ophthalmic " solution INSTILL 1 DROP INTO BOTH EYES 3 TIMES A DAY 30 mL 4    dorzolamide (TRUSOPT) 2 % ophthalmic solution Place 1 drop into both eyes 2 (two) times a day. 10 mL 5    estradioL (ESTRACE) 0.01 % (0.1 mg/gram) vaginal cream Use 0.5 grams of estrogen cream in vagina with applicator or dime-sized amount with finger (as far as can reach internally) nightly x 2 weeks, then twice a week thereafter. 42.5 g 3    latanoprost 0.005 % ophthalmic solution INSTILL 1 DROP INTO BOTH EYES IN THE EVENING 7.5 mL 3    valACYclovir (VALTREX) 500 MG tablet Take 1 tablet (500 mg total) by mouth once daily. 90 tablet 1     Facility-Administered Encounter Medications as of 4/19/2024   Medication Dose Route Frequency Provider Last Rate Last Admin    LIDOcaine-EPINEPHrine 1%-1:100,000 30 mL, EPINEPHrine 2 mg in lactated Ringers 1,000 mL irrigation   Irrigation On Call Procedure Diego Modi MD        LIDOcaine-EPINEPHrine 1%-1:100,000 30 mL, EPINEPHrine 2 mg in lactated Ringers 1,000 mL irrigation   Irrigation On Call Procedure Diego Modi MD         No orders of the defined types were placed in this encounter.      Plan:     Problem List Items Addressed This Visit          Pulmonary    Pleuritis - Primary    Current Assessment & Plan     Pt s/p L chest radiation with pneumonitis in 2018. She now has some residual pleuritic chest pain with URIs. Review of her most recent PET/CT shows some pleural scarring and tenting on the L lung adjacent to the mediastinum. Relatively unchanged on review of her previous PET scans. She is still cycling up to 30 miles per day and otherwise has no limitations. PFTs do not demonstrate any intrinsic lung disease. Would recommend anti-inflammatory medications when she has flare ups but otherwise no limitations or restriction on activity.   - PRN anti-inflammatory medications    - discussed steroids versus high dose NSAIDs  - continue follow-up with oncology  - PRN follow-up with the pulmonary  clinic

## 2024-04-19 NOTE — ASSESSMENT & PLAN NOTE
Pt s/p L chest radiation with pneumonitis in 2018. She now has some residual pleuritic chest pain with URIs. Review of her most recent PET/CT shows some pleural scarring and tenting on the L lung adjacent to the mediastinum. Relatively unchanged on review of her previous PET scans. She is still cycling up to 30 miles per day and otherwise has no limitations. PFTs do not demonstrate any intrinsic lung disease. Would recommend anti-inflammatory medications when she has flare ups but otherwise no limitations or restriction on activity.   - PRN anti-inflammatory medications    - discussed steroids versus high dose NSAIDs  - continue follow-up with oncology  - PRN follow-up with the pulmonary clinic

## 2024-04-24 ENCOUNTER — PATIENT MESSAGE (OUTPATIENT)
Dept: OPTOMETRY | Facility: CLINIC | Age: 69
End: 2024-04-24
Payer: MEDICARE

## 2024-04-25 DIAGNOSIS — H40.053 BILATERAL OCULAR HYPERTENSION: Primary | ICD-10-CM

## 2024-04-25 DIAGNOSIS — H40.053 OHT (OCULAR HYPERTENSION), BILATERAL: ICD-10-CM

## 2024-04-25 RX ORDER — LATANOPROST 50 UG/ML
1 SOLUTION/ DROPS OPHTHALMIC NIGHTLY
Qty: 2.5 ML | Refills: 11 | Status: SHIPPED | OUTPATIENT
Start: 2024-04-25 | End: 2025-04-25

## 2024-04-25 RX ORDER — DORZOLAMIDE HCL 20 MG/ML
1 SOLUTION/ DROPS OPHTHALMIC 2 TIMES DAILY
Qty: 10 ML | Refills: 11 | Status: SHIPPED | OUTPATIENT
Start: 2024-04-25 | End: 2025-04-25

## 2024-04-25 NOTE — PROGRESS NOTES
Assessment /Plan     For exam results, see Encounter Report.    Bilateral ocular hypertension  -     latanoprost 0.005 % ophthalmic solution; Place 1 drop into both eyes every evening. INSTILL 1 DROP INTO BOTH EYES IN THE EVENING  Dispense: 2.5 mL; Refill: 11  -     dorzolamide (TRUSOPT) 2 % ophthalmic solution; Place 1 drop into both eyes 2 (two) times a day.  Dispense: 10 mL; Refill: 11    OHT (ocular hypertension), bilateral      1-2. Refills only.

## 2024-05-10 ENCOUNTER — CLINICAL SUPPORT (OUTPATIENT)
Dept: AUDIOLOGY | Facility: CLINIC | Age: 69
End: 2024-05-10
Payer: MEDICARE

## 2024-05-10 ENCOUNTER — OFFICE VISIT (OUTPATIENT)
Dept: OTOLARYNGOLOGY | Facility: CLINIC | Age: 69
End: 2024-05-10
Payer: MEDICARE

## 2024-05-10 VITALS — HEIGHT: 67 IN | BODY MASS INDEX: 21.04 KG/M2 | WEIGHT: 134.06 LBS

## 2024-05-10 DIAGNOSIS — H90.3 BILATERAL SENSORINEURAL HEARING LOSS: ICD-10-CM

## 2024-05-10 DIAGNOSIS — H93.13 TINNITUS, BILATERAL: Primary | ICD-10-CM

## 2024-05-10 DIAGNOSIS — H90.3 SENSORINEURAL HEARING LOSS (SNHL) OF BOTH EARS: Primary | ICD-10-CM

## 2024-05-10 PROCEDURE — 1101F PT FALLS ASSESS-DOCD LE1/YR: CPT | Mod: CPTII,S$GLB,, | Performed by: OTOLARYNGOLOGY

## 2024-05-10 PROCEDURE — 92557 COMPREHENSIVE HEARING TEST: CPT | Mod: S$GLB,,, | Performed by: AUDIOLOGIST-HEARING AID FITTER

## 2024-05-10 PROCEDURE — 99999 PR PBB SHADOW E&M-EST. PATIENT-LVL III: CPT | Mod: PBBFAC,,, | Performed by: OTOLARYNGOLOGY

## 2024-05-10 PROCEDURE — 3008F BODY MASS INDEX DOCD: CPT | Mod: CPTII,S$GLB,, | Performed by: OTOLARYNGOLOGY

## 2024-05-10 PROCEDURE — 92567 TYMPANOMETRY: CPT | Mod: S$GLB,,, | Performed by: AUDIOLOGIST-HEARING AID FITTER

## 2024-05-10 PROCEDURE — 1157F ADVNC CARE PLAN IN RCRD: CPT | Mod: CPTII,S$GLB,, | Performed by: OTOLARYNGOLOGY

## 2024-05-10 PROCEDURE — 1160F RVW MEDS BY RX/DR IN RCRD: CPT | Mod: CPTII,S$GLB,, | Performed by: OTOLARYNGOLOGY

## 2024-05-10 PROCEDURE — 1126F AMNT PAIN NOTED NONE PRSNT: CPT | Mod: CPTII,S$GLB,, | Performed by: OTOLARYNGOLOGY

## 2024-05-10 PROCEDURE — 99999 PR PBB SHADOW E&M-EST. PATIENT-LVL II: CPT | Mod: PBBFAC,,, | Performed by: AUDIOLOGIST-HEARING AID FITTER

## 2024-05-10 PROCEDURE — 1159F MED LIST DOCD IN RCRD: CPT | Mod: CPTII,S$GLB,, | Performed by: OTOLARYNGOLOGY

## 2024-05-10 PROCEDURE — 3288F FALL RISK ASSESSMENT DOCD: CPT | Mod: CPTII,S$GLB,, | Performed by: OTOLARYNGOLOGY

## 2024-05-10 PROCEDURE — 99203 OFFICE O/P NEW LOW 30 MIN: CPT | Mod: S$GLB,,, | Performed by: OTOLARYNGOLOGY

## 2024-05-10 NOTE — PROGRESS NOTES
Ashlie Redman was seen 05/10/2024 for an audiological evaluation. Pt was alone during today's visit. Pertinent complaints today include hearing loss tinnitus AU. Pt confirms history of loud noise exposure and denies early onset of genetic family history of hearing loss. Otoscopy revealed no cerumen in both ears. The tympanic membrane was visualized AU prior to proceeding with the hearing testing.     Results reveal a bilateral normal through 1K Hz sloping to moderate HF sensorineural hearing loss.    Speech Reception Thresholds were  10 dBHL for the right ear and 10 dBHL for the left ear.    Word recognition scores were good for the right ear and excellent for the left ear.   Tympanograms were Type A for the right ear and Type A for the left ear.    Audiogram results were reviewed in detail with patient and all questions were answered. Results will be reviewed by the referring provider at the completion of this note. All complaints were addressed during this visit to the patient's satisfaction. Recommend binaural amplification pending medical clearance, repeat hearing testing in one year due to tinnitus and noise exposure and bilateral hearing protection with either muffs or in-ear protection in loud noises. Plan of care was discussed in detail with the patient, who agreed with the plan as above.

## 2024-05-10 NOTE — PROGRESS NOTES
Subjective:       Patient ID: Ashlie Redman is a 69 y.o. female.    Chief Complaint: Tinnitus and Hearing Loss    Ashlie is here for tinnitus and HL.   Length of symptoms: 1 year, possibly longer.  No clearly associated with chemotherapy for breast ca.  No vertigo. No ear surgeries. No fam history of HL  Patient validated questionnaires (if applicable):      %            No data to display                   No data to display                   No data to display                     Social History     Tobacco Use   Smoking Status Never   Smokeless Tobacco Never     Social History     Substance and Sexual Activity   Alcohol Use Yes    Alcohol/week: 1.0 standard drink of alcohol    Types: 1 Glasses of wine per week    Comment: social          Objective:        Constitutional:   She is oriented to person, place, and time. She appears well-developed and well-nourished. She appears alert. She is active. Normal speech.      Head:  Normocephalic and atraumatic. Head is without TMJ tenderness. No scars. Salivary glands normal.  Facial strength is normal.      Ears:    Right Ear: No drainage or swelling. No middle ear effusion.   Left Ear: No drainage or swelling.  No middle ear effusion.     Nose:  No mucosal edema, rhinorrhea or sinus tenderness. No turbinate hypertrophy.      Mouth/Throat  Oropharynx clear and moist without lesions or asymmetry, normal uvula midline and mirror exam normal. Normal dentition. No uvula swelling, lacerations or trismus. No oropharyngeal exudate. Tonsillar erythema, tonsillar exudate.      Neck:  Full range of motion with neck supple and no adenopathy. Thyroid tenderness is present. No tracheal deviation, no edema, no erythema, normal range of motion, no stridor, no crepitus and no neck rigidity present. No thyroid mass present.     Cardiovascular:    Intact distal pulses and normal pulses.              Pulmonary/Chest:   Effort normal and breath sounds normal. No stridor.     Psychiatric:   Her  speech is normal and behavior is normal. Her mood appears not anxious. Her affect is not labile.     Neurological:   She is alert and oriented to person, place, and time. No sensory deficit.     Skin:   No abrasions, lacerations, lesions, or rashes. No abrasion and no bruising noted.         Tests / Results:  Mild to mod mid-high freq SNHL      Assessment:       1. Sensorineural hearing loss (SNHL) of both ears          Plan:         We discussed tinnitus and SNHL  Hearing aid candidate

## 2024-06-20 ENCOUNTER — LAB VISIT (OUTPATIENT)
Dept: LAB | Facility: HOSPITAL | Age: 69
End: 2024-06-20
Attending: INTERNAL MEDICINE
Payer: MEDICARE

## 2024-06-20 ENCOUNTER — OFFICE VISIT (OUTPATIENT)
Dept: OPTOMETRY | Facility: CLINIC | Age: 69
End: 2024-06-20
Payer: MEDICARE

## 2024-06-20 DIAGNOSIS — C50.611 MALIGNANT NEOPLASM OF AXILLARY TAIL OF RIGHT FEMALE BREAST, UNSPECIFIED ESTROGEN RECEPTOR STATUS: ICD-10-CM

## 2024-06-20 DIAGNOSIS — Z82.62 FAMILY HISTORY OF OSTEOPOROSIS: ICD-10-CM

## 2024-06-20 DIAGNOSIS — Z96.1 PSEUDOPHAKIA OF BOTH EYES: ICD-10-CM

## 2024-06-20 DIAGNOSIS — H52.13 MYOPIA WITH PRESBYOPIA OF BOTH EYES: ICD-10-CM

## 2024-06-20 DIAGNOSIS — H52.4 MYOPIA WITH PRESBYOPIA OF BOTH EYES: ICD-10-CM

## 2024-06-20 DIAGNOSIS — Z80.0 FAMILY HISTORY OF RECTAL CANCER: ICD-10-CM

## 2024-06-20 DIAGNOSIS — Z97.3 WEARS CONTACT LENSES: ICD-10-CM

## 2024-06-20 DIAGNOSIS — H40.053 BILATERAL OCULAR HYPERTENSION: Primary | ICD-10-CM

## 2024-06-20 DIAGNOSIS — C50.012 MALIGNANT NEOPLASM OF NIPPLE OF LEFT BREAST IN FEMALE, UNSPECIFIED ESTROGEN RECEPTOR STATUS: ICD-10-CM

## 2024-06-20 DIAGNOSIS — I25.10 CORONARY ARTERY DISEASE INVOLVING NATIVE CORONARY ARTERY OF NATIVE HEART WITHOUT ANGINA PECTORIS: ICD-10-CM

## 2024-06-20 DIAGNOSIS — Z90.10 STATUS POST PARTIAL MASTECTOMY, UNSPECIFIED LATERALITY: ICD-10-CM

## 2024-06-20 DIAGNOSIS — R33.9 INCOMPLETE EMPTYING OF BLADDER: ICD-10-CM

## 2024-06-20 LAB
ALBUMIN SERPL BCP-MCNC: 4.3 G/DL (ref 3.5–5.2)
ALP SERPL-CCNC: 66 U/L (ref 55–135)
ALT SERPL W/O P-5'-P-CCNC: 13 U/L (ref 10–44)
ANION GAP SERPL CALC-SCNC: 11 MMOL/L (ref 8–16)
AST SERPL-CCNC: 20 U/L (ref 10–40)
BASOPHILS # BLD AUTO: 0.05 K/UL (ref 0–0.2)
BASOPHILS NFR BLD: 0.6 % (ref 0–1.9)
BILIRUB SERPL-MCNC: 0.4 MG/DL (ref 0.1–1)
BUN SERPL-MCNC: 16 MG/DL (ref 8–23)
CALCIUM SERPL-MCNC: 10.6 MG/DL (ref 8.7–10.5)
CHLORIDE SERPL-SCNC: 104 MMOL/L (ref 95–110)
CO2 SERPL-SCNC: 25 MMOL/L (ref 23–29)
CREAT SERPL-MCNC: 0.9 MG/DL (ref 0.5–1.4)
DIFFERENTIAL METHOD BLD: ABNORMAL
EOSINOPHIL # BLD AUTO: 0.1 K/UL (ref 0–0.5)
EOSINOPHIL NFR BLD: 1.4 % (ref 0–8)
ERYTHROCYTE [DISTWIDTH] IN BLOOD BY AUTOMATED COUNT: 11.9 % (ref 11.5–14.5)
EST. GFR  (NO RACE VARIABLE): >60 ML/MIN/1.73 M^2
GLUCOSE SERPL-MCNC: 95 MG/DL (ref 70–110)
HCT VFR BLD AUTO: 41.4 % (ref 37–48.5)
HGB BLD-MCNC: 14 G/DL (ref 12–16)
IMM GRANULOCYTES # BLD AUTO: 0.02 K/UL (ref 0–0.04)
IMM GRANULOCYTES NFR BLD AUTO: 0.3 % (ref 0–0.5)
LYMPHOCYTES # BLD AUTO: 2.1 K/UL (ref 1–4.8)
LYMPHOCYTES NFR BLD: 26.5 % (ref 18–48)
MCH RBC QN AUTO: 33.9 PG (ref 27–31)
MCHC RBC AUTO-ENTMCNC: 33.8 G/DL (ref 32–36)
MCV RBC AUTO: 100 FL (ref 82–98)
MONOCYTES # BLD AUTO: 0.6 K/UL (ref 0.3–1)
MONOCYTES NFR BLD: 7.9 % (ref 4–15)
NEUTROPHILS # BLD AUTO: 4.9 K/UL (ref 1.8–7.7)
NEUTROPHILS NFR BLD: 63.3 % (ref 38–73)
NRBC BLD-RTO: 0 /100 WBC
PLATELET # BLD AUTO: 211 K/UL (ref 150–450)
PMV BLD AUTO: 10.3 FL (ref 9.2–12.9)
POTASSIUM SERPL-SCNC: 4.9 MMOL/L (ref 3.5–5.1)
PROT SERPL-MCNC: 7.6 G/DL (ref 6–8.4)
RBC # BLD AUTO: 4.13 M/UL (ref 4–5.4)
SODIUM SERPL-SCNC: 140 MMOL/L (ref 136–145)
WBC # BLD AUTO: 7.73 K/UL (ref 3.9–12.7)

## 2024-06-20 PROCEDURE — 86300 IMMUNOASSAY TUMOR CA 15-3: CPT | Performed by: INTERNAL MEDICINE

## 2024-06-20 PROCEDURE — 1157F ADVNC CARE PLAN IN RCRD: CPT | Mod: CPTII,S$GLB,,

## 2024-06-20 PROCEDURE — 1101F PT FALLS ASSESS-DOCD LE1/YR: CPT | Mod: CPTII,S$GLB,,

## 2024-06-20 PROCEDURE — 1126F AMNT PAIN NOTED NONE PRSNT: CPT | Mod: CPTII,S$GLB,,

## 2024-06-20 PROCEDURE — 85025 COMPLETE CBC W/AUTO DIFF WBC: CPT | Mod: PO | Performed by: INTERNAL MEDICINE

## 2024-06-20 PROCEDURE — 99213 OFFICE O/P EST LOW 20 MIN: CPT | Mod: S$GLB,,,

## 2024-06-20 PROCEDURE — 3288F FALL RISK ASSESSMENT DOCD: CPT | Mod: CPTII,S$GLB,,

## 2024-06-20 PROCEDURE — 99999 PR PBB SHADOW E&M-EST. PATIENT-LVL III: CPT | Mod: PBBFAC,,,

## 2024-06-20 PROCEDURE — 1159F MED LIST DOCD IN RCRD: CPT | Mod: CPTII,S$GLB,,

## 2024-06-20 PROCEDURE — 80053 COMPREHEN METABOLIC PANEL: CPT | Mod: PO | Performed by: INTERNAL MEDICINE

## 2024-06-20 PROCEDURE — G2211 COMPLEX E/M VISIT ADD ON: HCPCS | Mod: S$GLB,,,

## 2024-06-20 NOTE — Clinical Note
Hi! Can you please schedule pt for 4 months with IOP check and RNFL OCT. She said any day of the week is fine, after 1pm, and she will just [facility]  Thanks!

## 2024-06-20 NOTE — PROGRESS NOTES
HPI    4 month IOP ULISES 02/23/24    Pt denies any changes in va w contact lens, pt has monofit. Pt denies   flashes, sleeping in lens but notices occasional floater. Pt uses   latanoprost QHS OU and dorzolamide BID OU.   Last edited by Félix Morrison, OD on 6/20/2024  1:47 PM.            Assessment /Plan     For exam results, see Encounter Report.    Bilateral ocular hypertension    Pseudophakia of both eyes    Myopia with presbyopia of both eyes    Wears contact lenses      Longstanding. Previously monitored by Dr. Vasquez, then Dr. Jimenez. IOP stable in upper teens/low 20s on Latanoprost QHS OU and Dorzolamide BID OU. Emphasized importance of continued good compliance with drops to prevent worsening visual field loss. Pt to return in 4 months IOP check and RNFL OCT.  IOP  17 // 21 - 06/20/24  18 // 20 - 02/23/24 19 // 22 - 10/20/23  Tmax: 37 // 44   RNFL OCT  10/20/23         OD: G(79), ST(75), T(61), IT(87), IN(109), N(78), SN(85); ONL   OS: G(82), ST(97), T(73), IT(101), IN(111), N(57), SN(87); Borderline   24-2 HVF  02/23/24  OD: reliable, non-specific defects, GHT: WNL  OS: reliable, early superior and inferior nasal step, GHT: ONL  Pachymetry: 593 // 601  Gonio: Open to  OD, OS  (+)Family history: maternal grandmother    PCIOL OU, monovision, OD distance. Stable. Monitor yearly for changes.    3. Monovision CE w/IOL. OD distance. Pt wears one contact lens OS for intermediate. Excellent fit, comfort, and vision. Pt reports not needing updated contact lens rx. Pt knows to call or message if Rx needed.     *Today's visit is associated with current and anticipated ongoing medical care related to this patient's single serious/complex condition (ocular hypertension). Follow up is to be continued indefinitely to monitor the condition.     RTC: 4 months for IOP check and RNFL OCT

## 2024-06-23 ENCOUNTER — PATIENT MESSAGE (OUTPATIENT)
Dept: PRIMARY CARE CLINIC | Facility: CLINIC | Age: 69
End: 2024-06-23
Payer: MEDICARE

## 2024-06-23 DIAGNOSIS — R35.0 URINE FREQUENCY: Primary | ICD-10-CM

## 2024-06-23 DIAGNOSIS — R30.0 BURNING WITH URINATION: ICD-10-CM

## 2024-06-24 ENCOUNTER — TELEPHONE (OUTPATIENT)
Dept: PRIMARY CARE CLINIC | Facility: CLINIC | Age: 69
End: 2024-06-24
Payer: MEDICARE

## 2024-06-24 ENCOUNTER — LAB VISIT (OUTPATIENT)
Dept: LAB | Facility: HOSPITAL | Age: 69
End: 2024-06-24
Payer: MEDICARE

## 2024-06-24 DIAGNOSIS — R35.0 URINE FREQUENCY: ICD-10-CM

## 2024-06-24 DIAGNOSIS — R30.0 BURNING WITH URINATION: ICD-10-CM

## 2024-06-24 DIAGNOSIS — R35.0 URINE FREQUENCY: Primary | ICD-10-CM

## 2024-06-24 LAB
BACTERIA #/AREA URNS AUTO: ABNORMAL /HPF
BILIRUB UR QL STRIP: ABNORMAL
CANCER AG27-29 SERPL-ACNC: 38 U/ML
CLARITY UR REFRACT.AUTO: ABNORMAL
COLOR UR AUTO: YELLOW
GLUCOSE UR QL STRIP: NEGATIVE
HGB UR QL STRIP: NEGATIVE
HYALINE CASTS UR QL AUTO: 1 /LPF
KETONES UR QL STRIP: NEGATIVE
LEUKOCYTE ESTERASE UR QL STRIP: ABNORMAL
MICROSCOPIC COMMENT: ABNORMAL
NITRITE UR QL STRIP: POSITIVE
PH UR STRIP: 6 [PH] (ref 5–8)
PROT UR QL STRIP: ABNORMAL
RBC #/AREA URNS AUTO: 9 /HPF (ref 0–4)
SP GR UR STRIP: 1.03 (ref 1–1.03)
SQUAMOUS #/AREA URNS AUTO: 5 /HPF
UNSPECIFIED CRY UR QL COMP ASSIST: 9
URN SPEC COLLECT METH UR: ABNORMAL
WBC #/AREA URNS AUTO: 81 /HPF (ref 0–5)

## 2024-06-24 PROCEDURE — 87086 URINE CULTURE/COLONY COUNT: CPT | Performed by: INTERNAL MEDICINE

## 2024-06-24 PROCEDURE — 81001 URINALYSIS AUTO W/SCOPE: CPT | Performed by: INTERNAL MEDICINE

## 2024-06-25 ENCOUNTER — TELEPHONE (OUTPATIENT)
Dept: PRIMARY CARE CLINIC | Facility: CLINIC | Age: 69
End: 2024-06-25
Payer: MEDICARE

## 2024-06-25 LAB
BACTERIA UR CULT: NORMAL
BACTERIA UR CULT: NORMAL

## 2024-06-25 RX ORDER — CIPROFLOXACIN 500 MG/1
500 TABLET ORAL EVERY 12 HOURS
Qty: 14 TABLET | Refills: 0 | Status: SHIPPED | OUTPATIENT
Start: 2024-06-25 | End: 2024-07-02

## 2024-06-25 NOTE — TELEPHONE ENCOUNTER
Please call and let pt know that urine is consistent with urinary tract infection. I will send in cipro to pharmacy on file. Urine culture is still pending. May adjust antibiotics depending on sensitivities.

## 2024-06-27 ENCOUNTER — PATIENT MESSAGE (OUTPATIENT)
Dept: HEMATOLOGY/ONCOLOGY | Facility: CLINIC | Age: 69
End: 2024-06-27
Payer: MEDICARE

## 2024-06-28 ENCOUNTER — RESEARCH ENCOUNTER (OUTPATIENT)
Dept: RESEARCH | Facility: HOSPITAL | Age: 69
End: 2024-06-28
Payer: MEDICARE

## 2024-06-28 ENCOUNTER — OFFICE VISIT (OUTPATIENT)
Dept: HEMATOLOGY/ONCOLOGY | Facility: CLINIC | Age: 69
End: 2024-06-28
Payer: MEDICARE

## 2024-06-28 DIAGNOSIS — H40.053 BILATERAL OCULAR HYPERTENSION: ICD-10-CM

## 2024-06-28 DIAGNOSIS — Z82.62 FAMILY HISTORY OF OSTEOPOROSIS: ICD-10-CM

## 2024-06-28 DIAGNOSIS — I25.110 CORONARY ARTERY DISEASE INVOLVING NATIVE HEART WITH UNSTABLE ANGINA PECTORIS, UNSPECIFIED VESSEL OR LESION TYPE: ICD-10-CM

## 2024-06-28 DIAGNOSIS — E53.8 VITAMIN B12 DEFICIENCY: ICD-10-CM

## 2024-06-28 DIAGNOSIS — C50.012 MALIGNANT NEOPLASM OF NIPPLE OF LEFT BREAST IN FEMALE, UNSPECIFIED ESTROGEN RECEPTOR STATUS: Primary | ICD-10-CM

## 2024-06-28 NOTE — PROGRESS NOTES
Protocol: [id number]  Investigator: GRETCHEN Arce MD  Pt Initials: KATRINA HERNDON  Study ID: [id number]  IRB #: [id number]  Arm 1     [id number]: A Randomized Phase III Trial Evaluating the Role of Axillary Lymph Node Dissection in Breast Cancer Patients (CT1-3 N1) Who Have Positive [place] Lymph Node Disease After Neoadjuvant Chemotherapy     June 28th, 2024- 1 year f/u     Called the patient for her 1 year follow-up per [id number] protocol. The patient continues to freely agree to participate in the [id number] trial.      Ms. Redman states she is doing well. She states that she has an appt with her treating oncologist, Dr. Ragsdale, this afternoon. She reports that she did not have any problems with wound infections, seromas, lymphedema, or dermatitis radiation within the last year. She denies any new symptoms. She states that she has not initiated or completed any new hormonal or anti Her2 therapy, chemotherapy, biologic or vaccine therapy since her last study visit.     Lymphedema Measurements, QOLs:     Pt completed final lymphedema measurements and QOLs (5 years after completion of radiation therapy or 60 month follow up visit) in June of 2023     Next study procedures:     Annual mammogram - due 9/2024  Annual post treatment follow up H&P and AE assessment (Clinical follow up) -  due 6/2025  8 years from completion of radiation blood samples - due 2/2/2026     The patient denied having any questions for this CRC. Mrs. Redman was encouraged to call with any questions or concerns in the future.     Amanda Spann, CRC

## 2024-06-28 NOTE — PROGRESS NOTES
The patient location is: home  Visit type: Virtual visit with synchronous audio and video  Face-to-face or time spent with patient on the encounter:25 min  Total time spent on and for  this encounter which includes non face-to-face time preparing to see patient, review of tests, obtaining and or reviewing separately obtained records documenting clinical information in the electronic or other health records, independently interpreting results which is not separately reported ,and communicating results to the patient/family/caregiver and in care coordination and treatment planning/communicating with pharmacy for prescriptions/addressing social needs/arranging follow-up and or referrals :25  min    Each patient I provide medical services by telemedicine is:  (1) informed of the relationship between the physician and patient and the respective role of any other health care provider with respect to management of the patient; and (2) notified that he or she may decline to receive medical services by telemedicine and may withdraw from such care at any time.  This is a video visit therefore some elements of the physical exam such as vital signs, heart sounds are breath sounds are not included and may be included if found in recent clinic notes of other providers assessing same patient. Any symptoms or signs that were visualized were stated by the patient may be included in this note.     HISTORY OF PRESENT ILLNESS:    This is a 68-year-old white female here for f/u of breast ca hx    Oncology history   treated for a  4/12/2017 diagnosis of Stage IIB left breast carcinoma.ER/OH negative. Her - 2 positive  Recd Neoadjuvant A/C ,completed 12 cycles weekly taxol and  herceptin/ perjeta had lumpectomy was on [employer] trial completed the entire year of herceptin /perjeta, but didn't undergo axillary xrt per trial  Here for f/u with pet and labs recnt mammogram showed calcifications , that were biospied and came back  negative  radiation induced interstitial findings still having the cough but getting of her upper respiratory infection today   sonme neuropathy post rx still persisits   had a difficult summer 2019. Had removal of her original implant for pain in the area, after removal pt developed pseudomonas infection stayed in hospital  For a week. She sees Dr. Modi, had second reconstruction 10 2020 had a deep flap done. Saw DR Rodriguez rt breast mammo 9/2020    Patient denies issues related to appetite or recent weight change.  Feels well overall.  Denies issues with generalized weakness .  Denies fatigue over above what is normally experienced with day-to-day activities  Denies fever, chills, rigors  Denies issues with ambulation  Denies generalized swelling or new lumps and bumps felt in any part  of body  Denies visual or hearing loss  Denies issues with congestion, sinus issues, cough, sputum production runny nose or itching eyes  Denies chest pain or palpitations, or passing out  Denies abdominal pain, reflux symptoms, nausea vomiting loose stools or constipation  Denies seizure activity or focal weaknesses or symptoms related to TIA, no head aches or blurred vision reported  Denies issues with skin rash or bruising  Denies issues with swelling of feet, tingling or numbness   No issues with sleep,   No recent foreign travel   Good family support reported       GENERAL:   Wt Readings from Last 3 Encounters:   05/10/24 60.8 kg (134 lb 0.6 oz)   04/19/24 60.8 kg (134 lb 0.6 oz)   03/01/24 61.6 kg (135 lb 12.9 oz)     Temp Readings from Last 3 Encounters:   03/01/24 98 °F (36.7 °C) (Oral)   12/29/23 98.2 °F (36.8 °C)   09/01/23 98 °F (36.7 °C) (Oral)     BP Readings from Last 3 Encounters:   04/19/24 116/72   03/01/24 136/84   02/26/24 130/86     Pulse Readings from Last 3 Encounters:   04/19/24 (!) 50   03/01/24 (!) 59   12/29/23 87    VITAL SIGNS:  as above   GENERAL: appears well-built, well-nourished.  No anxiety, no  agitation, and in no distress.  Patient is awake, alert, oriented and cooperative.  HEENT:  Showed no congestion. Trachea is central no obvious icterus or pallor noted no hoarseness. no obvious JVD   NECK:  Supple.  No JVD. No obvious cervical submental or supraclavicular adenopathy.  RS:the visualized portion of  Chest expands well. chest appears symmetric, no audible wheezes.  No dyspnea recognized  ABDOMEN:  abdomen appears undistended.  EXTREMITIES:  Without edema.  NEUROLOGICAL:  The patient is appropriate, higher functions are normal.  No  obvious neurological deficits.  normal judgement normal thought content  No confusion, no speech impediment. Cranial nerves are intact and show no deficit. No gross motor deficits noted   SKIN MUSCULOSKELETAL: no joint or skeletal deformity, no clubbing of nails.  No visible rash ecchymosis or petechiae  BREAST: left breast  Flap done, some puckering that is not satisfactory to pt, she has left axilla fullness/ swelling. With cord like tighetning around axilla that limits her arm raising. Its a lump like area to axilla, the arm itself is not swollen, pt reports that the working dx is that the lymphatic drainage was blocked due to sx fom scarring   rt breast implant has been removed and the breast is natural  LABORATORY:    Lab Results   Component Value Date    WBC 7.73 06/20/2024    HGB 14.0 06/20/2024    HCT 41.4 06/20/2024     (H) 06/20/2024     06/20/2024         CMP  Sodium   Date Value Ref Range Status   06/20/2024 140 136 - 145 mmol/L Final     Potassium   Date Value Ref Range Status   06/20/2024 4.9 3.5 - 5.1 mmol/L Final     Chloride   Date Value Ref Range Status   06/20/2024 104 95 - 110 mmol/L Final     CO2   Date Value Ref Range Status   06/20/2024 25 23 - 29 mmol/L Final     Glucose   Date Value Ref Range Status   06/20/2024 95 70 - 110 mg/dL Final     BUN   Date Value Ref Range Status   06/20/2024 16 8 - 23 mg/dL Final     Creatinine   Date Value  Ref Range Status   06/20/2024 0.9 0.5 - 1.4 mg/dL Final     Calcium   Date Value Ref Range Status   06/20/2024 10.6 (H) 8.7 - 10.5 mg/dL Final     Total Protein   Date Value Ref Range Status   06/20/2024 7.6 6.0 - 8.4 g/dL Final     Albumin   Date Value Ref Range Status   06/20/2024 4.3 3.5 - 5.2 g/dL Final     Total Bilirubin   Date Value Ref Range Status   06/20/2024 0.4 0.1 - 1.0 mg/dL Final     Comment:     For infants and newborns, interpretation of results should be based  on gestational age, weight and in agreement with clinical  observations.    Premature Infant recommended reference ranges:  Up to 24 hours.............<8.0 mg/dL  Up to 48 hours............<12.0 mg/dL  3-5 days..................<15.0 mg/dL  6-29 days.................<15.0 mg/dL       Alkaline Phosphatase   Date Value Ref Range Status   06/20/2024 66 55 - 135 U/L Final     AST   Date Value Ref Range Status   06/20/2024 20 10 - 40 U/L Final     ALT   Date Value Ref Range Status   06/20/2024 13 10 - 44 U/L Final     Anion Gap   Date Value Ref Range Status   06/20/2024 11 8 - 16 mmol/L Final     eGFR if    Date Value Ref Range Status   06/01/2022 >60 >60 mL/min/1.73 m^2 Final     eGFR if non    Date Value Ref Range Status   06/01/2022 >60 >60 mL/min/1.73 m^2 Final     Comment:     Calculation used to obtain the estimated glomerular filtration  rate (eGFR) is the CKD-EPI equation.      30.7 ca 2729 on 12/3/21     Left breast, lumpectomy:  - Residual invasive ductal carcinoma, moderately differentiated, [place] Grade 2 (3+2+1=6), 0.2 cm in  greatest linear microscopic dimension.  - Ductal carcinoma in situ (DCIS), high nuclear grade, 2 cm, solid and cribriform types.  - Surgical margins are free of tumor; invasive carcinoma is located 0.1 cm from the closest margin         FINAL PATHOLOGIC DIAGNOSIS  1. BREAST, RIGHT, CENTRAL REGION MIDDLE DEPTH, CALCIFICATIONS, STEREOTACTIC-GUIDED  BIOPSY:  Benign breast tissue  with fibrocystic changes, columnar cell changes, and focal features suggestive of duct  rupture.  Microcalcifications: Seen in association with areas of columnar cell change.  Negative for atypia or carcinoma.  Osteopenia 3/2017 dexa  1/2020 osteoporosis  Chest x-ray negative October 2020 mammogram September 2 1020-    Pet 12/2022 neg    IMPRESSION:    Stage IIB left breast carcinoma 2017 (ER/VT negative, Her-2/clay positive)  Completed neoadjuvant AC and weekly taxol / herceptin  stopped perjeta since she also had xrt to left side due to fear of toxicity   BRCA negative  , s/p  lumpectomy and axillary disection , with 2 residual Ln positive. Per [place] did not undergo xrt to axilla    reconstructed left breast MRI of rt breast  negative February 2020, mammo of  Both breast( has natural breast tissue in left still) in 9/2021  and 9/22 . 9/23 next due 9/24   pet was denied by insurance will get annual atleast   cont observation and surveillance  [id number] slight elevation in the past has normalized mammo and [id number] wnl n3ext due 9/24  1.   Bone density  next due 12/24 improved since she had osteoporosis prior to start of prolia proceed with Prolia July 1, 2024  Next PET scan December 2024   anemia has resolved  Thyroid nodules noted on health screening tests done outside previous PET scans have not shown nodules will get thyroid ultrasound and phone review  See me September 20, 2024 with bilateral mammogram CBC CMP tumor marker      Advance Care Planning     Date: 06/29/2023    Power of   I verified acp docs

## 2024-06-28 NOTE — Clinical Note
Bilateral mammogram CBC CMP tumor marker in September and see me virtual visit in September Okay for Prolia in July Get thyroid ultrasound now and phone review

## 2024-07-01 ENCOUNTER — PATIENT MESSAGE (OUTPATIENT)
Dept: HEMATOLOGY/ONCOLOGY | Facility: CLINIC | Age: 69
End: 2024-07-01
Payer: MEDICARE

## 2024-07-01 ENCOUNTER — INFUSION (OUTPATIENT)
Dept: INFUSION THERAPY | Facility: HOSPITAL | Age: 69
End: 2024-07-01
Attending: INTERNAL MEDICINE
Payer: MEDICARE

## 2024-07-01 VITALS
TEMPERATURE: 98 F | HEIGHT: 67 IN | SYSTOLIC BLOOD PRESSURE: 126 MMHG | WEIGHT: 134.5 LBS | RESPIRATION RATE: 14 BRPM | HEART RATE: 62 BPM | BODY MASS INDEX: 21.11 KG/M2 | DIASTOLIC BLOOD PRESSURE: 79 MMHG

## 2024-07-01 DIAGNOSIS — M80.00XA AGE-RELATED OSTEOPOROSIS WITH CURRENT PATHOLOGICAL FRACTURE, INITIAL ENCOUNTER: Primary | ICD-10-CM

## 2024-07-01 PROCEDURE — 96372 THER/PROPH/DIAG INJ SC/IM: CPT | Mod: PN

## 2024-07-01 PROCEDURE — 63600175 PHARM REV CODE 636 W HCPCS: Mod: JZ,JG,PN | Performed by: INTERNAL MEDICINE

## 2024-07-01 RX ADMIN — DENOSUMAB 60 MG: 60 INJECTION SUBCUTANEOUS at 09:07

## 2024-07-01 NOTE — PLAN OF CARE
Problem: Adult Inpatient Plan of Care  Goal: Plan of Care Review  Outcome: Progressing  Flowsheets (Taken 7/1/2024 0914)  Plan of Care Reviewed With: patient  Goal: Patient-Specific Goal (Individualized)  Outcome: Progressing  Flowsheets (Taken 7/1/2024 0914)  Individualized Care Needs: recliner  Anxieties, Fears or Concerns: none  Patient/Family-Specific Goals (Include Timeframe): no s/s of rx with treatment     Problem: Fatigue  Goal: Improved Activity Tolerance  Outcome: Progressing  Intervention: Promote Improved Energy  Flowsheets (Taken 7/1/2024 0914)  Activity Management:   Ambulated in [place] - L4   Up in chair - L3   Patient tolerated Prolia injection well, d/c in no acute distress.

## 2024-07-08 ENCOUNTER — HOSPITAL ENCOUNTER (OUTPATIENT)
Dept: RADIOLOGY | Facility: HOSPITAL | Age: 69
Discharge: HOME | End: 2024-07-08
Attending: INTERNAL MEDICINE
Payer: MEDICARE

## 2024-07-08 DIAGNOSIS — C50.012 MALIGNANT NEOPLASM OF NIPPLE OF LEFT BREAST IN FEMALE, UNSPECIFIED ESTROGEN RECEPTOR STATUS: ICD-10-CM

## 2024-07-08 PROCEDURE — 76536 US EXAM OF HEAD AND NECK: CPT | Mod: TC,PO

## 2024-07-08 PROCEDURE — 76536 US EXAM OF HEAD AND NECK: CPT | Mod: 26,,, | Performed by: RADIOLOGY

## 2024-07-09 DIAGNOSIS — E04.1 THYROID NODULE: Primary | ICD-10-CM

## 2024-07-10 ENCOUNTER — PATIENT MESSAGE (OUTPATIENT)
Dept: HEMATOLOGY/ONCOLOGY | Facility: CLINIC | Age: 69
End: 2024-07-10
Payer: MEDICARE

## 2024-07-10 ENCOUNTER — TELEPHONE (OUTPATIENT)
Dept: HEMATOLOGY/ONCOLOGY | Facility: CLINIC | Age: 69
End: 2024-07-10
Payer: MEDICARE

## 2024-07-11 ENCOUNTER — TELEPHONE (OUTPATIENT)
Dept: HEMATOLOGY/ONCOLOGY | Facility: CLINIC | Age: 69
End: 2024-07-11
Payer: MEDICARE

## 2024-07-11 NOTE — TELEPHONE ENCOUNTER
Spoke with Rubi at [facility] radiology regarding scheduling of FNA of thyroid. She states that she spoke with pt this morning. Per pt, she is going out of town until 7/24, and she will call back to schedule. Pt stated that she did not have someone to bring her to procedure. Reminder set to f/u on 7/25.

## 2024-07-26 ENCOUNTER — PATIENT MESSAGE (OUTPATIENT)
Dept: HEMATOLOGY/ONCOLOGY | Facility: CLINIC | Age: 69
End: 2024-07-26
Payer: MEDICARE

## 2024-07-29 ENCOUNTER — TELEPHONE (OUTPATIENT)
Dept: HEMATOLOGY/ONCOLOGY | Facility: CLINIC | Age: 69
End: 2024-07-29
Payer: MEDICARE

## 2024-07-29 NOTE — TELEPHONE ENCOUNTER
Called pt to notify that this nurse has called [facility] and left vm regarding scheduling fna thyroid. Msg sent to Rubi Brooke in radiology, whom this RN spoke with wks ago. Rubi had stated at that time that pt wanted to wait to schedule d/t travel. Waiting for reply.

## 2024-07-31 ENCOUNTER — PATIENT MESSAGE (OUTPATIENT)
Dept: HEMATOLOGY/ONCOLOGY | Facility: CLINIC | Age: 69
End: 2024-07-31
Payer: MEDICARE

## 2024-08-06 DIAGNOSIS — R21 PERIANAL RASH: ICD-10-CM

## 2024-08-06 RX ORDER — VALACYCLOVIR HYDROCHLORIDE 500 MG/1
500 TABLET, FILM COATED ORAL
Qty: 90 TABLET | Refills: 1 | Status: SHIPPED | OUTPATIENT
Start: 2024-08-06

## 2024-08-13 ENCOUNTER — OFFICE VISIT (OUTPATIENT)
Dept: HEMATOLOGY/ONCOLOGY | Facility: CLINIC | Age: 69
End: 2024-08-13
Payer: MEDICARE

## 2024-08-13 VITALS
RESPIRATION RATE: 16 BRPM | HEART RATE: 58 BPM | DIASTOLIC BLOOD PRESSURE: 79 MMHG | SYSTOLIC BLOOD PRESSURE: 143 MMHG | WEIGHT: 134.25 LBS | HEIGHT: 67 IN | TEMPERATURE: 98 F | OXYGEN SATURATION: 100 % | BODY MASS INDEX: 21.07 KG/M2

## 2024-08-13 DIAGNOSIS — C50.012 MALIGNANT NEOPLASM OF NIPPLE OF LEFT BREAST IN FEMALE, UNSPECIFIED ESTROGEN RECEPTOR STATUS: Primary | ICD-10-CM

## 2024-08-13 PROCEDURE — 99999 PR PBB SHADOW E&M-EST. PATIENT-LVL III: CPT | Mod: PBBFAC,,, | Performed by: INTERNAL MEDICINE

## 2024-08-13 NOTE — PROGRESS NOTES
The patient location is: home  Visit type: Virtual visit with synchronous audio and video  Face-to-face or time spent with patient on the encounter:25 min  Total time spent on and for  this encounter which includes non face-to-face time preparing to see patient, review of tests, obtaining and or reviewing separately obtained records documenting clinical information in the electronic or other health records, independently interpreting results which is not separately reported ,and communicating results to the patient/family/caregiver and in care coordination and treatment planning/communicating with pharmacy for prescriptions/addressing social needs/arranging follow-up and or referrals :25  min    Each patient I provide medical services by telemedicine is:  (1) informed of the relationship between the physician and patient and the respective role of any other health care provider with respect to management of the patient; and (2) notified that he or she may decline to receive medical services by telemedicine and may withdraw from such care at any time.  This is a video visit therefore some elements of the physical exam such as vital signs, heart sounds are breath sounds are not included and may be included if found in recent clinic notes of other providers assessing same patient. Any symptoms or signs that were visualized were stated by the patient may be included in this note.     HISTORY OF PRESENT ILLNESS:    This is a 68-year-old white female here for f/u of breast ca hx    Oncology history   treated for a  4/12/2017 diagnosis of Stage IIB left breast carcinoma.ER/AL negative. Her - 2 positive  Recd Neoadjuvant A/C ,completed 12 cycles weekly taxol and  herceptin/ perjeta had lumpectomy was on [facility] trial completed the entire year of herceptin /perjeta, but didn't undergo axillary xrt per trial  Here for f/u with pet and labs recnt mammogram showed calcifications , that were biospied and came back  negative  radiation induced interstitial findings still having the cough but getting of her upper respiratory infection today   sonme neuropathy post rx still persisits   had a difficult summer 2019. Had removal of her original implant for pain in the area, after removal pt developed pseudomonas infection stayed in hospital  For a week. She sees Dr. Modi, had second reconstruction 10 2020 had a deep flap done. Saw DR Rodriguez rt breast mammo 9/2020    Patient denies issues related to appetite or recent weight change.  Feels well overall.  Denies issues with generalized weakness .  Denies fatigue over above what is normally experienced with day-to-day activities  Denies fever, chills, rigors  Denies issues with ambulation  Denies generalized swelling or new lumps and bumps felt in any part  of body  Denies visual or hearing loss  Denies issues with congestion, sinus issues, cough, sputum production runny nose or itching eyes  Denies chest pain or palpitations, or passing out  Denies abdominal pain, reflux symptoms, nausea vomiting loose stools or constipation  Denies seizure activity or focal weaknesses or symptoms related to TIA, no head aches or blurred vision reported  Denies issues with skin rash or bruising  Denies issues with swelling of feet, tingling or numbness   No issues with sleep,   No recent foreign travel   Good family support reported       GENERAL:   Wt Readings from Last 3 Encounters:   08/13/24 60.9 kg (134 lb 4.2 oz)   07/01/24 61 kg (134 lb 7.7 oz)   05/10/24 60.8 kg (134 lb 0.6 oz)     Temp Readings from Last 3 Encounters:   08/13/24 97.5 °F (36.4 °C) (Temporal)   07/01/24 97.7 °F (36.5 °C)   03/01/24 98 °F (36.7 °C) (Oral)     BP Readings from Last 3 Encounters:   08/13/24 (!) 143/79   07/01/24 126/79   04/19/24 116/72     Pulse Readings from Last 3 Encounters:   08/13/24 (!) 58   07/01/24 62   04/19/24 (!) 50    VITAL SIGNS:  as above   GENERAL: appears well-built, well-nourished.  No  anxiety, no agitation, and in no distress.  Patient is awake, alert, oriented and cooperative.  HEENT:  Showed no congestion. Trachea is central no obvious icterus or pallor noted no hoarseness. no obvious JVD   NECK:  Supple.  No JVD. No obvious cervical submental or supraclavicular adenopathy.  RS:the visualized portion of  Chest expands well. chest appears symmetric, no audible wheezes.  No dyspnea recognized  ABDOMEN:  abdomen appears undistended.  EXTREMITIES:  Without edema.  NEUROLOGICAL:  The patient is appropriate, higher functions are normal.  No  obvious neurological deficits.  normal judgement normal thought content  No confusion, no speech impediment. Cranial nerves are intact and show no deficit. No gross motor deficits noted   SKIN MUSCULOSKELETAL: no joint or skeletal deformity, no clubbing of nails.  No visible rash ecchymosis or petechiae  BREAST: left breast  Flap done, some puckering that is not satisfactory to pt, she has left axilla fullness/ swelling. With cord like tighetning around axilla that limits her arm raising. Its a lump like area to axilla, the arm itself is not swollen, pt reports that the working dx is that the lymphatic drainage was blocked due to sx fom scarring   rt breast implant has been removed and the breast is natural  LABORATORY:    Lab Results   Component Value Date    WBC 4.52 08/02/2024    HGB 14.0 08/02/2024    HCT 41.5 08/02/2024     (H) 08/02/2024     08/02/2024         CMP  Sodium   Date Value Ref Range Status   06/20/2024 140 136 - 145 mmol/L Final     Potassium   Date Value Ref Range Status   06/20/2024 4.9 3.5 - 5.1 mmol/L Final     Chloride   Date Value Ref Range Status   06/20/2024 104 95 - 110 mmol/L Final     CO2   Date Value Ref Range Status   06/20/2024 25 23 - 29 mmol/L Final     Glucose   Date Value Ref Range Status   06/20/2024 95 70 - 110 mg/dL Final     BUN   Date Value Ref Range Status   06/20/2024 16 8 - 23 mg/dL Final     Creatinine    Date Value Ref Range Status   06/20/2024 0.9 0.5 - 1.4 mg/dL Final     Calcium   Date Value Ref Range Status   06/20/2024 10.6 (H) 8.7 - 10.5 mg/dL Final     Total Protein   Date Value Ref Range Status   06/20/2024 7.6 6.0 - 8.4 g/dL Final     Albumin   Date Value Ref Range Status   06/20/2024 4.3 3.5 - 5.2 g/dL Final     Total Bilirubin   Date Value Ref Range Status   06/20/2024 0.4 0.1 - 1.0 mg/dL Final     Comment:     For infants and newborns, interpretation of results should be based  on gestational age, weight and in agreement with clinical  observations.    Premature Infant recommended reference ranges:  Up to 24 hours.............<8.0 mg/dL  Up to 48 hours............<12.0 mg/dL  3-5 days..................<15.0 mg/dL  6-29 days.................<15.0 mg/dL       Alkaline Phosphatase   Date Value Ref Range Status   06/20/2024 66 55 - 135 U/L Final     AST   Date Value Ref Range Status   06/20/2024 20 10 - 40 U/L Final     ALT   Date Value Ref Range Status   06/20/2024 13 10 - 44 U/L Final     Anion Gap   Date Value Ref Range Status   06/20/2024 11 8 - 16 mmol/L Final     eGFR if    Date Value Ref Range Status   06/01/2022 >60 >60 mL/min/1.73 m^2 Final     eGFR if non    Date Value Ref Range Status   06/01/2022 >60 >60 mL/min/1.73 m^2 Final     Comment:     Calculation used to obtain the estimated glomerular filtration  rate (eGFR) is the CKD-EPI equation.      30.7 ca 2729 on 12/3/21     Left breast, lumpectomy:  - Residual invasive ductal carcinoma, moderately differentiated, [place] Grade 2 (3+2+1=6), 0.2 cm in  greatest linear microscopic dimension.  - Ductal carcinoma in situ (DCIS), high nuclear grade, 2 cm, solid and cribriform types.  - Surgical margins are free of tumor; invasive carcinoma is located 0.1 cm from the closest margin         FINAL PATHOLOGIC DIAGNOSIS  1. BREAST, RIGHT, CENTRAL REGION MIDDLE DEPTH, CALCIFICATIONS, STEREOTACTIC-GUIDED  BIOPSY:  Benign  breast tissue with fibrocystic changes, columnar cell changes, and focal features suggestive of duct  rupture.  Microcalcifications: Seen in association with areas of columnar cell change.  Negative for atypia or carcinoma.  Osteopenia 3/2017 dexa  1/2020 osteoporosis  Chest x-ray negative October 2020 mammogram September 2 1020-    Pet 12/2022 neg    IMPRESSION:    Stage IIB left breast carcinoma 2017 (ER/GA negative, Her-2/clay positive)  Completed neoadjuvant AC and weekly taxol / herceptin  stopped perjeta since she also had xrt to left side due to fear of toxicity   BRCA negative  , s/p  lumpectomy and axillary disection , with 2 residual Ln positive. Per aliance trail did not undergo xrt to axilla    reconstructed left breast MRI of rt breast  negative February 2020, mammo of  Both breast( has natural breast tissue in left still) in 9/2021  and 9/22 . 9/23 next due 9/24   pet was denied by insurance will get annual atleast   cont observation and surveillance  [id number] slight elevation in the past has normalized mammo and [id number] wnl n3ext due 9/24  1.   Bone density  next due 12/24 improved since she had osteoporosis prior to start of prolia proceed with  next prolia 1/25  Next PET scan December 2024   anemia has resolved   Thyroid nodule bx is benign : will cont to monitor  See me September 20, 2024 with bilateral mammogram CBC CMP tumor marker      Advance Care Planning     Date: 06/29/2023    Power of   I verified acp docs        Answers submitted by the patient for this visit:  Review of Systems Questionnaire (Submitted on 8/7/2024)  appetite change : No  unexpected weight change: No  mouth sores: No  visual disturbance: No  cough: No  shortness of breath: No  chest pain: No  abdominal pain: No  diarrhea: No  frequency: No  back pain: No  rash: No  headaches: No  adenopathy: No  nervous/ anxious: No

## 2024-08-24 NOTE — DISCHARGE INSTRUCTIONS
Information to Prepare you for your Surgery    PRE-ADMIT TESTING -  [phone]    [address]  [facility]          Your surgery has been scheduled at [facility]. We are pleased to have the opportunity to serve you. For Further Information please call [phone].    On the day of surgery please report to the Information Desk on the 1st floor.    · CONTACT YOUR PHYSICIAN'S OFFICE THE DAY PRIOR TO YOUR SURGERY TO OBTAIN YOUR ARRIVAL TIME.     · The evening before surgery do not eat anything after 9 p.m. ( this includes hard candy, chewing gum and mints).  You may only have GATORADE, POWERADE AND WATER  from 9 p.m. until you leave your home.   DO NOT DRINK ANY LIQUIDS ON THE WAY TO THE HOSPITAL.      SPECIAL MEDICATION INSTRUCTIONS: TAKE medications checked off by the Anesthesiologist on your Medication List.    Angiogram Patients: Take medications as instructed by your physician, including aspirin.     Surgery Patients:    If you take ASPIRIN - Your PHYSICIAN/SURGEON will need to inform you IF/OR when you need to stop taking aspirin prior to your surgery.     Do Not take any medications containing IBUPROFEN.  Do Not Wear any make-up or dark nail polish   (especially eye make-up) to surgery. If you come to surgery with makeup on you will be required to remove the makeup or nail polish.    Do not shave your surgical area at least 5 days prior to your surgery. The surgical prep will be performed at the hospital according to Infection Control regulations.    Leave all valuables at home.   Do Not wear any jewelry or watches, including any metal in body piercings. Jewelry must be removed prior to coming to the hospital.  There is a possibility that rings that are unable to be removed may be cut off if they are on the surgical extremity.    Contact Lens must be removed before surgery. Either do not wear the contact lens or bring a case and solution for  storage.  Please bring a container for eyeglasses or dentures as required.  Bring any paperwork your physician has provided, such as consent forms,  history and physicals, doctor's orders, etc.   Bring comfortable clothes that are loose fitting to wear upon discharge. Take into consideration the type of surgery being performed.  Maintain your diet as advised per your physician the day prior to surgery.      Adequate rest the night before surgery is advised.   Park in the Parking lot behind the hospital or in the [facility] across the street from the parking lot. Parking is complimentary.  If you will be discharged the same day as your procedure, please arrange for a responsible adult to drive you home or to accompany you if traveling by taxi.   YOU WILL NOT BE PERMITTED TO DRIVE OR TO LEAVE THE HOSPITAL ALONE AFTER SURGERY.   If you are being discharged the same day, it is strongly recommended that you arrange for someone to remain with you for the first 24 hrs following your surgery.    The Surgeon will speak to your family/visitor after your surgery regarding the outcome of your surgery and post op care.  The Surgeon may speak to you after your surgery, but there is a possibility you may not remember the details.  Please check with your family members regarding the conversation with the Surgeon.    We strongly recommend whoever is bringing you home be present for discharge instructions.  This will ensure a thorough understanding for your post op home care.    ALL CHILDREN MUST ALWAYS BE ACCOMPANIED BY AN ADULT.    Visitors-Refer to current Visitor policy handouts.    Thank you for your cooperation.  The Staff of [facility].                Bathing Instructions with Hibiclens     Shower the evening before and morning of your procedure with Hibiclens:   Wash your face with water and your regular face wash/soap   Apply Hibiclens directly on your skin or on a wet washcloth and wash  gently. When showering: Move away from the shower stream when applying Hibiclens to avoid rinsing off too soon.   Rinse thoroughly with warm water   Do not dilute Hibiclens         Dry off as usual, do not use any deodorant, powder, body lotions, perfume, after shave or cologne.                   2

## 2024-08-30 ENCOUNTER — LAB VISIT (OUTPATIENT)
Dept: LAB | Facility: HOSPITAL | Age: 69
End: 2024-08-30
Payer: MEDICARE

## 2024-08-30 DIAGNOSIS — E53.8 VITAMIN B12 DEFICIENCY: ICD-10-CM

## 2024-08-30 DIAGNOSIS — R63.4 WEIGHT LOSS: ICD-10-CM

## 2024-08-30 DIAGNOSIS — Z13.6 SCREENING FOR CARDIOVASCULAR CONDITION: ICD-10-CM

## 2024-08-30 DIAGNOSIS — I89.0 LYMPHEDEMA: ICD-10-CM

## 2024-08-30 LAB
CHOLEST SERPL-MCNC: 205 MG/DL (ref 120–199)
CHOLEST/HDLC SERPL: 2.6 {RATIO} (ref 2–5)
HDLC SERPL-MCNC: 78 MG/DL (ref 40–75)
HDLC SERPL: 38 % (ref 20–50)
LDLC SERPL CALC-MCNC: 116.2 MG/DL (ref 63–159)
NONHDLC SERPL-MCNC: 127 MG/DL
TRIGL SERPL-MCNC: 54 MG/DL (ref 30–150)
TSH SERPL DL<=0.005 MIU/L-ACNC: 1.2 UIU/ML (ref 0.4–4)
VIT B12 SERPL-MCNC: 642 PG/ML (ref 210–950)

## 2024-08-30 PROCEDURE — 36415 COLL VENOUS BLD VENIPUNCTURE: CPT | Mod: PO | Performed by: INTERNAL MEDICINE

## 2024-08-30 PROCEDURE — 84443 ASSAY THYROID STIM HORMONE: CPT | Performed by: INTERNAL MEDICINE

## 2024-08-30 PROCEDURE — 82607 VITAMIN B-12: CPT | Performed by: INTERNAL MEDICINE

## 2024-08-30 PROCEDURE — 80061 LIPID PANEL: CPT | Performed by: INTERNAL MEDICINE

## 2024-09-06 ENCOUNTER — OFFICE VISIT (OUTPATIENT)
Dept: PRIMARY CARE CLINIC | Facility: CLINIC | Age: 69
End: 2024-09-06
Payer: MEDICARE

## 2024-09-06 VITALS
BODY MASS INDEX: 21.21 KG/M2 | TEMPERATURE: 98 F | HEART RATE: 57 BPM | HEIGHT: 67 IN | WEIGHT: 135.13 LBS | OXYGEN SATURATION: 99 % | DIASTOLIC BLOOD PRESSURE: 70 MMHG | SYSTOLIC BLOOD PRESSURE: 132 MMHG

## 2024-09-06 DIAGNOSIS — G62.0 CHEMOTHERAPY-INDUCED PERIPHERAL NEUROPATHY: ICD-10-CM

## 2024-09-06 DIAGNOSIS — T46.6X5A STATIN MYOPATHY: ICD-10-CM

## 2024-09-06 DIAGNOSIS — M85.80 OSTEOPENIA, UNSPECIFIED LOCATION: ICD-10-CM

## 2024-09-06 DIAGNOSIS — M80.00XA AGE-RELATED OSTEOPOROSIS WITH CURRENT PATHOLOGICAL FRACTURE, INITIAL ENCOUNTER: ICD-10-CM

## 2024-09-06 DIAGNOSIS — I25.10 CORONARY ARTERY DISEASE, UNSPECIFIED VESSEL OR LESION TYPE, UNSPECIFIED WHETHER ANGINA PRESENT, UNSPECIFIED WHETHER NATIVE OR TRANSPLANTED HEART: ICD-10-CM

## 2024-09-06 DIAGNOSIS — E83.52 HYPERCALCEMIA: ICD-10-CM

## 2024-09-06 DIAGNOSIS — T45.1X5A CHEMOTHERAPY-INDUCED PERIPHERAL NEUROPATHY: ICD-10-CM

## 2024-09-06 DIAGNOSIS — I25.110 ATHEROSCLEROSIS OF NATIVE CORONARY ARTERY OF NATIVE HEART WITH UNSTABLE ANGINA PECTORIS: Primary | ICD-10-CM

## 2024-09-06 DIAGNOSIS — G72.0 STATIN MYOPATHY: ICD-10-CM

## 2024-09-06 PROCEDURE — 99999 PR PBB SHADOW E&M-EST. PATIENT-LVL IV: CPT | Mod: PBBFAC,,, | Performed by: INTERNAL MEDICINE

## 2024-09-06 RX ORDER — ASPIRIN 81 MG/1
81 TABLET ORAL DAILY
Qty: 90 TABLET | Refills: 3 | Status: SHIPPED | OUTPATIENT
Start: 2024-09-06 | End: 2025-09-06

## 2024-09-06 NOTE — PROGRESS NOTES
INTERNAL MEDICINE PROGRESS/URGENT CARE NOTE    CHIEF COMPLAINT     Chief Complaint   Patient presents with    Follow-up     Patient is here for a 6 month follow up. She denies any current complaints.        SALOMON Redman is a 69 y.o.  female who presents with a PMHx of  allergy, anemia, history of breast cancer, glaucoma, osteopenia, who presents today for routine follow up.   Here alone today  Her daughter has recently moved to the [facility] and she's thing of moving there as well. One duaghter lives in [place] and the other in [place]. She would like to be closer to her and her granddaughter      Her main concerns and complaints are:   Just had a thyroid biopsy which was benign.   Some mild non botehrsome insomnia due to her menopause symptoms at night. Has been on SRRI did not help. Unable to take estrogen ebcause of breast cancer history. Has tried OTC supplements but also scare of containing estrogen    At her previous visits, we discussed:   1. BP a bit elevated.   2. Discussed Cath from 2021 which noted 20% blockage of the RCA without significant occlusive disease. Most recent LDL is 120 and I woluld like to be 55. Had a long discussion with patient re wanting her to be on statin and she strongly disagrees. Says she was cardiology that she has nothing to worry about.   Of note, describes a recent event of eating a piece of chocolate and having severe chest pain that night, abated with antacid which does suggest a gastric cause, but one night after that without inciting e vent she again woke up with a chest tightness. Discuss [facility] ardiology if this continues.         History of breast cancer:  diagnosed in 2017. Treated with radiation, mastectomy and chemotehrapy. Still follows with oncologist. Had a PET CT done which was normal and reassuring.      Osteoporosis: last BMD done in 2022 showed osteopenia with extremely high FRAX score . On prolia. Which has significantly imrpoved bone health.       CAD: went to the hospital a while ago and had a cardiac cath which showed nononstructing CAD. Recommended statin and ASA, patient declines both. Her LDL is at goal. Note FH of early cardiac death. Discussed this as well as a reason to reconsider statin and ASA. She does eat very well as a vegetarian and she is a lifelong excercisier and stays very very healthy so this is to her benefit.      Neuropathy with macrocytosis: likely B12 deficiency. Will check but have advised her to start taking vitamin B12.       Home Medications:  Prior to Admission medications    Medication Sig Start Date End Date Taking? Authorizing Provider   calcium carb/vit D3/minerals (CALCIUM-VITAMIN D ORAL)  1/1/20   Provider, Historical   denosumab (PROLIA) 60 mg/mL Syrg Inject 60 mg into the skin.    Provider, Historical   dorzolamide (TRUSOPT) 2 % ophthalmic solution INSTILL 1 DROP INTO BOTH EYES 3 TIMES A DAY 11/28/22   Tyrone Jimenez Jr., MD   dorzolamide (TRUSOPT) 2 % ophthalmic solution Place 1 drop into both eyes 2 (two) times a day. 3/18/24 3/18/25  Félix Morrison, ESVIN   dorzolamide (TRUSOPT) 2 % ophthalmic solution Place 1 drop into both eyes 2 (two) times a day. 4/25/24 4/25/25  Félix Morrison, OD   estradioL (ESTRACE) 0.01 % (0.1 mg/gram) vaginal cream Use 0.5 grams of estrogen cream in vagina with applicator or dime-sized amount with finger (as far as can reach internally) nightly x 2 weeks, then twice a week thereafter. 9/25/23   Kiana Camarillo, NP   latanoprost 0.005 % ophthalmic solution Place 1 drop into both eyes every evening. INSTILL 1 DROP INTO BOTH EYES IN THE EVENING 4/25/24 4/25/25  Félix Morrison, OD   valACYclovir (VALTREX) 500 MG tablet TAKE 1 TABLET BY MOUTH EVERY DAY 8/6/24   Salma Pendleton MD       Review of Systems:  Review of Systems      Advance Care Planning     Date: 09/06/2024    Power of   I initiated the process of voluntary advance care planning today and explained the  "importance of this process to the patient.  I introduced the concept of advance directives to the patient, as well. Then the patient received detailed information about the importance of designating a Health Care Power of  (HCPOA). She was also instructed to communicate with this person about their wishes for future healthcare, should she become sick and lose decision-making capacity. The patient has previously appointed a HCPOA. After our discussion, the patient has decided to complete a HCPOA and has appointed her daughter, health care agent:  on file  & health care agent number:  on file . I encouraged her to communicate with this person about their wishes for future healthcare, should she become sick and lose decision-making capacity.      A total of 10 min was spent on advance care planning, goals of care discussion, emotional support, formulating and communicating prognosis and exploring burden/benefit of various approaches of treatment. This discussion occurred on a fully voluntary basis with the verbal consent of the patient and/or family.           PHYSICAL EXAM     /70 (BP Location: Right arm, Patient Position: Sitting, BP Method: Medium (Automatic))   Pulse (!) 57   Temp 97.7 °F (36.5 °C) (Oral)   Ht 5' 7" (1.702 m)   Wt 61.3 kg (135 lb 2.3 oz)   SpO2 99%   BMI 21.17 kg/m²     GEN - A+OX4, NAD   HEENT - PERRL, EOMI, OP clear  Neck - No thyromegaly or cervical LAD. No thyroid masses felt.  CV - RRR, no m/r   Chest - CTAB, no wheezing or rhonchi  Abd - S/NT/ND/+BS.   Ext - 2+BDP and radial pulses. No C/C/E.  Skin - No rash.    LABS       ASSESSMENT/PLAN     Ashlie Redman is a 69 y.o. female with  1. Atherosclerosis of native coronary artery of native heart with unstable angina pectoris  Overview:  Declines statin and ASA.  LDL at goal  shes a vegetarian who already excercises a great deal. Continue  Note FH of early cardiac death      2. Coronary artery disease, unspecified vessel or " lesion type, unspecified whether angina present, unspecified whether native or transplanted heart  Asymptomatic  Declines statin use.   Agreeable to getting back on ASA.     3. Osteopenia, unspecified location  Improved with prolia    4. Age-related osteoporosis with current pathological fracture, initial encounter  Overview:  Improved significantly with prolia. Continue  Continue with calcium and vitamin d  wieght bearing excercises continue.       5. Statin myopathy  Avoid  Cholesterol not at goal but she's a vegetarian and excercises regularly    6. Chemotherapy-induced peripheral neuropathy  Overview:  Recommend a vitamin b complex              WORRY SCORE 1    RTC in 6 months, sooner if needed and depending on labs.    Salma Pendleton MD  Board Certified Internist/Geriatrician  [facility] ([place])

## 2024-09-20 ENCOUNTER — LAB VISIT (OUTPATIENT)
Dept: LAB | Facility: HOSPITAL | Age: 69
End: 2024-09-20
Attending: INTERNAL MEDICINE
Payer: MEDICARE

## 2024-09-20 DIAGNOSIS — C50.012 MALIGNANT NEOPLASM OF NIPPLE OF LEFT BREAST IN FEMALE, UNSPECIFIED ESTROGEN RECEPTOR STATUS: ICD-10-CM

## 2024-09-20 DIAGNOSIS — E83.52 HYPERCALCEMIA: ICD-10-CM

## 2024-09-20 LAB
ALBUMIN SERPL BCP-MCNC: 4.2 G/DL (ref 3.5–5.2)
ALP SERPL-CCNC: 57 U/L (ref 55–135)
ALT SERPL W/O P-5'-P-CCNC: 20 U/L (ref 10–44)
ANION GAP SERPL CALC-SCNC: 9 MMOL/L (ref 8–16)
AST SERPL-CCNC: 23 U/L (ref 10–40)
BASOPHILS # BLD AUTO: 0.04 K/UL (ref 0–0.2)
BASOPHILS NFR BLD: 0.6 % (ref 0–1.9)
BILIRUB SERPL-MCNC: 0.4 MG/DL (ref 0.1–1)
BUN SERPL-MCNC: 16 MG/DL (ref 8–23)
CA-I BLDV-SCNC: 1.3 MMOL/L (ref 1.06–1.42)
CALCIUM SERPL-MCNC: 10.2 MG/DL (ref 8.7–10.5)
CHLORIDE SERPL-SCNC: 108 MMOL/L (ref 95–110)
CO2 SERPL-SCNC: 23 MMOL/L (ref 23–29)
CREAT SERPL-MCNC: 0.9 MG/DL (ref 0.5–1.4)
DIFFERENTIAL METHOD BLD: ABNORMAL
EOSINOPHIL # BLD AUTO: 0.1 K/UL (ref 0–0.5)
EOSINOPHIL NFR BLD: 1.6 % (ref 0–8)
ERYTHROCYTE [DISTWIDTH] IN BLOOD BY AUTOMATED COUNT: 11.7 % (ref 11.5–14.5)
EST. GFR  (NO RACE VARIABLE): >60 ML/MIN/1.73 M^2
GLUCOSE SERPL-MCNC: 100 MG/DL (ref 70–110)
HCT VFR BLD AUTO: 38.6 % (ref 37–48.5)
HGB BLD-MCNC: 13.4 G/DL (ref 12–16)
IMM GRANULOCYTES # BLD AUTO: 0.02 K/UL (ref 0–0.04)
IMM GRANULOCYTES NFR BLD AUTO: 0.3 % (ref 0–0.5)
LYMPHOCYTES # BLD AUTO: 2 K/UL (ref 1–4.8)
LYMPHOCYTES NFR BLD: 29.1 % (ref 18–48)
MCH RBC QN AUTO: 34.4 PG (ref 27–31)
MCHC RBC AUTO-ENTMCNC: 34.7 G/DL (ref 32–36)
MCV RBC AUTO: 99 FL (ref 82–98)
MONOCYTES # BLD AUTO: 0.6 K/UL (ref 0.3–1)
MONOCYTES NFR BLD: 8.5 % (ref 4–15)
NEUTROPHILS # BLD AUTO: 4.2 K/UL (ref 1.8–7.7)
NEUTROPHILS NFR BLD: 59.9 % (ref 38–73)
NRBC BLD-RTO: 0 /100 WBC
PLATELET # BLD AUTO: 217 K/UL (ref 150–450)
PMV BLD AUTO: 9.9 FL (ref 9.2–12.9)
POTASSIUM SERPL-SCNC: 4.1 MMOL/L (ref 3.5–5.1)
PROT SERPL-MCNC: 7.2 G/DL (ref 6–8.4)
RBC # BLD AUTO: 3.89 M/UL (ref 4–5.4)
SODIUM SERPL-SCNC: 140 MMOL/L (ref 136–145)
WBC # BLD AUTO: 6.94 K/UL (ref 3.9–12.7)

## 2024-09-20 PROCEDURE — 86300 IMMUNOASSAY TUMOR CA 15-3: CPT | Performed by: INTERNAL MEDICINE

## 2024-09-20 PROCEDURE — 82330 ASSAY OF CALCIUM: CPT | Performed by: INTERNAL MEDICINE

## 2024-09-20 PROCEDURE — 36415 COLL VENOUS BLD VENIPUNCTURE: CPT | Mod: PO | Performed by: INTERNAL MEDICINE

## 2024-09-20 PROCEDURE — 80053 COMPREHEN METABOLIC PANEL: CPT | Mod: PO | Performed by: INTERNAL MEDICINE

## 2024-09-20 PROCEDURE — 85025 COMPLETE CBC W/AUTO DIFF WBC: CPT | Mod: PO | Performed by: INTERNAL MEDICINE

## 2024-09-20 PROCEDURE — 83970 ASSAY OF PARATHORMONE: CPT | Performed by: INTERNAL MEDICINE

## 2024-09-21 LAB — PTH-INTACT SERPL-MCNC: 61.6 PG/ML (ref 9–77)

## 2024-09-24 LAB — CANCER AG27-29 SERPL-ACNC: 34 U/ML

## 2024-09-30 ENCOUNTER — HOSPITAL ENCOUNTER (OUTPATIENT)
Dept: RADIOLOGY | Facility: HOSPITAL | Age: 69
Discharge: HOME | End: 2024-09-30
Attending: INTERNAL MEDICINE
Payer: MEDICARE

## 2024-09-30 DIAGNOSIS — C50.012 MALIGNANT NEOPLASM OF NIPPLE OF LEFT BREAST IN FEMALE, UNSPECIFIED ESTROGEN RECEPTOR STATUS: ICD-10-CM

## 2024-09-30 PROCEDURE — 77062 BREAST TOMOSYNTHESIS BI: CPT | Mod: TC,PO

## 2024-09-30 PROCEDURE — 77062 BREAST TOMOSYNTHESIS BI: CPT | Mod: 26,,, | Performed by: RADIOLOGY

## 2024-09-30 PROCEDURE — 77066 DX MAMMO INCL CAD BI: CPT | Mod: 26,,, | Performed by: RADIOLOGY

## 2024-09-30 PROCEDURE — 77066 DX MAMMO INCL CAD BI: CPT | Mod: TC,PO

## 2024-10-04 ENCOUNTER — OFFICE VISIT (OUTPATIENT)
Dept: HEMATOLOGY/ONCOLOGY | Facility: CLINIC | Age: 69
End: 2024-10-04
Payer: MEDICARE

## 2024-10-04 DIAGNOSIS — C50.012 MALIGNANT NEOPLASM OF NIPPLE OF LEFT BREAST IN FEMALE, UNSPECIFIED ESTROGEN RECEPTOR STATUS: Primary | ICD-10-CM

## 2024-10-04 DIAGNOSIS — M81.6 LOCALIZED OSTEOPOROSIS WITHOUT CURRENT PATHOLOGICAL FRACTURE: ICD-10-CM

## 2024-10-04 DIAGNOSIS — M81.6 LOCALIZED OSTEOPOROSIS (LEQUESNE): ICD-10-CM

## 2024-10-04 DIAGNOSIS — M80.00XD AGE-RELATED OSTEOPOROSIS WITH CURRENT PATHOLOGICAL FRACTURE WITH ROUTINE HEALING, SUBSEQUENT ENCOUNTER: ICD-10-CM

## 2024-10-04 DIAGNOSIS — Z85.3 HX OF BREAST CANCER: ICD-10-CM

## 2024-10-04 RX ORDER — BUPROPION HYDROCHLORIDE 150 MG/1
150 TABLET ORAL EVERY MORNING
Qty: 30 TABLET | Refills: 2 | Status: SHIPPED | OUTPATIENT
Start: 2024-10-04 | End: 2025-10-04

## 2024-10-04 NOTE — PROGRESS NOTES
The patient location is: home  Visit type: Virtual visit with synchronous audio and video  Face-to-face or time spent with patient on the encounter:25 min  Total time spent on and for  this encounter which includes non face-to-face time preparing to see patient, review of tests, obtaining and or reviewing separately obtained records documenting clinical information in the electronic or other health records, independently interpreting results which is not separately reported ,and communicating results to the patient/family/caregiver and in care coordination and treatment planning/communicating with pharmacy for prescriptions/addressing social needs/arranging follow-up and or referrals :25  min    Each patient I provide medical services by telemedicine is:  (1) informed of the relationship between the physician and patient and the respective role of any other health care provider with respect to management of the patient; and (2) notified that he or she may decline to receive medical services by telemedicine and may withdraw from such care at any time.  This is a video visit therefore some elements of the physical exam such as vital signs, heart sounds are breath sounds are not included and may be included if found in recent clinic notes of other providers assessing same patient. Any symptoms or signs that were visualized were stated by the patient may be included in this note.     HISTORY OF PRESENT ILLNESS:    This is a 69-year-old white female here for f/u of breast ca hx    Oncology history   treated for a  4/12/2017 diagnosis of Stage IIB left breast carcinoma.ER/DE negative. Her - 2 positive  Recd Neoadjuvant A/C ,completed 12 cycles weekly taxol and  herceptin/ perjeta had lumpectomy was on [facility] trial completed the entire year of herceptin /perjeta, but didn't undergo axillary xrt per trial  Here for f/u with pet and labs recnt mammogram showed calcifications , that were biospied and came back  negative  radiation induced interstitial findings still having the cough but getting of her upper respiratory infection today   sonme neuropathy post rx still persisits   had a difficult summer 2019. Had removal of her original implant for pain in the area, after removal pt developed pseudomonas infection stayed in hospital  For a week. She sees Dr. Modi, had second reconstruction 10 2020 had a deep flap done. Saw DR Rodriguez rt breast mammo 9/2020    Patient denies issues related to appetite or recent weight change.  Feels tired from the hot flashes and feeling poorly  + fatigue over above what is normally experienced with day-to-day activities  Denies fever, chills, rigors  Denies issues with ambulation  Denies generalized swelling or new lumps and bumps felt in any part  of body  Denies visual or hearing loss  Denies issues with congestion, sinus issues, cough, sputum production runny nose or itching eyes  Denies chest pain or palpitations, or passing out  Denies abdominal pain, reflux symptoms, nausea vomiting loose stools or constipation  Denies seizure activity or focal weaknesses or symptoms related to TIA, no head aches or blurred vision reported  Denies issues with skin rash or bruising  Denies issues with swelling of feet, tingling or numbness   No issues with sleep,   No recent foreign travel   Good family support reported       GENERAL:   Wt Readings from Last 3 Encounters:   09/06/24 61.3 kg (135 lb 2.3 oz)   08/13/24 60.9 kg (134 lb 4.2 oz)   07/01/24 61 kg (134 lb 7.7 oz)     Temp Readings from Last 3 Encounters:   09/06/24 97.7 °F (36.5 °C) (Oral)   08/13/24 97.5 °F (36.4 °C) (Temporal)   07/01/24 97.7 °F (36.5 °C)     BP Readings from Last 3 Encounters:   09/06/24 132/70   08/13/24 (!) 143/79   07/01/24 126/79     Pulse Readings from Last 3 Encounters:   09/06/24 (!) 57   08/13/24 (!) 58   07/01/24 62    VITAL SIGNS:  as above   GENERAL: appears well-built, well-nourished.  No anxiety, no  agitation, and in no distress.  Patient is awake, alert, oriented and cooperative.  HEENT:  Showed no congestion. Trachea is central no obvious icterus or pallor noted no hoarseness. no obvious JVD   NECK:  Supple.  No JVD. No obvious cervical submental or supraclavicular adenopathy.  RS:the visualized portion of  Chest expands well. chest appears symmetric, no audible wheezes.  No dyspnea recognized  ABDOMEN:  abdomen appears undistended.  EXTREMITIES:  Without edema.  NEUROLOGICAL:  The patient is appropriate, higher functions are normal.  No  obvious neurological deficits.  normal judgement normal thought content  No confusion, no speech impediment. Cranial nerves are intact and show no deficit. No gross motor deficits noted   SKIN MUSCULOSKELETAL: no joint or skeletal deformity, no clubbing of nails.  No visible rash ecchymosis or petechiae   LABORATORY:    Lab Results   Component Value Date    WBC 6.94 09/20/2024    HGB 13.4 09/20/2024    HCT 38.6 09/20/2024    MCV 99 (H) 09/20/2024     09/20/2024         CMP  Sodium   Date Value Ref Range Status   09/20/2024 140 136 - 145 mmol/L Final     Potassium   Date Value Ref Range Status   09/20/2024 4.1 3.5 - 5.1 mmol/L Final     Chloride   Date Value Ref Range Status   09/20/2024 108 95 - 110 mmol/L Final     CO2   Date Value Ref Range Status   09/20/2024 23 23 - 29 mmol/L Final     Glucose   Date Value Ref Range Status   09/20/2024 100 70 - 110 mg/dL Final     BUN   Date Value Ref Range Status   09/20/2024 16 8 - 23 mg/dL Final     Creatinine   Date Value Ref Range Status   09/20/2024 0.9 0.5 - 1.4 mg/dL Final     Calcium   Date Value Ref Range Status   09/20/2024 10.2 8.7 - 10.5 mg/dL Final     Total Protein   Date Value Ref Range Status   09/20/2024 7.2 6.0 - 8.4 g/dL Final     Albumin   Date Value Ref Range Status   09/20/2024 4.2 3.5 - 5.2 g/dL Final     Total Bilirubin   Date Value Ref Range Status   09/20/2024 0.4 0.1 - 1.0 mg/dL Final     Comment:      For infants and newborns, interpretation of results should be based  on gestational age, weight and in agreement with clinical  observations.    Premature Infant recommended reference ranges:  Up to 24 hours.............<8.0 mg/dL  Up to 48 hours............<12.0 mg/dL  3-5 days..................<15.0 mg/dL  6-29 days.................<15.0 mg/dL       Alkaline Phosphatase   Date Value Ref Range Status   09/20/2024 57 55 - 135 U/L Final     AST   Date Value Ref Range Status   09/20/2024 23 10 - 40 U/L Final     ALT   Date Value Ref Range Status   09/20/2024 20 10 - 44 U/L Final     Anion Gap   Date Value Ref Range Status   09/20/2024 9 8 - 16 mmol/L Final     eGFR if    Date Value Ref Range Status   06/01/2022 >60 >60 mL/min/1.73 m^2 Final     eGFR if non    Date Value Ref Range Status   06/01/2022 >60 >60 mL/min/1.73 m^2 Final     Comment:     Calculation used to obtain the estimated glomerular filtration  rate (eGFR) is the CKD-EPI equation.      30.7 ca 2729 on 12/3/21     Left breast, lumpectomy:  - Residual invasive ductal carcinoma, moderately differentiated, Jamal Grade 2 (3+2+1=6), 0.2 cm in  greatest linear microscopic dimension.  - Ductal carcinoma in situ (DCIS), high nuclear grade, 2 cm, solid and cribriform types.  - Surgical margins are free of tumor; invasive carcinoma is located 0.1 cm from the closest margin         FINAL PATHOLOGIC DIAGNOSIS  1. BREAST, RIGHT, CENTRAL REGION MIDDLE DEPTH, CALCIFICATIONS, STEREOTACTIC-GUIDED  BIOPSY:  Benign breast tissue with fibrocystic changes, columnar cell changes, and focal features suggestive of duct  rupture.  Microcalcifications: Seen in association with areas of columnar cell change.  Negative for atypia or carcinoma.  Osteopenia 3/2017 dexa  1/2020 osteoporosis  Chest x-ray negative October 2020 mammogram September 2 1020-    Pet 12/2022 neg    IMPRESSION:    Stage IIB left breast carcinoma 2017 (ER/PA negative,  Her-2/clay positive)  Completed neoadjuvant AC and weekly taxol / herceptin  stopped perjeta since she also had xrt to left side due to fear of toxicity   BRCA negative  , s/p  lumpectomy and axillary disection , with 2 residual Ln positive. Per [facility]iance trail did not undergo xrt to axilla    reconstructed left breast MRI of rt breast  negative February 2020, mammo of  Both breast( has natural breast tissue in left still) in 9/2021  and 9/22 . 9/23 9/24next due 9/25   pet was denied by insurance will get annual atleast   cont observation and surveillance  [id number] normalized  1.   Bone density  next due 12/24 improved since she had osteoporosis prior to start of prolia proceed with  next prolia 1/25  Next PET scan December 2024   anemia has resolved   Thyroid nodule bx is benign : will cont to monitor  Patient has hot flashes are quite bothersome she used to be on Wellbutrin and would like to resume sent a prescription     Advance Care Planning     Date: 06/29/2023    Power of   I verified acp docs      Answers submitted by the patient for this visit:  Review of Systems Questionnaire (Submitted on 10/2/2024)  appetite change : No  unexpected weight change: No  mouth sores: No  visual disturbance: No  cough: No  shortness of breath: No  chest pain: No  abdominal pain: No  diarrhea: No  frequency: No  back pain: Yes  rash: No  headaches: Yes  adenopathy: No  nervous/ anxious: No

## 2024-10-19 ENCOUNTER — OFFICE VISIT (OUTPATIENT)
Dept: URGENT CARE | Facility: CLINIC | Age: 69
End: 2024-10-19
Payer: MEDICARE

## 2024-10-19 VITALS
BODY MASS INDEX: 21.19 KG/M2 | WEIGHT: 135 LBS | HEART RATE: 76 BPM | TEMPERATURE: 98 F | RESPIRATION RATE: 16 BRPM | SYSTOLIC BLOOD PRESSURE: 126 MMHG | HEIGHT: 67 IN | OXYGEN SATURATION: 96 % | DIASTOLIC BLOOD PRESSURE: 82 MMHG

## 2024-10-19 DIAGNOSIS — R30.0 DYSURIA: Primary | ICD-10-CM

## 2024-10-19 LAB
BILIRUBIN, UA POC OHS: NEGATIVE
BLOOD, UA POC OHS: ABNORMAL
CLARITY, UA POC OHS: CLEAR
COLOR, UA POC OHS: YELLOW
GLUCOSE, UA POC OHS: NEGATIVE
KETONES, UA POC OHS: NEGATIVE
LEUKOCYTES, UA POC OHS: ABNORMAL
NITRITE, UA POC OHS: POSITIVE
PH, UA POC OHS: 7
PROTEIN, UA POC OHS: 30
SPECIFIC GRAVITY, UA POC OHS: 1.02
UROBILINOGEN, UA POC OHS: 0.2

## 2024-10-19 PROCEDURE — 87088 URINE BACTERIA CULTURE: CPT | Performed by: NURSE PRACTITIONER

## 2024-10-19 PROCEDURE — 99213 OFFICE O/P EST LOW 20 MIN: CPT | Mod: S$GLB,,, | Performed by: NURSE PRACTITIONER

## 2024-10-19 PROCEDURE — 87186 SC STD MICRODIL/AGAR DIL: CPT | Performed by: NURSE PRACTITIONER

## 2024-10-19 PROCEDURE — 87086 URINE CULTURE/COLONY COUNT: CPT | Performed by: NURSE PRACTITIONER

## 2024-10-19 PROCEDURE — 81003 URINALYSIS AUTO W/O SCOPE: CPT | Mod: QW,S$GLB,, | Performed by: NURSE PRACTITIONER

## 2024-10-19 RX ORDER — NITROFURANTOIN 25; 75 MG/1; MG/1
100 CAPSULE ORAL 2 TIMES DAILY
Qty: 14 CAPSULE | Refills: 0 | Status: SHIPPED | OUTPATIENT
Start: 2024-10-19

## 2024-10-19 NOTE — PROGRESS NOTES
"Subjective:      Patient ID: Ashlie Redman is a 69 y.o. female.    Vitals:  height is 5' 7" (1.702 m) and weight is 61.2 kg (135 lb). Her oral temperature is 97.9 °F (36.6 °C). Her blood pressure is 126/82 and her pulse is 76. Her respiration is 16 and oxygen saturation is 96%.     Chief Complaint: Urinary Tract Infection (Entered by patient)    Pt came in today with complaints of frequent urination that started last Saturday. She stated that she has not taken any medications for her symptoms    Urinary Tract Infection   This is a new problem. The current episode started in the past 7 days. The problem occurs every urination. The problem has been gradually worsening. The pain is at a severity of 3/10. The pain is mild. There has been no fever. She is Not sexually active. There is No history of pyelonephritis. Associated symptoms include frequency. She has tried nothing for the symptoms. Her past medical history is significant for recurrent UTIs.     Genitourinary:  Positive for frequency.      Objective:     Physical Exam   Constitutional: She is oriented to person, place, and time. She appears well-developed.   HENT:   Head: Normocephalic and atraumatic.   Ears:   Right Ear: External ear normal.   Left Ear: External ear normal.   Nose: Nose normal.   Mouth/Throat: Mucous membranes are normal.   Eyes: Conjunctivae and lids are normal.   Neck: Trachea normal. Neck supple.   Cardiovascular: Normal rate, regular rhythm and normal heart sounds.   Pulmonary/Chest: Effort normal and breath sounds normal. No respiratory distress.   Abdominal: Normal appearance and bowel sounds are normal. She exhibits no distension and no mass. Soft. There is no abdominal tenderness.   Musculoskeletal: Normal range of motion.         General: Normal range of motion.   Neurological: She is alert and oriented to person, place, and time. She has normal strength.   Skin: Skin is warm, dry, intact, not diaphoretic and not pale.   Psychiatric: " Her speech is normal and behavior is normal. Judgment and thought content normal.   Nursing note and vitals reviewed.      Assessment:     1. Dysuria      Results for orders placed or performed in visit on 10/19/24   POCT Urinalysis(Instrument)    Collection Time: 10/19/24  9:56 AM   Result Value Ref Range    Color, POC UA Yellow Yellow, Straw, Colorless    Clarity, POC UA Clear Clear    Glucose, POC UA Negative Negative    Bilirubin, POC UA Negative Negative    Ketones, POC UA Negative Negative    Spec Grav POC UA 1.020 1.005 - 1.030    Blood, POC UA Small (A) Negative    pH, POC UA 7.0 5.0 - 8.0    Protein, POC UA 30 (A) Negative    Urobilinogen, POC UA 0.2 <=1.0    Nitrite, POC UA Positive (A) Negative    WBC, POC UA Large (A) Negative     *Note: Due to a large number of results and/or encounters for the requested time period, some results have not been displayed. A complete set of results can be found in Results Review.       Plan:       Dysuria  -     POCT Urinalysis(Instrument)  -     Urine culture  -     nitrofurantoin, macrocrystal-monohydrate, (MACROBID) 100 MG capsule; Take 1 capsule (100 mg total) by mouth 2 (two) times daily.  Dispense: 14 capsule; Refill: 0      INSTRUCTIONS:  - Rest.  - Drink plenty of fluids.  - Take Tylenol and/or Ibuprofen as directed as needed for fever/pain.  Do not take more than the recommended dose.  - follow up with your PCP within the next 1-2 weeks as needed.  - You must understand that you have received an Urgent Care treatment only and that you may be released before all of your medical problems are known or treated.   - You, the patient, will arrange for follow up care as instructed.   - If your condition worsens or fails to improve we recommend that you receive another evaluation at the ER immediately or contact your PCP to discuss your concerns.   - You can call [phone] or [phone] to help schedule an appointment with the appropriate provider.     -If you  smoke cigarettes, it would be beneficial for you to stop.    Monitor for new or worsening symptoms    OTC for symptom control    Recommend follow up with PCP/Pediatrician if symptoms are worsening

## 2024-10-21 LAB — BACTERIA UR CULT: ABNORMAL

## 2024-10-22 ENCOUNTER — OFFICE VISIT (OUTPATIENT)
Dept: OPTOMETRY | Facility: CLINIC | Age: 69
End: 2024-10-22
Payer: MEDICARE

## 2024-10-22 DIAGNOSIS — H00.11 CHALAZION OF RIGHT UPPER EYELID: ICD-10-CM

## 2024-10-22 DIAGNOSIS — H40.053 BILATERAL OCULAR HYPERTENSION: Primary | ICD-10-CM

## 2024-10-22 PROCEDURE — 99213 OFFICE O/P EST LOW 20 MIN: CPT | Mod: S$GLB,,,

## 2024-10-22 PROCEDURE — 92133 CPTRZD OPH DX IMG PST SGM ON: CPT | Mod: S$GLB,,,

## 2024-10-22 PROCEDURE — 3288F FALL RISK ASSESSMENT DOCD: CPT | Mod: CPTII,S$GLB,,

## 2024-10-22 PROCEDURE — 1159F MED LIST DOCD IN RCRD: CPT | Mod: CPTII,S$GLB,,

## 2024-10-22 PROCEDURE — 1126F AMNT PAIN NOTED NONE PRSNT: CPT | Mod: CPTII,S$GLB,,

## 2024-10-22 PROCEDURE — 1101F PT FALLS ASSESS-DOCD LE1/YR: CPT | Mod: CPTII,S$GLB,,

## 2024-10-22 PROCEDURE — 99999 PR PBB SHADOW E&M-EST. PATIENT-LVL II: CPT | Mod: PBBFAC,,,

## 2024-10-22 PROCEDURE — 1157F ADVNC CARE PLAN IN RCRD: CPT | Mod: CPTII,S$GLB,,

## 2024-10-22 NOTE — PROGRESS NOTES
HPI    4 month IOP and RNFL ULISES 06/20/24    Pt complains of bump on OD lid, pt stated 2 months ago and pt thought it   was stye and treated w warm compress and AT but no relief. Pt denies any   eye pain, flashes and floaters.   Gtts: latanoprost QHS OU, dorzolamide BID OU  Last edited by Félix Morrison, OD on 10/22/2024  8:55 AM.            Assessment /Plan     For exam results, see Encounter Report.    Bilateral ocular hypertension  -     Posterior Segment OCT Optic Nerve- Both eyes  -     Hayden Visual Field - OU - Extended - Both Eyes; Future; Expected date: 04/22/2025    Chalazion of right upper eyelid      Longstanding. Previously monitored by Dr. Vasquez, then Dr. Jimenez. IOP stable in upper teens/low 20s on Latanoprost QHS OU and Dorzolamide BID OU, lower today. Emphasized importance of continued good compliance with drops to prevent worsening visual field loss. Pt to return in 4 months IOP check, 24-2 HVF, and refraction.  IOP  14 // 15 - 10/22/24  17 // 21 - 06/20/24  18 // 20 - 02/23/24  19 // 22 - 10/20/23  Tmax: 37 // 44   RNFL OCT  10/22/24  OD: G(76), ST(85), T(65), IT(94), IN(104), N(62), SN(71); ONL - new centration  OS: G(81), ST(93), T(64), IT(91), IN(120), N(68), SN(81); ONL   10/20/23         OD: G(79), ST(75), T(61), IT(87), IN(109), N(78), SN(85); ONL - scan not centered  OS: G(82), ST(97), T(73), IT(101), IN(111), N(57), SN(87); Borderline   24-2 HVF  02/23/24  OD: reliable, non-specific defects, GHT: WNL  OS: reliable, early superior and inferior nasal step, GHT: ONL  Pachymetry: 593 // 601  Gonio: Open to  OD, OS  (+)Family history: maternal grandmother    Pt reports having a painful and tender hordeolum on the right upper eyelid 2 months ago. Pt states she has now noticed a bump on the right upper eyelid that won't resolve. Small chalazion present on the right upper eyelid. Ed pt on nature/etiology of hordeola/chalazia. Advised pt to continue frequent warm compresses and  digital massage. Discussed need for excision if pt bothered cosmetically. Otherwise ok to leave alone.    RTC: 4 months for IOP check, 24-2 HVF, and refraction (pt wants  for driving at night, currently has monovision PICOL).

## 2024-10-29 DIAGNOSIS — Z00.00 ENCOUNTER FOR MEDICARE ANNUAL WELLNESS EXAM: ICD-10-CM

## 2024-11-13 DIAGNOSIS — N95.2 VAGINAL ATROPHY: ICD-10-CM

## 2024-11-14 RX ORDER — ESTRADIOL 0.1 MG/G
CREAM VAGINAL
Qty: 42.5 G | Refills: 3 | Status: SHIPPED | OUTPATIENT
Start: 2024-11-14

## 2024-12-18 ENCOUNTER — OFFICE VISIT (OUTPATIENT)
Dept: FAMILY MEDICINE | Facility: CLINIC | Age: 69
End: 2024-12-18
Payer: MEDICARE

## 2024-12-18 VITALS
HEIGHT: 67 IN | DIASTOLIC BLOOD PRESSURE: 72 MMHG | SYSTOLIC BLOOD PRESSURE: 128 MMHG | HEART RATE: 70 BPM | BODY MASS INDEX: 21.63 KG/M2 | OXYGEN SATURATION: 99 % | WEIGHT: 137.81 LBS

## 2024-12-18 DIAGNOSIS — T45.1X5A CHEMOTHERAPY-INDUCED PERIPHERAL NEUROPATHY: ICD-10-CM

## 2024-12-18 DIAGNOSIS — G72.0 STATIN MYOPATHY: ICD-10-CM

## 2024-12-18 DIAGNOSIS — M81.6 LOCALIZED OSTEOPOROSIS WITHOUT CURRENT PATHOLOGICAL FRACTURE: ICD-10-CM

## 2024-12-18 DIAGNOSIS — I25.110 ATHEROSCLEROSIS OF NATIVE CORONARY ARTERY OF NATIVE HEART WITH UNSTABLE ANGINA PECTORIS: ICD-10-CM

## 2024-12-18 DIAGNOSIS — C50.012 MALIGNANT NEOPLASM OF NIPPLE OF LEFT BREAST IN FEMALE, UNSPECIFIED ESTROGEN RECEPTOR STATUS: ICD-10-CM

## 2024-12-18 DIAGNOSIS — T46.6X5A STATIN MYOPATHY: ICD-10-CM

## 2024-12-18 DIAGNOSIS — R09.1 PLEURITIS: ICD-10-CM

## 2024-12-18 DIAGNOSIS — G62.0 CHEMOTHERAPY-INDUCED PERIPHERAL NEUROPATHY: ICD-10-CM

## 2024-12-18 DIAGNOSIS — D69.2 SENILE PURPURA: ICD-10-CM

## 2024-12-18 DIAGNOSIS — I25.10 CORONARY ARTERY DISEASE, UNSPECIFIED VESSEL OR LESION TYPE, UNSPECIFIED WHETHER ANGINA PRESENT, UNSPECIFIED WHETHER NATIVE OR TRANSPLANTED HEART: ICD-10-CM

## 2024-12-18 DIAGNOSIS — Z85.3 PERSONAL HISTORY OF MALIGNANT NEOPLASM OF BREAST: ICD-10-CM

## 2024-12-18 DIAGNOSIS — Z00.00 ENCOUNTER FOR MEDICARE ANNUAL WELLNESS EXAM: Primary | ICD-10-CM

## 2024-12-18 DIAGNOSIS — I70.0 AORTIC ATHEROSCLEROSIS: ICD-10-CM

## 2024-12-18 DIAGNOSIS — I77.819 AORTIC ECTASIA: ICD-10-CM

## 2024-12-18 PROBLEM — M79.622 PAIN IN AXILLA, LEFT: Status: RESOLVED | Noted: 2017-12-05 | Resolved: 2024-12-18

## 2024-12-18 PROBLEM — R21 PERIANAL RASH: Status: RESOLVED | Noted: 2023-04-10 | Resolved: 2024-12-18

## 2024-12-18 PROBLEM — N81.11 MIDLINE CYSTOCELE: Status: RESOLVED | Noted: 2018-05-31 | Resolved: 2024-12-18

## 2024-12-18 PROBLEM — R35.1 NOCTURIA MORE THAN TWICE PER NIGHT: Status: RESOLVED | Noted: 2023-04-10 | Resolved: 2024-12-18

## 2024-12-18 PROBLEM — R33.9 INCOMPLETE EMPTYING OF BLADDER: Status: RESOLVED | Noted: 2018-05-31 | Resolved: 2024-12-18

## 2024-12-18 PROBLEM — N81.6 RECTOCELE, FEMALE: Status: RESOLVED | Noted: 2023-04-10 | Resolved: 2024-12-18

## 2024-12-18 PROBLEM — J70.0 RADIATION PNEUMONITIS: Status: RESOLVED | Noted: 2018-06-30 | Resolved: 2024-12-18

## 2024-12-18 PROBLEM — N39.41 URGE URINARY INCONTINENCE: Status: RESOLVED | Noted: 2023-04-10 | Resolved: 2024-12-18

## 2024-12-18 PROBLEM — N81.11 CYSTOCELE, MIDLINE: Status: RESOLVED | Noted: 2018-11-20 | Resolved: 2024-12-18

## 2024-12-18 PROBLEM — H93.8X2 EAR FULLNESS, LEFT: Status: RESOLVED | Noted: 2023-05-17 | Resolved: 2024-12-18

## 2024-12-18 PROCEDURE — 3288F FALL RISK ASSESSMENT DOCD: CPT | Mod: CPTII,S$GLB,, | Performed by: NURSE PRACTITIONER

## 2024-12-18 PROCEDURE — 1101F PT FALLS ASSESS-DOCD LE1/YR: CPT | Mod: CPTII,S$GLB,, | Performed by: NURSE PRACTITIONER

## 2024-12-18 PROCEDURE — G0439 PPPS, SUBSEQ VISIT: HCPCS | Mod: S$GLB,,, | Performed by: NURSE PRACTITIONER

## 2024-12-18 PROCEDURE — 3074F SYST BP LT 130 MM HG: CPT | Mod: CPTII,S$GLB,, | Performed by: NURSE PRACTITIONER

## 2024-12-18 PROCEDURE — 99999 PR PBB SHADOW E&M-EST. PATIENT-LVL IV: CPT | Mod: PBBFAC,,, | Performed by: NURSE PRACTITIONER

## 2024-12-18 PROCEDURE — 3078F DIAST BP <80 MM HG: CPT | Mod: CPTII,S$GLB,, | Performed by: NURSE PRACTITIONER

## 2024-12-18 PROCEDURE — 1125F AMNT PAIN NOTED PAIN PRSNT: CPT | Mod: CPTII,S$GLB,, | Performed by: NURSE PRACTITIONER

## 2024-12-18 PROCEDURE — 1160F RVW MEDS BY RX/DR IN RCRD: CPT | Mod: CPTII,S$GLB,, | Performed by: NURSE PRACTITIONER

## 2024-12-18 PROCEDURE — 1159F MED LIST DOCD IN RCRD: CPT | Mod: CPTII,S$GLB,, | Performed by: NURSE PRACTITIONER

## 2024-12-18 PROCEDURE — 1123F ACP DISCUSS/DSCN MKR DOCD: CPT | Mod: CPTII,S$GLB,, | Performed by: NURSE PRACTITIONER

## 2024-12-18 NOTE — PATIENT INSTRUCTIONS
Counseling and Referral of Other Preventative  (Italic type indicates deductible and co-insurance are waived)    Patient Name: Ashlie Redman  Today's Date: 12/18/2024    Health Maintenance       Date Due Completion Date    RSV Vaccine (Age 60+ and Pregnant patients) (1 - Risk 60-74 years 1-dose series) Never done ---    COVID-19 Vaccine (7 - 2024-25 season) 09/01/2024 10/4/2023    TETANUS VACCINE 08/28/2025 8/28/2015    Mammogram 09/30/2025 9/30/2024    Aspirin/Antiplatelet Therapy 10/22/2025 10/22/2024    Colorectal Cancer Screening 12/22/2025 12/22/2020    DEXA Scan 12/28/2025 12/28/2022    Lipid Panel 08/30/2029 8/30/2024        No orders of the defined types were placed in this encounter.    The following information is provided to all patients.  This information is to help you find resources for any of the problems found today that may be affecting your health:                  Living healthy guide: www.[facility].[place].gov      Understanding Diabetes: www.diabetes.org      Eating healthy: www.cdc.gov/healthyweight      CDC home safety checklist: www.cdc.gov/steadi/patient.html      Agency on Aging: www.goea.[place].gov      Alcoholics anonymous (AA): www.aa.org      Physical Activity: www.carlos.nih.gov/sm0zfyu      Tobacco use: www.quitwithusla.org

## 2024-12-27 ENCOUNTER — HOSPITAL ENCOUNTER (OUTPATIENT)
Dept: RADIOLOGY | Facility: HOSPITAL | Age: 69
Discharge: HOME | End: 2024-12-27
Attending: INTERNAL MEDICINE
Payer: MEDICARE

## 2024-12-27 DIAGNOSIS — C50.012 MALIGNANT NEOPLASM OF NIPPLE OF LEFT BREAST IN FEMALE, UNSPECIFIED ESTROGEN RECEPTOR STATUS: ICD-10-CM

## 2024-12-27 LAB — GLUCOSE SERPL-MCNC: 101 MG/DL (ref 70–110)

## 2024-12-27 PROCEDURE — 78815 PET IMAGE W/CT SKULL-THIGH: CPT | Mod: 26,PS,, | Performed by: RADIOLOGY

## 2024-12-27 PROCEDURE — A9552 F18 FDG: HCPCS | Mod: PN | Performed by: INTERNAL MEDICINE

## 2024-12-27 PROCEDURE — 78815 PET IMAGE W/CT SKULL-THIGH: CPT | Mod: TC,PN

## 2024-12-27 RX ORDER — BUPROPION HYDROCHLORIDE 150 MG/1
150 TABLET ORAL EVERY MORNING
Qty: 30 TABLET | Refills: 2 | Status: SHIPPED | OUTPATIENT
Start: 2024-12-27 | End: 2025-12-27

## 2024-12-27 RX ORDER — FLUDEOXYGLUCOSE F18 500 MCI/ML
12 INJECTION INTRAVENOUS
Status: COMPLETED | OUTPATIENT
Start: 2024-12-27 | End: 2024-12-27

## 2024-12-27 RX ADMIN — FLUDEOXYGLUCOSE F-18 12 MILLICURIE: 500 INJECTION INTRAVENOUS at 08:12

## 2024-12-27 NOTE — PROGRESS NOTES
PET Imaging Questionnaire    Are you a Diabetic? Recent Blood Sugar level? No    Are you anemic? Bone Marrow Stimulation Meds? No    Have you had a CT Scan, if so when & where was your last one? Yes -     Have you had a PET Scan, if so when & where was your last one? Yes -     Chemotherapy or currently on Chemotherapy? Yes    Radiation therapy? Yes    Surgical History:   Past Surgical History:   Procedure Laterality Date    ANGIOGRAM, CORONARY, WITH LEFT HEART CATHETERIZATION Left 09/02/2021    Procedure: ANGIOGRAM,CORONARY,WITH LEFT HEART CATHETERIZATION;  Surgeon: Rubin Hui MD;  Location: [facility] CATH/EP LAB;  Service: Cardiology;  Laterality: Left;    ANTERIOR VAGINAL REPAIR N/A 7/13/2023    Procedure: COLPORRHAPHY, ANTERIOR;  Surgeon: Nury Wright MD;  Location: [facility];  Service: OB/GYN;  Laterality: N/A;    BILATERAL SALPINGO-OOPHORECTOMY (BSO) N/A 7/13/2023    Procedure: SALPINGO-OOPHORECTOMY, BILATERAL;  Surgeon: Nury Wright MD;  Location: [facility];  Service: OB/GYN;  Laterality: N/A;    bladder lift  2018    [facility]    breast augmentation  03/2013    BREAST BIOPSY Left 04/2017    Core bx,+ cancer    BREAST CAPSULECTOMY Left 08/05/2019    Procedure: CAPSULECTOMY, BREAST;  Surgeon: Diego Modi MD;  Location: [facility];  Service: General;  Laterality: Left;    BREAST LUMPECTOMY Left 10/25/2017    BREAST RECONSTRUCTION Bilateral 08/21/2019    Procedure: RECONSTRUCTION, BREAST WITH ALLODERM;  Surgeon: Diego Modi MD;  Location: [facility];  Service: General;  Laterality: Bilateral;    BREAST RECONSTRUCTION Left     BREAST REVISION SURGERY Bilateral 08/05/2019    Procedure: BREAST REVISION SURGERY;  Surgeon: Diego Modi MD;  Location: [facility];  Service: General;  Laterality: Bilateral;    BREAST REVISION SURGERY Left 04/05/2021    Procedure: BREAST REVISION SURGERY - LEFT BREAST RECONSTRUCTION REVISION;  Surgeon: Diego Modi MD;  Location: [facility];  Service: General;   Laterality: Left;    CATARACT EXTRACTION W/  INTRAOCULAR LENS IMPLANT Bilateral 2009    COLONOSCOPY  4/2009, 2015    repeat in 5 years    COLONOSCOPY N/A 12/22/2020    Procedure: COLONOSCOPY;  Surgeon: Praneeth Leblanc MD;  Location: [facility];  Service: Endoscopy;  Laterality: N/A;    COSMETIC SURGERY      CYSTOCELE REPAIR N/A 11/20/2018    Procedure: REPAIR, CYSTOCELE;  Surgeon: Rebecca Acosta MD;  [facility]: [facility] OR [facility] FLR;  Service: Urology;  Laterality: N/A;  1.5 hours    CYSTOSCOPY N/A 11/20/2018    Procedure: CYSTOSCOPY;  Surgeon: Rebecca Acosta MD;  Location: [facility] OR 2ND FLR;  Service: Urology;  Laterality: N/A;    CYSTOSCOPY N/A 7/13/2023    Procedure: CYSTOSCOPY;  Surgeon: Nury Wright MD;  Location: [facility];  Service: OB/GYN;  Laterality: N/A;    DILATION AND CURETTAGE OF UTERUS      ECTOPIC PREGNANCY SURGERY      EYE SURGERY Bilateral 03/2009    Vitrectomy     INSERTION OF MIDURETHRAL SLING N/A 7/13/2023    Procedure: SLING, MIDURETHRAL;  Surgeon: Nury Wright MD;  Location: [facility];  Service: OB/GYN;  Laterality: N/A;    LAPAROSCOPIC SUSPENSION OF UTEROSACRAL LIGAMENT N/A 7/13/2023    Procedure: SUSPENSION, LIGAMENT, UTEROSACRAL, LAPAROSCOPIC;  Surgeon: Nury Wright MD;  Location: [facility];  Service: OB/GYN;  Laterality: N/A;    MASTECTOMY Left     MASTOPEXY Right 04/05/2021    Procedure: MASTOPEXY - RIGHT MASTOPEXY LIPOSUCTION WAIST AND THIGHS, FAT TRANSFER TO BREAST;  Surgeon: Diego Modi MD;  Location: [facility];  Service: General;  Laterality: Right;    POLYPECTOMY      x3 2001    RECONSTRUCTION OF BREAST WITH DEEP INFERIOR EPIGASTRIC ARTERY  (SUSAN) FLAP Left 10/26/2020    Procedure: RECONSTRUCTION, BREAST, USING SUSAN SKIN FLAP - LEFT BREAST RECONSTRUCTION WITH STACKED SUSAN FREE FLAPS.;  Surgeon: Diego Modi MD;  Location: [facility];  Service: General;  Laterality: Left;    REMOVAL OF BREAST IMPLANT Bilateral 08/05/2019    Procedure: REMOVAL, IMPLANT, BREAST;   Surgeon: Diego Modi MD;  Location: [facility];  Service: General;  Laterality: Bilateral;    REMOVAL OF BREAST IMPLANT Left 08/30/2019    Procedure: REMOVAL, IMPLANT, BREAST;  Surgeon: Diego Modi MD;  Location: [facility] OR;  Service: General;  Laterality: Left;    REMOVAL OF BREAST IMPLANT Right 10/26/2020    Procedure: REMOVAL, IMPLANT, BREAST -   RIGHT BREAST IMPLANT REMOVAL;  Surgeon: Diego Modi MD;  Location: [facility] OR;  Service: General;  Laterality: Right;    ROBOT-ASSISTED LAPAROSCOPIC ABDOMINAL HYSTERECTOMY USING DA NOÉ XI N/A 7/13/2023    Procedure: XI ROBOTIC HYSTERECTOMY;  Surgeon: Nury Wright MD;  Location: [facility];  Service: OB/GYN;  Laterality: N/A;  robotic assisted vaginal hyst    Yag Capsulotomy Bilateral 12/2014        Have you been fasting for at least 6 hours? Yes    Is there any chance you may be pregnant or breastfeeding? No    Assay: 12.9 MCi@:08:15   Injection Site:RT Wrist    Residual: 0.9 mCi@: 08:19   Technologist: Pasquale Parekh Injected:12.0 mCi

## 2024-12-30 ENCOUNTER — LAB VISIT (OUTPATIENT)
Dept: LAB | Facility: HOSPITAL | Age: 69
End: 2024-12-30
Attending: INTERNAL MEDICINE
Payer: MEDICARE

## 2024-12-30 ENCOUNTER — HOSPITAL ENCOUNTER (OUTPATIENT)
Dept: RADIOLOGY | Facility: HOSPITAL | Age: 69
Discharge: HOME | End: 2024-12-30
Attending: INTERNAL MEDICINE
Payer: MEDICARE

## 2024-12-30 DIAGNOSIS — C50.012 MALIGNANT NEOPLASM OF NIPPLE OF LEFT BREAST IN FEMALE, UNSPECIFIED ESTROGEN RECEPTOR STATUS: ICD-10-CM

## 2024-12-30 DIAGNOSIS — M80.00XA AGE-RELATED OSTEOPOROSIS WITH CURRENT PATHOLOGICAL FRACTURE, INITIAL ENCOUNTER: ICD-10-CM

## 2024-12-30 LAB
ALBUMIN SERPL BCP-MCNC: 4.2 G/DL (ref 3.5–5.2)
ALP SERPL-CCNC: 56 U/L (ref 40–150)
ALT SERPL W/O P-5'-P-CCNC: 20 U/L (ref 10–44)
ANION GAP SERPL CALC-SCNC: 9 MMOL/L (ref 8–16)
AST SERPL-CCNC: 24 U/L (ref 10–40)
BASOPHILS # BLD AUTO: 0.05 K/UL (ref 0–0.2)
BASOPHILS NFR BLD: 0.9 % (ref 0–1.9)
BILIRUB SERPL-MCNC: 0.5 MG/DL (ref 0.1–1)
BUN SERPL-MCNC: 16 MG/DL (ref 8–23)
CALCIUM SERPL-MCNC: 10.3 MG/DL (ref 8.7–10.5)
CHLORIDE SERPL-SCNC: 110 MMOL/L (ref 95–110)
CO2 SERPL-SCNC: 23 MMOL/L (ref 23–29)
CREAT SERPL-MCNC: 1 MG/DL (ref 0.5–1.4)
DIFFERENTIAL METHOD BLD: ABNORMAL
EOSINOPHIL # BLD AUTO: 0.1 K/UL (ref 0–0.5)
EOSINOPHIL NFR BLD: 2.4 % (ref 0–8)
ERYTHROCYTE [DISTWIDTH] IN BLOOD BY AUTOMATED COUNT: 12 % (ref 11.5–14.5)
EST. GFR  (NO RACE VARIABLE): >60 ML/MIN/1.73 M^2
GLUCOSE SERPL-MCNC: 103 MG/DL (ref 70–110)
HCT VFR BLD AUTO: 40.3 % (ref 37–48.5)
HGB BLD-MCNC: 13.9 G/DL (ref 12–16)
IMM GRANULOCYTES # BLD AUTO: 0.02 K/UL (ref 0–0.04)
IMM GRANULOCYTES NFR BLD AUTO: 0.4 % (ref 0–0.5)
LYMPHOCYTES # BLD AUTO: 1.6 K/UL (ref 1–4.8)
LYMPHOCYTES NFR BLD: 29.5 % (ref 18–48)
MCH RBC QN AUTO: 34.2 PG (ref 27–31)
MCHC RBC AUTO-ENTMCNC: 34.5 G/DL (ref 32–36)
MCV RBC AUTO: 99 FL (ref 82–98)
MONOCYTES # BLD AUTO: 0.5 K/UL (ref 0.3–1)
MONOCYTES NFR BLD: 8.8 % (ref 4–15)
NEUTROPHILS # BLD AUTO: 3.1 K/UL (ref 1.8–7.7)
NEUTROPHILS NFR BLD: 58 % (ref 38–73)
NRBC BLD-RTO: 0 /100 WBC
PLATELET # BLD AUTO: 223 K/UL (ref 150–450)
PMV BLD AUTO: 9.9 FL (ref 9.2–12.9)
POTASSIUM SERPL-SCNC: 4.6 MMOL/L (ref 3.5–5.1)
PROT SERPL-MCNC: 7.3 G/DL (ref 6–8.4)
RBC # BLD AUTO: 4.06 M/UL (ref 4–5.4)
SODIUM SERPL-SCNC: 142 MMOL/L (ref 136–145)
WBC # BLD AUTO: 5.36 K/UL (ref 3.9–12.7)

## 2024-12-30 PROCEDURE — 80053 COMPREHEN METABOLIC PANEL: CPT | Mod: PO | Performed by: INTERNAL MEDICINE

## 2024-12-30 PROCEDURE — 77080 DXA BONE DENSITY AXIAL: CPT | Mod: 26,,, | Performed by: RADIOLOGY

## 2024-12-30 PROCEDURE — 77080 DXA BONE DENSITY AXIAL: CPT | Mod: TC,PO

## 2024-12-30 PROCEDURE — 85025 COMPLETE CBC W/AUTO DIFF WBC: CPT | Mod: PO | Performed by: INTERNAL MEDICINE

## 2024-12-30 PROCEDURE — 86300 IMMUNOASSAY TUMOR CA 15-3: CPT | Performed by: INTERNAL MEDICINE

## 2024-12-30 PROCEDURE — 77092 TBS I&R FX RSK QHP: CPT | Mod: ,,, | Performed by: RADIOLOGY

## 2025-01-02 ENCOUNTER — OFFICE VISIT (OUTPATIENT)
Dept: HEMATOLOGY/ONCOLOGY | Facility: CLINIC | Age: 70
End: 2025-01-02
Payer: MEDICARE

## 2025-01-02 DIAGNOSIS — C50.012 MALIGNANT NEOPLASM OF NIPPLE OF LEFT BREAST IN FEMALE, UNSPECIFIED ESTROGEN RECEPTOR STATUS: Primary | ICD-10-CM

## 2025-01-02 DIAGNOSIS — E53.8 VITAMIN B12 DEFICIENCY: ICD-10-CM

## 2025-01-02 PROBLEM — I70.0 AORTIC ATHEROSCLEROSIS: Status: ACTIVE | Noted: 2025-01-02

## 2025-01-02 LAB — CANCER AG27-29 SERPL-ACNC: 40.4 U/ML

## 2025-01-02 NOTE — PROGRESS NOTES
"Ashlie Redman presented for a follow-up Medicare AWV today. The following components were reviewed and updated:    Medical history  Family History  Social history  Allergies and Current Medications  Health Risk Assessment  Health Maintenance  Care Team    **See Completed Assessments for Annual Wellness visit with in the encounter summary    The following assessments were completed:  Depression Screening  Cognitive function Screening    Timed Get Up Test  Whisper Test      Opioid documentation:      Patient does not have a current opioid prescription.          Vitals:    12/18/24 0825   BP: 128/72   BP Location: Right arm   Patient Position: Sitting   Pulse: 70   SpO2: 99%   Weight: 62.5 kg (137 lb 12.6 oz)   Height: 5' 7" (1.702 m)     Body mass index is 21.58 kg/m².       Physical Exam  Vitals reviewed.   Constitutional:       General: She is not in acute distress.  HENT:      Head: Normocephalic.   Cardiovascular:      Rate and Rhythm: Normal rate.      Pulses: Normal pulses.   Pulmonary:      Effort: Pulmonary effort is normal.   Skin:     General: Skin is warm.   Neurological:      General: No focal deficit present.      Mental Status: She is alert.   Psychiatric:         Mood and Affect: Mood normal.           Diagnoses and health risks identified today and associated recommendations/orders:  1. Encounter for Medicare annual wellness exam  Reviewed health maintenance and provided recommendations    Received COVID vaccine 12/1/24  - Ambulatory Referral/Consult to Enhanced Annual Wellness Visit (eAWV)    2. Senile purpura  Continue to monitor  Followed by Salma Pendleton MD .      3. Malignant neoplasm of nipple of left breast in female, unspecified estrogen receptor status  Continue to monitor  Followed by Dr Ragsdale.    Last mammogram 9/30/24    4. Aortic atherosclerosis  Continue to monitor  Followed by Salma Pendleton MD   Ct/PET 6/8/21.      5. Atherosclerosis of native coronary artery of native " heart with unstable angina pectoris  Continue to monitor  Followed by Salma Pendleton MD   Declines statin and ASA.  LDL at goal  shes a vegetarian who already excercises a great deal. Continue  Note FH of early cardiac death    6. Aortic ectasia  Continue to monitor  Followed by Salma Pendleton MD   BP controlled.      7. Chemotherapy-induced peripheral neuropathy  Continue to monitor  Followed by Dr Ragsdale.      8. Pleuritis  Continue to monitor  Followed by Salma Pendleton MD .      9. Coronary artery disease, unspecified vessel or lesion type, unspecified whether angina present, unspecified whether native or transplanted heart  Continue to monitor  Followed by Salma Pendleton MD .    Continue cardiovascular exercise    10. Localized osteoporosis without current pathological fracture  Continue to monitor  Followed by Salma Pendleton MD .  prolia      11. Personal history of malignant neoplasm of breast  Continue to monitor  Followed by Dr Ragsdale.    Continue mammograms as directed by Dr Ragsdale    12. Statin myopathy  Continue to monitor  Followed by Salma Pendleton MD .    Encourage healthy food choices      Provided Ashlie with a 5-10 year written screening schedule and personal prevention plan. Recommendations were developed using the USPSTF age appropriate recommendations. Education, counseling, and referrals were provided as needed.  After Visit Summary printed and given to patient which includes a list of additional screenings\tests needed.    No follow-ups on file.      Susi May NP

## 2025-01-02 NOTE — PROGRESS NOTES
The patient location is: work  Visit type: Virtual visit with synchronous audio and video  Face-to-face or time spent with patient on the encounter:25 min  Total time spent on and for  this encounter which includes non face-to-face time preparing to see patient, review of tests, obtaining and or reviewing separately obtained records documenting clinical information in the electronic or other health records, independently interpreting results which is not separately reported ,and communicating results to the patient/family/caregiver and in care coordination and treatment planning/communicating with pharmacy for prescriptions/addressing social needs/arranging follow-up and or referrals :25  min    Each patient I provide medical services by telemedicine is:  (1) informed of the relationship between the physician and patient and the respective role of any other health care provider with respect to management of the patient; and (2) notified that he or she may decline to receive medical services by telemedicine and may withdraw from such care at any time.  This is a video visit therefore some elements of the physical exam such as vital signs, heart sounds are breath sounds are not included and may be included if found in recent clinic notes of other providers assessing same patient. Any symptoms or signs that were visualized were stated by the patient may be included in this note.     HISTORY OF PRESENT ILLNESS:    This is a 69-year-old white female here for f/u of breast ca hx    Oncology history   treated for a  4/12/2017 diagnosis of Stage IIB left breast carcinoma.ER/ND negative. Her - 2 positive  Recd Neoadjuvant A/C ,completed 12 cycles weekly taxol and  herceptin/ perjeta had lumpectomy was on [facility] trial completed the entire year of herceptin /perjeta, but didn't undergo axillary xrt per trial  Here for f/u with pet and labs recnt mammogram showed calcifications , that were biospied and came back  negative  radiation induced interstitial findings still having the cough but getting of her upper respiratory infection today   sonme neuropathy post rx still persisits   had a difficult summer 2019. Had removal of her original implant for pain in the area, after removal pt developed pseudomonas infection stayed in hospital  For a week. She sees Dr. Modi, had second reconstruction 10 2020 had a deep flap done. Saw DR Rodriguez rt breast mammo 9/2020    Patient denies issues related to appetite or recent weight change.  Feels tired from the hot flashes and feeling poorly  + fatigue over above what is normally experienced with day-to-day activities  Denies fever, chills, rigors  Denies issues with ambulation  Denies generalized swelling or new lumps and bumps felt in any part  of body  Denies visual or hearing loss  Denies issues with congestion, sinus issues, cough, sputum production runny nose or itching eyes  Denies chest pain or palpitations, or passing out  Denies abdominal pain, reflux symptoms, nausea vomiting loose stools or constipation  Denies seizure activity or focal weaknesses or symptoms related to TIA, no head aches or blurred vision reported  Denies issues with skin rash or bruising  Denies issues with swelling of feet, tingling or numbness   No issues with sleep,   No recent foreign travel   Good family support reported       GENERAL:   Wt Readings from Last 3 Encounters:   12/18/24 62.5 kg (137 lb 12.6 oz)   10/19/24 61.2 kg (135 lb)   09/06/24 61.3 kg (135 lb 2.3 oz)     Temp Readings from Last 3 Encounters:   10/19/24 97.9 °F (36.6 °C) (Oral)   09/06/24 97.7 °F (36.5 °C) (Oral)   08/13/24 97.5 °F (36.4 °C) (Temporal)     BP Readings from Last 3 Encounters:   12/18/24 128/72   10/19/24 126/82   09/06/24 132/70     Pulse Readings from Last 3 Encounters:   12/18/24 70   10/19/24 76   09/06/24 (!) 57    VITAL SIGNS:  as above   GENERAL: appears well-built, well-nourished.  No anxiety, no agitation, and  in no distress.  Patient is awake, alert, oriented and cooperative.  HEENT:  Showed no congestion. Trachea is central no obvious icterus or pallor noted no hoarseness. no obvious JVD   NECK:  Supple.  No JVD. No obvious cervical submental or supraclavicular adenopathy.  RS:the visualized portion of  Chest expands well. chest appears symmetric, no audible wheezes.  No dyspnea recognized  ABDOMEN:  abdomen appears undistended.  EXTREMITIES:  Without edema.  NEUROLOGICAL:  The patient is appropriate, higher functions are normal.  No  obvious neurological deficits.  normal judgement normal thought content  No confusion, no speech impediment. Cranial nerves are intact and show no deficit. No gross motor deficits noted   SKIN MUSCULOSKELETAL: no joint or skeletal deformity, no clubbing of nails.  No visible rash ecchymosis or petechiae   LABORATORY:    Lab Results   Component Value Date    WBC 5.36 12/30/2024    HGB 13.9 12/30/2024    HCT 40.3 12/30/2024    MCV 99 (H) 12/30/2024     12/30/2024         CMP  Sodium   Date Value Ref Range Status   12/30/2024 142 136 - 145 mmol/L Final     Potassium   Date Value Ref Range Status   12/30/2024 4.6 3.5 - 5.1 mmol/L Final     Chloride   Date Value Ref Range Status   12/30/2024 110 95 - 110 mmol/L Final     CO2   Date Value Ref Range Status   12/30/2024 23 23 - 29 mmol/L Final     Glucose   Date Value Ref Range Status   12/30/2024 103 70 - 110 mg/dL Final     BUN   Date Value Ref Range Status   12/30/2024 16 8 - 23 mg/dL Final     Creatinine   Date Value Ref Range Status   12/30/2024 1.0 0.5 - 1.4 mg/dL Final     Calcium   Date Value Ref Range Status   12/30/2024 10.3 8.7 - 10.5 mg/dL Final     Total Protein   Date Value Ref Range Status   12/30/2024 7.3 6.0 - 8.4 g/dL Final     Albumin   Date Value Ref Range Status   12/30/2024 4.2 3.5 - 5.2 g/dL Final     Total Bilirubin   Date Value Ref Range Status   12/30/2024 0.5 0.1 - 1.0 mg/dL Final     Comment:     For infants and  newborns, interpretation of results should be based  on gestational age, weight and in agreement with clinical  observations.    Premature Infant recommended reference ranges:  Up to 24 hours.............<8.0 mg/dL  Up to 48 hours............<12.0 mg/dL  3-5 days..................<15.0 mg/dL  6-29 days.................<15.0 mg/dL       Alkaline Phosphatase   Date Value Ref Range Status   12/30/2024 56 40 - 150 U/L Final     AST   Date Value Ref Range Status   12/30/2024 24 10 - 40 U/L Final     ALT   Date Value Ref Range Status   12/30/2024 20 10 - 44 U/L Final     Anion Gap   Date Value Ref Range Status   12/30/2024 9 8 - 16 mmol/L Final     eGFR if    Date Value Ref Range Status   06/01/2022 >60 >60 mL/min/1.73 m^2 Final     eGFR if non    Date Value Ref Range Status   06/01/2022 >60 >60 mL/min/1.73 m^2 Final     Comment:     Calculation used to obtain the estimated glomerular filtration  rate (eGFR) is the CKD-EPI equation.      30.7 ca 2729 on 12/3/21     Left breast, lumpectomy:  - Residual invasive ductal carcinoma, moderately differentiated, Ty Ty Grade 2 (3+2+1=6), 0.2 cm in  greatest linear microscopic dimension.  - Ductal carcinoma in situ (DCIS), high nuclear grade, 2 cm, solid and cribriform types.  - Surgical margins are free of tumor; invasive carcinoma is located 0.1 cm from the closest margin         FINAL PATHOLOGIC DIAGNOSIS  1. BREAST, RIGHT, CENTRAL REGION MIDDLE DEPTH, CALCIFICATIONS, STEREOTACTIC-GUIDED  BIOPSY:  Benign breast tissue with fibrocystic changes, columnar cell changes, and focal features suggestive of duct  rupture.  Microcalcifications: Seen in association with areas of columnar cell change.  Negative for atypia or carcinoma.  Osteopenia 3/2017 dexa  1/2020 osteoporosis  Chest x-ray negative October 2020 mammogram September 2 1020-    Pet 12/2022 neg    IMPRESSION:    Stage IIB left breast carcinoma 2017 (ER/VT negative, Her-2/clay  positive)  Completed neoadjuvant AC and weekly taxol / herceptin  stopped perjeta since she also had xrt to left side due to fear of toxicity   BRCA negative  , s/p  lumpectomy and axillary disection , with 2 residual Ln positive. Per [facility] trail did not undergo xrt to axilla    reconstructed left breast MRI of rt breast  negative February 2020, mammo of  Both breast( has natural breast tissue in left still) in 9/2021  and 9/22 . 9/23 9/24next due 9/25   pet was denied by insurance will get annual atleast   cont observation and surveillance  [id number] normalized before and slightly higher now  1.   Bone density  next due 12/26 improved since she had osteoporosis prior to start of prolia proceed with   prolia. Next due June 20, 2025  Next PET scan December 2025 patient would like to consider if this is necessary anymore   anemia has resolved   Thyroid nodule bx is benign : will cont to monitor  Patient has hot flashes are quite bothersome she used to be on Wellbutrin and would like to resume sent a prescription       Answers submitted by the patient for this visit:  Review of Systems Questionnaire (Submitted on 12/29/2024)  appetite change : No  unexpected weight change: No  mouth sores: No  visual disturbance: No  cough: No  shortness of breath: No  chest pain: No  abdominal pain: No  diarrhea: No  frequency: No  back pain: No  rash: No  headaches: No  adenopathy: No  nervous/ anxious: No

## 2025-01-03 ENCOUNTER — INFUSION (OUTPATIENT)
Dept: INFUSION THERAPY | Facility: HOSPITAL | Age: 70
End: 2025-01-03
Attending: INTERNAL MEDICINE
Payer: MEDICARE

## 2025-01-03 VITALS
DIASTOLIC BLOOD PRESSURE: 70 MMHG | RESPIRATION RATE: 16 BRPM | TEMPERATURE: 98 F | SYSTOLIC BLOOD PRESSURE: 118 MMHG | HEART RATE: 82 BPM

## 2025-01-03 DIAGNOSIS — M81.6 LOCALIZED OSTEOPOROSIS WITHOUT CURRENT PATHOLOGICAL FRACTURE: Primary | ICD-10-CM

## 2025-01-03 PROCEDURE — 63600175 PHARM REV CODE 636 W HCPCS: Mod: JZ,TB,PN | Performed by: INTERNAL MEDICINE

## 2025-01-03 RX ADMIN — DENOSUMAB 60 MG: 60 INJECTION SUBCUTANEOUS at 01:01

## 2025-01-25 ENCOUNTER — PATIENT MESSAGE (OUTPATIENT)
Dept: PRIMARY CARE CLINIC | Facility: CLINIC | Age: 70
End: 2025-01-25
Payer: MEDICARE

## 2025-01-25 DIAGNOSIS — M54.2 NECK PAIN: Primary | ICD-10-CM

## 2025-01-27 ENCOUNTER — HOSPITAL ENCOUNTER (OUTPATIENT)
Dept: RADIOLOGY | Facility: HOSPITAL | Age: 70
Discharge: HOME | End: 2025-01-27
Attending: INTERNAL MEDICINE
Payer: MEDICARE

## 2025-01-27 DIAGNOSIS — M54.2 NECK PAIN: ICD-10-CM

## 2025-01-27 PROCEDURE — 72040 X-RAY EXAM NECK SPINE 2-3 VW: CPT | Mod: TC,FY,PO

## 2025-01-27 PROCEDURE — 72040 X-RAY EXAM NECK SPINE 2-3 VW: CPT | Mod: 26,,, | Performed by: RADIOLOGY

## 2025-01-27 NOTE — PROGRESS NOTES
Please tell patient the xray shows chronic age related changes of the spine ie osteoarthritis (bone on bone) but no acute change like fracture which is what concerned me the most. It is potentially muscle related which is amenable to PT, heat/ice, deep massage (robles now that we know theres no fracture) and follow up with me if no bettter in a week or so.

## 2025-01-28 ENCOUNTER — PATIENT MESSAGE (OUTPATIENT)
Dept: HEMATOLOGY/ONCOLOGY | Facility: CLINIC | Age: 70
End: 2025-01-28
Payer: MEDICARE

## 2025-02-03 ENCOUNTER — CLINICAL SUPPORT (OUTPATIENT)
Dept: REHABILITATION | Facility: HOSPITAL | Age: 70
End: 2025-02-03
Attending: INTERNAL MEDICINE
Payer: MEDICARE

## 2025-02-03 DIAGNOSIS — M62.81 MUSCLE WEAKNESS: Primary | ICD-10-CM

## 2025-02-03 DIAGNOSIS — M54.2 NECK PAIN: ICD-10-CM

## 2025-02-03 DIAGNOSIS — M25.60 DECREASED RANGE OF MOTION: ICD-10-CM

## 2025-02-03 PROCEDURE — 97110 THERAPEUTIC EXERCISES: CPT | Mod: PN

## 2025-02-03 PROCEDURE — 97140 MANUAL THERAPY 1/> REGIONS: CPT | Mod: PN

## 2025-02-03 PROCEDURE — 97161 PT EVAL LOW COMPLEX 20 MIN: CPT | Mod: PN

## 2025-02-03 NOTE — PROGRESS NOTES
[facility]  Physical Therapy Initial Evaluation    Name: Ashlie Redman  Clinic Number: [id number]    Therapy Diagnosis:   Encounter Diagnoses   Name Primary?    Neck pain     Muscle weakness Yes    Decreased range of motion         Physician: Salma Pendleton MD    Physician Orders: PT Eval and Treat   Medical Diagnosis from Referral: Neck pain   Evaluation Date: 2/3/2025  Authorization Period Expiration: 1/27/26  Plan of Care Expiration: 3/31/25  Progress Note Due: 3/3/25  Visit # / Visits authorized: 1/ 1   FOTO: 1/3 47    Precautions: Standard and OP      Time In: 1400  Time Out: 1500  Total Appointment Time (timed & untimed codes): 58 minutes      History   History of Present Illness: Ashlie is a 69 y.o. female that presents to  [facility] at the [facility] s/p L breast surgery on 4/5/21.     Chief complaint: neck pain, decreased cervical and L shoulder ROM  Surgical History:  Ashlie Redman  has a past surgical history that includes Ectopic pregnancy surgery; Colonoscopy (4/2009, 2015); breast augmentation (03/2013); Dilation and curettage of uterus; Eye surgery (Bilateral, 03/2009); Yag Capsulotomy (Bilateral, 12/2014); Polypectomy; Cataract extraction w/  intraocular lens implant (Bilateral, 2009); Breast biopsy (Left, 04/2017); Breast lumpectomy (Left, 10/25/2017); Cystocele repair (N/A, 11/20/2018); Cystoscopy (N/A, 11/20/2018); Breast revision surgery (Bilateral, 08/05/2019); Removal of breast implant (Bilateral, 08/05/2019); Breast Capsulectomy (Left, 08/05/2019); Breast reconstruction (Bilateral, 08/21/2019); Removal of breast implant (Left, 08/30/2019); Breast reconstruction (Left); Reconstruction of breast with deep inferior epigastric artery  (SUSAN) flap (Left, 10/26/2020); Removal of breast implant (Right, 10/26/2020); Colonoscopy (N/A, 12/22/2020); Breast revision surgery (Left, 04/05/2021); Mastopexy (Right, 04/05/2021);  Mastectomy (Left); ANGIOGRAM, CORONARY, WITH LEFT HEART CATHETERIZATION (Left, 09/02/2021); bladder lift (2018); Cosmetic surgery; Robot-assisted laparoscopic abdominal hysterectomy using da Susan Xi (N/A, 7/13/2023); Bilateral salpingo-oophorectomy (BSO) (N/A, 7/13/2023); laparoscopic suspension of uterosacral ligament (N/A, 7/13/2023); Anterior vaginal repair (N/A, 7/13/2023); Insertion of midurethral sling (N/A, 7/13/2023); and Cystoscopy (N/A, 7/13/2023).    Per MD note: completed 12 cycles weekly taxol and  herceptin/ perjeta had lumpectomy was on ALLIANCE trial completed the entire year of herceptin /perjeta, but didn't undergo axillary xrt per trial  Here for f/u with pet and labs recnt mammogram showed calcifications , that were biospied and came back negative  radiation induced interstitial findings still having the cough but getting of her upper respiratory infection today   sonme neuropathy post rx still persisits   had a difficult summer 2019. Had removal of her original implant for pain in the area, after removal pt developed pseudomonas infection stayed in hospital  For a week. She sees Dr. Modi, had second reconstruction 10 2020 had a deep flap done. Saw DR Rodriguez rt breast mammo 9/2020    Past Medical History:   Diagnosis Date    Allergy     Anemia     Arthritis     back    Asteroid hyalosis of both eyes     Basal cell carcinoma     L. dorsal forearm     Breast cancer 04/2017    Left    Cataract     Chemotherapy adverse reaction     Lung Damage    Diverticulosis     Encounter for blood transfusion     Glaucoma     High myopia     Osteopenia     Osteoporosis     PONV (postoperative nausea and vomiting)     S/P dilation and curettage           Medications:  Ashlie has a current medication list which includes the following prescription(s): aspirin, bupropion, calcium carb/vit d3/minerals, prolia, dorzolamide, estradiol, latanoprost, and valacyclovir, and the following Facility-Administered Medications:  LIDOcaine-EPINEPHrine 1%-1:100,000 30 mL, EPINEPHrine 2 mg in lactated Ringers 1,000 mL irrigation and LIDOcaine-EPINEPHrine 1%-1:100,000 30 mL, EPINEPHrine 2 mg in lactated Ringers 1,000 mL irrigation.    Allergies:  Review of patient's allergies indicates:   Allergen Reactions    Bactrim [sulfamethoxazole-trimethoprim] Rash     Fever, vomiting        Prior Therapy: PT in the past   Social History: pt lives alone  Durable Medical Equipment: none  Occupation: sitting at computer (accounting)  Prior Level of Function: I with ADLs  Current Level of Function: pain and difficulty with lifting weight overhead  Exercise routine prior to onset: Jazzercise, walking  Hand dominance: right      Patient's Goals: decreased pain, be able to lift overhead without pain, be able to bike        Subjective   Pt states: she noticed her ability to push weights [place] was decreasing.  About 2 months ago she started having pain to neck.    Pain: 6/10 on VAS currently.   Best: 3/10  Worst: 9/10   Pain location: neck pain and to L levator scap    Objective   Mental status :alert and oriented to person, place, time    Postural examination/scapular alignment: Rounded shoulder and Head forward    Skin Integrity:   Scar Location: L breast  Appearance: healed  Signs of infection: No  Drainage: none    Edema: none    Axillary Web Syndrome/Cording:   Location: L axilla  Degree of Cording:  moderate (mild, moderate etc...)   Number of cords present: 1    Sensation: WNL         TTP and tightness to B cervical paraspinals, B UT, B levator scap (L>R)    Range of Motion:     Cervical AROM: (deg)  Flex:44  Ext:50 pain  R SB: 32  L SB:32  R rot:55  L rot:56       Shoulder Range of Motion:   Active /Passive ROM Right Left   Flexion 166 139 tight   Abduction 172 152 tight   Extension 55 46   IR/90deg T5 T5   ER/90deg 80 75     Cervical strength: 5/5 in all planes    Strength: manual muscle test grades below   Upper Extremity Strength   (R) UE (L) UE    Shoulder flexion: 5/5 4+/5   Shoulder Abduction: 5/5 4+/5   Shoulder IR 5/5 4+/5   Shoulder ER 5/5 4+/5   Elbow flexion: 5/5 5/5   Elbow extension: 5/5 5/5     Lower trap: R: 3-/5, L: 2+/5  Mid trap: 3+/5; L; 3+/5  Rhomboid: 5/5, L: 5/5    Functional Mobility (Bed mobility, transfers)  I in all aspects    ADL's (Activities of Daily Living):  Difficulty with reaching overhead, upper body dressing    Gait Assessment:   - Assistive device used: none  - Assistance: independent  - Distance: community distances     Patient Education Provided   - role of therapy in multi - disciplinary team, goals for therapy  -perform all ex in pain free ROM  Pt gave verbal understanding to all education provided     Pt has no cultural, educational or language barriers to learning provided.  Treatment and Instruction of Home Exercise Program      Total Time Separate from Evaluation: 28 minutes    Ashlie received therapeutic exercises to develop strength, ROM, and flexibility for 15 minutes including:   Supine AA sh flex 1# dowel 10x10 sec  Supine sh ER at 90 deg abd x10  LTR + sh abd in supine  Wall slides for flexion  Wall push ups    Ashlie received the following manual therapy techniques:  were applied to the: L breast and L axilla for 8 minutes.   STM for chest wall glides (lateral)  STM to release cording to L axilla    Ashlie received the following therapeutic activities for 5 minutes, including:  Pt edu on desk ergonomics, taking standing breaks every 20-30 min  Pt edu on risk of lymphedema    Written Home Exercises Provided: yes.  Exercises were reviewed and Ashlie was able to demonstrate them prior to the end of the session.  Ashlie demonstrated good  understanding of the education provided.     See EMR under Patient Instructions for exercises provided 2/3/2025.      Functional Limitations Reporting        Intake Outcome Measure for FOTO neck Survey    Therapist reviewed FOTO scores for Ashlie JOSHUA Redman on 2/3/2025.   FOTO documents entered into  EPIC - see Media section.    Intake Score: 47%             Assessment   This is a 69 y.o. female referred to outpatient physical therapy and presents with a medical diagnosis of L breast cancer and was seen today post-operatively to establish PT plan of care for impairments following surgery including: cording to L axilla, decreased chest wall mobility, decreased cervical ROM, hypomobility with side glides to cervical spine and OA flexion, tightness to cervical musculature, decreased L shoulder ROM, decreased postural strength.  She tolerated manual and AAROM shoulder exercises well.  She will continue to benefit from PT for improved cervical and L shoulder ROM, postural strength and stabilization.     Pt instructed in HEP this session and was able to perform all exercises given independently. Pt instructed to follow up with therapist if any concerns arise with program established. Pt will continue to benefit from skilled physical therapy to address the impairments stated in chart below, provide patient/family education and to maximize pt's level of independence in home and community environment     Pt prognosis is Good.    Anticipated barriers to physical therapy: none     Pt's spiritual, cultural and educational needs considered and pt agreeable to plan of care and goals as stated below:       Medical Necessity is demonstrated by the following:  History  Co-morbidities and personal factors that may impact the plan of care [] LOW: no personal factors / co-morbidities  [x] MODERATE: 1-2 personal factors / co-morbidities  [] HIGH: 3+ personal factors / co-morbidities    Moderate / High Support Documentation:   Co-morbidities affecting plan of care: hx of CA, OP    Personal Factors:   no deficits     Examination  Body Structures and Functions, activity limitations and participation restrictions that may impact the plan of care [] LOW: addressing 1-2 elements  [] MODERATE: 3+ elements  [x] HIGH: 4+ elements (please  support below)    Moderate / High Support Documentation: pain, decreased ROM, strength, reaching, shopping     Clinical Presentation [x] LOW: stable  [] MODERATE: Evolving  [] HIGH: Unstable     Decision Making/ Complexity Score: low       Goals: Pt agrees with goals set    Short Term goals: 4 weeks  1. Patient will demonstrate 100% understanding of lymphedema risk reduction practices to include self monitoring for lymphedema. (progressing, not met)  2. Patient will demonstrate independence with Home Exercise program established. (progressing, not met)  3. Pt will increase AROM in shoulder abduction ROM to 160-165 degrees on left to improve functional reach, carry, push, pull pain free. (progressing, not met)  4. Pt will increase AROM in shoulder flexion to 150 degrees on left to improve functional reach, carry, push, pull pain free.(progressing, not met)  5. Pt will increase strength by 1/3 muscle grade in gross UE musculature to improve tolerance to all functional activities pain free. (progressing, not met)  6. Pt will increase periscap strength (lower trap, mid trap) by 1/3 muscle grade for improved postural stability. (progressing, not met)    Long Term Goals: 8 weeks   1.  Pt will increase AROM in shoulder flexion to 160 degrees on left to improve functional reach, carry, push, pull pain free. (progressing, not met)  2. Pt will increase strength to 4+/5 in gross UE musculature to improve tolerance to all functional activities pain free. (progressing, not met)  3. Pt will demonstrate full/maximized tissue mobility to increase ROM and promote healthy tissue to be pain free at discharge. (progressing, not met)  4. Pt will report decrease in overall worst pain to 2/10 at discharge. (progressing, not met)  5. Pt will increase periscap strength (lower trap, mid trap) by 1 muscle grade for improved postural stability. (progressing, not met)  6. Increased cervical rotation AROM to 60 deg B for increased ease driving  (progressing, not met)      Plan   Outpatient physical therapy 2x week for 8 weeks to include the following:   Electrical Stimulation  , Manual Therapy, Moist Heat/ Ice, Neuromuscular Re-ed, Patient Education, Self Care, Therapeutic Activities, Therapeutic Exercise, and dry needling .    Plan of care Certification Period: 2/3/2025 to 3/31/25.    Therapist: Sylvie Gandhi, PT, DPT  2/3/2025

## 2025-02-04 NOTE — PROGRESS NOTES
[facility]   Physical Therapy Treatment Note      Name: Ashlie Redman  Clinic Number: [id number]    Therapy Diagnosis:   Encounter Diagnoses   Name Primary?    Muscle weakness Yes    Decreased range of motion      Physician: Salma Pendleton MD    Visit Date: 2/5/2025    Physician Orders: PT Eval and Treat   Medical Diagnosis from Referral: Neck pain   Evaluation Date: 2/3/2025  Authorization Period Expiration: 1/27/26  Plan of Care Expiration: 3/31/25  Progress Note Due: 3/3/25  Visit # / Visits authorized: 1/ 1   FOTO: 1/3 47    Precautions: Standard and OP     Time In: 1400  Time Out: 1500  Total Appointment Time (timed & untimed codes): 55 minutes    PTA Visit #: 0/5       Subjective     Patient reports: pt states she is feeling better with HEP.  She was compliant with home exercise program.  Response to previous treatment: did well  Functional change: decreased overall tightness    Pain: 2/10  Location: left shoulder     Objective      Objective Measures updated at progress report unless specified.     Treatment     Ashlie received the treatments listed below:      therapeutic exercises to develop strength, ROM, and flexibility for 30 minutes including:  Supine AA sh flex 1# dowel 10x10 sec  Supine sh ER AROM at 90 deg abd x10  LTR + sh abd in supine  Wall slides for flexion with foam roller x10, 5sec hold  Wall push ups x10  Shoulder flex stretch holding bar 5x10 sec    Ashlie received the following manual therapy techniques:  were applied to the: L breast and L axilla for 15 minutes.   STM to release cording to L axilla  STM to B UT, B levator scap, L subscap    Not performed today: Ashlie received the following therapeutic activities for 00 minutes, including:  Pt edu on desk ergonomics, taking standing breaks every 20-30 min  May add overpressure to L axilla with supine sh abd for improved stretch to reduce cording    neuromuscular re-education activities to improve: Kinesthetic, Sense,  Proprioception, and Posture for 10 minutes. The following activities were included: (vc for proper scap setting)  Sh ext Y tubing 2x10  Rows Y tubing 2x10    May add: Bruegger's, supine horiz abd    Patient Education and Home Exercises       Education provided:   - perform ex to point of pull not pain  Pt gave verbal understanding to all education provided     Written Home Exercises Provided: pt instructed to continue previous HEP. Exercises were reviewed and Ashlie was able to demonstrate them prior to the end of the session.  Ashlie demonstrated good  understanding of the education provided. See Electronic Medical Record under Patient Instructions for exercises provided during therapy sessions    Assessment     Pt presented with decreased cording to L axilla.  She presented with tightness to cervical muscles, which loosened with manual.  She will continue to benefit from PT for improved L shoulder ROM, decreased muscle tension and increased functional mobility.    Ashlie Is progressing well towards her goals.   Patient prognosis is Excellent.     Patient will continue to benefit from skilled outpatient physical therapy to address the deficits listed in the problem list box on initial evaluation, provide pt/family education and to maximize pt's level of independence in the home and community environment.     Patient's spiritual, cultural and educational needs considered and pt agreeable to plan of care and goals.     Anticipated barriers to physical therapy: none    Goals:   Short Term goals: 4 weeks  1. Patient will demonstrate 100% understanding of lymphedema risk reduction practices to include self monitoring for lymphedema. (progressing, not met)  2. Patient will demonstrate independence with Home Exercise program established. (progressing, not met)  3. Pt will increase AROM in shoulder abduction ROM to 160-165 degrees on left to improve functional reach, carry, push, pull pain free. (progressing, not met)  4. Pt will  increase AROM in shoulder flexion to 150 degrees on left to improve functional reach, carry, push, pull pain free.(progressing, not met)  5. Pt will increase strength by 1/3 muscle grade in gross UE musculature to improve tolerance to all functional activities pain free. (progressing, not met)  6. Pt will increase periscap strength (lower trap, mid trap) by 1/3 muscle grade for improved postural stability. (progressing, not met)    Long Term Goals: 8 weeks   1.  Pt will increase AROM in shoulder flexion to 160 degrees on left to improve functional reach, carry, push, pull pain free. (progressing, not met)  2. Pt will increase strength to 4+/5 in gross UE musculature to improve tolerance to all functional activities pain free. (progressing, not met)  3. Pt will demonstrate full/maximized tissue mobility to increase ROM and promote healthy tissue to be pain free at discharge. (progressing, not met)  4. Pt will report decrease in overall worst pain to 2/10 at discharge. (progressing, not met)  5. Pt will increase periscap strength (lower trap, mid trap) by 1 muscle grade for improved postural stability. (progressing, not met)  6. Increased cervical rotation AROM to 60 deg B for increased ease driving (progressing, not met)    Plan     Continue PT towards established goals.  Progress L shoulder ROM and postural strengthening as tolerated.    Outpatient physical therapy 2x week for 8 weeks to include the following:   Electrical Stimulation  , Manual Therapy, Moist Heat/ Ice, Neuromuscular Re-ed, Patient Education, Self Care, Therapeutic Activities, Therapeutic Exercise, and dry needling.    Plan of care Certification Period: 2/3/2025 to 3/31/25.      Sylvie Gandhi, PT, DPT

## 2025-02-05 ENCOUNTER — CLINICAL SUPPORT (OUTPATIENT)
Dept: REHABILITATION | Facility: HOSPITAL | Age: 70
End: 2025-02-05
Payer: MEDICARE

## 2025-02-05 DIAGNOSIS — M25.60 DECREASED RANGE OF MOTION: ICD-10-CM

## 2025-02-05 DIAGNOSIS — M62.81 MUSCLE WEAKNESS: Primary | ICD-10-CM

## 2025-02-05 PROCEDURE — 97140 MANUAL THERAPY 1/> REGIONS: CPT | Mod: PN

## 2025-02-05 PROCEDURE — 97110 THERAPEUTIC EXERCISES: CPT | Mod: PN

## 2025-02-05 PROCEDURE — 97112 NEUROMUSCULAR REEDUCATION: CPT | Mod: PN

## 2025-02-10 ENCOUNTER — CLINICAL SUPPORT (OUTPATIENT)
Dept: REHABILITATION | Facility: HOSPITAL | Age: 70
End: 2025-02-10
Payer: MEDICARE

## 2025-02-10 DIAGNOSIS — M25.60 DECREASED RANGE OF MOTION: ICD-10-CM

## 2025-02-10 DIAGNOSIS — M62.81 MUSCLE WEAKNESS: Primary | ICD-10-CM

## 2025-02-10 PROCEDURE — 97140 MANUAL THERAPY 1/> REGIONS: CPT | Mod: PN

## 2025-02-10 PROCEDURE — 97110 THERAPEUTIC EXERCISES: CPT | Mod: PN

## 2025-02-10 PROCEDURE — 97112 NEUROMUSCULAR REEDUCATION: CPT | Mod: PN

## 2025-02-10 NOTE — PROGRESS NOTES
[facility]   Physical Therapy Treatment Note      Name: Ashlie Redman  Clinic Number: [id number]    Therapy Diagnosis:   Encounter Diagnoses   Name Primary?    Muscle weakness Yes    Decreased range of motion        Physician: Salma Pendleton MD    Visit Date: 2/10/2025    Physician Orders: PT Eval and Treat   Medical Diagnosis from Referral: Neck pain   Evaluation Date: 2/3/2025  Authorization Period Expiration: 1/27/26  Plan of Care Expiration: 3/31/25  Progress Note Due: 3/3/25  Visit # / Visits authorized: 1/ 1   FOTO: 1/3 47    Precautions: Standard and OP     Time In: 1300  Time Out: 1400  Total Appointment Time (timed & untimed codes): 55 minutes    PTA Visit #: 0/5       Subjective     Patient reports: pt reports she had pain to L scap and down L arm with shoulder flexion with dowel so she stopped exercise.  She was compliant with home exercise program.  Response to previous treatment: did well  Functional change: decreased overall tightness    Pain: 0/10 at rest and 7-8/10 with overhead  Location: left shoulder     Objective      Objective Measures updated at progress report unless specified.     Treatment     Ashlie received the treatments listed below:      therapeutic exercises to develop strength, ROM, and flexibility for 25 minutes including:  PROM L sh flex and abd by PT  Supine AA sh flex 1# dowel 10x10 sec  Reviewed: Supine sh ER AROM at 90 deg abd x10  LTR + sh abd in supine  Shoulder flex stretch holding bar 5x10 sec    Ashlie received the following manual therapy techniques:  were applied to the: L breast and L axilla for 20 minutes.   STM to release cording to L axilla  NP STM to B UT, B levator scap, L subscap  Pt cleared of contraindications and verbal and written consent acquired. Pt given option of copy of consent form. Pt educated on benefits and potential side effects of DN. FDN performed to L UT (30mm, fanning and winding), L levator scap insertion (30mm,  pecking), L infraspinatus (30mm, winding). FDN performed to reduce pain and muscle tension, promote blood flow, and improve ROM and function . Pt tolerated tx well without adverse effects. Pt was educated on what to expect following the procedure and he verbalized understanding.      Not performed today: Ashlie received the following therapeutic activities for 00 minutes, including:  Pt edu on desk ergonomics, taking standing breaks every 20-30 min  May add overpressure to L axilla with supine sh abd for improved stretch to reduce cording    neuromuscular re-education activities to improve: Kinesthetic, Sense, Proprioception, and Posture for 10 minutes. The following activities were included: (vc for proper scap setting)  Serratus Wall slides with foam roller and Y loop around wrists x10, 5sec hold  Sh ext Y tubing 2x10  NP Rows Y tubing 2x10    May add: Bruegger's, supine horiz abd    Patient Education and Home Exercises       Education provided:   - perform ex to point of pull not pain  Pt gave verbal understanding to all education provided     Written Home Exercises Provided: pt instructed to continue previous HEP. Exercises were reviewed and Ashlie was able to demonstrate them prior to the end of the session.  Ashlie demonstrated good  understanding of the education provided. See Electronic Medical Record under Patient Instructions for exercises provided during therapy sessions    Assessment     Pt presented with new onset of tingling down L UE to 2nd and 3rd digits of L hand and tightness to L scap.  Muscles loosened with dry needling.  Did not perform AA shoulder flexion with dowel today due to exacerbation of symptoms.  She was able to perform all other exercises without exacerbation of symptoms. She will continue to benefit from PT for improved L shoulder ROM, decreased muscle tension and increased functional mobility.    Ashlie Is progressing well towards her goals.   Patient prognosis is Excellent.     Patient will  continue to benefit from skilled outpatient physical therapy to address the deficits listed in the problem list box on initial evaluation, provide pt/family education and to maximize pt's level of independence in the home and community environment.     Patient's spiritual, cultural and educational needs considered and pt agreeable to plan of care and goals.     Anticipated barriers to physical therapy: none    Goals:   Short Term goals: 4 weeks  1. Patient will demonstrate 100% understanding of lymphedema risk reduction practices to include self monitoring for lymphedema. (progressing, not met)  2. Patient will demonstrate independence with Home Exercise program established. (progressing, not met)  3. Pt will increase AROM in shoulder abduction ROM to 160-165 degrees on left to improve functional reach, carry, push, pull pain free. (progressing, not met)  4. Pt will increase AROM in shoulder flexion to 150 degrees on left to improve functional reach, carry, push, pull pain free.(progressing, not met)  5. Pt will increase strength by 1/3 muscle grade in gross UE musculature to improve tolerance to all functional activities pain free. (progressing, not met)  6. Pt will increase periscap strength (lower trap, mid trap) by 1/3 muscle grade for improved postural stability. (progressing, not met)    Long Term Goals: 8 weeks   1.  Pt will increase AROM in shoulder flexion to 160 degrees on left to improve functional reach, carry, push, pull pain free. (progressing, not met)  2. Pt will increase strength to 4+/5 in gross UE musculature to improve tolerance to all functional activities pain free. (progressing, not met)  3. Pt will demonstrate full/maximized tissue mobility to increase ROM and promote healthy tissue to be pain free at discharge. (progressing, not met)  4. Pt will report decrease in overall worst pain to 2/10 at discharge. (progressing, not met)  5. Pt will increase periscap strength (lower trap, mid trap) by  1 muscle grade for improved postural stability. (progressing, not met)  6. Increased cervical rotation AROM to 60 deg B for increased ease driving (progressing, not met)    Plan     Continue PT towards established goals.  Progress L shoulder ROM and postural strengthening as tolerated.    Outpatient physical therapy 2x week for 8 weeks to include the following:   Electrical Stimulation  , Manual Therapy, Moist Heat/ Ice, Neuromuscular Re-ed, Patient Education, Self Care, Therapeutic Activities, Therapeutic Exercise, and dry needling.    Plan of care Certification Period: 2/3/2025 to 3/31/25.      Sylvie Gandhi, PT, DPT

## 2025-02-18 ENCOUNTER — CLINICAL SUPPORT (OUTPATIENT)
Dept: REHABILITATION | Facility: HOSPITAL | Age: 70
End: 2025-02-18
Payer: MEDICARE

## 2025-02-18 DIAGNOSIS — M62.81 MUSCLE WEAKNESS: Primary | ICD-10-CM

## 2025-02-18 DIAGNOSIS — M25.60 DECREASED RANGE OF MOTION: ICD-10-CM

## 2025-02-18 PROCEDURE — 97112 NEUROMUSCULAR REEDUCATION: CPT | Mod: PN

## 2025-02-18 PROCEDURE — 97140 MANUAL THERAPY 1/> REGIONS: CPT | Mod: PN

## 2025-02-18 PROCEDURE — 97110 THERAPEUTIC EXERCISES: CPT | Mod: PN

## 2025-02-18 NOTE — PROGRESS NOTES
[facility]   Physical Therapy Treatment Note      Name: Ashlie Redman  Clinic Number: [id number]    Therapy Diagnosis:   Encounter Diagnoses   Name Primary?    Muscle weakness Yes    Decreased range of motion          Physician: Salma Pendleton MD    Visit Date: 2/18/2025    Physician Orders: PT Eval and Treat   Medical Diagnosis from Referral: Neck pain   Evaluation Date: 2/3/2025  Authorization Period Expiration: 1/27/26  Plan of Care Expiration: 3/31/25  Progress Note Due: 3/3/25  Visit # / Visits authorized: 3/12 (+eval)   FOTO:  47    Precautions: Standard and OP     Time In: 8:10  Time Out: 9:05  Total Appointment Time (timed & untimed codes): 55 minutes    PTA Visit #: 0/5       Subjective     Patient reports: she just flew back into town last night.  She feels achy all over due to being on plane and not sleeping in her bed.  She was compliant with home exercise program.  Response to previous treatment: did well  Functional change: decreased overall tightness    Pain: 0/10 at rest   Location: left shoulder     Objective      Objective Measures updated at progress report unless specified.     Treatment     Ashlie received the treatments listed below:      therapeutic exercises to develop strength, ROM, and flexibility for 15 minutes including:  PROM L sh flex and abd by PT  Pec stretch over 1/2 foam roll 3x30 sec  PROM sh flex and abd while supine on 1/2 foam roll  Seated thoracic extension over back of chair x10, 5 sec hold  SL open books x5    Not performed today:  Supine AA sh flex 1# dowel 10x10 sec  Reviewed: Supine sh ER AROM at 90 deg abd x10  LTR + sh abd in supine  Shoulder flex stretch holding bar 5x10 sec    Ashlie received the following manual therapy techniques:  were applied to the: L breast and L axilla for 15 minutes.   STM to release cording to L axilla  NP STM to B UT, B levator scap, L subscap  Pt cleared of contraindications and verbal and written consent acquired.  Pt given option of copy of consent form. Pt educated on benefits and potential side effects of DN. FDN performed to L UT (30mm, fanning and winding), L levator scap insertion (30mm, pecking), L infraspinatus (30mm, winding). FDN performed to reduce pain and muscle tension, promote blood flow, and improve ROM and function . Pt tolerated tx well without adverse effects. Pt was educated on what to expect following the procedure and he verbalized understanding.      Not performed today: Ashlie received the following therapeutic activities for 00 minutes, including:  Pt edu on desk ergonomics, taking standing breaks every 20-30 min  May add overpressure to L axilla with supine sh abd for improved stretch to reduce cording    neuromuscular re-education activities to improve: Kinesthetic, Sense, Proprioception, and Posture for 25 minutes. The following activities were included: (vc for proper scap setting)  Seated scap retraction into towel 2x10  Bruegger's 2x10 seated with towel roll along spine  Scap protraction on wall x10  Serratus Wall slides with foam roller and Y loop around wrists x10, 5sec hold  Sh ext Y tubing 2x15  Rows Y tubing 2x15  Scap clocks lg Y loop x5 ea (vc and mc for scap position on L)    May add: supine horiz abd    Patient Education and Home Exercises       Education provided:   - perform ex to point of pull not pain  Pt gave verbal understanding to all education provided     Written Home Exercises Provided: pt instructed to continue previous HEP. Exercises were reviewed and Ashlie was able to demonstrate them prior to the end of the session.  Ashlie demonstrated good  understanding of the education provided. See Electronic Medical Record under Patient Instructions for exercises provided during therapy sessions    Assessment     Pt presented with overall soreness/stiffness after being on plane.  She reported feeling better overall post treatment.  Some pinching pain to L shoulder with shoulder flexion, which  resolved when lying supine on 1/2 foam roll for proper position of humeral head on socket.  She continues to present with periscap weakness (L>R), but is improving.  She will continue to benefit from PT for improved L shoulder ROM, decreased muscle tension and increased functional mobility.    Ashlie Is progressing well towards her goals.   Patient prognosis is Excellent.     Patient will continue to benefit from skilled outpatient physical therapy to address the deficits listed in the problem list box on initial evaluation, provide pt/family education and to maximize pt's level of independence in the home and community environment.     Patient's spiritual, cultural and educational needs considered and pt agreeable to plan of care and goals.     Anticipated barriers to physical therapy: none    Goals:   Short Term goals: 4 weeks  1. Patient will demonstrate 100% understanding of lymphedema risk reduction practices to include self monitoring for lymphedema. (progressing, not met)  2. Patient will demonstrate independence with Home Exercise program established. (progressing, not met)  3. Pt will increase AROM in shoulder abduction ROM to 160-165 degrees on left to improve functional reach, carry, push, pull pain free. (progressing, not met)  4. Pt will increase AROM in shoulder flexion to 150 degrees on left to improve functional reach, carry, push, pull pain free.(progressing, not met)  5. Pt will increase strength by 1/3 muscle grade in gross UE musculature to improve tolerance to all functional activities pain free. (progressing, not met)  6. Pt will increase periscap strength (lower trap, mid trap) by 1/3 muscle grade for improved postural stability. (progressing, not met)    Long Term Goals: 8 weeks   1.  Pt will increase AROM in shoulder flexion to 160 degrees on left to improve functional reach, carry, push, pull pain free. (progressing, not met)  2. Pt will increase strength to 4+/5 in gross UE musculature to  improve tolerance to all functional activities pain free. (progressing, not met)  3. Pt will demonstrate full/maximized tissue mobility to increase ROM and promote healthy tissue to be pain free at discharge. (progressing, not met)  4. Pt will report decrease in overall worst pain to 2/10 at discharge. (progressing, not met)  5. Pt will increase periscap strength (lower trap, mid trap) by 1 muscle grade for improved postural stability. (progressing, not met)  6. Increased cervical rotation AROM to 60 deg B for increased ease driving (progressing, not met)    Plan     Continue PT towards established goals.  Progress L shoulder ROM and postural strengthening as tolerated.    Outpatient physical therapy 2x week for 8 weeks to include the following:   Electrical Stimulation  , Manual Therapy, Moist Heat/ Ice, Neuromuscular Re-ed, Patient Education, Self Care, Therapeutic Activities, Therapeutic Exercise, and dry needling.    Plan of care Certification Period: 2/3/2025 to 3/31/25.      Sylvie Gandhi, PT, DPT

## 2025-02-20 ENCOUNTER — CLINICAL SUPPORT (OUTPATIENT)
Dept: REHABILITATION | Facility: HOSPITAL | Age: 70
End: 2025-02-20
Payer: MEDICARE

## 2025-02-20 DIAGNOSIS — M25.60 DECREASED RANGE OF MOTION: ICD-10-CM

## 2025-02-20 DIAGNOSIS — M62.81 MUSCLE WEAKNESS: Primary | ICD-10-CM

## 2025-02-20 PROCEDURE — 97140 MANUAL THERAPY 1/> REGIONS: CPT | Mod: PN

## 2025-02-20 PROCEDURE — 97112 NEUROMUSCULAR REEDUCATION: CPT | Mod: PN

## 2025-02-20 PROCEDURE — 97110 THERAPEUTIC EXERCISES: CPT | Mod: PN

## 2025-02-20 NOTE — PROGRESS NOTES
[facility]   Physical Therapy Treatment Note      Name: Ashlie Redman  Clinic Number: [id number]    Therapy Diagnosis:   Encounter Diagnoses   Name Primary?    Muscle weakness Yes    Decreased range of motion            Physician: Salma Pendleton MD    Visit Date: 2/20/2025    Physician Orders: PT Eval and Treat   Medical Diagnosis from Referral: Neck pain   Evaluation Date: 2/3/2025  Authorization Period Expiration: 1/27/26  Plan of Care Expiration: 3/31/25  Progress Note Due: 3/3/25  Visit # / Visits authorized: 4/12 (+eval)   FOTO:  47    Precautions: Standard and OP     Time In: 3:05  Time Out: 4:00  Total Appointment Time (timed & untimed codes): 55 minutes    PTA Visit #: 0/5       Subjective     Patient reports: she is feeling better today.  She has some pain to top of L shoulder.  She was compliant with home exercise program.  Response to previous treatment: did well  Functional change: decreased overall tightness    Pain: 0/10 at rest   Location: left shoulder     Objective      Objective Measures updated at progress report unless specified.     Treatment     Ashlie received the treatments listed below:      therapeutic exercises to develop strength, ROM, and flexibility for 15 minutes including:  PROM L sh flex and abd by PT  Pec stretch over 1/2 foam roll 3x30 sec  PROM sh flex and abd while supine on 1/2 foam roll  Seated thoracic extension over back of chair x10, 5 sec hold  SL open books x5  Supine sh abd x10  LTR + sh abd in supine    Not performed today:  Supine AA sh flex 1# dowel 10x10 sec  Reviewed: Supine sh ER AROM at 90 deg abd x10    Shoulder flex stretch holding bar 5x10 sec    Ashlie received the following manual therapy techniques:  were applied to the: L breast and L axilla for 10 minutes.   STM to release cording to L axilla  STM to L UT, L levator scap,    Not performed today:  Pt cleared of contraindications and verbal and written consent acquired. Pt given  option of copy of consent form. Pt educated on benefits and potential side effects of DN. FDN performed to L UT (30mm, fanning and winding), L levator scap insertion (30mm, pecking), L infraspinatus (30mm, winding). FDN performed to reduce pain and muscle tension, promote blood flow, and improve ROM and function . Pt tolerated tx well without adverse effects. Pt was educated on what to expect following the procedure and he verbalized understanding.      Not performed today: Ashlie received the following therapeutic activities for 00 minutes, including:  Pt edu on desk ergonomics, taking standing breaks every 20-30 min  May add overpressure to L axilla with supine sh abd for improved stretch to reduce cording    neuromuscular re-education activities to improve: Kinesthetic, Sense, Proprioception, and Posture for 30 minutes. The following activities were included: (vc for proper scap setting)  Seated scap retraction into towel 2x10  Supine horiz abd on foam roll YTB 2x10  Supine scap protraction on 1/2 foam roll 2x10, 7#  Bruegger's 2x10 seated with towel roll along spine YTB  Wall push up plus 2x10  NP Serratus Wall slides with foam roller and Y loop around wrists x10, 5sec hold  Sh ext Y tubing 2x15  NP Rows Y tubing 2x15  NP Scap clocks lg Y loop x5 ea (vc and mc for scap position on L)      Patient Education and Home Exercises       Education provided:   - perform ex to point of pull not pain  Pt gave verbal understanding to all education provided     Written Home Exercises Provided: pt instructed to continue previous HEP. Exercises were reviewed and Ashlie was able to demonstrate them prior to the end of the session.  Ashlie demonstrated good  understanding of the education provided. See Electronic Medical Record under Patient Instructions for exercises provided during therapy sessions    Assessment     Pt presented decreased overall pain and improved L shoulder ROM today.  She had 1 cord to L axilla, but decreased  compared to jose maria.  She is progressing with periscap strength and stabilization.  Supine scap protraction was more challenging on L than R due to decreased shoulder and scap stabilization.  She will continue to benefit from PT for improved L shoulder ROM, decreased muscle tension and increased functional mobility.    Ashlie Is progressing well towards her goals.   Patient prognosis is Excellent.     Patient will continue to benefit from skilled outpatient physical therapy to address the deficits listed in the problem list box on initial evaluation, provide pt/family education and to maximize pt's level of independence in the home and community environment.     Patient's spiritual, cultural and educational needs considered and pt agreeable to plan of care and goals.     Anticipated barriers to physical therapy: none    Goals:   Short Term goals: 4 weeks  1. Patient will demonstrate 100% understanding of lymphedema risk reduction practices to include self monitoring for lymphedema. (progressing, not met)  2. Patient will demonstrate independence with Home Exercise program established. (progressing, not met)  3. Pt will increase AROM in shoulder abduction ROM to 160-165 degrees on left to improve functional reach, carry, push, pull pain free. (progressing, not met)  4. Pt will increase AROM in shoulder flexion to 150 degrees on left to improve functional reach, carry, push, pull pain free.(progressing, not met)  5. Pt will increase strength by 1/3 muscle grade in gross UE musculature to improve tolerance to all functional activities pain free. (progressing, not met)  6. Pt will increase periscap strength (lower trap, mid trap) by 1/3 muscle grade for improved postural stability. (progressing, not met)    Long Term Goals: 8 weeks   1.  Pt will increase AROM in shoulder flexion to 160 degrees on left to improve functional reach, carry, push, pull pain free. (progressing, not met)  2. Pt will increase strength to 4+/5 in  gross UE musculature to improve tolerance to all functional activities pain free. (progressing, not met)  3. Pt will demonstrate full/maximized tissue mobility to increase ROM and promote healthy tissue to be pain free at discharge. (progressing, not met)  4. Pt will report decrease in overall worst pain to 2/10 at discharge. (progressing, not met)  5. Pt will increase periscap strength (lower trap, mid trap) by 1 muscle grade for improved postural stability. (progressing, not met)  6. Increased cervical rotation AROM to 60 deg B for increased ease driving (progressing, not met)    Plan     Continue PT towards established goals.  Progress L shoulder ROM and postural strengthening as tolerated.    Outpatient physical therapy 2x week for 8 weeks to include the following:   Electrical Stimulation  , Manual Therapy, Moist Heat/ Ice, Neuromuscular Re-ed, Patient Education, Self Care, Therapeutic Activities, Therapeutic Exercise, and dry needling.    Plan of care Certification Period: 2/3/2025 to 3/31/25.      Sylvie Gandhi, PT, DPT

## 2025-02-26 NOTE — PROGRESS NOTES
[facility]   Physical Therapy Treatment Note      Name: Ashlie Redman  Clinic Number: [id number]    Therapy Diagnosis:   Encounter Diagnoses   Name Primary?    Muscle weakness Yes    Decreased range of motion          Physician: Salma Pendleton MD    Visit Date: 2/27/2025    Physician Orders: PT Eval and Treat   Medical Diagnosis from Referral: Neck pain   Evaluation Date: 2/3/2025  Authorization Period Expiration: 1/27/26  Plan of Care Expiration: 3/31/25  Progress Note Due: 3/3/25  Visit # / Visits authorized: 5/12 (+eval)   FOTO:  47    Precautions: Standard and OP     Time In: 1:00  Time Out: 1:55  Total Appointment Time (timed & untimed codes): 53 minutes    PTA Visit #: 0/5       Subjective     Patient reports: she does not have pain, but she has been sick with a cold.  She was compliant with home exercise program.  Response to previous treatment: did well  Functional change: decreased overall tightness    Pain: 0/10 at rest   Location: left shoulder     Objective      Objective Measures updated at progress report unless specified.     Treatment     Ashlie received the treatments listed below:      therapeutic exercises to develop strength, ROM, and flexibility for 20 minutes including:  PROM L sh flex and abd by PT  Pec stretch over 1/2 foam roll 3x30 sec  PROM sh flex and abd while supine on 1/2 foam roll  Seated thoracic extension over back of chair x10, 5 sec hold  SL open books x5, 10 sec hold  Supine sh abd x10  LTR + sh abd in supine  Shoulder flex stretch holding bar 5x10 sec      Ashlie received the following manual therapy techniques:  were applied to the: L breast and L axilla for 2 minutes.   Assessed for cording to L axilla, but none today  STM to scar on lateral L breast  NP STM to release cording to L axilla  NP STM to L UT, L levator scap,    Not performed today:  Pt cleared of contraindications and verbal and written consent acquired. Pt given option of copy of consent  form. Pt educated on benefits and potential side effects of DN. FDN performed to L UT (30mm, fanning and winding), L levator scap insertion (30mm, pecking), L infraspinatus (30mm, winding). FDN performed to reduce pain and muscle tension, promote blood flow, and improve ROM and function . Pt tolerated tx well without adverse effects. Pt was educated on what to expect following the procedure and he verbalized understanding.      Not performed today: Ashlie received the following therapeutic activities for 00 minutes, including:  Pt edu on desk ergonomics, taking standing breaks every 20-30 min  May add overpressure to L axilla with supine sh abd for improved stretch to reduce cording    neuromuscular re-education activities to improve: Kinesthetic, Sense, Proprioception, and Posture for 23 minutes. The following activities were included: (vc for proper scap setting)  Seated scap retraction into towel 2x10  Supine horiz abd on foam roll YTB 2x10  Supine scap protraction on 1/2 foam roll 2x10, 7#  Bruegger's 2x10 seated with towel roll along spine YTB  Wall push up plus 2x10  Serratus Wall slides with foam roller and Y loop around wrists x10, 5sec hold  Lower trap lift off wall x10 from corner  NP Sh ext Y tubing 2x15  NP Rows Y tubing 2x15  Scap clocks lg Y loop x7 ea (vc and mc for scap position on L)  UBE seat 1, L1, backwards x3 min (towel roll along spine and cues for posture)      Patient Education and Home Exercises       Education provided:   - perform ex to point of pull not pain  Pt gave verbal understanding to all education provided     Written Home Exercises Provided: pt instructed to continue previous HEP. Exercises were reviewed and Ashlie was able to demonstrate them prior to the end of the session.  Ashlie demonstrated good  understanding of the education provided. See Electronic Medical Record under Patient Instructions for exercises provided during therapy sessions    Assessment     Pt presented with improved  L shoulder AROM.  She did not have cording today so did not require much manual.  She continues to improve with increased lower trap strength.  She will continue to benefit from PT for improved L shoulder ROM, decreased muscle tension and increased functional mobility.    Ashlie Is progressing well towards her goals.   Patient prognosis is Excellent.     Patient will continue to benefit from skilled outpatient physical therapy to address the deficits listed in the problem list box on initial evaluation, provide pt/family education and to maximize pt's level of independence in the home and community environment.     Patient's spiritual, cultural and educational needs considered and pt agreeable to plan of care and goals.     Anticipated barriers to physical therapy: none    Goals:   Short Term goals: 4 weeks  1. Patient will demonstrate 100% understanding of lymphedema risk reduction practices to include self monitoring for lymphedema. (progressing, not met)  2. Patient will demonstrate independence with Home Exercise program established. (progressing, not met)  3. Pt will increase AROM in shoulder abduction ROM to 160-165 degrees on left to improve functional reach, carry, push, pull pain free. (progressing, not met)  4. Pt will increase AROM in shoulder flexion to 150 degrees on left to improve functional reach, carry, push, pull pain free.(progressing, not met)  5. Pt will increase strength by 1/3 muscle grade in gross UE musculature to improve tolerance to all functional activities pain free. (progressing, not met)  6. Pt will increase periscap strength (lower trap, mid trap) by 1/3 muscle grade for improved postural stability. (progressing, not met)    Long Term Goals: 8 weeks   1.  Pt will increase AROM in shoulder flexion to 160 degrees on left to improve functional reach, carry, push, pull pain free. (progressing, not met)  2. Pt will increase strength to 4+/5 in gross UE musculature to improve tolerance to all  functional activities pain free. (progressing, not met)  3. Pt will demonstrate full/maximized tissue mobility to increase ROM and promote healthy tissue to be pain free at discharge. (progressing, not met)  4. Pt will report decrease in overall worst pain to 2/10 at discharge. (progressing, not met)  5. Pt will increase periscap strength (lower trap, mid trap) by 1 muscle grade for improved postural stability. (progressing, not met)  6. Increased cervical rotation AROM to 60 deg B for increased ease driving (progressing, not met)    Plan     Continue PT towards established goals.  Progress L shoulder ROM and postural strengthening as tolerated.  Will reassess next visit.    Outpatient physical therapy 2x week for 8 weeks to include the following:   Electrical Stimulation  , Manual Therapy, Moist Heat/ Ice, Neuromuscular Re-ed, Patient Education, Self Care, Therapeutic Activities, Therapeutic Exercise, and dry needling.    Plan of care Certification Period: 2/3/2025 to 3/31/25.      Sylvie Gandhi, PT, DPT

## 2025-02-27 ENCOUNTER — CLINICAL SUPPORT (OUTPATIENT)
Dept: REHABILITATION | Facility: HOSPITAL | Age: 70
End: 2025-02-27
Payer: MEDICARE

## 2025-02-27 DIAGNOSIS — M25.60 DECREASED RANGE OF MOTION: ICD-10-CM

## 2025-02-27 DIAGNOSIS — M62.81 MUSCLE WEAKNESS: Primary | ICD-10-CM

## 2025-02-27 PROCEDURE — 97112 NEUROMUSCULAR REEDUCATION: CPT | Mod: PN

## 2025-02-27 PROCEDURE — 97110 THERAPEUTIC EXERCISES: CPT | Mod: PN

## 2025-02-28 ENCOUNTER — CLINICAL SUPPORT (OUTPATIENT)
Dept: OPHTHALMOLOGY | Facility: CLINIC | Age: 70
End: 2025-02-28
Payer: MEDICARE

## 2025-02-28 ENCOUNTER — OFFICE VISIT (OUTPATIENT)
Dept: OPTOMETRY | Facility: CLINIC | Age: 70
End: 2025-02-28
Payer: MEDICARE

## 2025-02-28 DIAGNOSIS — H40.053 BILATERAL OCULAR HYPERTENSION: Primary | ICD-10-CM

## 2025-02-28 DIAGNOSIS — H52.203 MYOPIA WITH ASTIGMATISM AND PRESBYOPIA, BILATERAL: ICD-10-CM

## 2025-02-28 DIAGNOSIS — H52.13 MYOPIA WITH ASTIGMATISM AND PRESBYOPIA, BILATERAL: ICD-10-CM

## 2025-02-28 DIAGNOSIS — H40.053 BILATERAL OCULAR HYPERTENSION: ICD-10-CM

## 2025-02-28 DIAGNOSIS — H52.4 MYOPIA WITH ASTIGMATISM AND PRESBYOPIA, BILATERAL: ICD-10-CM

## 2025-02-28 DIAGNOSIS — Z97.3 WEARS CONTACT LENSES: ICD-10-CM

## 2025-02-28 PROCEDURE — 99999 PR PBB SHADOW E&M-EST. PATIENT-LVL III: CPT | Mod: PBBFAC,,,

## 2025-02-28 NOTE — PROGRESS NOTES
HPI    Pt preents today for a 24-2 HVF, IOP check and MRX.  Pt states [facility] is [facility], but would like a RX for dist OU for driving at   night.  Pt instills Latanoprost qhs OU and Dorzolamide bid OU.  Denies ocular pain/disc.  Last edited by Félix Morrison, OD on 2025  4:17 PM.            Assessment /Plan     For exam results, see Encounter Report.    Bilateral ocular hypertension    Myopia with astigmatism and presbyopia, bilateral    Wears contact lenses      Longstanding. Previously monitored by Dr. Vasquez, then Dr. Jimenez. IOP stable in upper teens/low 20s on Latanoprost QHS OU and Dorzolamide BID OU, higher than typical today OS. Emphasized importance of continued good compliance with drops to prevent worsening visual field loss. Pt to return in 4 months IOP check and DFE. If IOP continues to increase OS, may need to add another drop.  IOP   - 25  14 / 15 - 10/22/24  17 // 21 - 24  18 /  - 24 - 10/20/23  Tmax: 37 // 44   RNFL OCT  10/22/24  OD: G(76), ST(85), T(65), IT(94), IN(104), N(62), SN(71); ONL - new centration  OS: G(81), ST(93), T(64), IT(91), IN(120), N(68), SN(81); ONL   10/20/23         OD: G(79), ST(75), T(61), IT(87), IN(109), N(78), SN(85); ONL - scan not centered  OS: G(82), ST(97), T(73), IT(101), IN(111), N(57), SN(87); Borderline   24-2 HVF  25  OD: borderline reliability, non-specific nasal defects, GHT: WNL  OS: reliable, early superior and inferior nasal step, GHT: ONL  24  OD: reliable, non-specific defects, GHT: WNL  OS: reliable, early superior and inferior nasal step, GHT: ONL  Pachymetry: 593 // 601  Gonio: Open to  OD, OS  (+)Family history: maternal grandmother  Discussed spectacle options with pt and released final spec rx,  (pt has PCIOL monovision). Ed pt on change in rx and adaptation.  Pt needs to order new contact lenses, but unsure exactly which strength she's currently wearing (wears Oasys, have seen  on eye before). Will call or message with power and can finalize for pt.     RTC: 4 months for IOP check and DFE, sooner prn.

## 2025-02-28 NOTE — PROGRESS NOTES
PT PRESENTS TODAY FOR A 24-2 HVF.  PT COMPLIANT W/ TESTING INSTRUCTIONS.  PT HELD FIXATION VERY WELL.    Assessment /Plan     For exam results, see Encounter Report.    Bilateral ocular hypertension  -     [facility] Visual Field - OU - Extended - Both Eyes

## 2025-03-01 ENCOUNTER — PATIENT MESSAGE (OUTPATIENT)
Dept: OPTOMETRY | Facility: CLINIC | Age: 70
End: 2025-03-01
Payer: MEDICARE

## 2025-03-05 ENCOUNTER — CLINICAL SUPPORT (OUTPATIENT)
Dept: REHABILITATION | Facility: HOSPITAL | Age: 70
End: 2025-03-05
Payer: MEDICARE

## 2025-03-05 DIAGNOSIS — M62.81 MUSCLE WEAKNESS: Primary | ICD-10-CM

## 2025-03-05 DIAGNOSIS — M25.60 DECREASED RANGE OF MOTION: ICD-10-CM

## 2025-03-05 PROCEDURE — 97112 NEUROMUSCULAR REEDUCATION: CPT | Mod: PN

## 2025-03-05 PROCEDURE — 97110 THERAPEUTIC EXERCISES: CPT | Mod: PN

## 2025-03-05 PROCEDURE — 97140 MANUAL THERAPY 1/> REGIONS: CPT | Mod: PN

## 2025-03-05 NOTE — PROGRESS NOTES
[facility]   Physical Therapy Treatment Note      Name: Ashlie Redman  Clinic Number: [id number]    Therapy Diagnosis:   Encounter Diagnoses   Name Primary?    Muscle weakness Yes    Decreased range of motion          Physician: Salma Pendleton MD    Visit Date: 3/5/2025    Physician Orders: PT Eval and Treat   Medical Diagnosis from Referral: Neck pain   Evaluation Date: 2/3/2025  Authorization Period Expiration: 1/27/26  Plan of Care Expiration: 3/31/25  Progress Note Due: 3/3/25  Visit # / Visits authorized: 5/12 (+eval)   FOTO:  79 (met)    Precautions: Standard and OP     Time In: 2:05  Time Out: 3:00  Total Appointment Time (timed & untimed codes): 53 minutes    PTA Visit #: 0/5       Subjective     Patient reports: she is feeling better overall.  She has been paying attention to posture and scap setting.  She was compliant with home exercise program.  Response to previous treatment: did well  Functional change: decreased overall tightness    Pain: 0/10 at rest   Location: left shoulder     Objective      TTP and tightness to B cervical paraspinals, B UT, B levator scap (L>R)    Range of Motion:     Cervical AROM: (deg)  Flex:50  Ext:60   R SB: 32  L SB:32  R rot:65  L rot:60       Shoulder Range of Motion:   Active /Passive ROM Right Left   Flexion 166 158   Abduction 172 160   Extension 55 55   IR/90deg T5 T5   ER/90deg 80 78     Cervical strength: 5/5 in all planes    Strength: manual muscle test grades below   Upper Extremity Strength   (R) UE (L) UE   Shoulder flexion: 5/5 5/5   Shoulder Abduction: 5/5 5/5   Shoulder IR 5/5 5/5   Shoulder ER 5/5 5/5   Elbow flexion: 5/5 5/5   Elbow extension: 5/5 5/5     Lower trap: R: 3/5, L: 3/5  Mid trap: R: 4+/5; L; 4/5  Rhomboid: R: 5/5, L: 5/5    Functional Mobility (Bed mobility, transfers)  I in all aspects    ADL's (Activities of Daily Living):  Difficulty with reaching overhead, upper body dressing    Treatment     Ashlie received the  treatments listed below:      therapeutic exercises to develop strength, ROM, and flexibility for 25 minutes including:  Reassessment  PROM L sh flex and abd by PT  Pec stretch over 1/2 foam roll 3x30 sec  PROM sh flex and abd while supine on 1/2 foam roll  Seated thoracic extension over back of chair x10, 5 sec hold  SL open books x5, 10 sec hold  LTR + sh abd in supine  NP Shoulder flex stretch holding bar 5x10 sec      Ashlie received the following manual therapy techniques:  were applied to the: L breast and L axilla for 10 minutes.   Release cording to L axilla  STM to scar on lateral L breast  NP STM to L UT, L levator scap,    Not performed today: Ashlie received the following therapeutic activities for 00 minutes, including:  Pt edu on desk ergonomics, taking standing breaks every 20-30 min  May add overpressure to L axilla with supine sh abd for improved stretch to reduce cording    neuromuscular re-education activities to improve: Kinesthetic, Sense, Proprioception, and Posture for 18 minutes. The following activities were included: (vc for proper scap setting)  Seated scap retraction into towel 2x10  Supine horiz abd on foam roll RTB 2x10  Supine scap protraction on 1/2 foam roll 2x10, 7#  Bruegger's 2x10 seated with towel roll along spine YTB  NP Wall push up plus 2x10    Not performed due time:  Serratus Wall slides with foam roller and Y loop around wrists x10, 5sec hold  Lower trap lift off wall x10 from corner  Sh ext Y tubing 2x15  Rows Y tubing 2x15  Scap clocks lg Y loop x7 ea (vc and mc for scap position on L)  UBE seat 1, L1, backwards x3 min (towel roll along spine and cues for posture)      Patient Education and Home Exercises       Education provided:   - perform ex to point of pull not pain  Pt gave verbal understanding to all education provided     Written Home Exercises Provided: pt instructed to continue previous HEP. Exercises were reviewed and Ashlie was able to demonstrate them prior to the end  of the session.  Ashlie demonstrated good  understanding of the education provided. See Electronic Medical Record under Patient Instructions for exercises provided during therapy sessions    Assessment     Pt reassessed today.  She has improved significantly with increased L shoulder ROM and UE strength.  She does still have slight cording to L axilla at area of lateral L breast. She will continue to benefit from PT for improved L shoulder ROM, decreased muscle tension and increased functional mobility.    Ashlie Is progressing well towards her goals.   Patient prognosis is Excellent.     Patient will continue to benefit from skilled outpatient physical therapy to address the deficits listed in the problem list box on initial evaluation, provide pt/family education and to maximize pt's level of independence in the home and community environment.     Patient's spiritual, cultural and educational needs considered and pt agreeable to plan of care and goals.     Anticipated barriers to physical therapy: none    Goals:   Short Term goals: 4 weeks  1. Patient will demonstrate 100% understanding of lymphedema risk reduction practices to include self monitoring for lymphedema. (met)  2. Patient will demonstrate independence with Home Exercise program established. (met)  3. Pt will increase AROM in shoulder abduction ROM to 160-165 degrees on left to improve functional reach, carry, push, pull pain free. (met)  4. Pt will increase AROM in shoulder flexion to 150 degrees on left to improve functional reach, carry, push, pull pain free.(met)  5. Pt will increase strength by 1/3 muscle grade in gross UE musculature to improve tolerance to all functional activities pain free. (met)  6. Pt will increase periscap strength (lower trap, mid trap) by 1/3 muscle grade for improved postural stability. (met)    Long Term Goals: 8 weeks   1.  Pt will increase AROM in shoulder flexion to 160 degrees on left to improve functional reach, carry,  push, pull pain free. (progressing, not met)  2. Pt will increase strength to 4+/5 in gross UE musculature to improve tolerance to all functional activities pain free. (progressing, not met)  3. Pt will demonstrate full/maximized tissue mobility to increase ROM and promote healthy tissue to be pain free at discharge. (progressing, not met)  4. Pt will report decrease in overall worst pain to 2/10 at discharge. (progressing, not met)  5. Pt will increase periscap strength (lower trap, mid trap) by 1 muscle grade for improved postural stability. (progressing, not met)  6. Increased cervical rotation AROM to 60 deg B for increased ease driving (progressing, not met)    Plan     Continue PT towards established goals.  Progress L shoulder ROM and postural strengthening as tolerated.      Outpatient physical therapy 2x week for 8 weeks to include the following:   Electrical Stimulation  , Manual Therapy, Moist Heat/ Ice, Neuromuscular Re-ed, Patient Education, Self Care, Therapeutic Activities, Therapeutic Exercise, and dry needling.    Plan of care Certification Period: 2/3/2025 to 3/31/25.      Sylvie Gandhi, PT, DPT

## 2025-03-07 ENCOUNTER — PATIENT MESSAGE (OUTPATIENT)
Dept: DERMATOLOGY | Facility: CLINIC | Age: 70
End: 2025-03-07
Payer: MEDICARE

## 2025-03-07 ENCOUNTER — OFFICE VISIT (OUTPATIENT)
Dept: PRIMARY CARE CLINIC | Facility: CLINIC | Age: 70
End: 2025-03-07
Payer: MEDICARE

## 2025-03-07 VITALS
WEIGHT: 140.63 LBS | SYSTOLIC BLOOD PRESSURE: 120 MMHG | DIASTOLIC BLOOD PRESSURE: 74 MMHG | HEIGHT: 67 IN | OXYGEN SATURATION: 99 % | HEART RATE: 62 BPM | BODY MASS INDEX: 22.07 KG/M2

## 2025-03-07 DIAGNOSIS — G72.0 STATIN MYOPATHY: ICD-10-CM

## 2025-03-07 DIAGNOSIS — I25.10 CORONARY ARTERY DISEASE, UNSPECIFIED VESSEL OR LESION TYPE, UNSPECIFIED WHETHER ANGINA PRESENT, UNSPECIFIED WHETHER NATIVE OR TRANSPLANTED HEART: ICD-10-CM

## 2025-03-07 DIAGNOSIS — M80.00XA AGE-RELATED OSTEOPOROSIS WITH CURRENT PATHOLOGICAL FRACTURE, INITIAL ENCOUNTER: ICD-10-CM

## 2025-03-07 DIAGNOSIS — E53.8 VITAMIN B12 DEFICIENCY: ICD-10-CM

## 2025-03-07 DIAGNOSIS — M85.80 OSTEOPENIA, UNSPECIFIED LOCATION: ICD-10-CM

## 2025-03-07 DIAGNOSIS — E83.52 HYPERCALCEMIA: ICD-10-CM

## 2025-03-07 DIAGNOSIS — I25.110 ATHEROSCLEROSIS OF NATIVE CORONARY ARTERY OF NATIVE HEART WITH UNSTABLE ANGINA PECTORIS: Primary | ICD-10-CM

## 2025-03-07 DIAGNOSIS — T46.6X5A STATIN MYOPATHY: ICD-10-CM

## 2025-03-07 PROCEDURE — 99999 PR PBB SHADOW E&M-EST. PATIENT-LVL III: CPT | Mod: PBBFAC,,, | Performed by: INTERNAL MEDICINE

## 2025-03-07 NOTE — PROGRESS NOTES
INTERNAL MEDICINE PROGRESS/URGENT CARE NOTE    CHIEF COMPLAINT     Chief Complaint   Patient presents with    Nail Problem     On her left 3rd finger        HPI     Ashlie Redman is a 69 y.o.  female who presents with a PMHx of  allergy, anemia, history of breast cancer, glaucoma, osteopenia, who presents today for routine follow up.   Here alone today  Her daughter has recently moved to the [facility] and she's thing of moving there as well. One duaghter lives in [place] and the other in [place]. She would like to be closer to her and her granddaughter      Her main concerns and complaints are: none. She is doing very well      At her previous visits, we discussed:   1. BP a bit elevated. Now normalized  2. Discussed Cath from 2021 which noted 20% blockage of the RCA without significant occlusive disease. Most recent LDL is 120 and I woluld like to be 55. Had a long discussion with patient re wanting her to be on statin and she strongly disagrees. Says she was cardiology that she has nothing to worry about.   Of note, describes a recent event of eating a piece of chocolate and having severe chest pain that night, abated with antacid which does suggest a gastric cause, but one night after that without inciting e vent she again woke up with a chest tightness. [facility]   3.Just had a thyroid biopsy which was benign.   4.Some mild non botehrsome insomnia due to her menopause symptoms at night. Has been on SRRI did not help. Unable to take estrogen ebcause of breast cancer history. Has tried OTC supplements but also scare of containing estrogen           History of breast cancer:  diagnosed in 2017. Treated with radiation, mastectomy and chemotehrapy. Still follows with oncologist. Had a PET CT done which was normal and reassuring.      Osteoporosis: last BMD done in 2022 showed osteopenia with extremely high FRAX score . On prolia. Which has significantly imrpoved bone health.       CAD: went to the hospital a while ago and had a cardiac cath which showed nononstructing CAD. Recommended statin and ASA, patient declines both. Her LDL is at goal. Note FH of early cardiac death. Discussed this as well as a reason to reconsider statin and ASA. She does eat very well as a vegetarian and she is a lifelong excercisier and stays very very healthy so this is to her benefit.      Neuropathy with macrocytosis: likely B12 deficiency. Will check but have advised her to start taking vitamin B12.       Home Medications:  Prior to Admission medications    Medication Sig Start Date End Date Taking? Authorizing Provider   aspirin (ECOTRIN) 81 MG EC tablet Take 1 tablet (81 mg total) by mouth once daily. 9/6/24 9/6/25  Salma Pendleton MD   buPROPion (WELLBUTRIN XL) 150 MG TB24 tablet Take 1 tablet (150 mg total) by mouth every morning. 12/27/24 12/27/25  Celestina Ragsdale MD   calcium carb/vit D3/minerals (CALCIUM-VITAMIN D ORAL)  1/1/20   Provider, Historical   denosumab (PROLIA) 60 mg/mL Syrg Inject 60 mg into the skin.    Provider, Historical   dorzolamide (TRUSOPT) 2 % ophthalmic solution Place 1 drop into both eyes 2 (two) times a day. 4/25/24 4/25/25  Félix Morrison, ESVIN   estradioL (ESTRACE) 0.01 % (0.1 mg/gram) vaginal cream INSERT 1/2 GRAM VAGINA W/THOR OR DIME-SIZE W/FINGER(FAR INTERNAL) NIGHTLY X 2 WKS,THEN TWICE A WK 11/14/24   Kiana Camarillo, NP   latanoprost 0.005 % ophthalmic solution Place 1 drop into both eyes every evening. INSTILL 1 DROP INTO BOTH EYES IN THE EVENING 4/25/24 4/25/25  Félix Morrison, OD   valACYclovir (VALTREX) 500 MG tablet TAKE 1 TABLET BY MOUTH EVERY DAY 8/6/24   Salma Pendleton MD       Review of Systems:  Review of Systems      Advance Care Planning     Date: 03/07/2025    Power of   I initiated the process of voluntary advance care planning today and explained the importance of this process to the patient.  I introduced the concept of advance  "directives to the patient, as well. Then the patient received detailed information about the importance of designating a Health Care Power of  (HCPOA). She was also instructed to communicate with this person about their wishes for future healthcare, should she become sick and lose decision-making capacity. The patient has previously appointed a HCPOA. After our discussion, the patient has decided to complete a HCPOA and has appointed her daughter, health care agent:  on file  & health care agent number:  on file . I encouraged her to communicate with this person about their wishes for future healthcare, should she become sick and lose decision-making capacity.      A total of 10 min was spent on advance care planning, goals of care discussion, emotional support, formulating and communicating prognosis and exploring burden/benefit of various approaches of treatment. This discussion occurred on a fully voluntary basis with the verbal consent of the patient and/or family.           PHYSICAL EXAM     /74 (BP Location: Right arm, Patient Position: Sitting)   Pulse 62   Ht 5' 7" (1.702 m)   Wt 63.8 kg (140 lb 10.5 oz)   SpO2 99%   BMI 22.03 kg/m²     GEN - A+OX4, NAD   HEENT - PERRL, EOMI, OP clear  Neck - No thyromegaly or cervical LAD. No thyroid masses felt.  CV - RRR, no m/r   Chest - CTAB, no wheezing or rhonchi  Abd - S/NT/ND/+BS.   Ext - 2+BDP and radial pulses. No C/C/E.  Skin - No rash.    LABS         ASSESSMENT/PLAN     Ashlie Redman is a 69 y.o. female with  1. Atherosclerosis of native coronary artery of native heart with unstable angina pectoris  Overview:  Declines statin and ASA.  LDL at goal  shes a vegetarian who already excercises a great deal. Continue  Note FH of early cardiac death      2. Coronary artery disease, unspecified vessel or lesion type, unspecified whether angina present, unspecified whether native or transplanted heart  Asymptomatic  Contineu with current " medications  She's very active and eats very well     3. Osteopenia, unspecified location  On prolia.   Continue with calcium and vitamin d     4. Age-related osteoporosis with current pathological fracture, initial encounter  On prolia with calcium and vitamin d    5. Hypercalcemia  Normal on this labs     6. Statin myopathy  Avoid statin use   Limit cholesterol in diet            WORRY SCORE 1    RTC in 6 months, sooner if needed and depending on labs.    Salma Pendleton MD  Board Certified Internist/Geriatrician  [facility] ([place])

## 2025-03-12 ENCOUNTER — CLINICAL SUPPORT (OUTPATIENT)
Dept: REHABILITATION | Facility: HOSPITAL | Age: 70
End: 2025-03-12
Payer: MEDICARE

## 2025-03-12 DIAGNOSIS — M62.81 MUSCLE WEAKNESS: Primary | ICD-10-CM

## 2025-03-12 DIAGNOSIS — M25.60 DECREASED RANGE OF MOTION: ICD-10-CM

## 2025-03-12 PROCEDURE — 97140 MANUAL THERAPY 1/> REGIONS: CPT | Mod: PN

## 2025-03-12 PROCEDURE — 97110 THERAPEUTIC EXERCISES: CPT | Mod: PN

## 2025-03-12 PROCEDURE — 97112 NEUROMUSCULAR REEDUCATION: CPT | Mod: PN

## 2025-03-12 NOTE — PROGRESS NOTES
[facility]   Physical Therapy Treatment Note      Name: Ashlie Redman  Clinic Number: [id number]    Therapy Diagnosis:   Encounter Diagnoses   Name Primary?    Muscle weakness Yes    Decreased range of motion            Physician: aSlma Pendleton MD    Visit Date: 3/12/2025    Physician Orders: PT Eval and Treat   Medical Diagnosis from Referral: Neck pain   Evaluation Date: 2/3/2025  Authorization Period Expiration: 1/27/26  Plan of Care Expiration: 3/31/25  Progress Note Due: 3/3/25  Visit # / Visits authorized: 6/12 (+eval)   FOTO:  79 (met)    Precautions: Standard and OP     Time In: 2:05  Time Out: 3:00  Total Appointment Time (timed & untimed codes): 53 minutes    PTA Visit #: 0/5       Subjective     Patient reports: she is not having pain.  She was compliant with home exercise program.  Response to previous treatment: did well  Functional change: decreased overall tightness, able to perform overhead motions in Jazzercise    Pain: 0/10 at rest   Location: left shoulder     Objective      TTP and tightness to B cervical paraspinals, B UT, B levator scap (L>R)    Range of Motion:     Cervical AROM: (deg)  Flex:50  Ext:60   R SB: 32  L SB:32  R rot:65  L rot:60       Shoulder Range of Motion:   Active /Passive ROM Right Left   Flexion 166 158   Abduction 172 160   Extension 55 55   IR/90deg T5 T5   ER/90deg 80 78     Cervical strength: 5/5 in all planes    Strength: manual muscle test grades below   Upper Extremity Strength   (R) UE (L) UE   Shoulder flexion: 5/5 5/5   Shoulder Abduction: 5/5 5/5   Shoulder IR 5/5 5/5   Shoulder ER 5/5 5/5   Elbow flexion: 5/5 5/5   Elbow extension: 5/5 5/5     Lower trap: R: 3/5, L: 3/5  Mid trap: R: 4+/5; L; 4/5  Rhomboid: R: 5/5, L: 5/5    Functional Mobility (Bed mobility, transfers)  I in all aspects    ADL's (Activities of Daily Living):  Difficulty with reaching overhead, upper body dressing    Treatment     Ashlie received the treatments  listed below:      therapeutic exercises to develop strength, ROM, and flexibility for 20 minutes including:  PROM L sh flex and abd by PT  Pec stretch over 1/2 foam roll 3x30 sec  PROM sh flex and abd while supine on 1/2 foam roll  Seated thoracic extension over back of chair x10, 5 sec hold  SL open books x5, 10 sec hold  LTR + sh abd in supine  Bicep stretch in doorway 3x20 sec ea  NP Shoulder flex stretch holding bar 5x10 sec      Ashlie received the following manual therapy techniques:  were applied to the: L breast and L axilla for 10 minutes.   Release cording to L axilla  STM to scar on lateral L breast  NP STM to L UT, L levator scap,    Not performed today: Ashlie received the following therapeutic activities for 00 minutes, including:  Pt edu on desk ergonomics, taking standing breaks every 20-30 min  May add overpressure to L axilla with supine sh abd for improved stretch to reduce cording    neuromuscular re-education activities to improve: Kinesthetic, Sense, Proprioception, and Posture for 25 minutes. The following activities were included: (vc for proper scap setting)  Seated scap retraction into towel 2x10  Supine horiz abd on foam roll RTB 2x10  Supine scap protraction on 1/2 foam roll 2x10, 7#  Bruegger's 2x10 seated with towel roll along spine YTB  Wall push up plus 2x10  Lower trap lift off wall x10 from corner  Sh ext Y tubing 1x15, then 1x15 with ER  Rows Y tubing 2x15  Scap clocks lg Y loop x8 ea (vc and mc for scap position on L)  UBE seat 1, L1, backwards x5 min (towel roll along spine and cues for posture)      Not performed due time:  Serratus Wall slides with foam roller and Y loop around wrists x10, 5sec hold    Patient Education and Home Exercises       Education provided:   - perform ex to point of pull not pain  Pt gave verbal understanding to all education provided     Written Home Exercises Provided: pt instructed to continue previous HEP. Exercises were reviewed and Ashlie was able to  demonstrate them prior to the end of the session.  Ashlie demonstrated good  understanding of the education provided. See Electronic Medical Record under Patient Instructions for exercises provided during therapy sessions    Assessment     Pt improving with L shoulder ROM and postural strength.  Will most likely be ready for discharge next visit.    Ashlie Is progressing well towards her goals.   Patient prognosis is Excellent.     Patient will continue to benefit from skilled outpatient physical therapy to address the deficits listed in the problem list box on initial evaluation, provide pt/family education and to maximize pt's level of independence in the home and community environment.     Patient's spiritual, cultural and educational needs considered and pt agreeable to plan of care and goals.     Anticipated barriers to physical therapy: none    Goals:   Short Term goals: 4 weeks  1. Patient will demonstrate 100% understanding of lymphedema risk reduction practices to include self monitoring for lymphedema. (met)  2. Patient will demonstrate independence with Home Exercise program established. (met)  3. Pt will increase AROM in shoulder abduction ROM to 160-165 degrees on left to improve functional reach, carry, push, pull pain free. (met)  4. Pt will increase AROM in shoulder flexion to 150 degrees on left to improve functional reach, carry, push, pull pain free.(met)  5. Pt will increase strength by 1/3 muscle grade in gross UE musculature to improve tolerance to all functional activities pain free. (met)  6. Pt will increase periscap strength (lower trap, mid trap) by 1/3 muscle grade for improved postural stability. (met)    Long Term Goals: 8 weeks   1.  Pt will increase AROM in shoulder flexion to 160 degrees on left to improve functional reach, carry, push, pull pain free. (progressing, not met)  2. Pt will increase strength to 4+/5 in gross UE musculature to improve tolerance to all functional activities pain  free. (progressing, not met)  3. Pt will demonstrate full/maximized tissue mobility to increase ROM and promote healthy tissue to be pain free at discharge. (progressing, not met)  4. Pt will report decrease in overall worst pain to 2/10 at discharge. (progressing, not met)  5. Pt will increase periscap strength (lower trap, mid trap) by 1 muscle grade for improved postural stability. (progressing, not met)  6. Increased cervical rotation AROM to 60 deg B for increased ease driving (progressing, not met)    Plan     Reassess and probable discharge.    Outpatient physical therapy 2x week for 8 weeks to include the following:   Electrical Stimulation  , Manual Therapy, Moist Heat/ Ice, Neuromuscular Re-ed, Patient Education, Self Care, Therapeutic Activities, Therapeutic Exercise, and dry needling.    Plan of care Certification Period: 2/3/2025 to 3/31/25.      Sylvie Gandhi, PT, DPT

## 2025-03-16 ENCOUNTER — PATIENT MESSAGE (OUTPATIENT)
Dept: OPTOMETRY | Facility: CLINIC | Age: 70
End: 2025-03-16
Payer: MEDICARE

## 2025-03-17 ENCOUNTER — CLINICAL SUPPORT (OUTPATIENT)
Dept: REHABILITATION | Facility: HOSPITAL | Age: 70
End: 2025-03-17
Payer: MEDICARE

## 2025-03-17 DIAGNOSIS — M25.60 DECREASED RANGE OF MOTION: ICD-10-CM

## 2025-03-17 DIAGNOSIS — M62.81 MUSCLE WEAKNESS: Primary | ICD-10-CM

## 2025-03-17 PROCEDURE — 97112 NEUROMUSCULAR REEDUCATION: CPT | Mod: PN

## 2025-03-17 PROCEDURE — 97140 MANUAL THERAPY 1/> REGIONS: CPT | Mod: PN

## 2025-03-17 PROCEDURE — 97110 THERAPEUTIC EXERCISES: CPT | Mod: PN

## 2025-03-17 NOTE — PROGRESS NOTES
[facility]   Discharge Summary and Physical Therapy Treatment Note      Name: Ashlie Redman  Clinic Number: [id number]    Therapy Diagnosis:   Encounter Diagnoses   Name Primary?    Muscle weakness Yes    Decreased range of motion        Physician: Salma Pendleton MD    Visit Date: 3/12/2025    Physician Orders: PT Eval and Treat   Medical Diagnosis from Referral: Neck pain   Evaluation Date: 2/3/2025  Authorization Period Expiration: 1/27/26  Plan of Care Expiration: 3/31/25  Progress Note Due: 3/3/25  Visit # / Visits authorized: 8/12 (+eval)   FOTO:  79 (met)    Precautions: Standard and OP     Time In: 3:05  Time Out: 4:00  Total Appointment Time (timed & untimed codes): 53 minutes    PTA Visit #: 0/5       Subjective     Patient reports: she is not having pain.  She was compliant with home exercise program.  Response to previous treatment: did well  Functional change: decreased overall tightness, able to perform overhead motions in Jazzercise    Pain: 0/10 at rest   Location: left shoulder     Objective      TTP and tightness to B cervical paraspinals, B UT, B levator scap (L>R)    Range of Motion:     Cervical AROM: (deg)  Flex:50  Ext:60   R SB: 32  L SB:32  R rot:65  L rot:60       Shoulder Range of Motion:   Active /Passive ROM Right Left   Flexion 166 160   Abduction 172 160   Extension 55 55   IR/90deg T5 T5   ER/90deg 80 80     Cervical strength: 5/5 in all planes    Strength: manual muscle test grades below   Upper Extremity Strength   (R) UE (L) UE   Shoulder flexion: 5/5 5/5   Shoulder Abduction: 5/5 5/5   Shoulder IR 5/5 5/5   Shoulder ER 5/5 5/5   Elbow flexion: 5/5 5/5   Elbow extension: 5/5 5/5     Lower trap: R: 3+/5, L: 3/5  Mid trap: R: 4+/5; L; 4/5  Rhomboid: R: 5/5, L: 5/5    Functional Mobility (Bed mobility, transfers)  I in all aspects    ADL's (Activities of Daily Living):  Difficulty with reaching overhead, upper body dressing    Treatment     Ashlie received  the treatments listed below:      therapeutic exercises to develop strength, ROM, and flexibility for 20 minutes including:  Reassessment  PROM L sh flex and abd by PT  Pec stretch over 1/2 foam roll 3x30 sec  Seated thoracic extension over back of chair x10, 5 sec hold  SL open books x5, 10 sec hold  LTR + sh abd in supine x10  Shoulder flex stretch holding bar 5x10 sec    Reviewed updated HEP      Ashlie received the following manual therapy techniques:  were applied to the: L breast and L axilla for 10 minutes.   Release cording to L axilla  STM to scar on lateral L breast  NP STM to L UT, L levator scap,    neuromuscular re-education activities to improve: Kinesthetic, Sense, Proprioception, and Posture for 25 minutes. The following activities were included: (vc for proper scap setting)  Seated scap retraction into towel 2x10  Supine horiz abd on foam roll RTB 2x10  Supine scap protraction on 1/2 foam roll 2x10, 7#  Bruegger's 2x10 seated with towel roll along spine YTB  Sh ext Y tubing 1x15, then 1x15 with ER  Rows Y tubing 2x15    Patient Education and Home Exercises       Education provided:   - perform ex to point of pull not pain  Pt gave verbal understanding to all education provided     Written Home Exercises Provided: pt instructed to continue previous HEP. Exercises were reviewed and Ashlie was able to demonstrate them prior to the end of the session.  Ashlie demonstrated good  understanding of the education provided. See Electronic Medical Record under Patient Instructions for exercises provided during therapy sessions    Assessment     Pt met all goals and is safe for discharge to continue to self manage with HEP.    Patient's spiritual, cultural and educational needs considered and pt agreeable to plan of care and goals.     Anticipated barriers to physical therapy: none    Goals:   Short Term goals: 4 weeks  1. Patient will demonstrate 100% understanding of lymphedema risk reduction practices to include self  monitoring for lymphedema. (met)  2. Patient will demonstrate independence with Home Exercise program established. (met)  3. Pt will increase AROM in shoulder abduction ROM to 160-165 degrees on left to improve functional reach, carry, push, pull pain free. (met)  4. Pt will increase AROM in shoulder flexion to 150 degrees on left to improve functional reach, carry, push, pull pain free.(met)  5. Pt will increase strength by 1/3 muscle grade in gross UE musculature to improve tolerance to all functional activities pain free. (met)  6. Pt will increase periscap strength (lower trap, mid trap) by 1/3 muscle grade for improved postural stability. (met)    Long Term Goals: 8 weeks   1.  Pt will increase AROM in shoulder flexion to 160 degrees on left to improve functional reach, carry, push, pull pain free. (met)  2. Pt will increase strength to 4+/5 in gross UE musculature to improve tolerance to all functional activities pain free. (met)  3. Pt will demonstrate full/maximized tissue mobility to increase ROM and promote healthy tissue to be pain free at discharge. (met)  4. Pt will report decrease in overall worst pain to 2/10 at discharge. (met)  5. Pt will increase periscap strength (lower trap, mid trap) by 1 muscle grade for improved postural stability. (partially met)  6. Increased cervical rotation AROM to 60 deg B for increased ease driving (met)    Plan     Pt discharged from PT to continue to self manage with HEP.      Sylvie Gandhi, PT, DPT

## 2025-04-01 DIAGNOSIS — C50.012 MALIGNANT NEOPLASM OF NIPPLE OF LEFT BREAST IN FEMALE, UNSPECIFIED ESTROGEN RECEPTOR STATUS: ICD-10-CM

## 2025-04-02 ENCOUNTER — OFFICE VISIT (OUTPATIENT)
Dept: DERMATOLOGY | Facility: CLINIC | Age: 70
End: 2025-04-02
Payer: MEDICARE

## 2025-04-02 VITALS — BODY MASS INDEX: 22.07 KG/M2 | HEIGHT: 67 IN | WEIGHT: 140.63 LBS

## 2025-04-02 DIAGNOSIS — B35.1 ONYCHOMYCOSIS: Primary | ICD-10-CM

## 2025-04-02 PROCEDURE — 1157F ADVNC CARE PLAN IN RCRD: CPT | Mod: CPTII,S$GLB,, | Performed by: DERMATOLOGY

## 2025-04-02 PROCEDURE — 3288F FALL RISK ASSESSMENT DOCD: CPT | Mod: CPTII,S$GLB,, | Performed by: DERMATOLOGY

## 2025-04-02 PROCEDURE — 3008F BODY MASS INDEX DOCD: CPT | Mod: CPTII,S$GLB,, | Performed by: DERMATOLOGY

## 2025-04-02 PROCEDURE — 1159F MED LIST DOCD IN RCRD: CPT | Mod: CPTII,S$GLB,, | Performed by: DERMATOLOGY

## 2025-04-02 PROCEDURE — 1160F RVW MEDS BY RX/DR IN RCRD: CPT | Mod: CPTII,S$GLB,, | Performed by: DERMATOLOGY

## 2025-04-02 PROCEDURE — 99213 OFFICE O/P EST LOW 20 MIN: CPT | Mod: S$GLB,,, | Performed by: DERMATOLOGY

## 2025-04-02 PROCEDURE — 1101F PT FALLS ASSESS-DOCD LE1/YR: CPT | Mod: CPTII,S$GLB,, | Performed by: DERMATOLOGY

## 2025-04-02 RX ORDER — BUPROPION HYDROCHLORIDE 150 MG/1
150 TABLET ORAL EVERY MORNING
Qty: 30 TABLET | Refills: 2 | Status: SHIPPED | OUTPATIENT
Start: 2025-04-02 | End: 2026-04-02

## 2025-04-02 RX ORDER — FLUCONAZOLE 200 MG/1
TABLET ORAL
Qty: 12 TABLET | Refills: 1 | Status: SHIPPED | OUTPATIENT
Start: 2025-04-02

## 2025-04-02 NOTE — PROGRESS NOTES
Subjective:      Patient ID:  Ashlie Redman is a 70 y.o. female who presents for   Chief Complaint   Patient presents with    Nail Problem     LOV 2/16/24 NUB, Sk, Angioma, Nevi, Lentigo    Skin, right forearm, shave biopsy:   -VERRUCOUS KERATOSIS, IRRITATED AND INFLAMED, see comment     COMMENT:  The histological findings (see microscopic description) are somewhat nonspecific, but this is a benign keratosis.  This spectrum of changes can be seen in irritated seborrheic keratoses or warts. This lesion is benign.     Patient here today for nail fungus to left hand, painful.   Had chemo 2017, affected nails, left ring never healed fully  Also used nail salons in 2023 4/2021, fusarium R ring finger        Derm Hx:  Recent BCC, L upper arm, E&S 08/2023  Superficial BCC left dorsal forearm- 1-30-20  Denies Fhx of MM    Current Outpatient Medications:   ·  aspirin (ECOTRIN) 81 MG EC tablet, Take 1 tablet (81 mg total) by mouth once daily., Disp: 90 tablet, Rfl: 3  ·  buPROPion (WELLBUTRIN XL) 150 MG TB24 tablet, Take 1 tablet (150 mg total) by mouth every morning., Disp: 30 tablet, Rfl: 2  ·  calcium carb/vit D3/minerals (CALCIUM-VITAMIN D ORAL), , Disp: , Rfl:   ·  denosumab (PROLIA) 60 mg/mL Syrg, Inject 60 mg into the skin., Disp: , Rfl:   ·  dorzolamide (TRUSOPT) 2 % ophthalmic solution, Place 1 drop into both eyes 2 (two) times a day., Disp: 10 mL, Rfl: 11  ·  estradioL (ESTRACE) 0.01 % (0.1 mg/gram) vaginal cream, INSERT 1/2 GRAM VAGINA W/THOR OR DIME-SIZE W/FINGER(FAR INTERNAL) NIGHTLY X 2 WKS,THEN TWICE A WK, Disp: 42.5 g, Rfl: 3  ·  latanoprost 0.005 % ophthalmic solution, Place 1 drop into both eyes every evening. INSTILL 1 DROP INTO BOTH EYES IN THE EVENING, Disp: 2.5 mL, Rfl: 11  ·  valACYclovir (VALTREX) 500 MG tablet, TAKE 1 TABLET BY MOUTH EVERY DAY, Disp: 90 tablet, Rfl: 1               Review of Systems   Constitutional:  Negative for fever, chills and fatigue.   Respiratory:  Negative for cough and  shortness of breath.    Skin:  Positive for activity-related sunscreen use and wears hat. Negative for itching, rash, dry skin and daily sunscreen use.   Hematologic/Lymphatic: Bruises/bleeds easily.       Objective:   Physical Exam   Constitutional: She appears well-developed and well-nourished. No distress.   Neurological: She is alert and oriented to person, place, and time. She is not disoriented.   Psychiatric: She has a normal mood and affect.   Skin:   Areas Examined (abnormalities noted in diagram):   Nails and Digits Inspection Performed           Diagram Legend     Erythematous scaling macule/papule c/w actinic keratosis       Vascular papule c/w angioma      Pigmented verrucoid papule/plaque c/w seborrheic keratosis      Yellow umbilicated papule c/w sebaceous hyperplasia      Irregularly shaped tan macule c/w lentigo     1-2 mm smooth white papules consistent with Milia      Movable subcutaneous cyst with punctum c/w epidermal inclusion cyst      Subcutaneous movable cyst c/w pilar cyst      Firm pink to brown papule c/w dermatofibroma      Pedunculated fleshy papule(s) c/w skin tag(s)      Evenly pigmented macule c/w junctional nevus     Mildly variegated pigmented, slightly irregular-bordered macule c/w mildly atypical nevus      Flesh colored to evenly pigmented papule c/w intradermal nevus       Pink pearly papule/plaque c/w basal cell carcinoma      Erythematous hyperkeratotic cursted plaque c/w SCC      Surgical scar with no sign of skin cancer recurrence      Open and closed comedones      Inflammatory papules and pustules      Verrucoid papule consistent consistent with wart     Erythematous eczematous patches and plaques     Dystrophic onycholytic nail with subungual debris c/w onychomycosis     Umbilicated papule    Erythematous-base heme-crusted tan verrucoid plaque consistent with inflamed seborrheic keratosis     Erythematous Silvery Scaling Plaque c/w Psoriasis     See  annotation      Assessment / Plan:        Onychomycosis  -     fluconazole (DIFLUCAN) 200 MG Tab; Take 1 tab PO qweek.  Dispense: 12 tablet; Refill: 1    Vinegar soaks 1:1 in shot glass, soak 5-10 min  May add topcial SKNV antifungal if fails to improve  Healthy nail plate needs to grow (4-6 months) for resolution           No follow-ups on file.

## 2025-04-08 ENCOUNTER — PATIENT MESSAGE (OUTPATIENT)
Dept: OPTOMETRY | Facility: CLINIC | Age: 70
End: 2025-04-08
Payer: MEDICARE

## 2025-04-10 ENCOUNTER — PATIENT MESSAGE (OUTPATIENT)
Dept: HEMATOLOGY/ONCOLOGY | Facility: CLINIC | Age: 70
End: 2025-04-10
Payer: MEDICARE

## 2025-05-03 DIAGNOSIS — H40.053 BILATERAL OCULAR HYPERTENSION: ICD-10-CM

## 2025-05-05 RX ORDER — LATANOPROST 50 UG/ML
1 SOLUTION/ DROPS OPHTHALMIC NIGHTLY
Qty: 2.5 ML | Refills: 11 | Status: SHIPPED | OUTPATIENT
Start: 2025-05-05 | End: 2026-05-05

## 2025-05-21 NOTE — PROGRESS NOTES
HISTORY OF PRESENT ILLNESS:  This is a 62-year-old white female known to Dr. Ragsdale for a recent diagnosis of Stage IIB left breast carcinoma.  The patient discovered   a mass in her left breast and was seen by Dr. Arce.  She has received 4 cycles of   A/C and presents to the clinic today for evaluation prior to Cycle 4, Day 8 of Taxol.    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  She has bouts of   diarrhea that resolve easily with Imodium. She denies any fevers, chills, unexplained   weight loss, new breast complaints, vaginal bleeding, abdominal discomfort, nausea,   vomiting, diarrhea, mouth sores, etc.  No new complaints or pertinent findings on a   14 point review of systems.    PHYSICAL EXAMINATION:  GENERAL:  Well-developed, well-nourished white female in no acute distress.    Alert and oriented x4.  VITAL SIGNS:  Weight 147.5 pounds (decrease of 1 pound/1 week), /76, pulse 76,   respirations 16, temperature 97.9.  HEENT:  Normocephalic, atraumatic.  Oral mucosa pink and moist.  Lips without   lesions.  Tongue midline.  Oropharynx clear.  Nonicteric sclerae.  NECK:  Supple, no adenopathy.  No carotid bruits, thyromegaly or thyroid nodule.  HEART:  Regular rate and rhythm without murmur, gallop.  LUNGS:  Clear to auscultation bilaterally.  Normal respiratory effort.  ABDOMEN:  Soft, nontender, nondistended with positive normoactive bowel sounds,   no hepatosplenomegaly.  EXTREMITIES:  No cyanosis, clubbing or edema.  Distal pulses are intact.  AXILLAE AND GROIN:  No palpable pathologic lymphadenopathy is appreciated.  SKIN:  Intact/turgor normal.  NEUROLOGIC:  Cranial nerves II-XII grossly intact.  Motor:  Good muscle bulk and   tone.  Strength/sensory 5/5 throughout.  Gait stable.    LABORATORY:    Lab Results   Component Value Date    WBC 5.67 09/15/2017    HGB 11.1 (L) 09/15/2017    HCT 32.9 (L) 09/15/2017     (H) 09/15/2017     09/15/2017     Differential  remarkable for 71.3% grans, 5% lymph, 17% monos    CMP  Sodium   Date Value Ref Range Status   09/15/2017 138 136 - 145 mmol/L Final     Potassium   Date Value Ref Range Status   09/15/2017 4.0 3.5 - 5.1 mmol/L Final     Chloride   Date Value Ref Range Status   09/15/2017 106 95 - 110 mmol/L Final     CO2   Date Value Ref Range Status   09/15/2017 24 22 - 31 mmol/L Final     Glucose   Date Value Ref Range Status   09/15/2017 106 70 - 110 mg/dL Final     Comment:     The ADA recommends the following guidelines for fasting glucose:  Normal:       less than 100 mg/dL  Prediabetes:  100 mg/dL to 125 mg/dL  Diabetes:     126 mg/dL or higher       BUN, Bld   Date Value Ref Range Status   09/15/2017 11 7 - 18 mg/dL Final     Creatinine   Date Value Ref Range Status   09/15/2017 0.65 0.50 - 1.40 mg/dL Final     Calcium   Date Value Ref Range Status   09/15/2017 9.8 8.4 - 10.2 mg/dL Final     Total Protein   Date Value Ref Range Status   09/15/2017 6.8 6.0 - 8.4 g/dL Final     Albumin   Date Value Ref Range Status   09/15/2017 4.2 3.5 - 5.2 g/dL Final     Total Bilirubin   Date Value Ref Range Status   09/15/2017 0.5 0.2 - 1.3 mg/dL Final     Alkaline Phosphatase   Date Value Ref Range Status   09/15/2017 103 38 - 145 U/L Final     AST   Date Value Ref Range Status   09/15/2017 34 14 - 36 U/L Final     ALT   Date Value Ref Range Status   09/15/2017 48 (H) 10 - 44 U/L Final     Anion Gap   Date Value Ref Range Status   09/15/2017 8 8 - 16 mmol/L Final     eGFR if    Date Value Ref Range Status   09/15/2017 >60 >60 mL/min/1.73 m^2 Final     eGFR if non    Date Value Ref Range Status   09/15/2017 >60 >60 mL/min/1.73 m^2 Final     Comment:     Calculation used to obtain the estimated glomerular filtration  rate (eGFR) is the CKD-EPI equation. Since race is unknown   in our information system, the eGFR values for   -American and Non--American patients are given   for each creatinine  result.       IMPRESSION:  Stage IIB left breast carcinoma (ER/NM negative, Her-2/clay positive)    PLAN:  1.  Proceed with Cycle 4, day 8 Taxol with premedications.  2.  Follow up in clinic with interval CBC, CMP for evaluation prior to C4D15 Taxol.    Assessment/plan reviewed and approved by Dr. Lau.           Pt comes in today c/o sharp shooting pains in his head and chest when he breathes. He also c/o dry cough x's 3 days. No fever, no recent travel.

## (undated) DEVICE — COVER PROBE NL STRL 3.6X96IN

## (undated) DEVICE — SPONGE LAP 18X18 PREWASHED

## (undated) DEVICE — SYS CLSR DERMABOND PRINEO 22CM

## (undated) DEVICE — Device

## (undated) DEVICE — PACK PERI/GYN OPTIMA

## (undated) DEVICE — SUT VICRYL 3-0 27 SH

## (undated) DEVICE — GAUZE FLUFF XXLG 36X36 2 PLY

## (undated) DEVICE — BLADE SURG STAINLESS STEEL #11

## (undated) DEVICE — ELECTRODE BLADE INSULATED 1 IN

## (undated) DEVICE — BAG DRAIN ANTI REFLUX 2000ML

## (undated) DEVICE — STOCKINETTE TUBULAR 2PL 6 X 4

## (undated) DEVICE — BRA CLASSIC COMFORM BLK SZ 36

## (undated) DEVICE — BLADE SURG STAINLESS STEEL #15

## (undated) DEVICE — SUT PROLENE 4-0 FS-2 BL MON

## (undated) DEVICE — SYS PRINEO SKIN CLOSURE

## (undated) DEVICE — DRAPE COLUMN DAVINCI XI

## (undated) DEVICE — SEE MEDLINE ITEM 146313

## (undated) DEVICE — SUT MONOCRYL 3-0 PS-2 UND

## (undated) DEVICE — POSITIONER IV ARMBOARD FOAM

## (undated) DEVICE — RETRACTOR STERILE PLASTIC G2

## (undated) DEVICE — DRAPE THYROID WITH ARMBOARD

## (undated) DEVICE — SYR 10CC LUER LOCK

## (undated) DEVICE — PENCIL ELECTROSURG HOLST W/BLD

## (undated) DEVICE — SPONGE SUPER KERLIX 6X6.75IN

## (undated) DEVICE — GIRDLE COMPRESS A/K XXLG BLK

## (undated) DEVICE — SUT VICRYL PLUS 0 CT1 36IN

## (undated) DEVICE — ELECTRODE BLD EXT INSUL 1

## (undated) DEVICE — CLIP LIGATING MEDIUM

## (undated) DEVICE — SPONGE DERMACEA GAUZE 4X4

## (undated) DEVICE — UNDERGLOVE BIOGEL PI SZ 6.5 LF

## (undated) DEVICE — NDL HYPO REG 25G X 1 1/2

## (undated) DEVICE — SEE MEDLINE ITEM 152548

## (undated) DEVICE — BRA CLASSIC COMFORM BLK SZ 38

## (undated) DEVICE — DRESSING ANTIMICROBIAL 1 INCH

## (undated) DEVICE — SUT STRATAFIX PGAPCL 3 FS-1

## (undated) DEVICE — TROCAR ENDOPATH XCEL 5X100MM

## (undated) DEVICE — ADHESIVE DERMABOND ADVANCED

## (undated) DEVICE — SUT MCRYL PLUS 4-0 PS2 27IN

## (undated) DEVICE — DRESSING XEROFORM 1X8IN

## (undated) DEVICE — SUT CTD VICRYL 3-0 V.L BR

## (undated) DEVICE — STAPLER SKIN ROTATING HEAD

## (undated) DEVICE — SEE MEDLINE ITEM 152622

## (undated) DEVICE — SEE MEDLINE ITEM 153151

## (undated) DEVICE — ELECTRODE REM PLYHSV RETURN 9

## (undated) DEVICE — SEE MEDLINE ITEM 157128

## (undated) DEVICE — PAD ABD 8X10 STERILE

## (undated) DEVICE — NDL SPINAL 20GX3.5 HUB

## (undated) DEVICE — APPLICATOR CHLORAPREP ORN 26ML

## (undated) DEVICE — STOCKINETTE DBL PLY ST 4X

## (undated) DEVICE — NEOGUARD COVER 4X30CM STERILE

## (undated) DEVICE — SUT 2/0 27IN PDS II VIO MO

## (undated) DEVICE — CUP MEDICINE STERILE 2OZ

## (undated) DEVICE — SEE MEDLINE ITEM 157110

## (undated) DEVICE — DRAPE STERI INSTRUMENT 1018

## (undated) DEVICE — RETRACTOR RADIALUX LIGHTED

## (undated) DEVICE — CLOSURE SKIN STERI STRIP 1/2X4

## (undated) DEVICE — OCCLUDER COLPO-PNEUMO STERILE

## (undated) DEVICE — SPONGE COTTON TRAY 4X4IN

## (undated) DEVICE — SEE MEDLINE ITEM 157131

## (undated) DEVICE — SYR IRRIGATION BULB STER 60ML

## (undated) DEVICE — SEAL UNIVERSAL 5MM-8MM XI

## (undated) DEVICE — DRAPE UND BUTT W/POUCH 40X44IN

## (undated) DEVICE — DRESSING GZ SURGICOUNT 4X8

## (undated) DEVICE — EVACUATOR WOUND BULB 100CC

## (undated) DEVICE — SYR DISP LL 5CC

## (undated) DEVICE — SOL 0.9% NACL IRRI.IN STERIL

## (undated) DEVICE — COVER TIP CURVED SCISSORS XI

## (undated) DEVICE — SOL NACL 0.9% INJ PF/100 150

## (undated) DEVICE — GOWN NONREINF SET-IN SLV XL

## (undated) DEVICE — PACK LAPSCP/PELVSCPY III TIBRN

## (undated) DEVICE — NDL 18GA X1 1/2 REG BEVEL

## (undated) DEVICE — SEE MEDLINE ITEM 157181

## (undated) DEVICE — BANDAGE KERLIX P/P 2.25IN STER

## (undated) DEVICE — SKIN MARKER DEVON 160

## (undated) DEVICE — SYR ONLY LUER LOCK 20CC

## (undated) DEVICE — SUT 9/0 5IN ETHILON BLK MON

## (undated) DEVICE — PACK UNIV PROCEDURE

## (undated) DEVICE — TRAY DRY SKIN SCRUB PREP

## (undated) DEVICE — SUT PROLENE 4-0 MONO 18IN

## (undated) DEVICE — SUT STRATAFIX PGAPCL 3 FS-2

## (undated) DEVICE — SUT VICRYL PLUS 4-0 PS2 27

## (undated) DEVICE — SWAB CULTURETTE II DUAL

## (undated) DEVICE — CATHETER RADIAL TIG 4.0 OPTITORQUE 5X110

## (undated) DEVICE — DRAIN WND 15FRX3/16X4.7MM TRCR

## (undated) DEVICE — WARMER DRAPE STERILE LF

## (undated) DEVICE — ADHESIVE MASTISOL VIAL 48/BX

## (undated) DEVICE — SUT VICRYL 2-0 36 CT-1

## (undated) DEVICE — TRAY FOLEY 16FR INFECTION CONT

## (undated) DEVICE — METER URINE DRAIN BAG 400ML

## (undated) DEVICE — DRAPE C ARM 42 X 120 10/BX

## (undated) DEVICE — TRAY DO THE ROBOT

## (undated) DEVICE — SCISSOR 5MMX35CM DIRECT DRIVE

## (undated) DEVICE — GAUZE SPONGE 4X4 12PLY

## (undated) DEVICE — SUT VICRYL 4-0 ANTIBACT

## (undated) DEVICE — COVER HANDLE UNIVERSAL 6X14

## (undated) DEVICE — HEMOSTAT VASC BAND REG 24CM

## (undated) DEVICE — GOWN SURGICAL X-LARGE

## (undated) DEVICE — TOWEL OR DISP STRL BLUE 4/PK

## (undated) DEVICE — SOL POVIDONE PREP IODINE 4 OZ

## (undated) DEVICE — SEE MEDLINE ITEM 152529

## (undated) DEVICE — LUBRICANT SURGILUBE 2 OZ

## (undated) DEVICE — TIP RUMI MANIPULATOR LAVENDER

## (undated) DEVICE — CATHETER DIAGNOSTIC DXTERITY 5F PIGST

## (undated) DEVICE — GEL AQUASONIC 100 STERILE20GM

## (undated) DEVICE — SUT 2/0 30IN SILK BLK BRAI

## (undated) DEVICE — CLIP LIGATING HEMOCLP SMALL

## (undated) DEVICE — SCRUB 10% POVIDONE IODINE 4OZ

## (undated) DEVICE — SUT PDS II 2-0 CT1

## (undated) DEVICE — SUT STRATAFIX PDO 2-0 MH

## (undated) DEVICE — SUT MONOCRYL 4-0 PS-2

## (undated) DEVICE — MARKER SKIN STND TIP BLUE BARR

## (undated) DEVICE — CLIPPER BLADE MOD 4406 (CAREF)

## (undated) DEVICE — TUBING INFILTRATION STRL DISP

## (undated) DEVICE — CLAMP SINGLE MICRO.

## (undated) DEVICE — FUNNEL KELLER STERILE

## (undated) DEVICE — SOL NORMAL USPCA 0.9%

## (undated) DEVICE — CONTAINER SPECIMEN STRL 4OZ

## (undated) DEVICE — HOOK LONE STAR BLUNT 12MM

## (undated) DEVICE — SUT VICRYL 4-0 27 RB-1

## (undated) DEVICE — DRAIN CHANNEL ROUND 15FR

## (undated) DEVICE — HOOK STAY ELAS 5MM 8EA/PK

## (undated) DEVICE — TRAY MINOR GEN SURG

## (undated) DEVICE — GOWN SMART IMP BREATHABLE XXLG

## (undated) DEVICE — CORD BIPOLAR 12 FOOT

## (undated) DEVICE — SEE MEDLINE ITEM 154981

## (undated) DEVICE — SYR 50CC LL

## (undated) DEVICE — TRAY CATH FOL SIL URIMTR 16FR

## (undated) DEVICE — BRA SURGICAL LG 38-40

## (undated) DEVICE — SET TRI-LUMEN FILTERED TUBE

## (undated) DEVICE — ELECTRODE BLADE TEFLON 6

## (undated) DEVICE — SET IRR URLGY 2LINE UNIV SPIKE

## (undated) DEVICE — IRRIGATOR ENDOSCOPY DISP.

## (undated) DEVICE — KIT WING PAD POSITIONING

## (undated) DEVICE — SUT 2-0 VICRYL / SH (J417)

## (undated) DEVICE — DEVICE ANC SW STAT FOLEY 6-24

## (undated) DEVICE — SUT PROLENE 4-0 PS2 18 BLUE

## (undated) DEVICE — CATH FOL IC 3WAY 16F 5CCLF

## (undated) DEVICE — GLOVE BIOGEL SKINSENSE PI 6.5

## (undated) DEVICE — DRESSING XEROFORM FOIL PK 1X8

## (undated) DEVICE — MARKER SURGICAL W/15CM RULER

## (undated) DEVICE — DRAPE VS3 IRIDIUM MICROSCOPE

## (undated) DEVICE — SUT STRATAFIX PDS 1 CTX 18IN

## (undated) DEVICE — DRAPE ADPT RUMI II ADVINCULA

## (undated) DEVICE — POSITIONER HEAD DONUT 9IN FOAM

## (undated) DEVICE — GLOVE BIOGEL SKINSENSE PI 7.0

## (undated) DEVICE — INSERT CUSHIONPRONE VIEW LARGE

## (undated) DEVICE — JELLY SURGILUBE 5GR

## (undated) DEVICE — SUT PROLENE 0 MO6 30IN BLUE

## (undated) DEVICE — OBTURATOR BLADELESS 8MM XI CLR

## (undated) DEVICE — GLOVE BIOGEL SKINSENSE PI 7.5

## (undated) DEVICE — SUT VICRYL 0 27 CT-2

## (undated) DEVICE — SYS SEE SHARP SCP ANTIFG LNG

## (undated) DEVICE — SEE MEDLINE ITEM 152530

## (undated) DEVICE — SYR ONLY 60CC TOOMEY

## (undated) DEVICE — SYRINGE 0.9% NACL 10MIL PREFIL

## (undated) DEVICE — SUT 3-0 VICRYL SH CR/8 18

## (undated) DEVICE — SOL NACL 0.9% INJ PF/50151

## (undated) DEVICE — DRAPE ARM DAVINCI XI

## (undated) DEVICE — POSITIONER HEEL FOAM CONVOLTD

## (undated) DEVICE — SOL ELECTROLUBE ANTI-STIC

## (undated) DEVICE — CLIP DOUBLE MICRO.

## (undated) DEVICE — SUT PDS II 2-0 CT-2 VIL

## (undated) DEVICE — PACK UNIVERSAL SPLIT II

## (undated) DEVICE — SOL IRR WATER STRL 3000 ML

## (undated) DEVICE — BLANKET HYPER ADULT 24X60IN

## (undated) DEVICE — SUT VICRYL CTD 2-0 GI 27 SH

## (undated) DEVICE — SEE MEDLINE ITEM 146417

## (undated) DEVICE — GUIDEWIRE J TIP EXCHANGE FIXED CORE 260

## (undated) DEVICE — SHEATH INTRODUCER SLENDER 6FX10CM

## (undated) DEVICE — SOL 9P NACL IRR PIC IL

## (undated) DEVICE — PORT ACCESS 8MM W/120MM LOW

## (undated) DEVICE — CAUTERY TIP POLISHER

## (undated) DEVICE — UNDERGLOVES BIOGEL PI SZ 7 LF

## (undated) DEVICE — SOL IRRI STRL WATER 1000ML

## (undated) DEVICE — COLLECTOR SPECIMEN ANAEROBIC

## (undated) DEVICE — SET CYSTO IRR DRP CHMBR 84IN

## (undated) DEVICE — STOCKINET 4INX48

## (undated) DEVICE — SOL POVIDONE SCRUB IODINE 4 OZ

## (undated) DEVICE — SYR SALINE PREFILLED FLSH 10ML

## (undated) DEVICE — PAD PREP CUFFED NS 24X48IN